# Patient Record
Sex: MALE | Race: WHITE | NOT HISPANIC OR LATINO | Employment: OTHER | URBAN - METROPOLITAN AREA
[De-identification: names, ages, dates, MRNs, and addresses within clinical notes are randomized per-mention and may not be internally consistent; named-entity substitution may affect disease eponyms.]

---

## 2017-01-03 ENCOUNTER — OFFICE VISIT (OUTPATIENT)
Dept: AUDIOLOGY | Facility: CLINIC | Age: 62
End: 2017-01-03
Payer: COMMERCIAL

## 2017-01-03 ENCOUNTER — OFFICE VISIT (OUTPATIENT)
Dept: OTOLARYNGOLOGY | Facility: CLINIC | Age: 62
End: 2017-01-03
Payer: COMMERCIAL

## 2017-01-03 DIAGNOSIS — H93.292 ABNORMAL AUDITORY PERCEPTION OF LEFT EAR: Primary | ICD-10-CM

## 2017-01-03 DIAGNOSIS — H69.92 DYSFUNCTION OF EUSTACHIAN TUBE, LEFT: Primary | ICD-10-CM

## 2017-01-03 PROCEDURE — 92567 TYMPANOMETRY: CPT | Performed by: AUDIOLOGIST

## 2017-01-03 PROCEDURE — 31575 DIAGNOSTIC LARYNGOSCOPY: CPT | Performed by: OTOLARYNGOLOGY

## 2017-01-03 PROCEDURE — 92557 COMPREHENSIVE HEARING TEST: CPT | Performed by: AUDIOLOGIST

## 2017-01-03 PROCEDURE — 99202 OFFICE O/P NEW SF 15 MIN: CPT | Mod: 25 | Performed by: OTOLARYNGOLOGY

## 2017-01-03 RX ORDER — MOMETASONE FUROATE MONOHYDRATE 50 UG/1
2 SPRAY, METERED NASAL DAILY
Qty: 3 BOX | Refills: 1 | Status: SHIPPED | OUTPATIENT
Start: 2017-01-03 | End: 2018-04-16

## 2017-01-03 ASSESSMENT — ENCOUNTER SYMPTOMS
DOUBLE VISION: 0
VOMITING: 0
BRUISES/BLEEDS EASILY: 0
TINGLING: 0
TREMORS: 0
CONSTITUTIONAL NEGATIVE: 1
HEARTBURN: 0
BLURRED VISION: 0
COUGH: 0
DIZZINESS: 0
NAUSEA: 0

## 2017-01-03 ASSESSMENT — PAIN SCALES - GENERAL: PAINLEVEL: NO PAIN (0)

## 2017-01-03 NOTE — PROGRESS NOTES
AUDIOLOGY REPORT    SUMMARY: Audiology visit completed. See audiogram for results.    RECOMMENDATIONS: Follow-up with ENT.    Ugo Wilson  Doctor of Audiology  [place] License # [id number]

## 2017-01-03 NOTE — NURSING NOTE
"Sven Givens's goals for this visit include:   Chief Complaint   Patient presents with     Consult     Left ear fluid       He requests these members of his care team be copied on today's visit information: pcp      PCP: Pam Dela Cruz    Referring Provider:  No referring provider defined for this encounter.    Chief Complaint   Patient presents with     Consult     Left ear fluid       Initial There were no vitals taken for this visit. Estimated body mass index is 34.92 kg/(m^2) as calculated from the following:    Height as of 12/20/16: 1.702 m (5' 7.01\").    Weight as of 12/20/16: 101.152 kg (223 lb).  BP completed using cuff size: NA (Not Taken)    "

## 2017-01-03 NOTE — PROGRESS NOTES
HPI    This is a 61 year old patient who has been having plugged up sensation and water feeling in his left ear. Started in September after his surgery. Denies any ear drainage, pain, tinnitus, dizziness or vertigo. No bleeding from nose or breathing issues through his nose. He has a hx of diabetes, GERD, PRESLEY, non-small cell ca, obesity, radiation exposure, and anemia. He tried CPAP in the past for his PRESLEY. He loss weight right after the surgery and gained weight again. He has seasonal allergies but no symptoms at this point.     Review of Systems   Constitutional: Negative.    HENT: Negative.    Eyes: Negative for blurred vision and double vision.   Respiratory: Negative for cough.    Gastrointestinal: Negative for heartburn, nausea and vomiting.   Skin: Negative.    Neurological: Negative for dizziness, tingling and tremors.   Endo/Heme/Allergies: Positive for environmental allergies. Does not bruise/bleed easily.         Physical Exam   Constitutional: He is well-developed, well-nourished, and in no distress.   HENT:   Head: Normocephalic and atraumatic.   Right Ear: Tympanic membrane, external ear and ear canal normal. No drainage, swelling or tenderness. No middle ear effusion. No decreased hearing is noted.   Left Ear: Tympanic membrane, external ear and ear canal normal. No drainage, swelling or tenderness.  No middle ear effusion. No decreased hearing is noted.   Nose: Mucosal edema present. No rhinorrhea or septal deviation.   Mouth/Throat: Uvula is midline, oropharynx is clear and moist and mucous membranes are normal. No oropharyngeal exudate.   Eyes: Pupils are equal, round, and reactive to light.   Neck: Neck supple. No tracheal deviation present. No thyromegaly present.   Lymphadenopathy:     He has no cervical adenopathy.     Diagnostic nasal endoscopy:     He was seen in the room and identified. Pros and cons of the procedure were explained to the patient. The procedure and its alternatives were  explained to the patient in lay terms. His questions were answered. His symptoms required a diagnostic endoscopic evaluation under local anesthesia. After obtaining an informed consent from the patient, 3% Lidocaine with 1: 100.000 epinephrine spray was applied to each nostril. Then a flexible scopic exam was performed. Septum is in the midline. Ostiomeatal complexes are free of disease but swollen and narrow. No pus or polyp seen. Inferior turbinates are enlarged. Nasopharynx is normal. Rosenmüller fossa and torus tubarius are normal. Epiglottis, hypopharynx, false vocal folds are normal. No pooling in pyriform fossae. Vocal cords are mobile and normal. He tolerated the procedure well and left the room with no complications.      A/P  ETD  I will start a topical nasal steroid and see him in the f/u in a month. If the problem continues, we can try a myringotomy. Questions were answered.

## 2017-03-05 DIAGNOSIS — I10 ESSENTIAL HYPERTENSION WITH GOAL BLOOD PRESSURE LESS THAN 140/90: ICD-10-CM

## 2017-03-05 NOTE — TELEPHONE ENCOUNTER
terazosin (HYTRIN) 5 MG capsule      Last Written Prescription Date: 9/9/16  Last Fill Quantity: 90, # refills: 1    Last Office Visit with [facility], [facility] or [facility] prescribing provider:  10/17/16   Future Office Visit:    Next 5 appointments (look out 90 days)     May 26, 2017  3:15 PM CDT   Return Visit with Lay Valencia MD   [facility] ([facility])    [address] [place] [id number] [phone]                    BP Readings from Last 3 Encounters:   12/20/16 126/75   12/15/16 (!) 146/93   10/17/16 105/69           Manuel Faarax  [facility]

## 2017-03-08 RX ORDER — TERAZOSIN 5 MG/1
CAPSULE ORAL
Qty: 90 CAPSULE | Refills: 1 | Status: SHIPPED | OUTPATIENT
Start: 2017-03-08 | End: 2017-09-03

## 2017-03-08 NOTE — TELEPHONE ENCOUNTER
Prescription approved per [facility] Refill Protocol.    Flor Beatty RN, [facility]

## 2017-03-09 ENCOUNTER — TELEPHONE (OUTPATIENT)
Dept: FAMILY MEDICINE | Facility: CLINIC | Age: 62
End: 2017-03-09

## 2017-03-09 DIAGNOSIS — E11.42 TYPE 2 DIABETES MELLITUS WITH DIABETIC POLYNEUROPATHY, WITHOUT LONG-TERM CURRENT USE OF INSULIN (H): Primary | ICD-10-CM

## 2017-03-09 NOTE — TELEPHONE ENCOUNTER
Routing refill request to provider for review/approval because:  Drug not active on patient's medication list  Rehana De Santiago RN

## 2017-03-09 NOTE — TELEPHONE ENCOUNTER
Reason for Call:  Other prescription    Detailed comments: [facility] calling for Pt previously called in to the pharmacy for one touch test strips a week ago but were not received.  They were hoping Dr. Dela Cruz can send in a Rx as soon as possible for Pt is out.    Phone Number Pharmacy can be reached at: Other phone number:  [phone]    Best Time: Anytime    Can we leave a detailed message on this number? YES    Call taken on 3/9/2017 at 2:14 PM by Amadeo Krishnan

## 2017-03-11 ENCOUNTER — MYC REFILL (OUTPATIENT)
Dept: FAMILY MEDICINE | Facility: CLINIC | Age: 62
End: 2017-03-11

## 2017-03-11 DIAGNOSIS — E11.42 TYPE 2 DIABETES MELLITUS WITH DIABETIC POLYNEUROPATHY, WITHOUT LONG-TERM CURRENT USE OF INSULIN (H): ICD-10-CM

## 2017-03-12 DIAGNOSIS — E11.65 TYPE 2 DIABETES MELLITUS WITH HYPERGLYCEMIA, WITHOUT LONG-TERM CURRENT USE OF INSULIN (H): Primary | ICD-10-CM

## 2017-03-12 DIAGNOSIS — E11.9 TYPE 2 DIABETES MELLITUS WITHOUT COMPLICATION (H): ICD-10-CM

## 2017-03-12 NOTE — TELEPHONE ENCOUNTER
metFORMIN          Last Written Prescription Date: 3/18/16  Last Fill Quantity: 180, # refills: 3  Last Office Visit with G, P or [facility] prescribing provider:  10/17/16   Next 5 appointments (look out 90 days)     May 26, 2017  3:15 PM CDT   Return Visit with Lay Valencia MD   [facility] ([facility])    [address] [place] [address] [phone]                   BP Readings from Last 3 Encounters:   12/20/16 126/75   12/15/16 (!) 146/93   10/17/16 105/69     Lab Results   Component Value Date    MICROL 38 10/30/2015     No results found for: MICROALBUMIN  Creatinine   Date Value Ref Range Status   09/24/2016 0.65 (L) 0.66 - 1.25 mg/dL Final   ]  GFR Estimate   Date Value Ref Range Status   09/24/2016 >90  Non  GFR Calc   >60 mL/min/1.7m2 Final   09/24/2016 >90  Non  GFR Calc   >60 mL/min/1.7m2 Final   09/23/2016 >90  Non  GFR Calc   >60 mL/min/1.7m2 Final     GFR Estimate If Black   Date Value Ref Range Status   09/24/2016 >90   GFR Calc   >60 mL/min/1.7m2 Final   09/24/2016 >90   GFR Calc   >60 mL/min/1.7m2 Final   09/23/2016 >90   GFR Calc   >60 mL/min/1.7m2 Final     Lab Results   Component Value Date    CHOL 147 03/18/2016     Lab Results   Component Value Date    HDL 29 03/18/2016     Lab Results   Component Value Date    LDL 84 03/18/2016    LDL 69 11/09/2009     Lab Results   Component Value Date    TRIG 172 03/18/2016     Lab Results   Component Value Date    CHOLHDLRATIO 4.8 03/02/2015     Lab Results   Component Value Date    AST 32 05/13/2016     Lab Results   Component Value Date    ALT 60 05/13/2016     Lab Results   Component Value Date    A1C 7.4 09/16/2016    A1C 7.1 03/18/2016    A1C 7.5 10/30/2015    A1C 6.8 03/02/2015    A1C 8.9 10/15/2014     Potassium   Date Value Ref Range Status   09/24/2016 3.8 3.4 - 5.3 mmol/L Final

## 2017-03-13 NOTE — TELEPHONE ENCOUNTER
Message from MyChart:  Original authorizing provider: Pam Dela Cruz MD    Sven Nageltucker would like a refill of the following medications:  blood glucose monitoring (ONE TOUCH ULTRA) test strip [Pam Dela Cruz MD]    Preferred pharmacy: [facility]/PHARMACY #[id number] - [place], [place] - [address] [address]    Comment:

## 2017-03-14 NOTE — TELEPHONE ENCOUNTER
Routing refill request to provider for review/approval because:  Labs out of range:  Creatinine  Lise Villa RN

## 2017-03-25 ENCOUNTER — OFFICE VISIT (OUTPATIENT)
Dept: URGENT CARE | Facility: URGENT CARE | Age: 62
End: 2017-03-25
Payer: COMMERCIAL

## 2017-03-25 VITALS
WEIGHT: 220 LBS | TEMPERATURE: 98.6 F | HEART RATE: 116 BPM | DIASTOLIC BLOOD PRESSURE: 78 MMHG | BODY MASS INDEX: 34.45 KG/M2 | SYSTOLIC BLOOD PRESSURE: 132 MMHG | OXYGEN SATURATION: 96 %

## 2017-03-25 DIAGNOSIS — J20.9 ACUTE BRONCHITIS, UNSPECIFIED ORGANISM: ICD-10-CM

## 2017-03-25 DIAGNOSIS — J98.01 BRONCHOSPASM: Primary | ICD-10-CM

## 2017-03-25 PROCEDURE — 99213 OFFICE O/P EST LOW 20 MIN: CPT | Performed by: FAMILY MEDICINE

## 2017-03-25 RX ORDER — ALBUTEROL SULFATE 90 UG/1
2-4 AEROSOL, METERED RESPIRATORY (INHALATION) EVERY 4 HOURS PRN
Qty: 1 INHALER | Refills: 1 | Status: SHIPPED | OUTPATIENT
Start: 2017-03-25 | End: 2017-11-10

## 2017-03-25 NOTE — MR AVS SNAPSHOT
After Visit Summary   3/25/2017    Sven Givens    MRN: [id number]           Patient Information     Date Of Birth          1955        Visit Information        Provider Department      3/25/2017 11:20 AM Ahsan Veronica MD [facility]        Today's Diagnoses     Bronchospasm    -  1    Acute bronchitis, unspecified organism           Follow-ups after your visit        Your next 10 appointments already scheduled     May 23, 2017  2:00 PM CDT   LAB with LAB ONC [facility])    [id number] [place] [id number] [phone]           Patient must bring picture ID.  Patient should be prepared to give a urine specimen  Please do not eat 10-12 hours before your appointment if you are coming in fasting for labs on lipids, cholesterol, or glucose (sugar).  Pregnant women should follow their Care Team instructions. Water with medications is okay. Do not drink coffee or other fluids.   If you have concerns about taking  your medications, please ask at office or if scheduling via [employer], send a message by clicking on Secure Messaging, Message Your Care Team.            May 23, 2017  2:30 PM CDT   CT CHEST ABDOMEN PELVIS W/O & W CONTRAST with MGCT1   [facility])    [address] [address] [place] [id number] [phone]           Please bring any scans or X-rays taken at other hospitals, if similar tests were done. Also bring a list of your medicines, including vitamins, minerals and over-the-counter drugs. It is safest to leave personal items at home.  Be sure to tell your doctor:   If you have any allergies.   If there s any chance you are pregnant.   If you are breastfeeding.   If you have any special needs.  You may have contrast for this exam. To prepare:   Do not eat or drink for 2 hours before your exam. If you need to take medicine, you may take it  with small sips of water. (We may ask you to take liquid medicine as well.)   The day before your exam, drink extra fluids at least six 8-ounce glasses (unless your doctor tells you to restrict your fluids).  Patients over 70 or patients with diabetes or kidney problems:   If you haven t had a blood test (creatinine test) within the last 30 days, go to your clinic or Diagnostic Imaging Department for this test.  If you have diabetes:   If your kidney function is normal, continue taking your metformin (Avandamet, Glucophage, Glucovance, Metaglip) on the day of your exam.   If your kidney function is abnormal, wait 48 hours before restarting this medicine.  You will have oral contrast for this exam:   You will drink the contrast at home. Get this from your clinic or Diagnostic Imaging Department. Please follow the directions given.  Please wear loose clothing, such as a sweat suit or jogging clothes. Avoid snaps, zippers and other metal. We may ask you to undress and put on a hospital gown.  If you have any questions, please call the Imaging Department where you will have your exam.            May 26, 2017  3:15 PM CDT   Return Visit with Lay Valencia MD   [facility] ([facility])    [address] [place] [id number] [phone]              Who to contact     If you have questions or need follow up information about today's clinic visit or your schedule please contact [facility] directly at [phone].  Normal or non-critical lab and imaging results will be communicated to you by MyChart, letter or phone within 4 business days after the clinic has received the results. If you do not hear from us within 7 days, please contact the clinic through [employer] or phone. If you have a critical or abnormal lab result, we will notify you by phone as soon as possible.  Submit refill requests through [employer] or call your pharmacy and they will forward the  refill request to us. Please allow 3 business days for your refill to be completed.          Additional Information About Your Visit        [employer] Information     [employer] gives you secure access to your electronic health record. If you see a primary care provider, you can also send messages to your care team and make appointments. If you have questions, please call your primary care clinic.  If you do not have a primary care provider, please call [phone] and they will assist you.        Care EveryWhere ID     This is your Care EveryWhere ID. This could be used by other organizations to access your [place] medical records  [id number]        Your Vitals Were     Pulse Temperature Pulse Oximetry BMI (Body Mass Index)          116 98.6  F (37  C) (Oral) 96% 34.45 kg/m2         Blood Pressure from Last 3 Encounters:   03/25/17 132/78   12/20/16 126/75   12/15/16 (!) 146/93    Weight from Last 3 Encounters:   03/25/17 220 lb (99.8 kg)   12/20/16 223 lb (101.2 kg)   12/15/16 226 lb 3.2 oz (102.6 kg)              Today, you had the following     No orders found for display         Today's Medication Changes          These changes are accurate as of: 3/25/17 12:41 PM.  If you have any questions, ask your nurse or doctor.               Start taking these medicines.        Dose/Directions    albuterol 108 (90 BASE) MCG/ACT Inhaler   Commonly known as:  PROAIR HFA/PROVENTIL HFA/VENTOLIN HFA   Used for:  Bronchospasm, Acute bronchitis, unspecified organism   Started by:  Ahsan Veronica MD        Dose:  2-4 puff   Inhale 2-4 puffs into the lungs every 4 hours as needed for shortness of breath / dyspnea or wheezing   Quantity:  1 Inhaler   Refills:  1            Where to get your medicines      These medications were sent to [facility]/pharmacy #[id number] - [place] - [address] [facility]  [address] [facility] [place] [facility] [address]     Phone:  [phone]     albuterol 108 (90 BASE) MCG/ACT Inhaler                 Primary Care Provider Office Phone # Fax #    Pam Yesenia Dela Cruz [phone]       [facility] [address]  [facility] [address]        Thank you!     Thank you for choosing [facility]  for your care. Our goal is always to provide you with excellent care. Hearing back from our patients is one way we can continue to improve our services. Please take a few minutes to complete the written survey that you may receive in the mail after your visit with us. Thank you!             Your Updated Medication List - Protect others around you: Learn how to safely use, store and throw away your medicines at www.disposemymeds.org.          This list is accurate as of: 3/25/17 12:41 PM.  Always use your most recent med list.                   Brand Name Dispense Instructions for use    albuterol 108 (90 BASE) MCG/ACT Inhaler    PROAIR HFA/PROVENTIL HFA/VENTOLIN HFA    1 Inhaler    Inhale 2-4 puffs into the lungs every 4 hours as needed for shortness of breath / dyspnea or wheezing       allopurinol 300 MG tablet    ZYLOPRIM    90 tablet    TAKE 1 TABLET (300 MG) BY MOUTH DAILY       aspirin 81 MG tablet      1 TABLET DAILY(*)       atorvastatin 40 MG tablet    LIPITOR    90 tablet    Take 1 tablet (40 mg) by mouth daily       Azelaic Acid 15 % gel    FINACEA    50 g    Apply small amount twice a day to skin       blood glucose monitoring test strip    ONE TOUCH ULTRA    100 strip    Use to test blood sugars 2 times daily or as directed.       cinnamon 500 MG Tabs      Take one tablet twice daily.       fish oil-omega-3 fatty acids 1000 MG capsule      daily       fluticasone 50 MCG/ACT spray    FLONASE    1 Bottle    Spray 1-2 sprays into both nostrils daily       glimepiride 4 MG tablet    AMARYL    180 tablet    TAKE 1 TABLET (4 MG) BY MOUTH 2 TIMES DAILY       guaiFENesin-codeine 100-10 MG/5ML Soln solution    ROBITUSSIN AC    120 mL    Take 5 mLs by mouth every 4 hours as  needed for cough       losartan-hydrochlorothiazide 100-25 MG per tablet    HYZAAR    90 tablet    TAKE 1 TABLET BY MOUTH DAILY       metFORMIN 1000 MG tablet    GLUCOPHAGE    180 tablet    TAKE 1 TABLET (1,000 MG) BY MOUTH 2 TIMES DAILY (WITH MEALS)       minocycline 100 MG tablet    DYNACIN    60 tablet    Take 1 tablet by mouth. Twice daily for 10 days with flare of rosacea.       mometasone 50 MCG/ACT spray    NASONEX    3 Box    Spray 2 sprays into both nostrils daily       MULTIPLE VITAMINS PO      1 a day       nitroglycerin 0.4 MG sublingual tablet    NITROSTAT    25 tablet    Place 1 tablet (0.4 mg) under the tongue every 5 minutes as needed for chest pain If you are still having symptoms after 3 doses (15 minutes) call 911.       omeprazole 20 MG CR capsule    priLOSEC    180 capsule    Take 1 capsule (20 mg) by mouth 2 times daily Take 30-60 minutes before a meal.       order for DME     200 strip    One touch ultra  Test strips for checking blood sugars 2 times a day.       terazosin 5 MG capsule    HYTRIN    90 capsule    TAKE 1 CAPSULE (5 MG) BY MOUTH AT BEDTIME

## 2017-03-25 NOTE — NURSING NOTE
"Chief Complaint   Patient presents with     Cough     Pt c/o Loss of appetite, body aches, headache, cough, trouble sleeping. Sx started Saturday when pt was in [place].       Initial /78 (BP Location: Left arm, Patient Position: Chair, Cuff Size: Adult Large)  Pulse 116  Temp 98.6  F (37  C) (Oral)  Wt 220 lb (99.8 kg)  SpO2 96%  BMI 34.45 kg/m2 Estimated body mass index is 34.45 kg/(m^2) as calculated from the following:    Height as of 12/20/16: 5' 7.01\" (1.702 m).    Weight as of this encounter: 220 lb (99.8 kg).  Medication Reconciliation: complete     Clementina Cramer CMA (AAMA)      "

## 2017-03-25 NOTE — PROGRESS NOTES
Some of this note was populated by a medical assistant.      SUBJECTIVE:                                                    Sven Givens is a 61 year old male who presents to clinic today for the following health issues:    RESPIRATORY SYMPTOMS      Duration: Saturday last week    Description  Loss of appetite, body aches, headache -- apical, cough -- dry , trouble sleeping, mild rhinorrhea     Severity: moderate    Accompanying signs and symptoms: None    History (predisposing factors):  none    Precipitating or alleviating factors: Was in [place] last week    Therapies tried and outcome:  rest and fluids, cough syrup, medication in [place] for congestion- no relief.      Hx of non-small cell cancer of left lung with resection in 9/2016       Problem list and histories reviewed & adjusted, as indicated.  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, overuse, and pressure     Hypertriglyceridemia     Benign prostatic hyperplasia with lower urinary tract symptoms, unspecified morphology     History of radiation exposure     Non-small cell carcinoma of lung, left (H)     Nonalcoholic hepatosteatosis     Past Surgical History:   Procedure Laterality Date     BRONCHOSCOPY FLEXIBLE N/A 9/23/2016    Procedure: BRONCHOSCOPY FLEXIBLE;  Surgeon: Gayle Moya MD;  Location: [facility] OR     COLONOSCOPY  5-03     GENITOURINARY SURGERY      kidney stones     THORACOSCOPIC WEDGE RESECTION LUNG Left 9/23/2016    Procedure: THORACOSCOPIC WEDGE  RESECTION LUNG;  Surgeon: Gayle Moya MD;  Location: [facility] OR     VASCULAR SURGERY      varicose vein       Social History   Substance Use Topics     Smoking status: Former Smoker     Quit date: 2/1/1992     Smokeless tobacco: Never Used      Comment: 15 + years      Alcohol use No     Family History   Problem Relation Age of Onset     CEREBROVASCULAR DISEASE Father      CANCER Sister      ovarary          Current Outpatient Prescriptions   Medication Sig Dispense Refill     metFORMIN (GLUCOPHAGE) 1000 MG tablet TAKE 1 TABLET (1,000 MG) BY MOUTH 2 TIMES DAILY (WITH MEALS) 180 tablet 3     order for DME One touch ultra  Test strips for checking blood sugars 2 times a day. 200 strip 1     blood glucose monitoring (ONE TOUCH ULTRA) test strip Use to test blood sugars 2 times daily or as directed. 100 strip 11     terazosin (HYTRIN) 5 MG capsule TAKE 1 CAPSULE (5 MG) BY MOUTH AT BEDTIME 90 capsule 1     mometasone (NASONEX) 50 MCG/ACT spray Spray 2 sprays into both nostrils daily 3 Box 1     glimepiride (AMARYL) 4 MG tablet TAKE 1 TABLET (4 MG) BY MOUTH 2 TIMES DAILY 180 tablet 1     minocycline (DYNACIN) 100 MG tablet Take 1 tablet by mouth. Twice daily for 10 days with flare of rosacea. 60 tablet 2     allopurinol (ZYLOPRIM) 300 MG tablet TAKE 1 TABLET (300 MG) BY MOUTH DAILY 90 tablet 3     losartan-hydrochlorothiazide (HYZAAR) 100-25 MG per tablet TAKE 1 TABLET BY MOUTH DAILY 90 tablet 3     guaiFENesin-codeine (ROBITUSSIN AC) 100-10 MG/5ML SOLN Take 5 mLs by mouth every 4 hours as needed for cough 120 mL 1     fluticasone (FLONASE) 50 MCG/ACT nasal spray Spray 1-2 sprays into both nostrils daily 1 Bottle 1     Azelaic Acid (FINACEA) 15 % gel Apply small amount twice a day to skin 50 g 3     atorvastatin (LIPITOR) 40 MG tablet Take 1 tablet (40 mg) by mouth daily 90 tablet 3     nitroglycerin (NITROSTAT) 0.4 MG SL tablet Place 1 tablet (0.4 mg) under the tongue every 5 minutes as needed for chest pain If you are  still having symptoms after 3 doses (15 minutes) call 911. 25 tablet 3     omeprazole (PRILOSEC) 20 MG capsule Take 1 capsule (20 mg) by mouth 2 times daily Take 30-60 minutes before a meal. 180 capsule 3     CINNAMON 500 MG OR TABS Take one tablet twice daily.       FISH OIL 1000 MG OR CAPS daily       ASPIRIN 81 MG PO TABS 1 TABLET DAILY(*)       MULTIPLE VITAMINS OR 1 a day       [DISCONTINUED] ORDER FOR DME One touch ultra  Test strips for checking blood sugars 2 times a day. 200 strip 1     Allergies   Allergen Reactions     Penicillins Rash     Ace Inhibitors Cough     Indomethacin Other (See Comments)     Severe dizziness       Reviewed and updated as needed this visit by clinical staff       Reviewed and updated as needed this visit by Provider         ROS:  Constitutional, HEENT, cardiovascular, pulmonary, gi and gu systems are negative, except as otherwise noted.    OBJECTIVE:                                                    /78 (BP Location: Left arm, Patient Position: Chair, Cuff Size: Adult Large)  Pulse 116  Temp 98.6  F (37  C) (Oral)  Wt 220 lb (99.8 kg)  SpO2 96%  BMI 34.45 kg/m2  Body mass index is 34.45 kg/(m^2).  GENERAL: healthy, alert and no distress  NECK: no adenopathy, no asymmetry, masses, or scars and thyroid normal to palpation  RESP: lungs clear to auscultation - no rales, rhonchi or wheezes  CV: regular rate and rhythm, normal S1 S2, no S3 or S4, no murmur, click or rub, no peripheral edema and peripheral pulses strong  ABDOMEN: soft, nontender, no hepatosplenomegaly, no masses and bowel sounds normal  MS: no gross musculoskeletal defects noted, no edema    Diagnostic Test Results:  none      ASSESSMENT/PLAN:                                                        ICD-10-CM    1. Bronchospasm J98.01 albuterol (PROAIR HFA/PROVENTIL HFA/VENTOLIN HFA) 108 (90 BASE) MCG/ACT Inhaler   2. Acute bronchitis, unspecified organism J20.9 albuterol (PROAIR HFA/PROVENTIL HFA/VENTOLIN  HFA) 108 (90 BASE) MCG/ACT Inhaler        PLAN  Patient educational/instructional material provided including reasons for follow-up   The patient indicates understanding of these issues and agrees with the plan.  Ahsan Veronica MD      [facility]

## 2017-03-28 ENCOUNTER — RADIANT APPOINTMENT (OUTPATIENT)
Dept: GENERAL RADIOLOGY | Facility: CLINIC | Age: 62
End: 2017-03-28
Attending: PHYSICIAN ASSISTANT
Payer: COMMERCIAL

## 2017-03-28 ENCOUNTER — OFFICE VISIT (OUTPATIENT)
Dept: FAMILY MEDICINE | Facility: CLINIC | Age: 62
End: 2017-03-28
Payer: COMMERCIAL

## 2017-03-28 VITALS
RESPIRATION RATE: 12 BRPM | HEIGHT: 67 IN | DIASTOLIC BLOOD PRESSURE: 70 MMHG | BODY MASS INDEX: 34.45 KG/M2 | OXYGEN SATURATION: 94 % | WEIGHT: 219.5 LBS | TEMPERATURE: 98.5 F | HEART RATE: 112 BPM | SYSTOLIC BLOOD PRESSURE: 110 MMHG

## 2017-03-28 DIAGNOSIS — R19.7 DIARRHEA, UNSPECIFIED TYPE: ICD-10-CM

## 2017-03-28 DIAGNOSIS — C34.92 NON-SMALL CELL CARCINOMA OF LUNG, LEFT (H): ICD-10-CM

## 2017-03-28 DIAGNOSIS — R05.9 COUGH: ICD-10-CM

## 2017-03-28 DIAGNOSIS — J20.9 ACUTE BRONCHITIS WITH SYMPTOMS > 10 DAYS: Primary | ICD-10-CM

## 2017-03-28 DIAGNOSIS — R11.0 NAUSEA: ICD-10-CM

## 2017-03-28 DIAGNOSIS — E11.42 TYPE 2 DIABETES MELLITUS WITH DIABETIC POLYNEUROPATHY, WITHOUT LONG-TERM CURRENT USE OF INSULIN (H): ICD-10-CM

## 2017-03-28 LAB
ALBUMIN SERPL-MCNC: 3.4 G/DL (ref 3.4–5)
ALP SERPL-CCNC: 131 U/L (ref 40–150)
ALT SERPL W P-5'-P-CCNC: 41 U/L (ref 0–70)
ANION GAP SERPL CALCULATED.3IONS-SCNC: 13 MMOL/L (ref 3–14)
AST SERPL W P-5'-P-CCNC: 23 U/L (ref 0–45)
BASOPHILS # BLD AUTO: 0 10E9/L (ref 0–0.2)
BASOPHILS NFR BLD AUTO: 0.1 %
BILIRUB SERPL-MCNC: 0.6 MG/DL (ref 0.2–1.3)
BUN SERPL-MCNC: 13 MG/DL (ref 7–30)
CALCIUM SERPL-MCNC: 9.4 MG/DL (ref 8.5–10.1)
CHLORIDE SERPL-SCNC: 98 MMOL/L (ref 94–109)
CO2 SERPL-SCNC: 26 MMOL/L (ref 20–32)
CREAT SERPL-MCNC: 0.76 MG/DL (ref 0.66–1.25)
DIFFERENTIAL METHOD BLD: ABNORMAL
EOSINOPHIL # BLD AUTO: 0.1 10E9/L (ref 0–0.7)
EOSINOPHIL NFR BLD AUTO: 1 %
ERYTHROCYTE [DISTWIDTH] IN BLOOD BY AUTOMATED COUNT: 13.1 % (ref 10–15)
FLUAV+FLUBV AG SPEC QL: NEGATIVE
FLUAV+FLUBV AG SPEC QL: NORMAL
GFR SERPL CREATININE-BSD FRML MDRD: ABNORMAL ML/MIN/1.7M2
GLUCOSE BLD-MCNC: 167 MG/DL (ref 70–99)
GLUCOSE SERPL-MCNC: 139 MG/DL (ref 70–99)
HBA1C MFR BLD: 7.7 % (ref 4.3–6)
HCT VFR BLD AUTO: 36 % (ref 40–53)
HGB BLD-MCNC: 12.1 G/DL (ref 13.3–17.7)
LYMPHOCYTES # BLD AUTO: 1.8 10E9/L (ref 0.8–5.3)
LYMPHOCYTES NFR BLD AUTO: 13.1 %
MCH RBC QN AUTO: 29.2 PG (ref 26.5–33)
MCHC RBC AUTO-ENTMCNC: 33.6 G/DL (ref 31.5–36.5)
MCV RBC AUTO: 87 FL (ref 78–100)
MONOCYTES # BLD AUTO: 1.1 10E9/L (ref 0–1.3)
MONOCYTES NFR BLD AUTO: 7.8 %
NEUTROPHILS # BLD AUTO: 10.7 10E9/L (ref 1.6–8.3)
NEUTROPHILS NFR BLD AUTO: 78 %
PLATELET # BLD AUTO: 304 10E9/L (ref 150–450)
POTASSIUM SERPL-SCNC: 3.4 MMOL/L (ref 3.4–5.3)
PROT SERPL-MCNC: 8.1 G/DL (ref 6.8–8.8)
RBC # BLD AUTO: 4.14 10E12/L (ref 4.4–5.9)
SODIUM SERPL-SCNC: 137 MMOL/L (ref 133–144)
SPECIMEN SOURCE: NORMAL
WBC # BLD AUTO: 13.7 10E9/L (ref 4–11)

## 2017-03-28 PROCEDURE — 82947 ASSAY GLUCOSE BLOOD QUANT: CPT | Performed by: PHYSICIAN ASSISTANT

## 2017-03-28 PROCEDURE — 71020 XR CHEST 2 VW: CPT

## 2017-03-28 PROCEDURE — 83036 HEMOGLOBIN GLYCOSYLATED A1C: CPT | Performed by: PHYSICIAN ASSISTANT

## 2017-03-28 PROCEDURE — 99214 OFFICE O/P EST MOD 30 MIN: CPT | Performed by: PHYSICIAN ASSISTANT

## 2017-03-28 PROCEDURE — 36415 COLL VENOUS BLD VENIPUNCTURE: CPT | Performed by: PHYSICIAN ASSISTANT

## 2017-03-28 PROCEDURE — 85025 COMPLETE CBC W/AUTO DIFF WBC: CPT | Performed by: PHYSICIAN ASSISTANT

## 2017-03-28 PROCEDURE — 87804 INFLUENZA ASSAY W/OPTIC: CPT | Performed by: PHYSICIAN ASSISTANT

## 2017-03-28 PROCEDURE — 80053 COMPREHEN METABOLIC PANEL: CPT | Performed by: PHYSICIAN ASSISTANT

## 2017-03-28 RX ORDER — LEVOFLOXACIN 500 MG/1
500 TABLET, FILM COATED ORAL DAILY
Qty: 10 TABLET | Refills: 0 | Status: SHIPPED | OUTPATIENT
Start: 2017-03-28 | End: 2017-04-14

## 2017-03-28 NOTE — PATIENT INSTRUCTIONS
Bring in stool for culture  Take levaquin daily for 10 days  Continue using the albuterol inhaler.   Follow up with us if no improvement over the next 5 days.  Return urgently if any change in symptoms like increasing shortness of breath, fever or other change in symptoms.   Schedule appointment with your Primary Care provider to review your diabetes. Your A1C was up to 7.7 and above our goal of 7.0.

## 2017-03-28 NOTE — NURSING NOTE
"Chief Complaint   Patient presents with     ER F/U       Initial /70 (BP Location: Right arm, Patient Position: Chair, Cuff Size: Adult Regular)  Pulse 112  Temp 98.5  F (36.9  C) (Oral)  Resp 12  Ht 1.702 m (5' 7\")  Wt 99.6 kg (219 lb 8 oz)  SpO2 94%  BMI 34.38 kg/m2 Estimated body mass index is 34.38 kg/(m^2) as calculated from the following:    Height as of this encounter: 1.702 m (5' 7\").    Weight as of this encounter: 99.6 kg (219 lb 8 oz).  Medication Reconciliation: complete     Maria Teresa Stanley        "

## 2017-03-28 NOTE — MR AVS SNAPSHOT
After Visit Summary   3/28/2017    Sven Givens    MRN: [id number]           Patient Information     Date Of Birth          1955        Visit Information        Provider Department      3/28/2017 2:00 PM Park Darden PA-C [facility]        Today's Diagnoses     Nausea    -  1    Cough        Type 2 diabetes mellitus with diabetic polyneuropathy, without long-term current use of insulin (H)        Diarrhea, unspecified type        Acute bronchitis with symptoms > 10 days          Care Instructions    Bring in stool for culture  Take levaquin daily for 10 days  Continue using the albuterol inhaler.   Follow up with us if no improvement over the next 5 days.  Return urgently if any change in symptoms like increasing shortness of breath, fever or other change in symptoms.   Schedule appointment with your Primary Care provider to review your diabetes. Your A1C was up to 7.7 and above our goal of 7.0.           Follow-ups after your visit        Your next 10 appointments already scheduled     Apr 14, 2017  8:00 AM CDT   PHYSICAL with Pam Dela Cruz MD   [facility]    [address] [facility] [address] [phone]            May 23, 2017  2:00 PM CDT   LAB with LAB ONC [facility] ([facility])    [id number] [place] [id number] [phone]           Patient must bring picture ID.  Patient should be prepared to give a urine specimen  Please do not eat 10-12 hours before your appointment if you are coming in fasting for labs on lipids, cholesterol, or glucose (sugar).  Pregnant women should follow their Care Team instructions. Water with medications is okay. Do not drink coffee or other fluids.   If you have concerns about taking  your medications, please ask at office or if scheduling via YUPIQ, send a message by clicking on Secure  Messaging, Message Your Care Team.            May 23, 2017  2:30 PM CDT   CT CHEST ABDOMEN PELVIS W/O & W CONTRAST with MGCT1   [facility] ([facility]    [address] [address] [place] [id number] [phone]           Please bring any scans or X-rays taken at other hospitals, if similar tests were done. Also bring a list of your medicines, including vitamins, minerals and over-the-counter drugs. It is safest to leave personal items at home.  Be sure to tell your doctor:   If you have any allergies.   If there s any chance you are pregnant.   If you are breastfeeding.   If you have any special needs.  You may have contrast for this exam. To prepare:   Do not eat or drink for 2 hours before your exam. If you need to take medicine, you may take it with small sips of water. (We may ask you to take liquid medicine as well.)   The day before your exam, drink extra fluids at least six 8-ounce glasses (unless your doctor tells you to restrict your fluids).  Patients over 70 or patients with diabetes or kidney problems:   If you haven t had a blood test (creatinine test) within the last 30 days, go to your clinic or Diagnostic Imaging Department for this test.  If you have diabetes:   If your kidney function is normal, continue taking your metformin (Avandamet, Glucophage, Glucovance, Metaglip) on the day of your exam.   If your kidney function is abnormal, wait 48 hours before restarting this medicine.  You will have oral contrast for this exam:   You will drink the contrast at home. Get this from your clinic or Diagnostic Imaging Department. Please follow the directions given.  Please wear loose clothing, such as a sweat suit or jogging clothes. Avoid snaps, zippers and other metal. We may ask you to undress and put on a hospital gown.  If you have any questions, please call the Imaging Department where you will have your exam.            May 26, 2017  3:15 PM CDT   Return Visit  "with Lay Valencia MD   [facility]    [address] [address] [place] [address] [phone]              Future tests that were ordered for you today     Open Future Orders        Priority Expected Expires Ordered    Enteric Bacteria and Virus Panel by RAMANDEEP Stool Routine  3/28/2018 3/28/2017    Ova and Parasite Exam Routine Routine  3/28/2018 3/28/2017            Who to contact     If you have questions or need follow up information about today's clinic visit or your schedule please contact [facility] directly at [phone].  Normal or non-critical lab and imaging results will be communicated to you by MyChart, letter or phone within 4 business days after the clinic has received the results. If you do not hear from us within 7 days, please contact the clinic through myAchyhart or phone. If you have a critical or abnormal lab result, we will notify you by phone as soon as possible.  Submit refill requests through [employer] or call your pharmacy and they will forward the refill request to us. Please allow 3 business days for your refill to be completed.          Additional Information About Your Visit        myAchyhart Information     [employer] gives you secure access to your electronic health record. If you see a primary care provider, you can also send messages to your care team and make appointments. If you have questions, please call your primary care clinic.  If you do not have a primary care provider, please call [phone] and they will assist you.        Care EveryWhere ID     This is your Care EveryWhere ID. This could be used by other organizations to access your [facility] medical records  [id number]        Your Vitals Were     Pulse Temperature Respirations Height Pulse Oximetry BMI (Body Mass Index)    112 98.5  F (36.9  C) (Oral) 12 1.702 m (5' 7\") 94% 34.38 kg/m2       Blood Pressure from Last 3 Encounters:   03/28/17 110/70   03/25/17 " 132/78   12/20/16 126/75    Weight from Last 3 Encounters:   03/28/17 99.6 kg (219 lb 8 oz)   03/25/17 99.8 kg (220 lb)   12/20/16 101.2 kg (223 lb)              We Performed the Following     CBC with platelets differential     Comprehensive metabolic panel     Glucose, whole blood     HEMOGLOBIN A1C     Influenza A/B antigen          Today's Medication Changes          These changes are accurate as of: 3/28/17  2:10 PM.  If you have any questions, ask your nurse or doctor.               Start taking these medicines.        Dose/Directions    levofloxacin 500 MG tablet   Commonly known as:  LEVAQUIN   Used for:  Cough, Acute bronchitis with symptoms > 10 days   Started by:  Park Darden PA-C        Dose:  500 mg   Take 1 tablet (500 mg) by mouth daily   Quantity:  10 tablet   Refills:  0            Where to get your medicines      These medications were sent to [facility]/pharmacy #[id number] - [facility], [place] - [address]  [address] [facility] [address]     Phone:  [phone]     levofloxacin 500 MG tablet                Primary Care Provider Office Phone # Fax #    Pam Yesenia Dela Cruz [phone]       [facility] [address] [facility] [address]        Thank you!     Thank you for choosing [facility]  for your care. Our goal is always to provide you with excellent care. Hearing back from our patients is one way we can continue to improve our services. Please take a few minutes to complete the written survey that you may receive in the mail after your visit with us. Thank you!             Your Updated Medication List - Protect others around you: Learn how to safely use, store and throw away your medicines at www.disposemymeds.org.          This list is accurate as of: 3/28/17  2:10 PM.  Always use your most recent med list.                   Brand Name Dispense Instructions for use    albuterol 108 (90 BASE) MCG/ACT Inhaler    PROAIR  HFA/PROVENTIL HFA/VENTOLIN HFA    1 Inhaler    Inhale 2-4 puffs into the lungs every 4 hours as needed for shortness of breath / dyspnea or wheezing       allopurinol 300 MG tablet    ZYLOPRIM    90 tablet    TAKE 1 TABLET (300 MG) BY MOUTH DAILY       aspirin 81 MG tablet      1 TABLET DAILY(*)       atorvastatin 40 MG tablet    LIPITOR    90 tablet    Take 1 tablet (40 mg) by mouth daily       Azelaic Acid 15 % gel    FINACEA    50 g    Apply small amount twice a day to skin       blood glucose monitoring test strip    ONE TOUCH ULTRA    100 strip    Use to test blood sugars 2 times daily or as directed.       cinnamon 500 MG Tabs      Take one tablet twice daily.       fish oil-omega-3 fatty acids 1000 MG capsule      daily       fluticasone 50 MCG/ACT spray    FLONASE    1 Bottle    Spray 1-2 sprays into both nostrils daily       glimepiride 4 MG tablet    AMARYL    180 tablet    TAKE 1 TABLET (4 MG) BY MOUTH 2 TIMES DAILY       guaiFENesin-codeine 100-10 MG/5ML Soln solution    ROBITUSSIN AC    120 mL    Take 5 mLs by mouth every 4 hours as needed for cough       levofloxacin 500 MG tablet    LEVAQUIN    10 tablet    Take 1 tablet (500 mg) by mouth daily       losartan-hydrochlorothiazide 100-25 MG per tablet    HYZAAR    90 tablet    TAKE 1 TABLET BY MOUTH DAILY       metFORMIN 1000 MG tablet    GLUCOPHAGE    180 tablet    TAKE 1 TABLET (1,000 MG) BY MOUTH 2 TIMES DAILY (WITH MEALS)       minocycline 100 MG tablet    DYNACIN    60 tablet    Take 1 tablet by mouth. Twice daily for 10 days with flare of rosacea.       mometasone 50 MCG/ACT spray    NASONEX    3 Box    Spray 2 sprays into both nostrils daily       MULTIPLE VITAMINS PO      1 a day       nitroglycerin 0.4 MG sublingual tablet    NITROSTAT    25 tablet    Place 1 tablet (0.4 mg) under the tongue every 5 minutes as needed for chest pain If you are still having symptoms after 3 doses (15 minutes) call 911.       omeprazole 20 MG CR capsule     priLOSEC    180 capsule    Take 1 capsule (20 mg) by mouth 2 times daily Take 30-60 minutes before a meal.       order for DME     200 strip    One touch ultra  Test strips for checking blood sugars 2 times a day.       terazosin 5 MG capsule    HYTRIN    90 capsule    TAKE 1 CAPSULE (5 MG) BY MOUTH AT BEDTIME

## 2017-03-28 NOTE — PROGRESS NOTES
SUBJECTIVE:                                                    Sven Givens is a 61 year old male who presents to clinic today for the following health issues:      ED/UC Followup:    Facility:  [facility]  Date of visit: 3-25-17  Reason for visit: URI  Current Status: Still weak, vomiting, Diarrhea, Chills sweats       Ill for approximately 10 days  Was recently in [place] and ill during trip and since.  Complains of headache, cough, nausea and diarrhea.  Denies vomiting.  Having 2 liquid grey stools a day.  Shortness of breath with any exertion.  Cough is dry nonproductive.  Complains of fatigue.  Seen in urgent care and given inhaler without improvement in symptoms.    History of stage one lung cancer (non small cell cancer) treated with surgery September.  Problem list and histories reviewed & adjusted, as indicated.  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, overuse, and pressure     Hypertriglyceridemia     Benign prostatic hyperplasia with lower urinary tract symptoms, unspecified morphology     History of radiation exposure     Non-small cell carcinoma of lung, left (H)     Nonalcoholic hepatosteatosis     Past Surgical History:   Procedure Laterality Date     BRONCHOSCOPY FLEXIBLE N/A 9/23/2016    Procedure: BRONCHOSCOPY FLEXIBLE;  Surgeon: Gayle Moya MD;  Location: [facility] OR     COLONOSCOPY  5-03     GENITOURINARY SURGERY      kidney stones     THORACOSCOPIC WEDGE RESECTION LUNG Left 9/23/2016     Procedure: THORACOSCOPIC WEDGE RESECTION LUNG;  Surgeon: Gayle Moya MD;  Location: [facility] OR     VASCULAR SURGERY      varicose vein       Social History   Substance Use Topics     Smoking status: Former Smoker     Quit date: 2/1/1992     Smokeless tobacco: Never Used      Comment: 15 + years      Alcohol use No     Family History   Problem Relation Age of Onset     CEREBROVASCULAR DISEASE Father      CANCER Sister      ovaraaiden          Current Outpatient Prescriptions   Medication Sig Dispense Refill       0     albuterol (PROAIR HFA/PROVENTIL HFA/VENTOLIN HFA) 108 (90 BASE) MCG/ACT Inhaler Inhale 2-4 puffs into the lungs every 4 hours as needed for shortness of breath / dyspnea or wheezing 1 Inhaler 1     metFORMIN (GLUCOPHAGE) 1000 MG tablet TAKE 1 TABLET (1,000 MG) BY MOUTH 2 TIMES DAILY (WITH MEALS) 180 tablet 3     order for DME One touch ultra  Test strips for checking blood sugars 2 times a day. 200 strip 1     blood glucose monitoring (ONE TOUCH ULTRA) test strip Use to test blood sugars 2 times daily or as directed. 100 strip 11     terazosin (HYTRIN) 5 MG capsule TAKE 1 CAPSULE (5 MG) BY MOUTH AT BEDTIME 90 capsule 1     mometasone (NASONEX) 50 MCG/ACT spray Spray 2 sprays into both nostrils daily 3 Box 1     glimepiride (AMARYL) 4 MG tablet TAKE 1 TABLET (4 MG) BY MOUTH 2 TIMES DAILY 180 tablet 1     minocycline (DYNACIN) 100 MG tablet Take 1 tablet by mouth. Twice daily for 10 days with flare of rosacea. 60 tablet 2     allopurinol (ZYLOPRIM) 300 MG tablet TAKE 1 TABLET (300 MG) BY MOUTH DAILY 90 tablet 3     losartan-hydrochlorothiazide (HYZAAR) 100-25 MG per tablet TAKE 1 TABLET BY MOUTH DAILY 90 tablet 3     guaiFENesin-codeine (ROBITUSSIN AC) 100-10 MG/5ML SOLN Take 5 mLs by mouth every 4 hours as needed for cough 120 mL 1     fluticasone (FLONASE) 50 MCG/ACT nasal spray Spray 1-2 sprays into both nostrils daily 1 Bottle 1     Azelaic Acid (FINACEA) 15 % gel Apply small amount twice a day to skin  "50 g 3     atorvastatin (LIPITOR) 40 MG tablet Take 1 tablet (40 mg) by mouth daily 90 tablet 3     nitroglycerin (NITROSTAT) 0.4 MG SL tablet Place 1 tablet (0.4 mg) under the tongue every 5 minutes as needed for chest pain If you are still having symptoms after 3 doses (15 minutes) call 911. 25 tablet 3     omeprazole (PRILOSEC) 20 MG capsule Take 1 capsule (20 mg) by mouth 2 times daily Take 30-60 minutes before a meal. 180 capsule 3     CINNAMON 500 MG OR TABS Take one tablet twice daily.       FISH OIL 1000 MG OR CAPS daily       ASPIRIN 81 MG PO TABS 1 TABLET DAILY(*)       MULTIPLE VITAMINS OR 1 a day       [DISCONTINUED] ORDER FOR DME One touch ultra  Test strips for checking blood sugars 2 times a day. 200 strip 1       Reviewed and updated as needed this visit by clinical staff  Tobacco  Allergies  Meds  Med Hx  Surg Hx  Fam Hx  Soc Hx      Reviewed and updated as needed this visit by Provider         ROS:  Constitutional, HEENT, cardiovascular, pulmonary, gi and gu systems are negative, except as otherwise noted.    OBJECTIVE:                                                    /70 (BP Location: Right arm, Patient Position: Chair, Cuff Size: Adult Regular)  Pulse 112  Temp 98.5  F (36.9  C) (Oral)  Resp 12  Ht 1.702 m (5' 7\")  Wt 99.6 kg (219 lb 8 oz)  SpO2 94%  BMI 34.38 kg/m2  Body mass index is 34.38 kg/(m^2).  GENERAL: healthy, alert and no distress  EYES: Eyes grossly normal to inspection, PERRL and conjunctivae and sclerae normal  HENT: ear canals and TM's normal, nose and mouth without ulcers or lesions  NECK: no adenopathy, no asymmetry, masses, or scars and thyroid normal to palpation  RESP: scattered wheeze.  No rhonchi or rales  CV: regular rate and rhythm, normal S1 S2, no S3 or S4, no murmur, click or rub, no peripheral edema and peripheral pulses strong  ABDOMEN: soft, nontender, no hepatosplenomegaly, no masses and bowel sounds normal  MS: no gross musculoskeletal defects " noted, no edema    Diagnostic Test Results:  Results for orders placed or performed in visit on 03/28/17   HEMOGLOBIN A1C   Result Value Ref Range    Hemoglobin A1C 7.7 (H) 4.3 - 6.0 %   Comprehensive metabolic panel   Result Value Ref Range    Sodium 137 133 - 144 mmol/L    Potassium 3.4 3.4 - 5.3 mmol/L    Chloride 98 94 - 109 mmol/L    Carbon Dioxide 26 20 - 32 mmol/L    Anion Gap 13 3 - 14 mmol/L    Glucose 139 (H) 70 - 99 mg/dL    Urea Nitrogen 13 7 - 30 mg/dL    Creatinine 0.76 0.66 - 1.25 mg/dL    GFR Estimate >90  Non  GFR Calc   >60 mL/min/1.7m2    GFR Estimate If Black >90   GFR Calc   >60 mL/min/1.7m2    Calcium 9.4 8.5 - 10.1 mg/dL    Bilirubin Total 0.6 0.2 - 1.3 mg/dL    Albumin 3.4 3.4 - 5.0 g/dL    Protein Total 8.1 6.8 - 8.8 g/dL    Alkaline Phosphatase 131 40 - 150 U/L    ALT 41 0 - 70 U/L    AST 23 0 - 45 U/L   CBC with platelets differential   Result Value Ref Range    WBC 13.7 (H) 4.0 - 11.0 10e9/L    RBC Count 4.14 (L) 4.4 - 5.9 10e12/L    Hemoglobin 12.1 (L) 13.3 - 17.7 g/dL    Hematocrit 36.0 (L) 40.0 - 53.0 %    MCV 87 78 - 100 fl    MCH 29.2 26.5 - 33.0 pg    MCHC 33.6 31.5 - 36.5 g/dL    RDW 13.1 10.0 - 15.0 %    Platelet Count 304 150 - 450 10e9/L    Diff Method Automated Method     % Neutrophils 78.0 %    % Lymphocytes 13.1 %    % Monocytes 7.8 %    % Eosinophils 1.0 %    % Basophils 0.1 %    Absolute Neutrophil 10.7 (H) 1.6 - 8.3 10e9/L    Absolute Lymphocytes 1.8 0.8 - 5.3 10e9/L    Absolute Monocytes 1.1 0.0 - 1.3 10e9/L    Absolute Eosinophils 0.1 0.0 - 0.7 10e9/L    Absolute Basophils 0.0 0.0 - 0.2 10e9/L   Glucose, whole blood   Result Value Ref Range    Glucose Whole Blood 167 (H) 70 - 99 mg/dL   Influenza A/B antigen   Result Value Ref Range    Influenza A/B Agn Specimen Nasal     Influenza A Negative NEG    Influenza B  NEG     Negative   Test results must be correlated with clinical data. If necessary, results   should be confirmed by a molecular  assay or viral culture.          ASSESSMENT/PLAN:                                                            1. Acute bronchitis with symptoms > 10 days  Will treat with levaquin (to cover travelers diarrhea as well)  - levofloxacin (LEVAQUIN) 500 MG tablet; Take 1 tablet (500 mg) by mouth daily  Dispense: 10 tablet; Refill: 0    2. Type 2 diabetes mellitus with diabetic polyneuropathy, without long-term current use of insulin (H)  A1C up from previous.  Due for follow up with pcp   - HEMOGLOBIN A1C  - Glucose, whole blood    3. Non-small cell carcinoma of lung, left (H)  Reports stage I-followed by oncology.  Only intervention surgery    4. Nausea  No emesis.  Will check electrolytes, kidney and liver function  - Comprehensive metabolic panel  - CBC with platelets differential  - Glucose, whole blood    5. Cough    - CBC with platelets differential  - XR Chest 2 Views; Future  - Influenza A/B antigen  - levofloxacin (LEVAQUIN) 500 MG tablet; Take 1 tablet (500 mg) by mouth daily  Dispense: 10 tablet; Refill: 0    6. Diarrhea, unspecified type  Will culture stool given diarrhea and travel  - Enteric Bacteria and Virus Panel by RAMANDEEP Stool; Future  - Ova and Parasite Exam Routine; Future    Patient Instructions   Bring in stool for culture  Take levaquin daily for 10 days  Continue using the albuterol inhaler.   Follow up with us if no improvement over the next 5 days.  Return urgently if any change in symptoms like increasing shortness of breath, fever or other change in symptoms.   Schedule appointment with your Primary Care provider to review your diabetes. Your A1C was up to 7.7 and above our goal of 7.0.        CC chart to PCP for araseli Darden PA-C  [facility]

## 2017-03-29 DIAGNOSIS — E78.1 HYPERTRIGLYCERIDEMIA: Primary | ICD-10-CM

## 2017-03-29 DIAGNOSIS — E11.42 TYPE 2 DIABETES MELLITUS WITH DIABETIC POLYNEUROPATHY (H): ICD-10-CM

## 2017-03-29 NOTE — TELEPHONE ENCOUNTER
[facility] Call Center    Phone Message    Name of Caller: Vi    Phone Number: Other phone number:  [phone]     Best time to return call: Any    May a detailed message be left on voicemail: no    Relation to patient: Other Name: Vi  Relationship:[facility]  Is there legal documentation in chart to discuss information with this person: NA  Action Taken: Message routed to:  Adult Clinics: Cardiology p [id number]'    Faxed refill request.   Medication requested: atorvastatin  Pharmacy Requested[facility] [place]   Pt's last office visit: 4/28/16  Next scheduled office visit: none  Component      Latest Ref Rng & Units 3/18/2016   Cholesterol      <200 mg/dL 147   Triglycerides      <150 mg/dL 172 (H)   HDL Cholesterol      >39 mg/dL 29 (L)   LDL Cholesterol Calculated      <100 mg/dL 84   Non HDL Cholesterol      <130 mg/dL 118     Refill authorized per protocol  Eve Belcher, RN  Cardiology Care Coordinator  [facility]  Phone: [phone]

## 2017-03-29 NOTE — PROGRESS NOTES
Vandana Machuca  Your electrolytes, kidney function and liver function were normal.   Your blood sugar was 139.    Please call or MyChart my office with any questions or concerns.    Park Darden, PAC

## 2017-03-30 RX ORDER — ATORVASTATIN CALCIUM 40 MG/1
40 TABLET, FILM COATED ORAL DAILY
Qty: 90 TABLET | Refills: 0 | Status: SHIPPED | OUTPATIENT
Start: 2017-03-30 | End: 2017-07-05

## 2017-04-14 ENCOUNTER — OFFICE VISIT (OUTPATIENT)
Dept: FAMILY MEDICINE | Facility: CLINIC | Age: 62
End: 2017-04-14
Payer: COMMERCIAL

## 2017-04-14 VITALS
RESPIRATION RATE: 18 BRPM | WEIGHT: 215 LBS | OXYGEN SATURATION: 96 % | SYSTOLIC BLOOD PRESSURE: 130 MMHG | HEART RATE: 102 BPM | TEMPERATURE: 98 F | HEIGHT: 69 IN | BODY MASS INDEX: 31.84 KG/M2 | DIASTOLIC BLOOD PRESSURE: 80 MMHG

## 2017-04-14 DIAGNOSIS — R05.3 PERSISTENT COUGH FOR 3 WEEKS OR LONGER: ICD-10-CM

## 2017-04-14 DIAGNOSIS — E11.42 DIABETIC POLYNEUROPATHY ASSOCIATED WITH TYPE 2 DIABETES MELLITUS (H): ICD-10-CM

## 2017-04-14 DIAGNOSIS — C34.92 NON-SMALL CELL CARCINOMA OF LUNG, LEFT (H): ICD-10-CM

## 2017-04-14 DIAGNOSIS — R10.13 EPIGASTRIC PAIN: ICD-10-CM

## 2017-04-14 DIAGNOSIS — Z13.89 SCREENING FOR DIABETIC PERIPHERAL NEUROPATHY: ICD-10-CM

## 2017-04-14 DIAGNOSIS — I10 HYPERTENSION GOAL BP (BLOOD PRESSURE) < 140/90: ICD-10-CM

## 2017-04-14 DIAGNOSIS — R97.20 ELEVATED PROSTATE SPECIFIC ANTIGEN (PSA): ICD-10-CM

## 2017-04-14 DIAGNOSIS — Z12.11 ENCOUNTER FOR SCREENING COLONOSCOPY: ICD-10-CM

## 2017-04-14 DIAGNOSIS — Z00.01 ENCOUNTER FOR ROUTINE ADULT PHYSICAL EXAM WITH ABNORMAL FINDINGS: Primary | ICD-10-CM

## 2017-04-14 DIAGNOSIS — E78.5 HYPERLIPIDEMIA WITH TARGET LDL LESS THAN 100: ICD-10-CM

## 2017-04-14 LAB
BASOPHILS # BLD AUTO: 0 10E9/L (ref 0–0.2)
BASOPHILS NFR BLD AUTO: 0.4 %
CHOLEST SERPL-MCNC: 102 MG/DL
CREAT UR-MCNC: 147 MG/DL
DIFFERENTIAL METHOD BLD: ABNORMAL
EOSINOPHIL # BLD AUTO: 0.3 10E9/L (ref 0–0.7)
EOSINOPHIL NFR BLD AUTO: 4 %
ERYTHROCYTE [DISTWIDTH] IN BLOOD BY AUTOMATED COUNT: 13.5 % (ref 10–15)
HCT VFR BLD AUTO: 38 % (ref 40–53)
HDLC SERPL-MCNC: 35 MG/DL
HGB BLD-MCNC: 12.2 G/DL (ref 13.3–17.7)
LDLC SERPL CALC-MCNC: 35 MG/DL
LYMPHOCYTES # BLD AUTO: 2.4 10E9/L (ref 0.8–5.3)
LYMPHOCYTES NFR BLD AUTO: 34 %
MCH RBC QN AUTO: 29 PG (ref 26.5–33)
MCHC RBC AUTO-ENTMCNC: 32.1 G/DL (ref 31.5–36.5)
MCV RBC AUTO: 91 FL (ref 78–100)
MICROALBUMIN UR-MCNC: 11 MG/L
MICROALBUMIN/CREAT UR: 7.76 MG/G CR (ref 0–17)
MONOCYTES # BLD AUTO: 0.5 10E9/L (ref 0–1.3)
MONOCYTES NFR BLD AUTO: 6.8 %
NEUTROPHILS # BLD AUTO: 3.8 10E9/L (ref 1.6–8.3)
NEUTROPHILS NFR BLD AUTO: 54.8 %
NONHDLC SERPL-MCNC: 67 MG/DL
PLATELET # BLD AUTO: 283 10E9/L (ref 150–450)
PSA SERPL-ACNC: 5.09 UG/L (ref 0–4)
RBC # BLD AUTO: 4.2 10E12/L (ref 4.4–5.9)
TRIGL SERPL-MCNC: 161 MG/DL
WBC # BLD AUTO: 7 10E9/L (ref 4–11)

## 2017-04-14 PROCEDURE — 85025 COMPLETE CBC W/AUTO DIFF WBC: CPT | Performed by: FAMILY MEDICINE

## 2017-04-14 PROCEDURE — 99212 OFFICE O/P EST SF 10 MIN: CPT | Mod: 25 | Performed by: FAMILY MEDICINE

## 2017-04-14 PROCEDURE — 82043 UR ALBUMIN QUANTITATIVE: CPT | Performed by: FAMILY MEDICINE

## 2017-04-14 PROCEDURE — 80061 LIPID PANEL: CPT | Performed by: FAMILY MEDICINE

## 2017-04-14 PROCEDURE — 36415 COLL VENOUS BLD VENIPUNCTURE: CPT | Performed by: FAMILY MEDICINE

## 2017-04-14 PROCEDURE — 99396 PREV VISIT EST AGE 40-64: CPT | Performed by: FAMILY MEDICINE

## 2017-04-14 PROCEDURE — 99207 C FOOT EXAM  NO CHARGE: CPT | Mod: 25 | Performed by: FAMILY MEDICINE

## 2017-04-14 PROCEDURE — 84153 ASSAY OF PSA TOTAL: CPT | Performed by: FAMILY MEDICINE

## 2017-04-14 RX ORDER — CODEINE PHOSPHATE AND GUAIFENESIN 10; 100 MG/5ML; MG/5ML
1-2 SOLUTION ORAL EVERY 4 HOURS PRN
Qty: 120 ML | Refills: 3 | Status: SHIPPED | OUTPATIENT
Start: 2017-04-14 | End: 2017-11-10

## 2017-04-14 ASSESSMENT — PAIN SCALES - GENERAL: PAINLEVEL: SEVERE PAIN (7)

## 2017-04-14 NOTE — PROGRESS NOTES
SUBJECTIVE:     CC: Sven Givens is an 61 year old male who presents for preventative health visit.     Healthy Habits: Got really sick in [place] on his first day with bronchitis and diarrhea. Treated with inhaler and antibiotics but did not help much. Hurts on right chest and abdomen. Developed constipation. Vision also was disturbed. Off work for next week.     Answers for HPI/ROS submitted by the patient on 4/11/2017   Annual Exam:  Getting at least 3 servings of Calcium per day:: Yes  Bi-annual eye exam:: Yes  Dental care twice a year:: Yes  Sleep apnea or symptoms of sleep apnea:: Sleep apnea  Diet:: Diabetic  Frequency of exercise:: 2-3 days/week  Taking medications regularly:: Yes  Medication side effects:: Not applicable  Additional concerns today:: No  Q1: Little interest or pleasure in doing things: 0=Not at all  Q2: Feeling down, depressed or hopeless: 0=Not at all  PHQ-2 Score: 0  Duration of exercise:: 15-30 minutes      Diabetes Follow-up      Patient is checking blood sugars: low blood sugars     Diabetic concerns: None and blood sugar frequently over 200 in morning     Symptoms of hypoglycemia (low blood sugar): none     Paresthesias (numbness or burning in feet) or sores: No     Date of last diabetic eye exam: due     Hyperlipidemia Follow-Up      Rate your low fat/cholesterol diet?: good    Taking statin?  Yes, no muscle aches from statin    Other lipid medications/supplements?:  none     Hypertension Follow-up      Outpatient blood pressures are not being checked.    Low Salt Diet: no added salt     Anxiety Follow-Up    Status since last visit: No change    Other associated symptoms:None    Complicating factors:   Significant life event: No   Current substance abuse: None  Depression symptoms: No  No flowsheet data found.     GAD7        Pain Follow-Up       Type / Location of Pain: right chest and upper abdomen from coughing  Analgesia/pain control:       Recent changes:  worse      Overall  control: Tolerable with discomfort  Activity level/function:      Daily activities:  Able to do light housework, cooking    Work:  Able to work part time without limitations  Adverse effects:  No  Adherance    Taking medication as directed?  Yes    Participating in other treatments: yes  Risk Factors:    Sleep:  Poor    Mood/anxiety:  worsened    Recent family or social stressors:  none noted    Other aggravating factors: sedentary lifestyle  PHQ-9 SCORE 1/28/2011   Total Score 9     No flowsheet data found.  Encounter-Level CSA:     There are no encounter-level csa.             Today's PHQ-2 Score:   PHQ-2 ( 1999 [employer]) 4/11/2017 12/20/2016   Q1: Little interest or pleasure in doing things - 0   Q2: Feeling down, depressed or hopeless - 0   PHQ-2 Score - 0   Little interest or pleasure in doing things Not at all -   Feeling down, depressed or hopeless Not at all -   PHQ-2 Score 0 -       Abuse: Current or Past(Physical, Sexual or Emotional)- No  Do you feel safe in your environment - Yes    Social History   Substance Use Topics     Smoking status: Former Smoker     Quit date: 2/1/1992     Smokeless tobacco: Never Used      Comment: 15 + years      Alcohol use No     The patient does not drink >3 drinks per day nor >7 drinks per week.    Last PSA:   PSA   Date Value Ref Range Status   10/30/2015 2.49 0 - 4 ug/L Final       Recent Labs   Lab Test  03/18/16   0905  03/02/15   0819  02/28/14   0941   CHOL  147  152  126   HDL  29*  32*  22*   LDL  84  73  69   TRIG  172*  235*  177*   CHOLHDLRATIO   --   4.8  5.8*   NHDL  118   --    --        Reviewed orders with patient. Reviewed health maintenance and updated orders accordingly - Yes    Reviewed and updated as needed this visit by clinical staff         Reviewed and updated as needed this visit by Provider            ROS:  C: NEGATIVE for fever, chills, change in weight  I: NEGATIVE for worrisome rashes, moles or lesions  E: NEGATIVE for vision changes or  irritation  ENT: NEGATIVE for ear, mouth and throat problems  CV: NEGATIVE for chest pain, palpitations or peripheral edema   male: negative for dysuria, hematuria, decreased urinary stream, erectile dysfunction, urethral discharge  M: NEGATIVE for significant arthralgias or myalgia  N: NEGATIVE for weakness, dizziness or paresthesias  P: NEGATIVE for changes in mood or affect    Problem list, Medication list, Allergies, and Medical/Social/Surgical histories reviewed in [facility] and updated as appropriate.  Labs reviewed in EPIC  BP Readings from Last 3 Encounters:   04/14/17 130/80   03/28/17 110/70   03/25/17 132/78    Wt Readings from Last 3 Encounters:   04/14/17 215 lb (97.5 kg)   03/28/17 219 lb 8 oz (99.6 kg)   03/25/17 220 lb (99.8 kg)                  Patient Active Problem List   Diagnosis     High triglycerides     Advanced directives, counseling/discussion     Hypertension goal BP (blood pressure) < 140/90     Diabetic neuropathy (H)     Tubular adenoma of colon     GERD (gastroesophageal reflux disease)     Anemia     Hyperlipidemia with target LDL less than 100     Gout     Type 2 diabetes mellitus with diabetic polyneuropathy (H)     Obesity     Idiopathic chronic gout of right foot without tophus     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, overuse, and pressure     Hypertriglyceridemia     Benign prostatic hyperplasia with lower urinary tract symptoms, unspecified morphology     History of radiation exposure     Non-small cell carcinoma of lung, left (H)     Nonalcoholic hepatosteatosis     Past Surgical History:   Procedure Laterality Date     BRONCHOSCOPY FLEXIBLE N/A 9/23/2016    Procedure: BRONCHOSCOPY FLEXIBLE;  Surgeon: Gayle Moya MD;  Location: [facility] OR     COLONOSCOPY  5-03     GENITOURINARY SURGERY      kidney stones     THORACOSCOPIC WEDGE RESECTION LUNG Left 9/23/2016    Procedure: THORACOSCOPIC WEDGE RESECTION LUNG;  Surgeon: Gayle Moya MD;  Location: [facility] OR     VASCULAR SURGERY       varicose vein       Social History   Substance Use Topics     Smoking status: Former Smoker     Quit date: 2/1/1992     Smokeless tobacco: Never Used      Comment: 15 + years      Alcohol use No     Family History   Problem Relation Age of Onset     CEREBROVASCULAR DISEASE Father      CANCER Sister      ovaraaiden          Current Outpatient Prescriptions   Medication Sig Dispense Refill     atorvastatin (LIPITOR) 40 MG tablet Take 1 tablet (40 mg) by mouth daily 90 tablet 0     albuterol (PROAIR HFA/PROVENTIL HFA/VENTOLIN HFA) 108 (90 BASE) MCG/ACT Inhaler Inhale 2-4 puffs into the lungs every 4 hours as needed for shortness of breath / dyspnea or wheezing 1 Inhaler 1     metFORMIN (GLUCOPHAGE) 1000 MG tablet TAKE 1 TABLET (1,000 MG) BY MOUTH 2 TIMES DAILY (WITH MEALS) 180 tablet 3     order for DME One touch ultra  Test strips for checking blood sugars 2 times a day. 200 strip 1     blood glucose monitoring (ONE TOUCH ULTRA) test strip Use to test blood sugars 2 times daily or as directed. 100 strip 11     terazosin (HYTRIN) 5 MG capsule TAKE 1 CAPSULE (5 MG) BY MOUTH AT BEDTIME 90 capsule 1     mometasone (NASONEX) 50 MCG/ACT spray Spray 2 sprays into both nostrils daily 3 Box 1     glimepiride (AMARYL) 4 MG tablet TAKE 1 TABLET (4 MG) BY MOUTH 2 TIMES DAILY 180 tablet 1     minocycline (DYNACIN) 100 MG tablet Take 1 tablet by mouth. Twice daily for 10 days with flare of rosacea. 60 tablet 2     allopurinol (ZYLOPRIM) 300 MG tablet TAKE 1 TABLET (300 MG) BY MOUTH DAILY 90 tablet 3     losartan-hydrochlorothiazide (HYZAAR) 100-25 MG per tablet TAKE 1 TABLET BY MOUTH DAILY 90 tablet 3     fluticasone (FLONASE) 50 MCG/ACT nasal spray Spray 1-2 sprays into both nostrils daily 1 Bottle 1     Azelaic Acid (FINACEA) 15 % gel Apply small amount twice a day to skin 50 g 3     nitroglycerin (NITROSTAT) 0.4 MG SL tablet Place 1 tablet (0.4 mg) under the tongue every 5 minutes as needed for chest pain If you are still having  symptoms after 3 doses (15 minutes) call 911. 25 tablet 3     omeprazole (PRILOSEC) 20 MG capsule Take 1 capsule (20 mg) by mouth 2 times daily Take 30-60 minutes before a meal. 180 capsule 3     CINNAMON 500 MG OR TABS Take one tablet twice daily.       FISH OIL 1000 MG OR CAPS daily       ASPIRIN 81 MG PO TABS 1 TABLET DAILY(*)       MULTIPLE VITAMINS OR 1 a day       [DISCONTINUED] ORDER FOR DME One touch ultra  Test strips for checking blood sugars 2 times a day. 200 strip 1     Allergies   Allergen Reactions     Penicillins Rash     Ace Inhibitors Cough     Indomethacin Other (See Comments)     Severe dizziness     Recent Labs   Lab Test  03/28/17   1342  10/12/16   1056  09/24/16   0625   09/16/16   0936 05/13/16 04/23/16   0925  03/18/16   0905   03/02/15   0819  10/15/14   0916  02/28/14   0941   A1C  7.7*   --    --    --   7.4*   --    --   7.1*   < >  6.8*  8.9*  7.6*   LDL   --    --    --    --    --    --    --   84   --   73   --   69   HDL   --    --    --    --    --    --    --   29*   --   32*   --   22*   TRIG   --    --    --    --    --    --    --   172*   --   235*   --   177*   ALT  41   --    --    --    --   60  38   --    --    --   33  34   CR  0.76   --   0.65*   < >  0.78   --   0.66  0.84   < >  0.79  0.72  0.80   GFRESTIMATED  >90  Non  GFR Calc     --   >90  Non  GFR Calc     < >  >90  Non  GFR Calc     --   >90  Non  GFR Calc    >90  Non  GFR Calc     < >  >90  Non  GFR Calc    >90  Non  GFR Calc    >90   GFRESTBLACK  >90   GFR Calc     --   >90   GFR Calc     < >  >90   GFR Calc     --   >90   GFR Calc    >90   GFR Calc     < >  >90   GFR Calc    >90   GFR Calc    >90   POTASSIUM  3.4   --   3.8   < >  4.0   --   3.6  4.2   < >  4.2  4.1  4.0   TSH   --   2.21  "  --    --    --    --    --    --    --    --   2.67   --     < > = values in this interval not displayed.      OBJECTIVE:     /80 (BP Location: Right arm, Patient Position: Chair, Cuff Size: Adult Large)  Pulse 102  Temp 98  F (36.7  C) (Oral)  Resp 18  Ht 5' 8.5\" (1.74 m)  Wt 215 lb (97.5 kg)  SpO2 96%  BMI 32.22 kg/m2  EXAM:  GENERAL: healthy, alert and no distress, is obese  EYES: Eyes grossly normal to inspection, PERRL and conjunctivae and sclerae normal  HENT: ear canals and TM's normal, nose and mouth without ulcers or lesions  NECK: no adenopathy, no asymmetry, masses, or scars and thyroid normal to palpation  RESP: lungs clear to auscultation - no rales, rhonchi or wheezes  CV: regular rate and rhythm, normal S1 S2, no S3 or S4, no murmur, click or rub, no peripheral edema and peripheral pulses strong  ABDOMEN: soft, nontender, no hepatosplenomegaly, no masses and bowel sounds normal  MS: no gross musculoskeletal defects noted, no edema  SKIN: no suspicious lesions or rashes  NEURO: Normal strength and tone, mentation intact and speech normal  BACK: no CVA tenderness, no paralumbar tenderness  PSYCH: mentation appears normal, affect normal/bright  Diabetic foot exam: normal DP and PT pulses, no trophic changes or ulcerative lesions, normal calluses, decreased sensory exam and abnormal monofilament exam     ASSESSMENT/PLAN:         ICD-10-CM    1. Encounter for routine adult physical exam with abnormal findings Z00.01 Persistent cough and chest pain from severe bronchitis, treated with antibiotics and slow recovery.    2. Diabetic polyneuropathy associated with type 2 diabetes mellitus (H) E11.42 FOOT EXAM  NO CHARGE [38625.114]     Albumin Random Urine Quantitative     CBC with platelets differential   3. Non-small cell carcinoma of lung, left (H) C34.92 Follow up with oncology in 1 month with CT scan   4. Epigastric pain R10.13 H Pylori antigen, stool- recheck for Helicobacter Pylori    5. " "Hypertension goal BP (blood pressure) < 140/90 I10 Well controlled on medications    6. Hyperlipidemia with target LDL less than 100 E78.5 Lipid panel reflex to direct LDL   7. Persistent cough for 3 weeks or longer R05 guaiFENesin-codeine (ROBITUSSIN AC) 100-10 MG/5ML SOLN solution for at night and to suppress excessive coughing, getting better.    8. Encounter for screening colonoscopy Z12.11 GASTROENTEROLOGY ADULT REF PROCEDURE ONLY- 5 year follow up due next month for polyp   9. Screening for diabetic peripheral neuropathy Z13.89    10. Elevated prostate specific antigen (PSA) R97.20 PROSTATE SPEC ANTIGEN SCREEN       COUNSELING:  Reviewed preventive health counseling, as reflected in patient instructions       Regular exercise       Healthy diet/nutrition       Colon cancer screening       Prostate cancer screening         reports that he quit smoking about 25 years ago. He has never used smokeless tobacco.    Estimated body mass index is 34.38 kg/(m^2) as calculated from the following:    Height as of 3/28/17: 5' 7\" (1.702 m).    Weight as of 3/28/17: 219 lb 8 oz (99.6 kg).   Weight management plan: Discussed healthy diet and exercise guidelines and patient will follow up in 3 months in clinic to re-evaluate.    Counseling Resources:  ATP IV Guidelines  Pooled Cohorts Equation Calculator  FRAX Risk Assessment  ICSI Preventive Guidelines  Dietary Guidelines for Americans, 2010  USDA's MyPlate  ASA Prophylaxis  Lung CA Screening    Pam Dela Cruz MD  [facility]  "

## 2017-04-14 NOTE — MR AVS SNAPSHOT
After Visit Summary   4/14/2017    Sven Givens    MRN: [id number]           Patient Information     Date Of Birth          1955        Visit Information        Provider Department      4/14/2017 8:00 AM Pam Dela Cruz MD [facility]        Today's Diagnoses     Screening for diabetic peripheral neuropathy    -  1    Encounter for screening colonoscopy        Epigastric pain        Hypertension goal BP (blood pressure) < 140/90        Diabetic polyneuropathy associated with type 2 diabetes mellitus (H)        Hyperlipidemia with target LDL less than 100        Persistent cough for 3 weeks or longer          Care Instructions    How to contact your care team: [phone] Pharmacy [phone]   AMPARO BROTHERS MD KATYA GEORGIEV, PA-C CHRIS JONES, PA-C NAM HO, MD JONATHAN BATES, MD ARVIN VOCAL, MD    Clinic hours M-Th 7am-7pm Fri 7am-5pm.   Urgent care M-F 11am-9pm  Sat/Sun 9am-5pm.   Pharmacy   Mon-Th:  8:00am-8pm   Fri:  8:00am-6:00pm  Sat/Sun  8:00am-5:00 pm       Preventive Health Recommendations  Male Ages 50 - 64    Yearly exam:             See your health care provider every year in order to  o   Review health changes.   o   Discuss preventive care.    o   Review your medicines if your doctor has prescribed any.     Have a cholesterol test every 5 years, or more frequently if you are at risk for high cholesterol/heart disease.     Have a diabetes test (fasting glucose) every three years. If you are at risk for diabetes, you should have this test more often.     Have a colonoscopy at age 50, or have a yearly FIT test (stool test). These exams will check for colon cancer.      Talk with your health care provider about whether or not a prostate cancer screening test (PSA) is right for you.    You should be tested each year for STDs (sexually transmitted diseases), if you re at risk.     Shots: Get a flu shot each year. Get a tetanus shot  every 10 years.     Nutrition:    Eat at least 5 servings of fruits and vegetables daily.     Eat whole-grain bread, whole-wheat pasta and brown rice instead of white grains and rice.     Talk to your provider about Calcium and Vitamin D.     Lifestyle    Exercise for at least 150 minutes a week (30 minutes a day, 5 days a week). This will help you control your weight and prevent disease.     Limit alcohol to one drink per day.     No smoking.     Wear sunscreen to prevent skin cancer.     See your dentist every six months for an exam and cleaning.     See your eye doctor every 1 to 2 years.          Follow-ups after your visit        Additional Services     GASTROENTEROLOGY ADULT REF PROCEDURE ONLY       Last Lab Result: Creatinine (mg/dL)       Date                     Value                 03/28/2017               0.76             ----------  Body mass index is 32.22 kg/(m^2).      Patient will be contacted to schedule procedure.     Please be aware that coverage of these services is subject to the terms and limitations of your health insurance plan.  Call member services at your health plan with any benefit or coverage questions.  Any procedures must be performed at a [facility] facility OR coordinated by your clinic's referral office.    Please bring the following with you to your appointment:    (1) Any X-Rays, CTs or MRIs which have been performed.  Contact the facility where they were done to arrange for  prior to your scheduled appointment.    (2) List of current medications   (3) This referral request   (4) Any documents/labs given to you for this referral                  Your next 10 appointments already scheduled     May 23, 2017  2:00 PM CDT   LAB with LAB ONC [facility] ([facility])    [address] [address] [place] [id number] [phone]           Patient must bring picture ID.  Patient should be prepared to give a urine specimen  Please do not  eat 10-12 hours before your appointment if you are coming in fasting for labs on lipids, cholesterol, or glucose (sugar).  Pregnant women should follow their Care Team instructions. Water with medications is okay. Do not drink coffee or other fluids.   If you have concerns about taking  your medications, please ask at office or if scheduling via "[employer]", send a message by clicking on Secure Messaging, Message Your Care Team.            May 23, 2017  2:30 PM CDT   CT CHEST ABDOMEN PELVIS W/O & W CONTRAST with MGCT1   [facility]    [address] [address] [place] [id number] [phone]           Please bring any scans or X-rays taken at other hospitals, if similar tests were done. Also bring a list of your medicines, including vitamins, minerals and over-the-counter drugs. It is safest to leave personal items at home.  Be sure to tell your doctor:   If you have any allergies.   If there s any chance you are pregnant.   If you are breastfeeding.   If you have any special needs.  You may have contrast for this exam. To prepare:   Do not eat or drink for 2 hours before your exam. If you need to take medicine, you may take it with small sips of water. (We may ask you to take liquid medicine as well.)   The day before your exam, drink extra fluids at least six 8-ounce glasses (unless your doctor tells you to restrict your fluids).  Patients over 70 or patients with diabetes or kidney problems:   If you haven t had a blood test (creatinine test) within the last 30 days, go to your clinic or Diagnostic Imaging Department for this test.  If you have diabetes:   If your kidney function is normal, continue taking your metformin (Avandamet, Glucophage, Glucovance, Metaglip) on the day of your exam.   If your kidney function is abnormal, wait 48 hours before restarting this medicine.  You will have oral contrast for this exam:   You will drink the contrast at home. Get this  from your clinic or Diagnostic Imaging Department. Please follow the directions given.  Please wear loose clothing, such as a sweat suit or jogging clothes. Avoid snaps, zippers and other metal. We may ask you to undress and put on a hospital gown.  If you have any questions, please call the Imaging Department where you will have your exam.            May 26, 2017  3:15 PM CDT   Return Visit with Lay Valencia MD   [facility]    [address] [place] [address] [phone]              Future tests that were ordered for you today     Open Future Orders        Priority Expected Expires Ordered    H Pylori antigen, stool Routine  5/14/2017 4/14/2017            Who to contact     If you have questions or need follow up information about today's clinic visit or your schedule please contact [facility] directly at [phone].  Normal or non-critical lab and imaging results will be communicated to you by [employer], letter or phone within 4 business days after the clinic has received the results. If you do not hear from us within 7 days, please contact the clinic through [employer] or phone. If you have a critical or abnormal lab result, we will notify you by phone as soon as possible.  Submit refill requests through [employer] or call your pharmacy and they will forward the refill request to us. Please allow 3 business days for your refill to be completed.          Additional Information About Your Visit        [employer] Information     [employer] gives you secure access to your electronic health record. If you see a primary care provider, you can also send messages to your care team and make appointments. If you have questions, please call your primary care clinic.  If you do not have a primary care provider, please call [phone] and they will assist you.        Care EveryWhere ID     This is your Care EveryWhere ID. This could be used by other  "organizations to access your [facility] medical records  [id number]        Your Vitals Were     Pulse Temperature Respirations Height Pulse Oximetry BMI (Body Mass Index)    102 98  F (36.7  C) (Oral) 18 5' 8.5\" (1.74 m) 96% 32.22 kg/m2       Blood Pressure from Last 3 Encounters:   04/14/17 130/80   03/28/17 110/70   03/25/17 132/78    Weight from Last 3 Encounters:   04/14/17 215 lb (97.5 kg)   03/28/17 219 lb 8 oz (99.6 kg)   03/25/17 220 lb (99.8 kg)              We Performed the Following     Albumin Random Urine Quantitative     CBC with platelets differential     FOOT EXAM  NO CHARGE [[id number]]     GASTROENTEROLOGY ADULT REF PROCEDURE ONLY     Lipid panel reflex to direct LDL     PROSTATE SPEC ANTIGEN SCREEN          Today's Medication Changes          These changes are accurate as of: 4/14/17  9:09 AM.  If you have any questions, ask your nurse or doctor.               Start taking these medicines.        Dose/Directions    guaiFENesin-codeine 100-10 MG/5ML Soln solution   Commonly known as:  ROBITUSSIN AC   Used for:  Persistent cough for 3 weeks or longer   Started by:  Pam Dela Cruz MD        Dose:  1-2 tsp.   Take 5-10 mLs by mouth every 4 hours as needed for cough   Quantity:  120 mL   Refills:  3            Where to get your medicines      Some of these will need a paper prescription and others can be bought over the counter.  Ask your nurse if you have questions.     Bring a paper prescription for each of these medications     guaiFENesin-codeine 100-10 MG/5ML Soln solution                Primary Care Provider Office Phone # Fax #    Pam Dela Cruz [phone]       [facility] [address]  [facility] [address]        Thank you!     Thank you for choosing [facility]  for your care. Our goal is always to provide you with excellent care. Hearing back from our patients is one way we can continue to improve our services. Please take a " few minutes to complete the written survey that you may receive in the mail after your visit with us. Thank you!             Your Updated Medication List - Protect others around you: Learn how to safely use, store and throw away your medicines at www.disposemymeds.org.          This list is accurate as of: 4/14/17  9:09 AM.  Always use your most recent med list.                   Brand Name Dispense Instructions for use    albuterol 108 (90 BASE) MCG/ACT Inhaler    PROAIR HFA/PROVENTIL HFA/VENTOLIN HFA    1 Inhaler    Inhale 2-4 puffs into the lungs every 4 hours as needed for shortness of breath / dyspnea or wheezing       allopurinol 300 MG tablet    ZYLOPRIM    90 tablet    TAKE 1 TABLET (300 MG) BY MOUTH DAILY       aspirin 81 MG tablet      1 TABLET DAILY(*)       atorvastatin 40 MG tablet    LIPITOR    90 tablet    Take 1 tablet (40 mg) by mouth daily       Azelaic Acid 15 % gel    FINACEA    50 g    Apply small amount twice a day to skin       blood glucose monitoring test strip    ONE TOUCH ULTRA    100 strip    Use to test blood sugars 2 times daily or as directed.       cinnamon 500 MG Tabs      Take one tablet twice daily.       fish oil-omega-3 fatty acids 1000 MG capsule      daily       fluticasone 50 MCG/ACT spray    FLONASE    1 Bottle    Spray 1-2 sprays into both nostrils daily       glimepiride 4 MG tablet    AMARYL    180 tablet    TAKE 1 TABLET (4 MG) BY MOUTH 2 TIMES DAILY       guaiFENesin-codeine 100-10 MG/5ML Soln solution    ROBITUSSIN AC    120 mL    Take 5-10 mLs by mouth every 4 hours as needed for cough       losartan-hydrochlorothiazide 100-25 MG per tablet    HYZAAR    90 tablet    TAKE 1 TABLET BY MOUTH DAILY       metFORMIN 1000 MG tablet    GLUCOPHAGE    180 tablet    TAKE 1 TABLET (1,000 MG) BY MOUTH 2 TIMES DAILY (WITH MEALS)       minocycline 100 MG tablet    DYNACIN    60 tablet    Take 1 tablet by mouth. Twice daily for 10 days with flare of rosacea.       mometasone 50  MCG/ACT spray    NASONEX    3 Box    Spray 2 sprays into both nostrils daily       MULTIPLE VITAMINS PO      1 a day       nitroglycerin 0.4 MG sublingual tablet    NITROSTAT    25 tablet    Place 1 tablet (0.4 mg) under the tongue every 5 minutes as needed for chest pain If you are still having symptoms after 3 doses (15 minutes) call 911.       omeprazole 20 MG CR capsule    priLOSEC    180 capsule    Take 1 capsule (20 mg) by mouth 2 times daily Take 30-60 minutes before a meal.       order for DME     200 strip    One touch ultra  Test strips for checking blood sugars 2 times a day.       terazosin 5 MG capsule    HYTRIN    90 capsule    TAKE 1 CAPSULE (5 MG) BY MOUTH AT BEDTIME

## 2017-04-14 NOTE — PATIENT INSTRUCTIONS
How to contact your care team: [phone] Pharmacy [phone]   AMPARO BROTHERS MD KATYA GEORGIEV, PA-C CHRIS JONES, PA-C NAM HO, MD JONATHAN BATES, MD ARVIN VOCAL, MD    Clinic hours M-Th 7am-7pm Fri 7am-5pm.   Urgent care M-F 11am-9pm  Sat/Sun 9am-5pm.   Pharmacy   Mon-Th:  8:00am-8pm   Fri:  8:00am-6:00pm  Sat/Sun  8:00am-5:00 pm       Preventive Health Recommendations  Male Ages 50   64    Yearly exam:             See your health care provider every year in order to  o   Review health changes.   o   Discuss preventive care.    o   Review your medicines if your doctor has prescribed any.     Have a cholesterol test every 5 years, or more frequently if you are at risk for high cholesterol/heart disease.     Have a diabetes test (fasting glucose) every three years. If you are at risk for diabetes, you should have this test more often.     Have a colonoscopy at age 50, or have a yearly FIT test (stool test). These exams will check for colon cancer.      Talk with your health care provider about whether or not a prostate cancer screening test (PSA) is right for you.    You should be tested each year for STDs (sexually transmitted diseases), if you re at risk.     Shots: Get a flu shot each year. Get a tetanus shot every 10 years.     Nutrition:    Eat at least 5 servings of fruits and vegetables daily.     Eat whole-grain bread, whole-wheat pasta and brown rice instead of white grains and rice.     Talk to your provider about Calcium and Vitamin D.     Lifestyle    Exercise for at least 150 minutes a week (30 minutes a day, 5 days a week). This will help you control your weight and prevent disease.     Limit alcohol to one drink per day.     No smoking.     Wear sunscreen to prevent skin cancer.     See your dentist every six months for an exam and cleaning.     See your eye doctor every 1 to 2 years.

## 2017-04-15 DIAGNOSIS — R10.13 EPIGASTRIC PAIN: Primary | ICD-10-CM

## 2017-04-15 PROCEDURE — 87338 HPYLORI STOOL AG IA: CPT | Performed by: FAMILY MEDICINE

## 2017-04-15 NOTE — PROGRESS NOTES
Dear Sven    Your test results are attached.    The white count is back to normal and this is reassuring that the infection should be completely treated and getting better.     Your cholesterol is very low and we may be able to decrease the dose.     The PSA is higher and I will send this result to Dr. Hillman.     Let me know if you are not feeling better or if the cough is not improving.   Please contact me by [email or web address] if you have any questions about your labs or management.    Pam Dela Cruz MD

## 2017-04-17 LAB
H PYLORI AG STL QL IA: NORMAL
MICRO REPORT STATUS: NORMAL
SPECIMEN SOURCE: NORMAL

## 2017-04-18 NOTE — PROGRESS NOTES
Dear Sven    Your test results are attached. I am happy to let you know that they are stable.    The Helicobacter Pylori test was negative.    Please contact me by Troodonhart if you have any questions about your labs or management.    Pam Dela Cruz MD

## 2017-05-09 ENCOUNTER — OFFICE VISIT (OUTPATIENT)
Dept: UROLOGY | Facility: CLINIC | Age: 62
End: 2017-05-09
Payer: COMMERCIAL

## 2017-05-09 VITALS — RESPIRATION RATE: 12 BRPM | DIASTOLIC BLOOD PRESSURE: 74 MMHG | HEART RATE: 60 BPM | SYSTOLIC BLOOD PRESSURE: 120 MMHG

## 2017-05-09 DIAGNOSIS — R97.20 ELEVATED PROSTATE SPECIFIC ANTIGEN (PSA): Primary | ICD-10-CM

## 2017-05-09 DIAGNOSIS — N40.1 BENIGN NON-NODULAR PROSTATIC HYPERPLASIA WITH LOWER URINARY TRACT SYMPTOMS: ICD-10-CM

## 2017-05-09 PROCEDURE — 51798 US URINE CAPACITY MEASURE: CPT | Performed by: UROLOGY

## 2017-05-09 PROCEDURE — 99213 OFFICE O/P EST LOW 20 MIN: CPT | Mod: 25 | Performed by: UROLOGY

## 2017-05-09 NOTE — NURSING NOTE
"Chief Complaint   Patient presents with     RECHECK       Initial /74 (BP Location: Right arm, Patient Position: Chair, Cuff Size: Adult Regular)  Pulse 60  Resp 12 Estimated body mass index is 32.22 kg/(m^2) as calculated from the following:    Height as of 4/14/17: 1.74 m (5' 8.5\").    Weight as of 4/14/17: 97.5 kg (215 lb).  Medication Reconciliation: complete   Cassy Poole RN         "

## 2017-05-09 NOTE — PROGRESS NOTES
Chief Complaint   Patient presents with     RECHECK       Sven Givens is a 62 year old male who presents today for follow up of   Chief Complaint   Patient presents with     RECHECK    f/u for benign prostatic hyperplasia/elevated psa.  Recent psa was slightly elevated.  He is s/p urolift several months ago.  His urinary symptoms are much better than before.      Current Outpatient Prescriptions   Medication Sig Dispense Refill     guaiFENesin-codeine (ROBITUSSIN AC) 100-10 MG/5ML SOLN solution Take 5-10 mLs by mouth every 4 hours as needed for cough 120 mL 3     atorvastatin (LIPITOR) 40 MG tablet Take 1 tablet (40 mg) by mouth daily 90 tablet 0     albuterol (PROAIR HFA/PROVENTIL HFA/VENTOLIN HFA) 108 (90 BASE) MCG/ACT Inhaler Inhale 2-4 puffs into the lungs every 4 hours as needed for shortness of breath / dyspnea or wheezing 1 Inhaler 1     metFORMIN (GLUCOPHAGE) 1000 MG tablet TAKE 1 TABLET (1,000 MG) BY MOUTH 2 TIMES DAILY (WITH MEALS) 180 tablet 3     order for DME One touch ultra  Test strips for checking blood sugars 2 times a day. 200 strip 1     blood glucose monitoring (ONE TOUCH ULTRA) test strip Use to test blood sugars 2 times daily or as directed. 100 strip 11     terazosin (HYTRIN) 5 MG capsule TAKE 1 CAPSULE (5 MG) BY MOUTH AT BEDTIME 90 capsule 1     mometasone (NASONEX) 50 MCG/ACT spray Spray 2 sprays into both nostrils daily 3 Box 1     glimepiride (AMARYL) 4 MG tablet TAKE 1 TABLET (4 MG) BY MOUTH 2 TIMES DAILY 180 tablet 1     minocycline (DYNACIN) 100 MG tablet Take 1 tablet by mouth. Twice daily for 10 days with flare of rosacea. 60 tablet 2     allopurinol (ZYLOPRIM) 300 MG tablet TAKE 1 TABLET (300 MG) BY MOUTH DAILY 90 tablet 3     losartan-hydrochlorothiazide (HYZAAR) 100-25 MG per tablet TAKE 1 TABLET BY MOUTH DAILY 90 tablet 3     fluticasone (FLONASE) 50 MCG/ACT nasal spray Spray 1-2 sprays into both nostrils daily 1 Bottle 1     Azelaic Acid (FINACEA) 15 % gel Apply small amount  twice a day to skin 50 g 3     nitroglycerin (NITROSTAT) 0.4 MG SL tablet Place 1 tablet (0.4 mg) under the tongue every 5 minutes as needed for chest pain If you are still having symptoms after 3 doses (15 minutes) call 911. 25 tablet 3     omeprazole (PRILOSEC) 20 MG capsule Take 1 capsule (20 mg) by mouth 2 times daily Take 30-60 minutes before a meal. 180 capsule 3     CINNAMON 500 MG OR TABS Take one tablet twice daily.       FISH OIL 1000 MG OR CAPS daily       ASPIRIN 81 MG PO TABS 1 TABLET DAILY(*)       MULTIPLE VITAMINS OR 1 a day       [DISCONTINUED] ORDER FOR DME One touch ultra  Test strips for checking blood sugars 2 times a day. 200 strip 1     Allergies   Allergen Reactions     Penicillins Rash     Ace Inhibitors Cough     Indomethacin Other (See Comments)     Severe dizziness      Past Medical History:   Diagnosis Date     Allergies     PCN, ACE, Indomethocin     DM (diabetes mellitus) (H)      Dyslipidemia      GERD (gastroesophageal reflux disease)      HTN (hypertension)      Kidney stones 09/2004    s/p right kidney basket retrieval     PRESLEY (obstructive sleep apnea)     cpap only during the winter when air is dry.     Rhinitis, allergic      Rosacea      Tubular adenoma of colon 12/2009    follow up colonoscopy in 3 to 5 years requested     Varicose veins      Past Surgical History:   Procedure Laterality Date     BRONCHOSCOPY FLEXIBLE N/A 9/23/2016    Procedure: BRONCHOSCOPY FLEXIBLE;  Surgeon: Gayle Moya MD;  Location: [facility] OR     COLONOSCOPY  5-03     GENITOURINARY SURGERY      kidney stones     THORACOSCOPIC WEDGE RESECTION LUNG Left 9/23/2016    Procedure: THORACOSCOPIC WEDGE RESECTION LUNG;  Surgeon: Gayle Moya MD;  Location: [facility] OR     VASCULAR SURGERY      varicose vein     Family History   Problem Relation Age of Onset     CEREBROVASCULAR DISEASE Father      CANCER Sister      ovarary      Social History     Social History     Marital status:      Spouse name: N/A      Number of children: N/A     Years of education: N/A     Occupational History      [employer]     Social History Main Topics     Smoking status: Former Smoker     Quit date: 2/1/1992     Smokeless tobacco: Never Used      Comment: 15 + years      Alcohol use No     Drug use: No     Sexual activity: No     Other Topics Concern     Parent/Sibling W/ Cabg, Mi Or Angioplasty Before 65f 55m? No     Social History Narrative       REVIEW OF SYSTEMS  =================  C: NEGATIVE for fever, chills, change in weight  I: NEGATIVE for worrisome rashes, moles or lesions  E/M: NEGATIVE for ear, mouth and throat problems  R: NEGATIVE for significant cough or SHORTNESS OF BREATH,   CV: NEGATIVE for chest pain, palpitations or peripheral edema  GI: NEGATIVE for nausea, abdominal pain, heartburn, or change in bowel habits  NEURO: NEGATIVE any motor/sensory changes  PSYCH: NEGATIVE for recent mood disorder    Physical Exam:  /74 (BP Location: Right arm, Patient Position: Chair, Cuff Size: Adult Regular)  Pulse 60  Resp 12   Patient is pleasant, in no acute distress, good general condition.  Lung: no evidence of respiratory distress    Abdomen: Soft, nondistended, non tender. No masses. No rebound or guarding.   Exam: no cva tenderness  Skin: Warm and dry.  No redness.  Psych: normal mood and affect  Neuro: alert and oriented    Assessment/Plan:   (R97.20) Elevated prostate specific antigen (PSA)  (primary encounter diagnosis)  Comment:    Plan: repeat psa in six months           psa elevation might be related to recent prostate procedure.    (N40.1) Benign non-nodular prostatic hyperplasia with lower urinary tract symptoms  Comment: s/p urolift  Plan: bladder scan 70 ml            Recheck prn

## 2017-05-09 NOTE — PROGRESS NOTES
Bladder Scan performed. 75ml maximum residual urine detected after 3 scans. MD informed.    Otf Wong, CMA

## 2017-05-09 NOTE — MR AVS SNAPSHOT
After Visit Summary   5/9/2017    Sven Givens    MRN: [id number]           Patient Information     Date Of Birth          1955        Visit Information        Provider Department      5/9/2017 3:00 PM Sami Hillman MD [facility]        Today's Diagnoses     Elevated prostate specific antigen (PSA)    -  1    Benign non-nodular prostatic hyperplasia with lower urinary tract symptoms          Care Instructions    Please follow up with Dr. Hillman in  6 months. You may stop at the  to arrange a lab appointment and follow up appointment one week later for results.        Follow-ups after your visit        Your next 10 appointments already scheduled     May 23, 2017  2:00 PM CDT   LAB with LAB ONC [facility] ([facility])    [address] [place] [address] [phone]           Patient must bring picture ID.  Patient should be prepared to give a urine specimen  Please do not eat 10-12 hours before your appointment if you are coming in fasting for labs on lipids, cholesterol, or glucose (sugar).  Pregnant women should follow their Care Team instructions. Water with medications is okay. Do not drink coffee or other fluids.   If you have concerns about taking  your medications, please ask at office or if scheduling via cheerapp, send a message by clicking on Secure Messaging, Message Your Care Team.            May 23, 2017  2:30 PM CDT   CT CHEST ABDOMEN PELVIS W/O & W CONTRAST with MGCT1   [facility])    [address] [place] [address] [phone]           Please bring any scans or X-rays taken at other hospitals, if similar tests were done. Also bring a list of your medicines, including vitamins, minerals and over-the-counter drugs. It is safest to leave personal items at home.  Be sure to tell your doctor:   If you have any allergies.   If there s  any chance you are pregnant.   If you are breastfeeding.   If you have any special needs.  You may have contrast for this exam. To prepare:   Do not eat or drink for 2 hours before your exam. If you need to take medicine, you may take it with small sips of water. (We may ask you to take liquid medicine as well.)   The day before your exam, drink extra fluids at least six 8-ounce glasses (unless your doctor tells you to restrict your fluids).  Patients over 70 or patients with diabetes or kidney problems:   If you haven t had a blood test (creatinine test) within the last 30 days, go to your clinic or Diagnostic Imaging Department for this test.  If you have diabetes:   If your kidney function is normal, continue taking your metformin (Avandamet, Glucophage, Glucovance, Metaglip) on the day of your exam.   If your kidney function is abnormal, wait 48 hours before restarting this medicine.  You will have oral contrast for this exam:   You will drink the contrast at home. Get this from your clinic or Diagnostic Imaging Department. Please follow the directions given.  Please wear loose clothing, such as a sweat suit or jogging clothes. Avoid snaps, zippers and other metal. We may ask you to undress and put on a hospital gown.  If you have any questions, please call the Imaging Department where you will have your exam.            May 26, 2017  3:15 PM CDT   Return Visit with Lay Valencia MD   [facility])    [address] [place] [address]   [phone]            May 31, 2017   Procedure with Margarito Rasheed DO   [facility] (--)    [address] [place] [address]   [phone]              Future tests that were ordered for you today     Open Future Orders        Priority Expected Expires Ordered    PSA tumor marker Routine 11/8/2017 5/10/2018 5/9/2017            Who to contact     If you have questions or need follow up  information about today's clinic visit or your schedule please contact [facility] directly at [phone].  Normal or non-critical lab and imaging results will be communicated to you by MyChart, letter or phone within 4 business days after the clinic has received the results. If you do not hear from us within 7 days, please contact the clinic through Songdroph[employer] or phone. If you have a critical or abnormal lab result, we will notify you by phone as soon as possible.  Submit refill requests through [facility] or call your pharmacy and they will forward the refill request to us. Please allow 3 business days for your refill to be completed.          Additional Information About Your Visit        SongdropharSayNow Information     [facility] gives you secure access to your electronic health record. If you see a primary care provider, you can also send messages to your care team and make appointments. If you have questions, please call your primary care clinic.  If you do not have a primary care provider, please call [phone] and they will assist you.        Care EveryWhere ID     This is your Care EveryWhere ID. This could be used by other organizations to access your [place] medical records  [id number]        Your Vitals Were     Pulse Respirations                60 12           Blood Pressure from Last 3 Encounters:   05/09/17 120/74   04/14/17 130/80   03/28/17 110/70    Weight from Last 3 Encounters:   04/14/17 97.5 kg (215 lb)   03/28/17 99.6 kg (219 lb 8 oz)   03/25/17 99.8 kg (220 lb)              We Performed the Following     MEASURE POST-VOID RESIDUAL URINE/BLADDER CAPACITY, US NON-IMAGING        Primary Care Provider Office Phone # Fax #    Pam Dela Cruz [phone]       [facility] [address]  [facility] [address]        Thank you!     Thank you for choosing [facility]  for your care. Our goal is always to provide you with excellent care. Hearing  back from our patients is one way we can continue to improve our services. Please take a few minutes to complete the written survey that you may receive in the mail after your visit with us. Thank you!             Your Updated Medication List - Protect others around you: Learn how to safely use, store and throw away your medicines at www.disposemymeds.org.          This list is accurate as of: 5/9/17  3:00 PM.  Always use your most recent med list.                   Brand Name Dispense Instructions for use    albuterol 108 (90 BASE) MCG/ACT Inhaler    PROAIR HFA/PROVENTIL HFA/VENTOLIN HFA    1 Inhaler    Inhale 2-4 puffs into the lungs every 4 hours as needed for shortness of breath / dyspnea or wheezing       allopurinol 300 MG tablet    ZYLOPRIM    90 tablet    TAKE 1 TABLET (300 MG) BY MOUTH DAILY       aspirin 81 MG tablet      1 TABLET DAILY(*)       atorvastatin 40 MG tablet    LIPITOR    90 tablet    Take 1 tablet (40 mg) by mouth daily       Azelaic Acid 15 % gel    FINACEA    50 g    Apply small amount twice a day to skin       blood glucose monitoring test strip    ONE TOUCH ULTRA    100 strip    Use to test blood sugars 2 times daily or as directed.       cinnamon 500 MG Tabs      Take one tablet twice daily.       fish oil-omega-3 fatty acids 1000 MG capsule      daily       fluticasone 50 MCG/ACT spray    FLONASE    1 Bottle    Spray 1-2 sprays into both nostrils daily       glimepiride 4 MG tablet    AMARYL    180 tablet    TAKE 1 TABLET (4 MG) BY MOUTH 2 TIMES DAILY       guaiFENesin-codeine 100-10 MG/5ML Soln solution    ROBITUSSIN AC    120 mL    Take 5-10 mLs by mouth every 4 hours as needed for cough       losartan-hydrochlorothiazide 100-25 MG per tablet    HYZAAR    90 tablet    TAKE 1 TABLET BY MOUTH DAILY       metFORMIN 1000 MG tablet    GLUCOPHAGE    180 tablet    TAKE 1 TABLET (1,000 MG) BY MOUTH 2 TIMES DAILY (WITH MEALS)       minocycline 100 MG tablet    DYNACIN    60 tablet    Take 1  tablet by mouth. Twice daily for 10 days with flare of rosacea.       mometasone 50 MCG/ACT spray    NASONEX    3 Box    Spray 2 sprays into both nostrils daily       MULTIPLE VITAMINS PO      1 a day       nitroglycerin 0.4 MG sublingual tablet    NITROSTAT    25 tablet    Place 1 tablet (0.4 mg) under the tongue every 5 minutes as needed for chest pain If you are still having symptoms after 3 doses (15 minutes) call 911.       omeprazole 20 MG CR capsule    priLOSEC    180 capsule    Take 1 capsule (20 mg) by mouth 2 times daily Take 30-60 minutes before a meal.       order for DME     200 strip    One touch ultra  Test strips for checking blood sugars 2 times a day.       terazosin 5 MG capsule    HYTRIN    90 capsule    TAKE 1 CAPSULE (5 MG) BY MOUTH AT BEDTIME

## 2017-05-09 NOTE — PATIENT INSTRUCTIONS
Please follow up with Dr. Hillman in  6 months. You may stop at the  to arrange a lab appointment and follow up appointment one week later for results.

## 2017-05-14 DIAGNOSIS — E11.9 TYPE 2 DIABETES MELLITUS WITHOUT COMPLICATION, UNSPECIFIED LONG TERM INSULIN USE STATUS: ICD-10-CM

## 2017-05-14 NOTE — TELEPHONE ENCOUNTER
glimepiride (AMARYL) 4 MG tablet         Last Written Prescription Date: 11/15/16  Last Fill Quantity: 180, # refills: 1  Last Office Visit with [facility], [facility] or [facility] prescribing provider:  4/14/17   Next 5 appointments (look out 90 days)     May 26, 2017  3:15 PM CDT   [facility]   [facility]    [address] [facility] [address] [phone]                   BP Readings from Last 3 Encounters:   05/09/17 120/74   04/14/17 130/80   03/28/17 110/70     Lab Results   Component Value Date    MICROL 11 04/14/2017     Lab Results   Component Value Date    UMALCR 7.76 04/14/2017     Creatinine   Date Value Ref Range Status   03/28/2017 0.76 0.66 - 1.25 mg/dL Final   ]  GFR Estimate   Date Value Ref Range Status   03/28/2017 >90  Non  GFR Calc   >60 mL/min/1.7m2 Final   09/24/2016 >90  Non  GFR Calc   >60 mL/min/1.7m2 Final   09/24/2016 >90  Non  GFR Calc   >60 mL/min/1.7m2 Final     GFR Estimate If Black   Date Value Ref Range Status   03/28/2017 >90   GFR Calc   >60 mL/min/1.7m2 Final   09/24/2016 >90   GFR Calc   >60 mL/min/1.7m2 Final   09/24/2016 >90   GFR Calc   >60 mL/min/1.7m2 Final     Lab Results   Component Value Date    CHOL 102 04/14/2017     Lab Results   Component Value Date    HDL 35 04/14/2017     Lab Results   Component Value Date    LDL 35 04/14/2017     Lab Results   Component Value Date    TRIG 161 04/14/2017     Lab Results   Component Value Date    CHOLHDLRATIO 4.8 03/02/2015     Lab Results   Component Value Date    AST 23 03/28/2017     Lab Results   Component Value Date    ALT 41 03/28/2017     Lab Results   Component Value Date    A1C 7.7 03/28/2017    A1C 7.4 09/16/2016    A1C 7.1 03/18/2016    A1C 7.5 10/30/2015    A1C 6.8 03/02/2015     Potassium   Date Value Ref Range Status   03/28/2017 3.4 3.4 - 5.3 mmol/L  Final           Manuel Faarax  [facility]

## 2017-05-17 RX ORDER — GLIMEPIRIDE 4 MG/1
TABLET ORAL
Qty: 180 TABLET | Refills: 0 | Status: SHIPPED | OUTPATIENT
Start: 2017-05-17 | End: 2017-08-13

## 2017-05-17 NOTE — TELEPHONE ENCOUNTER
Prescription approved per [facility] Refill Protocol.  Lise Villa RN

## 2017-05-23 ENCOUNTER — RADIANT APPOINTMENT (OUTPATIENT)
Dept: CT IMAGING | Facility: CLINIC | Age: 62
End: 2017-05-23
Attending: INTERNAL MEDICINE
Payer: COMMERCIAL

## 2017-05-23 DIAGNOSIS — C34.92 NON-SMALL CELL CARCINOMA OF LUNG, LEFT (H): ICD-10-CM

## 2017-05-23 LAB
ALBUMIN SERPL-MCNC: 3.9 G/DL (ref 3.4–5)
ALP SERPL-CCNC: 119 U/L (ref 40–150)
ALT SERPL W P-5'-P-CCNC: 41 U/L (ref 0–70)
ANION GAP SERPL CALCULATED.3IONS-SCNC: 9 MMOL/L (ref 3–14)
AST SERPL W P-5'-P-CCNC: 20 U/L (ref 0–45)
BASOPHILS # BLD AUTO: 0 10E9/L (ref 0–0.2)
BASOPHILS NFR BLD AUTO: 0.5 %
BILIRUB SERPL-MCNC: 0.4 MG/DL (ref 0.2–1.3)
BUN SERPL-MCNC: 16 MG/DL (ref 7–30)
CALCIUM SERPL-MCNC: 9.2 MG/DL (ref 8.5–10.1)
CHLORIDE SERPL-SCNC: 104 MMOL/L (ref 94–109)
CO2 SERPL-SCNC: 26 MMOL/L (ref 20–32)
CREAT SERPL-MCNC: 0.83 MG/DL (ref 0.66–1.25)
DIFFERENTIAL METHOD BLD: ABNORMAL
EOSINOPHIL # BLD AUTO: 0.3 10E9/L (ref 0–0.7)
EOSINOPHIL NFR BLD AUTO: 3.7 %
ERYTHROCYTE [DISTWIDTH] IN BLOOD BY AUTOMATED COUNT: 13.6 % (ref 10–15)
GFR SERPL CREATININE-BSD FRML MDRD: ABNORMAL ML/MIN/1.7M2
GLUCOSE SERPL-MCNC: 287 MG/DL (ref 70–99)
HCT VFR BLD AUTO: 36.1 % (ref 40–53)
HGB BLD-MCNC: 12 G/DL (ref 13.3–17.7)
LYMPHOCYTES # BLD AUTO: 2.7 10E9/L (ref 0.8–5.3)
LYMPHOCYTES NFR BLD AUTO: 34.9 %
MCH RBC QN AUTO: 29.4 PG (ref 26.5–33)
MCHC RBC AUTO-ENTMCNC: 33.2 G/DL (ref 31.5–36.5)
MCV RBC AUTO: 89 FL (ref 78–100)
MONOCYTES # BLD AUTO: 0.4 10E9/L (ref 0–1.3)
MONOCYTES NFR BLD AUTO: 5.5 %
NEUTROPHILS # BLD AUTO: 4.3 10E9/L (ref 1.6–8.3)
NEUTROPHILS NFR BLD AUTO: 55.4 %
PLATELET # BLD AUTO: 207 10E9/L (ref 150–450)
POTASSIUM SERPL-SCNC: 3.6 MMOL/L (ref 3.4–5.3)
PROT SERPL-MCNC: 7.5 G/DL (ref 6.8–8.8)
RADIOLOGIST FLAGS: NORMAL
RBC # BLD AUTO: 4.08 10E12/L (ref 4.4–5.9)
SODIUM SERPL-SCNC: 139 MMOL/L (ref 133–144)
WBC # BLD AUTO: 7.8 10E9/L (ref 4–11)

## 2017-05-23 PROCEDURE — 85025 COMPLETE CBC W/AUTO DIFF WBC: CPT | Performed by: INTERNAL MEDICINE

## 2017-05-23 PROCEDURE — 74177 CT ABD & PELVIS W/CONTRAST: CPT | Performed by: RADIOLOGY

## 2017-05-23 PROCEDURE — 80053 COMPREHEN METABOLIC PANEL: CPT | Performed by: INTERNAL MEDICINE

## 2017-05-23 PROCEDURE — 36415 COLL VENOUS BLD VENIPUNCTURE: CPT | Performed by: INTERNAL MEDICINE

## 2017-05-23 PROCEDURE — 71260 CT THORAX DX C+: CPT | Performed by: RADIOLOGY

## 2017-05-23 RX ORDER — IOPAMIDOL 755 MG/ML
132 INJECTION, SOLUTION INTRAVASCULAR ONCE
Status: COMPLETED | OUTPATIENT
Start: 2017-05-23 | End: 2017-05-23

## 2017-05-23 RX ADMIN — IOPAMIDOL 132 ML: 755 INJECTION, SOLUTION INTRAVASCULAR at 14:02

## 2017-05-25 ENCOUNTER — CARE COORDINATION (OUTPATIENT)
Dept: ONCOLOGY | Facility: CLINIC | Age: 62
End: 2017-05-25

## 2017-05-25 NOTE — PROGRESS NOTES
[facility] radiology called to give Dr. Valencia message regarding CT scan from 5/23 - patient has new pulmonary nodule.

## 2017-05-26 ENCOUNTER — ONCOLOGY VISIT (OUTPATIENT)
Dept: ONCOLOGY | Facility: CLINIC | Age: 62
End: 2017-05-26
Payer: COMMERCIAL

## 2017-05-26 VITALS
SYSTOLIC BLOOD PRESSURE: 129 MMHG | HEIGHT: 69 IN | HEART RATE: 111 BPM | WEIGHT: 220 LBS | OXYGEN SATURATION: 94 % | RESPIRATION RATE: 15 BRPM | DIASTOLIC BLOOD PRESSURE: 78 MMHG | BODY MASS INDEX: 32.58 KG/M2

## 2017-05-26 DIAGNOSIS — C34.92 NON-SMALL CELL CARCINOMA OF LUNG, LEFT (H): Primary | ICD-10-CM

## 2017-05-26 PROCEDURE — 99214 OFFICE O/P EST MOD 30 MIN: CPT | Performed by: INTERNAL MEDICINE

## 2017-05-26 ASSESSMENT — PAIN SCALES - GENERAL: PAINLEVEL: NO PAIN (0)

## 2017-05-26 NOTE — NURSING NOTE
"Oncology Rooming Note    May 26, 2017 3:03 PM   Sven Givens is a 62 year old male who presents for:    Chief Complaint   Patient presents with     Oncology Clinic Visit     follow up     Initial Vitals: /78  Pulse 111  Resp 15  Ht 1.753 m (5' 9.02\")  Wt 99.8 kg (220 lb)  SpO2 94%  BMI 32.47 kg/m2 Estimated body mass index is 32.47 kg/(m^2) as calculated from the following:    Height as of this encounter: 1.753 m (5' 9.02\").    Weight as of this encounter: 99.8 kg (220 lb). Body surface area is 2.2 meters squared.  No Pain (0) Comment: Data Unavailable   No LMP for male patient.  Allergies reviewed: Yes  Medications reviewed: Yes    Medications: Medication refills not needed today.  Pharmacy name entered into [employer]: [id number] - [place], [place] - [address] [facility]        5 minutes for nursing intake (face to face time)     Eve Valdez LPN              "

## 2017-05-26 NOTE — MR AVS SNAPSHOT
After Visit Summary   5/26/2017    Sven Givens    MRN: [id number]           Patient Information     Date Of Birth          1955        Visit Information        Provider Department      5/26/2017 3:15 PM Lay Valencia MD [facility]        Today's Diagnoses     Non-small cell carcinoma of lung, left (H)    -  1      Care Instructions    In September, repeat CT Chest and a few days later see me back              Follow-ups after your visit        Your next 10 appointments already scheduled     May 31, 2017   Procedure with Margarito Rasheed,    [facility] (--)    [address]  [facility] [address] [phone]            Sep 21, 2017  3:00 PM CDT   CT CHEST W/O CONTRAST with MGCT1   [facility] ([facility])    [address] [place] [address] [phone]           Please bring any scans or X-rays taken at other hospitals, if similar tests were done. Also bring a list of your medicines, including vitamins, minerals and over-the-counter drugs. It is safest to leave personal items at home.  Be sure to tell your doctor:   If you have any allergies.   If there s any chance you are pregnant.   If you are breastfeeding.   If you have any special needs.  You do not need to do anything special to prepare.  Please wear loose clothing, such as a sweat suit or jogging clothes. Avoid snaps, zippers and other metal. We may ask you to undress and put on a hospital gown.            Sep 26, 2017  3:15 PM CDT   Return Visit with Lay Valencia MD   [facility] ([facility])    [address] [facility] [address] [phone]            Oct 31, 2017  3:00 PM CDT   LAB with BK LAB   [facility] ([facility])    [address] [facility] [address] [phone]           Patient must bring picture ID.  Patient should be  prepared to give a urine specimen  Please do not eat 10-12 hours before your appointment if you are coming in fasting for labs on lipids, cholesterol, or glucose (sugar).  Pregnant women should follow their Care Team instructions. Water with medications is okay. Do not drink coffee or other fluids.   If you have concerns about taking  your medications, please ask at office or if scheduling via BringMeThat, send a message by clicking on Secure Messaging, Message Your Care Team.            Nov 07, 2017  3:00 PM CST   Return Visit with Sami [facility] MD   [facility]    [address] [place] [address]   [phone]              Future tests that were ordered for you today     Open Future Orders        Priority Expected Expires Ordered    CT Chest w/o contrast Routine 9/8/2017 5/26/2018 5/26/2017            Who to contact     If you have questions or need follow up information about today's clinic visit or your schedule please contact [facility] directly at [phone].  Normal or non-critical lab and imaging results will be communicated to you by MyChart, letter or phone within 4 business days after the clinic has received the results. If you do not hear from us within 7 days, please contact the clinic through [employer] or phone. If you have a critical or abnormal lab result, we will notify you by phone as soon as possible.  Submit refill requests through [employer] or call your pharmacy and they will forward the refill request to us. Please allow 3 business days for your refill to be completed.          Additional Information About Your Visit        BringMeThat Information     [employer] gives you secure access to your electronic health record. If you see a primary care provider, you can also send messages to your care team and make appointments. If you have questions, please call your primary care clinic.  If you do not have a primary care provider, please  "call [phone] and they will assist you.      [employer] is an electronic gateway that provides easy, online access to your medical records. With [employer], you can request a clinic appointment, read your test results, renew a prescription or communicate with your care team.     To access your existing account, please contact your [facility] or call [phone] for assistance.        Care EveryWhere ID     This is your Care EveryWhere ID. This could be used by other organizations to access your [place] medical records  [id number]        Your Vitals Were     Pulse Respirations Height Pulse Oximetry BMI (Body Mass Index)       111 15 1.753 m (5' 9.02\") 94% 32.47 kg/m2        Blood Pressure from Last 3 Encounters:   05/26/17 129/78   05/09/17 120/74   04/14/17 130/80    Weight from Last 3 Encounters:   05/26/17 99.8 kg (220 lb)   05/25/17 99.8 kg (220 lb)   04/14/17 97.5 kg (215 lb)               Primary Care Provider Office Phone # Fax #    Pam Yesenia Dela Cruz [phone]       [facility] [address]  [place] [address]        Thank you!     Thank you for choosing [facility]  for your care. Our goal is always to provide you with excellent care. Hearing back from our patients is one way we can continue to improve our services. Please take a few minutes to complete the written survey that you may receive in the mail after your visit with us. Thank you!             Your Updated Medication List - Protect others around you: Learn how to safely use, store and throw away your medicines at www.disposemymeds.org.          This list is accurate as of: 5/26/17  3:34 PM.  Always use your most recent med list.                   Brand Name Dispense Instructions for use    albuterol 108 (90 BASE) MCG/ACT Inhaler    PROAIR HFA/PROVENTIL HFA/VENTOLIN HFA    1 Inhaler    Inhale 2-4 puffs into the lungs every 4 hours as needed for shortness of " breath / dyspnea or wheezing       allopurinol 300 MG tablet    ZYLOPRIM    90 tablet    TAKE 1 TABLET (300 MG) BY MOUTH DAILY       aspirin 81 MG tablet      1 TABLET DAILY(*)       atorvastatin 40 MG tablet    LIPITOR    90 tablet    Take 1 tablet (40 mg) by mouth daily       Azelaic Acid 15 % gel    FINACEA    50 g    Apply small amount twice a day to skin       blood glucose monitoring test strip    ONE TOUCH ULTRA    100 strip    Use to test blood sugars 2 times daily or as directed.       cinnamon 500 MG Tabs      Take one tablet twice daily.       fish oil-omega-3 fatty acids 1000 MG capsule      daily       fluticasone 50 MCG/ACT spray    FLONASE    1 Bottle    Spray 1-2 sprays into both nostrils daily       glimepiride 4 MG tablet    AMARYL    180 tablet    TAKE 1 TABLET (4 MG) BY MOUTH 2 TIMES DAILY       guaiFENesin-codeine 100-10 MG/5ML Soln solution    ROBITUSSIN AC    120 mL    Take 5-10 mLs by mouth every 4 hours as needed for cough       losartan-hydrochlorothiazide 100-25 MG per tablet    HYZAAR    90 tablet    TAKE 1 TABLET BY MOUTH DAILY       metFORMIN 1000 MG tablet    GLUCOPHAGE    180 tablet    TAKE 1 TABLET (1,000 MG) BY MOUTH 2 TIMES DAILY (WITH MEALS)       minocycline 100 MG tablet    DYNACIN    60 tablet    Take 1 tablet by mouth. Twice daily for 10 days with flare of rosacea.       mometasone 50 MCG/ACT spray    NASONEX    3 Box    Spray 2 sprays into both nostrils daily       MULTIPLE VITAMINS PO      1 a day       nitroglycerin 0.4 MG sublingual tablet    NITROSTAT    25 tablet    Place 1 tablet (0.4 mg) under the tongue every 5 minutes as needed for chest pain If you are still having symptoms after 3 doses (15 minutes) call 911.       omeprazole 20 MG CR capsule    priLOSEC    180 capsule    Take 1 capsule (20 mg) by mouth 2 times daily Take 30-60 minutes before a meal.       order for DME     200 strip    One touch ultra  Test strips for checking blood sugars 2 times a day.        terazosin 5 MG capsule    HYTRIN    90 capsule    TAKE 1 CAPSULE (5 MG) BY MOUTH AT BEDTIME
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
## 2017-05-31 ENCOUNTER — SURGERY (OUTPATIENT)
Age: 62
End: 2017-05-31

## 2017-05-31 ENCOUNTER — HOSPITAL ENCOUNTER (OUTPATIENT)
Facility: AMBULATORY SURGERY CENTER | Age: 62
Discharge: HOME | End: 2017-05-31
Attending: SURGERY | Admitting: SURGERY
Payer: COMMERCIAL

## 2017-05-31 VITALS
TEMPERATURE: 97.6 F | RESPIRATION RATE: 16 BRPM | OXYGEN SATURATION: 96 % | DIASTOLIC BLOOD PRESSURE: 94 MMHG | BODY MASS INDEX: 32.58 KG/M2 | WEIGHT: 220 LBS | HEIGHT: 69 IN | SYSTOLIC BLOOD PRESSURE: 129 MMHG

## 2017-05-31 LAB
COLONOSCOPY: NORMAL
GLUCOSE BLDC GLUCOMTR-MCNC: 149 MG/DL (ref 70–99)

## 2017-05-31 PROCEDURE — 88305 TISSUE EXAM BY PATHOLOGIST: CPT | Performed by: SURGERY

## 2017-05-31 PROCEDURE — G8918 PT W/O PREOP ORDER IV AB PRO: HCPCS

## 2017-05-31 PROCEDURE — 45385 COLONOSCOPY W/LESION REMOVAL: CPT | Mod: PT | Performed by: SURGERY

## 2017-05-31 PROCEDURE — G8907 PT DOC NO EVENTS ON DISCHARG: HCPCS

## 2017-05-31 PROCEDURE — 45385 COLONOSCOPY W/LESION REMOVAL: CPT

## 2017-05-31 RX ORDER — ONDANSETRON 2 MG/ML
4 INJECTION INTRAMUSCULAR; INTRAVENOUS
Status: DISCONTINUED | OUTPATIENT
Start: 2017-05-31 | End: 2017-06-01 | Stop reason: HOSPADM

## 2017-05-31 RX ORDER — FENTANYL CITRATE 50 UG/ML
INJECTION, SOLUTION INTRAMUSCULAR; INTRAVENOUS PRN
Status: DISCONTINUED | OUTPATIENT
Start: 2017-05-31 | End: 2017-05-31 | Stop reason: HOSPADM

## 2017-05-31 RX ORDER — LIDOCAINE 40 MG/G
CREAM TOPICAL
Status: DISCONTINUED | OUTPATIENT
Start: 2017-05-31 | End: 2017-06-01 | Stop reason: HOSPADM

## 2017-05-31 RX ADMIN — FENTANYL CITRATE 100 MCG: 50 INJECTION, SOLUTION INTRAMUSCULAR; INTRAVENOUS at 07:24

## 2017-05-31 RX ADMIN — FENTANYL CITRATE 50 MCG: 50 INJECTION, SOLUTION INTRAMUSCULAR; INTRAVENOUS at 07:33

## 2017-05-31 RX ADMIN — FENTANYL CITRATE 50 MCG: 50 INJECTION, SOLUTION INTRAMUSCULAR; INTRAVENOUS at 08:11

## 2017-06-02 LAB — COPATH REPORT: NORMAL

## 2017-06-16 ENCOUNTER — TELEPHONE (OUTPATIENT)
Dept: FAMILY MEDICINE | Facility: CLINIC | Age: 62
End: 2017-06-16

## 2017-06-16 DIAGNOSIS — D12.6 TUBULAR ADENOMA OF COLON: Primary | ICD-10-CM

## 2017-06-16 NOTE — TELEPHONE ENCOUNTER
Reason for Call:  Request for results:    Name of test or procedure: COLONOSCOPY    Date of test of procedure: 5/31/2017    Location of the test or procedure: [facility]    OK to leave the result message on voice mail or with a family member? YES    Phone number Patient can be reached at:  Home number on file [phone] (home)    Additional comments: ANY    Call taken on 6/16/2017 at 3:24 PM by Alyce Scott

## 2017-06-16 NOTE — TELEPHONE ENCOUNTER
Chart reviewed. Results are final.   Routing to provider to review and advise.     Dora Thompson RN

## 2017-06-20 NOTE — TELEPHONE ENCOUNTER
Detailed message left on voice mail per provider documentation. [facility] number provided for any additional questions/concerns.   Dora Thompson RN

## 2017-06-20 NOTE — TELEPHONE ENCOUNTER
The biopsies showed 2 very small polyps. Neither had any cancer and one was an adenoma like in past polyps. Recommend repeat colonoscopy in 5 years.   Pam Dela Cruz MD

## 2017-07-05 ENCOUNTER — TELEPHONE (OUTPATIENT)
Dept: CARDIOLOGY | Facility: CLINIC | Age: 62
End: 2017-07-05

## 2017-07-05 DIAGNOSIS — E11.42 TYPE 2 DIABETES MELLITUS WITH DIABETIC POLYNEUROPATHY, WITHOUT LONG-TERM CURRENT USE OF INSULIN (H): ICD-10-CM

## 2017-07-05 DIAGNOSIS — E11.42 TYPE 2 DIABETES MELLITUS WITH DIABETIC POLYNEUROPATHY (H): ICD-10-CM

## 2017-07-05 DIAGNOSIS — E78.1 HYPERTRIGLYCERIDEMIA: ICD-10-CM

## 2017-07-05 RX ORDER — ATORVASTATIN CALCIUM 40 MG/1
40 TABLET, FILM COATED ORAL DAILY
Qty: 90 TABLET | Refills: 3 | Status: SHIPPED | OUTPATIENT
Start: 2017-07-05 | End: 2018-04-16

## 2017-07-05 RX ORDER — ATORVASTATIN CALCIUM 40 MG/1
TABLET, FILM COATED ORAL
Qty: 90 TABLET | Refills: 0 | Status: CANCELLED | OUTPATIENT
Start: 2017-07-05

## 2017-07-05 NOTE — TELEPHONE ENCOUNTER
[facility] Call Center    Phone Message    Name of Caller: [facility]    Phone Number: [phone]    Best time to return call: TODAY    May a detailed message be left on voicemail: yes    Relation to patient: Pharmacy    Reason for Call: Other: Refill for atorvastatin (LIPITOR) 40 MG tablet.      Action Taken: Message routed to:  Adult Clinics: Cardiology p [id number]

## 2017-07-05 NOTE — TELEPHONE ENCOUNTER
Faxed refill request.   Medication requested: atorvastatin  Pharmacy Requested[facility]   Pt's last office visit: 4/28/16  Next scheduled office visit: none  Component      Latest Ref Rng & Units 4/14/2017   Cholesterol      <200 mg/dL 102   Triglycerides      <150 mg/dL 161 (H)   HDL Cholesterol      >39 mg/dL 35 (L)   LDL Cholesterol Calculated      <100 mg/dL 35   Non HDL Cholesterol      <130 mg/dL 67     Refill authorized per protocol  Eve Belcher RN  Cardiology Care Coordinator  [facility]  Phone: [phone]

## 2017-07-14 ENCOUNTER — TELEPHONE (OUTPATIENT)
Dept: FAMILY MEDICINE | Facility: CLINIC | Age: 62
End: 2017-07-14

## 2017-07-14 DIAGNOSIS — E11.42 DIABETIC POLYNEUROPATHY ASSOCIATED WITH TYPE 2 DIABETES MELLITUS (H): Primary | ICD-10-CM

## 2017-07-14 DIAGNOSIS — L71.9 ROSACEA: ICD-10-CM

## 2017-07-14 NOTE — TELEPHONE ENCOUNTER
Patient is request refills for these two mediations Diclofenac Sodium Gel 3%, Ladocaine 5% Ointment.          Manuel Faarax  [facility]

## 2017-07-17 RX ORDER — LIDOCAINE 50 MG/G
OINTMENT TOPICAL 3 TIMES DAILY
Qty: 50 G | Refills: 3 | Status: SHIPPED | OUTPATIENT
Start: 2017-07-17 | End: 2018-04-16

## 2017-07-17 RX ORDER — AZELAIC ACID 0.15 G/G
GEL TOPICAL
Qty: 50 G | Refills: 3 | Status: SHIPPED | OUTPATIENT
Start: 2017-07-17 | End: 2024-02-07

## 2017-07-17 NOTE — TELEPHONE ENCOUNTER
Diclofenac Sodium Gel and Lidocaine 5% ointment have never been prescribed on [facility].    This writer attempted to contact Sven on 07/17/17      Reason for call medication request and left message to return call.      When patient calls back, please contact clinic RN team. If no one available, document that pt called and route to care team. routine priority.          Lise Villa, RAO

## 2017-07-17 NOTE — TELEPHONE ENCOUNTER
Patient is reporting using the medication for pain in his feet.  Patient is at work and doesn't have the prescriptions with him.      Routing to provider. Please advise if more information is needed.    Lise Villa RN

## 2017-08-13 DIAGNOSIS — E11.9 TYPE 2 DIABETES MELLITUS WITHOUT COMPLICATION, UNSPECIFIED LONG TERM INSULIN USE STATUS: ICD-10-CM

## 2017-08-16 RX ORDER — GLIMEPIRIDE 4 MG/1
TABLET ORAL
Qty: 180 TABLET | Refills: 0 | Status: SHIPPED | OUTPATIENT
Start: 2017-08-16 | End: 2017-11-13

## 2017-09-03 DIAGNOSIS — I10 ESSENTIAL HYPERTENSION WITH GOAL BLOOD PRESSURE LESS THAN 140/90: ICD-10-CM

## 2017-09-05 RX ORDER — TERAZOSIN 5 MG/1
CAPSULE ORAL
Qty: 90 CAPSULE | Refills: 1 | Status: SHIPPED | OUTPATIENT
Start: 2017-09-05 | End: 2018-03-03

## 2017-09-05 NOTE — TELEPHONE ENCOUNTER
terazosin (HYTRIN) 5 MG capsule         Last Written Prescription Date: 03/08/17  Last Fill Quantity: 90, # refills: 1    Last Office Visit with [facility], [facility] or [facility] prescribing provider:  04/14/17     Future Office Visit:    Next 5 appointments (look out 90 days)     Sep 26, 2017  3:15 PM CDT   Return Visit with Lay Valencia MD   [facility] ([facility])    [address] [place] [id number] [phone]            Nov 07, 2017  3:00 PM CST   Return Visit with Sami Hillman MD   [facility] ([facility])    [address] [place] [id number] [phone]                  BP Readings from Last 3 Encounters:   05/31/17 (!) 129/94   05/26/17 129/78   05/09/17 120/74         Veronica Garcia  [facility]

## 2017-09-11 ENCOUNTER — OFFICE VISIT (OUTPATIENT)
Dept: FAMILY MEDICINE | Facility: CLINIC | Age: 62
End: 2017-09-11
Payer: COMMERCIAL

## 2017-09-11 ENCOUNTER — RADIANT APPOINTMENT (OUTPATIENT)
Dept: GENERAL RADIOLOGY | Facility: CLINIC | Age: 62
End: 2017-09-11
Attending: FAMILY MEDICINE
Payer: COMMERCIAL

## 2017-09-11 VITALS
HEIGHT: 69 IN | DIASTOLIC BLOOD PRESSURE: 78 MMHG | WEIGHT: 228 LBS | SYSTOLIC BLOOD PRESSURE: 136 MMHG | HEART RATE: 92 BPM | BODY MASS INDEX: 33.77 KG/M2 | OXYGEN SATURATION: 93 % | TEMPERATURE: 98.9 F

## 2017-09-11 DIAGNOSIS — G62.9 NEUROPATHY: ICD-10-CM

## 2017-09-11 DIAGNOSIS — S90.32XA CONTUSION OF LEFT FOOT, INITIAL ENCOUNTER: ICD-10-CM

## 2017-09-11 DIAGNOSIS — S90.32XA CONTUSION OF LEFT FOOT, INITIAL ENCOUNTER: Primary | ICD-10-CM

## 2017-09-11 DIAGNOSIS — E78.5 HYPERLIPIDEMIA WITH TARGET LDL LESS THAN 100: ICD-10-CM

## 2017-09-11 DIAGNOSIS — E11.42 DIABETIC POLYNEUROPATHY ASSOCIATED WITH TYPE 2 DIABETES MELLITUS (H): ICD-10-CM

## 2017-09-11 DIAGNOSIS — I10 HYPERTENSION GOAL BP (BLOOD PRESSURE) < 140/90: ICD-10-CM

## 2017-09-11 DIAGNOSIS — E11.42 TYPE 2 DIABETES MELLITUS WITH DIABETIC POLYNEUROPATHY, WITHOUT LONG-TERM CURRENT USE OF INSULIN (H): ICD-10-CM

## 2017-09-11 DIAGNOSIS — C34.92 NON-SMALL CELL CARCINOMA OF LUNG, LEFT (H): ICD-10-CM

## 2017-09-11 LAB — HBA1C MFR BLD: 7.6 % (ref 4.3–6)

## 2017-09-11 PROCEDURE — 83036 HEMOGLOBIN GLYCOSYLATED A1C: CPT | Performed by: FAMILY MEDICINE

## 2017-09-11 PROCEDURE — 99214 OFFICE O/P EST MOD 30 MIN: CPT | Performed by: FAMILY MEDICINE

## 2017-09-11 PROCEDURE — 73630 X-RAY EXAM OF FOOT: CPT | Mod: LT

## 2017-09-11 PROCEDURE — 36415 COLL VENOUS BLD VENIPUNCTURE: CPT | Performed by: FAMILY MEDICINE

## 2017-09-11 ASSESSMENT — PAIN SCALES - GENERAL: PAINLEVEL: MODERATE PAIN (4)

## 2017-09-11 NOTE — PROGRESS NOTES
SUBJECTIVE:   Sven Givens is a 62 year old male who presents to clinic today for the following health issues:    Concern - Left foot injury  Onset: [id number]    Description:   Patient was looking for something and he accidentally bumped a large flashlight on the shelf, causing it to fall and hit left foot.    Intensity: moderate    Progression of Symptoms:  improving    Accompanying Signs & Symptoms:  Swelling, small cut    Previous history of similar problem:   No    Precipitating factors:   Worsened by: Use of feet, wearing shoes    Alleviating factors:  Improved by: Ice and Ibuprofen    Therapies Tried and outcome: Ice, Ibuprofen    Has persistent neuropathy in feet that is getting worse. Did not find gabapentin helpful at all.     Lung cancer removed by surgery. Had a follow up CT 3 months ago but had infection also. Repeat scheduled next month.       Diabetes Follow-up      Patient is checking blood sugars: not at all    Diabetic concerns: None     Symptoms of hypoglycemia (low blood sugar): none     Paresthesias (numbness or burning in feet) or sores: No     Date of last diabetic eye exam: due    Hyperlipidemia Follow-Up      Rate your low fat/cholesterol diet?: good    Taking statin?  Yes, no muscle aches from statin    Other lipid medications/supplements?:  none    Hypertension Follow-up      Outpatient blood pressures are not being checked.    Low Salt Diet: no added salt              Problem list and histories reviewed & adjusted, as indicated.  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, overuse, and pressure     Hypertriglyceridemia     Benign prostatic hyperplasia with lower urinary tract symptoms, unspecified morphology     History of radiation exposure     Non-small cell carcinoma of lung, left (H)     Nonalcoholic hepatosteatosis     Past Surgical History:   Procedure Laterality Date     BRONCHOSCOPY FLEXIBLE N/A 9/23/2016    Procedure: BRONCHOSCOPY FLEXIBLE;  Surgeon: Gayle Moya MD;  Location: [facility] OR     COLONOSCOPY  5-03     COLONOSCOPY WITH CO2 INSUFFLATION N/A 5/31/2017    Procedure: COLONOSCOPY WITH CO2 INSUFFLATION;  COLON SCREEN/ SEB;  Surgeon: Margarito Rasheed DO;  Location: [facility] OR     GENITOURINARY SURGERY      kidney stones     THORACOSCOPIC WEDGE RESECTION LUNG Left 9/23/2016    Procedure: THORACOSCOPIC WEDGE RESECTION LUNG;  Surgeon: Gayle Moya MD;  Location: [facility] OR     VASCULAR SURGERY      varicose vein       Social History   Substance Use Topics     Smoking status: Former Smoker     Quit date: 2/1/1992     Smokeless tobacco: Never Used      Comment: 15 + years      Alcohol use No     Family History   Problem Relation Age of Onset     CEREBROVASCULAR DISEASE Father      CANCER Sister      ovarary          Current Outpatient Prescriptions   Medication Sig Dispense Refill     terazosin (HYTRIN) 5 MG capsule TAKE 1 CAPSULE (5 MG) BY MOUTH AT BEDTIME 90 capsule 1     glimepiride (AMARYL) 4 MG tablet TAKE 1 TABLET (4 MG) BY MOUTH 2 TIMES DAILY 180 tablet 0     Azelaic Acid (FINACEA) 15 % gel Apply small amount twice a day to skin 50 g 3     lidocaine (XYLOCAINE) 5 % ointment Apply topically 3 times daily 50 g 3     diclofenac (VOLTAREN) 1 % GEL topical gel Apply 4 grams to knees or 2 grams to hands four times daily using enclosed dosing card. 100 g 1     atorvastatin (LIPITOR) 40 MG tablet Take 1 tablet (40 mg) by mouth daily 90 tablet 3     guaiFENesin-codeine (ROBITUSSIN AC) 100-10 MG/5ML SOLN  solution Take 5-10 mLs by mouth every 4 hours as needed for cough 120 mL 3     albuterol (PROAIR HFA/PROVENTIL HFA/VENTOLIN HFA) 108 (90 BASE) MCG/ACT Inhaler Inhale 2-4 puffs into the lungs every 4 hours as needed for shortness of breath / dyspnea or wheezing 1 Inhaler 1     metFORMIN (GLUCOPHAGE) 1000 MG tablet TAKE 1 TABLET (1,000 MG) BY MOUTH 2 TIMES DAILY (WITH MEALS) 180 tablet 3     order for DME One touch ultra  Test strips for checking blood sugars 2 times a day. 200 strip 1     blood glucose monitoring (ONE TOUCH ULTRA) test strip Use to test blood sugars 2 times daily or as directed. 100 strip 11     mometasone (NASONEX) 50 MCG/ACT spray Spray 2 sprays into both nostrils daily 3 Box 1     minocycline (DYNACIN) 100 MG tablet Take 1 tablet by mouth. Twice daily for 10 days with flare of rosacea. 60 tablet 2     allopurinol (ZYLOPRIM) 300 MG tablet TAKE 1 TABLET (300 MG) BY MOUTH DAILY 90 tablet 3     losartan-hydrochlorothiazide (HYZAAR) 100-25 MG per tablet TAKE 1 TABLET BY MOUTH DAILY 90 tablet 3     fluticasone (FLONASE) 50 MCG/ACT nasal spray Spray 1-2 sprays into both nostrils daily 1 Bottle 1     nitroglycerin (NITROSTAT) 0.4 MG SL tablet Place 1 tablet (0.4 mg) under the tongue every 5 minutes as needed for chest pain If you are still having symptoms after 3 doses (15 minutes) call 911. 25 tablet 3     omeprazole (PRILOSEC) 20 MG capsule Take 1 capsule (20 mg) by mouth 2 times daily Take 30-60 minutes before a meal. 180 capsule 3     CINNAMON 500 MG OR TABS Take one tablet twice daily.       FISH OIL 1000 MG OR CAPS daily       ASPIRIN 81 MG PO TABS 1 TABLET DAILY(*)       MULTIPLE VITAMINS OR 1 a day       [DISCONTINUED] ORDER FOR DME One touch ultra  Test strips for checking blood sugars 2 times a day. 200 strip 1     Allergies   Allergen Reactions     Penicillins Rash     Ace Inhibitors Cough     Indomethacin Other (See Comments)     Severe dizziness     Recent Labs   Lab Test  05/23/17   1330   04/14/17   0847  03/28/17   1342  10/12/16   1056   09/16/16   0936 05/13/16 03/18/16   0905   03/02/15   0819  10/15/14   0916   A1C   --    --   7.7*   --    --   7.4*   --    --   7.1*   < >  6.8*  8.9*   LDL   --   35   --    --    --    --    --    --   84   --   73   --    HDL   --   35*   --    --    --    --    --    --   29*   --   32*   --    TRIG   --   161*   --    --    --    --    --    --   172*   --   235*   --    ALT  41   --   41   --    --    --   60   < >   --    --    --   33   CR  0.83   --   0.76   --    < >  0.78   --    < >  0.84   < >  0.79  0.72   GFRESTIMATED  >90  Non  GFR Calc     --   >90  Non  GFR Calc     --    < >  >90  Non  GFR Calc     --    < >  >90  Non  GFR Calc     < >  >90  Non  GFR Calc    >90  Non  GFR Calc     GFRESTBLACK  >90   GFR Calc     --   >90   GFR Calc     --    < >  >90   GFR Calc     --    < >  >90   GFR Calc     < >  >90   GFR Calc    >90   GFR Calc     POTASSIUM  3.6   --   3.4   --    < >  4.0   --    < >  4.2   < >  4.2  4.1   TSH   --    --    --   2.21   --    --    --    --    --    --    --   2.67    < > = values in this interval not displayed.      BP Readings from Last 3 Encounters:   09/11/17 136/78   05/31/17 (!) 129/94   05/26/17 129/78    Wt Readings from Last 3 Encounters:   09/11/17 228 lb (103.4 kg)   05/25/17 220 lb (99.8 kg)   05/26/17 220 lb (99.8 kg)                  Labs reviewed in EPIC          Reviewed and updated as needed this visit by clinical staffTobacco  Allergies  Meds  Med Hx  Surg Hx  Fam Hx  Soc Hx      Reviewed and updated as needed this visit by Provider         ROS:  Constitutional, HEENT, cardiovascular, pulmonary, gi and gu systems are negative, except as otherwise noted.      OBJECTIVE:   /78 (BP Location: Left arm,  "Patient Position: Chair, Cuff Size: Adult Regular)  Pulse 92  Temp 98.9  F (37.2  C) (Oral)  Ht 5' 9\" (1.753 m)  Wt 228 lb (103.4 kg)  SpO2 93%  BMI 33.67 kg/m2  Body mass index is 33.67 kg/(m^2).  GENERAL: healthy, alert and no distress, is obese  EYES: Eyes grossly normal to inspection, PERRL and conjunctivae and sclerae normal  HENT: ear canals and TM's normal, nose and mouth without ulcers or lesions  NECK: no adenopathy, no asymmetry, masses, or scars and thyroid normal to palpation  RESP: lungs clear to auscultation - no rales, rhonchi or wheezes  CV: regular rate and rhythm, normal S1 S2, no S3 or S4, no murmur, click or rub, no peripheral edema and peripheral pulses strong  ABDOMEN: soft, nontender, no hepatosplenomegaly, no masses and bowel sounds normal  MS: no gross musculoskeletal defects noted, no edema  SKIN: no suspicious lesions or rashes  NEURO: Normal strength and tone, mentation intact and speech normal  BACK: no CVA tenderness, no paralumbar tenderness  PSYCH: mentation appears normal, affect normal/bright  Diabetic foot exam: normal DP and PT pulses, no trophic changes or ulcerative lesions, normal calluses, decreased sensory exam and decreased monofilament exam     Diagnostic Test Results:  No results found for this or any previous visit (from the past 24 hour(s)).    ASSESSMENT/PLAN:         Tobacco Cessation:   reports that he quit smoking about 25 years ago. He has never used smokeless tobacco.      BMI:   Estimated body mass index is 33.67 kg/(m^2) as calculated from the following:    Height as of this encounter: 5' 9\" (1.753 m).    Weight as of this encounter: 228 lb (103.4 kg).   Weight management plan: Discussed healthy diet and exercise guidelines and patient will follow up in 3 months in clinic to re-evaluate.            ICD-10-CM    1. Contusion of left foot, initial encounter S90.32XA XR Foot Left G/E 3 Views- no fracture but is still very tender after contusion to mid " forefoot.      PODIATRY/FOOT & ANKLE SURGERY REFERRAL   2. Type 2 diabetes mellitus with diabetic polyneuropathy, without long-term current use of insulin (H) E11.42 Hemoglobin A1c  Lab Results   Component Value Date    A1C 7.7 03/28/2017    A1C 7.4 09/16/2016    A1C 7.1 03/18/2016    A1C 7.5 10/30/2015    A1C 6.8 03/02/2015         3. Diabetic polyneuropathy associated with type 2 diabetes mellitus (H) E11.42 Weight gain from lack of exercise. Neuropathy getting worse   4. Non-small cell carcinoma of lung, left (H) C34.92 Follow up CT scan next month. Surgical removal only for treatment    5. Neuropathy (H) G62.9 PODIATRY/FOOT & ANKLE SURGERY REFERRAL   6. Hypertension goal BP (blood pressure) < 140/90 I10 Blood pressure well controlled on medications    7. Hyperlipidemia with target LDL less than 100 E78.5 Stable        CONSULTATION/REFERRAL to podiatry and thoracic surgery as scheduled.   FUTURE LABS:       - Schedule fasting labs in 3 months  FUTURE APPOINTMENTS:       - Follow-up visit in 3 months or sooner if any questions or concerns.   Work on weight loss  Regular exercise  See Patient Instructions    Pam Dela Cruz MD  [facility]

## 2017-09-11 NOTE — NURSING NOTE
"Chief Complaint   Patient presents with     Foot Injury     Left foot pain and swelling       Initial /78 (BP Location: Left arm, Patient Position: Chair, Cuff Size: Adult Regular)  Pulse 92  Temp 98.9  F (37.2  C) (Oral)  Ht 5' 9\" (1.753 m)  Wt 228 lb (103.4 kg)  SpO2 93%  BMI 33.67 kg/m2 Estimated body mass index is 33.67 kg/(m^2) as calculated from the following:    Height as of this encounter: 5' 9\" (1.753 m).    Weight as of this encounter: 228 lb (103.4 kg).  Medication Reconciliation: complete     Shakir Jacobs CMA    "

## 2017-09-11 NOTE — PATIENT INSTRUCTIONS
Foot Sprain    A sprain is a stretching or tearing of the ligaments that hold a joint together. There are no broken bones. Sprains generally take from 3 6 weeks to heal. A sprain may be treated with a splint, walking cast, or special boot. Mild sprains may not need any additional support.  Home care  The following guidelines will help you care for your injury at home:    Keep your leg elevated when sitting or lying down. This is very important during the first 48 hours to reduce swelling. Stay off the injured foot as much as possible until you can walk on it without pain. If needed, you may use crutches during the first week for this purpose. Crutches can be rented at many pharmacies or surgical/orthopedic supply stores.    You may be given a cast shoe to wear to prevent movement in your foot. If not, you can use a sandal or any shoe that does not put pressure on the injured area until the swelling and pain go away. If using a sandal, be careful not to hit your foot against anything, since another injury could make the sprain worse.    Apply an ice pack over the injured area for 15 to 20 minutes every 3 to 6 hours. You should do this for the first 24 to 48 hours. You can make an ice pack by filling a plastic bag that seals at the top with ice cubes and then wrapping it with a thin towel. Continue to use ice packs for relief of pain and swelling as needed. As the ice melts, avoid getting any wrap, splint, or cast wet. After 48 hours, apply heat from a warm shower or bath for 20 minutes several times daily. Alternating ice and heat may also be helpful.    You may use over-the-counter pain medicine to control pain, unless another medicine was prescribed. If you have chronic liver or kidney disease or ever had a stomach ulcer or GI bleeding, talk with your healthcare provider before using these medicines.    If you were given a splint or cast, keep it dry. Bathe with your splint or cast well out of the water,  protected with 2 large plastic bags, rubber-banded at the top end. If a fiberglass splint or cast gets wet, you can dry it with a hair dryer.    You may return to sports after healing, when you can run without pain.  Follow-up care  Follow up with your healthcare provider as directed. Sometimes fractures don t show up on the first X-ray. Bruises and sprains can sometimes hurt as much as a fracture. These injuries can take time to heal completely. If your symptoms don t improve or they get worse, talk with your healthcare provider. You may need a repeat X-ray.  When to seek medical advice  Call your healthcare provider right away if any of these occur:    The plaster cast or splint gets wet or soft    The fiberglass cast or splint gets wet and does not dry for 24 hours    Pain or swelling increases, or redness appears    A bad odor comes from within the cast    Fever of 100.4 F (38 C) or above lasting for 24 to 48 hours    Toes on the injured foot become cold, blue, numb, or tingly  Date Last Reviewed: 11/20/2015 2000-2017 [employer]. [address] [place] [address]. All rights reserved. This information is not intended as a substitute for professional medical care. Always follow your healthcare professional's instructions.        Foot Contusion  You have a contusion. This is also called a bruise. There is swelling and some bleeding under the skin, but no broken bones. This injury generally takes a few days to a few weeks to heal.  During that time, the bruise will typically change in color from reddish, to purple-blue, to greenish-yellow, then to yellow-brown.  Home care    Elevate the foot to reduce pain and swelling. As much as possible, sit or lie down with the foot raised about the level of your heart. This is especially important during the first 48 hours.    Ice the foot to help reduce pain and swelling. Wrap a cold source (ice pack or ice cubes in a plastic bag) in a thin towel.  Apply to the bruised area for 20 minutes every 1 to 2 hours the first day. Continue this 3 to 4 times a day until the pain and swelling goes away.    Unless another medicine was prescribed, you can take acetaminophen, ibuprofen, or naproxen to control pain. (If you have chronic liver or kidney disease or ever had a stomach ulcer or gastrointestinal bleeding, talk with your healthcare provider before using these medicines.)  Follow up  Follow up with your healthcare provider or our staff as advised. Call if you are not improving within 1 to 2 weeks.  When to seek medical advice   Call your healthcare provider right away if you have any of the following:    Increased pain or swelling    Foot or leg becomes cold, blue, numb or tingly    Signs of infection: Warmth, drainage, or increased redness or pain around the bruise    Inability to move the injured foot     Frequent bruising for unknown reasons  Date Last Reviewed: 2/1/2017 2000-2017 [employer]. [address] [place] [address], [place] [address]. All rights reserved. This information is not intended as a substitute for professional medical care. Always follow your healthcare professional's instructions.

## 2017-09-11 NOTE — MR AVS SNAPSHOT
After Visit Summary   9/11/2017    Sven Givens    MRN: [id number]           Patient Information     Date Of Birth          1955        Visit Information        Provider Department      9/11/2017 3:40 PM Pam Dela Cruz MD [facility]        Today's Diagnoses     Contusion of left foot, initial encounter    -  1    Type 2 diabetes mellitus with diabetic polyneuropathy, without long-term current use of insulin (H)        Neuropathy (H)          Care Instructions      Foot Sprain    A sprain is a stretching or tearing of the ligaments that hold a joint together. There are no broken bones. Sprains generally take from 3-6 weeks to heal. A sprain may be treated with a splint, walking cast, or special boot. Mild sprains may not need any additional support.  Home care  The following guidelines will help you care for your injury at home:    Keep your leg elevated when sitting or lying down. This is very important during the first 48 hours to reduce swelling. Stay off the injured foot as much as possible until you can walk on it without pain. If needed, you may use crutches during the first week for this purpose. Crutches can be rented at many pharmacies or surgical/orthopedic supply stores.    You may be given a cast shoe to wear to prevent movement in your foot. If not, you can use a sandal or any shoe that does not put pressure on the injured area until the swelling and pain go away. If using a sandal, be careful not to hit your foot against anything, since another injury could make the sprain worse.    Apply an ice pack over the injured area for 15 to 20 minutes every 3 to 6 hours. You should do this for the first 24 to 48 hours. You can make an ice pack by filling a plastic bag that seals at the top with ice cubes and then wrapping it with a thin towel. Continue to use ice packs for relief of pain and swelling as needed. As the ice melts, avoid getting any wrap, splint, or  cast wet. After 48 hours, apply heat from a warm shower or bath for 20 minutes several times daily. Alternating ice and heat may also be helpful.    You may use over-the-counter pain medicine to control pain, unless another medicine was prescribed. If you have chronic liver or kidney disease or ever had a stomach ulcer or GI bleeding, talk with your healthcare provider before using these medicines.    If you were given a splint or cast, keep it dry. Bathe with your splint or cast well out of the water, protected with 2 large plastic bags, rubber-banded at the top end. If a fiberglass splint or cast gets wet, you can dry it with a hair dryer.    You may return to sports after healing, when you can run without pain.  Follow-up care  Follow up with your healthcare provider as directed. Sometimes fractures don t show up on the first X-ray. Bruises and sprains can sometimes hurt as much as a fracture. These injuries can take time to heal completely. If your symptoms don t improve or they get worse, talk with your healthcare provider. You may need a repeat X-ray.  When to seek medical advice  Call your healthcare provider right away if any of these occur:    The plaster cast or splint gets wet or soft    The fiberglass cast or splint gets wet and does not dry for 24 hours    Pain or swelling increases, or redness appears    A bad odor comes from within the cast    Fever of 100.4 F (38 C) or above lasting for 24 to 48 hours    Toes on the injured foot become cold, blue, numb, or tingly  Date Last Reviewed: 11/20/2015 2000-2017 [employer]. [address] [place] [address]. All rights reserved. This information is not intended as a substitute for professional medical care. Always follow your healthcare professional's instructions.        Foot Contusion  You have a contusion. This is also called a bruise. There is swelling and some bleeding under the skin, but no broken bones. This  injury generally takes a few days to a few weeks to heal.  During that time, the bruise will typically change in color from reddish, to purple-blue, to greenish-yellow, then to yellow-brown.  Home care    Elevate the foot to reduce pain and swelling. As much as possible, sit or lie down with the foot raised about the level of your heart. This is especially important during the first 48 hours.    Ice the foot to help reduce pain and swelling. Wrap a cold source (ice pack or ice cubes in a plastic bag) in a thin towel. Apply to the bruised area for 20 minutes every 1 to 2 hours the first day. Continue this 3 to 4 times a day until the pain and swelling goes away.    Unless another medicine was prescribed, you can take acetaminophen, ibuprofen, or naproxen to control pain. (If you have chronic liver or kidney disease or ever had a stomach ulcer or gastrointestinal bleeding, talk with your healthcare provider before using these medicines.)  Follow up  Follow up with your healthcare provider or our staff as advised. Call if you are not improving within 1 to 2 weeks.  When to seek medical advice   Call your healthcare provider right away if you have any of the following:    Increased pain or swelling    Foot or leg becomes cold, blue, numb or tingly    Signs of infection: Warmth, drainage, or increased redness or pain around the bruise    Inability to move the injured foot     Frequent bruising for unknown reasons  Date Last Reviewed: 2/1/2017 2000-2017 [employer]. [address] [place] [address]. All rights reserved. This information is not intended as a substitute for professional medical care. Always follow your healthcare professional's instructions.                Follow-ups after your visit        Additional Services     PODIATRY/FOOT & ANKLE SURGERY REFERRAL       Your provider has referred you to: [facility]: [facility] [phone]    [email or web address]    Please be aware that coverage of these services is subject to the terms and limitations of your health insurance plan.  Call member services at your health plan with any benefit or coverage questions.      Please bring the following to your appointment:  >>   Any x-rays, CTs or MRIs which have been performed.  Contact the facility where they were done to arrange for  prior to your scheduled appointment.    >>   List of current medications   >>   This referral request   >>   Any documents/labs given to you for this referral                  Your next 10 appointments already scheduled     Sep 21, 2017  3:00 PM CDT   [facility]    [address] [place] [id number] [phone]           Please bring any scans or X-rays taken at other hospitals, if similar tests were done. Also bring a list of your medicines, including vitamins, minerals and over-the-counter drugs. It is safest to leave personal items at home.  Be sure to tell your doctor:   If you have any allergies.   If there s any chance you are pregnant.   If you are breastfeeding.   If you have any special needs.  You do not need to do anything special to prepare.  Please wear loose clothing, such as a sweat suit or jogging clothes. Avoid snaps, zippers and other metal. We may ask you to undress and put on a hospital gown.            Sep 26, 2017  3:15 PM CDT   [facility]   [facility]    [address] [address] [place] [id number] [phone]            Oct 31, 2017  3:00 PM CDT   [facility]    [address] [facility] [address] [phone]           Patient must bring picture ID. Patient should be prepared to give a urine specimen  Please do  not eat 10-12 hours before your appointment if you are coming in fasting for labs on lipids, cholesterol, or glucose (sugar). Pregnant women should follow their Care Team instructions. Water with medications is okay. Do not drink coffee or other fluids. If you have concerns about taking  your medications, please ask at office or if scheduling via [employer], send a message by clicking on Secure Messaging, Message Your Care Team.            Nov 07, 2017  3:00 PM CST   [facility]   [facility] ([address]  [place] [id number] [phone]              Who to contact     If you have questions or need follow up information about today's clinic visit or your schedule please contact [facility] directly at [phone].  Normal or non-critical lab and imaging results will be communicated to you by MyChart, letter or phone within 4 business days after the clinic has received the results. If you do not hear from us within 7 days, please contact the clinic through [employer] or phone. If you have a critical or abnormal lab result, we will notify you by phone as soon as possible.  Submit refill requests through [employer] or call your pharmacy and they will forward the refill request to us. Please allow 3 business days for your refill to be completed.          Additional Information About Your Visit        [employer] Information     [employer] gives you secure access to your electronic health record. If you see a primary care provider, you can also send messages to your care team and make appointments. If you have questions, please call your primary care clinic.  If you do not have a primary care provider, please call [phone] and they will assist you.        Care EveryWhere ID     This is your Care EveryWhere ID. This could be used by other organizations to access your [facility] medical records  [id number]        Your Vitals Were   "   Pulse Temperature Height Pulse Oximetry BMI (Body Mass Index)       92 98.9  F (37.2  C) (Oral) 5' 9\" (1.753 m) 93% 33.67 kg/m2        Blood Pressure from Last 3 Encounters:   09/11/17 136/78   05/31/17 (!) 129/94   05/26/17 129/78    Weight from Last 3 Encounters:   09/11/17 228 lb (103.4 kg)   05/25/17 220 lb (99.8 kg)   05/26/17 220 lb (99.8 kg)              We Performed the Following     Hemoglobin A1c     PODIATRY/FOOT & ANKLE SURGERY REFERRAL        Primary Care Provider Office Phone # Fax #    Pam Dela Cruz [phone]       [address]  [facility] [address]        Equal Access to Services     [facility] Center[facility]US[facility] : Hadii sergey cao hadasho Soomaali, waaxda luqadaha, qaybta kaalmada adeegyada, candace jaquez . So [facility] [phone].    ATENCIÓN: Si habla español, tiene a buchanan disposición servicios gratuitos de asistencia lingüística. [facility] [phone].    We comply with applicable federal civil rights laws and [place] laws. We do not discriminate on the basis of race, color, national origin, age, disability sex, sexual orientation or gender identity.            Thank you!     Thank you for choosing [facility]  for your care. Our goal is always to provide you with excellent care. Hearing back from our patients is one way we can continue to improve our services. Please take a few minutes to complete the written survey that you may receive in the mail after your visit with us. Thank you!             Your Updated Medication List - Protect others around you: Learn how to safely use, store and throw away your medicines at www.disposemymeds.org.          This list is accurate as of: 9/11/17  4:44 PM.  Always use your most recent med list.                   Brand Name Dispense Instructions for use Diagnosis    albuterol 108 (90 BASE) MCG/ACT Inhaler    PROAIR HFA/PROVENTIL HFA/VENTOLIN HFA    1 Inhaler    Inhale 2-4 puffs into the lungs every 4 " hours as needed for shortness of breath / dyspnea or wheezing    Bronchospasm, Acute bronchitis, unspecified organism       allopurinol 300 MG tablet    ZYLOPRIM    90 tablet    TAKE 1 TABLET (300 MG) BY MOUTH DAILY    Chronic gout of multiple sites, unspecified cause       aspirin 81 MG tablet      1 TABLET DAILY(*)        atorvastatin 40 MG tablet    LIPITOR    90 tablet    Take 1 tablet (40 mg) by mouth daily    Hypertriglyceridemia, Type 2 diabetes mellitus with diabetic polyneuropathy, without long-term current use of insulin (H)       Azelaic Acid 15 % gel    FINACEA    50 g    Apply small amount twice a day to skin    Rosacea       blood glucose monitoring test strip    ONE TOUCH ULTRA    100 strip    Use to test blood sugars 2 times daily or as directed.    Type 2 diabetes mellitus with diabetic polyneuropathy, without long-term current use of insulin (H)       cinnamon 500 MG Tabs      Take one tablet twice daily.        diclofenac 1 % Gel topical gel    VOLTAREN    100 g    Apply 4 grams to knees or 2 grams to hands four times daily using enclosed dosing card.    Diabetic polyneuropathy associated with type 2 diabetes mellitus (H)       fish oil-omega-3 fatty acids 1000 MG capsule      daily        fluticasone 50 MCG/ACT spray    FLONASE    1 Bottle    Spray 1-2 sprays into both nostrils daily    URI with cough and congestion       glimepiride 4 MG tablet    AMARYL    180 tablet    TAKE 1 TABLET (4 MG) BY MOUTH 2 TIMES DAILY    Type 2 diabetes mellitus without complication, unspecified long term insulin use status (H)       guaiFENesin-codeine 100-10 MG/5ML Soln solution    ROBITUSSIN AC    120 mL    Take 5-10 mLs by mouth every 4 hours as needed for cough    Persistent cough for 3 weeks or longer       lidocaine 5 % ointment    XYLOCAINE    50 g    Apply topically 3 times daily    Diabetic polyneuropathy associated with type 2 diabetes mellitus (H)       losartan-hydrochlorothiazide 100-25 MG per tablet     HYZAAR    90 tablet    TAKE 1 TABLET BY MOUTH DAILY    Essential hypertension with goal blood pressure less than 140/90       metFORMIN 1000 MG tablet    GLUCOPHAGE    180 tablet    TAKE 1 TABLET (1,000 MG) BY MOUTH 2 TIMES DAILY (WITH MEALS)    Type 2 diabetes mellitus with hyperglycemia, without long-term current use of insulin (H)       minocycline 100 MG tablet    DYNACIN    60 tablet    Take 1 tablet by mouth. Twice daily for 10 days with flare of rosacea.    Rosacea       mometasone 50 MCG/ACT spray    NASONEX    3 Box    Spray 2 sprays into both nostrils daily    Dysfunction of eustachian tube, left       MULTIPLE VITAMINS PO      1 a day        nitroGLYcerin 0.4 MG sublingual tablet    NITROSTAT    25 tablet    Place 1 tablet (0.4 mg) under the tongue every 5 minutes as needed for chest pain If you are still having symptoms after 3 doses (15 minutes) call 911.    Ischemic chest pain (H)       omeprazole 20 MG CR capsule    priLOSEC    180 capsule    Take 1 capsule (20 mg) by mouth 2 times daily Take 30-60 minutes before a meal.    Gastroesophageal reflux disease with esophagitis       order for DME     200 strip    One touch ultra  Test strips for checking blood sugars 2 times a day.    Type 2 diabetes mellitus with diabetic polyneuropathy, without long-term current use of insulin (H)       terazosin 5 MG capsule    HYTRIN    90 capsule    TAKE 1 CAPSULE (5 MG) BY MOUTH AT BEDTIME    Essential hypertension with goal blood pressure less than 140/90

## 2017-09-13 NOTE — PROGRESS NOTES
Dear Sven    Your test results are attached. I am happy to let you know that they are stable.    The radiologist did not see any fracture, but he does see arthritis in your foot and this could make recovery take longer.     Please contact me by MyChart if you have any questions about your labs or management.    Pam Dela Cruz MD

## 2017-09-13 NOTE — PROGRESS NOTES
Dear Sven    Your test results are attached.    The A1C test looks great and is at 7.6. We can recheck labs in 6 months.     Please contact me by [employer] if you have any questions about your labs or management.    Pam Dela Cruz MD

## 2017-09-21 ENCOUNTER — RADIANT APPOINTMENT (OUTPATIENT)
Dept: CT IMAGING | Facility: CLINIC | Age: 62
End: 2017-09-21
Attending: INTERNAL MEDICINE
Payer: COMMERCIAL

## 2017-09-21 DIAGNOSIS — C34.92 NON-SMALL CELL CARCINOMA OF LUNG, LEFT (H): ICD-10-CM

## 2017-09-21 PROCEDURE — 71250 CT THORAX DX C-: CPT | Performed by: RADIOLOGY

## 2017-09-26 ENCOUNTER — ONCOLOGY VISIT (OUTPATIENT)
Dept: ONCOLOGY | Facility: CLINIC | Age: 62
End: 2017-09-26
Payer: COMMERCIAL

## 2017-09-26 VITALS
BODY MASS INDEX: 33.33 KG/M2 | DIASTOLIC BLOOD PRESSURE: 79 MMHG | TEMPERATURE: 98.4 F | SYSTOLIC BLOOD PRESSURE: 125 MMHG | HEIGHT: 69 IN | WEIGHT: 225 LBS | OXYGEN SATURATION: 95 % | RESPIRATION RATE: 20 BRPM | HEART RATE: 94 BPM

## 2017-09-26 DIAGNOSIS — C34.92 NON-SMALL CELL CARCINOMA OF LUNG, LEFT (H): Primary | ICD-10-CM

## 2017-09-26 DIAGNOSIS — Z23 NEED FOR PROPHYLACTIC VACCINATION AND INOCULATION AGAINST INFLUENZA: ICD-10-CM

## 2017-09-26 PROCEDURE — G0008 ADMIN INFLUENZA VIRUS VAC: HCPCS | Performed by: INTERNAL MEDICINE

## 2017-09-26 PROCEDURE — 90686 IIV4 VACC NO PRSV 0.5 ML IM: CPT | Performed by: INTERNAL MEDICINE

## 2017-09-26 PROCEDURE — 99214 OFFICE O/P EST MOD 30 MIN: CPT | Mod: 25 | Performed by: INTERNAL MEDICINE

## 2017-09-26 ASSESSMENT — PAIN SCALES - GENERAL: PAINLEVEL: NO PAIN (0)

## 2017-09-26 NOTE — MR AVS SNAPSHOT
After Visit Summary   9/26/2017    Sven Givens    MRN: [id number]           Patient Information     Date Of Birth          1955        Visit Information        Provider Department      9/26/2017 3:15 PM Lay Valencia MD [facility]        Today's Diagnoses     Non-small cell carcinoma of lung, left (H)    -  1    Need for prophylactic vaccination and inoculation against influenza          Care Instructions    Flu shot today    Schedule CT Chest in 6 months and then see me a few days after that          Follow-ups after your visit        Your next 10 appointments already scheduled     Oct 11, 2017  1:15 PM CDT   New Visit with Krish Boyle DPM   [facility] ([facility])    [address] [facility] [address] [phone]            Oct 31, 2017  3:00 PM CDT   LAB with BK LAB   [facility] ([facility])    [address] [facility] [address] [phone]           Patient must bring picture ID. Patient should be prepared to give a urine specimen  Please do not eat 10-12 hours before your appointment if you are coming in fasting for labs on lipids, cholesterol, or glucose (sugar). Pregnant women should follow their Care Team instructions. Water with medications is okay. Do not drink coffee or other fluids. If you have concerns about taking  your medications, please ask at office or if scheduling via [employer], send a message by clicking on Secure Messaging, Message Your Care Team.            Nov 07, 2017  3:00 PM CST   Return Visit with Sami Hillman MD   [facility]    [address] [place] [address]   [phone]            Mar 16, 2018  2:30 PM CDT   CT CHEST W/O CONTRAST with MGCT1   [facility] ([facility])    [id number] [place] [address]    [phone]           Please bring any scans or X-rays taken at other hospitals, if similar tests were done. Also bring a list of your medicines, including vitamins, minerals and over-the-counter drugs. It is safest to leave personal items at home.  Be sure to tell your doctor:   If you have any allergies.   If there s any chance you are pregnant.   If you are breastfeeding.   If you have any special needs.  You do not need to do anything special to prepare.  Please wear loose clothing, such as a sweat suit or jogging clothes. Avoid snaps, zippers and other metal. We may ask you to undress and put on a hospital gown.            Mar 23, 2018  3:15 PM CDT   [facility]    [address] [place] [address] [phone]              Future tests that were ordered for you today     Open Future Orders        Priority Expected Expires Ordered    CT Chest w/o contrast Routine 3/26/2018 9/26/2018 9/26/2017            Who to contact     If you have questions or need follow up information about today's clinic visit or your schedule please contact [facility] directly at [phone].  Normal or non-critical lab and imaging results will be communicated to you by [employer], letter or phone within 4 business days after the clinic has received the results. If you do not hear from us within 7 days, please contact the clinic through [employer] or phone. If you have a critical or abnormal lab result, we will notify you by phone as soon as possible.  Submit refill requests through [employer] or call your pharmacy and they will forward the refill request to us. Please allow 3 business days for your refill to be completed.          Additional Information About Your Visit        [employer] Information     [employer] gives you secure access to your electronic health record. If you see a primary care provider, you can also send  "messages to your care team and make appointments. If you have questions, please call your primary care clinic.  If you do not have a primary care provider, please call [phone] and they will assist you.      [employer] is an electronic gateway that provides easy, online access to your medical records. With [employer], you can request a clinic appointment, read your test results, renew a prescription or communicate with your care team.     To access your existing account, please contact your [facility] or call [phone] for assistance.        Care EveryWhere ID     This is your Care EveryWhere ID. This could be used by other organizations to access your [facility] medical records  [id number]        Your Vitals Were     Pulse Temperature Respirations Height Pulse Oximetry BMI (Body Mass Index)    94 98.4  F (36.9  C) (Oral) 20 1.753 m (5' 9.02\") 95% 33.21 kg/m2       Blood Pressure from Last 3 Encounters:   09/26/17 125/79   09/11/17 136/78   05/31/17 (!) 129/94    Weight from Last 3 Encounters:   09/26/17 102.1 kg (225 lb)   09/11/17 103.4 kg (228 lb)   05/25/17 99.8 kg (220 lb)               Primary Care Provider Office Phone # Fax #    Pam Yesenia Dela Cruz [phone]       [address]  [facility] [address]        Equal Access to Services     [facility] Hospital[facility]US[facility]NA AH: Hadii aad ku hadasho Soomaali, waaxda luqadaha, qaybta kaalmada adeegyada, waxay deandre jaquez ah. So [facility] [phone].    ATENCIÓN: Si habla español, tiene a buchanan disposición servicios gratuitos de asistencia lingüística. Llame al [phone].    We comply with applicable federal civil rights laws and [place] laws. We do not discriminate on the basis of race, color, national origin, age, disability sex, sexual orientation or gender identity.            Thank you!     Thank you for choosing [facility]  for your care. Our goal is always to provide you with excellent " care. Hearing back from our patients is one way we can continue to improve our services. Please take a few minutes to complete the written survey that you may receive in the mail after your visit with us. Thank you!             Your Updated Medication List - Protect others around you: Learn how to safely use, store and throw away your medicines at www.disposemymeds.org.          This list is accurate as of: 9/26/17  3:43 PM.  Always use your most recent med list.                   Brand Name Dispense Instructions for use Diagnosis    albuterol 108 (90 BASE) MCG/ACT Inhaler    PROAIR HFA/PROVENTIL HFA/VENTOLIN HFA    1 Inhaler    Inhale 2-4 puffs into the lungs every 4 hours as needed for shortness of breath / dyspnea or wheezing    Bronchospasm, Acute bronchitis, unspecified organism       allopurinol 300 MG tablet    ZYLOPRIM    90 tablet    TAKE 1 TABLET (300 MG) BY MOUTH DAILY    Chronic gout of multiple sites, unspecified cause       aspirin 81 MG tablet      1 TABLET DAILY(*)        atorvastatin 40 MG tablet    LIPITOR    90 tablet    Take 1 tablet (40 mg) by mouth daily    Hypertriglyceridemia, Type 2 diabetes mellitus with diabetic polyneuropathy, without long-term current use of insulin (H)       Azelaic Acid 15 % gel    FINACEA    50 g    Apply small amount twice a day to skin    Rosacea       blood glucose monitoring test strip    ONE TOUCH ULTRA    100 strip    Use to test blood sugars 2 times daily or as directed.    Type 2 diabetes mellitus with diabetic polyneuropathy, without long-term current use of insulin (H)       cinnamon 500 MG Tabs      Take one tablet twice daily.        diclofenac 1 % Gel topical gel    VOLTAREN    100 g    Apply 4 grams to knees or 2 grams to hands four times daily using enclosed dosing card.    Diabetic polyneuropathy associated with type 2 diabetes mellitus (H)       fish oil-omega-3 fatty acids 1000 MG capsule      daily        fluticasone 50 MCG/ACT spray    FLONASE    1  Bottle    Spray 1-2 sprays into both nostrils daily    URI with cough and congestion       glimepiride 4 MG tablet    AMARYL    180 tablet    TAKE 1 TABLET (4 MG) BY MOUTH 2 TIMES DAILY    Type 2 diabetes mellitus without complication, unspecified long term insulin use status (H)       guaiFENesin-codeine 100-10 MG/5ML Soln solution    ROBITUSSIN AC    120 mL    Take 5-10 mLs by mouth every 4 hours as needed for cough    Persistent cough for 3 weeks or longer       lidocaine 5 % ointment    XYLOCAINE    50 g    Apply topically 3 times daily    Diabetic polyneuropathy associated with type 2 diabetes mellitus (H)       losartan-hydrochlorothiazide 100-25 MG per tablet    HYZAAR    90 tablet    TAKE 1 TABLET BY MOUTH DAILY    Essential hypertension with goal blood pressure less than 140/90       metFORMIN 1000 MG tablet    GLUCOPHAGE    180 tablet    TAKE 1 TABLET (1,000 MG) BY MOUTH 2 TIMES DAILY (WITH MEALS)    Type 2 diabetes mellitus with hyperglycemia, without long-term current use of insulin (H)       minocycline 100 MG tablet    DYNACIN    60 tablet    Take 1 tablet by mouth. Twice daily for 10 days with flare of rosacea.    Rosacea       mometasone 50 MCG/ACT spray    NASONEX    3 Box    Spray 2 sprays into both nostrils daily    Dysfunction of eustachian tube, left       MULTIPLE VITAMINS PO      1 a day        nitroGLYcerin 0.4 MG sublingual tablet    NITROSTAT    25 tablet    Place 1 tablet (0.4 mg) under the tongue every 5 minutes as needed for chest pain If you are still having symptoms after 3 doses (15 minutes) call 911.    Ischemic chest pain (H)       omeprazole 20 MG CR capsule    priLOSEC    180 capsule    Take 1 capsule (20 mg) by mouth 2 times daily Take 30-60 minutes before a meal.    Gastroesophageal reflux disease with esophagitis       order for DME     200 strip    One touch ultra  Test strips for checking blood sugars 2 times a day.    Type 2 diabetes mellitus with diabetic polyneuropathy,  without long-term current use of insulin (H)       terazosin 5 MG capsule    HYTRIN    90 capsule    TAKE 1 CAPSULE (5 MG) BY MOUTH AT BEDTIME    Essential hypertension with goal blood pressure less than 140/90

## 2017-09-26 NOTE — NURSING NOTE
"Oncology Rooming Note    September 26, 2017 3:04 PM   Sven Givens is a 62 year old male who presents for:    Chief Complaint   Patient presents with     Oncology Clinic Visit     f/u following CT     Initial Vitals: There were no vitals taken for this visit. Estimated body mass index is 33.67 kg/(m^2) as calculated from the following:    Height as of 9/11/17: 1.753 m (5' 9\").    Weight as of 9/11/17: 103.4 kg (228 lb). There is no height or weight on file to calculate BSA.  Data Unavailable Comment: Data Unavailable   No LMP for male patient.  Allergies reviewed: Yes  Medications reviewed: Yes    Medications: Medication refills not needed today.  Pharmacy name entered into [facility]: [id number] [place], [place] - [address]    Clinical concerns:     8 minutes for nursing intake (face to face time)     KISHORE MYERS LPN              "

## 2017-09-26 NOTE — NURSING NOTE
"Injectable Influenza Immunization Documentation    1.  Has the patient received the information for the injectable influenza vaccine? YES     2. Is the patient 6 months of age or older? YES     3. Does the patient have any of the following contraindications?         Severe allergy to eggs? No     Severe allergic reaction to previous influenza vaccines? No   Severe allergy to latex? No       History of Guillain-[place] syndrome? No     Currently have a temperature greater than 100.4F? No        4.  Severely egg allergic patients should have flu vaccine eligibility assessed by an MD, RN, or pharmacist, and those who received flu vaccine should be observed for 15 min by an MD, RN, Pharmacist, Medical Technician, or member of clinic staff.\": YES    5. Latex-allergic patients should be given latex-free influenza vaccine Yes. Please reference the Vaccine latex table to determine if your clinic s product is latex-containing.       Vaccination given by Ny Magdaleno LPN          "
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
## 2017-10-11 ENCOUNTER — OFFICE VISIT (OUTPATIENT)
Dept: PODIATRY | Facility: CLINIC | Age: 62
End: 2017-10-11
Payer: COMMERCIAL

## 2017-10-11 VITALS — WEIGHT: 228 LBS | OXYGEN SATURATION: 98 % | HEART RATE: 100 BPM | BODY MASS INDEX: 33.65 KG/M2

## 2017-10-11 DIAGNOSIS — E11.42 TYPE 2 DIABETES MELLITUS WITH DIABETIC POLYNEUROPATHY, WITHOUT LONG-TERM CURRENT USE OF INSULIN (H): Primary | ICD-10-CM

## 2017-10-11 DIAGNOSIS — M79.671 PAIN IN BOTH FEET: ICD-10-CM

## 2017-10-11 DIAGNOSIS — S90.32XA CONTUSION OF LEFT FOOT, INITIAL ENCOUNTER: ICD-10-CM

## 2017-10-11 DIAGNOSIS — M79.672 PAIN IN BOTH FEET: ICD-10-CM

## 2017-10-11 PROCEDURE — 99243 OFF/OP CNSLTJ NEW/EST LOW 30: CPT | Performed by: PODIATRIST

## 2017-10-11 RX ORDER — CAPSAICIN 0.025 %
1 CREAM (GRAM) TOPICAL 3 TIMES DAILY
Qty: 60 G | Refills: 3 | Status: SHIPPED | OUTPATIENT
Start: 2017-10-11 | End: 2020-12-23

## 2017-10-11 NOTE — LETTER
10/11/2017         RE: Sven Givens  [id number]  [facility] [id number]        Dear Colleague,    Thank you for referring your patient, Sven Givens, to the [facility]. Please see a copy of my visit note below.    Subjective:    Pt is seen today in consult from Dr. Dela Cruz as a new pt for a diabetic foot exam.  Patient had blunt trauma dorsum of left foot 9/1/17 with flashlight falling on foot.  States this has mostly healed up.  Denies weakness or increased toe deformity.    Chief complaint is pain and points to ball of both feet.  Also has pain and burning in both feet.  Has had this for many years.  Worse when blood sugars are high.  Slowly getting worse.  The pain is aggravated by activity.  Can also be bothersome in bed at night.  Nothing seems to relieve this.  Pain with walking now.  Wondering what else can be done to treat this.            ROS:  No hx of wound healing problems or foot ulcers.  Admits bilateral numbness.  Denies edema, erythema, drainage.       Allergies   Allergen Reactions     Penicillins Rash     Ace Inhibitors Cough     Indomethacin Other (See Comments)     Severe dizziness       Current Outpatient Prescriptions   Medication Sig Dispense Refill     capsaicin (ZOSTRIX) 0.025 % CREA cream Apply 1 g topically 3 times daily 60 g 3     terazosin (HYTRIN) 5 MG capsule TAKE 1 CAPSULE (5 MG) BY MOUTH AT BEDTIME 90 capsule 1     glimepiride (AMARYL) 4 MG tablet TAKE 1 TABLET (4 MG) BY MOUTH 2 TIMES DAILY 180 tablet 0     Azelaic Acid (FINACEA) 15 % gel Apply small amount twice a day to skin 50 g 3     lidocaine (XYLOCAINE) 5 % ointment Apply topically 3 times daily 50 g 3     diclofenac (VOLTAREN) 1 % GEL topical gel Apply 4 grams to knees or 2 grams to hands four times daily using enclosed dosing card. 100 g 1     atorvastatin (LIPITOR) 40 MG tablet Take 1 tablet (40 mg) by mouth daily 90 tablet 3     guaiFENesin-codeine (ROBITUSSIN AC) 100-10 MG/5ML SOLN  solution Take 5-10 mLs by mouth every 4 hours as needed for cough 120 mL 3     albuterol (PROAIR HFA/PROVENTIL HFA/VENTOLIN HFA) 108 (90 BASE) MCG/ACT Inhaler Inhale 2-4 puffs into the lungs every 4 hours as needed for shortness of breath / dyspnea or wheezing 1 Inhaler 1     metFORMIN (GLUCOPHAGE) 1000 MG tablet TAKE 1 TABLET (1,000 MG) BY MOUTH 2 TIMES DAILY (WITH MEALS) 180 tablet 3     order for DME One touch ultra  Test strips for checking blood sugars 2 times a day. 200 strip 1     blood glucose monitoring (ONE TOUCH ULTRA) test strip Use to test blood sugars 2 times daily or as directed. 100 strip 11     mometasone (NASONEX) 50 MCG/ACT spray Spray 2 sprays into both nostrils daily 3 Box 1     minocycline (DYNACIN) 100 MG tablet Take 1 tablet by mouth. Twice daily for 10 days with flare of rosacea. 60 tablet 2     allopurinol (ZYLOPRIM) 300 MG tablet TAKE 1 TABLET (300 MG) BY MOUTH DAILY 90 tablet 3     losartan-hydrochlorothiazide (HYZAAR) 100-25 MG per tablet TAKE 1 TABLET BY MOUTH DAILY 90 tablet 3     fluticasone (FLONASE) 50 MCG/ACT nasal spray Spray 1-2 sprays into both nostrils daily 1 Bottle 1     nitroglycerin (NITROSTAT) 0.4 MG SL tablet Place 1 tablet (0.4 mg) under the tongue every 5 minutes as needed for chest pain If you are still having symptoms after 3 doses (15 minutes) call 911. 25 tablet 3     omeprazole (PRILOSEC) 20 MG capsule Take 1 capsule (20 mg) by mouth 2 times daily Take 30-60 minutes before a meal. 180 capsule 3     CINNAMON 500 MG OR TABS Take one tablet twice daily.       FISH OIL 1000 MG OR CAPS daily       ASPIRIN 81 MG PO TABS 1 TABLET DAILY(*)       MULTIPLE VITAMINS OR 1 a day       [DISCONTINUED] ORDER FOR DME One touch ultra  Test strips for checking blood sugars 2 times a day. 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     Rhinitis, allergic      Rosacea      Tubular adenoma of colon 12/2009    follow up colonoscopy in 3 to 5 years requested     Varicose veins        Past Surgical History:   Procedure Laterality Date     BRONCHOSCOPY FLEXIBLE N/A 9/23/2016    Procedure: BRONCHOSCOPY FLEXIBLE;  Surgeon: Gayle Moya MD;  Location: [facility] OR     COLONOSCOPY  5-03     COLONOSCOPY WITH CO2 INSUFFLATION N/A 5/31/2017    Procedure: COLONOSCOPY WITH CO2 INSUFFLATION;  COLON SCREEN/ SEB;  Surgeon: Margarito Rasheed DO;  Location:  OR     GENITOURINARY SURGERY      kidney stones     THORACOSCOPIC WEDGE RESECTION LUNG Left 9/23/2016    Procedure: THORACOSCOPIC WEDGE RESECTION LUNG;  Surgeon: Gayle Moya MD;  Location: [facility] OR     VASCULAR SURGERY      varicose vein       Family History   Problem Relation Age of Onset     CEREBROVASCULAR DISEASE Father      CANCER Sister      ovarary        Social History   Substance Use Topics     Smoking status: Former Smoker     Quit date: 2/1/1992     Smokeless tobacco: Never Used      Comment: 15 + years      Alcohol use No         Exam:    Pulse 100  Wt 103.4 kg (228 lb)   SpO2 98%  BMI 33.65 kg/m2.  Patient a good historian.  A&O X 3.  Pulses DP, PT 2/4 b/l.  CRT < 3 seconds X 10 digits.   Mild ankle edema or varicosities noted.  5.07 monofilament  Intact on toes b/l.  Reflexes 2/4 b/l.  Skin healthy with hair growth noted b/l.  Normal arch with weightbearing.  No forefoot or rear foot deformities noted.  MS 5/5 all compartments.  Normal ROM all fore foot and rearfoot joints.  No erythema or ecchymosis noted bilateral.  No subcutaneous masses noted.  No pain on palpation anywhere bilateral.  Slight edema on dorsum of left foot and contusion 99% healed.    Hemoglobin A1C 7.6 (H) 4.3 - 6.0                Vitamin B12 405  193 - 986 pg/mL Final 10/12/2016      XR FOOT LT G/E 3 VW 9/11/2017 4:15 PM     HISTORY: Contusion.     COMPARISON: None.         IMPRESSION: No evidence of acute fracture or malalignment. There is a  calcaneal bone spur. Mild degenerative changes throughout the midfoot.     A/P  Diabetes mellitus  Peripheral neuropathy with pain  Blunt trauma left foot    Personally reviewed x-ray and labs.  Area of blunt trauma has healed.  Discussed cause of peripheral neuropathy this with patient.  Instructed patient to control sugars as tightly as possible.  Rx for accommodative orthotics.  Rx for capsacin cream and instructed him on how to use this.  Warned patient this could get worse if not complaint which could lead to loss of protective sensation and possible open wound, amputation.  Return to clinic prn.  Thank you for allowing me participate in the care of this patient.          Krish Boyle DPM, FACFAS      Again, thank you for allowing me to participate in the care of your patient.        Sincerely,        Krish Boyle DPM

## 2017-10-11 NOTE — PROGRESS NOTES
Subjective:    Pt is seen today in consult from Dr. Dela Cruz as a new pt for a diabetic foot exam.  Patient had blunt trauma dorsum of left foot 9/1/17 with flashlight falling on foot.  States this has mostly healed up.  Denies weakness or increased toe deformity.    Chief complaint is pain and points to ball of both feet.  Also has pain and burning in both feet.  Has had this for many years.  Worse when blood sugars are high.  Slowly getting worse.  The pain is aggravated by activity.  Can also be bothersome in bed at night.  Nothing seems to relieve this.  Pain with walking now.  Wondering what else can be done to treat this.            ROS:  No hx of wound healing problems or foot ulcers.  Admits bilateral numbness.  Denies edema, erythema, drainage.       Allergies   Allergen Reactions     Penicillins Rash     Ace Inhibitors Cough     Indomethacin Other (See Comments)     Severe dizziness       Current Outpatient Prescriptions   Medication Sig Dispense Refill     capsaicin (ZOSTRIX) 0.025 % CREA cream Apply 1 g topically 3 times daily 60 g 3     terazosin (HYTRIN) 5 MG capsule TAKE 1 CAPSULE (5 MG) BY MOUTH AT BEDTIME 90 capsule 1     glimepiride (AMARYL) 4 MG tablet TAKE 1 TABLET (4 MG) BY MOUTH 2 TIMES DAILY 180 tablet 0     Azelaic Acid (FINACEA) 15 % gel Apply small amount twice a day to skin 50 g 3     lidocaine (XYLOCAINE) 5 % ointment Apply topically 3 times daily 50 g 3     diclofenac (VOLTAREN) 1 % GEL topical gel Apply 4 grams to knees or 2 grams to hands four times daily using enclosed dosing card. 100 g 1     atorvastatin (LIPITOR) 40 MG tablet Take 1 tablet (40 mg) by mouth daily 90 tablet 3     guaiFENesin-codeine (ROBITUSSIN AC) 100-10 MG/5ML SOLN solution Take 5-10 mLs by mouth every 4 hours as needed for cough 120 mL 3     albuterol (PROAIR HFA/PROVENTIL HFA/VENTOLIN HFA) 108 (90 BASE) MCG/ACT Inhaler Inhale 2-4 puffs into the lungs every 4 hours as needed for shortness of breath / dyspnea or  wheezing 1 Inhaler 1     metFORMIN (GLUCOPHAGE) 1000 MG tablet TAKE 1 TABLET (1,000 MG) BY MOUTH 2 TIMES DAILY (WITH MEALS) 180 tablet 3     order for DME One touch ultra  Test strips for checking blood sugars 2 times a day. 200 strip 1     blood glucose monitoring (ONE TOUCH ULTRA) test strip Use to test blood sugars 2 times daily or as directed. 100 strip 11     mometasone (NASONEX) 50 MCG/ACT spray Spray 2 sprays into both nostrils daily 3 Box 1     minocycline (DYNACIN) 100 MG tablet Take 1 tablet by mouth. Twice daily for 10 days with flare of rosacea. 60 tablet 2     allopurinol (ZYLOPRIM) 300 MG tablet TAKE 1 TABLET (300 MG) BY MOUTH DAILY 90 tablet 3     losartan-hydrochlorothiazide (HYZAAR) 100-25 MG per tablet TAKE 1 TABLET BY MOUTH DAILY 90 tablet 3     fluticasone (FLONASE) 50 MCG/ACT nasal spray Spray 1-2 sprays into both nostrils daily 1 Bottle 1     nitroglycerin (NITROSTAT) 0.4 MG SL tablet Place 1 tablet (0.4 mg) under the tongue every 5 minutes as needed for chest pain If you are still having symptoms after 3 doses (15 minutes) call 911. 25 tablet 3     omeprazole (PRILOSEC) 20 MG capsule Take 1 capsule (20 mg) by mouth 2 times daily Take 30-60 minutes before a meal. 180 capsule 3     CINNAMON 500 MG OR TABS Take one tablet twice daily.       FISH OIL 1000 MG OR CAPS daily       ASPIRIN 81 MG PO TABS 1 TABLET DAILY(*)       MULTIPLE VITAMINS OR 1 a day       [DISCONTINUED] ORDER FOR DME One touch ultra  Test strips for checking blood sugars 2 times a day. 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     Rhinitis, allergic      Rosacea      Tubular adenoma of colon 12/2009    follow up colonoscopy in 3 to 5 years requested     Varicose veins        Past Surgical History:   Procedure Laterality Date     BRONCHOSCOPY FLEXIBLE N/A 9/23/2016    Procedure: BRONCHOSCOPY FLEXIBLE;  Surgeon: Gayle Moya MD;  Location: [facility] OR     COLONOSCOPY  5-03     COLONOSCOPY WITH CO2 INSUFFLATION N/A 5/31/2017    Procedure: COLONOSCOPY WITH CO2 INSUFFLATION;  COLON SCREEN/ SEB;  Surgeon: Margarito Rasheed DO;  Location:  OR     GENITOURINARY SURGERY      kidney stones     THORACOSCOPIC WEDGE RESECTION LUNG Left 9/23/2016    Procedure: THORACOSCOPIC WEDGE RESECTION LUNG;  Surgeon: Gayle Moya MD;  Location: [facility] OR     VASCULAR SURGERY      varicose vein       Family History   Problem Relation Age of Onset     CEREBROVASCULAR DISEASE Father      CANCER Sister      ovarary        Social History   Substance Use Topics     Smoking status: Former Smoker     Quit date: 2/1/1992     Smokeless tobacco: Never Used      Comment: 15 + years      Alcohol use No         Exam:    Pulse 100  Wt 103.4 kg (228 lb)  SpO2 98%  BMI 33.65 kg/m2.  Patient a good historian.  A&O X 3.  Pulses DP, PT 2/4 b/l.  CRT < 3 seconds X 10 digits.   Mild ankle edema or varicosities noted.  5.07 monofilament  Intact on toes b/l.  Reflexes 2/4 b/l.  Skin healthy with hair growth noted  b/l.  Normal arch with weightbearing.  No forefoot or rear foot deformities noted.  MS 5/5 all compartments.  Normal ROM all fore foot and rearfoot joints.  No erythema or ecchymosis noted bilateral.  No subcutaneous masses noted.  No pain on palpation anywhere bilateral.  Slight edema on dorsum of left foot and contusion 99% healed.    Hemoglobin A1C 7.6 (H) 4.3 - 6.0                Vitamin B12 405  193 - 986 pg/mL Final 10/12/2016      XR FOOT LT G/E 3 VW 9/11/2017 4:15 PM     HISTORY: Contusion.     COMPARISON: None.         IMPRESSION: No evidence of acute fracture or malalignment. There is a  calcaneal bone spur. Mild degenerative changes throughout the midfoot.     A/P  Diabetes mellitus  Peripheral neuropathy with pain  Blunt trauma left foot    Personally reviewed x-ray and labs.  Area of blunt trauma has healed.  Discussed cause of peripheral neuropathy this with patient.  Instructed patient to control sugars as tightly as possible.  Rx for accommodative orthotics.  Rx for capsacin cream and instructed him on how to use this.  Warned patient this could get worse if not complaint which could lead to loss of protective sensation and possible open wound, amputation.  Return to clinic prn.  Thank you for allowing me participate in the care of this patient.          Krish Boyle DPM, FACFAS

## 2017-10-11 NOTE — MR AVS SNAPSHOT
After Visit Summary   10/11/2017    Sven Givens    MRN: [id number]           Patient Information     Date Of Birth          1955        Visit Information        Provider Department      10/11/2017 1:15 PM Krish Boyle, L[facility]        Today's Diagnoses     Type 2 diabetes mellitus with diabetic polyneuropathy, without long-term current use of insulin (H)    -  1    Pain in both feet        Contusion of left foot, initial encounter          Care Instructions    We wish you continued good healing. If you have any questions or concerns, please do not hesitate to contact us at [phone]      Please remember to call and schedule a follow up appointment if one was recommended at your earliest convenience.   PODIATRY CLINIC HOURS  TELEPHONE NUMBER    Dr. Krish LEÓNPTUNDE [facility]    Clinics:  [facility]        Nupur Santos MA  Medical Assistant  Tuesday 1PM-6PM  [place]  Wednesday 7AM-2PM  [place]  Thursday 10AM-6PM  [place]  Friday 7AM-345PM  [place]  Specialty schedulers:   [phone] to make an appointment with any Specialty Provider.        Urgent Care locations:    [facility] Monday-Friday 5 pm - 9 pm. Saturday-Sunday 9 am -5pm    Monday-Friday 11 am - 9 pm Saturday 9 am - 5 pm     Monday-Sunday 12 noon-8PM [phone]     If you need a medication refill, please contact us you may need lab work and/or a follow up visit prior to your refill (i.e. Antifungal medications).    [employer] (secure e-mail communication and access to your chart) to send a message or to make an appointment.    If MRI needed please call Leonel Jo at [phone]        Weight management plan: Patient was referred to their PCP to discuss a diet and exercise plan.            Follow-ups after your visit        Your next 10  appointments already scheduled     Oct 31, 2017  3:00 PM CDT   LAB with BK LAB   [facility] ([facility]    [address] [facility] [id number] [phone]           Patient must bring picture ID. Patient should be prepared to give a urine specimen  Please do not eat 10-12 hours before your appointment if you are coming in fasting for labs on lipids, cholesterol, or glucose (sugar). Pregnant women should follow their Care Team instructions. Water with medications is okay. Do not drink coffee or other fluids. If you have concerns about taking  your medications, please ask at office or if scheduling via Emailaget, send a message by clicking on Secure Messaging, Message Your Care Team.            Nov 07, 2017  3:00 PM CST   Return Visit with Sami Hillman MD   [facility] ([facility])    [address] [place] [id number] [phone]            Mar 16, 2018  2:30 PM CDT   CT CHEST W/O CONTRAST with MGCT1   [facility] ([facility])    [address] [place] [address] [phone]           Please bring any scans or X-rays taken at other hospitals, if similar tests were done. Also bring a list of your medicines, including vitamins, minerals and over-the-counter drugs. It is safest to leave personal items at home.  Be sure to tell your doctor:   If you have any allergies.   If there s any chance you are pregnant.   If you are breastfeeding.   If you have any special needs.  You do not need to do anything special to prepare.  Please wear loose clothing, such as a sweat suit or jogging clothes. Avoid snaps, zippers and other metal. We may ask you to undress and put on a hospital gown.            Mar 23, 2018  3:15 PM CDT   Return Visit with Lay Valencia MD   [facility] ([facility])    [address] [place] [id number]    [phone]              Who to contact     If you have questions or need follow up information about today's clinic visit or your schedule please contact [facility] directly at [phone].  Normal or non-critical lab and imaging results will be communicated to you by MyChart, letter or phone within 4 business days after the clinic has received the results. If you do not hear from us within 7 days, please contact the clinic through [employer] or phone. If you have a critical or abnormal lab result, we will notify you by phone as soon as possible.  Submit refill requests through [employer] or call your pharmacy and they will forward the refill request to us. Please allow 3 business days for your refill to be completed.          Additional Information About Your Visit        [employer] Information     [employer] gives you secure access to your electronic health record. If you see a primary care provider, you can also send messages to your care team and make appointments. If you have questions, please call your primary care clinic.  If you do not have a primary care provider, please call [phone] and they will assist you.        Care EveryWhere ID     This is your Care EveryWhere ID. This could be used by other organizations to access your [place] medical records  [id number]        Your Vitals Were     Pulse Pulse Oximetry BMI (Body Mass Index)             100 98% 33.65 kg/m2          Blood Pressure from Last 3 Encounters:   09/26/17 125/79   09/11/17 136/78   05/31/17 (!) 129/94    Weight from Last 3 Encounters:   10/11/17 103.4 kg (228 lb)   09/26/17 102.1 kg (225 lb)   09/11/17 103.4 kg (228 lb)              Today, you had the following     No orders found for display         Today's Medication Changes          These changes are accurate as of: 10/11/17 11:59 PM.  If you have any questions, ask your nurse or doctor.               Start taking these medicines.        Dose/Directions    capsaicin  0.025 % Crea cream   Commonly known as:  ZOSTRIX   Used for:  Pain in both feet, Type 2 diabetes mellitus with diabetic polyneuropathy, without long-term current use of insulin (H)   Started by:  Krish Boyle DPM        Dose:  1 applicator   Apply 1 g topically 3 times daily   Quantity:  60 g   Refills:  3            Where to get your medicines      These medications were sent to [facility]/pharmacy #[id number] - [facility], [place] - [address]  [address] [facility] [address]     Phone:  [phone]     capsaicin 0.025 % Crea cream                Primary Care Provider Office Phone # Fax #    Pma Yesenia Dela Cruz [phone]       [address]  [facility] [address]        Equal Access to Services     [facility]: Hadii aad ku hadasho Soomaali, waaxda luqadaha, qaybta kaalmada adeegyada, candace jaquez . So [facility] [phone].    ATENCIÓN: Si habla español, tiene a buchanan disposición servicios gratuitos de asistencia lingüística. [facility] [phone].    We comply with applicable federal civil rights laws and [place] laws. We do not discriminate on the basis of race, color, national origin, age, disability, sex, sexual orientation, or gender identity.            Thank you!     Thank you for choosing [facility]  for your care. Our goal is always to provide you with excellent care. Hearing back from our patients is one way we can continue to improve our services. Please take a few minutes to complete the written survey that you may receive in the mail after your visit with us. Thank you!             Your Updated Medication List - Protect others around you: Learn how to safely use, store and throw away your medicines at www.disposemymeds.org.          This list is accurate as of: 10/11/17 11:59 PM.  Always use your most recent med list.                   Brand Name Dispense Instructions for use Diagnosis    albuterol 108 (90 BASE) MCG/ACT  Inhaler    PROAIR HFA/PROVENTIL HFA/VENTOLIN HFA    1 Inhaler    Inhale 2-4 puffs into the lungs every 4 hours as needed for shortness of breath / dyspnea or wheezing    Bronchospasm, Acute bronchitis, unspecified organism       allopurinol 300 MG tablet    ZYLOPRIM    90 tablet    TAKE 1 TABLET (300 MG) BY MOUTH DAILY    Chronic gout of multiple sites, unspecified cause       aspirin 81 MG tablet      1 TABLET DAILY(*)        atorvastatin 40 MG tablet    LIPITOR    90 tablet    Take 1 tablet (40 mg) by mouth daily    Hypertriglyceridemia, Type 2 diabetes mellitus with diabetic polyneuropathy, without long-term current use of insulin (H)       Azelaic Acid 15 % gel    FINACEA    50 g    Apply small amount twice a day to skin    Rosacea       blood glucose monitoring test strip    ONE TOUCH ULTRA    100 strip    Use to test blood sugars 2 times daily or as directed.    Type 2 diabetes mellitus with diabetic polyneuropathy, without long-term current use of insulin (H)       capsaicin 0.025 % Crea cream    ZOSTRIX    60 g    Apply 1 g topically 3 times daily    Pain in both feet, Type 2 diabetes mellitus with diabetic polyneuropathy, without long-term current use of insulin (H)       cinnamon 500 MG Tabs      Take one tablet twice daily.        diclofenac 1 % Gel topical gel    VOLTAREN    100 g    Apply 4 grams to knees or 2 grams to hands four times daily using enclosed dosing card.    Diabetic polyneuropathy associated with type 2 diabetes mellitus (H)       fish oil-omega-3 fatty acids 1000 MG capsule      daily        fluticasone 50 MCG/ACT spray    FLONASE    1 Bottle    Spray 1-2 sprays into both nostrils daily    URI with cough and congestion       glimepiride 4 MG tablet    AMARYL    180 tablet    TAKE 1 TABLET (4 MG) BY MOUTH 2 TIMES DAILY    Type 2 diabetes mellitus without complication, unspecified long term insulin use status (H)       guaiFENesin-codeine 100-10 MG/5ML Soln solution    ROBITUSSIN AC    120  mL    Take 5-10 mLs by mouth every 4 hours as needed for cough    Persistent cough for 3 weeks or longer       lidocaine 5 % ointment    XYLOCAINE    50 g    Apply topically 3 times daily    Diabetic polyneuropathy associated with type 2 diabetes mellitus (H)       losartan-hydrochlorothiazide 100-25 MG per tablet    HYZAAR    90 tablet    TAKE 1 TABLET BY MOUTH DAILY    Essential hypertension with goal blood pressure less than 140/90       metFORMIN 1000 MG tablet    GLUCOPHAGE    180 tablet    TAKE 1 TABLET (1,000 MG) BY MOUTH 2 TIMES DAILY (WITH MEALS)    Type 2 diabetes mellitus with hyperglycemia, without long-term current use of insulin (H)       minocycline 100 MG tablet    DYNACIN    60 tablet    Take 1 tablet by mouth. Twice daily for 10 days with flare of rosacea.    Rosacea       mometasone 50 MCG/ACT spray    NASONEX    3 Box    Spray 2 sprays into both nostrils daily    Dysfunction of Eustachian tube, left       MULTIPLE VITAMINS PO      1 a day        nitroGLYcerin 0.4 MG sublingual tablet    NITROSTAT    25 tablet    Place 1 tablet (0.4 mg) under the tongue every 5 minutes as needed for chest pain If you are still having symptoms after 3 doses (15 minutes) call 911.    Ischemic chest pain (H)       omeprazole 20 MG CR capsule    priLOSEC    180 capsule    Take 1 capsule (20 mg) by mouth 2 times daily Take 30-60 minutes before a meal.    Gastroesophageal reflux disease with esophagitis       order for DME     200 strip    One touch ultra  Test strips for checking blood sugars 2 times a day.    Type 2 diabetes mellitus with diabetic polyneuropathy, without long-term current use of insulin (H)       terazosin 5 MG capsule    HYTRIN    90 capsule    TAKE 1 CAPSULE (5 MG) BY MOUTH AT BEDTIME    Essential hypertension with goal blood pressure less than 140/90

## 2017-10-11 NOTE — PATIENT INSTRUCTIONS
We wish you continued good healing. If you have any questions or concerns, please do not hesitate to contact us at [phone]      Please remember to call and schedule a follow up appointment if one was recommended at your earliest convenience.   PODIATRY CLINIC HOURS  TELEPHONE NUMBER    Dr. Krish Boyle D.P.M [facility]    Clinics:  [facility]        Nupur Santos MA  Medical Assistant  Tuesday 1PM-6PM  [facility]  Wednesday 7AM-2PM  [place]  Thursday 10AM-6PM  [place] Friday 7AM-345PM  [place]  Specialty schedulers:   [phone] to make an appointment with any Specialty Provider.        Urgent Care locations:    [facility] Monday-Friday 5 pm - 9 pm. Saturday-Sunday 9 am -5pm    Monday-Friday 11 am - 9 pm Saturday 9 am - 5 pm     Monday-Sunday 12 noon-8PM [phone]     If you need a medication refill, please contact us you may need lab work and/or a follow up visit prior to your refill (i.e. Antifungal medications).    [employer] (secure e-mail communication and access to your chart) to send a message or to make an appointment.    If MRI needed please call Leonel Jo at [phone]        Weight management plan: Patient was referred to their PCP to discuss a diet and exercise plan.

## 2017-10-13 DIAGNOSIS — M1A.09X0 CHRONIC GOUT OF MULTIPLE SITES, UNSPECIFIED CAUSE: ICD-10-CM

## 2017-10-13 NOTE — TELEPHONE ENCOUNTER
allopurinol (ZYLOPRIM) 300 MG tablet       Last Written Prescription Date: 10/24/16  Last Fill Quantity: 90, # refills: 3  Last Office Visit with G, P or [facility] prescribing provider:  09/11/17     Next 5 appointments (look out 90 days)     Nov 07, 2017  3:00 PM CST   Return Visit with Sami Hillman MD   [facility] ([address] [address]   [phone]                   Uric Acid   Date Value Ref Range Status   10/30/2015 6.2 3.5 - 7.2 mg/dL Final   ]  Creatinine   Date Value Ref Range Status   05/23/2017 0.83 0.66 - 1.25 mg/dL Final   ]  Lab Results   Component Value Date    WBC 7.8 05/23/2017     Lab Results   Component Value Date    RBC 4.08 05/23/2017     Lab Results   Component Value Date    HGB 12.0 05/23/2017     Lab Results   Component Value Date    HCT 36.1 05/23/2017     No components found for: MCT  Lab Results   Component Value Date    MCV 89 05/23/2017     Lab Results   Component Value Date    MCH 29.4 05/23/2017     Lab Results   Component Value Date    MCHC 33.2 05/23/2017     Lab Results   Component Value Date    RDW 13.6 05/23/2017     Lab Results   Component Value Date     05/23/2017     Lab Results   Component Value Date    AST 20 05/23/2017     Lab Results   Component Value Date    ALT 41 05/23/2017         Veronica Garcia  [facility]

## 2017-10-17 RX ORDER — ALLOPURINOL 300 MG/1
TABLET ORAL
Qty: 90 TABLET | Refills: 3 | Status: SHIPPED | OUTPATIENT
Start: 2017-10-17 | End: 2018-04-16

## 2017-10-17 NOTE — TELEPHONE ENCOUNTER
Routing refill request to provider for review/approval because:  Labs not current:  Annual uric acid level. Last done 2015.

## 2017-10-31 DIAGNOSIS — R97.20 ELEVATED PROSTATE SPECIFIC ANTIGEN (PSA): ICD-10-CM

## 2017-10-31 LAB — PSA SERPL-MCNC: 2.46 UG/L (ref 0–4)

## 2017-10-31 PROCEDURE — 36415 COLL VENOUS BLD VENIPUNCTURE: CPT | Performed by: UROLOGY

## 2017-10-31 PROCEDURE — 84153 ASSAY OF PSA TOTAL: CPT | Performed by: UROLOGY

## 2017-11-07 ENCOUNTER — OFFICE VISIT (OUTPATIENT)
Dept: UROLOGY | Facility: CLINIC | Age: 62
End: 2017-11-07
Payer: COMMERCIAL

## 2017-11-07 VITALS — SYSTOLIC BLOOD PRESSURE: 116 MMHG | RESPIRATION RATE: 12 BRPM | HEART RATE: 84 BPM | DIASTOLIC BLOOD PRESSURE: 72 MMHG

## 2017-11-07 DIAGNOSIS — N40.1 BENIGN NON-NODULAR PROSTATIC HYPERPLASIA WITH LOWER URINARY TRACT SYMPTOMS: ICD-10-CM

## 2017-11-07 DIAGNOSIS — R97.20 ELEVATED PROSTATE SPECIFIC ANTIGEN (PSA): Primary | ICD-10-CM

## 2017-11-07 PROCEDURE — 51798 US URINE CAPACITY MEASURE: CPT | Performed by: UROLOGY

## 2017-11-07 PROCEDURE — 99213 OFFICE O/P EST LOW 20 MIN: CPT | Mod: 25 | Performed by: UROLOGY

## 2017-11-07 NOTE — PROGRESS NOTES
Chief Complaint   Patient presents with     RECHECK       Sven Givens is a 62 year old male who presents today for follow up of   Chief Complaint   Patient presents with     RECHECK    f/u for elevated psa/bph.  He is s/p urolift in the past.  His flow is good.  He still gets up 3-4 x at night.  He is on terazosin.  He has sleep apnea but has not been using the CPAP.  His psa went up to 5's but has decreased to 2's recently.    Current Outpatient Prescriptions   Medication Sig Dispense Refill     allopurinol (ZYLOPRIM) 300 MG tablet TAKE 1 TABLET (300 MG) BY MOUTH DAILY 90 tablet 3     capsaicin (ZOSTRIX) 0.025 % CREA cream Apply 1 g topically 3 times daily 60 g 3     terazosin (HYTRIN) 5 MG capsule TAKE 1 CAPSULE (5 MG) BY MOUTH AT BEDTIME 90 capsule 1     glimepiride (AMARYL) 4 MG tablet TAKE 1 TABLET (4 MG) BY MOUTH 2 TIMES DAILY 180 tablet 0     Azelaic Acid (FINACEA) 15 % gel Apply small amount twice a day to skin 50 g 3     lidocaine (XYLOCAINE) 5 % ointment Apply topically 3 times daily 50 g 3     diclofenac (VOLTAREN) 1 % GEL topical gel Apply 4 grams to knees or 2 grams to hands four times daily using enclosed dosing card. 100 g 1     atorvastatin (LIPITOR) 40 MG tablet Take 1 tablet (40 mg) by mouth daily 90 tablet 3     guaiFENesin-codeine (ROBITUSSIN AC) 100-10 MG/5ML SOLN solution Take 5-10 mLs by mouth every 4 hours as needed for cough 120 mL 3     albuterol (PROAIR HFA/PROVENTIL HFA/VENTOLIN HFA) 108 (90 BASE) MCG/ACT Inhaler Inhale 2-4 puffs into the lungs every 4 hours as needed for shortness of breath / dyspnea or wheezing 1 Inhaler 1     metFORMIN (GLUCOPHAGE) 1000 MG tablet TAKE 1 TABLET (1,000 MG) BY MOUTH 2 TIMES DAILY (WITH MEALS) 180 tablet 3     order for DME One touch ultra  Test strips for checking blood sugars 2 times a day. 200 strip 1     blood glucose monitoring (ONE TOUCH ULTRA) test strip Use to test blood sugars 2 times daily or as directed. 100 strip 11     mometasone (NASONEX)  50 MCG/ACT spray Spray 2 sprays into both nostrils daily 3 Box 1     minocycline (DYNACIN) 100 MG tablet Take 1 tablet by mouth. Twice daily for 10 days with flare of rosacea. 60 tablet 2     losartan-hydrochlorothiazide (HYZAAR) 100-25 MG per tablet TAKE 1 TABLET BY MOUTH DAILY 90 tablet 3     fluticasone (FLONASE) 50 MCG/ACT nasal spray Spray 1-2 sprays into both nostrils daily 1 Bottle 1     nitroglycerin (NITROSTAT) 0.4 MG SL tablet Place 1 tablet (0.4 mg) under the tongue every 5 minutes as needed for chest pain If you are still having symptoms after 3 doses (15 minutes) call 911. 25 tablet 3     omeprazole (PRILOSEC) 20 MG capsule Take 1 capsule (20 mg) by mouth 2 times daily Take 30-60 minutes before a meal. 180 capsule 3     CINNAMON 500 MG OR TABS Take one tablet twice daily.       FISH OIL 1000 MG OR CAPS daily       ASPIRIN 81 MG PO TABS 1 TABLET DAILY(*)       MULTIPLE VITAMINS OR 1 a day       [DISCONTINUED] ORDER FOR DME One touch ultra  Test strips for checking blood sugars 2 times a day. 200 strip 1     Allergies   Allergen Reactions     Penicillins Rash     Ace Inhibitors Cough     Indomethacin Other (See Comments)     Severe dizziness      Past Medical History:   Diagnosis Date     Allergies     PCN, ACE, Indomethocin     DM (diabetes mellitus) (H)      Dyslipidemia      GERD (gastroesophageal reflux disease)      HTN (hypertension)      Kidney stones 09/2004    s/p right kidney basket retrieval     PRESLEY (obstructive sleep apnea)     cpap only during the winter when air is dry.     Rhinitis, allergic      Rosacea      Tubular adenoma of colon 12/2009    follow up colonoscopy in 3 to 5 years requested     Varicose veins      Past Surgical History:   Procedure Laterality Date     BRONCHOSCOPY FLEXIBLE N/A 9/23/2016    Procedure: BRONCHOSCOPY FLEXIBLE;  Surgeon: Gayle Moya MD;  Location: [facility] OR     COLONOSCOPY  5-03     COLONOSCOPY WITH CO2 INSUFFLATION N/A 5/31/2017    Procedure: COLONOSCOPY WITH  CO2 INSUFFLATION;  COLON SCREEN/ SEB;  Surgeon: Margarito Rasheed DO;  Location: [facility] OR     GENITOURINARY SURGERY      kidney stones     THORACOSCOPIC WEDGE RESECTION LUNG Left 9/23/2016    Procedure: THORACOSCOPIC WEDGE RESECTION LUNG;  Surgeon: Gayle Moya MD;  Location: [facility] OR     VASCULAR SURGERY      varicose vein     Family History   Problem Relation Age of Onset     CEREBROVASCULAR DISEASE Father      CANCER Sister      ovarary      Social History     Social History     Marital status:      Spouse name: N/A     Number of children: N/A     Years of education: N/A     Occupational History      [employer]     Social History Main Topics     Smoking status: Former Smoker     Quit date: 2/1/1992     Smokeless tobacco: Never Used      Comment: 15 + years      Alcohol use No     Drug use: No     Sexual activity: No     Other Topics Concern     Parent/Sibling W/ Cabg, Mi Or Angioplasty Before 65f 55m? No     Social History Narrative       REVIEW OF SYSTEMS  =================  C: NEGATIVE for fever, chills, change in weight  I: NEGATIVE for worrisome rashes, moles or lesions  E/M: NEGATIVE for ear, mouth and throat problems  R: NEGATIVE for significant cough or SHORTNESS OF BREATH,   CV: NEGATIVE for chest pain, palpitations or peripheral edema  GI: NEGATIVE for nausea, abdominal pain, heartburn, or change in bowel habits  NEURO: NEGATIVE any motor/sensory changes  PSYCH: NEGATIVE for recent mood disorder    Physical Exam:  /72 (BP Location: Right arm, Patient Position: Chair, Cuff Size: Adult Large)  Pulse 84  Resp 12   Patient is pleasant, in no acute distress, good general condition.  Lung: no evidence of respiratory distress    Abdomen: Soft, nondistended, non tender. No masses. No rebound or guarding.   Exam: PVR 84 ml  Skin: Warm and dry.  No redness.  Psych: normal mood and affect  Neuro: alert and oriented    Assessment/Plan:   (R97.20) Elevated prostate specific antigen (PSA)   (primary encounter diagnosis)  Comment:    Plan: psa stable           Recheck annually    (N40.1) Benign non-nodular prostatic hyperplasia with lower urinary tract symptoms  Comment: s/p urolift.  C/o mainly nocturia  Plan: cont with terazosin           Nocturia could be sleep apnea - start using CPAP again (he has used for several years).

## 2017-11-07 NOTE — MR AVS SNAPSHOT
After Visit Summary   11/7/2017    Sven Givens    MRN: [id number]           Patient Information     Date Of Birth          1955        Visit Information        Provider Department      11/7/2017 3:00 PM Sami Hillman MD [facility]        Today's Diagnoses     Elevated prostate specific antigen (PSA)    -  1    Benign non-nodular prostatic hyperplasia with lower urinary tract symptoms           Follow-ups after your visit        Your next 10 appointments already scheduled     Nov 07, 2017  3:00 PM CST   [facility]    [address] [place] [id number]   [phone]            Mar 16, 2018  2:30 PM CDT   CT CHEST W/O CONTRAST with MGCT1   [facility])    [address] [place] [id number] [phone]           Please bring any scans or X-rays taken at other hospitals, if similar tests were done. Also bring a list of your medicines, including vitamins, minerals and over-the-counter drugs. It is safest to leave personal items at home.  Be sure to tell your doctor:   If you have any allergies.   If there s any chance you are pregnant.   If you are breastfeeding.   If you have any special needs.  You do not need to do anything special to prepare.  Please wear loose clothing, such as a sweat suit or jogging clothes. Avoid snaps, zippers and other metal. We may ask you to undress and put on a hospital gown.            Mar 23, 2018  3:15 PM CDT   Return Visit with Lay Valencia MD   [facility])    [address] [place] [id number] [phone]              Who to contact     If you have questions or need follow up information about today's clinic visit or your schedule please contact [facility] directly at [phone].  Normal or non-critical lab and  imaging results will be communicated to you by MyChart, letter or phone within 4 business days after the clinic has received the results. If you do not hear from us within 7 days, please contact the clinic through i[employer] or phone. If you have a critical or abnormal lab result, we will notify you by phone as soon as possible.  Submit refill requests through [employer] or call your pharmacy and they will forward the refill request to us. Please allow 3 business days for your refill to be completed.          Additional Information About Your Visit        [employer] Information     [employer] gives you secure access to your electronic health record. If you see a primary care provider, you can also send messages to your care team and make appointments. If you have questions, please call your primary care clinic.  If you do not have a primary care provider, please call [phone] and they will assist you.        Care EveryWhere ID     This is your Care EveryWhere ID. This could be used by other organizations to access your [place] medical records  [id number]        Your Vitals Were     Pulse Respirations                84 12           Blood Pressure from Last 3 Encounters:   11/07/17 116/72   09/26/17 125/79   09/11/17 136/78    Weight from Last 3 Encounters:   10/11/17 103.4 kg (228 lb)   09/26/17 102.1 kg (225 lb)   09/11/17 103.4 kg (228 lb)              We Performed the Following     MEASURE POST-VOID RESIDUAL URINE/BLADDER CAPACITY, US NON-IMAGING        Primary Care Provider Office Phone # Fax #    Pam Yesenia Dela Cruz [phone]       [address]  [facility] [address]        Equal Access to Services     [facility]: Hadii aad ku hadasho Soomaali, waaxda luqadaha, qaybta kaalmada adeegyada, candace jaquez . So [facility] [phone].    ATENCIÓN: Si habla español, tiene a buchanan disposición servicios gratuitos de asistencia lingüística. Llame al [phone].    We comply with  applicable federal civil rights laws and [place] laws. We do not discriminate on the basis of race, color, national origin, age, disability, sex, sexual orientation, or gender identity.            Thank you!     Thank you for choosing [facility]  for your care. Our goal is always to provide you with excellent care. Hearing back from our patients is one way we can continue to improve our services. Please take a few minutes to complete the written survey that you may receive in the mail after your visit with us. Thank you!             Your Updated Medication List - Protect others around you: Learn how to safely use, store and throw away your medicines at www.disposemymeds.org.          This list is accurate as of: 11/7/17  2:45 PM.  Always use your most recent med list.                   Brand Name Dispense Instructions for use Diagnosis    albuterol 108 (90 BASE) MCG/ACT Inhaler    PROAIR HFA/PROVENTIL HFA/VENTOLIN HFA    1 Inhaler    Inhale 2-4 puffs into the lungs every 4 hours as needed for shortness of breath / dyspnea or wheezing    Bronchospasm, Acute bronchitis, unspecified organism       allopurinol 300 MG tablet    ZYLOPRIM    90 tablet    TAKE 1 TABLET (300 MG) BY MOUTH DAILY    Chronic gout of multiple sites, unspecified cause       aspirin 81 MG tablet      1 TABLET DAILY(*)        atorvastatin 40 MG tablet    LIPITOR    90 tablet    Take 1 tablet (40 mg) by mouth daily    Hypertriglyceridemia, Type 2 diabetes mellitus with diabetic polyneuropathy, without long-term current use of insulin (H)       Azelaic Acid 15 % gel    FINACEA    50 g    Apply small amount twice a day to skin    Rosacea       blood glucose monitoring test strip    ONETOUCH ULTRA    100 strip    Use to test blood sugars 2 times daily or as directed.    Type 2 diabetes mellitus with diabetic polyneuropathy, without long-term current use of insulin (H)       capsaicin 0.025 % Crea cream    ZOSTRIX    60 g    Apply 1 g  topically 3 times daily    Pain in both feet, Type 2 diabetes mellitus with diabetic polyneuropathy, without long-term current use of insulin (H)       cinnamon 500 MG Tabs      Take one tablet twice daily.        diclofenac 1 % Gel topical gel    VOLTAREN    100 g    Apply 4 grams to knees or 2 grams to hands four times daily using enclosed dosing card.    Diabetic polyneuropathy associated with type 2 diabetes mellitus (H)       fish oil-omega-3 fatty acids 1000 MG capsule      daily        fluticasone 50 MCG/ACT spray    FLONASE    1 Bottle    Spray 1-2 sprays into both nostrils daily    URI with cough and congestion       glimepiride 4 MG tablet    AMARYL    180 tablet    TAKE 1 TABLET (4 MG) BY MOUTH 2 TIMES DAILY    Type 2 diabetes mellitus without complication, unspecified long term insulin use status (H)       guaiFENesin-codeine 100-10 MG/5ML Soln solution    ROBITUSSIN AC    120 mL    Take 5-10 mLs by mouth every 4 hours as needed for cough    Persistent cough for 3 weeks or longer       lidocaine 5 % ointment    XYLOCAINE    50 g    Apply topically 3 times daily    Diabetic polyneuropathy associated with type 2 diabetes mellitus (H)       losartan-hydrochlorothiazide 100-25 MG per tablet    HYZAAR    90 tablet    TAKE 1 TABLET BY MOUTH DAILY    Essential hypertension with goal blood pressure less than 140/90       metFORMIN 1000 MG tablet    GLUCOPHAGE    180 tablet    TAKE 1 TABLET (1,000 MG) BY MOUTH 2 TIMES DAILY (WITH MEALS)    Type 2 diabetes mellitus with hyperglycemia, without long-term current use of insulin (H)       minocycline 100 MG tablet    DYNACIN    60 tablet    Take 1 tablet by mouth. Twice daily for 10 days with flare of rosacea.    Rosacea       mometasone 50 MCG/ACT spray    NASONEX    3 Box    Spray 2 sprays into both nostrils daily    Dysfunction of Eustachian tube, left       MULTIPLE VITAMINS PO      1 a day        nitroGLYcerin 0.4 MG sublingual tablet    NITROSTAT    25 tablet     Place 1 tablet (0.4 mg) under the tongue every 5 minutes as needed for chest pain If you are still having symptoms after 3 doses (15 minutes) call 911.    Ischemic chest pain (H)       omeprazole 20 MG CR capsule    priLOSEC    180 capsule    Take 1 capsule (20 mg) by mouth 2 times daily Take 30-60 minutes before a meal.    Gastroesophageal reflux disease with esophagitis       order for DME     200 strip    One touch ultra  Test strips for checking blood sugars 2 times a day.    Type 2 diabetes mellitus with diabetic polyneuropathy, without long-term current use of insulin (H)       terazosin 5 MG capsule    HYTRIN    90 capsule    TAKE 1 CAPSULE (5 MG) BY MOUTH AT BEDTIME    Essential hypertension with goal blood pressure less than 140/90

## 2017-11-07 NOTE — NURSING NOTE
"Chief Complaint   Patient presents with     RECHECK       Initial /72 (BP Location: Right arm, Patient Position: Chair, Cuff Size: Adult Large)  Pulse 84  Resp 12 Estimated body mass index is 33.65 kg/(m^2) as calculated from the following:    Height as of 9/26/17: 1.753 m (5' 9.02\").    Weight as of 10/11/17: 103.4 kg (228 lb).  Medication Reconciliation: complete   Anika Linda CMA      "

## 2017-11-10 ENCOUNTER — OFFICE VISIT (OUTPATIENT)
Dept: FAMILY MEDICINE | Facility: CLINIC | Age: 62
End: 2017-11-10
Payer: COMMERCIAL

## 2017-11-10 VITALS
WEIGHT: 228 LBS | HEIGHT: 69 IN | DIASTOLIC BLOOD PRESSURE: 58 MMHG | SYSTOLIC BLOOD PRESSURE: 103 MMHG | TEMPERATURE: 98.3 F | OXYGEN SATURATION: 97 % | HEART RATE: 107 BPM | BODY MASS INDEX: 33.77 KG/M2

## 2017-11-10 DIAGNOSIS — E11.42 TYPE 2 DIABETES MELLITUS WITH DIABETIC POLYNEUROPATHY, WITHOUT LONG-TERM CURRENT USE OF INSULIN (H): ICD-10-CM

## 2017-11-10 DIAGNOSIS — C34.92 NON-SMALL CELL CARCINOMA OF LUNG, LEFT (H): ICD-10-CM

## 2017-11-10 DIAGNOSIS — Z92.3 HISTORY OF RADIATION EXPOSURE: ICD-10-CM

## 2017-11-10 DIAGNOSIS — J20.9 ACUTE BRONCHITIS WITH SYMPTOMS > 10 DAYS: Primary | ICD-10-CM

## 2017-11-10 DIAGNOSIS — E78.5 HYPERLIPIDEMIA WITH TARGET LDL LESS THAN 100: ICD-10-CM

## 2017-11-10 DIAGNOSIS — I10 HYPERTENSION GOAL BP (BLOOD PRESSURE) < 140/90: ICD-10-CM

## 2017-11-10 DIAGNOSIS — H10.33 ACUTE BACTERIAL CONJUNCTIVITIS OF BOTH EYES: ICD-10-CM

## 2017-11-10 PROCEDURE — 99214 OFFICE O/P EST MOD 30 MIN: CPT | Performed by: FAMILY MEDICINE

## 2017-11-10 RX ORDER — AZITHROMYCIN 250 MG/1
TABLET, FILM COATED ORAL
Qty: 6 TABLET | Refills: 0 | Status: SHIPPED | OUTPATIENT
Start: 2017-11-10 | End: 2018-04-16

## 2017-11-10 RX ORDER — OFLOXACIN 3 MG/ML
1 SOLUTION/ DROPS OPHTHALMIC EVERY 4 HOURS
Qty: 1 BOTTLE | Refills: 0 | Status: SHIPPED | OUTPATIENT
Start: 2017-11-10 | End: 2018-03-21

## 2017-11-10 ASSESSMENT — PAIN SCALES - GENERAL: PAINLEVEL: NO PAIN (0)

## 2017-11-10 NOTE — PROGRESS NOTES
SUBJECTIVE:   Sven Givens is a 62 year old male who presents to clinic today for the following health issues:    Acute Illness   Acute illness concerns: Possible pink eye  Onset: Cold started 11/6/17, eye problem started 11/7/17    Fever: no     Chills/Sweats: YES- sweat    Headache (location?): no     Sinus Pressure:YES    Conjunctivitis:  YES: bilateral, worst right eye. Matter, drainage, itchiness, redness    Ear Pain: no but feels like something is in ear making it comfortable    Rhinorrhea: YES    Congestion: YES    Sore Throat: YES     Cough: YES-productive of yellow sputum    Wheeze: no     Decreased Appetite: no     Nausea: no but dizziness when coughing really hard    Vomiting: no     Diarrhea:  no     Dysuria/Freq.: no     Fatigue/Achiness: no     Sick/Strep Exposure: YES- grandkids over this past weekend sick and was over 11/3-11/5     Therapies Tried and outcome: OTC Cough medication, OTC Afrin helps to sleep better, Thera-flu taken since 11/8/17 less coughing        Diabetes Follow-up      Patient is checking blood sugars: stable and 100-140 in am  Lab Results   Component Value Date    A1C 7.6 09/11/2017    A1C 7.7 03/28/2017    A1C 7.4 09/16/2016    A1C 7.1 03/18/2016    A1C 7.5 10/30/2015            Diabetic concerns: None     Symptoms of hypoglycemia (low blood sugar): none     Paresthesias (numbness or burning in feet) or sores: No     Date of last diabetic eye exam: up to date     Hyperlipidemia Follow-Up      Rate your low fat/cholesterol diet?: good    Taking statin?  Yes, no muscle aches from statin    Other lipid medications/supplements?:  none    Hypertension Follow-up      Outpatient blood pressures are not being checked.    Low Salt Diet: no added salt    Lung cancer removed surgically with no radiation or chemotherapy. Follow up CT showed no recurrence but a stable rib lesion that needs to be followed. Radiation exposure at [place].       Problem list and histories reviewed &  adjusted, as indicated.  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, overuse, and pressure     Hypertriglyceridemia     History of radiation exposure     Non-small cell carcinoma of lung, left (H)     Nonalcoholic hepatosteatosis     Past Surgical History:   Procedure Laterality Date     BRONCHOSCOPY FLEXIBLE N/A 9/23/2016    Procedure: BRONCHOSCOPY FLEXIBLE;  Surgeon: Gayle Moya MD;  Location: [facility] OR     COLONOSCOPY  5-03     COLONOSCOPY WITH CO2 INSUFFLATION N/A 5/31/2017    Procedure: COLONOSCOPY WITH CO2 INSUFFLATION;  COLON SCREEN/ SEB;  Surgeon: Margarito Rasheed DO;  Location:  OR     GENITOURINARY SURGERY      kidney stones     THORACOSCOPIC WEDGE RESECTION LUNG Left 9/23/2016    Procedure: THORACOSCOPIC WEDGE RESECTION LUNG;  Surgeon: Gayle Moya MD;  Location: [facility] OR     VASCULAR SURGERY      varicose vein       Social History   Substance Use Topics     Smoking status: Former Smoker     Quit date: 2/1/1992     Smokeless tobacco: Never Used      Comment: 15 + years      Alcohol use No     Family History   Problem Relation Age of Onset     CEREBROVASCULAR DISEASE Father      CANCER Sister      ovarary          Current Outpatient Prescriptions   Medication Sig Dispense Refill     Oxymetazoline HCl (AFRIN NASAL SPRAY NA) Spray in nostril as needed       azithromycin (ZITHROMAX) 250 MG tablet Two tablets first day, then one tablet daily for four days. 6 tablet 0     ofloxacin (OCUFLOX) 0.3 % ophthalmic solution  Place 1 drop into both eyes every 4 hours 1 Bottle 0     allopurinol (ZYLOPRIM) 300 MG tablet TAKE 1 TABLET (300 MG) BY MOUTH DAILY 90 tablet 3     capsaicin (ZOSTRIX) 0.025 % CREA cream Apply 1 g topically 3 times daily 60 g 3     terazosin (HYTRIN) 5 MG capsule TAKE 1 CAPSULE (5 MG) BY MOUTH AT BEDTIME 90 capsule 1     glimepiride (AMARYL) 4 MG tablet TAKE 1 TABLET (4 MG) BY MOUTH 2 TIMES DAILY 180 tablet 0     Azelaic Acid (FINACEA) 15 % gel Apply small amount twice a day to skin 50 g 3     lidocaine (XYLOCAINE) 5 % ointment Apply topically 3 times daily 50 g 3     diclofenac (VOLTAREN) 1 % GEL topical gel Apply 4 grams to knees or 2 grams to hands four times daily using enclosed dosing card. 100 g 1     atorvastatin (LIPITOR) 40 MG tablet Take 1 tablet (40 mg) by mouth daily 90 tablet 3     metFORMIN (GLUCOPHAGE) 1000 MG tablet TAKE 1 TABLET (1,000 MG) BY MOUTH 2 TIMES DAILY (WITH MEALS) 180 tablet 3     order for DME One touch ultra  Test strips for checking blood sugars 2 times a day. 200 strip 1     blood glucose monitoring (ONE TOUCH ULTRA) test strip Use to test blood sugars 2 times daily or as directed. 100 strip 11     mometasone (NASONEX) 50 MCG/ACT spray Spray 2 sprays into both nostrils daily 3 Box 1     minocycline (DYNACIN) 100 MG tablet Take 1 tablet by mouth. Twice daily for 10 days with flare of rosacea. 60 tablet 2     losartan-hydrochlorothiazide (HYZAAR) 100-25 MG per tablet TAKE 1 TABLET BY MOUTH DAILY 90 tablet 3     fluticasone (FLONASE) 50 MCG/ACT nasal spray Spray 1-2 sprays into both nostrils daily 1 Bottle 1     nitroglycerin (NITROSTAT) 0.4 MG SL tablet Place 1 tablet (0.4 mg) under the tongue every 5 minutes as needed for chest pain If you are still having symptoms after 3 doses (15 minutes) call 911. 25 tablet 3     omeprazole (PRILOSEC) 20 MG capsule Take 1 capsule (20 mg) by mouth 2 times daily Take 30-60 minutes before a meal. 180 capsule 3     CINNAMON 500 MG OR TABS Take one  tablet twice daily.       FISH OIL 1000 MG OR CAPS daily       ASPIRIN 81 MG PO TABS 1 TABLET DAILY(*)       MULTIPLE VITAMINS OR 1 a day       [DISCONTINUED] ORDER FOR DME One touch ultra  Test strips for checking blood sugars 2 times a day. 200 strip 1     Allergies   Allergen Reactions     Penicillins Rash     Ace Inhibitors Cough     Indomethacin Other (See Comments)     Severe dizziness     Recent Labs   Lab Test  09/11/17   1621  05/23/17   1330  04/14/17   0847  03/28/17   1342  10/12/16   1056   09/16/16   0936 05/13/16 03/18/16   0905   03/02/15   0819  10/15/14   0916   A1C  7.6*   --    --   7.7*   --    --   7.4*   --    --   7.1*   < >  6.8*  8.9*   LDL   --    --   35   --    --    --    --    --    --   84   --   73   --    HDL   --    --   35*   --    --    --    --    --    --   29*   --   32*   --    TRIG   --    --   161*   --    --    --    --    --    --   172*   --   235*   --    ALT   --   41   --   41   --    --    --   60   < >   --    --    --   33   CR   --   0.83   --   0.76   --    < >  0.78   --    < >  0.84   < >  0.79  0.72   GFRESTIMATED   --   >90  Non  GFR Calc     --   >90  Non  GFR Calc     --    < >  >90  Non  GFR Calc     --    < >  >90  Non  GFR Calc     < >  >90  Non  GFR Calc    >90  Non  GFR Calc     GFRESTBLACK   --   >90   GFR Calc     --   >90   GFR Calc     --    < >  >90   GFR Calc     --    < >  >90   GFR Calc     < >  >90   GFR Calc    >90   GFR Calc     POTASSIUM   --   3.6   --   3.4   --    < >  4.0   --    < >  4.2   < >  4.2  4.1   TSH   --    --    --    --   2.21   --    --    --    --    --    --    --   2.67    < > = values in this interval not displayed.      BP Readings from Last 3 Encounters:   11/10/17 103/58   11/07/17 116/72   09/26/17 125/79    Wt Readings from  "Last 3 Encounters:   11/10/17 228 lb (103.4 kg)   10/11/17 228 lb (103.4 kg)   09/26/17 225 lb (102.1 kg)                  Labs reviewed in EPIC          Reviewed and updated as needed this visit by clinical staffTobacco  Allergies  Meds  Med Hx  Surg Hx  Fam Hx  Soc Hx      Reviewed and updated as needed this visit by Provider         ROS:  Constitutional, HEENT, cardiovascular, pulmonary, gi and gu systems are negative, except as otherwise noted.      OBJECTIVE:   /58 (BP Location: Left arm, Patient Position: Chair, Cuff Size: Adult Large)  Pulse 107  Temp 98.3  F (36.8  C) (Oral)  Ht 5' 9\" (1.753 m)  Wt 228 lb (103.4 kg)  SpO2 97%  BMI 33.67 kg/m2  Body mass index is 33.67 kg/(m^2).  GENERAL: acutely ill, alert, well nourished, well hydrated, no distress, with cough  HENT: ear canals- normal; TMs- normal; Nose- rhinorrhea ; Mouth- no ulcers, no lesions, throat is erythematous without exudate. Sinuses with tenderness to percussion.   NECK: no tenderness, negative anterior cervical adenopathy, no asymmetry, no masses, no stiffness; thyroid- normal to palpation  RESP: lungs clear to auscultation - no rales, no rhonchi, some expiratory wheezes  CV: regular rates and rhythm, normal S1 S2, no S3 or S4 and no murmur, no click or rub -  ABDOMEN: soft, no tenderness, no  hepatosplenomegaly, no masses, normal bowel sounds  MS: extremities- no gross deformities noted, no edema  SKIN: no suspicious lesions, no rashes  PSYCH: Alert and oriented times 3; affect- normal     Diagnostic Test Results:  Results for orders placed or performed in visit on 10/31/17   PSA tumor marker   Result Value Ref Range    PSA 2.46 0 - 4 ug/L       ASSESSMENT/PLAN:         Tobacco Cessation:   reports that he quit smoking about 25 years ago. He has never used smokeless tobacco.      BMI:   Estimated body mass index is 33.67 kg/(m^2) as calculated from the following:    Height as of this encounter: 5' 9\" (1.753 m).    Weight as " of this encounter: 228 lb (103.4 kg).   Weight management plan: Discussed healthy diet and exercise guidelines and patient will follow up in 3 months in clinic to re-evaluate.            ICD-10-CM    1. Acute bronchitis with symptoms > 10 days J20.9 azithromycin (ZITHROMAX) 250 MG tablet- will treat a little earlier due to risk factors and recent surgery for lung cancer.   Symptomatic treatment with rest, fluids, tylenol and OTC cold medications as needed. Call if symptoms worse or do not improve for further evaluation and treatment. Call if symptoms persistent or worse. Chest xray if cough does not improve.   2. Acute bacterial conjunctivitis of both eyes H10.33 ofloxacin (OCUFLOX) 0.3 % ophthalmic solution 1 gtt every 4 hours while awake for 3-5 days depending on symptoms    3. Hypertension goal BP (blood pressure) < 140/90 I10 Blood pressure has been running very low at last few visits.    4. Hyperlipidemia with target LDL less than 100 E78.5 Stable    5. Type 2 diabetes mellitus with diabetic polyneuropathy, without long-term current use of insulin (H) E11.42 Would like to see A1C lower. Working on diet and exercise.    6. Non-small cell carcinoma of lung, left (H) C34.92 Follow up with oncology as scheduled.    7. History of radiation exposure Z92.3 May have persistent elevated risk of cancer over time.        FURTHER TESTING:       - chest xray if symptoms persist.  FUTURE LABS:       - Schedule fasting labs in 3 months  FUTURE APPOINTMENTS:       - Follow-up visit in 3 months or sooner if any questions or concerns.   Work on weight loss  Regular exercise  See Patient Instructions    Pam Dela Cruz MD  [facility]

## 2017-11-10 NOTE — MR AVS SNAPSHOT
After Visit Summary   11/10/2017    Sven Givens    MRN: [id number]           Patient Information     Date Of Birth          1955        Visit Information        Provider Department      11/10/2017 1:40 PM Pam Dela Cruz MD [facility]        Today's Diagnoses     Acute bronchitis with symptoms > 10 days    -  1    Acute bacterial conjunctivitis of both eyes        Hypertension goal BP (blood pressure) < 140/90        Hyperlipidemia with target LDL less than 100        Type 2 diabetes mellitus with diabetic polyneuropathy, without long-term current use of insulin (H)        Non-small cell carcinoma of lung, left (H)        History of radiation exposure          Care Instructions    At [facility], we strive to deliver an exceptional experience to you, every time we see you.  If you receive a survey in the mail, please send us back your thoughts. We really do value your feedback.    Based on your medical history, these are the current health maintenance/preventive care services that you are due for (some may have been done at this visit.)  Health Maintenance Due   Topic Date Due     EYE EXAM Q1 YEAR  03/01/2016     ADVANCE DIRECTIVE PLANNING Q5 YRS  06/16/2016         Suggested websites for health information:  Www.[employer] : Up to date and easily searchable information on multiple topics.  Www.medlineplus.gov : medication info, interactive tutorials, watch real surgeries online  Www.familydoctor.org : good info from the Academy of Family Physicians  Www.cdc.gov : public health info, travel advisories, epidemics (H1N1)  Www.aap.org : children's health info, normal development, vaccinations  Www.health.state[place].us : [place] dept of health, public health issues in [place], N1N1    Your care team:                            Family Medicine Internal Medicine   MD Rodrick Arita MD Shantel Branch-Fleming, MD Katya Georgiev PA-C Nam Ho, MD  Pediatrics   AMPARO Bob, GRISELDA Law APRN MD Keira Mckeon MD Deborah Mielke, MD Kim Thein, APRN CNP      Clinic hours: Monday - Thursday 7 am-7 pm; Fridays 7 am-5 pm.   Urgent care: Monday - Friday 11 am-9 pm; Saturday and Sunday 9 am-5 pm.  Pharmacy : Monday -Thursday 8 am-8 pm; Friday 8 am-6 pm; Saturday and Sunday 9 am-5 pm.     Clinic: [phone]   Pharmacy: [phone]    What Is Conjunctivitis?    Conjunctivitis is an irritation or infection. It affects the membrane that covers the white of your eye and the inside of your eyelid (conjunctiva). It can happen to one or both eyes. The membrane swells and the blood vessels enlarge (dilate). This makes your eye red. That's why conjunctivitis is sometimes called red eye or pink eye.  What are the symptoms?  If you have one or more of these symptoms, see an eye doctor:    Redness in and around your eye    Eyes that are puffy and sore    Itching, burning, or stinging eyes    Watery eyes or discharge from your eye    Eyelids that are crusty or stuck together when you wake up in the morning    Pink color in the whites of one or both eyes  Getting treatment quickly can help prevent damage to your eyes.  How is it diagnosed?  Conjunctivitis is usually a minor eye infection. But it can sometimes become a more serious problem. Some more serious eye diseases have symptoms that look like conjunctivitis. So it's important for an eye doctor to diagnose you. Your eye doctor will ask about your symptoms and any medicines you take. He or she will ask about any illnesses or medical conditions you may have. The doctor will also check your eyes with a hand-held light and a special microscope called a slit lamp.  Date Last Reviewed: 6/11/2015 2000-2017 [employer]. [address] [place] [address]. All rights reserved. This information is not intended as a substitute for professional  medical care. Always follow your healthcare professional's instructions.        Bronchitis, Antibiotic Treatment (Adult)    Bronchitis is an infection of the air passages (bronchial tubes) in your lungs. It often occurs when you have a cold. This illness is contagious during the first few days and is spread through the air by coughing and sneezing, or by direct contact (touching the sick person and then touching your own eyes, nose, or mouth).  Symptoms of bronchitis include cough with mucus (phlegm) and low-grade fever. Bronchitis usually lasts 7 to 14 days. Mild cases can be treated with simple home remedies. More severe infection is treated with an antibiotic.  Home care  Follow these guidelines when caring for yourself at home:    If your symptoms are severe, rest at home for the first 2 to 3 days. When you go back to your usual activities, don't let yourself get too tired.    Do not smoke. Also avoid being exposed to secondhand smoke.    You may use over-the-counter medicines to control fever or pain, unless another medicine was prescribed. (Note: If you have chronic liver or kidney disease or have ever had a stomach ulcer or gastrointestinal bleeding, talk with your healthcare provider before using these medicines. Also talk to your provider if you are taking medicine to prevent blood clots.) Aspirin should never be given to anyone younger than 18 years of age who is ill with a viral infection or fever. It may cause severe liver or brain damage.    Your appetite may be poor, so a light diet is fine. Avoid dehydration by drinking 6 to 8 glasses of fluids per day (such as water, soft drinks, sports drinks, juices, tea, or soup). Extra fluids will help loosen secretions in the nose and lungs.    Over-the-counter cough, cold, and sore-throat medicines will not shorten the length of the illness, but they may be helpful to reduce symptoms. (Note: Do not use decongestants if you have high blood pressure.)    Finish  all antibiotic medicine. Do this even if you are feeling better after only a few days.  Follow-up care  Follow up with your healthcare provider, or as advised. If you had an X-ray or ECG (electrocardiogram), a specialist will review it. You will be notified of any new findings that may affect your care.  Note: If you are age 65 or older, or if you have a chronic lung disease or condition that affects your immune system, or you smoke, talk to your healthcare provider about having pneumococcal vaccinations and a yearly influenza vaccination (flu shot).  When to seek medical advice  Call your healthcare provider right away if any of these occur:    Fever of 100.4 F (38 C) or higher    Coughing up increased amounts of colored sputum    Weakness, drowsiness, headache, facial pain, ear pain, or a stiff neck  Call 911, or get immediate medical care  Contact emergency services right away if any of these occur.    Coughing up blood    Worsening weakness, drowsiness, headache, or stiff neck    Trouble breathing, wheezing, or pain with breathing  Date Last Reviewed: 9/13/2015 2000-2017 [employer]. [address] [place] [address]. All rights reserved. This information is not intended as a substitute for professional medical care. Always follow your healthcare professional's instructions.                Follow-ups after your visit        Follow-up notes from your care team     Return in about 3 months (around 2/10/2018) for medication follow up, Physical Exam, Lab Work, BP Recheck.      Your next 10 appointments already scheduled     Mar 16, 2018  2:30 PM CDT   CT CHEST W/O CONTRAST with MGCT1   [facility]    [id number] [place] [id number] [phone]           Please bring any scans or X-rays taken at other hospitals, if similar tests were done. Also bring a list of your medicines, including vitamins, minerals and over-the-counter  drugs. It is safest to leave personal items at home.  Be sure to tell your doctor:   If you have any allergies.   If there s any chance you are pregnant.   If you are breastfeeding.   If you have any special needs.  You do not need to do anything special to prepare.  Please wear loose clothing, such as a sweat suit or jogging clothes. Avoid snaps, zippers and other metal. We may ask you to undress and put on a hospital gown.            Mar 23, 2018  3:15 PM CDT   Return Visit with Lay Valencia MD   [facility]    [address] [place] [id number] [phone]              Who to contact     If you have questions or need follow up information about today's clinic visit or your schedule please contact [facility] directly at [phone].  Normal or non-critical lab and imaging results will be communicated to you by [employer], letter or phone within 4 business days after the clinic has received the results. If you do not hear from us within 7 days, please contact the clinic through [employer] or phone. If you have a critical or abnormal lab result, we will notify you by phone as soon as possible.  Submit refill requests through [employer] or call your pharmacy and they will forward the refill request to us. Please allow 3 business days for your refill to be completed.          Additional Information About Your Visit        [employer] Information     [employer] gives you secure access to your electronic health record. If you see a primary care provider, you can also send messages to your care team and make appointments. If you have questions, please call your primary care clinic.  If you do not have a primary care provider, please call [phone] and they will assist you.        Care EveryWhere ID     This is your Care EveryWhere ID. This could be used by other organizations to access your [facility] medical records  [id number]        Your Vitals  "Were     Pulse Temperature Height Pulse Oximetry BMI (Body Mass Index)       107 98.3  F (36.8  C) (Oral) 5' 9\" (1.753 m) 97% 33.67 kg/m2        Blood Pressure from Last 3 Encounters:   11/10/17 103/58   11/07/17 116/72   09/26/17 125/79    Weight from Last 3 Encounters:   11/10/17 228 lb (103.4 kg)   10/11/17 228 lb (103.4 kg)   09/26/17 225 lb (102.1 kg)              Today, you had the following     No orders found for display         Today's Medication Changes          These changes are accurate as of: 11/10/17 11:59 PM.  If you have any questions, ask your nurse or doctor.               Start taking these medicines.        Dose/Directions    azithromycin 250 MG tablet   Commonly known as:  ZITHROMAX   Used for:  Acute bronchitis with symptoms > 10 days   Started by:  Pam Dela Cruz MD        Two tablets first day, then one tablet daily for four days.   Quantity:  6 tablet   Refills:  0       ofloxacin 0.3 % ophthalmic solution   Commonly known as:  OCUFLOX   Used for:  Acute bacterial conjunctivitis of both eyes   Started by:  Pam Dela Cruz MD        Dose:  1 drop   Place 1 drop into both eyes every 4 hours   Quantity:  1 Bottle   Refills:  0            Where to get your medicines      These medications were sent to [facility]/pharmacy #[id number] - [place], [place] - [address] [facility]  [address] [facility] [address]     Phone:  [phone]     azithromycin 250 MG tablet    ofloxacin 0.3 % ophthalmic solution                Primary Care Provider Office Phone # Fax #    Pam Dela Cruz [phone]       [address]  [facility] [address]        Equal Access to Services     MONTRELL STERN AH: Yolanda Wilson, gaby beltran, tania kaalmada jonoyada, candace baldwin. So [facility] [phone].    ATENCIÓN: Si habla español, tiene a buchanan disposición servicios gratuitos de asistencia lingüística. Llame al [phone].    We comply with " applicable federal civil rights laws and [place] laws. We do not discriminate on the basis of race, color, national origin, age, disability, sex, sexual orientation, or gender identity.            Thank you!     Thank you for choosing [facility]  for your care. Our goal is always to provide you with excellent care. Hearing back from our patients is one way we can continue to improve our services. Please take a few minutes to complete the written survey that you may receive in the mail after your visit with us. Thank you!             Your Updated Medication List - Protect others around you: Learn how to safely use, store and throw away your medicines at www.disposemymeds.org.          This list is accurate as of: 11/10/17 11:59 PM.  Always use your most recent med list.                   Brand Name Dispense Instructions for use Diagnosis    AFRIN NASAL SPRAY NA      Spray in nostril as needed        allopurinol 300 MG tablet    ZYLOPRIM    90 tablet    TAKE 1 TABLET (300 MG) BY MOUTH DAILY    Chronic gout of multiple sites, unspecified cause       aspirin 81 MG tablet      1 TABLET DAILY(*)        atorvastatin 40 MG tablet    LIPITOR    90 tablet    Take 1 tablet (40 mg) by mouth daily    Hypertriglyceridemia, Type 2 diabetes mellitus with diabetic polyneuropathy, without long-term current use of insulin (H)       Azelaic Acid 15 % gel    FINACEA    50 g    Apply small amount twice a day to skin    Rosacea       azithromycin 250 MG tablet    ZITHROMAX    6 tablet    Two tablets first day, then one tablet daily for four days.    Acute bronchitis with symptoms > 10 days       blood glucose monitoring test strip    ONETOUCH ULTRA    100 strip    Use to test blood sugars 2 times daily or as directed.    Type 2 diabetes mellitus with diabetic polyneuropathy, without long-term current use of insulin (H)       capsaicin 0.025 % Crea cream    ZOSTRIX    60 g    Apply 1 g topically 3 times daily    Pain  in both feet, Type 2 diabetes mellitus with diabetic polyneuropathy, without long-term current use of insulin (H)       cinnamon 500 MG Tabs      Take one tablet twice daily.        diclofenac 1 % Gel topical gel    VOLTAREN    100 g    Apply 4 grams to knees or 2 grams to hands four times daily using enclosed dosing card.    Diabetic polyneuropathy associated with type 2 diabetes mellitus (H)       fish oil-omega-3 fatty acids 1000 MG capsule      daily        fluticasone 50 MCG/ACT spray    FLONASE    1 Bottle    Spray 1-2 sprays into both nostrils daily    URI with cough and congestion       glimepiride 4 MG tablet    AMARYL    180 tablet    TAKE 1 TABLET (4 MG) BY MOUTH 2 TIMES DAILY    Type 2 diabetes mellitus without complication, unspecified long term insulin use status (H)       lidocaine 5 % ointment    XYLOCAINE    50 g    Apply topically 3 times daily    Diabetic polyneuropathy associated with type 2 diabetes mellitus (H)       losartan-hydrochlorothiazide 100-25 MG per tablet    HYZAAR    90 tablet    TAKE 1 TABLET BY MOUTH DAILY    Essential hypertension with goal blood pressure less than 140/90       metFORMIN 1000 MG tablet    GLUCOPHAGE    180 tablet    TAKE 1 TABLET (1,000 MG) BY MOUTH 2 TIMES DAILY (WITH MEALS)    Type 2 diabetes mellitus with hyperglycemia, without long-term current use of insulin (H)       minocycline 100 MG tablet    DYNACIN    60 tablet    Take 1 tablet by mouth. Twice daily for 10 days with flare of rosacea.    Rosacea       mometasone 50 MCG/ACT spray    NASONEX    3 Box    Spray 2 sprays into both nostrils daily    Dysfunction of Eustachian tube, left       MULTIPLE VITAMINS PO      1 a day        nitroGLYcerin 0.4 MG sublingual tablet    NITROSTAT    25 tablet    Place 1 tablet (0.4 mg) under the tongue every 5 minutes as needed for chest pain If you are still having symptoms after 3 doses (15 minutes) call 911.    Ischemic chest pain (H)       ofloxacin 0.3 % ophthalmic  solution    OCUFLOX    1 Bottle    Place 1 drop into both eyes every 4 hours    Acute bacterial conjunctivitis of both eyes       omeprazole 20 MG CR capsule    priLOSEC    180 capsule    Take 1 capsule (20 mg) by mouth 2 times daily Take 30-60 minutes before a meal.    Gastroesophageal reflux disease with esophagitis       order for DME     200 strip    One touch ultra  Test strips for checking blood sugars 2 times a day.    Type 2 diabetes mellitus with diabetic polyneuropathy, without long-term current use of insulin (H)       terazosin 5 MG capsule    HYTRIN    90 capsule    TAKE 1 CAPSULE (5 MG) BY MOUTH AT BEDTIME    Essential hypertension with goal blood pressure less than 140/90

## 2017-11-10 NOTE — PATIENT INSTRUCTIONS
At [facility], we strive to deliver an exceptional experience to you, every time we see you.  If you receive a survey in the mail, please send us back your thoughts. We really do value your feedback.    Based on your medical history, these are the current health maintenance/preventive care services that you are due for (some may have been done at this visit.)  Health Maintenance Due   Topic Date Due     EYE EXAM Q1 YEAR  03/01/2016     ADVANCE DIRECTIVE PLANNING Q5 YRS  06/16/2016         Suggested websites for health information:  Www.[employer]" : Up to date and easily searchable information on multiple topics.  Www.[employer].gov : medication info, interactive tutorials, watch real surgeries online  Www.familydoctor.org : good info from the Academy of Family Physicians  Www.cdc.gov : public health info, travel advisories, epidemics (H1N1)  Www.aap.org : children's health info, normal development, vaccinations  Www.health.[facility].[place].us : [place] dept of health, public health issues in [place], N1N1    Your care team:                            Family Medicine Internal Medicine   MD Rodrick Arita MD Shantel Branch-Fleming, MD Katya Georgiev PA-C Nam Ho, MD Pediatrics   AMPARO Bob, GRISELDA CANCHOLA CNP   MD Keira Trejo MD Deborah Mielke, MD Kim Thein, APRN [facility]      Clinic hours: Monday - Thursday 7 am-7 pm; Fridays 7 am-5 pm.   Urgent care: Monday - Friday 11 am-9 pm; Saturday and Sunday 9 am-5 pm.  Pharmacy : Monday -Thursday 8 am-8 pm; Friday 8 am-6 pm; Saturday and Sunday 9 am-5 pm.     Clinic: [phone]   Pharmacy: [phone]    What Is Conjunctivitis?    Conjunctivitis is an irritation or infection. It affects the membrane that covers the white of your eye and the inside of your eyelid (conjunctiva). It can happen to one or both eyes. The membrane swells and the blood vessels enlarge (dilate). This makes your  eye red. That's why conjunctivitis is sometimes called red eye or pink eye.  What are the symptoms?  If you have one or more of these symptoms, see an eye doctor:    Redness in and around your eye    Eyes that are puffy and sore    Itching, burning, or stinging eyes    Watery eyes or discharge from your eye    Eyelids that are crusty or stuck together when you wake up in the morning    Pink color in the whites of one or both eyes  Getting treatment quickly can help prevent damage to your eyes.  How is it diagnosed?  Conjunctivitis is usually a minor eye infection. But it can sometimes become a more serious problem. Some more serious eye diseases have symptoms that look like conjunctivitis. So it's important for an eye doctor to diagnose you. Your eye doctor will ask about your symptoms and any medicines you take. He or she will ask about any illnesses or medical conditions you may have. The doctor will also check your eyes with a hand-held light and a special microscope called a slit lamp.  Date Last Reviewed: 6/11/2015 2000-2017 [employer]. [address] [place] [address]. All rights reserved. This information is not intended as a substitute for professional medical care. Always follow your healthcare professional's instructions.        Bronchitis, Antibiotic Treatment (Adult)    Bronchitis is an infection of the air passages (bronchial tubes) in your lungs. It often occurs when you have a cold. This illness is contagious during the first few days and is spread through the air by coughing and sneezing, or by direct contact (touching the sick person and then touching your own eyes, nose, or mouth).  Symptoms of bronchitis include cough with mucus (phlegm) and low-grade fever. Bronchitis usually lasts 7 to 14 days. Mild cases can be treated with simple home remedies. More severe infection is treated with an antibiotic.  Home care  Follow these guidelines when caring for yourself at  home:    If your symptoms are severe, rest at home for the first 2 to 3 days. When you go back to your usual activities, don't let yourself get too tired.    Do not smoke. Also avoid being exposed to secondhand smoke.    You may use over-the-counter medicines to control fever or pain, unless another medicine was prescribed. (Note: If you have chronic liver or kidney disease or have ever had a stomach ulcer or gastrointestinal bleeding, talk with your healthcare provider before using these medicines. Also talk to your provider if you are taking medicine to prevent blood clots.) Aspirin should never be given to anyone younger than 18 years of age who is ill with a viral infection or fever. It may cause severe liver or brain damage.    Your appetite may be poor, so a light diet is fine. Avoid dehydration by drinking 6 to 8 glasses of fluids per day (such as water, soft drinks, sports drinks, juices, tea, or soup). Extra fluids will help loosen secretions in the nose and lungs.    Over-the-counter cough, cold, and sore-throat medicines will not shorten the length of the illness, but they may be helpful to reduce symptoms. (Note: Do not use decongestants if you have high blood pressure.)    Finish all antibiotic medicine. Do this even if you are feeling better after only a few days.  Follow-up care  Follow up with your healthcare provider, or as advised. If you had an X-ray or ECG (electrocardiogram), a specialist will review it. You will be notified of any new findings that may affect your care.  Note: If you are age 65 or older, or if you have a chronic lung disease or condition that affects your immune system, or you smoke, talk to your healthcare provider about having pneumococcal vaccinations and a yearly influenza vaccination (flu shot).  When to seek medical advice  Call your healthcare provider right away if any of these occur:    Fever of 100.4 F (38 C) or higher    Coughing up increased amounts of colored  sputum    Weakness, drowsiness, headache, facial pain, ear pain, or a stiff neck  Call 911, or get immediate medical care  Contact emergency services right away if any of these occur.    Coughing up blood    Worsening weakness, drowsiness, headache, or stiff neck    Trouble breathing, wheezing, or pain with breathing  Date Last Reviewed: 9/13/2015 2000-2017 [employer]. [address] [place] [address]. All rights reserved. This information is not intended as a substitute for professional medical care. Always follow your healthcare professional's instructions.

## 2017-11-10 NOTE — NURSING NOTE
"Chief Complaint   Patient presents with     Conjunctivitis     Bilateral, worst right eye       Initial /58 (BP Location: Left arm, Patient Position: Chair, Cuff Size: Adult Large)  Pulse 107  Temp 98.3  F (36.8  C) (Oral)  Ht 5' 9\" (1.753 m)  Wt 228 lb (103.4 kg)  SpO2 97%  BMI 33.67 kg/m2 Estimated body mass index is 33.67 kg/(m^2) as calculated from the following:    Height as of this encounter: 5' 9\" (1.753 m).    Weight as of this encounter: 228 lb (103.4 kg).  Medication Reconciliation: complete     Shakir Jacobs CMA    "

## 2017-11-12 DIAGNOSIS — I10 ESSENTIAL HYPERTENSION WITH GOAL BLOOD PRESSURE LESS THAN 140/90: ICD-10-CM

## 2017-11-13 DIAGNOSIS — E11.9 TYPE 2 DIABETES MELLITUS WITHOUT COMPLICATION, UNSPECIFIED LONG TERM INSULIN USE STATUS: ICD-10-CM

## 2017-11-15 RX ORDER — LOSARTAN POTASSIUM AND HYDROCHLOROTHIAZIDE 25; 100 MG/1; MG/1
TABLET ORAL
Qty: 90 TABLET | Refills: 1 | Status: SHIPPED | OUTPATIENT
Start: 2017-11-15 | End: 2018-04-16

## 2017-11-16 RX ORDER — GLIMEPIRIDE 4 MG/1
TABLET ORAL
Qty: 180 TABLET | Refills: 1 | Status: SHIPPED | OUTPATIENT
Start: 2017-11-16 | End: 2018-04-16

## 2017-12-20 ENCOUNTER — ONCOLOGY VISIT (OUTPATIENT)
Dept: ONCOLOGY | Facility: CLINIC | Age: 62
End: 2017-12-20
Payer: COMMERCIAL

## 2017-12-20 ENCOUNTER — RADIANT APPOINTMENT (OUTPATIENT)
Dept: GENERAL RADIOLOGY | Facility: CLINIC | Age: 62
End: 2017-12-20
Attending: INTERNAL MEDICINE
Payer: COMMERCIAL

## 2017-12-20 VITALS
HEIGHT: 69 IN | HEART RATE: 103 BPM | WEIGHT: 230 LBS | SYSTOLIC BLOOD PRESSURE: 137 MMHG | DIASTOLIC BLOOD PRESSURE: 83 MMHG | BODY MASS INDEX: 34.07 KG/M2 | TEMPERATURE: 96.9 F | RESPIRATION RATE: 15 BRPM

## 2017-12-20 DIAGNOSIS — R05.9 COUGH: Primary | ICD-10-CM

## 2017-12-20 DIAGNOSIS — C34.92 NON-SMALL CELL CARCINOMA OF LUNG, LEFT (H): ICD-10-CM

## 2017-12-20 DIAGNOSIS — R05.9 COUGH: ICD-10-CM

## 2017-12-20 LAB
BASOPHILS # BLD AUTO: 0.1 10E9/L (ref 0–0.2)
BASOPHILS NFR BLD AUTO: 0.8 %
DIFFERENTIAL METHOD BLD: ABNORMAL
EOSINOPHIL # BLD AUTO: 0.2 10E9/L (ref 0–0.7)
EOSINOPHIL NFR BLD AUTO: 2.5 %
ERYTHROCYTE [DISTWIDTH] IN BLOOD BY AUTOMATED COUNT: 12.9 % (ref 10–15)
HCT VFR BLD AUTO: 40.6 % (ref 40–53)
HGB BLD-MCNC: 13.2 G/DL (ref 13.3–17.7)
IMM GRANULOCYTES # BLD: 0 10E9/L (ref 0–0.4)
IMM GRANULOCYTES NFR BLD: 0.3 %
LYMPHOCYTES # BLD AUTO: 2.7 10E9/L (ref 0.8–5.3)
LYMPHOCYTES NFR BLD AUTO: 33.6 %
MCH RBC QN AUTO: 28.8 PG (ref 26.5–33)
MCHC RBC AUTO-ENTMCNC: 32.5 G/DL (ref 31.5–36.5)
MCV RBC AUTO: 89 FL (ref 78–100)
MONOCYTES # BLD AUTO: 0.5 10E9/L (ref 0–1.3)
MONOCYTES NFR BLD AUTO: 6.6 %
NEUTROPHILS # BLD AUTO: 4.5 10E9/L (ref 1.6–8.3)
NEUTROPHILS NFR BLD AUTO: 56.2 %
PLATELET # BLD AUTO: 221 10E9/L (ref 150–450)
RBC # BLD AUTO: 4.59 10E12/L (ref 4.4–5.9)
WBC # BLD AUTO: 7.9 10E9/L (ref 4–11)

## 2017-12-20 PROCEDURE — 99214 OFFICE O/P EST MOD 30 MIN: CPT | Performed by: INTERNAL MEDICINE

## 2017-12-20 PROCEDURE — 71020 XR CHEST 2 VW: CPT | Performed by: RADIOLOGY

## 2017-12-20 PROCEDURE — 36415 COLL VENOUS BLD VENIPUNCTURE: CPT | Performed by: INTERNAL MEDICINE

## 2017-12-20 PROCEDURE — 85025 COMPLETE CBC W/AUTO DIFF WBC: CPT | Performed by: INTERNAL MEDICINE

## 2017-12-20 ASSESSMENT — PAIN SCALES - GENERAL: PAINLEVEL: NO PAIN (0)
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
## 2017-12-20 NOTE — NURSING NOTE
"Oncology Rooming Note    December 20, 2017 8:15 AM   Sven Givens is a 62 year old male who presents for:    Chief Complaint   Patient presents with     Oncology Clinic Visit     follow up      Initial Vitals: /83  Pulse 103  Temp 96.9  F (36.1  C)  Resp 15  Ht 1.753 m (5' 9.02\")  Wt 104.3 kg (230 lb)   L/min  BMI 33.95 kg/m2 Estimated body mass index is 33.95 kg/(m^2) as calculated from the following:    Height as of this encounter: 1.753 m (5' 9.02\").    Weight as of this encounter: 104.3 kg (230 lb). Body surface area is 2.25 meters squared.  No Pain (0) Comment: Data Unavailable   No LMP for male patient.  Allergies reviewed: Yes  Medications reviewed: Yes    Medications: Medication refills not needed today.  Pharmacy name entered into [employer]: [id number] - [place], [place] - [address]        5 minutes for nursing intake (face to face time)     Eve Valdez LPN              "

## 2017-12-20 NOTE — MR AVS SNAPSHOT
After Visit Summary   12/20/2017    Sven Givens    MRN: [id number]           Patient Information     Date Of Birth          1955        Visit Information        Provider Department      12/20/2017 8:45 AM Lay Valencia MD [facility]        Today's Diagnoses     Cough    -  1    Non-small cell carcinoma of lung, left (H)          Care Instructions    Cbc and CXR today    Try over the counter cough suppressants    See me back as scheduled                Follow-ups after your visit        Your next 10 appointments already scheduled     Mar 16, 2018  2:30 PM CDT   CT CHEST W/O CONTRAST with MGCT1   [facility] ([facility])    [address] [place] [id number] [phone]           Please bring any scans or X-rays taken at other hospitals, if similar tests were done. Also bring a list of your medicines, including vitamins, minerals and over-the-counter drugs. It is safest to leave personal items at home.  Be sure to tell your doctor:   If you have any allergies.   If there s any chance you are pregnant.   If you are breastfeeding.   If you have any special needs.  You do not need to do anything special to prepare.  Please wear loose clothing, such as a sweat suit or jogging clothes. Avoid snaps, zippers and other metal. We may ask you to undress and put on a hospital gown.            Mar 21, 2018  2:45 PM CDT   Return Visit with Lay Valencia MD   [facility] ([facility])    [address] [place] [id number] [phone]              Future tests that were ordered for you today     Open Future Orders        Priority Expected Expires Ordered    X-ray Chest 2 vws* STAT 12/20/2017 12/20/2018 12/20/2017            Who to contact     If you have questions or need follow up information about today's clinic visit or your schedule please contact [facility] directly at  "[phone].  Normal or non-critical lab and imaging results will be communicated to you by [employer], letter or phone within 4 business days after the clinic has received the results. If you do not hear from us within 7 days, please contact the clinic through [employer] or phone. If you have a critical or abnormal lab result, we will notify you by phone as soon as possible.  Submit refill requests through [facility] or call your pharmacy and they will forward the refill request to us. Please allow 3 business days for your refill to be completed.          Additional Information About Your Visit        [employer] Information     [facility] gives you secure access to your electronic health record. If you see a primary care provider, you can also send messages to your care team and make appointments. If you have questions, please call your primary care clinic.  If you do not have a primary care provider, please call [phone] and they will assist you.      [facility] is an electronic gateway that provides easy, online access to your medical records. With [facility] you can request a clinic appointment, read your test results, renew a prescription or communicate with your care team.     To access your existing account, please contact your [facility] or call [phone] for assistance.        Care EveryWhere ID     This is your Care EveryWhere ID. This could be used by other organizations to access your [facility] medical records  [id number]        Your Vitals Were     Pulse Temperature Respirations Height Peak Flow BMI (Body Mass Index)    103 96.9  F (36.1  C) 15 1.753 m (5' 9.02\") 100 L/min 33.95 kg/m2       Blood Pressure from Last 3 Encounters:   12/20/17 137/83   11/10/17 103/58   11/07/17 116/72    Weight from Last 3 Encounters:   12/20/17 104.3 kg (230 lb)   11/10/17 103.4 kg (228 lb)   10/11/17 103.4 kg (228 lb)               Primary Care Provider Office Phone # Fax #    Pam Dela Cruz MD " [phone]       [address]  [place] [facility] [address]        Equal Access to Services     CHERISE STERN : Hadii sergey cao lesa Sotono, wavalentinda luqadaha, qaybta kaalmada sheri, candace chapashan nicolasa. So [facility] [phone].    ATENCIÓN: Si habla español, tiene a buchanan disposición servicios gratuitos de asistencia lingüística. Llame al [phone].    We comply with applicable federal civil rights laws and [place] laws. We do not discriminate on the basis of race, color, national origin, age, disability, sex, sexual orientation, or gender identity.            Thank you!     Thank you for choosing [facility]  for your care. Our goal is always to provide you with excellent care. Hearing back from our patients is one way we can continue to improve our services. Please take a few minutes to complete the written survey that you may receive in the mail after your visit with us. Thank you!             Your Updated Medication List - Protect others around you: Learn how to safely use, store and throw away your medicines at www.disposemymeds.org.          This list is accurate as of: 12/20/17  9:06 AM.  Always use your most recent med list.                   Brand Name Dispense Instructions for use Diagnosis    AFRIN NASAL SPRAY NA      Spray in nostril as needed        allopurinol 300 MG tablet    ZYLOPRIM    90 tablet    TAKE 1 TABLET (300 MG) BY MOUTH DAILY    Chronic gout of multiple sites, unspecified cause       aspirin 81 MG tablet      1 TABLET DAILY(*)        atorvastatin 40 MG tablet    LIPITOR    90 tablet    Take 1 tablet (40 mg) by mouth daily    Hypertriglyceridemia, Type 2 diabetes mellitus with diabetic polyneuropathy, without long-term current use of insulin (H)       Azelaic Acid 15 % gel    FINACEA    50 g    Apply small amount twice a day to skin    Rosacea       azithromycin 250 MG tablet    ZITHROMAX    6 tablet    Two tablets first day, then one  tablet daily for four days.    Acute bronchitis with symptoms > 10 days       blood glucose monitoring test strip    ONETOUCH ULTRA    100 strip    Use to test blood sugars 2 times daily or as directed.    Type 2 diabetes mellitus with diabetic polyneuropathy, without long-term current use of insulin (H)       capsaicin 0.025 % Crea cream    ZOSTRIX    60 g    Apply 1 g topically 3 times daily    Pain in both feet, Type 2 diabetes mellitus with diabetic polyneuropathy, without long-term current use of insulin (H)       cinnamon 500 MG Tabs      Take one tablet twice daily.        diclofenac 1 % Gel topical gel    VOLTAREN    100 g    Apply 4 grams to knees or 2 grams to hands four times daily using enclosed dosing card.    Diabetic polyneuropathy associated with type 2 diabetes mellitus (H)       fish oil-omega-3 fatty acids 1000 MG capsule      daily        fluticasone 50 MCG/ACT spray    FLONASE    1 Bottle    Spray 1-2 sprays into both nostrils daily    URI with cough and congestion       glimepiride 4 MG tablet    AMARYL    180 tablet    TAKE 1 TABLET (4 MG) BY MOUTH 2 TIMES DAILY    Type 2 diabetes mellitus without complication, unspecified long term insulin use status (H)       lidocaine 5 % ointment    XYLOCAINE    50 g    Apply topically 3 times daily    Diabetic polyneuropathy associated with type 2 diabetes mellitus (H)       losartan-hydrochlorothiazide 100-25 MG per tablet    HYZAAR    90 tablet    TAKE 1 TABLET BY MOUTH DAILY    Essential hypertension with goal blood pressure less than 140/90       metFORMIN 1000 MG tablet    GLUCOPHAGE    180 tablet    TAKE 1 TABLET (1,000 MG) BY MOUTH 2 TIMES DAILY (WITH MEALS)    Type 2 diabetes mellitus with hyperglycemia, without long-term current use of insulin (H)       minocycline 100 MG tablet    DYNACIN    60 tablet    Take 1 tablet by mouth. Twice daily for 10 days with flare of rosacea.    Rosacea       mometasone 50 MCG/ACT spray    NASONEX    3 Box     Spray 2 sprays into both nostrils daily    Dysfunction of Eustachian tube, left       MULTIPLE VITAMINS PO      1 a day        nitroGLYcerin 0.4 MG sublingual tablet    NITROSTAT    25 tablet    Place 1 tablet (0.4 mg) under the tongue every 5 minutes as needed for chest pain If you are still having symptoms after 3 doses (15 minutes) call 911.    Ischemic chest pain (H)       ofloxacin 0.3 % ophthalmic solution    OCUFLOX    1 Bottle    Place 1 drop into both eyes every 4 hours    Acute bacterial conjunctivitis of both eyes       omeprazole 20 MG CR capsule    priLOSEC    180 capsule    Take 1 capsule (20 mg) by mouth 2 times daily Take 30-60 minutes before a meal.    Gastroesophageal reflux disease with esophagitis       order for DME     200 strip    One touch ultra  Test strips for checking blood sugars 2 times a day.    Type 2 diabetes mellitus with diabetic polyneuropathy, without long-term current use of insulin (H)       terazosin 5 MG capsule    HYTRIN    90 capsule    TAKE 1 CAPSULE (5 MG) BY MOUTH AT BEDTIME    Essential hypertension with goal blood pressure less than 140/90

## 2018-03-03 ENCOUNTER — TELEPHONE (OUTPATIENT)
Dept: FAMILY MEDICINE | Facility: CLINIC | Age: 63
End: 2018-03-03

## 2018-03-03 DIAGNOSIS — E11.65 TYPE 2 DIABETES MELLITUS WITH HYPERGLYCEMIA, WITHOUT LONG-TERM CURRENT USE OF INSULIN (H): ICD-10-CM

## 2018-03-03 DIAGNOSIS — I10 ESSENTIAL HYPERTENSION WITH GOAL BLOOD PRESSURE LESS THAN 140/90: ICD-10-CM

## 2018-03-03 NOTE — TELEPHONE ENCOUNTER
"Requested Prescriptions   Pending Prescriptions Disp Refills     metFORMIN (GLUCOPHAGE) 1000 MG tablet [Pharmacy Med Name: METFORMIN HCL 1,000 MG TABLET]    Last Written Prescription Date:  3/15/17  Last Fill Quantity: 180,  # refills: 3   Last Office Visit with [facility], [facility] or [facility] prescribing provider:  11/10/17   Future Office Visit:    Next 5 appointments (look out 90 days)     Mar 21, 2018  2:45 PM CDT   [facility] with Lay Valencia MD   [facility]    [address] [place] [id number] [phone]                  180 tablet 3     Sig: TAKE 1 TABLET (1,000 MG) BY MOUTH 2 TIMES DAILY (WITH MEALS)    Biguanide Agents Passed    3/3/2018  1:49 AM       Passed - Blood pressure less than 140/90 in past 6 months    BP Readings from Last 3 Encounters:   12/20/17 137/83   11/10/17 103/58   11/07/17 116/72                Passed - Patient has documented LDL within the past 12 mos.    Recent Labs   Lab Test  04/14/17   0847   LDL  35            Passed - Patient has had a Microalbumin in the past 12 mos.    Recent Labs   Lab Test  04/14/17   0847   MICROL  11   UMALCR  7.76            Passed - Patient is age 10 or older       Passed - Patient has documented A1c within the specified period of time.    Recent Labs   Lab Test  09/11/17   1621   A1C  7.6*            Passed - Patient's CR is NOT>1.4 OR Patient's EGFR is NOT<45 within past 12 mos.    Recent Labs   Lab Test  05/23/17   1330   GFRESTIMATED  >90  Non  GFR Calc     GFRESTBLACK  >90   GFR Calc         Recent Labs   Lab Test  05/23/17   1330   CR  0.83            Passed - Patient does NOT have a diagnosis of CHF.       Passed - Recent (6 mo) or future (30 days) visit within the authorizing provider's specialty    Patient had office visit in the last 6 months or has a visit in the next 30 days with authorizing provider.  See \"Patient Info\" tab in inbasket, or \"Choose " "Columns\" in Meds & Orders section of the refill encounter.                  Manuel Faarax  [facility]  "

## 2018-03-03 NOTE — TELEPHONE ENCOUNTER
Requested Prescriptions   Pending Prescriptions Disp Refills     terazosin (HYTRIN) 5 MG capsule    Last Written Prescription Date:  9/5/17  Last Fill Quantity: 90,  # refills: 1   Last Office Visit with NOBLE, ASA or [facility] prescribing provider:  11/10/17   Future Office Visit:    Next 5 appointments (look out 90 days)     Mar 21, 2018  2:45 PM CDT   Return Visit with Lay Valencia MD   [facility] ([facility])    [address] [place] [id number] [phone]                  90 capsule 1    There is no refill protocol information for this order              Manuel Faarax  [facility]

## 2018-03-06 RX ORDER — TERAZOSIN 5 MG/1
CAPSULE ORAL
Qty: 30 CAPSULE | Refills: 0 | Status: SHIPPED | OUTPATIENT
Start: 2018-03-06 | End: 2018-03-07

## 2018-03-06 NOTE — TELEPHONE ENCOUNTER
Medication is being filled for 1 time refill only due to:  Patient needs to be seen because overdue to be seen in February.   Micaela Iyer RN

## 2018-03-06 NOTE — TELEPHONE ENCOUNTER
Medication is being filled for 1 time refill only due to:  overdue to me seen in February  Micaela Iyer RN

## 2018-03-07 RX ORDER — TERAZOSIN 5 MG/1
CAPSULE ORAL
Qty: 30 CAPSULE | Refills: 0 | Status: SHIPPED | OUTPATIENT
Start: 2018-03-07 | End: 2018-04-04

## 2018-03-07 NOTE — TELEPHONE ENCOUNTER
Called, patient is notified and appointment is scheduled.  Han Orourke,  [facility]

## 2018-03-07 NOTE — TELEPHONE ENCOUNTER
Rx resent to pharmacy. (jesus refill given)  Team, please inform patient he is due for fasting labs and office visit.     Dora Thompson RN

## 2018-03-07 NOTE — TELEPHONE ENCOUNTER
Reason for Call:  Other prescription    Detailed comments: [facility] calling for they did not receive Terazosin Rx that was faxed in yesterday and would like for Montse Payne to send in another Rx.      Phone Number Pharmacy can be reached at: Other phone number:  [phone]    Best Time: anytime    Can we leave a detailed message on this number? YES    Call taken on 3/7/2018 at 10:03 AM by Amadeo Krishnan

## 2018-03-16 ENCOUNTER — RADIANT APPOINTMENT (OUTPATIENT)
Dept: CT IMAGING | Facility: CLINIC | Age: 63
End: 2018-03-16
Attending: INTERNAL MEDICINE
Payer: COMMERCIAL

## 2018-03-16 DIAGNOSIS — C34.92 NON-SMALL CELL CARCINOMA OF LUNG, LEFT (H): ICD-10-CM

## 2018-03-16 PROCEDURE — 71250 CT THORAX DX C-: CPT | Performed by: RADIOLOGY

## 2018-03-21 ENCOUNTER — ONCOLOGY VISIT (OUTPATIENT)
Dept: ONCOLOGY | Facility: CLINIC | Age: 63
End: 2018-03-21
Payer: COMMERCIAL

## 2018-03-21 VITALS
WEIGHT: 229.56 LBS | HEART RATE: 89 BPM | BODY MASS INDEX: 34 KG/M2 | SYSTOLIC BLOOD PRESSURE: 138 MMHG | DIASTOLIC BLOOD PRESSURE: 85 MMHG | HEIGHT: 69 IN | TEMPERATURE: 98.3 F | OXYGEN SATURATION: 95 %

## 2018-03-21 DIAGNOSIS — C34.92 NON-SMALL CELL CARCINOMA OF LUNG, LEFT (H): Primary | ICD-10-CM

## 2018-03-21 PROCEDURE — 99214 OFFICE O/P EST MOD 30 MIN: CPT | Performed by: INTERNAL MEDICINE

## 2018-03-21 ASSESSMENT — PAIN SCALES - GENERAL: PAINLEVEL: NO PAIN (0)

## 2018-03-21 NOTE — MR AVS SNAPSHOT
After Visit Summary   3/21/2018    Sven Givens    MRN: [id number]           Patient Information     Date Of Birth          1955        Visit Information        Provider Department      3/21/2018 2:45 PM Lay Valencia MD [facility]        Today's Diagnoses     Non-small cell carcinoma of lung, left (H)    -  1      Care Instructions    CT Chest in 6 months and see me a few days after that    Preventive Care:    Diabetic Eye Exam Screening: During our visit today, we discussed that it is recommended you receive diabetic eye exam screening. Please call or make an appointment with your primary care provider to discuss this with them. You may also call the [facility] scheduling line ([phone]) to set up an eye exam at one of the [facility].            Follow-ups after your visit        Your next 10 appointments already scheduled     Apr 16, 2018  8:00 AM CDT   [facility]YSICAL with Pam Del[facility]   [facility]    [address] [facility] [address] [phone]            Sep 18, 2018  2:30 PM CDT   (Arrive by 2:15 PM)   CT CHEST W/O CONTRAST with MGCT1   [facility] ([facility])    [address] [address] [place] [id number] [phone]           Please bring any scans or X-rays taken at other hospitals, if similar tests were done. Also bring a list of your medicines, including vitamins, minerals and over-the-counter drugs. It is safest to leave personal items at home.  Be sure to tell your doctor:   If you have any allergies.   If there s any chance you are pregnant.   If you are breastfeeding.  You do not need to do anything special to prepare for this exam.  Please wear loose clothing, such as a sweat suit or jogging clothes. Avoid snaps, zippers and other metal. We may ask you to undress and put on a hospital gown.            Sep 21, 2018  4:15 PM CDT    Return Visit with Lay Valencia MD   [facility]    [address] [place] [phone] [phone]              Future tests that were ordered for you today     Open Future Orders        Priority Expected Expires Ordered    CT Chest w/o contrast Routine 9/21/2018 3/21/2019 3/21/2018            Who to contact     If you have questions or need follow up information about today's clinic visit or your schedule please contact [facility] directly at [phone].  Normal or non-critical lab and imaging results will be communicated to you by [employer], letter or phone within 4 business days after the clinic has received the results. If you do not hear from us within 7 days, please contact the clinic through [employer] or phone. If you have a critical or abnormal lab result, we will notify you by phone as soon as possible.  Submit refill requests through [employer] or call your pharmacy and they will forward the refill request to us. Please allow 3 business days for your refill to be completed.          Additional Information About Your Visit        MyChart Information     [employer] gives you secure access to your electronic health record. If you see a primary care provider, you can also send messages to your care team and make appointments. If you have questions, please call your primary care clinic.  If you do not have a primary care provider, please call [phone] and they will assist you.      [employer] is an electronic gateway that provides easy, online access to your medical records. With [employer], you can request a clinic appointment, read your test results, renew a prescription or communicate with your care team.     To access your existing account, please contact your [facility] or call [phone] for assistance.        Care EveryWhere ID     This is your Care EveryWhere ID. This could be used by other  "organizations to access your [facility] medical records  [id number]        Your Vitals Were     Pulse Temperature Height Pulse Oximetry BMI (Body Mass Index)       89 98.3  F (36.8  C) 1.753 m (5' 9\") 95% 33.9 kg/m2        Blood Pressure from Last 3 Encounters:   03/21/18 138/85   12/20/17 137/83   11/10/17 103/58    Weight from Last 3 Encounters:   03/21/18 104.1 kg (229 lb 9 oz)   12/20/17 104.3 kg (230 lb)   11/10/17 103.4 kg (228 lb)               Primary Care Provider Office Phone # Fax #    Pam Yesenia Dela Cruz [phone] [address]  [place] [facility] [address]        Equal Access to Services     [facility] Hospital[facility]USANNA : Hadii sergey cao hadasho Soomaali, waaxda luqadaha, qaybta kaalmada adeegyada, candace jaquez . So [facility] [phone].    ATENCIÓN: Si habla español, tiene a buchanan disposición servicios gratuitos de asistencia lingüística. Alexander al [phone].    We comply with applicable federal civil rights laws and [place] laws. We do not discriminate on the basis of race, color, national origin, age, disability, sex, sexual orientation, or gender identity.            Thank you!     Thank you for choosing [email or web address]  for your care. Our goal is always to provide you with excellent care. Hearing back from our patients is one way we can continue to improve our services. Please take a few minutes to complete the written survey that you may receive in the mail after your visit with us. Thank you!             Your Updated Medication List - Protect others around you: Learn how to safely use, store and throw away your medicines at www.disposemymeds.org.          This list is accurate as of 3/21/18  3:08 PM.  Always use your most recent med list.                   Brand Name Dispense Instructions for use Diagnosis    AFRIN NASAL SPRAY NA      Spray in nostril as needed        allopurinol 300 MG tablet    ZYLOPRIM    90 tablet    TAKE 1 TABLET (300 MG) BY MOUTH " DAILY    Chronic gout of multiple sites, unspecified cause       aspirin 81 MG tablet      1 TABLET DAILY(*)        atorvastatin 40 MG tablet    LIPITOR    90 tablet    Take 1 tablet (40 mg) by mouth daily    Hypertriglyceridemia, Type 2 diabetes mellitus with diabetic polyneuropathy, without long-term current use of insulin (H)       Azelaic Acid 15 % gel    FINACEA    50 g    Apply small amount twice a day to skin    Rosacea       azithromycin 250 MG tablet    ZITHROMAX    6 tablet    Two tablets first day, then one tablet daily for four days.    Acute bronchitis with symptoms > 10 days       blood glucose monitoring test strip    ONETOUCH ULTRA    100 strip    Use to test blood sugars 2 times daily or as directed.    Type 2 diabetes mellitus with diabetic polyneuropathy, without long-term current use of insulin (H)       capsaicin 0.025 % Crea cream    ZOSTRIX    60 g    Apply 1 g topically 3 times daily    Pain in both feet, Type 2 diabetes mellitus with diabetic polyneuropathy, without long-term current use of insulin (H)       cinnamon 500 MG Tabs      Take one tablet twice daily.        diclofenac 1 % Gel topical gel    VOLTAREN    100 g    Apply 4 grams to knees or 2 grams to hands four times daily using enclosed dosing card.    Diabetic polyneuropathy associated with type 2 diabetes mellitus (H)       fish oil-omega-3 fatty acids 1000 MG capsule      daily        fluticasone 50 MCG/ACT spray    FLONASE    1 Bottle    Spray 1-2 sprays into both nostrils daily    URI with cough and congestion       glimepiride 4 MG tablet    AMARYL    180 tablet    TAKE 1 TABLET (4 MG) BY MOUTH 2 TIMES DAILY    Type 2 diabetes mellitus without complication, unspecified long term insulin use status (H)       lidocaine 5 % ointment    XYLOCAINE    50 g    Apply topically 3 times daily    Diabetic polyneuropathy associated with type 2 diabetes mellitus (H)       losartan-hydrochlorothiazide 100-25 MG per tablet    HYZAAR    90  tablet    TAKE 1 TABLET BY MOUTH DAILY    Essential hypertension with goal blood pressure less than 140/90       metFORMIN 1000 MG tablet    GLUCOPHAGE    60 tablet    TAKE 1 TABLET (1,000 MG) BY MOUTH 2 TIMES DAILY (WITH MEALS)    Type 2 diabetes mellitus with hyperglycemia, without long-term current use of insulin (H)       minocycline 100 MG tablet    DYNACIN    60 tablet    Take 1 tablet by mouth. Twice daily for 10 days with flare of rosacea.    Rosacea       mometasone 50 MCG/ACT spray    NASONEX    3 Box    Spray 2 sprays into both nostrils daily    Dysfunction of Eustachian tube, left       MULTIPLE VITAMINS PO      1 a day        nitroGLYcerin 0.4 MG sublingual tablet    NITROSTAT    25 tablet    Place 1 tablet (0.4 mg) under the tongue every 5 minutes as needed for chest pain If you are still having symptoms after 3 doses (15 minutes) call 911.    Ischemic chest pain (H)       omeprazole 20 MG CR capsule    priLOSEC    180 capsule    Take 1 capsule (20 mg) by mouth 2 times daily Take 30-60 minutes before a meal.    Gastroesophageal reflux disease with esophagitis       order for DME     200 strip    One touch ultra  Test strips for checking blood sugars 2 times a day.    Type 2 diabetes mellitus with diabetic polyneuropathy, without long-term current use of insulin (H)       terazosin 5 MG capsule    HYTRIN    30 capsule    TAKE 1 CAPSULE (5 MG) BY MOUTH AT BEDTIME    Essential hypertension with goal blood pressure less than 140/90
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
## 2018-03-21 NOTE — NURSING NOTE
"Oncology Rooming Note    March 21, 2018 2:34 PM   Sven Givens is a 62 year old male who presents for:    Chief Complaint   Patient presents with     Oncology Clinic Visit     6 month follow up     Initial Vitals: /85  Pulse 89  Temp 98.3  F (36.8  C)  Ht 1.753 m (5' 9\")  Wt 104.1 kg (229 lb 9 oz)  SpO2 95%  BMI 33.9 kg/m2 Estimated body mass index is 33.9 kg/(m^2) as calculated from the following:    Height as of this encounter: 1.753 m (5' 9\").    Weight as of this encounter: 104.1 kg (229 lb 9 oz). Body surface area is 2.25 meters squared.  No Pain (0) Comment: Data Unavailable   No LMP for male patient.  Allergies reviewed: Yes  Medications reviewed: Yes    Medications: Medication refills not needed today.  Pharmacy name entered into [employer]: [id number] - [place], [place] - [address] [facility]        5 minutes for nursing intake (face to face time)     Vida Sweeney LPN              "

## 2018-03-21 NOTE — PATIENT INSTRUCTIONS
CT Chest in 6 months and see me a few days after that    Preventive Care:    Diabetic Eye Exam Screening: During our visit today, we discussed that it is recommended you receive diabetic eye exam screening. Please call or make an appointment with your primary care provider to discuss this with them. You may also call the [facility] scheduling line ([phone]) to set up an eye exam at one of the [facility]ics.
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
## 2018-04-16 ENCOUNTER — OFFICE VISIT (OUTPATIENT)
Dept: FAMILY MEDICINE | Facility: CLINIC | Age: 63
End: 2018-04-16
Payer: COMMERCIAL

## 2018-04-16 VITALS
TEMPERATURE: 97 F | HEIGHT: 68 IN | DIASTOLIC BLOOD PRESSURE: 86 MMHG | HEART RATE: 101 BPM | WEIGHT: 226 LBS | SYSTOLIC BLOOD PRESSURE: 135 MMHG | RESPIRATION RATE: 16 BRPM | OXYGEN SATURATION: 95 % | BODY MASS INDEX: 34.25 KG/M2

## 2018-04-16 DIAGNOSIS — K21.00 GASTROESOPHAGEAL REFLUX DISEASE WITH ESOPHAGITIS: ICD-10-CM

## 2018-04-16 DIAGNOSIS — G47.33 OSA (OBSTRUCTIVE SLEEP APNEA): ICD-10-CM

## 2018-04-16 DIAGNOSIS — C34.92 NON-SMALL CELL CARCINOMA OF LUNG, LEFT (H): ICD-10-CM

## 2018-04-16 DIAGNOSIS — L92.0 GRANULOMA ANNULARE: ICD-10-CM

## 2018-04-16 DIAGNOSIS — I10 HYPERTENSION GOAL BP (BLOOD PRESSURE) < 140/90: ICD-10-CM

## 2018-04-16 DIAGNOSIS — E11.42 DIABETIC POLYNEUROPATHY ASSOCIATED WITH TYPE 2 DIABETES MELLITUS (H): ICD-10-CM

## 2018-04-16 DIAGNOSIS — Z92.3 HISTORY OF RADIATION EXPOSURE: ICD-10-CM

## 2018-04-16 DIAGNOSIS — E11.42 TYPE 2 DIABETES MELLITUS WITH DIABETIC POLYNEUROPATHY, WITHOUT LONG-TERM CURRENT USE OF INSULIN (H): ICD-10-CM

## 2018-04-16 DIAGNOSIS — M1A.09X0 CHRONIC GOUT OF MULTIPLE SITES, UNSPECIFIED CAUSE: ICD-10-CM

## 2018-04-16 DIAGNOSIS — Z00.00 NORMAL PHYSICAL EXAM: Primary | ICD-10-CM

## 2018-04-16 DIAGNOSIS — R68.2 DRY MOUTH: ICD-10-CM

## 2018-04-16 DIAGNOSIS — E78.5 HYPERLIPIDEMIA WITH TARGET LDL LESS THAN 100: ICD-10-CM

## 2018-04-16 DIAGNOSIS — E78.1 HYPERTRIGLYCERIDEMIA: ICD-10-CM

## 2018-04-16 DIAGNOSIS — E11.65 TYPE 2 DIABETES MELLITUS WITH HYPERGLYCEMIA, WITHOUT LONG-TERM CURRENT USE OF INSULIN (H): ICD-10-CM

## 2018-04-16 DIAGNOSIS — I10 ESSENTIAL HYPERTENSION WITH GOAL BLOOD PRESSURE LESS THAN 140/90: ICD-10-CM

## 2018-04-16 DIAGNOSIS — E11.9 TYPE 2 DIABETES MELLITUS WITHOUT COMPLICATION, UNSPECIFIED LONG TERM INSULIN USE STATUS: ICD-10-CM

## 2018-04-16 DIAGNOSIS — K76.0 NONALCOHOLIC HEPATOSTEATOSIS: ICD-10-CM

## 2018-04-16 DIAGNOSIS — I20.1 ANGINA PECTORIS WITH DOCUMENTED SPASM (H): ICD-10-CM

## 2018-04-16 LAB
ALBUMIN SERPL-MCNC: 3.8 G/DL (ref 3.4–5)
ALP SERPL-CCNC: 119 U/L (ref 40–150)
ALT SERPL W P-5'-P-CCNC: 54 U/L (ref 0–70)
ANION GAP SERPL CALCULATED.3IONS-SCNC: 9 MMOL/L (ref 3–14)
AST SERPL W P-5'-P-CCNC: 36 U/L (ref 0–45)
BILIRUB SERPL-MCNC: 0.4 MG/DL (ref 0.2–1.3)
BUN SERPL-MCNC: 18 MG/DL (ref 7–30)
CALCIUM SERPL-MCNC: 9.1 MG/DL (ref 8.5–10.1)
CHLORIDE SERPL-SCNC: 103 MMOL/L (ref 94–109)
CHOLEST SERPL-MCNC: 125 MG/DL
CO2 SERPL-SCNC: 26 MMOL/L (ref 20–32)
CREAT SERPL-MCNC: 0.7 MG/DL (ref 0.66–1.25)
CREAT UR-MCNC: 162 MG/DL
ENA SS-A IGG SER IA-ACNC: <0.2 AI (ref 0–0.9)
ENA SS-B IGG SER IA-ACNC: <0.2 AI (ref 0–0.9)
ERYTHROCYTE [DISTWIDTH] IN BLOOD BY AUTOMATED COUNT: 13.1 % (ref 10–15)
GFR SERPL CREATININE-BSD FRML MDRD: >90 ML/MIN/1.7M2
GLUCOSE SERPL-MCNC: 151 MG/DL (ref 70–99)
HBA1C MFR BLD: 8.5 % (ref 0–5.6)
HCT VFR BLD AUTO: 37.9 % (ref 40–53)
HDLC SERPL-MCNC: 31 MG/DL
HGB BLD-MCNC: 12.3 G/DL (ref 13.3–17.7)
LDLC SERPL CALC-MCNC: 51 MG/DL
MCH RBC QN AUTO: 29.1 PG (ref 26.5–33)
MCHC RBC AUTO-ENTMCNC: 32.5 G/DL (ref 31.5–36.5)
MCV RBC AUTO: 90 FL (ref 78–100)
MICROALBUMIN UR-MCNC: 37 MG/L
MICROALBUMIN/CREAT UR: 22.72 MG/G CR (ref 0–17)
NONHDLC SERPL-MCNC: 94 MG/DL
PLATELET # BLD AUTO: 212 10E9/L (ref 150–450)
POTASSIUM SERPL-SCNC: 3.9 MMOL/L (ref 3.4–5.3)
PROT SERPL-MCNC: 7.5 G/DL (ref 6.8–8.8)
RBC # BLD AUTO: 4.22 10E12/L (ref 4.4–5.9)
SODIUM SERPL-SCNC: 138 MMOL/L (ref 133–144)
TRIGL SERPL-MCNC: 216 MG/DL
TSH SERPL DL<=0.005 MIU/L-ACNC: 2.91 MU/L (ref 0.4–4)
WBC # BLD AUTO: 7.3 10E9/L (ref 4–11)

## 2018-04-16 PROCEDURE — 80053 COMPREHEN METABOLIC PANEL: CPT | Performed by: FAMILY MEDICINE

## 2018-04-16 PROCEDURE — 99207 C FOOT EXAM  NO CHARGE: CPT | Performed by: FAMILY MEDICINE

## 2018-04-16 PROCEDURE — 85027 COMPLETE CBC AUTOMATED: CPT | Performed by: FAMILY MEDICINE

## 2018-04-16 PROCEDURE — 99396 PREV VISIT EST AGE 40-64: CPT | Performed by: FAMILY MEDICINE

## 2018-04-16 PROCEDURE — 84443 ASSAY THYROID STIM HORMONE: CPT | Performed by: FAMILY MEDICINE

## 2018-04-16 PROCEDURE — 80061 LIPID PANEL: CPT | Performed by: FAMILY MEDICINE

## 2018-04-16 PROCEDURE — 86235 NUCLEAR ANTIGEN ANTIBODY: CPT | Performed by: FAMILY MEDICINE

## 2018-04-16 PROCEDURE — 83036 HEMOGLOBIN GLYCOSYLATED A1C: CPT | Performed by: FAMILY MEDICINE

## 2018-04-16 PROCEDURE — 99213 OFFICE O/P EST LOW 20 MIN: CPT | Mod: 25 | Performed by: FAMILY MEDICINE

## 2018-04-16 PROCEDURE — 36415 COLL VENOUS BLD VENIPUNCTURE: CPT | Performed by: FAMILY MEDICINE

## 2018-04-16 PROCEDURE — 82043 UR ALBUMIN QUANTITATIVE: CPT | Performed by: FAMILY MEDICINE

## 2018-04-16 RX ORDER — GLIMEPIRIDE 4 MG/1
4 TABLET ORAL 2 TIMES DAILY
Qty: 180 TABLET | Refills: 3 | Status: SHIPPED | OUTPATIENT
Start: 2018-04-16 | End: 2019-03-08

## 2018-04-16 RX ORDER — LOSARTAN POTASSIUM AND HYDROCHLOROTHIAZIDE 25; 100 MG/1; MG/1
1 TABLET ORAL DAILY
Qty: 90 TABLET | Refills: 3 | Status: SHIPPED | OUTPATIENT
Start: 2018-04-16 | End: 2019-03-08

## 2018-04-16 RX ORDER — ALLOPURINOL 300 MG/1
300 TABLET ORAL DAILY
Qty: 90 TABLET | Refills: 3 | Status: SHIPPED | OUTPATIENT
Start: 2018-04-16 | End: 2019-05-29

## 2018-04-16 RX ORDER — ATORVASTATIN CALCIUM 40 MG/1
40 TABLET, FILM COATED ORAL DAILY
Qty: 90 TABLET | Refills: 3 | Status: SHIPPED | OUTPATIENT
Start: 2018-04-16 | End: 2019-05-28

## 2018-04-16 NOTE — MR AVS SNAPSHOT
After Visit Summary   4/16/2018    Sven Givens    MRN: [id number]           Patient Information     Date Of Birth          1955        Visit Information        Provider Department      4/16/2018 8:00 AM Pam Dela Cruz MD [facility]        Today's Diagnoses     Normal physical exam    -  1    Non-small cell carcinoma of lung, left (H)        Type 2 diabetes mellitus with diabetic polyneuropathy, without long-term current use of insulin (H)        Hyperlipidemia with target LDL less than 100        Hypertension goal BP (blood pressure) < 140/90        Nonalcoholic hepatosteatosis        History of radiation exposure        Dry mouth        Diabetic polyneuropathy associated with type 2 diabetes mellitus (H)        Granuloma annulare        PRESLEY (obstructive sleep apnea)          Care Instructions      Preventive Health Recommendations  Male Ages 50 - 64    Yearly exam:             See your health care provider every year in order to  o   Review health changes.   o   Discuss preventive care.    o   Review your medicines if your doctor has prescribed any.     Have a cholesterol test every 5 years, or more frequently if you are at risk for high cholesterol/heart disease.     Have a diabetes test (fasting glucose) every three years. If you are at risk for diabetes, you should have this test more often.     Have a colonoscopy at age 50, or have a yearly FIT test (stool test). These exams will check for colon cancer.      Talk with your health care provider about whether or not a prostate cancer screening test (PSA) is right for you.    You should be tested each year for STDs (sexually transmitted diseases), if you re at risk.     Shots: Get a flu shot each year. Get a tetanus shot every 10 years.     Nutrition:    Eat at least 5 servings of fruits and vegetables daily.     Eat whole-grain bread, whole-wheat pasta and brown rice instead of white grains and rice.     Talk to  your provider about Calcium and Vitamin D.     Lifestyle    Exercise for at least 150 minutes a week (30 minutes a day, 5 days a week). This will help you control your weight and prevent disease.     Limit alcohol to one drink per day.     No smoking.     Wear sunscreen to prevent skin cancer.     See your dentist every six months for an exam and cleaning.     See your eye doctor every 1 to 2 years.    Obstructive Sleep Apnea  Obstructive sleep apnea is a condition that causes your air passages to become narrowed or blocked during sleep. As a result, breathing stops for short periods. Your body wakes up enough for breathing to begin again, though you don't remember it. The cycle of stopped breathing and brief awakenings can repeat dozens of times a night. This prevents the body from getting to the deeper stages of sleep that are needed for good rest and may cause your body's oxygen level to fall.  Signs of sleep apnea include loud snoring, noisy breathing, and gasping sounds during sleep. Daytime symptoms include waking up tired after a full night's sleep, waking up with headaches, feeling very sleepy or falling asleep during the day, and having problems with memory or concentration.  Risk factors for sleep apnea include:    Being overweight    Being a man, or a woman in menopause    Smoking    Using alcohol or sedating medicines    Having enlarged structures in the nose or throat  Home care  Lifestyle changes that can help treat snoring and sleep apnea include the following:    If you are overweight, lose weight. Talk to your healthcare provider about a weight-loss plan for you.    Avoid alcohol for 3 to 4 hours before bedtime. Avoid sedating medications. Ask your healthcare provider about the medicines you take.    If you smoke, talk to your healthcare provider about ways to quit.    Sleep on your side. This can help prevent gravity from pulling relaxed throat tissues into your breathing passages.    If you have  allergies or sinus problems that block your nose, ask your healthcare provider for help.  Follow-up care  Follow up with your healthcare provider, or as advised. A diagnosis of sleep apnea is made with a sleep study. Your healthcare provider can tell you more about this test.  When to seek medical advice  Sleep apnea can make you more likely to have certain health problems. These include high blood pressure, heart attack, stroke, and sexual dysfunction. If you have sleep apnea, talk to your healthcare provider about the best treatments for you.  Date Last Reviewed: 4/1/2017 2000-2017 [employer]. [address] [place] [address]. All rights reserved. This information is not intended as a substitute for professional medical care. Always follow your healthcare professional's instructions.                Follow-ups after your visit        Additional Services     SLEEP EVALUATION & MANAGEMENT REFERRAL - [facility]  [phone] (Age 15 and up)       Please be aware that coverage of these services is subject to the terms and limitations of your health insurance plan.  Call member services at your health plan with any benefit or coverage questions.      Please bring the following to your appointment:    >>   List of current medications   >>   This referral request   >>   Any documents/labs given to you for this referral                      Your next 10 appointments already scheduled     Sep 21, 2018  4:15 PM CDT   [facility] Visit with Lay Valencia MD   [facility]    [address] [place] [id number] [phone]              Future tests that were ordered for you today     Open Future Orders        Priority Expected Expires Ordered    SLEEP EVALUATION & MANAGEMENT REFERRAL - [facility]  [phone] (Age 15 and up) Routine  4/16/2019 4/16/2018            Who to  "contact     If you have questions or need follow up information about today's clinic visit or your schedule please contact [facility] [place] directly at [phone].  Normal or non-critical lab and imaging results will be communicated to you by Abazabhart, letter or phone within 4 business days after the clinic has received the results. If you do not hear from us within 7 days, please contact the clinic through Abazabhart or phone. If you have a critical or abnormal lab result, we will notify you by phone as soon as possible.  Submit refill requests through [employer]mant or call your pharmacy and they will forward the refill request to us. Please allow 3 business days for your refill to be completed.          Additional Information About Your Visit        Gourmant Information     [employer] gives you secure access to your electronic health record. If you see a primary care provider, you can also send messages to your care team and make appointments. If you have questions, please call your primary care clinic.  If you do not have a primary care provider, please call [phone] and they will assist you.        Care EveryWhere ID     This is your Care EveryWhere ID. This could be used by other organizations to access your [facility] medical records  [id number]        Your Vitals Were     Pulse Temperature Respirations Height Pulse Oximetry BMI (Body Mass Index)    101 97  F (36.1  C) (Tympanic) 16 5' 8\" (1.727 m) 95% 34.36 kg/m2       Blood Pressure from Last 3 Encounters:   04/16/18 135/86   03/21/18 138/85   12/20/17 137/83    Weight from Last 3 Encounters:   04/16/18 226 lb (102.5 kg)   03/21/18 229 lb 9 oz (104.1 kg)   12/20/17 230 lb (104.3 kg)              We Performed the Following     Albumin Random Urine Quantitative with Creat Ratio     CBC with platelets     Comprehensive metabolic panel     Hemoglobin A1c     Lipid panel reflex to direct LDL Fasting     SSA Ro SHELLEY Antibody IgG     SSB La SHELLEY Antibody IgG  "    TSH with free T4 reflex        Primary Care Provider Office Phone # Fax #    Pam Yesenia Dela Cruz [phone]       [address]  [facility] [address]        Equal Access to Services     CHERISE STERN : Hadii aad ku hadnorao Soomaali, waaxda luqadaha, qaybta kaalmada adeegyada, waxluca shortyin hayaan kimparag devi leonid baldwin. So [facility] [phone].    ATENCIÓN: Si habla español, tiene a buchanan disposición servicios gratuitos de asistencia lingüística. Llame al [phone].    We comply with applicable federal civil rights laws and [place] laws. We do not discriminate on the basis of race, color, national origin, age, disability, sex, sexual orientation, or gender identity.            Thank you!     Thank you for choosing [facility]  for your care. Our goal is always to provide you with excellent care. Hearing back from our patients is one way we can continue to improve our services. Please take a few minutes to complete the written survey that you may receive in the mail after your visit with us. Thank you!             Your Updated Medication List - Protect others around you: Learn how to safely use, store and throw away your medicines at www.disposemymeds.org.          This list is accurate as of 4/16/18  8:29 AM.  Always use your most recent med list.                   Brand Name Dispense Instructions for use Diagnosis    AFRIN NASAL SPRAY NA      Spray in nostril as needed        allopurinol 300 MG tablet    ZYLOPRIM    90 tablet    TAKE 1 TABLET (300 MG) BY MOUTH DAILY    Chronic gout of multiple sites, unspecified cause       aspirin 81 MG tablet      1 TABLET DAILY(*)        atorvastatin 40 MG tablet    LIPITOR    90 tablet    Take 1 tablet (40 mg) by mouth daily    Hypertriglyceridemia, Type 2 diabetes mellitus with diabetic polyneuropathy, without long-term current use of insulin (H)       Azelaic Acid 15 % gel    FINACEA    50 g    Apply small amount twice a day to skin     Rosacea       azithromycin 250 MG tablet    ZITHROMAX    6 tablet    Two tablets first day, then one tablet daily for four days.    Acute bronchitis with symptoms > 10 days       blood glucose monitoring test strip    ONETOUCH ULTRA    100 strip    Use to test blood sugars 2 times daily or as directed.    Type 2 diabetes mellitus with diabetic polyneuropathy, without long-term current use of insulin (H)       capsaicin 0.025 % Crea cream    ZOSTRIX    60 g    Apply 1 g topically 3 times daily    Pain in both feet, Type 2 diabetes mellitus with diabetic polyneuropathy, without long-term current use of insulin (H)       cinnamon 500 MG Tabs      Take one tablet twice daily.        diclofenac 1 % Gel topical gel    VOLTAREN    100 g    Apply 4 grams to knees or 2 grams to hands four times daily using enclosed dosing card.    Diabetic polyneuropathy associated with type 2 diabetes mellitus (H)       fish oil-omega-3 fatty acids 1000 MG capsule      daily        fluticasone 50 MCG/ACT spray    FLONASE    1 Bottle    Spray 1-2 sprays into both nostrils daily    URI with cough and congestion       glimepiride 4 MG tablet    AMARYL    180 tablet    TAKE 1 TABLET (4 MG) BY MOUTH 2 TIMES DAILY    Type 2 diabetes mellitus without complication, unspecified long term insulin use status (H)       lidocaine 5 % ointment    XYLOCAINE    50 g    Apply topically 3 times daily    Diabetic polyneuropathy associated with type 2 diabetes mellitus (H)       losartan-hydrochlorothiazide 100-25 MG per tablet    HYZAAR    90 tablet    TAKE 1 TABLET BY MOUTH DAILY    Essential hypertension with goal blood pressure less than 140/90       metFORMIN 1000 MG tablet    GLUCOPHAGE    180 tablet    TAKE 1 TABLET (1,000 MG) BY MOUTH 2 TIMES DAILY (WITH MEALS). OVERDUE TO BE SEEN IN FEBRUARY.    Type 2 diabetes mellitus with hyperglycemia, without long-term current use of insulin (H)       minocycline 100 MG tablet    DYNACIN    60 tablet    Take 1  tablet by mouth. Twice daily for 10 days with flare of rosacea.    Rosacea       mometasone 50 MCG/ACT spray    NASONEX    3 Box    Spray 2 sprays into both nostrils daily    Dysfunction of Eustachian tube, left       MULTIPLE VITAMINS PO      1 a day        nitroGLYcerin 0.4 MG sublingual tablet    NITROSTAT    25 tablet    Place 1 tablet (0.4 mg) under the tongue every 5 minutes as needed for chest pain If you are still having symptoms after 3 doses (15 minutes) call 911.    Ischemic chest pain (H)       omeprazole 20 MG CR capsule    priLOSEC    180 capsule    Take 1 capsule (20 mg) by mouth 2 times daily Take 30-60 minutes before a meal.    Gastroesophageal reflux disease with esophagitis       order for DME     200 strip    One touch ultra  Test strips for checking blood sugars 2 times a day.    Type 2 diabetes mellitus with diabetic polyneuropathy, without long-term current use of insulin (H)       terazosin 5 MG capsule    HYTRIN    90 capsule    TAKE 1 CAPSULE (5 MG) BY MOUTH AT BEDTIME. PATIENT NEEDS TO BE SEEN FOR FUTURE REFILLS.    Essential hypertension with goal blood pressure less than 140/90

## 2018-04-16 NOTE — PATIENT INSTRUCTIONS
Preventive Health Recommendations  Male Ages 50   64    Yearly exam:             See your health care provider every year in order to  o   Review health changes.   o   Discuss preventive care.    o   Review your medicines if your doctor has prescribed any.     Have a cholesterol test every 5 years, or more frequently if you are at risk for high cholesterol/heart disease.     Have a diabetes test (fasting glucose) every three years. If you are at risk for diabetes, you should have this test more often.     Have a colonoscopy at age 50, or have a yearly FIT test (stool test). These exams will check for colon cancer.      Talk with your health care provider about whether or not a prostate cancer screening test (PSA) is right for you.    You should be tested each year for STDs (sexually transmitted diseases), if you re at risk.     Shots: Get a flu shot each year. Get a tetanus shot every 10 years.     Nutrition:    Eat at least 5 servings of fruits and vegetables daily.     Eat whole-grain bread, whole-wheat pasta and brown rice instead of white grains and rice.     Talk to your provider about Calcium and Vitamin D.     Lifestyle    Exercise for at least 150 minutes a week (30 minutes a day, 5 days a week). This will help you control your weight and prevent disease.     Limit alcohol to one drink per day.     No smoking.     Wear sunscreen to prevent skin cancer.     See your dentist every six months for an exam and cleaning.     See your eye doctor every 1 to 2 years.    Obstructive Sleep Apnea  Obstructive sleep apnea is a condition that causes your air passages to become narrowed or blocked during sleep. As a result, breathing stops for short periods. Your body wakes up enough for breathing to begin again, though you don't remember it. The cycle of stopped breathing and brief awakenings can repeat dozens of times a night. This prevents the body from getting to the deeper stages of sleep that are needed for good  rest and may cause your body's oxygen level to fall.  Signs of sleep apnea include loud snoring, noisy breathing, and gasping sounds during sleep. Daytime symptoms include waking up tired after a full night's sleep, waking up with headaches, feeling very sleepy or falling asleep during the day, and having problems with memory or concentration.  Risk factors for sleep apnea include:    Being overweight    Being a man, or a woman in menopause    Smoking    Using alcohol or sedating medicines    Having enlarged structures in the nose or throat  Home care  Lifestyle changes that can help treat snoring and sleep apnea include the following:    If you are overweight, lose weight. Talk to your healthcare provider about a weight-loss plan for you.    Avoid alcohol for 3 to 4 hours before bedtime. Avoid sedating medications. Ask your healthcare provider about the medicines you take.    If you smoke, talk to your healthcare provider about ways to quit.    Sleep on your side. This can help prevent gravity from pulling relaxed throat tissues into your breathing passages.    If you have allergies or sinus problems that block your nose, ask your healthcare provider for help.  Follow-up care  Follow up with your healthcare provider, or as advised. A diagnosis of sleep apnea is made with a sleep study. Your healthcare provider can tell you more about this test.  When to seek medical advice  Sleep apnea can make you more likely to have certain health problems. These include high blood pressure, heart attack, stroke, and sexual dysfunction. If you have sleep apnea, talk to your healthcare provider about the best treatments for you.  Date Last Reviewed: 4/1/2017 2000-2017 [employer]. [address] [place] [address], [place] [address]. All rights reserved. This information is not intended as a substitute for professional medical care. Always follow your healthcare professional's instructions.

## 2018-04-16 NOTE — PROGRESS NOTES
SUBJECTIVE:   CC: Sven Givens is an 62 year old male who presents for preventative health visit.     Physical   Annual:     Getting at least 3 servings of Calcium per day::  Yes    Bi-annual eye exam::  Yes    Dental care twice a year::  Yes    Sleep apnea or symptoms of sleep apnea::  None and Sleep apnea    Diet::  Carbohydrate counting    Frequency of exercise::  2-3 days/week    Duration of exercise::  30-45 minutes    Taking medications regularly::  Yes    Medication side effects::  None    Additional concerns today::  No               Answers for HPI/ROS submitted by the patient on 4/14/2018   PHQ-2 Score: 0        CONCERN: Dry mouth while sleeping started 3 months ago. No new medications. Used CPAP in the past but not for the past 2 years. Needs replacement. Wakes with dry mouth but also lasts during the day. Hard time to swallow dry foods due to no saliva. Normal tearing.     Today's PHQ-2 Score:   PHQ-2 ( 1999 [employer]) 4/16/2018   Q1: Little interest or pleasure in doing things 0   Q2: Feeling down, depressed or hopeless 0   PHQ-2 Score 0   Q1: Little interest or pleasure in doing things -   Q2: Feeling down, depressed or hopeless -   PHQ-2 Score -     Diabetes Follow-up      Patient is checking blood sugars: up to 200, not exercising due to painful feet    Diabetic concerns: None     Symptoms of hypoglycemia (low blood sugar): none     Paresthesias (numbness or burning in feet) or sores: feet feel on fire     Date of last diabetic eye exam: due end of April    BP Readings from Last 2 Encounters:   04/16/18 135/86   03/21/18 138/85     Hemoglobin A1C (%)   Date Value   09/11/2017 7.6 (H)   03/28/2017 7.7 (H)     LDL Cholesterol Calculated (mg/dL)   Date Value   04/14/2017 35   03/18/2016 84     Hyperlipidemia Follow-Up      Rate your low fat/cholesterol diet?: good    Taking statin?  Yes, no muscle aches from statin    Other lipid medications/supplements?:  none    Hypertension Follow-up      Outpatient  blood pressures are not being checked.    Low Salt Diet: no added salt      Abuse: Current or Past(Physical, Sexual or Emotional)- No  Do you feel safe in your environment - Yes    Social History   Substance Use Topics     Smoking status: Former Smoker     Quit date: 2/1/1992     Smokeless tobacco: Never Used      Comment: 15 + years      Alcohol use No     Alcohol Use 4/14/2018   If you drink alcohol do you typically have greater than 3 drinks per day OR greater than 7 drinks per week? Not Applicable   No flowsheet data found.    Last PSA:   PSA   Date Value Ref Range Status   10/31/2017 2.46 0 - 4 ug/L Final     Comment:     Assay Method:  Chemiluminescence using Siemens Vista analyzer       Reviewed orders with patient. Reviewed health maintenance and updated orders accordingly - Yes  Labs reviewed in EPIC  BP Readings from Last 3 Encounters:   04/16/18 135/86   03/21/18 138/85   12/20/17 137/83    Wt Readings from Last 3 Encounters:   04/16/18 226 lb (102.5 kg)   03/21/18 229 lb 9 oz (104.1 kg)   12/20/17 230 lb (104.3 kg)                  Patient Active Problem List   Diagnosis     High triglycerides     Advanced directives, counseling/discussion     Hypertension goal BP (blood pressure) < 140/90     Diabetic neuropathy (H)     Tubular adenoma of colon     GERD (gastroesophageal reflux disease)     Anemia     Hyperlipidemia with target LDL less than 100     Gout     Type 2 diabetes mellitus with diabetic polyneuropathy (H)     Obesity     Idiopathic chronic gout of right foot without tophus     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, overuse, and pressure     Hypertriglyceridemia     History of radiation exposure     Non-small cell carcinoma of lung, left (H)     Nonalcoholic hepatosteatosis     Past Surgical History:   Procedure Laterality Date     BRONCHOSCOPY FLEXIBLE N/A 9/23/2016    Procedure: BRONCHOSCOPY FLEXIBLE;  Surgeon: Gayle Moya MD;  Location: [facility] OR     COLONOSCOPY  5-03     COLONOSCOPY WITH CO2  INSUFFLATION N/A 5/31/2017    Procedure: COLONOSCOPY WITH CO2 INSUFFLATION;  COLON SCREEN/ SEB;  Surgeon: Margarito Rasheed DO;  Location: [facility] OR     GENITOURINARY SURGERY      kidney stones     THORACOSCOPIC WEDGE RESECTION LUNG Left 9/23/2016    Procedure: THORACOSCOPIC WEDGE RESECTION LUNG;  Surgeon: Gayle Moya MD;  Location: [facility] OR     VASCULAR SURGERY      varicose vein       Social History   Substance Use Topics     Smoking status: Former Smoker     Quit date: 2/1/1992     Smokeless tobacco: Never Used      Comment: 15 + years      Alcohol use No     Family History   Problem Relation Age of Onset     CEREBROVASCULAR DISEASE Father      CANCER Sister      ovarary          Current Outpatient Prescriptions   Medication Sig Dispense Refill     terazosin (HYTRIN) 5 MG capsule TAKE 1 CAPSULE (5 MG) BY MOUTH AT BEDTIME. PATIENT NEEDS TO BE SEEN FOR FUTURE REFILLS. 90 capsule 0     metFORMIN (GLUCOPHAGE) 1000 MG tablet TAKE 1 TABLET (1,000 MG) BY MOUTH 2 TIMES DAILY (WITH MEALS). OVERDUE TO BE SEEN IN FEBRUARY. 180 tablet 0     glimepiride (AMARYL) 4 MG tablet TAKE 1 TABLET (4 MG) BY MOUTH 2 TIMES DAILY 180 tablet 1     losartan-hydrochlorothiazide (HYZAAR) 100-25 MG per tablet TAKE 1 TABLET BY MOUTH DAILY 90 tablet 1     Oxymetazoline HCl (AFRIN NASAL SPRAY NA) Spray in nostril as needed       allopurinol (ZYLOPRIM) 300 MG tablet TAKE 1 TABLET (300 MG) BY MOUTH DAILY 90 tablet 3     capsaicin (ZOSTRIX) 0.025 % CREA cream Apply 1 g topically 3 times daily 60 g 3     Azelaic Acid (FINACEA) 15 % gel Apply small amount twice a day to skin 50 g 3     diclofenac (VOLTAREN) 1 % GEL topical gel Apply 4 grams to knees or 2 grams to hands four times daily using enclosed dosing card. 100 g 1     atorvastatin (LIPITOR) 40 MG tablet Take 1 tablet (40 mg) by mouth daily 90 tablet 3     order for DME One touch ultra  Test strips for checking blood sugars 2 times a day. 200 strip 1     blood glucose monitoring (ONE TOUCH  ULTRA) test strip Use to test blood sugars 2 times daily or as directed. 100 strip 11     mometasone (NASONEX) 50 MCG/ACT spray Spray 2 sprays into both nostrils daily 3 Box 1     minocycline (DYNACIN) 100 MG tablet Take 1 tablet by mouth. Twice daily for 10 days with flare of rosacea. 60 tablet 2     fluticasone (FLONASE) 50 MCG/ACT nasal spray Spray 1-2 sprays into both nostrils daily 1 Bottle 1     nitroglycerin (NITROSTAT) 0.4 MG SL tablet Place 1 tablet (0.4 mg) under the tongue every 5 minutes as needed for chest pain If you are still having symptoms after 3 doses (15 minutes) call 911. 25 tablet 3     omeprazole (PRILOSEC) 20 MG capsule Take 1 capsule (20 mg) by mouth 2 times daily Take 30-60 minutes before a meal. 180 capsule 3     CINNAMON 500 MG OR TABS Take one tablet twice daily.       FISH OIL 1000 MG OR CAPS daily       ASPIRIN 81 MG PO TABS 1 TABLET DAILY(*)       MULTIPLE VITAMINS OR 1 a day       azithromycin (ZITHROMAX) 250 MG tablet Two tablets first day, then one tablet daily for four days. (Patient not taking: Reported on 3/21/2018) 6 tablet 0     lidocaine (XYLOCAINE) 5 % ointment Apply topically 3 times daily (Patient not taking: Reported on 3/21/2018) 50 g 3     [DISCONTINUED] ORDER FOR DME One touch ultra  Test strips for checking blood sugars 2 times a day. 200 strip 1     Allergies   Allergen Reactions     Penicillins Rash     Ace Inhibitors Cough     Indomethacin Other (See Comments)     Severe dizziness     Recent Labs   Lab Test  09/11/17   1621  05/23/17   1330  04/14/17   0847  03/28/17   1342  10/12/16   1056   09/16/16   0936 05/13/16 03/18/16   0905   03/02/15   0819  10/15/14   0916   A1C  7.6*   --    --   7.7*   --    --   7.4*   --    --   7.1*   < >  6.8*  8.9*   LDL   --    --   35   --    --    --    --    --    --   84   --   73   --    HDL   --    --   35*   --    --    --    --    --    --   29*   --   32*   --    TRIG   --    --   161*   --    --    --    --    --     "--   172*   --   235*   --    ALT   --   41   --   41   --    --    --   60   < >   --    --    --   33   CR   --   0.83   --   0.76   --    < >  0.78   --    < >  0.84   < >  0.79  0.72   GFRESTIMATED   --   >90  Non  GFR Calc     --   >90  Non  GFR Calc     --    < >  >90  Non  GFR Calc     --    < >  >90  Non  GFR Calc     < >  >90  Non  GFR Calc    >90  Non  GFR Calc     GFRESTBLACK   --   >90   GFR Calc     --   >90   GFR Calc     --    < >  >90   GFR Calc     --    < >  >90   GFR Calc     < >  >90   GFR Calc    >90   GFR Calc     POTASSIUM   --   3.6   --   3.4   --    < >  4.0   --    < >  4.2   < >  4.2  4.1   TSH   --    --    --    --   2.21   --    --    --    --    --    --    --   2.67    < > = values in this interval not displayed.        Reviewed and updated as needed this visit by clinical staff         Reviewed and updated as needed this visit by Provider            Review of Systems  C: NEGATIVE for fever, chills, change in weight  I: NEGATIVE for worrisome rashes, moles or lesions  E: NEGATIVE for vision changes or irritation  ENT: NEGATIVE for ear, mouth and throat problems  R: NEGATIVE for significant cough or SOB  CV: NEGATIVE for chest pain, palpitations or peripheral edema  GI: NEGATIVE for nausea, abdominal pain, heartburn, or change in bowel habits   male: negative for dysuria, hematuria, decreased urinary stream, erectile dysfunction, urethral discharge  M: NEGATIVE for significant arthralgias or myalgia  N: NEGATIVE for weakness, dizziness or paresthesias  P: NEGATIVE for changes in mood or affect    OBJECTIVE:   /86 (BP Location: Right arm, Patient Position: Sitting, Cuff Size: Adult Large)  Pulse 101  Temp 97  F (36.1  C) (Tympanic)  Resp 16  Ht 5' 8\" (1.727 m)  Wt 226 lb (102.5 kg)  " SpO2 95%  BMI 34.36 kg/m2    Physical Exam  GENERAL: healthy, alert and no distress  EYES: Eyes grossly normal to inspection, PERRL and conjunctivae and sclerae normal  HENT: ear canals and TM's normal, nose and mouth without ulcers or lesions  NECK: no adenopathy, no asymmetry, masses, or scars and thyroid normal to palpation  RESP: lungs clear to auscultation - no rales, rhonchi or wheezes  CV: regular rate and rhythm, normal S1 S2, no S3 or S4, no murmur, click or rub, no peripheral edema and peripheral pulses strong  ABDOMEN: soft, nontender, no hepatosplenomegaly, no masses and bowel sounds normal  MS: no gross musculoskeletal defects noted, no edema  SKIN: no suspicious lesions or rashes, except annular lesions on lower extremities with central clearing consistent with annulare granuloma.   NEURO: Normal strength and tone, mentation intact and speech normal  PSYCH: mentation appears normal, affect normal/bright  Diabetic foot exam: normal DP and PT pulses, no trophic changes or ulcerative lesions, normal calluses, no deformities, decreased fore foot sensory exam and monofilament exam      ASSESSMENT/PLAN:       ICD-10-CM    1. Normal physical exam Z00.00 Blood sugars running higher recently. Unable to get exercise due to neuropathy. Dry mouth issues.    2. Non-small cell carcinoma of lung, left (H) C34.92 Seen by oncology with no recurrence on CT scan- repeat every 6 months.    3. Type 2 diabetes mellitus with diabetic polyneuropathy, without long-term current use of insulin (H) E11.42 Hemoglobin A1c  Lab Results   Component Value Date    A1C 8.5 04/16/2018    A1C 7.6 09/11/2017    A1C 7.7 03/28/2017    A1C 7.4 09/16/2016    A1C 7.1 03/18/2016      Not in control and will adjust medications, return to clinic in 3 months for follow up.     TSH with free T4 reflex     Albumin Random Urine Quantitative with Creat Ratio     atorvastatin (LIPITOR) 40 MG tablet     blood glucose monitoring (ONETOUCH ULTRA) test  "strip     order for DME   4. Hyperlipidemia with target LDL less than 100 E78.5 Lipid panel reflex to direct LDL Fasting   5. Hypertension goal BP (blood pressure) < 140/90 I10 Comprehensive metabolic panel, well controlled on medications      CBC with platelets   6. Nonalcoholic hepatosteatosis K76.0 Comprehensive metabolic panel   7. History of radiation exposure Z92.3 May be at increased risk for cancers and secondary radiation damage.    8. Dry mouth R68.2 SSA Ro SHELLEY Antibody IgG     SSB La SHELLEY Antibody IgG   9. Diabetic polyneuropathy associated with type 2 diabetes mellitus (H) E11.42    10. 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 Needs biopsy to be sure.   11. PRESLEY (obstructive sleep apnea) G47.33 SLEEP EVALUATION & MANAGEMENT REFERRAL - [facility]  [phone] (Age 15 and up)   12. Chronic gout of multiple sites, unspecified cause M1A.09X0 allopurinol (ZYLOPRIM) 300 MG tablet   13. Hypertriglyceridemia E78.1 atorvastatin (LIPITOR) 40 MG tablet   14. Type 2 diabetes mellitus without complication, unspecified long term insulin use status (H) E11.9 glimepiride (AMARYL) 4 MG tablet   15. Essential hypertension with goal blood pressure less than 140/90 I10 losartan-hydrochlorothiazide (HYZAAR) 100-25 MG per tablet   16. Type 2 diabetes mellitus with hyperglycemia, without long-term current use of insulin (H) E11.65 metFORMIN (GLUCOPHAGE) 1000 MG tablet   17. Gastroesophageal reflux disease with esophagitis K21.0 Stable    18. Angina pectoris with documented spasm (H) I20.1 Has not been using nitroglycerin.        COUNSELING:   Reviewed preventive health counseling, as reflected in patient instructions       Regular exercise       Healthy diet/nutrition         reports that he quit smoking about 26 years ago. He has never used smokeless tobacco.    Estimated body mass index is 33.9 kg/(m^2) as calculated from the following:    Height as of 3/21/18: 5' 9\" (1.753 m).    Weight " as of 3/21/18: 229 lb 9 oz (104.1 kg).   Weight management plan: Specific weight management program called carbohydrate counting discussed and follow up in 3 months in clinic to re-evaluate.    Counseling Resources:  ATP IV Guidelines  Pooled Cohorts Equation Calculator  FRAX Risk Assessment  ICSI Preventive Guidelines  Dietary Guidelines for Americans, 2010  USDA's MyPlate  ASA Prophylaxis  Lung CA Screening    Pam Dela Cruz MD  [facility]

## 2018-04-16 NOTE — NURSING NOTE
"Chief Complaint   Patient presents with     Physical       Initial /86 (BP Location: Right arm, Patient Position: Sitting, Cuff Size: Adult Large)  Pulse 101  Temp 97  F (36.1  C) (Tympanic)  Resp 16  Ht 5' 8\" (1.727 m)  Wt 226 lb (102.5 kg)  SpO2 95%  BMI 34.36 kg/m2 Estimated body mass index is 34.36 kg/(m^2) as calculated from the following:    Height as of this encounter: 5' 8\" (1.727 m).    Weight as of this encounter: 226 lb (102.5 kg).  Medication Reconciliation: complete.  JOY Ugalde      "

## 2018-04-18 ENCOUNTER — TELEPHONE (OUTPATIENT)
Dept: FAMILY MEDICINE | Facility: CLINIC | Age: 63
End: 2018-04-18

## 2018-04-18 NOTE — TELEPHONE ENCOUNTER
Dr. Dela Cruz completed form for patient and the form has been faxed back to fax # [phone].  Han Orourke,  [facility]

## 2018-04-21 NOTE — PROGRESS NOTES
Dear Sven    Your test results are attached. I am happy to let you know that they are stable.    The test for Sjogren's is negative and this is not the cause for your dry mouth. The diabetes house is higher and sometimes this can cause dehydration, so I think this is the most likely reason. The thyroid test is normal. The kidneys are healthy.     I would like you to meet with the diabetic educator about medication options for your diabetes.     Your provider has referred you to: **[facility] Scheduling (numerous locations) [phone]   [email or web address]    Please contact me by Might[employer]Meetinghart if you have any questions about your labs or management.    Pam Dela Cruz MD

## 2018-04-24 ENCOUNTER — TELEPHONE (OUTPATIENT)
Dept: PHARMACY | Facility: OTHER | Age: 63
End: 2018-04-24

## 2018-04-24 NOTE — LETTER
[facility]  [address] [place] [address]      Dear  Sven,    Dr. Dela Cruz has recommended you schedule a Medication Therapy Management (MTM) appointment. MTM is designed to help you get the most of out of your medicines.     During an MTM appointment a specially trained pharmacist will review all of your medicines, both prescription and over-the-counter. They will make sure your medicines are the best choice for you and are safe and convenient for you.  MTM pharmacists work together with you and your doctor to help you understand your medicines, solve any problems related to your medicines and help you get the best results from taking your medicines.     At [facility], we strongly believe in a team approach to health care. We want to help you understand your medicines and health conditions. To learn more about how you might benefit from MTM services, watch the patient video at [email or web address].     To make an appointment, please call the MTM scheduling line at [phone] (toll-free at [phone]).    We look forward to hearing from you!        Kathi Garrett, PharmD, BCACP

## 2018-04-24 NOTE — TELEPHONE ENCOUNTER
MTM referral from: [facility] visit (referral by provider)    MTM referral outreach attempt #2 on April 24, 2018 at 2:55 PM      Outcome: Patient not reachable after several attempts, will route to MTM Pharmacist/Provider as an FYI. Thank you for the referral.    Alcira Husain, MTM Coordinator

## 2018-05-16 DIAGNOSIS — E11.42 TYPE 2 DIABETES MELLITUS WITH DIABETIC POLYNEUROPATHY, WITHOUT LONG-TERM CURRENT USE OF INSULIN (H): ICD-10-CM

## 2018-05-16 NOTE — TELEPHONE ENCOUNTER
100 strips sent on 4/16/18 with 11 refills. Should have refills at pharmacy.     Manuel Wang RN, BSN

## 2018-05-29 ENCOUNTER — TELEPHONE (OUTPATIENT)
Dept: UROLOGY | Facility: CLINIC | Age: 63
End: 2018-05-29

## 2018-05-29 ENCOUNTER — OFFICE VISIT (OUTPATIENT)
Dept: UROLOGY | Facility: CLINIC | Age: 63
End: 2018-05-29
Payer: COMMERCIAL

## 2018-05-29 VITALS — HEART RATE: 114 BPM | DIASTOLIC BLOOD PRESSURE: 81 MMHG | SYSTOLIC BLOOD PRESSURE: 127 MMHG | OXYGEN SATURATION: 96 %

## 2018-05-29 DIAGNOSIS — R31.0 GROSS HEMATURIA: Primary | ICD-10-CM

## 2018-05-29 DIAGNOSIS — J20.9 ACUTE BRONCHITIS, UNSPECIFIED ORGANISM: ICD-10-CM

## 2018-05-29 DIAGNOSIS — J98.01 BRONCHOSPASM: ICD-10-CM

## 2018-05-29 DIAGNOSIS — N40.1 BENIGN NON-NODULAR PROSTATIC HYPERPLASIA WITH LOWER URINARY TRACT SYMPTOMS: ICD-10-CM

## 2018-05-29 LAB
ALBUMIN UR-MCNC: 30 MG/DL
APPEARANCE UR: ABNORMAL
BILIRUB UR QL STRIP: NEGATIVE
COLOR UR AUTO: ABNORMAL
GLUCOSE UR STRIP-MCNC: NEGATIVE MG/DL
HGB UR QL STRIP: ABNORMAL
KETONES UR STRIP-MCNC: NEGATIVE MG/DL
LEUKOCYTE ESTERASE UR QL STRIP: NEGATIVE
NITRATE UR QL: NEGATIVE
PH UR STRIP: 6 PH (ref 5–7)
RBC #/AREA URNS AUTO: >100 /HPF
SOURCE: ABNORMAL
SP GR UR STRIP: 1.02 (ref 1–1.03)
UROBILINOGEN UR STRIP-ACNC: 0.2 EU/DL (ref 0.2–1)
WBC #/AREA URNS AUTO: ABNORMAL /HPF

## 2018-05-29 PROCEDURE — 51798 US URINE CAPACITY MEASURE: CPT | Performed by: UROLOGY

## 2018-05-29 PROCEDURE — 81001 URINALYSIS AUTO W/SCOPE: CPT | Performed by: UROLOGY

## 2018-05-29 PROCEDURE — 88112 CYTOPATH CELL ENHANCE TECH: CPT | Performed by: UROLOGY

## 2018-05-29 PROCEDURE — 99213 OFFICE O/P EST LOW 20 MIN: CPT | Mod: 25 | Performed by: UROLOGY

## 2018-05-29 NOTE — PROGRESS NOTES
Chief Complaint   Patient presents with     Hematuria       Sven Givens is a 63 year old male who presents today for follow up of   Chief Complaint   Patient presents with     Hematuria    patient c/o intermittent gross hematuria for the last several months. He denies any flank pain/n/v.  He has h/o renal stone many years ago.  He is a former smoker.  He is s/p urolift in the past.  He has no urinary complaints.    Current Outpatient Prescriptions   Medication Sig Dispense Refill     allopurinol (ZYLOPRIM) 300 MG tablet Take 1 tablet (300 mg) by mouth daily 90 tablet 3     ASPIRIN 81 MG PO TABS 1 TABLET DAILY(*)       atorvastatin (LIPITOR) 40 MG tablet Take 1 tablet (40 mg) by mouth daily 90 tablet 3     Azelaic Acid (FINACEA) 15 % gel Apply small amount twice a day to skin 50 g 3     blood glucose monitoring (ONETOUCH ULTRA) test strip Use to test blood sugars 2 times daily or as directed. 100 strip 11     capsaicin (ZOSTRIX) 0.025 % CREA cream Apply 1 g topically 3 times daily 60 g 3     CINNAMON 500 MG OR TABS Take one tablet twice daily.       diclofenac (VOLTAREN) 1 % GEL topical gel Apply 4 grams to knees or 2 grams to hands four times daily using enclosed dosing card. 100 g 1     FISH OIL 1000 MG OR CAPS daily       fluticasone (FLONASE) 50 MCG/ACT nasal spray Spray 1-2 sprays into both nostrils daily 1 Bottle 1     glimepiride (AMARYL) 4 MG tablet Take 1 tablet (4 mg) by mouth 2 times daily 180 tablet 3     losartan-hydrochlorothiazide (HYZAAR) 100-25 MG per tablet Take 1 tablet by mouth daily 90 tablet 3     metFORMIN (GLUCOPHAGE) 1000 MG tablet Take 1 tablet (1,000 mg) by mouth 2 times daily (with meals) 180 tablet 3     MULTIPLE VITAMINS OR 1 a day       nitroglycerin (NITROSTAT) 0.4 MG SL tablet Place 1 tablet (0.4 mg) under the tongue every 5 minutes as needed for chest pain If you are still having symptoms after 3 doses (15 minutes) call 911. 25 tablet 3     omeprazole (PRILOSEC) 20 MG capsule  Take 1 capsule (20 mg) by mouth 2 times daily Take 30-60 minutes before a meal. 180 capsule 3     order for DME One touch ultra  Test strips for checking blood sugars 2 times a day. 200 strip 1     Oxymetazoline HCl (AFRIN NASAL SPRAY NA) Spray in nostril as needed       terazosin (HYTRIN) 5 MG capsule TAKE 1 CAPSULE (5 MG) BY MOUTH AT BEDTIME. PATIENT NEEDS TO BE SEEN FOR FUTURE REFILLS. 90 capsule 0     [DISCONTINUED] ORDER FOR DME One touch ultra  Test strips for checking blood sugars 2 times a day. 200 strip 1     Allergies   Allergen Reactions     Penicillins Rash     Ace Inhibitors Cough     Indomethacin Other (See Comments)     Severe dizziness      Past Medical History:   Diagnosis Date     Allergies     PCN, ACE, Indomethocin     DM (diabetes mellitus) (H)      Dyslipidemia      GERD (gastroesophageal reflux disease)      HTN (hypertension)      Kidney stones 09/2004    s/p right kidney basket retrieval     PRESLEY (obstructive sleep apnea)     cpap only during the winter when air is dry.     Rhinitis, allergic      Rosacea      Tubular adenoma of colon 12/2009    follow up colonoscopy in 3 to 5 years requested     Varicose veins      Past Surgical History:   Procedure Laterality Date     BRONCHOSCOPY FLEXIBLE N/A 9/23/2016    Procedure: BRONCHOSCOPY FLEXIBLE;  Surgeon: Gayle Moya MD;  Location: [facility] OR     COLONOSCOPY  5-03     COLONOSCOPY WITH CO2 INSUFFLATION N/A 5/31/2017    Procedure: COLONOSCOPY WITH CO2 INSUFFLATION;  COLON SCREEN/ SEB;  Surgeon: Margarito Rasheed DO;  Location: [facility] OR     GENITOURINARY SURGERY      kidney stones     THORACOSCOPIC WEDGE RESECTION LUNG Left 9/23/2016    Procedure: THORACOSCOPIC WEDGE RESECTION LUNG;  Surgeon: Gayle Moya MD;  Location: [facility] OR     VASCULAR SURGERY      varicose vein     Family History   Problem Relation Age of Onset     CEREBROVASCULAR DISEASE Father      CANCER Sister      ovarary      Social History     Social History     Marital status:       Spouse name: N/A     Number of children: N/A     Years of education: N/A     Occupational History      [employer]     Social History Main Topics     Smoking status: Former Smoker     Quit date: 2/1/1992     Smokeless tobacco: Never Used      Comment: 15 + years      Alcohol use No     Drug use: No     Sexual activity: No     Other Topics Concern     Parent/Sibling W/ Cabg, Mi Or Angioplasty Before 65f 55m? No     Social History Narrative       REVIEW OF SYSTEMS  =================  C: NEGATIVE for fever, chills, change in weight  I: NEGATIVE for worrisome rashes, moles or lesions  E/M: NEGATIVE for ear, mouth and throat problems  R: NEGATIVE for significant cough or SHORTNESS OF BREATH,   CV: NEGATIVE for chest pain, palpitations or peripheral edema  GI: NEGATIVE for nausea, abdominal pain, heartburn, or change in bowel habits  NEURO: NEGATIVE any motor/sensory changes  PSYCH: NEGATIVE for recent mood disorder    Physical Exam:  /81 (BP Location: Right arm, Patient Position: Chair, Cuff Size: Adult Regular)  Pulse 114  SpO2 96%   Patient is pleasant, in no acute distress, good general condition.  Lung: no evidence of respiratory distress    Abdomen: Soft, nondistended, non tender. No masses. No rebound or guarding.   Exam: normal male  Skin: Warm and dry.  No redness.  Psych: normal mood and affect  Neuro: alert and oriented    Assessment/Plan:   (R31.0) Gross hematuria  (primary encounter diagnosis)  Comment:  UA  > 100 rbc/hpf  Plan: CT urogram/cysto next      BPH: s/p urolift.

## 2018-05-29 NOTE — TELEPHONE ENCOUNTER
Pt called stating CT scan is not covered by insurance  Requesting provider to send more info to insurance  Pt was not able to provide insurance fax#  He will call back with fax number and we will send over LOV notes.    Clay Menjivar RN....5/29/2018 1:23 PM

## 2018-05-29 NOTE — TELEPHONE ENCOUNTER
"Requested Prescriptions   Pending Prescriptions Disp Refills     PROAIR  (90 Base) MCG/ACT inhaler [Pharmacy Med Name: PROAIR HFA 90 MCG INHALER]    Last Written Prescription Date:  n/a  Last Fill Quantity: 0,  # refills: 0   Last Office Visit with [facility], [facility] or [facility] prescribing provider:  4/16/18   Future Office Visit:    Next 5 appointments (look out 90 days)     Jul 03, 2018  9:00 AM CDT   Return Visit with Sami Hillman MD, [place]ILIA CYSTO PROC ROOM   [facility] ([address] [address]   [phone]                  8.5 Inhaler 0     Sig: INHALE 2-4 PUFFS EVERY 4 HOURS AS NEEDED FOR SHORTNESS OF BREATH/ DYSPNEA OR WHEEZING    Asthma Maintenance Inhalers - Anticholinergics Passed    5/29/2018 12:17 PM       Passed - Patient is age 12 years or older       Passed - Recent (12 mo) or future (30 days) visit within the authorizing provider's specialty    Patient had office visit in the last 12 months or has a visit in the next 30 days with authorizing provider or within the authorizing provider's specialty.  See \"Patient Info\" tab in inbasket, or \"Choose Columns\" in Meds & Orders section of the refill encounter.                  Manuel Faarax  [facility]  "

## 2018-05-29 NOTE — TELEPHONE ENCOUNTER
Reason for Call:  Other Authorization for CT scan    Detailed comments: Patient called [facility] and they told him they do not have a fax number. Need to call [phone] to give verbal instructions for authorization    Phone Number Patient can be reached at: Home number on file [phone]     Best Time: Any    Can we leave a detailed message on this number? YES    Call taken on 5/29/2018 at 1:42 PM by Gina Palmer

## 2018-05-29 NOTE — PATIENT INSTRUCTIONS
Please call [facility] to schedule your CT scan at [facility]. [phone].  They are located near the intersection of [facility] and [address] [place] [address].      Your cystoscopy with Dr. Hillman has been scheduled for 7/3/2018 at 930.    If you have any questions or need to reschedule please call       Cystoscopy    Cystoscopy is a procedure that lets your doctor look directly inside your urethra and bladder. It can be used to:    Help diagnose a problem with your urethra, bladder, or kidneys.    Take a sample (biopsy) of bladder or urethral tissue.    Treat certain problems (such as removing kidney stones).    Place a stent to bypass an obstruction.    Take special X-rays of the kidneys.  Based on the findings, your doctor may recommend other tests or treatments.  What is a cystoscope?  A cystoscope is a telescope-like instrument that contains lenses and fiberoptics (small glass wires that make bright light). The cystoscope may be straight and rigid, or flexible to bend around curves in the urethra. The doctor may look directly into the cystoscope, or project the image onto a monitor.  Getting ready    Ask your doctor if you should stop taking any medicines before the procedure.    Ask whether you should avoid eating or drinking anything after midnight before the procedure.    Follow any other instructions your doctor gives you.  Tell your doctor before the exam if you:    Take any medicines, such as aspirin or blood thinners    Have allergies to any medicines    Are pregnant   The procedure  Cystoscopy is done in the doctor s office, surgery center, or hospital. The doctor and a nurse are present during the procedure. It takes only a few minutes, longer if a biopsy, X-ray, or treatment needs to be done.  During the procedure:    You lie on an exam table on your back, knees bent and legs apart. You are covered with a drape.    Your urethra and the area around it are washed. Anesthetic jelly may be  applied to numb the urethra. Other pain medicine is usually not needed. In some cases, you may be offered a mild sedative to help you relax. If a more extensive procedure is to be done, such as a biopsy or kidney stone removal, general anesthesia may be needed.    The cystoscope is inserted. A sterile fluid is put into the bladder to expand it. You may feel pressure from this fluid.    When the procedure is done, the cystoscope is removed.  After the procedure  If you had a sedative, general anesthesia, or spinal anesthesia, you must have someone drive you home. Once you re home:    Drink plenty of fluids.    You may have burning or light bleeding when you urinate this is normal.    Medicines may be prescribed to ease any discomfort or prevent infection. Take these as directed.    Call your doctor if you have heavy bleeding or blood clots, burning that lasts more than a day, a fever over 100 F  (38  C), or trouble urinating.  Date Last Reviewed: 1/1/2017 2000-2017 [employer]. [address] [place] [address], [place] [address]. All rights reserved. This information is not intended as a substitute for professional medical care. Always follow your healthcare professional's instructions.

## 2018-05-29 NOTE — MR AVS SNAPSHOT
After Visit Summary   5/29/2018    Sven Givens    MRN: [id number]           Patient Information     Date Of Birth          1955        Visit Information        Provider Department      5/29/2018 11:00 AM Sami Hillman MD [facility]        Today's Diagnoses     Gross hematuria    -  1      Care Instructions    Please call [facility] to schedule your CT scan at [facility]. [phone].  They are located near the intersection of [facility] and [address] [place] [address].      Your cystoscopy with Dr. Hillman has been scheduled for 7/3/2018 at 930.    If you have any questions or need to reschedule please call       Cystoscopy    Cystoscopy is a procedure that lets your doctor look directly inside your urethra and bladder. It can be used to:    Help diagnose a problem with your urethra, bladder, or kidneys.    Take a sample (biopsy) of bladder or urethral tissue.    Treat certain problems (such as removing kidney stones).    Place a stent to bypass an obstruction.    Take special X-rays of the kidneys.  Based on the findings, your doctor may recommend other tests or treatments.  What is a cystoscope?  A cystoscope is a telescope-like instrument that contains lenses and fiberoptics (small glass wires that make bright light). The cystoscope may be straight and rigid, or flexible to bend around curves in the urethra. The doctor may look directly into the cystoscope, or project the image onto a monitor.  Getting ready    Ask your doctor if you should stop taking any medicines before the procedure.    Ask whether you should avoid eating or drinking anything after midnight before the procedure.    Follow any other instructions your doctor gives you.  Tell your doctor before the exam if you:    Take any medicines, such as aspirin or blood thinners    Have allergies to any medicines    Are pregnant   The procedure  Cystoscopy is done in the doctor s office, surgery center, or  \[facility] [place] [facility]\"". The doctor and a nurse are present during the procedure. It takes only a few minutes, longer if a biopsy, X-ray, or treatment needs to be done.  During the procedure:    You lie on an exam table on your back, knees bent and legs apart. You are covered with a drape.    Your urethra and the area around it are washed. Anesthetic jelly may be applied to numb the urethra. Other pain medicine is usually not needed. In some cases, you may be offered a mild sedative to help you relax. If a more extensive procedure is to be done, such as a biopsy or kidney stone removal, general anesthesia may be needed.    The cystoscope is inserted. A sterile fluid is put into the bladder to expand it. You may feel pressure from this fluid.    When the procedure is done, the cystoscope is removed.  After the procedure  If you had a sedative, general anesthesia, or spinal anesthesia, you must have someone drive you home. Once you re home:    Drink plenty of fluids.    You may have burning or light bleeding when you urinate--this is normal.    Medicines may be prescribed to ease any discomfort or prevent infection. Take these as directed.    Call your doctor if you have heavy bleeding or blood clots, burning that lasts more than a day, a fever over 100 F  (38  C), or trouble urinating.  Date Last Reviewed: 1/1/2017 2000-2017 [employer]. [address] [place] [address]. All rights reserved. This information is not intended as a substitute for professional medical care. Always follow your healthcare professional's instructions.                Follow-ups after your visit        Your next 10 appointments already scheduled     Jun 01, 2018  3:00 PM CDT   New Sleep Patient with CHERYLE Burns   [facility] ([facility])    [address] [place] [address]   [phone]            Jul 03, 2018  9:00 AM CDT   Return Visit with Sami  Shankar Hillman MD, [place] CYSTO PROC ROOM   [facility]    [address] [place] [address]  [place] [id number] [phone]            Sep 21, 2018  4:15 PM CDT   Return Visit with Lay Valencia MD   [facility]    [address] [place] [id number] [phone]              Future tests that were ordered for you today     Open Future Orders        Priority Expected Expires Ordered    CT Abdomen Pelvis Hematuria w/wo IV Contrast Routine 5/30/2018 7/28/2018 5/29/2018            Who to contact     If you have questions or need follow up information about today's clinic visit or your schedule please contact [facility] directly at [phone].  Normal or non-critical lab and imaging results will be communicated to you by MyChart, letter or phone within 4 business days after the clinic has received the results. If you do not hear from us within 7 days, please contact the clinic through [employer] or phone. If you have a critical or abnormal lab result, we will notify you by phone as soon as possible.  Submit refill requests through [employer] or call your pharmacy and they will forward the refill request to us. Please allow 3 business days for your refill to be completed.          Additional Information About Your Visit        MyChart Information     [employer] gives you secure access to your electronic health record. If you see a primary care provider, you can also send messages to your care team and make appointments. If you have questions, please call your primary care clinic.  If you do not have a primary care provider, please call [phone] and they will assist you.        Care EveryWhere ID     This is your Care EveryWhere ID. This could be used by other organizations to access your [place] medical records  [id number]        Your Vitals Were     Pulse Pulse Oximetry                114 96%           Blood  Pressure from Last 3 Encounters:   05/29/18 127/81   04/16/18 135/86   03/21/18 138/85    Weight from Last 3 Encounters:   04/16/18 102.5 kg (226 lb)   03/21/18 104.1 kg (229 lb 9 oz)   12/20/17 104.3 kg (230 lb)              We Performed the Following     Cytology non gyn     MEASURE POST-VOID RESIDUAL URINE/BLADDER CAPACITY, US NON-IMAGING ([address]30)     UA reflex to Microscopic and Culture     Urine Microscopic        Primary Care Provider Office Phone # Fax #    Pam Yesenia Dela Cruz [phone]       [address]  [facility] [address]        Equal Access to Services     CHERISE STERN : Hadii sergey walkero Sotono, waaxda luqadaha, qaybta kaalmada adeparagyada, candace jaquez . So [facility] [phone].    ATENCIÓN: Si habla español, tiene a buchanan disposición servicios gratuitos de asistencia lingüística. Llame al [phone].    We comply with applicable federal civil rights laws and [place] laws. We do not discriminate on the basis of race, color, national origin, age, disability, sex, sexual orientation, or gender identity.            Thank you!     Thank you for choosing [facility]  for your care. Our goal is always to provide you with excellent care. Hearing back from our patients is one way we can continue to improve our services. Please take a few minutes to complete the written survey that you may receive in the mail after your visit with us. Thank you!             Your Updated Medication List - Protect others around you: Learn how to safely use, store and throw away your medicines at www.disposemymeds.org.          This list is accurate as of 5/29/18 11:26 AM.  Always use your most recent med list.                   Brand Name Dispense Instructions for use Diagnosis    AFRIN NASAL SPRAY NA      Spray in nostril as needed        allopurinol 300 MG tablet    ZYLOPRIM    90 tablet    Take 1 tablet (300 mg) by mouth daily    Chronic gout of multiple sites,  unspecified cause       aspirin 81 MG tablet      1 TABLET DAILY(*)        atorvastatin 40 MG tablet    LIPITOR    90 tablet    Take 1 tablet (40 mg) by mouth daily    Hypertriglyceridemia, Type 2 diabetes mellitus with diabetic polyneuropathy, without long-term current use of insulin (H)       Azelaic Acid 15 % gel    FINACEA    50 g    Apply small amount twice a day to skin    Rosacea       blood glucose monitoring test strip    ONETOUCH ULTRA    100 strip    Use to test blood sugars 2 times daily or as directed.    Type 2 diabetes mellitus with diabetic polyneuropathy, without long-term current use of insulin (H)       capsaicin 0.025 % Crea cream    ZOSTRIX    60 g    Apply 1 g topically 3 times daily    Pain in both feet, Type 2 diabetes mellitus with diabetic polyneuropathy, without long-term current use of insulin (H)       cinnamon 500 MG Tabs      Take one tablet twice daily.        diclofenac 1 % Gel topical gel    VOLTAREN    100 g    Apply 4 grams to knees or 2 grams to hands four times daily using enclosed dosing card.    Diabetic polyneuropathy associated with type 2 diabetes mellitus (H)       fish oil-omega-3 fatty acids 1000 MG capsule      daily        fluticasone 50 MCG/ACT spray    FLONASE    1 Bottle    Spray 1-2 sprays into both nostrils daily    URI with cough and congestion       glimepiride 4 MG tablet    AMARYL    180 tablet    Take 1 tablet (4 mg) by mouth 2 times daily    Type 2 diabetes mellitus without complication, unspecified long term insulin use status (H)       losartan-hydrochlorothiazide 100-25 MG per tablet    HYZAAR    90 tablet    Take 1 tablet by mouth daily    Essential hypertension with goal blood pressure less than 140/90       metFORMIN 1000 MG tablet    GLUCOPHAGE    180 tablet    Take 1 tablet (1,000 mg) by mouth 2 times daily (with meals)    Type 2 diabetes mellitus with hyperglycemia, without long-term current use of insulin (H)       MULTIPLE VITAMINS PO      1 a day         nitroGLYcerin 0.4 MG sublingual tablet    NITROSTAT    25 tablet    Place 1 tablet (0.4 mg) under the tongue every 5 minutes as needed for chest pain If you are still having symptoms after 3 doses (15 minutes) call 911.    Ischemic chest pain (H)       omeprazole 20 MG CR capsule    priLOSEC    180 capsule    Take 1 capsule (20 mg) by mouth 2 times daily Take 30-60 minutes before a meal.    Gastroesophageal reflux disease with esophagitis       order for DME     200 strip    One touch ultra  Test strips for checking blood sugars 2 times a day.    Type 2 diabetes mellitus with diabetic polyneuropathy, without long-term current use of insulin (H)       terazosin 5 MG capsule    HYTRIN    90 capsule    TAKE 1 CAPSULE (5 MG) BY MOUTH AT BEDTIME. PATIENT NEEDS TO BE SEEN FOR FUTURE REFILLS.    Essential hypertension with goal blood pressure less than 140/90

## 2018-05-30 ENCOUNTER — RADIANT APPOINTMENT (OUTPATIENT)
Dept: CT IMAGING | Facility: CLINIC | Age: 63
End: 2018-05-30
Attending: UROLOGY
Payer: COMMERCIAL

## 2018-05-30 ENCOUNTER — MYC MEDICAL ADVICE (OUTPATIENT)
Dept: UROLOGY | Facility: CLINIC | Age: 63
End: 2018-05-30

## 2018-05-30 DIAGNOSIS — R31.0 GROSS HEMATURIA: ICD-10-CM

## 2018-05-30 LAB
COPATH REPORT: NORMAL
CREAT BLD-MCNC: 0.9 MG/DL (ref 0.66–1.25)
GFR SERPL CREATININE-BSD FRML MDRD: 85 ML/MIN/1.7M2

## 2018-05-30 PROCEDURE — 74178 CT ABD&PLV WO CNTR FLWD CNTR: CPT | Performed by: RADIOLOGY

## 2018-05-30 PROCEDURE — 82565 ASSAY OF CREATININE: CPT | Performed by: RADIOLOGY

## 2018-05-30 RX ORDER — IOPAMIDOL 755 MG/ML
135 INJECTION, SOLUTION INTRAVASCULAR ONCE
Status: COMPLETED | OUTPATIENT
Start: 2018-05-30 | End: 2018-05-30

## 2018-05-30 RX ADMIN — IOPAMIDOL 135 ML: 755 INJECTION, SOLUTION INTRAVASCULAR at 14:49

## 2018-05-30 NOTE — TELEPHONE ENCOUNTER
Called and spoke to insurance.   Approved authorization number [id number]   My chart message sent to patient.   Cassy Poole RN

## 2018-05-31 RX ORDER — ALBUTEROL SULFATE 90 UG/1
AEROSOL, METERED RESPIRATORY (INHALATION)
Qty: 8.5 INHALER | Refills: 3 | Status: SHIPPED | OUTPATIENT
Start: 2018-05-31 | End: 2019-03-08

## 2018-05-31 NOTE — TELEPHONE ENCOUNTER
Routing refill request to provider for review/approval because:  Drug not active on patient's medication list  Medication was last given in March 2017 at an Urgent Care visit for acute bronchitis.  Provider to review, per [facility] protocol    Rosi Chakraborty RN  [facility]

## 2018-06-01 ENCOUNTER — OFFICE VISIT (OUTPATIENT)
Dept: SLEEP MEDICINE | Facility: CLINIC | Age: 63
End: 2018-06-01
Attending: FAMILY MEDICINE
Payer: COMMERCIAL

## 2018-06-01 VITALS
DIASTOLIC BLOOD PRESSURE: 82 MMHG | WEIGHT: 231 LBS | SYSTOLIC BLOOD PRESSURE: 134 MMHG | OXYGEN SATURATION: 97 % | HEART RATE: 98 BPM | HEIGHT: 69 IN | BODY MASS INDEX: 34.21 KG/M2

## 2018-06-01 DIAGNOSIS — C34.92 NON-SMALL CELL CARCINOMA OF LUNG, LEFT (H): Chronic | ICD-10-CM

## 2018-06-01 DIAGNOSIS — G47.33 OSA (OBSTRUCTIVE SLEEP APNEA): Primary | ICD-10-CM

## 2018-06-01 PROCEDURE — 99244 OFF/OP CNSLTJ NEW/EST MOD 40: CPT | Performed by: PHYSICIAN ASSISTANT

## 2018-06-01 RX ORDER — ZOLPIDEM TARTRATE 5 MG/1
TABLET ORAL
Qty: 1 TABLET | Refills: 0 | Status: SHIPPED | OUTPATIENT
Start: 2018-06-01 | End: 2018-09-14

## 2018-06-01 NOTE — MR AVS SNAPSHOT
After Visit Summary   6/1/2018    Sven Givens    MRN: [id number]           Patient Information     Date Of Birth          1955        Visit Information        Provider Department      6/1/2018 3:00 PM Al Ayala PA [facility]        Today's Diagnoses     PRESLEY (obstructive sleep apnea)    -  1      Care Instructions      Your BMI is Body mass index is 34.11 kg/(m^2).  Weight management is a personal decision.  If you are interested in exploring weight loss strategies, the following discussion covers the approaches that may be successful. Body mass index (BMI) is one way to tell whether you are at a healthy weight, overweight, or obese. It measures your weight in relation to your height.  A BMI of 18.5 to 24.9 is in the healthy range. A person with a BMI of 25 to 29.9 is considered overweight, and someone with a BMI of 30 or greater is considered obese. More than two-thirds of American adults are considered overweight or obese.  Being overweight or obese increases the risk for further weight gain. Excess weight may lead to heart disease and diabetes.  Creating and following plans for healthy eating and physical activity may help you improve your health.  Weight control is part of healthy lifestyle and includes exercise, emotional health, and healthy eating habits. Careful eating habits lifelong are the mainstay of weight control. Though there are significant health benefits from weight loss, long-term weight loss with diet alone may be very difficult to achieve- studies show long-term success with dietary management in less than 10% of people. Attaining a healthy weight may be especially difficult to achieve in those with severe obesity. In some cases, medications, devices and surgical management might be considered.  What can you do?  If you are overweight or obese and are interested in methods for weight loss, you should discuss this with your provider.     Consider reducing  daily calorie intake by 500 calories.     Keep a food journal.     Avoiding skipping meals, consider cutting portions instead.    Diet combined with exercise helps maintain muscle while optimizing fat loss. Strength training is particularly important for building and maintaining muscle mass. Exercise helps reduce stress, increase energy, and improves fitness. Increasing exercise without diet control, however, may not burn enough calories to loose weight.       Start walking three days a week 10-20 minutes at a time    Work towards walking thirty minutes five days a week     Eventually, increase the speed of your walking for 1-2 minutes at time    In addition, we recommend that you review healthy lifestyles and methods for weight loss available through the National Institutes of Health patient information sites:  http://win.niddk.nih.gov/publications/index.htm    And look into health and wellness programs that may be available through your health insurance provider, employer, local community center, or cedric club.    Weight management plan: Patient was referred to their PCP to discuss a diet and exercise plan.              Follow-ups after your visit        Your next 10 appointments already scheduled     Jul 03, 2018  9:00 AM CDT   Return Visit with Sami Hillman MD, [facility]ST[facility] PROC ROOM   [facility] ([address] [address]   [phone]            Jul 16, 2018  8:00 PM CDT   PSG Titration w/TCM with BK BED 2   [facility] ([facility])    [address] [place] [address]   [phone]            Jul 30, 2018  3:20 PM CDT   Return Sleep Patient with CHERYLE Burns   [facility] ([facility])    [address] [place] [address]   [phone]            Sep 18, 2018  2:30 PM CDT   CT CHEST W/O CONTRAST  with MGCT1   [facility] ([facility])    [address] [place] [address] [phone]           Please bring any scans or X-rays taken at other hospitals, if similar tests were done. Also bring a list of your medicines, including vitamins, minerals and over-the-counter drugs. It is safest to leave personal items at home.  Be sure to tell your doctor:   If you have any allergies.   If there s any chance you are pregnant.   If you are breastfeeding.  You do not need to do anything special to prepare for this exam.  Please wear loose clothing, such as a sweat suit or jogging clothes. Avoid snaps, zippers and other metal. We may ask you to undress and put on a hospital gown.            Sep 21, 2018  4:15 PM CDT   Return Visit with Lay Valencia MD   [facility] ([facility])    [address] [place] [address] [phone]              Future tests that were ordered for you today     Open Future Orders        Priority Expected Expires Ordered    Comprehensive Sleep Study Routine  11/28/2018 6/1/2018            Who to contact     If you have questions or need follow up information about today's clinic visit or your schedule please contact [facility] directly at [phone].  Normal or non-critical lab and imaging results will be communicated to you by Zinwavehart, letter or phone within 4 business days after the clinic has received the results. If you do not hear from us within 7 days, please contact the clinic through Zinwavehart or phone. If you have a critical or abnormal lab result, we will notify you by phone as soon as possible.  Submit refill requests through [employer] or call your pharmacy and they will forward the refill request to us. Please allow 3 business days for your refill to be completed.          Additional Information About Your Visit        [employer] Information     [employer] gives you secure  "access to your electronic health record. If you see a primary care provider, you can also send messages to your care team and make appointments. If you have questions, please call your primary care clinic.  If you do not have a primary care provider, please call [phone] and they will assist you.        Care EveryWhere ID     This is your Care EveryWhere ID. This could be used by other organizations to access your [facility] medical records  [id number]        Your Vitals Were     Pulse Height Pulse Oximetry BMI (Body Mass Index)          98 1.753 m (5' 9\") 97% 34.11 kg/m2         Blood Pressure from Last 3 Encounters:   06/01/18 134/82   05/29/18 127/81   04/16/18 135/86    Weight from Last 3 Encounters:   06/01/18 104.8 kg (231 lb)   04/16/18 102.5 kg (226 lb)   03/21/18 104.1 kg (229 lb 9 oz)              We Performed the Following     SLEEP EVALUATION & MANAGEMENT REFERRAL - ADULT -[facility]  [phone] (Age 15 and up)          Today's Medication Changes          These changes are accurate as of 6/1/18  3:28 PM.  If you have any questions, ask your nurse or doctor.               Start taking these medicines.        Dose/Directions    zolpidem 5 MG tablet   Commonly known as:  AMBIEN   Used for:  PRESLEY (obstructive sleep apnea)   Started by:  Al Ayala PA        Take tablet by mouth 15 minutes prior to sleep, for Sleep Study   Quantity:  1 tablet   Refills:  0            Where to get your medicines      Some of these will need a paper prescription and others can be bought over the counter.  Ask your nurse if you have questions.     Bring a paper prescription for each of these medications     zolpidem 5 MG tablet                Primary Care Provider Office Phone # Fax #    Pam Dela Cruz [phone]       [address]  [facility] [address]        Equal Access to Services     CHERISE STERN AH: Hadii sergey Wilson, camilada luqadapebbles, qaybta " candace codyparag jaquez ah. Iz[facility] [phone].    ATENCIÓN: Si curt sam, tiene a buchanan disposición servicios gratuitos de asistencia lingüística. Alexander al [phone].    We comply with applicable federal civil rights laws and [place] laws. We do not discriminate on the basis of race, color, national origin, age, disability, sex, sexual orientation, or gender identity.            Thank you!     Thank you for choosing [facility]  for your care. Our goal is always to provide you with excellent care. Hearing back from our patients is one way we can continue to improve our services. Please take a few minutes to complete the written survey that you may receive in the mail after your visit with us. Thank you!             Your Updated Medication List - Protect others around you: Learn how to safely use, store and throw away your medicines at www.disposemymeds.org.          This list is accurate as of 6/1/18  3:28 PM.  Always use your most recent med list.                   Brand Name Dispense Instructions for use Diagnosis    AFRIN NASAL SPRAY NA      Spray in nostril as needed        allopurinol 300 MG tablet    ZYLOPRIM    90 tablet    Take 1 tablet (300 mg) by mouth daily    Chronic gout of multiple sites, unspecified cause       aspirin 81 MG tablet      1 TABLET DAILY(*)        atorvastatin 40 MG tablet    LIPITOR    90 tablet    Take 1 tablet (40 mg) by mouth daily    Hypertriglyceridemia, Type 2 diabetes mellitus with diabetic polyneuropathy, without long-term current use of insulin (H)       Azelaic Acid 15 % gel    FINACEA    50 g    Apply small amount twice a day to skin    Rosacea       blood glucose monitoring test strip    ONETOUCH ULTRA    100 strip    Use to test blood sugars 2 times daily or as directed.    Type 2 diabetes mellitus with diabetic polyneuropathy, without long-term current use of insulin (H)       capsaicin 0.025 % Crea cream     ZOSTRIX    60 g    Apply 1 g topically 3 times daily    Pain in both feet, Type 2 diabetes mellitus with diabetic polyneuropathy, without long-term current use of insulin (H)       cinnamon 500 MG Tabs      Take one tablet twice daily.        diclofenac 1 % Gel topical gel    VOLTAREN    100 g    Apply 4 grams to knees or 2 grams to hands four times daily using enclosed dosing card.    Diabetic polyneuropathy associated with type 2 diabetes mellitus (H)       fish oil-omega-3 fatty acids 1000 MG capsule      daily        fluticasone 50 MCG/ACT spray    FLONASE    1 Bottle    Spray 1-2 sprays into both nostrils daily    URI with cough and congestion       glimepiride 4 MG tablet    AMARYL    180 tablet    Take 1 tablet (4 mg) by mouth 2 times daily    Type 2 diabetes mellitus without complication, unspecified long term insulin use status (H)       losartan-hydrochlorothiazide 100-25 MG per tablet    HYZAAR    90 tablet    Take 1 tablet by mouth daily    Essential hypertension with goal blood pressure less than 140/90       metFORMIN 1000 MG tablet    GLUCOPHAGE    180 tablet    Take 1 tablet (1,000 mg) by mouth 2 times daily (with meals)    Type 2 diabetes mellitus with hyperglycemia, without long-term current use of insulin (H)       MULTIPLE VITAMINS PO      1 a day        nitroGLYcerin 0.4 MG sublingual tablet    NITROSTAT    25 tablet    Place 1 tablet (0.4 mg) under the tongue every 5 minutes as needed for chest pain If you are still having symptoms after 3 doses (15 minutes) call 911.    Ischemic chest pain (H)       omeprazole 20 MG CR capsule    priLOSEC    180 capsule    Take 1 capsule (20 mg) by mouth 2 times daily Take 30-60 minutes before a meal.    Gastroesophageal reflux disease with esophagitis       order for DME     200 strip    One touch ultra  Test strips for checking blood sugars 2 times a day.    Type 2 diabetes mellitus with diabetic polyneuropathy, without long-term current use of insulin (H)        PROAIR  (90 Base) MCG/ACT Inhaler   Generic drug:  albuterol     8.5 Inhaler    INHALE 2-4 PUFFS EVERY 4 HOURS AS NEEDED FOR SHORTNESS OF BREATH/ DYSPNEA OR WHEEZING    Bronchospasm, Acute bronchitis, unspecified organism       terazosin 5 MG capsule    HYTRIN    90 capsule    TAKE 1 CAPSULE (5 MG) BY MOUTH AT BEDTIME. PATIENT NEEDS TO BE SEEN FOR FUTURE REFILLS.    Essential hypertension with goal blood pressure less than 140/90       zolpidem 5 MG tablet    AMBIEN    1 tablet    Take tablet by mouth 15 minutes prior to sleep, for Sleep Study    PRESLEY (obstructive sleep apnea)

## 2018-06-01 NOTE — PATIENT INSTRUCTIONS

## 2018-06-01 NOTE — PROGRESS NOTES
Sleep Consultation:    Date on this visit: 6/1/2018    Sven Givens is sent by Pam Dela Cruz for a sleep consultation regarding PRESLEY.    Primary Physician: Pam Dela Cruz     Chief Complaint   Patient presents with     Sleep Problem     consult-haven't used machine in years.fatigued during the day.  Wants to start back on cpap.      HPI: Sven Givens is a 63 year old male who was initially diagnosed with severe obstructive sleep apnea by a sleep study on 11/29/2007(200#)-AHI 37.7, RDI 50, lowest oxygen saturation 80%. CPAP of 7 cm/H9O was effective. He was prescribed CPAP, but stopped using it in ~2012 due to the loudness of the machine contributing to difficulty falling asleep.      Sven has gained 30# since his sleep study.     Sven goes to sleep at 8:00 PM during the week. He wakes up at 4:00 AM with an alarm. He falls asleep in 30 minutes.  Sven denies difficulty falling asleep.  He wakes up 3-4 times a night for 5-60 minutes before falling back to sleep.  Sven wakes up to go to the bathroom and dry mouth.  On weekends, Sven goes to sleep at 9:00 PM.  He wakes up at 7:00 AM without an alarm. He falls asleep in 20 minutes.  Patient gets 6.5-7 hours of sleep per night.     Patient does not use electronics in bed, watch TV in bed and read in bed.     Sven usually does not do shift work.      Sven does snore every night and snoring is loud. Patient does have a regular bed partner. There is report of snoring.  He does have witnessed apneas. Patient sleeps on his back and side. He denies morning headaches, morning confusion and restless legs. Sven denies any bruxism, sleep walking, sleep talking, dream enactment, sleep paralysis, cataplexy and hypnogogic/hypnopompic hallucinations.    Sven denies difficulty breathing through his nose, claustrophobia and reflux at night.      Patient describes themself as a morning person.  He would prefer to go to sleep at 8:00 PM and wake up at 4:00 AM.   Patient's [employer] [employer] Sleepiness score 3/24 inconsistent with excessive  daytime sleepiness.  He has fatigue.     Sven does not take naps.  He takes some inadvertant naps.  He denies falling asleep while driving.   Patient was counseled on the importance of driving while alert, to pull over if drowsy, or nap before getting into the vehicle if sleepy.  He uses 1-2 cups/day of coffee. Last caffeine intake is usually before noon.    Allergies:    Allergies   Allergen Reactions     Penicillins Rash     Ace Inhibitors Cough     Indomethacin Other (See Comments)     Severe dizziness       Medications:    Current Outpatient Prescriptions   Medication Sig Dispense Refill     allopurinol (ZYLOPRIM) 300 MG tablet Take 1 tablet (300 mg) by mouth daily 90 tablet 3     ASPIRIN 81 MG PO TABS 1 TABLET DAILY(*)       atorvastatin (LIPITOR) 40 MG tablet Take 1 tablet (40 mg) by mouth daily 90 tablet 3     Azelaic Acid (FINACEA) 15 % gel Apply small amount twice a day to skin 50 g 3     blood glucose monitoring (ONETOUCH ULTRA) test strip Use to test blood sugars 2 times daily or as directed. 100 strip 11     capsaicin (ZOSTRIX) 0.025 % CREA cream Apply 1 g topically 3 times daily 60 g 3     CINNAMON 500 MG OR TABS Take one tablet twice daily.       diclofenac (VOLTAREN) 1 % GEL topical gel Apply 4 grams to knees or 2 grams to hands four times daily using enclosed dosing card. 100 g 1     FISH OIL 1000 MG OR CAPS daily       fluticasone (FLONASE) 50 MCG/ACT nasal spray Spray 1-2 sprays into both nostrils daily 1 Bottle 1     glimepiride (AMARYL) 4 MG tablet Take 1 tablet (4 mg) by mouth 2 times daily 180 tablet 3     losartan-hydrochlorothiazide (HYZAAR) 100-25 MG per tablet Take 1 tablet by mouth daily 90 tablet 3     metFORMIN (GLUCOPHAGE) 1000 MG tablet Take 1 tablet (1,000 mg) by mouth 2 times daily (with meals) 180 tablet 3     MULTIPLE VITAMINS OR 1 a day       nitroglycerin (NITROSTAT) 0.4 MG SL tablet Place 1 tablet (0.4 mg)  under the tongue every 5 minutes as needed for chest pain If you are still having symptoms after 3 doses (15 minutes) call 911. 25 tablet 3     omeprazole (PRILOSEC) 20 MG capsule Take 1 capsule (20 mg) by mouth 2 times daily Take 30-60 minutes before a meal. 180 capsule 3     order for DME One touch ultra  Test strips for checking blood sugars 2 times a day. 200 strip 1     Oxymetazoline HCl (AFRIN NASAL SPRAY NA) Spray in nostril as needed       PROAIR  (90 Base) MCG/ACT inhaler INHALE 2-4 PUFFS EVERY 4 HOURS AS NEEDED FOR SHORTNESS OF BREATH/ DYSPNEA OR WHEEZING 8.5 Inhaler 3     terazosin (HYTRIN) 5 MG capsule TAKE 1 CAPSULE (5 MG) BY MOUTH AT BEDTIME. PATIENT NEEDS TO BE SEEN FOR FUTURE REFILLS. 90 capsule 0     zolpidem (AMBIEN) 5 MG tablet Take tablet by mouth 15 minutes prior to sleep, for Sleep Study 1 tablet 0     [DISCONTINUED] ORDER FOR DME One touch ultra  Test strips for checking blood sugars 2 times a day. 200 strip 1       Problem List:  Patient Active Problem List    Diagnosis Date Noted     Non-small cell carcinoma of lung, left (H) 10/17/2016     Priority: High     Stage 1A (pT1aN0) 0.9 x 0.7 x 0.5 cm grade 1 well differentiated adenocarcinoma of the left lower lobe of the lung with invasive component being 0.3cm, s/p wedge resection and mediastinal LN sampling on 9/23/16.       PRESLEY (obstructive sleep apnea)      Priority: Medium     11/29/2007(200#)-AHI 37.7, RDI 50, lowest oxygen saturation 80%. CPAP of 7 cm/H9O was effective.       Nonalcoholic hepatosteatosis 10/17/2016     Priority: Medium     History of radiation exposure 09/30/2016     Priority: Medium     Hypertriglyceridemia 12/18/2015     Priority: Medium     Type 2 diabetes mellitus with diabetic polyneuropathy (H) 10/15/2015     Priority: Medium     Obesity 10/15/2015     Priority: Medium     Hyperlipidemia with target LDL less than 100 12/20/2013     Priority: Medium     Diagnosis updated by automated process. Provider to  review and confirm.       Gout 12/20/2013     Priority: Medium     Diagnosed by joint tap August 2013 per patient report.        Anemia 07/03/2013     Priority: Medium     Persistent mild anemia. Needs evaluation. May be thalassemia carrier.       Diabetic neuropathy (H) 05/07/2012     Priority: Medium     Advanced directives, counseling/discussion 06/16/2011     Priority: Medium     Advance Directive Problem List Overview:   Name Relationship Phone    Primary Health Care Agent            Alternative Health Care Agent          Discussed advance care planning with patient; information given to patient to review.  Bindu Smith   6/16/2011          Hypertension goal BP (blood pressure) < 140/90 06/16/2011     Priority: Medium     High triglycerides 02/21/2011     Priority: Medium     Tubular adenoma of colon 12/01/2009     Priority: Medium     follow up colonoscopy 5 years requested 5-2017 due.       GERD (gastroesophageal reflux disease) 05/15/2012     Priority: Low        Past Medical/Surgical History:  Past Medical History:   Diagnosis Date     Allergies     PCN, ACE, Indomethocin     Kidney stones 09/2004    s/p right kidney basket retrieval     Rhinitis, allergic      Rosacea      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, overuse, 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     Varicose veins      Past Surgical History:   Procedure Laterality Date     BRONCHOSCOPY FLEXIBLE N/A 9/23/2016    Procedure: BRONCHOSCOPY FLEXIBLE;  Surgeon: Gayle Moya MD;  Location: [facility] OR     COLONOSCOPY  5-03     COLONOSCOPY WITH CO2 INSUFFLATION N/A 5/31/2017    Procedure: COLONOSCOPY WITH CO2 INSUFFLATION;  COLON SCREEN/ SEB;  Surgeon: Margarito Rasheed DO;  Location: [facility] OR     GENITOURINARY SURGERY      kidney stones     THORACOSCOPIC WEDGE RESECTION LUNG Left 9/23/2016    Procedure: THORACOSCOPIC WEDGE RESECTION LUNG;  Surgeon: Gayle Moya MD;  Location: [facility] OR     VASCULAR SURGERY      varicose vein       Social History:  Social History      Social History     Marital status:      Spouse name: N/A     Number of children: N/A     Years of education: N/A     Occupational History      [employer]     Social History Main Topics     Smoking status: Former Smoker     Quit date: 2/1/1992     Smokeless tobacco: Never Used      Comment: 15 + years      Alcohol use No     Drug use: No     Sexual activity: No     Other Topics Concern     Parent/Sibling W/ Cabg, Mi Or Angioplasty Before 65f 55m? No     Social History Narrative       Family History:  Family History   Problem Relation Age of Onset     CEREBROVASCULAR DISEASE Father      CANCER Sister      ovarary        Review of Systems:  CONSTITUTIONAL: NEGATIVE for weight gain/loss, fever, chills, sweats or night sweats, drug allergies.  EYES: NEGATIVE for changes in vision, blind spots, double vision.  ENT: NEGATIVE for ear pain, sore throat, sinus pain, post-nasal drip, runny nose, bloody nose  CARDIAC: NEGATIVE for fast heartbeats or fluttering in chest, chest pain or pressure, breathlessness when lying flat, swollen legs or swollen feet.  NEUROLOGIC: NEGATIVE headaches, weakness or numbness in the arms or legs.  DERMATOLOGIC: NEGATIVE for rashes, new moles or change in mole(s)  PULMONARY: NEGATIVE SOB at rest, SOB with activity, dry cough, productive cough, coughing up blood, wheezing or whistling when breathing.    GASTROINTESTINAL: NEGATIVE for nausea or vomitting, loose or watery stools, fat or grease in stools, constipation, abdominal pain, bowel movements black in color or blood noted.  GENITOURINARY: NEGATIVE for pain during urination, blood in urine, urinating more frequently than usual, irregular menstrual periods.  MUSCULOSKELETAL: NEGATIVE for muscle pain, bone or joint pain, swollen joints.  ENDOCRINE: NEGATIVE for increased thirst or urination, diabetes.  LYMPHATIC: NEGATIVE for swollen lymph nodes, lumps or bumps in the breasts or nipple discharge.    Physical  "Examination:  Vitals: /82  Pulse 98  Ht 1.753 m (5' 9\")  Wt 104.8 kg (231 lb)  SpO2 97%  BMI 34.11 kg/m2  BMI= Body mass index is 34.11 kg/(m^2).         [place] Total Score 6/1/2018   Total score - [place] 3       GENERAL APPEARANCE: alert and no distress  EYES: Eyes grossly normal to inspection, PERRL and conjunctivae and sclerae normal  HENT: ear canals and TM's normal, nose and mouth without ulcers or lesions and oropharynx crowded  NECK: no adenopathy and no asymmetry, masses, or scars  RESP: lungs clear to auscultation - no rales, rhonchi or wheezes  CV: regular rates and rhythm and normal S1 S2, no S3 or S4  MS: extremities normal- no gross deformities noted  NEURO: Normal strength and tone, mentation intact and speech normal  PSYCH: mentation appears normal and affect normal/bright  Mallampati Class:   Tonsillar Stage:     Last Basic Metabolic Panel:  Lab Results   Component Value Date     04/16/2018      Lab Results   Component Value Date    POTASSIUM 3.9 04/16/2018     Lab Results   Component Value Date    CHLORIDE 103 04/16/2018     Lab Results   Component Value Date    ODELL 9.1 04/16/2018     Lab Results   Component Value Date    CO2 26 04/16/2018     Lab Results   Component Value Date    BUN 18 04/16/2018     Lab Results   Component Value Date    CR 0.70 04/16/2018     Lab Results   Component Value Date     04/16/2018       Impression/Plan:  History severe obstructive sleep apnea by a sleep study on 11/29/2007(200#)-AHI 37.7, RDI 50, lowest oxygen saturation 80%,CPAP of 7 cm/H9O was effective. Currently untreated. Interim weight gain of ~30# and left lower lobe wedge resection. Recommend an all night PAP titration Polysomnogram (using 4% desaturation/Medicare/2012 AASM 1B scoring rules) to evaluate for optimal treatment. Ambien if needed.     Literature provided regarding sleep apnea.      He will follow up with me in approximately two weeks after his sleep study has been " completed to review the results and discuss plan of care.       Polysomnography reviewed.  Obstructive sleep apnea reviewed.  Complications of untreated sleep apnea were reviewed.    Al Ayala PA-C    CC: Pam Dela Cruz

## 2018-06-01 NOTE — NURSING NOTE
"Chief Complaint   Patient presents with     Sleep Problem     consult-haven't used maching in years.fatigued during the day.  Wants to start back on cpap.        Initial /82  Pulse 98  Ht 1.753 m (5' 9\")  Wt 104.8 kg (231 lb)  SpO2 97%  BMI 34.11 kg/m2 Estimated body mass index is 34.11 kg/(m^2) as calculated from the following:    Height as of this encounter: 1.753 m (5' 9\").    Weight as of this encounter: 104.8 kg (231 lb).    Medication Reconciliation: complete    Neck circumference: 16 inches / 41 centimeters.    DME: [facility]    "

## 2018-06-03 ENCOUNTER — MYC MEDICAL ADVICE (OUTPATIENT)
Dept: UROLOGY | Facility: CLINIC | Age: 63
End: 2018-06-03

## 2018-06-12 ENCOUNTER — OFFICE VISIT (OUTPATIENT)
Dept: UROLOGY | Facility: CLINIC | Age: 63
End: 2018-06-12
Payer: COMMERCIAL

## 2018-06-12 VITALS — DIASTOLIC BLOOD PRESSURE: 87 MMHG | OXYGEN SATURATION: 97 % | SYSTOLIC BLOOD PRESSURE: 138 MMHG | HEART RATE: 117 BPM

## 2018-06-12 DIAGNOSIS — R31.0 GROSS HEMATURIA: Primary | ICD-10-CM

## 2018-06-12 PROCEDURE — 52000 CYSTOURETHROSCOPY: CPT | Performed by: UROLOGY

## 2018-06-12 NOTE — MR AVS SNAPSHOT
After Visit Summary   6/12/2018    Sven Givens    MRN: [id number]           Patient Information     Date Of Birth          1955        Visit Information        Provider Department      6/12/2018 10:00 AM Sami Hillman MD; [facility] ROOM [facility]        Today's Diagnoses     Gross hematuria    -  1       Follow-ups after your visit        Your next 10 appointments already scheduled     Jul 16, 2018  8:00 PM CDT   PSG Titration w/TCM with BK BED 2   [facility] ([facility] [place])    [address] [place] [facility] [address]  [facility] [address]   [phone]            Jul 30, 2018  3:20 PM CDT   Return Sleep Patient with CHERYLE Burns   [facility] ([facility] [place])    [address] [place] [facility] [address]  [facility] [id number] [phone]            Sep 18, 2018  2:30 PM CDT   CT CHEST W/O CONTRAST with MGCT1   [facility])    [id number] [place] [id number] [phone]           Please bring any scans or X-rays taken at other hospitals, if similar tests were done. Also bring a list of your medicines, including vitamins, minerals and over-the-counter drugs. It is safest to leave personal items at home.  Be sure to tell your doctor:   If you have any allergies.   If there s any chance you are pregnant.   If you are breastfeeding.  You do not need to do anything special to prepare for this exam.  Please wear loose clothing, such as a sweat suit or jogging clothes. Avoid snaps, zippers and other metal. We may ask you to undress and put on a hospital gown.            Sep 21, 2018  4:15 PM CDT   Return Visit with Lay Valencia MD   [facility])    [id number] [place] [address] [phone]              Who to contact     If you have questions or need follow  up information about today's clinic visit or your schedule please contact [facility] directly at [phone].  Normal or non-critical lab and imaging results will be communicated to you by MyChart, letter or phone within 4 business days after the clinic has received the results. If you do not hear from us within 7 days, please contact the clinic through [employer] or phone. If you have a critical or abnormal lab result, we will notify you by phone as soon as possible.  Submit refill requests through [email or web address] or call your pharmacy and they will forward the refill request to us. Please allow 3 business days for your refill to be completed.          Additional Information About Your Visit        [employer]unn[employer]oft Information     [email or web address] gives you secure access to your electronic health record. If you see a primary care provider, you can also send messages to your care team and make appointments. If you have questions, please call your primary care clinic.  If you do not have a primary care provider, please call [phone] and they will assist you.        Care EveryWhere ID     This is your Care EveryWhere ID. This could be used by other organizations to access your [place] medical records  [id number]        Your Vitals Were     Pulse Pulse Oximetry                117 97%           Blood Pressure from Last 3 Encounters:   06/12/18 138/87   06/01/18 134/82   05/29/18 127/81    Weight from Last 3 Encounters:   06/01/18 104.8 kg (231 lb)   04/16/18 102.5 kg (226 lb)   03/21/18 104.1 kg (229 lb 9 oz)              We Performed the Following     CYSTOURETHROSCOPY ([id number])        Primary Care Provider Office Phone # Fax #    Pamparisa Dela Cruz [phone]       [address]  [facility] [address]        Equal Access to Services     MONTRELL STERN : Yolanda Wilson, gaby beltran, tania wade, candace baldwin. So [facility] [phone].    ATENCIÓN: Si  curt sam, tiene a buchanan disposición servicios gratuitos de asistencia lingüística. Alexander vines [phone].    We comply with applicable federal civil rights laws and [place] laws. We do not discriminate on the basis of race, color, national origin, age, disability, sex, sexual orientation, or gender identity.            Thank you!     Thank you for choosing [facility]  for your care. Our goal is always to provide you with excellent care. Hearing back from our patients is one way we can continue to improve our services. Please take a few minutes to complete the written survey that you may receive in the mail after your visit with us. Thank you!             Your Updated Medication List - Protect others around you: Learn how to safely use, store and throw away your medicines at www.disposemymeds.org.          This list is accurate as of 6/12/18 10:12 AM.  Always use your most recent med list.                   Brand Name Dispense Instructions for use Diagnosis    AFRIN NASAL SPRAY NA      Spray in nostril as needed        allopurinol 300 MG tablet    ZYLOPRIM    90 tablet    Take 1 tablet (300 mg) by mouth daily    Chronic gout of multiple sites, unspecified cause       aspirin 81 MG tablet      1 TABLET DAILY(*)        atorvastatin 40 MG tablet    LIPITOR    90 tablet    Take 1 tablet (40 mg) by mouth daily    Hypertriglyceridemia, Type 2 diabetes mellitus with diabetic polyneuropathy, without long-term current use of insulin (H)       Azelaic Acid 15 % gel    FINACEA    50 g    Apply small amount twice a day to skin    Rosacea       blood glucose monitoring test strip    ONETOUCH ULTRA    100 strip    Use to test blood sugars 2 times daily or as directed.    Type 2 diabetes mellitus with diabetic polyneuropathy, without long-term current use of insulin (H)       capsaicin 0.025 % Crea cream    ZOSTRIX    60 g    Apply 1 g topically 3 times daily    Pain in both feet, Type 2 diabetes mellitus with  diabetic polyneuropathy, without long-term current use of insulin (H)       cinnamon 500 MG Tabs      Take one tablet twice daily.        diclofenac 1 % Gel topical gel    VOLTAREN    100 g    Apply 4 grams to knees or 2 grams to hands four times daily using enclosed dosing card.    Diabetic polyneuropathy associated with type 2 diabetes mellitus (H)       fish oil-omega-3 fatty acids 1000 MG capsule      daily        fluticasone 50 MCG/ACT spray    FLONASE    1 Bottle    Spray 1-2 sprays into both nostrils daily    URI with cough and congestion       glimepiride 4 MG tablet    AMARYL    180 tablet    Take 1 tablet (4 mg) by mouth 2 times daily    Type 2 diabetes mellitus without complication, unspecified long term insulin use status (H)       losartan-hydrochlorothiazide 100-25 MG per tablet    HYZAAR    90 tablet    Take 1 tablet by mouth daily    Essential hypertension with goal blood pressure less than 140/90       metFORMIN 1000 MG tablet    GLUCOPHAGE    180 tablet    Take 1 tablet (1,000 mg) by mouth 2 times daily (with meals)    Type 2 diabetes mellitus with hyperglycemia, without long-term current use of insulin (H)       MULTIPLE VITAMINS PO      1 a day        nitroGLYcerin 0.4 MG sublingual tablet    NITROSTAT    25 tablet    Place 1 tablet (0.4 mg) under the tongue every 5 minutes as needed for chest pain If you are still having symptoms after 3 doses (15 minutes) call 911.    Ischemic chest pain (H)       omeprazole 20 MG CR capsule    priLOSEC    180 capsule    Take 1 capsule (20 mg) by mouth 2 times daily Take 30-60 minutes before a meal.    Gastroesophageal reflux disease with esophagitis       order for DME     200 strip    One touch ultra  Test strips for checking blood sugars 2 times a day.    Type 2 diabetes mellitus with diabetic polyneuropathy, without long-term current use of insulin (H)       PROAIR  (90 Base) MCG/ACT Inhaler   Generic drug:  albuterol     8.5 Inhaler    INHALE 2-4  PUFFS EVERY 4 HOURS AS NEEDED FOR SHORTNESS OF BREATH/ DYSPNEA OR WHEEZING    Bronchospasm, Acute bronchitis, unspecified organism       terazosin 5 MG capsule    HYTRIN    90 capsule    TAKE 1 CAPSULE (5 MG) BY MOUTH AT BEDTIME. PATIENT NEEDS TO BE SEEN FOR FUTURE REFILLS.    Essential hypertension with goal blood pressure less than 140/90       zolpidem 5 MG tablet    AMBIEN    1 tablet    Take tablet by mouth 15 minutes prior to sleep, for Sleep Study    PRESLEY (obstructive sleep apnea)

## 2018-06-12 NOTE — PROGRESS NOTES
S: Sven Givens is a 63 year old male returns for hematuria.  Recent CT urogram neg.    Patient is draped and prepped.  Flexible cystoscopy placed under direct vision.      The anterior urethra is normal   The prostatic urethra post Urolift.              There is a metallic clip seen.     The length is 2cm,  the coaptation is 2 cm.     In the bladder there is a small stone seen a bladder neck.  No evidence of bladder cancer.    Assessment/Plan:  (R31.0) Gross hematuria  (primary encounter diagnosis)  Comment: prostatic bleeding/post urolift with metallic clip seen.  Plan: reassurance.  Ok to restart aspirin.  F/u if bleeding recurs.  Might need exposed clip removal.

## 2018-06-19 ENCOUNTER — TELEPHONE (OUTPATIENT)
Dept: SLEEP MEDICINE | Facility: CLINIC | Age: 63
End: 2018-06-19

## 2018-06-19 DIAGNOSIS — G47.33 OBSTRUCTIVE SLEEP APNEA: Primary | ICD-10-CM

## 2018-06-19 NOTE — TELEPHONE ENCOUNTER
For the sleep study scheduled on 07/16/18 for Pt:  Sven Givens  Male, 63 yrs, 1955  MRN:   [id number]    The PA has been denied. A P2P may be completed by calling   [phone]  [id number]  Sven Givens  1955    Thank you,  Latanya Mccormack  Financial Securing Representative-Sleep Medicine

## 2018-06-22 NOTE — TELEPHONE ENCOUNTER
I spoke with the patient on the phone.     Order for replacement CPAP placed. Auto-CPAP 7-10 cm/H2O.    Please call the patient for CPAP set up and arrange a follow up 4-6 weeks after the CPAP set up.     Al Ayala PA-C

## 2018-06-28 ENCOUNTER — TELEPHONE (OUTPATIENT)
Dept: SLEEP MEDICINE | Facility: CLINIC | Age: 63
End: 2018-06-28

## 2018-06-28 NOTE — TELEPHONE ENCOUNTER
Called patient to schedule replacement PAP setup in [facility]. Patient prefers afternoons. Appointment scheduled at  [facility] for 07/12/18 at 3:15 pm. Patient was given location information. Coordinator was notified about appointment.

## 2018-07-04 DIAGNOSIS — E11.65 TYPE 2 DIABETES MELLITUS WITH HYPERGLYCEMIA, WITHOUT LONG-TERM CURRENT USE OF INSULIN (H): ICD-10-CM

## 2018-07-04 DIAGNOSIS — I10 ESSENTIAL HYPERTENSION WITH GOAL BLOOD PRESSURE LESS THAN 140/90: ICD-10-CM

## 2018-07-04 NOTE — TELEPHONE ENCOUNTER
Requested Prescriptions   Pending Prescriptions Disp Refills     metFORMIN (GLUCOPHAGE) 1000 MG tablet [Pharmacy Med Name: METFORMIN HCL 1,000 MG TABLET]    Last Written Prescription Date:  4/16/18  Last Fill Quantity: 180,  # refills: 3   Last Office Visit with NOBLE, [facility] or [facility] prescribing provider:  4/16/18   Future Office Visit:    Next 5 appointments (look out 90 days)     Sep 21, 2018  4:15 PM CDT   Return Visit with Lay Valencia MD   [facility] ([facility])    [address] [place] [id number] [phone]                  180 tablet 0     Sig: TAKE 1 TABLET (1,000 MG) BY MOUTH 2 TIMES DAILY (WITH MEALS). OVERDUE TO BE SEEN IN FEBRUARY.    Biguanide Agents Passed    7/4/2018  1:53 AM       Passed - Blood pressure less than 140/90 in past 6 months    BP Readings from Last 3 Encounters:   06/12/18 138/87   06/01/18 134/82   05/29/18 127/81                Passed - Patient has documented LDL within the past 12 mos.    Recent Labs   Lab Test  04/16/18   0834   LDL  51            Passed - Patient has had a Microalbumin in the past 12 mos.    Recent Labs   Lab Test  04/16/18   0841   MICROL  37   UMALCR  22.72*            Passed - Patient is age 10 or older       Passed - Patient has documented A1c within the specified period of time.    If HgbA1C is 8 or greater, it needs to be on file within the past 3 months.  If less than 8, must be on file within the past 6 months.     Recent Labs   Lab Test  04/16/18   0834   A1C  8.5*            Passed - Patient's CR is NOT>1.4 OR Patient's EGFR is NOT<45 within past 12 mos.    Recent Labs   Lab Test  05/30/18   1445   GFRESTIMATED  85   GFRESTBLACK  >90       Recent Labs   Lab Test  04/16/18   0834   CR  0.70            Passed - Patient does NOT have a diagnosis of CHF.       Passed - Recent (6 mo) or future (30 days) visit within the authorizing provider's specialty    Patient had office visit in the last 6 months or  "has a visit in the next 30 days with authorizing provider or within the authorizing provider's specialty.  See \"Patient Info\" tab in inbasket, or \"Choose Columns\" in Meds & Orders section of the refill encounter.            terazosin (HYTRIN) 5 MG capsule [Pharmacy Med Name: TERAZOSIN 5 MG CAPSULE] 90 capsule 0     Sig: TAKE 1 CAPSULE (5 MG) BY MOUTH AT BEDTIME. PATIENT NEEDS TO BE SEEN FOR FUTURE REFILLS.    Alpha Blockers Passed    7/4/2018  1:53 AM       Passed - Blood pressure under 140/90 in past 12 months    BP Readings from Last 3 Encounters:   06/12/18 138/87   06/01/18 134/82   05/29/18 127/81                Passed - Recent (12 mo) or future (30 days) visit within the authorizing provider's specialty    Patient had office visit in the last 12 months or has a visit in the next 30 days with authorizing provider or within the authorizing provider's specialty.  See \"Patient Info\" tab in inbasket, or \"Choose Columns\" in Meds & Orders section of the refill encounter.           Passed - Patient does not have Tadalafil, Vardenafil, or Sildenafil on their medication list       Passed - Patient is 18 years of age or older              Manuel Faarax  [facility]  "

## 2018-07-05 RX ORDER — TERAZOSIN 5 MG/1
5 CAPSULE ORAL AT BEDTIME
Qty: 90 CAPSULE | Refills: 1 | Status: SHIPPED | OUTPATIENT
Start: 2018-07-05 | End: 2018-07-31

## 2018-07-05 NOTE — TELEPHONE ENCOUNTER
Terazosin request approved per [facility] refill protocol.    Request for Metformin is too soon. Rx was last given on 4/16/18, #180 with 3 refills and sent to same pharmacy. Should have available till April 2019. Confirmation of recipit was received.     Disp Refills Start End MIGUEL   metFORMIN (GLUCOPHAGE) 1000 MG tablet 180 tablet 3 4/16/2018  No   Sig - Route: Take 1 tablet (1,000 mg) by mouth 2 times daily (with meals) - Oral   Class: E-Prescribe   Order: [id number]   E-Prescribing Status: Receipt confirmed by pharmacy (4/16/2018 10:20 AM CDT)   Pharmacy   [id number] - [place], [place] - [address]  [place] Triage

## 2018-07-12 ENCOUNTER — DOCUMENTATION ONLY (OUTPATIENT)
Dept: SLEEP MEDICINE | Facility: CLINIC | Age: 63
End: 2018-07-12
Payer: COMMERCIAL

## 2018-07-12 NOTE — PROGRESS NOTES
Patient was offered choice of vendor and chose [facility].  Sven Givens was set up at [place] on July 12, 2018 Patient received a Resmed AirSense 10 Auto. Pressures were set at 7-10 cm H2O.   Patient s ramp is 5 cm H2O for Auto and FLEX/EPR is EPR, 2.  Patient received a [employer] Mask name: Eson 2 Nasal mask Size Medium, heated tubing and heated humidifier.  Patient is enrolled in the [facility] and does need to meet compliance (prior authorization was required). Patient has a follow up on 07/30/18 with CHERYLE Mcdaniels.  Follow up is scheduled too soon, will need to be rescheduled for 30 days from today so there is enough data for CHERYLE Mcdaniels to review.  Emailed Yue to call patient and reschedule the appointment for a later date.  Patient mentioned that he used a Full Face mask in the past, but likes the Nasal Cushion masks he tried today better.  Suzette Watkins

## 2018-07-16 ENCOUNTER — DOCUMENTATION ONLY (OUTPATIENT)
Dept: SLEEP MEDICINE | Facility: CLINIC | Age: 63
End: 2018-07-16

## 2018-07-16 NOTE — PROGRESS NOTES
3 DAY STM VISIT    Patient contacted for 3 day STM visit  Subjective measures:  Pt states things are going well and has no issues or complaints.  Pt is benefiting from therapy.     Device type: Auto-CPAP  PAP settings from order::  CPAP min 7 cm  H20       CPAP max 10 cm  H20    Mask type:    Nasal Mask     Device settings from machine      Min CPAP 7.0            Max CPAP 10.0      Assessment: Nightly usage over four hours.  Action plan: Pt to have f/u 14 day STM visit.  Patient has a follow up visit scheduled:   yes within 31-90 days of set up.

## 2018-07-27 ENCOUNTER — DOCUMENTATION ONLY (OUTPATIENT)
Dept: SLEEP MEDICINE | Facility: CLINIC | Age: 63
End: 2018-07-27

## 2018-07-27 NOTE — PROGRESS NOTES
14 DAY STM VISIT    Subjective measures:   Pt states things are going well and has no issues or complaints.  Pt is benefiting from therapy.    Assessment: Pt meeting objective benchmarks.  Patient meeting subjective benchmarks.   Action plan: pt to have 30 day STM visit.    Device type: Auto-CPAP  PAP settings: CPAP min 7.0 cm  H20     CPAP max 10.0 cm  H20    95th% pressure 9.9 cm   Mask type:  Nasal Mask  Objective measures: 14 day rolling measures      Compliance  100 %      Leak  36.72 lpm  last  upload      AHI 6.95   last  upload      Average number of minutes 448     Average hours of usage 7.5          Objective measure goal  Compliance   Goal >70%  Leak   Goal < 24 lpm  AHI  Goal < 5  Usage  Goal >240

## 2018-07-31 ENCOUNTER — OFFICE VISIT (OUTPATIENT)
Dept: UROLOGY | Facility: CLINIC | Age: 63
End: 2018-07-31
Payer: COMMERCIAL

## 2018-07-31 VITALS — SYSTOLIC BLOOD PRESSURE: 132 MMHG | HEART RATE: 77 BPM | DIASTOLIC BLOOD PRESSURE: 86 MMHG | OXYGEN SATURATION: 98 %

## 2018-07-31 DIAGNOSIS — T19.1XXD FOREIGN BODY IN BLADDER AND URETHRA, SUBSEQUENT ENCOUNTER: Primary | ICD-10-CM

## 2018-07-31 DIAGNOSIS — T19.0XXD FOREIGN BODY IN BLADDER AND URETHRA, SUBSEQUENT ENCOUNTER: Primary | ICD-10-CM

## 2018-07-31 PROCEDURE — 52310 CYSTOSCOPY AND TREATMENT: CPT | Performed by: UROLOGY

## 2018-07-31 NOTE — MR AVS SNAPSHOT
After Visit Summary   7/31/2018    Sven Givens    MRN: [id number]           Patient Information     Date Of Birth          1955        Visit Information        Provider Department      7/31/2018 2:15 PM Sami Hillman MD; [facility]        Today's Diagnoses     Foreign body in bladder and urethra, subsequent encounter    -  1       Follow-ups after your visit        Your next 10 appointments already scheduled     Sep 14, 2018  2:00 PM CDT   Return Sleep Patient with CHERYLE Burns   [facility] ([facility])    [address] [address] [id number] [phone]            Sep 18, 2018  2:30 PM CDT   CT CHEST W/O CONTRAST with MGCT1   [facility])    [address] [address] [place] [address] [phone]           Please bring any scans or X-rays taken at other hospitals, if similar tests were done. Also bring a list of your medicines, including vitamins, minerals and over-the-counter drugs. It is safest to leave personal items at home.  Be sure to tell your doctor:   If you have any allergies.   If there s any chance you are pregnant.   If you are breastfeeding.  You do not need to do anything special to prepare for this exam.  Please wear loose clothing, such as a sweat suit or jogging clothes. Avoid snaps, zippers and other metal. We may ask you to undress and put on a hospital gown.            Sep 21, 2018  4:15 PM CDT   Return Visit with Lay Valencia MD   [facility] ([facility])    [address] [place] [address] [phone]              Who to contact     If you have questions or need follow up information about today's clinic visit or your schedule please contact [facility] directly at [phone].  Normal or non-critical lab and imaging results will be  communicated to you by Re[employer], letter or phone within 4 business days after the clinic has received the results. If you do not hear from us within 7 days, please contact the clinic through [employer] or phone. If you have a critical or abnormal lab result, we will notify you by phone as soon as possible.  Submit refill requests through [employer] or call your pharmacy and they will forward the refill request to us. Please allow 3 business days for your refill to be completed.          Additional Information About Your Visit        [employer] Information     [employer] gives you secure access to your electronic health record. If you see a primary care provider, you can also send messages to your care team and make appointments. If you have questions, please call your primary care clinic.  If you do not have a primary care provider, please call [phone] and they will assist you.        Care EveryWhere ID     This is your Care EveryWhere ID. This could be used by other organizations to access your [facility] medical records  [id number]        Your Vitals Were     Pulse Pulse Oximetry                77 98%           Blood Pressure from Last 3 Encounters:   07/31/18 132/86   06/12/18 138/87   06/01/18 134/82    Weight from Last 3 Encounters:   06/01/18 104.8 kg (231 lb)   04/16/18 102.5 kg (226 lb)   03/21/18 104.1 kg (229 lb 9 oz)              We Performed the Following     CYSTOSCOPY W FOREIGN BODY REMOVAL, SIMPLE ([address]289)          Today's Medication Changes          These changes are accurate as of 7/31/18  2:23 PM.  If you have any questions, ask your nurse or doctor.               Stop taking these medicines if you haven't already. Please contact your care team if you have questions.     terazosin 5 MG capsule   Commonly known as:  HYTRIN   Stopped by:  Sami Hillman MD                    Primary Care Provider Office Phone # Fax #    Pam Yesenia Dela Cruz [phone]       [address]  [place] [address]        Equal Access to Services     [facility]: Hadii sergey cao aarondestinee Auraali, waaxda luqadaha, qaybta kaalmada sheri, candace baldwin. So [facility] [phone].    ATENCIÓN: Si habla español, tiene a buchanan disposición servicios gratuitos de asistencia lingüística. Alexander al [phone].    We comply with applicable federal civil rights laws and [place] laws. We do not discriminate on the basis of race, color, national origin, age, disability, sex, sexual orientation, or gender identity.            Thank you!     Thank you for choosing [facility]  for your care. Our goal is always to provide you with excellent care. Hearing back from our patients is one way we can continue to improve our services. Please take a few minutes to complete the written survey that you may receive in the mail after your visit with us. Thank you!             Your Updated Medication List - Protect others around you: Learn how to safely use, store and throw away your medicines at www.disposemymeds.org.          This list is accurate as of 7/31/18  2:23 PM.  Always use your most recent med list.                   Brand Name Dispense Instructions for use Diagnosis    AFRIN NASAL SPRAY NA      Spray in nostril as needed        allopurinol 300 MG tablet    ZYLOPRIM    90 tablet    Take 1 tablet (300 mg) by mouth daily    Chronic gout of multiple sites, unspecified cause       aspirin 81 MG tablet      1 TABLET DAILY(*)        atorvastatin 40 MG tablet    LIPITOR    90 tablet    Take 1 tablet (40 mg) by mouth daily    Hypertriglyceridemia, Type 2 diabetes mellitus with diabetic polyneuropathy, without long-term current use of insulin (H)       Azelaic Acid 15 % gel    FINACEA    50 g    Apply small amount twice a day to skin    Rosacea       blood glucose monitoring test strip    ONETOUCH ULTRA    100 strip    Use to test blood sugars 2 times daily or as directed.    Type 2 diabetes mellitus  with diabetic polyneuropathy, without long-term current use of insulin (H)       capsaicin 0.025 % Crea cream    ZOSTRIX    60 g    Apply 1 g topically 3 times daily    Pain in both feet, Type 2 diabetes mellitus with diabetic polyneuropathy, without long-term current use of insulin (H)       cinnamon 500 MG Tabs      Take one tablet twice daily.        diclofenac 1 % Gel topical gel    VOLTAREN    100 g    Apply 4 grams to knees or 2 grams to hands four times daily using enclosed dosing card.    Diabetic polyneuropathy associated with type 2 diabetes mellitus (H)       fish oil-omega-3 fatty acids 1000 MG capsule      daily        fluticasone 50 MCG/ACT spray    FLONASE    1 Bottle    Spray 1-2 sprays into both nostrils daily    URI with cough and congestion       glimepiride 4 MG tablet    AMARYL    180 tablet    Take 1 tablet (4 mg) by mouth 2 times daily    Type 2 diabetes mellitus without complication, unspecified long term insulin use status (H)       losartan-hydrochlorothiazide 100-25 MG per tablet    HYZAAR    90 tablet    Take 1 tablet by mouth daily    Essential hypertension with goal blood pressure less than 140/90       metFORMIN 1000 MG tablet    GLUCOPHAGE    180 tablet    Take 1 tablet (1,000 mg) by mouth 2 times daily (with meals)    Type 2 diabetes mellitus with hyperglycemia, without long-term current use of insulin (H)       MULTIPLE VITAMINS PO      1 a day        nitroGLYcerin 0.4 MG sublingual tablet    NITROSTAT    25 tablet    Place 1 tablet (0.4 mg) under the tongue every 5 minutes as needed for chest pain If you are still having symptoms after 3 doses (15 minutes) call 911.    Ischemic chest pain (H)       omeprazole 20 MG CR capsule    priLOSEC    180 capsule    Take 1 capsule (20 mg) by mouth 2 times daily Take 30-60 minutes before a meal.    Gastroesophageal reflux disease with esophagitis       order for DME     200 strip    One touch ultra  Test strips for checking blood sugars 2  times a day.    Type 2 diabetes mellitus with diabetic polyneuropathy, without long-term current use of insulin (H)       PROAIR  (90 Base) MCG/ACT Inhaler   Generic drug:  albuterol     8.5 Inhaler    INHALE 2-4 PUFFS EVERY 4 HOURS AS NEEDED FOR SHORTNESS OF BREATH/ DYSPNEA OR WHEEZING    Bronchospasm, Acute bronchitis, unspecified organism       zolpidem 5 MG tablet    AMBIEN    1 tablet    Take tablet by mouth 15 minutes prior to sleep, for Sleep Study    PRESLEY (obstructive sleep apnea)

## 2018-07-31 NOTE — PROGRESS NOTES
S: Sven Givens is a 63 year old male returns for  Removal of urolifts clips    Patient is draped and prepped.  Flexible cystoscopy placed under direct vision.      The anterior urethra is normal   The prostatic urethra showed small exposed clips with calcification in it.  This was removed with a grasper without any difficulty (left side).  Inside the bladder at bladder neck region on right side, small clip seen exposed.  I tried to remove it but couldn't.        Assessment/Plan:  (T19.1XXD,  T19.0XXD) Foreign body in bladder and urethra, subsequent encounter  (primary encounter diagnosis)  Comment:    Plan:  Observation            If still problems, need to go to OR for removal.

## 2018-07-31 NOTE — PROGRESS NOTES
The following medication was given:     MEDICATION:  Ciprofloxin  ROUTE: PO  SITE: mouth  DOSE: 500  LOT #: [id number]  : [employer]  EXPIRATION DATE: 12/18  NDC#: 43945-510-59   Was there drug waste? No  Due to,medication administration, patient instructed to remain in clinic for 15 minutes  afterwards, and to report any adverse reaction to me immediately.

## 2018-08-06 ENCOUNTER — TELEPHONE (OUTPATIENT)
Dept: SLEEP MEDICINE | Facility: CLINIC | Age: 63
End: 2018-08-06

## 2018-08-06 NOTE — TELEPHONE ENCOUNTER
Per email from DME Coordinator, Suzette TOBAR, patient will need to sign new delivery ticket for his CPAP and supplies. Due to insurance guidelines, patient's machine must be a rent to own. Spoke to patient about about his insurance guidelines. Patient will stop in to  [facility] to sign new delivery ticket for machine order. Per Suzette TOBAR, she left delivery tickets at the MA desk at [facility]. Patient stated he would like to stop in today, 8/6/18 to sign the delivery tickets. Patient understands that  Sleep center is on second floor and only open until 4 pm. Notified Suzette about patient stopping in today.

## 2018-08-13 ENCOUNTER — TELEPHONE (OUTPATIENT)
Dept: UROLOGY | Facility: CLINIC | Age: 63
End: 2018-08-13

## 2018-08-13 ENCOUNTER — DOCUMENTATION ONLY (OUTPATIENT)
Dept: SLEEP MEDICINE | Facility: CLINIC | Age: 63
End: 2018-08-13

## 2018-08-13 NOTE — TELEPHONE ENCOUNTER
Called and spoke to patient.   Patient states that he is having discomfort when he sits down.   Feels like a needle is poking him.    Patient denies hematuria.  Patient states that his urination has worsened, with slower stream.   Huddled with Dr. Hillman give a couple weeks to heal.   If no improvement will follow up.   Cassy Poole RN

## 2018-08-13 NOTE — PROGRESS NOTES
Patient returned call.     Subjective measures:  Patient meeting subjective benchmarks.     Action plan:pt to have 6 month STM visit

## 2018-08-13 NOTE — PROGRESS NOTES
30 DAY [facility] VISIT    Diagnostic AHI: 37.7 PSG from 2007    Message left for patient to return call     Assessment: Pt not meeting objective benchmarks for AHI and leak    Action plan: waiting for patient to return call.   Patient has a follow up visit with CHERYLE Mcdaniels on 9/14/2018.   Device type: Auto-CPAP  PAP settings: CPAP min 7.0 cm  H20     CPAP max 10.0 cm  H20    95th% pressure 9.6 cm  H20   Mask type:  Nasal Mask  Objective measures: 14 day rolling measures      Compliance  78 %      Leak  29.82 lpm  last  upload      AHI 5.84   last  Upload-declining       Average number of minutes 309      Objective measure goal  Compliance   Goal >70%  Leak   Goal < 24 lpm  AHI  Goal < 5  Usage  Goal >240

## 2018-08-18 ENCOUNTER — MYC MEDICAL ADVICE (OUTPATIENT)
Dept: UROLOGY | Facility: CLINIC | Age: 63
End: 2018-08-18

## 2018-08-18 DIAGNOSIS — N40.1 BENIGN NON-NODULAR PROSTATIC HYPERPLASIA WITH LOWER URINARY TRACT SYMPTOMS: Primary | ICD-10-CM

## 2018-08-21 ENCOUNTER — TELEPHONE (OUTPATIENT)
Dept: UROLOGY | Facility: CLINIC | Age: 63
End: 2018-08-21

## 2018-08-21 NOTE — TELEPHONE ENCOUNTER
Reason for Call:  Other call back    Detailed comments: Called patient to schedule xps laser turp. Patient would like to know what is exactly being done with this procedure. Please call patient to discuss.    Phone Number Patient can be reached at: Home number on file [phone] (home)    Best Time: any    Can we leave a detailed message on this number? YES    Call taken on 8/21/2018 at 2:40 PM by Irma Becerra

## 2018-08-21 NOTE — TELEPHONE ENCOUNTER
Symptoms of BPH are worsened.  Flow is weaker.  He has more irritative voiding symptoms.  He wants to proceed with more definite treatment.  Laser surgery offered.  Info given.  Will schedule for elective surgery.

## 2018-09-14 ENCOUNTER — OFFICE VISIT (OUTPATIENT)
Dept: SLEEP MEDICINE | Facility: CLINIC | Age: 63
End: 2018-09-14
Payer: COMMERCIAL

## 2018-09-14 VITALS
HEART RATE: 107 BPM | OXYGEN SATURATION: 98 % | BODY MASS INDEX: 33.27 KG/M2 | SYSTOLIC BLOOD PRESSURE: 104 MMHG | DIASTOLIC BLOOD PRESSURE: 70 MMHG | WEIGHT: 224.6 LBS | HEIGHT: 69 IN

## 2018-09-14 DIAGNOSIS — G47.33 OSA (OBSTRUCTIVE SLEEP APNEA): Primary | ICD-10-CM

## 2018-09-14 PROCEDURE — 99213 OFFICE O/P EST LOW 20 MIN: CPT | Performed by: PHYSICIAN ASSISTANT

## 2018-09-14 NOTE — PATIENT INSTRUCTIONS

## 2018-09-14 NOTE — MR AVS SNAPSHOT
After Visit Summary   9/14/2018    Sven Givens    MRN: [id number]           Patient Information     Date Of Birth          1955        Visit Information        Provider Department      9/14/2018 2:00 PM Al Ayala PA [facility]        Today's Diagnoses     PRESLEY (obstructive sleep apnea)    -  1      Care Instructions      Your BMI is Body mass index is 33.17 kg/(m^2).  Weight management is a personal decision.  If you are interested in exploring weight loss strategies, the following discussion covers the approaches that may be successful. Body mass index (BMI) is one way to tell whether you are at a healthy weight, overweight, or obese. It measures your weight in relation to your height.  A BMI of 18.5 to 24.9 is in the healthy range. A person with a BMI of 25 to 29.9 is considered overweight, and someone with a BMI of 30 or greater is considered obese. More than two-thirds of American adults are considered overweight or obese.  Being overweight or obese increases the risk for further weight gain. Excess weight may lead to heart disease and diabetes.  Creating and following plans for healthy eating and physical activity may help you improve your health.  Weight control is part of healthy lifestyle and includes exercise, emotional health, and healthy eating habits. Careful eating habits lifelong are the mainstay of weight control. Though there are significant health benefits from weight loss, long-term weight loss with diet alone may be very difficult to achieve- studies show long-term success with dietary management in less than 10% of people. Attaining a healthy weight may be especially difficult to achieve in those with severe obesity. In some cases, medications, devices and surgical management might be considered.  What can you do?  If you are overweight or obese and are interested in methods for weight loss, you should discuss this with your provider.     Consider  reducing daily calorie intake by 500 calories.     Keep a food journal.     Avoiding skipping meals, consider cutting portions instead.    Diet combined with exercise helps maintain muscle while optimizing fat loss. Strength training is particularly important for building and maintaining muscle mass. Exercise helps reduce stress, increase energy, and improves fitness. Increasing exercise without diet control, however, may not burn enough calories to loose weight.       Start walking three days a week 10-20 minutes at a time    Work towards walking thirty minutes five days a week     Eventually, increase the speed of your walking for 1-2 minutes at time    In addition, we recommend that you review healthy lifestyles and methods for weight loss available through the National Institutes of Health patient information sites:  http://win.niddk.nih.gov/publications/index.htm    And look into health and wellness programs that may be available through your health insurance provider, employer, local community center, or cedric club.    Weight management plan: Patient was referred to their PCP to discuss a diet and exercise plan.              Follow-ups after your visit        Follow-up notes from your care team     Return in about 4 weeks (around 10/12/2018).      Your next 10 appointments already scheduled     Sep 18, 2018  2:30 PM CDT   CT CHEST W/O CONTRAST with MGCT1   [facility] ([facility])    [id number] [place] [address] [phone]           How do I prepare for my exam? (Food and drink instructions) No Food and Drink Restrictions.  How do I prepare for my exam? (Other instructions) You do not need to do anything special to prepare for this exam. For a sinus scan: Use your nose spray (nasal decongestant spray) as directed.  What should I wear: Please wear loose clothing, such as a sweat suit or jogging clothes. Avoid snaps, zippers and other metal. We may ask you  to undress and put on a hospital gown.  How long does the exam take: Most scans take less than 20 minutes.  What should I bring: Please bring any scans or X-rays taken at other hospitals, if similar tests were done. Also bring a list of your medicines, including vitamins, minerals and over-the-counter drugs. It is safest to leave personal items at home.  Do I need a : No  is needed.  What do I need to tell my doctor? Be sure to tell your doctor: * If you have any allergies. * If there s any chance you are pregnant. * If you are breastfeeding.  What should I do after the exam: No restrictions, You may resume normal activities.  What is this test: A CT (computed tomography) scan is a series of pictures that allows us to look inside your body. The scanner creates images of the body in cross sections, much like slices of bread. This helps us see any problems more clearly.  Who should I call with questions: If you have any questions, please call the Imaging Department where you will have your exam. Directions, parking instructions, and other information is available on our website, [email or web address].            Sep 21, 2018  4:15 PM CDT   Return Visit with Lay Valencia MD   [facility]    [address] [place] [address] [phone]            Oct 11, 2018  1:30 PM CDT   Return Sleep Patient with CHERYLE Burns   [facility] ([facility])    [address] [address] [address] [phone]              Who to contact     If you have questions or need follow up information about today's clinic visit or your schedule please contact [facility] directly at [phone].  Normal or non-critical lab and imaging results will be communicated to you by MyChart, letter or phone within 4 business days after the clinic has received the results. If you do not hear  "from us within 7 days, please contact the clinic through [employer] or phone. If you have a critical or abnormal lab result, we will notify you by phone as soon as possible.  Submit refill requests through [employer] or call your pharmacy and they will forward the refill request to us. Please allow 3 business days for your refill to be completed.          Additional Information About Your Visit        [employer] Information     [employer] gives you secure access to your electronic health record. If you see a primary care provider, you can also send messages to your care team and make appointments. If you have questions, please call your primary care clinic.  If you do not have a primary care provider, please call [phone] and they will assist you.        Care EveryWhere ID     This is your Care EveryWhere ID. This could be used by other organizations to access your [place] medical records  [id number]        Your Vitals Were     Pulse Height Pulse Oximetry BMI (Body Mass Index)          107 1.753 m (5' 9\") 98% 33.17 kg/m2         Blood Pressure from Last 3 Encounters:   09/14/18 104/70   07/31/18 132/86   06/12/18 138/87    Weight from Last 3 Encounters:   09/14/18 101.9 kg (224 lb 9.6 oz)   06/01/18 104.8 kg (231 lb)   04/16/18 102.5 kg (226 lb)              Today, you had the following     No orders found for display       Primary Care Provider Office Phone # Fax #    Pam Yesenia Dela Cruz [phone]       [address]  [facility] [address]        Equal Access to Services     [facility] : Hadii aad ku hadasho Soomaali, waaxda luqadaha, qaybta kaalmada adeegyada, candace jaquez . So [facility] [phone].    ATENCIÓN: Si habla español, tiene a buchanan disposición servicios gratuitos de asistencia lingüística. Llame al [phone].    We comply with applicable federal civil rights laws and [place] laws. We do not discriminate on the basis of race, color, national origin, age, " disability, sex, sexual orientation, or gender identity.            Thank you!     Thank you for choosing [facility]  for your care. Our goal is always to provide you with excellent care. Hearing back from our patients is one way we can continue to improve our services. Please take a few minutes to complete the written survey that you may receive in the mail after your visit with us. Thank you!             Your Updated Medication List - Protect others around you: Learn how to safely use, store and throw away your medicines at www.disposemymeds.org.          This list is accurate as of 9/14/18  2:26 PM.  Always use your most recent med list.                   Brand Name Dispense Instructions for use Diagnosis    AFRIN NASAL SPRAY NA      Spray in nostril as needed        allopurinol 300 MG tablet    ZYLOPRIM    90 tablet    Take 1 tablet (300 mg) by mouth daily    Chronic gout of multiple sites, unspecified cause       aspirin 81 MG tablet      1 TABLET DAILY(*)        atorvastatin 40 MG tablet    LIPITOR    90 tablet    Take 1 tablet (40 mg) by mouth daily    Hypertriglyceridemia, Type 2 diabetes mellitus with diabetic polyneuropathy, without long-term current use of insulin (H)       Azelaic Acid 15 % gel    FINACEA    50 g    Apply small amount twice a day to skin    Rosacea       blood glucose monitoring test strip    ONETOUCH ULTRA    100 strip    Use to test blood sugars 2 times daily or as directed.    Type 2 diabetes mellitus with diabetic polyneuropathy, without long-term current use of insulin (H)       capsaicin 0.025 % Crea cream    ZOSTRIX    60 g    Apply 1 g topically 3 times daily    Pain in both feet, Type 2 diabetes mellitus with diabetic polyneuropathy, without long-term current use of insulin (H)       cinnamon 500 MG Tabs      Take one tablet twice daily.        diclofenac 1 % Gel topical gel    VOLTAREN    100 g    Apply 4 grams to knees or 2 grams to hands four times daily using  enclosed dosing card.    Diabetic polyneuropathy associated with type 2 diabetes mellitus (H)       fish oil-omega-3 fatty acids 1000 MG capsule      daily        fluticasone 50 MCG/ACT spray    FLONASE    1 Bottle    Spray 1-2 sprays into both nostrils daily    URI with cough and congestion       glimepiride 4 MG tablet    AMARYL    180 tablet    Take 1 tablet (4 mg) by mouth 2 times daily    Type 2 diabetes mellitus without complication, unspecified long term insulin use status (H)       losartan-hydrochlorothiazide 100-25 MG per tablet    HYZAAR    90 tablet    Take 1 tablet by mouth daily    Essential hypertension with goal blood pressure less than 140/90       metFORMIN 1000 MG tablet    GLUCOPHAGE    180 tablet    Take 1 tablet (1,000 mg) by mouth 2 times daily (with meals)    Type 2 diabetes mellitus with hyperglycemia, without long-term current use of insulin (H)       MULTIPLE VITAMINS PO      1 a day        nitroGLYcerin 0.4 MG sublingual tablet    NITROSTAT    25 tablet    Place 1 tablet (0.4 mg) under the tongue every 5 minutes as needed for chest pain If you are still having symptoms after 3 doses (15 minutes) call 911.    Ischemic chest pain (H)       omeprazole 20 MG CR capsule    priLOSEC    180 capsule    Take 1 capsule (20 mg) by mouth 2 times daily Take 30-60 minutes before a meal.    Gastroesophageal reflux disease with esophagitis       order for DME     200 strip    One touch ultra  Test strips for checking blood sugars 2 times a day.    Type 2 diabetes mellitus with diabetic polyneuropathy, without long-term current use of insulin (H)       PROAIR  (90 Base) MCG/ACT inhaler   Generic drug:  albuterol     8.5 Inhaler    INHALE 2-4 PUFFS EVERY 4 HOURS AS NEEDED FOR SHORTNESS OF BREATH/ DYSPNEA OR WHEEZING    Bronchospasm, Acute bronchitis, unspecified organism

## 2018-09-14 NOTE — NURSING NOTE
"Chief Complaint   Patient presents with     CPAP Follow Up       Initial There were no vitals taken for this visit. Estimated body mass index is 34.11 kg/(m^2) as calculated from the following:    Height as of 6/1/18: 1.753 m (5' 9\").    Weight as of 6/1/18: 104.8 kg (231 lb).    Medication Reconciliation: complete        "

## 2018-09-14 NOTE — PROGRESS NOTES
Obstructive Sleep Apnea - PAP Follow-Up Visit:    Chief Complaint   Patient presents with     CPAP Follow Up       Sven Givens comes in today for follow-up of their severe sleep apnea, managed with CPAP.     Sven Givens is a 63 year old male who was initially diagnosed with severe obstructive sleep apnea by a sleep study on 11/29/2007(200#)-AHI 37.7, RDI 50, lowest oxygen saturation 80%. CPAP of 7 cm/H9O was effective. He was prescribed CPAP, but stopped using it in ~2012 due to the loudness of the machine contributing to difficulty falling asleep.    July 12, 2018 Patient received a Resmed AirSense 10 Auto. Pressures were set at 7-10 cm H2O.    Overall, he rates the experience with PAP as 8 (0 poor, 10 great). The mask is comfortable.    The mask is not leaking .  He is not snoring with the mask on. He is having gasp arousals.  He is not having significant oral/nasal dryness. The pressure is comfortable.     His PAP interface is Nasal Mask.    Bedtime is typically 2000. Usually it takes about 15 min minutes to fall asleep with the mask on. Wake time is typically 0500.  Patient is using PAP therapy 4.5 hours per night. The patient is usually getting 7 hours of sleep per night.    Total score - Tamms: 0 (9/14/2018  1:00 PM)      ISABEL Total Score: 12    ResMed   Auto-PAP 7.0 - 10.0 cmH2O 30 day usage data:    63% of days with > 4 hours of use. 9/30 days with no use.   Average use 226 minutes per day.   95%ile Leak 32.02 L/min.   CPAP 95% pressure 9.4 cm.   AHI 4.47 events per hour.         Past medical/surgical history, family history, social history, medications and allergies were reviewed.      Problem List:  Patient Active Problem List    Diagnosis Date Noted     PRESLEY (obstructive sleep apnea)      Priority: Medium     11/29/2007(200#)-AHI 37.7, RDI 50, lowest oxygen saturation 80%. CPAP of 7 cm/H9O was effective.       Non-small cell carcinoma of lung, left (H) 10/17/2016     Priority: Medium     Stage 1A  "(pT1aN0) 0.9 x 0.7 x 0.5 cm grade 1 well differentiated adenocarcinoma of the left lower lobe of the lung with invasive component being 0.3cm, s/p wedge resection and mediastinal LN sampling on 9/23/16.       Nonalcoholic hepatosteatosis 10/17/2016     Priority: Medium     History of radiation exposure 09/30/2016     Priority: Medium     Hypertriglyceridemia 12/18/2015     Priority: Medium     Type 2 diabetes mellitus with diabetic polyneuropathy (H) 10/15/2015     Priority: Medium     Obesity 10/15/2015     Priority: Medium     Hyperlipidemia with target LDL less than 100 12/20/2013     Priority: Medium     Diagnosis updated by automated process. Provider to review and confirm.       Gout 12/20/2013     Priority: Medium     Diagnosed by joint tap August 2013 per patient report.        Anemia 07/03/2013     Priority: Medium     Persistent mild anemia. Needs evaluation. May be thalassemia carrier.       GERD (gastroesophageal reflux disease) 05/15/2012     Priority: Medium     Diabetic neuropathy (H) 05/07/2012     Priority: Medium     Advanced directives, counseling/discussion 06/16/2011     Priority: Medium     Advance Directive Problem List Overview:   Name Relationship Phone    Primary Health Care Agent            Alternative Health Care Agent          Discussed advance care planning with patient; information given to patient to review.  Bindu Smith   6/16/2011          Hypertension goal BP (blood pressure) < 140/90 06/16/2011     Priority: Medium     High triglycerides 02/21/2011     Priority: Medium     Tubular adenoma of colon 12/01/2009     Priority: Medium     follow up colonoscopy 5 years requested 5-2017 due.          /70  Pulse 107  Ht 1.753 m (5' 9\")  Wt 101.9 kg (224 lb 9.6 oz)  SpO2 98%  BMI 33.17 kg/m2    Impression/Plan:    Severe Sleep apnea-  Tolerating PAP well. Daytime symptoms are improved.   Increase CPAP use.     Sven Givens will follow up in about 3-4 week(s). "     Fifteen minutes spent with patient, all of which were spent face-to-face counseling, consulting, coordinating plan of care regarding PRESLEY.      Al Ayala PA-C

## 2018-09-18 ENCOUNTER — RADIANT APPOINTMENT (OUTPATIENT)
Dept: CT IMAGING | Facility: CLINIC | Age: 63
End: 2018-09-18
Attending: INTERNAL MEDICINE
Payer: COMMERCIAL

## 2018-09-18 DIAGNOSIS — C34.92 NON-SMALL CELL CARCINOMA OF LUNG, LEFT (H): ICD-10-CM

## 2018-09-18 PROCEDURE — 71250 CT THORAX DX C-: CPT | Performed by: RADIOLOGY

## 2018-09-21 ENCOUNTER — ONCOLOGY VISIT (OUTPATIENT)
Dept: ONCOLOGY | Facility: CLINIC | Age: 63
End: 2018-09-21
Payer: COMMERCIAL

## 2018-09-21 VITALS
SYSTOLIC BLOOD PRESSURE: 136 MMHG | HEIGHT: 69 IN | DIASTOLIC BLOOD PRESSURE: 86 MMHG | HEART RATE: 94 BPM | RESPIRATION RATE: 18 BRPM | TEMPERATURE: 98.2 F | BODY MASS INDEX: 33.48 KG/M2 | WEIGHT: 226.04 LBS

## 2018-09-21 DIAGNOSIS — C34.92 NON-SMALL CELL CARCINOMA OF LUNG, LEFT (H): Primary | Chronic | ICD-10-CM

## 2018-09-21 PROCEDURE — 99214 OFFICE O/P EST MOD 30 MIN: CPT | Performed by: INTERNAL MEDICINE

## 2018-09-21 ASSESSMENT — PAIN SCALES - GENERAL: PAINLEVEL: MODERATE PAIN (5)

## 2018-09-21 NOTE — NURSING NOTE
"Oncology Rooming Note    September 21, 2018 3:57 PM   Sven Givens is a 63 year old male who presents for:    Chief Complaint   Patient presents with     Oncology Clinic Visit     follow up     Initial Vitals: /86  Pulse 94  Temp 98.2  F (36.8  C)  Resp 18  Ht 1.753 m (5' 9\")  Wt 102.5 kg (226 lb 0.7 oz)  PF 97 L/min  BMI 33.38 kg/m2 Estimated body mass index is 33.38 kg/(m^2) as calculated from the following:    Height as of this encounter: 1.753 m (5' 9\").    Weight as of this encounter: 102.5 kg (226 lb 0.7 oz). Body surface area is 2.23 meters squared.  Moderate Pain (5) Comment: not taking anything   No LMP for male patient.  Allergies reviewed: Yes  Medications reviewed: Yes    Medications: Medication refills not needed today.  Pharmacy name entered into [facility]Advisor: [id number] [place] - [address]       5 minutes for nursing intake (face to face time)     Doris Carlisle LPN              "
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
## 2018-09-21 NOTE — PATIENT INSTRUCTIONS
CT Chest in 6 months and see me after that    Talk to your PCP/Gastroenterologist for the abdominal discomfort/acid reflux

## 2018-09-21 NOTE — MR AVS SNAPSHOT
After Visit Summary   9/21/2018    Sven Givens    MRN: [id number]           Patient Information     Date Of Birth          1955        Visit Information        Provider Department      9/21/2018 4:15 PM Lay Valencia MD [facility]        Today's Diagnoses     Non-small cell carcinoma of lung, left (H)    -  1      Care Instructions    CT Chest in 6 months and see me after that    Talk to your PCP/Gastroenterologist for the abdominal discomfort/acid reflux              Follow-ups after your visit        Your next 10 appointments already scheduled     Oct 11, 2018  1:30 PM CDT   Return Sleep Patient with CHERYLE Burns   [facility] ([facility])    [address] [address] [id number] [phone]            Mar 19, 2019  3:00 PM CDT   CT CHEST W/O CONTRAST with MGCT1   [facility] ([facility])    [address] [place] [id number] [phone]           How do I prepare for my exam? (Food and drink instructions) No Food and Drink Restrictions.  How do I prepare for my exam? (Other instructions) You do not need to do anything special to prepare for this exam. For a sinus scan: Use your nose spray (nasal decongestant spray) as directed.  What should I wear: Please wear loose clothing, such as a sweat suit or jogging clothes. Avoid snaps, zippers and other metal. We may ask you to undress and put on a hospital gown.  How long does the exam take: Most scans take less than 20 minutes.  What should I bring: Please bring any scans or X-rays taken at other hospitals, if similar tests were done. Also bring a list of your medicines, including vitamins, minerals and over-the-counter drugs. It is safest to leave personal items at home.  Do I need a : No  is needed.  What do I need to tell my doctor? Be sure to tell your doctor: * If you have any allergies.  * If there s any chance you are pregnant. * If you are breastfeeding.  What should I do after the exam: No restrictions, You may resume normal activities.  What is this test: A CT (computed tomography) scan is a series of pictures that allows us to look inside your body. The scanner creates images of the body in cross sections, much like slices of bread. This helps us see any problems more clearly.  Who should I call with questions: If you have any questions, please call the Imaging Department where you will have your exam. Directions, parking instructions, and other information is available on our website, "[employer]/imaging.            Mar 22, 2019  3:15 PM CDT   Return Visit with Lay Valencia MD   [facility] ([facility])    [address] [place] [id number] [phone]              Future tests that were ordered for you today     Open Future Orders        Priority Expected Expires Ordered    CT Chest w/o contrast Routine 3/21/2019 9/21/2019 9/21/2018            Who to contact     If you have questions or need follow up information about today's clinic visit or your schedule please contact [facility] directly at [phone].  Normal or non-critical lab and imaging results will be communicated to you by MyChart, letter or phone within 4 business days after the clinic has received the results. If you do not hear from us within 7 days, please contact the clinic through [employer] or phone. If you have a critical or abnormal lab result, we will notify you by phone as soon as possible.  Submit refill requests through [employer] or call your pharmacy and they will forward the refill request to us. Please allow 3 business days for your refill to be completed.          Additional Information About Your Visit        [employer] Information     [employer] gives you secure access to your electronic health record. If you see a primary care provider, you can also  "send messages to your care team and make appointments. If you have questions, please call your primary care clinic.  If you do not have a primary care provider, please call [phone] and they will assist you.      [employer] is an electronic gateway that provides easy, online access to your medical records. With [employer], you can request a clinic appointment, read your test results, renew a prescription or communicate with your care team.     To access your existing account, please contact your [facility] or call [phone] for assistance.        Care EveryWhere ID     This is your Care EveryWhere ID. This could be used by other organizations to access your Branch medical records  [id number]        Your Vitals Were     Pulse Temperature Respirations Height Peak Flow BMI (Body Mass Index)    94 98.2  F (36.8  C) 18 1.753 m (5' 9\") 97 L/min 33.38 kg/m2       Blood Pressure from Last 3 Encounters:   09/21/18 136/86   09/14/18 104/70   07/31/18 132/86    Weight from Last 3 Encounters:   09/21/18 102.5 kg (226 lb 0.7 oz)   09/14/18 101.9 kg (224 lb 9.6 oz)   06/01/18 104.8 kg (231 lb)               Primary Care Provider Office Phone # Fax #    Pam Yesenia Dela Cruz [phone]       [address]  [facility] [address]        Equal Access to Services     [facility] AH: Hadii aad ku hadasho Soomaali, waaxda luqadaha, qaybta kaalmada adeegyada, candace baldwin. So [facility] [phone].    ATENCIÓN: Si habla español, tiene a buchanan disposición servicios gratuitos de asistencia lingüística. Llame al [phone].    We comply with applicable federal civil rights laws and [place] laws. We do not discriminate on the basis of race, color, national origin, age, disability, sex, sexual orientation, or gender identity.            Thank you!     Thank you for choosing [facility]  for your care. Our goal is always to provide you with " excellent care. Hearing back from our patients is one way we can continue to improve our services. Please take a few minutes to complete the written survey that you may receive in the mail after your visit with us. Thank you!             Your Updated Medication List - Protect others around you: Learn how to safely use, store and throw away your medicines at www.disposemymeds.org.          This list is accurate as of 9/21/18  4:28 PM.  Always use your most recent med list.                   Brand Name Dispense Instructions for use Diagnosis    AFRIN NASAL SPRAY NA      Spray in nostril as needed        allopurinol 300 MG tablet    ZYLOPRIM    90 tablet    Take 1 tablet (300 mg) by mouth daily    Chronic gout of multiple sites, unspecified cause       aspirin 81 MG tablet      1 TABLET DAILY(*)        atorvastatin 40 MG tablet    LIPITOR    90 tablet    Take 1 tablet (40 mg) by mouth daily    Hypertriglyceridemia, Type 2 diabetes mellitus with diabetic polyneuropathy, without long-term current use of insulin (H)       Azelaic Acid 15 % gel    FINACEA    50 g    Apply small amount twice a day to skin    Rosacea       blood glucose monitoring test strip    ONETOUCH ULTRA    100 strip    Use to test blood sugars 2 times daily or as directed.    Type 2 diabetes mellitus with diabetic polyneuropathy, without long-term current use of insulin (H)       capsaicin 0.025 % Crea cream    ZOSTRIX    60 g    Apply 1 g topically 3 times daily    Pain in both feet, Type 2 diabetes mellitus with diabetic polyneuropathy, without long-term current use of insulin (H)       cinnamon 500 MG Tabs      Take one tablet twice daily.        diclofenac 1 % Gel topical gel    VOLTAREN    100 g    Apply 4 grams to knees or 2 grams to hands four times daily using enclosed dosing card.    Diabetic polyneuropathy associated with type 2 diabetes mellitus (H)       fish oil-omega-3 fatty acids 1000 MG capsule      daily        fluticasone 50 MCG/ACT  spray    FLONASE    1 Bottle    Spray 1-2 sprays into both nostrils daily    URI with cough and congestion       glimepiride 4 MG tablet    AMARYL    180 tablet    Take 1 tablet (4 mg) by mouth 2 times daily    Type 2 diabetes mellitus without complication, unspecified long term insulin use status (H)       losartan-hydrochlorothiazide 100-25 MG per tablet    HYZAAR    90 tablet    Take 1 tablet by mouth daily    Essential hypertension with goal blood pressure less than 140/90       metFORMIN 1000 MG tablet    GLUCOPHAGE    180 tablet    Take 1 tablet (1,000 mg) by mouth 2 times daily (with meals)    Type 2 diabetes mellitus with hyperglycemia, without long-term current use of insulin (H)       MULTIPLE VITAMINS PO      1 a day        nitroGLYcerin 0.4 MG sublingual tablet    NITROSTAT    25 tablet    Place 1 tablet (0.4 mg) under the tongue every 5 minutes as needed for chest pain If you are still having symptoms after 3 doses (15 minutes) call 911.    Ischemic chest pain (H)       omeprazole 20 MG CR capsule    priLOSEC    180 capsule    Take 1 capsule (20 mg) by mouth 2 times daily Take 30-60 minutes before a meal.    Gastroesophageal reflux disease with esophagitis       order for DME     200 strip    One touch ultra  Test strips for checking blood sugars 2 times a day.    Type 2 diabetes mellitus with diabetic polyneuropathy, without long-term current use of insulin (H)       PROAIR  (90 Base) MCG/ACT inhaler   Generic drug:  albuterol     8.5 Inhaler    INHALE 2-4 PUFFS EVERY 4 HOURS AS NEEDED FOR SHORTNESS OF BREATH/ DYSPNEA OR WHEEZING    Bronchospasm, Acute bronchitis, unspecified organism

## 2018-09-24 ENCOUNTER — CARE COORDINATION (OUTPATIENT)
Dept: ONCOLOGY | Facility: CLINIC | Age: 63
End: 2018-09-24

## 2018-09-24 NOTE — PROGRESS NOTES
Patient was in clinic today for follow-up.  Requesting PA done or letter sent to his insurance so his CT can be scheduled without an issue.  Writer sent message to our financial dept for assistance in matter.

## 2018-09-26 ENCOUNTER — TELEPHONE (OUTPATIENT)
Dept: GASTROENTEROLOGY | Facility: CLINIC | Age: 63
End: 2018-09-26

## 2018-09-26 NOTE — TELEPHONE ENCOUNTER
PREVISIT INFORMATION                                                    Sven Tosha scheduled for future visit at [facility] specialty clinics.    Patient is scheduled to see Dr Mccarty on 8.16.18  Reason for visit: Abdominal pain/Acid reflux  Referring provider   Has patient seen previous specialist? [facility] in 2016. Records in [facility]  Medical Records:  Available in chart.  Patient was previously seen at a [place] or [facility] facility.    REVIEW                                                      New patient packet mailed to patient: No  Medication reconciliation complete: No      Current Outpatient Prescriptions   Medication Sig Dispense Refill     allopurinol (ZYLOPRIM) 300 MG tablet Take 1 tablet (300 mg) by mouth daily 90 tablet 3     ASPIRIN 81 MG PO TABS 1 TABLET DAILY(*)       atorvastatin (LIPITOR) 40 MG tablet Take 1 tablet (40 mg) by mouth daily 90 tablet 3     Azelaic Acid (FINACEA) 15 % gel Apply small amount twice a day to skin (Patient not taking: Reported on 9/21/2018) 50 g 3     blood glucose monitoring (ONETOUCH ULTRA) test strip Use to test blood sugars 2 times daily or as directed. 100 strip 11     capsaicin (ZOSTRIX) 0.025 % CREA cream Apply 1 g topically 3 times daily (Patient not taking: Reported on 9/14/2018) 60 g 3     CINNAMON 500 MG OR TABS Take one tablet twice daily.       diclofenac (VOLTAREN) 1 % GEL topical gel Apply 4 grams to knees or 2 grams to hands four times daily using enclosed dosing card. (Patient not taking: Reported on 9/14/2018) 100 g 1     FISH OIL 1000 MG OR CAPS daily       fluticasone (FLONASE) 50 MCG/ACT nasal spray Spray 1-2 sprays into both nostrils daily (Patient not taking: Reported on 9/21/2018) 1 Bottle 1     glimepiride (AMARYL) 4 MG tablet Take 1 tablet (4 mg) by mouth 2 times daily 180 tablet 3     losartan-hydrochlorothiazide (HYZAAR) 100-25 MG per tablet Take 1 tablet by mouth daily 90 tablet 3     metFORMIN  (GLUCOPHAGE) 1000 MG tablet Take 1 tablet (1,000 mg) by mouth 2 times daily (with meals) 180 tablet 3     MULTIPLE VITAMINS OR 1 a day       nitroglycerin (NITROSTAT) 0.4 MG SL tablet Place 1 tablet (0.4 mg) under the tongue every 5 minutes as needed for chest pain If you are still having symptoms after 3 doses (15 minutes) call 911. (Patient not taking: Reported on 9/21/2018) 25 tablet 3     omeprazole (PRILOSEC) 20 MG capsule Take 1 capsule (20 mg) by mouth 2 times daily Take 30-60 minutes before a meal. 180 capsule 3     order for DME One touch ultra  Test strips for checking blood sugars 2 times a day. 200 strip 1     Oxymetazoline HCl (AFRIN NASAL SPRAY NA) Spray in nostril as needed       PROAIR  (90 Base) MCG/ACT inhaler INHALE 2-4 PUFFS EVERY 4 HOURS AS NEEDED FOR SHORTNESS OF BREATH/ DYSPNEA OR WHEEZING (Patient not taking: Reported on 9/21/2018) 8.5 Inhaler 3     [DISCONTINUED] ORDER FOR DME One touch ultra  Test strips for checking blood sugars 2 times a day. 200 strip 1       Allergies: Penicillins; Ace inhibitors; and Indomethacin        PLAN/FOLLOW-UP NEEDED                                                      Previsit review complete.  Patient will see provider at future scheduled appointment.     Patient Reminders Given:  Please, make sure you bring an updated list of your medications.   If you are having a procedure, please, present 15 minutes early.  If you need to cancel or reschedule,please call [phone].    Abbe HOOKER

## 2018-10-11 ENCOUNTER — OFFICE VISIT (OUTPATIENT)
Dept: SLEEP MEDICINE | Facility: CLINIC | Age: 63
End: 2018-10-11
Payer: COMMERCIAL

## 2018-10-11 VITALS
SYSTOLIC BLOOD PRESSURE: 124 MMHG | OXYGEN SATURATION: 97 % | DIASTOLIC BLOOD PRESSURE: 79 MMHG | HEIGHT: 69 IN | HEART RATE: 94 BPM | BODY MASS INDEX: 33.62 KG/M2 | WEIGHT: 227 LBS

## 2018-10-11 DIAGNOSIS — G47.33 OSA (OBSTRUCTIVE SLEEP APNEA): Primary | ICD-10-CM

## 2018-10-11 PROCEDURE — 99213 OFFICE O/P EST LOW 20 MIN: CPT | Performed by: PHYSICIAN ASSISTANT

## 2018-10-11 NOTE — MR AVS SNAPSHOT
After Visit Summary   10/11/2018    Sven Givens    MRN: [id number]           Patient Information     Date Of Birth          1955        Visit Information        Provider Department      10/11/2018 10:30 AM Al Ayala PA [facility]        Today's Diagnoses     PRESLEY (obstructive sleep apnea)    -  1      Care Instructions      Your BMI is Body mass index is 33.52 kg/(m^2).  Weight management is a personal decision.  If you are interested in exploring weight loss strategies, the following discussion covers the approaches that may be successful. Body mass index (BMI) is one way to tell whether you are at a healthy weight, overweight, or obese. It measures your weight in relation to your height.  A BMI of 18.5 to 24.9 is in the healthy range. A person with a BMI of 25 to 29.9 is considered overweight, and someone with a BMI of 30 or greater is considered obese. More than two-thirds of American adults are considered overweight or obese.  Being overweight or obese increases the risk for further weight gain. Excess weight may lead to heart disease and diabetes.  Creating and following plans for healthy eating and physical activity may help you improve your health.  Weight control is part of healthy lifestyle and includes exercise, emotional health, and healthy eating habits. Careful eating habits lifelong are the mainstay of weight control. Though there are significant health benefits from weight loss, long-term weight loss with diet alone may be very difficult to achieve- studies show long-term success with dietary management in less than 10% of people. Attaining a healthy weight may be especially difficult to achieve in those with severe obesity. In some cases, medications, devices and surgical management might be considered.  What can you do?  If you are overweight or obese and are interested in methods for weight loss, you should discuss this with your provider.     Consider  reducing daily calorie intake by 500 calories.     Keep a food journal.     Avoiding skipping meals, consider cutting portions instead.    Diet combined with exercise helps maintain muscle while optimizing fat loss. Strength training is particularly important for building and maintaining muscle mass. Exercise helps reduce stress, increase energy, and improves fitness. Increasing exercise without diet control, however, may not burn enough calories to loose weight.       Start walking three days a week 10-20 minutes at a time    Work towards walking thirty minutes five days a week     Eventually, increase the speed of your walking for 1-2 minutes at time    In addition, we recommend that you review healthy lifestyles and methods for weight loss available through the National Institutes of Health patient information sites:  http://win.niddk.nih.gov/publications/index.htm    And look into health and wellness programs that may be available through your health insurance provider, employer, local community center, or cedric club.    Weight management plan: Patient was referred to their PCP to discuss a diet and exercise plan.                  Follow-ups after your visit        Follow-up notes from your care team     Return in 2 years (on 10/11/2020) for PAP follow up.      Your next 10 appointments already scheduled     Oct 11, 2018 10:30 AM CDT   Return Sleep Patient with CHERYLE Burns   [facility] [place]    [address] [address] [id number] [phone]            Oct 16, 2018  2:30 PM CDT   New Visit with Abbe Mccarty MD   [facility])    [address] [facility] [address]   [phone]            Mar 19, 2019  3:00 PM CDT   CT CHEST W/O CONTRAST with MGCT1   [facility]    [address] [facility]  [address] [phone]           How do I prepare for my exam? (Food and drink instructions) No Food and Drink Restrictions.  How do I prepare for my exam? (Other instructions) You do not need to do anything special to prepare for this exam. For a sinus scan: Use your nose spray (nasal decongestant spray) as directed.  What should I wear: Please wear loose clothing, such as a sweat suit or jogging clothes. Avoid snaps, zippers and other metal. We may ask you to undress and put on a hospital gown.  How long does the exam take: Most scans take less than 20 minutes.  What should I bring: Please bring any scans or X-rays taken at other hospitals, if similar tests were done. Also bring a list of your medicines, including vitamins, minerals and over-the-counter drugs. It is safest to leave personal items at home.  Do I need a : No  is needed.  What do I need to tell my doctor? Be sure to tell your doctor: * If you have any allergies. * If there s any chance you are pregnant. * If you are breastfeeding.  What should I do after the exam: No restrictions, You may resume normal activities.  What is this test: A CT (computed tomography) scan is a series of pictures that allows us to look inside your body. The scanner creates images of the body in cross sections, much like slices of bread. This helps us see any problems more clearly.  Who should I call with questions: If you have any questions, please call the Imaging Department where you will have your exam. Directions, parking instructions, and other information is available on our website, [email or web address].            Mar 22, 2019  3:15 PM CDT   Return Visit with Lay Valencia MD   [facility]    [address] [place] [address] [phone]              Who to contact     If you have questions or need follow up information about today's clinic visit or your schedule please contact [facility] directly at [phone].  Normal or non-critical lab and imaging results will be communicated to you by MyChart, letter or phone within 4 business days after the clinic has received the results. If you do not hear from us within 7 days, please contact the clinic through [employer] or phone. If you have a critical or abnormal lab result, we will notify you by phone as soon as possible.  Submit refill requests through [employer] or call your pharmacy and they will forward the refill request to us. Please allow 3 business days for your refill to be completed.          Additional Information About Your Visit        [employer] Information     [employer] gives you secure access to your electronic health record. If you see a primary care provider, you can also send messages to your care team and make appointments. If you have questions, please call your primary care clinic.  If you do not have a primary care provider, please call [phone] and they will assist you.        Care EveryWhere ID     This is your Care EveryWhere ID. This could be used by other organizations to access your [facility] medical records  [id number]        Your Vitals Were     Pulse Height Pulse Oximetry BMI (Body Mass Index)          94 1.753 m (5' 9\") 97% 33.52 kg/m2         Blood Pressure from Last 3 Encounters:   10/11/18 124/79   09/21/18 136/86   09/14/18 104/70    Weight from Last 3 Encounters:   10/11/18 103 kg (227 lb)   09/21/18 102.5 kg (226 lb 0.7 oz)   09/14/18 101.9 kg (224 lb 9.6 oz)              We Performed the Following     Sleep Comprehensive DME        Primary Care Provider Office Phone # Fax #    Pam Dela Cruz [phone]       [address]  [facility] [address]        Equal Access to Services     CHERISE STERN : Hadii sergey Wilson, waaxda luqadaha, qaybta kaalmada sheri, candace baldwin. So [facility] [phone].    ATENCIÓN: Si curt espradhika busch buchanan " disposición servicios gratuitos de asistencia lingüística. Alexander vines [phone].    We comply with applicable federal civil rights laws and [place] laws. We do not discriminate on the basis of race, color, national origin, age, disability, sex, sexual orientation, or gender identity.            Thank you!     Thank you for choosing [facility]  for your care. Our goal is always to provide you with excellent care. Hearing back from our patients is one way we can continue to improve our services. Please take a few minutes to complete the written survey that you may receive in the mail after your visit with us. Thank you!             Your Updated Medication List - Protect others around you: Learn how to safely use, store and throw away your medicines at www.disposemymeds.org.          This list is accurate as of 10/11/18 10:17 AM.  Always use your most recent med list.                   Brand Name Dispense Instructions for use Diagnosis    AFRIN NASAL SPRAY NA      Spray in nostril as needed        allopurinol 300 MG tablet    ZYLOPRIM    90 tablet    Take 1 tablet (300 mg) by mouth daily    Chronic gout of multiple sites, unspecified cause       aspirin 81 MG tablet      1 TABLET DAILY(*)        atorvastatin 40 MG tablet    LIPITOR    90 tablet    Take 1 tablet (40 mg) by mouth daily    Hypertriglyceridemia, Type 2 diabetes mellitus with diabetic polyneuropathy, without long-term current use of insulin (H)       Azelaic Acid 15 % gel    FINACEA    50 g    Apply small amount twice a day to skin    Rosacea       blood glucose monitoring test strip    ONETOUCH ULTRA    100 strip    Use to test blood sugars 2 times daily or as directed.    Type 2 diabetes mellitus with diabetic polyneuropathy, without long-term current use of insulin (H)       capsaicin 0.025 % Crea cream    ZOSTRIX    60 g    Apply 1 g topically 3 times daily    Pain in both feet, Type 2 diabetes mellitus with diabetic polyneuropathy,  without long-term current use of insulin (H)       cinnamon 500 MG Tabs      Take one tablet twice daily.        diclofenac 1 % Gel topical gel    VOLTAREN    100 g    Apply 4 grams to knees or 2 grams to hands four times daily using enclosed dosing card.    Diabetic polyneuropathy associated with type 2 diabetes mellitus (H)       fish oil-omega-3 fatty acids 1000 MG capsule      daily        fluticasone 50 MCG/ACT spray    FLONASE    1 Bottle    Spray 1-2 sprays into both nostrils daily    URI with cough and congestion       glimepiride 4 MG tablet    AMARYL    180 tablet    Take 1 tablet (4 mg) by mouth 2 times daily    Type 2 diabetes mellitus without complication, unspecified long term insulin use status       losartan-hydrochlorothiazide 100-25 MG per tablet    HYZAAR    90 tablet    Take 1 tablet by mouth daily    Essential hypertension with goal blood pressure less than 140/90       metFORMIN 1000 MG tablet    GLUCOPHAGE    180 tablet    Take 1 tablet (1,000 mg) by mouth 2 times daily (with meals)    Type 2 diabetes mellitus with hyperglycemia, without long-term current use of insulin (H)       MULTIPLE VITAMINS PO      1 a day        nitroGLYcerin 0.4 MG sublingual tablet    NITROSTAT    25 tablet    Place 1 tablet (0.4 mg) under the tongue every 5 minutes as needed for chest pain If you are still having symptoms after 3 doses (15 minutes) call 911.    Ischemic chest pain       omeprazole 20 MG CR capsule    priLOSEC    180 capsule    Take 1 capsule (20 mg) by mouth 2 times daily Take 30-60 minutes before a meal.    Gastroesophageal reflux disease with esophagitis       order for DME     200 strip    One touch ultra  Test strips for checking blood sugars 2 times a day.    Type 2 diabetes mellitus with diabetic polyneuropathy, without long-term current use of insulin (H)       PROAIR  (90 Base) MCG/ACT inhaler   Generic drug:  albuterol     8.5 Inhaler    INHALE 2-4 PUFFS EVERY 4 HOURS AS NEEDED FOR  SHORTNESS OF BREATH/ DYSPNEA OR WHEEZING    Bronchospasm, Acute bronchitis, unspecified organism

## 2018-10-11 NOTE — PROGRESS NOTES
Obstructive Sleep Apnea - PAP Follow-Up Visit:    Chief Complaint   Patient presents with     CPAP Follow Up       Sven Givens comes in today for follow-up of their severe sleep apnea, managed with CPAP.     Sven Givens is a 63 year old male who was initially diagnosed with severe obstructive sleep apnea by a sleep study on 11/29/2007(200#)-AHI 37.7, RDI 50, lowest oxygen saturation 80%. CPAP of 7 cm/H9O was effective. He was prescribed CPAP, but stopped using it in ~2012 due to the loudness of the machine contributing to difficulty falling asleep.    July 12, 2018 Patient received a Resmed AirSense 10 Auto. Pressures were set at 7-10 cm H2O.    Overall, he rates the experience with PAP as 9 (0 poor, 10 great). The mask is comfortable.    The mask is not leaking .  He is not snoring with the mask on. He is not having gasp arousals.  He is not having significant oral/nasal dryness. The pressure is comfortable.     His PAP interface is Nasal Pillows.    Bedtime is typically 2000. Usually it takes about 15 min minutes to fall asleep with the mask on. Wake time is typically 0400.  Patient is using PAP therapy 6 hours per night. The patient is usually getting 8 hours of sleep per night.    He does feel rested in the morning.    Total score - [place]: 0 (10/11/2018  9:00 AM)      ISABEL Total Score: 9    ResMed   Auto-PAP 7.0 - 10.0 cmH2O 30 day usage data:    100% of days with > 4 hours of use. 0/30 days with no use.   Average use 7 hours and 7 minutes per day.   95%ile Leak 23.63 L/min.   CPAP 95% pressure 10 cm.   AHI 2.7 events per hour.         Past medical/surgical history, family history, social history, medications and allergies were reviewed.      Problem List:  Patient Active Problem List    Diagnosis Date Noted     Non-small cell carcinoma of lung, left (H) 10/17/2016     Priority: High     Stage 1A (pT1aN0) 0.9 x 0.7 x 0.5 cm grade 1 well differentiated adenocarcinoma of the left lower lobe of the lung  "with invasive component being 0.3cm, s/p wedge resection and mediastinal LN sampling on 9/23/16.       PRESLEY (obstructive sleep apnea)      Priority: Medium     11/29/2007(200#)-AHI 37.7, RDI 50, lowest oxygen saturation 80%. CPAP of 7 cm/H9O was effective.       Nonalcoholic hepatosteatosis 10/17/2016     Priority: Medium     History of radiation exposure 09/30/2016     Priority: Medium     Hypertriglyceridemia 12/18/2015     Priority: Medium     Type 2 diabetes mellitus with diabetic polyneuropathy (H) 10/15/2015     Priority: Medium     Obesity 10/15/2015     Priority: Medium     Hyperlipidemia with target LDL less than 100 12/20/2013     Priority: Medium     Diagnosis updated by automated process. Provider to review and confirm.       Gout 12/20/2013     Priority: Medium     Diagnosed by joint tap August 2013 per patient report.        Anemia 07/03/2013     Priority: Medium     Persistent mild anemia. Needs evaluation. May be thalassemia carrier.       Diabetic neuropathy (H) 05/07/2012     Priority: Medium     Advanced directives, counseling/discussion 06/16/2011     Priority: Medium     Advance Directive Problem List Overview:   Name Relationship Phone    Primary Health Care Agent            Alternative Health Care Agent          Discussed advance care planning with patient; information given to patient to review.  Bindu Smith   6/16/2011          Hypertension goal BP (blood pressure) < 140/90 06/16/2011     Priority: Medium     High triglycerides 02/21/2011     Priority: Medium     Tubular adenoma of colon 12/01/2009     Priority: Medium     follow up colonoscopy 5 years requested 5-2017 due.       GERD (gastroesophageal reflux disease) 05/15/2012     Priority: Low        /79  Pulse 94  Ht 1.753 m (5' 9\")  Wt 103 kg (227 lb)  SpO2 97%  BMI 33.52 kg/m2    Impression/Plan:    Severe Sleep apnea-  Tolerating PAP well. Daytime symptoms are improved.   Continue current CPAP therapy.   Comprehensive " TINY.     Sven Givens will follow up in about 2 year(s).     Fifteen minutes spent with patient, all of which were spent face-to-face counseling, consulting, coordinating plan of care regarding PRESLEY.      Al Ayala PA-C

## 2018-10-11 NOTE — NURSING NOTE
"Chief Complaint   Patient presents with     CPAP Follow Up       Initial /79  Pulse 94  Ht 1.753 m (5' 9\")  Wt 103 kg (227 lb)  SpO2 97%  BMI 33.52 kg/m2 Estimated body mass index is 33.52 kg/(m^2) as calculated from the following:    Height as of this encounter: 1.753 m (5' 9\").    Weight as of this encounter: 103 kg (227 lb).    Medication Reconciliation: complete      "

## 2018-10-11 NOTE — PATIENT INSTRUCTIONS

## 2018-10-16 ENCOUNTER — OFFICE VISIT (OUTPATIENT)
Dept: GASTROENTEROLOGY | Facility: CLINIC | Age: 63
End: 2018-10-16
Payer: COMMERCIAL

## 2018-10-16 VITALS
DIASTOLIC BLOOD PRESSURE: 74 MMHG | OXYGEN SATURATION: 97 % | SYSTOLIC BLOOD PRESSURE: 152 MMHG | BODY MASS INDEX: 33.27 KG/M2 | HEIGHT: 69 IN | HEART RATE: 94 BPM | WEIGHT: 224.6 LBS

## 2018-10-16 DIAGNOSIS — R10.13 DYSPEPSIA: Primary | ICD-10-CM

## 2018-10-16 LAB
ALBUMIN SERPL-MCNC: 4 G/DL (ref 3.4–5)
ALP SERPL-CCNC: 121 U/L (ref 40–150)
ALT SERPL W P-5'-P-CCNC: 45 U/L (ref 0–70)
AMYLASE SERPL-CCNC: 52 U/L (ref 30–110)
AST SERPL W P-5'-P-CCNC: 24 U/L (ref 0–45)
BILIRUB DIRECT SERPL-MCNC: 0.1 MG/DL (ref 0–0.2)
BILIRUB SERPL-MCNC: 0.3 MG/DL (ref 0.2–1.3)
LIPASE SERPL-CCNC: 177 U/L (ref 73–393)
PROT SERPL-MCNC: 7.7 G/DL (ref 6.8–8.8)

## 2018-10-16 PROCEDURE — 83516 IMMUNOASSAY NONANTIBODY: CPT | Performed by: INTERNAL MEDICINE

## 2018-10-16 PROCEDURE — 83516 IMMUNOASSAY NONANTIBODY: CPT | Mod: 59 | Performed by: INTERNAL MEDICINE

## 2018-10-16 PROCEDURE — 36415 COLL VENOUS BLD VENIPUNCTURE: CPT | Performed by: INTERNAL MEDICINE

## 2018-10-16 PROCEDURE — 99204 OFFICE O/P NEW MOD 45 MIN: CPT | Performed by: INTERNAL MEDICINE

## 2018-10-16 PROCEDURE — 83690 ASSAY OF LIPASE: CPT | Performed by: INTERNAL MEDICINE

## 2018-10-16 PROCEDURE — 80076 HEPATIC FUNCTION PANEL: CPT | Performed by: INTERNAL MEDICINE

## 2018-10-16 PROCEDURE — 82150 ASSAY OF AMYLASE: CPT | Performed by: INTERNAL MEDICINE

## 2018-10-16 RX ORDER — SUCRALFATE 1 G/1
1 TABLET ORAL 3 TIMES DAILY PRN
Qty: 90 TABLET | Refills: 0 | Status: SHIPPED | OUTPATIENT
Start: 2018-10-16 | End: 2018-11-15

## 2018-10-16 ASSESSMENT — PAIN SCALES - GENERAL: PAINLEVEL: MILD PAIN (2)

## 2018-10-16 NOTE — MR AVS SNAPSHOT
After Visit Summary   10/16/2018    Sven Givens    MRN: [id number]           Patient Information     Date Of Birth          1955        Visit Information        Provider Department      10/16/2018 2:30 PM Abbe Mccarty MD [facility]        Today's Diagnoses     Dyspepsia    -  1      Care Instructions    Obtain labs    Submit stool sample    Get abdominal ultrasound    Take carafate 1 tab up to three times daily as needed.  Prescription sent to your pharmacy.    Continue omeprazole twice daily, take 30 min prior to meals.    FUV 6 weeks.          Follow-ups after your visit        Your next 10 appointments already scheduled     Oct 23, 2018  8:30 AM CDT   US ABDOMEN COMPLETE with MGUS1, MG US TECH   [facility] ([facility])    [address] [address] [place] [id number] [phone]           How do I prepare for my exam? (Food and drink instructions) Adults: No eating, smoking, gum chewing or drinking for 8 hours before the exam. You may take medicine with a small sip of water.  Children: * Infants, breast-fed: may have breast milk up to 2 hours before exam. * Infants, formula: may have bottle until 4 hours before exam. * Children 1-5 years: No food or drink for 4 hours before exam. * Children 6 -12 years: No food or drink for 6 hours before exam. * Children over 12 years: No food or drink for 8 hours before exam.  * J Tube Fed: No need to stop feedings.  What should I wear: Wear comfortable clothes.  How long does the exam take: Most ultrasounds take 30 to 60 minutes.  What should I bring: Bring a list of your medicines, including vitamins, minerals and over-the-counter drugs. It is safest to leave personal items at home.  Do I need a :  No  is needed.  What do I need to tell my doctor: Tell your doctor about any allergies you may have.  What should I do after the exam: No restrictions, You may resume normal  activities.  What is this test: An ultrasound uses sound waves to make pictures of the body. Sound waves do not cause pain. The only discomfort may be the pressure of the wand against your skin or full bladder.  Who should I call with questions: If you have any questions, please call the Imaging Department where you will have your exam. Directions, parking instructions, and other information is available on our website, [email or web address].            Nov 27, 2018  2:30 PM CST   [facility]    [address] [place] [id number] [phone]            Mar 19, 2019  3:00 PM CDT   CT CHEST W/O CONTRAST with MGCT1   [facility])    [address] [place] [id number] [phone]           How do I prepare for my exam? (Food and drink instructions) No Food and Drink Restrictions.  How do I prepare for my exam? (Other instructions) You do not need to do anything special to prepare for this exam. For a sinus scan: Use your nose spray (nasal decongestant spray) as directed.  What should I wear: Please wear loose clothing, such as a sweat suit or jogging clothes. Avoid snaps, zippers and other metal. We may ask you to undress and put on a hospital gown.  How long does the exam take: Most scans take less than 20 minutes.  What should I bring: Please bring any scans or X-rays taken at other hospitals, if similar tests were done. Also bring a list of your medicines, including vitamins, minerals and over-the-counter drugs. It is safest to leave personal items at home.  Do I need a : No  is needed.  What do I need to tell my doctor? Be sure to tell your doctor: * If you have any allergies. * If there s any chance you are pregnant. * If you are breastfeeding.  What should I do after the exam: No restrictions, You may resume normal activities.   What is this test: A CT (computed tomography) scan is a series of pictures that allows us to look inside your body. The scanner creates images of the body in cross sections, much like slices of bread. This helps us see any problems more clearly.  Who should I call with questions: If you have any questions, please call the Imaging Department where you will have your exam. Directions, parking instructions, and other information is available on our website, [email or web address].            Mar 22, 2019  3:15 PM CDT   Return Visit with Lay Valencia MD   [facility] ([facility])    [id number] [place] [id number] [phone]              Future tests that were ordered for you today     Open Future Orders        Priority Expected Expires Ordered    Tissue transglutaminase abbie IgA and IgG Routine  10/16/2019 10/16/2018    H Pylori antigen stool Routine  10/16/2019 10/16/2018    Amylase Routine  10/16/2019 10/16/2018    Lipase Routine  10/16/2019 10/16/2018    Hepatic panel Routine  10/16/2019 10/16/2018    US Abdomen Complete Routine  10/16/2019 10/16/2018            Who to contact     If you have questions or need follow up information about today's clinic visit or your schedule please contact [facility] directly at [phone].  Normal or non-critical lab and imaging results will be communicated to you by [employer], letter or phone within 4 business days after the clinic has received the results. If you do not hear from us within 7 days, please contact the clinic through [employer] or phone. If you have a critical or abnormal lab result, we will notify you by phone as soon as possible.  Submit refill requests through [email or web address] or call your pharmacy and they will forward the refill request to us. Please allow 3 business days for your refill to be completed.          Additional Information About Your Visit        [email or web address] Information     [email or web address] gives you  "secure access to your electronic health record. If you see a primary care provider, you can also send messages to your care team and make appointments. If you have questions, please call your primary care clinic.  If you do not have a primary care provider, please call [phone] and they will assist you.      [employer] is an electronic gateway that provides easy, online access to your medical records. With [employer], you can request a clinic appointment, read your test results, renew a prescription or communicate with your care team.     To access your existing account, please contact your [facility] or call [phone] for assistance.        Care EveryWhere ID     This is your Care EveryWhere ID. This could be used by other organizations to access your [facility] medical records  [id number]        Your Vitals Were     Pulse Height Pulse Oximetry BMI (Body Mass Index)          94 1.753 m (5' 9\") 97% 33.17 kg/m2         Blood Pressure from Last 3 Encounters:   10/16/18 152/74   10/11/18 124/79   09/21/18 136/86    Weight from Last 3 Encounters:   10/16/18 101.9 kg (224 lb 9.6 oz)   10/11/18 103 kg (227 lb)   09/21/18 102.5 kg (226 lb 0.7 oz)                 Today's Medication Changes          These changes are accurate as of 10/16/18  2:50 PM.  If you have any questions, ask your nurse or doctor.               Start taking these medicines.        Dose/Directions    sucralfate 1 GM tablet   Commonly known as:  CARAFATE   Used for:  Dyspepsia   Started by:  Abbe Mccarty MD        Dose:  1 g   Take 1 tablet (1 g) by mouth 3 times daily as needed (Abdominal pain)   Quantity:  90 tablet   Refills:  0            Where to get your medicines      These medications were sent to [facility]/pharmacy #[id number] - [place] - [address] [facility]  [address] [place] [address]     Phone:  [phone]     sucralfate 1 GM tablet                Primary Care Provider Office Phone " # Fax #    Pam Yesenia Dela Cruz [phone] [address]  [facility] [address]        Equal Access to Services     CHERISE STERN : Haddamien sergey cao aarono Sotono, waaxda luqadaha, qaybta kaalmada adeegyada, candace devi laChaparrolatisha baldwin. So [facility] [phone].    ATENCIÓN: Si habla español, tiene a buchanan disposición servicios gratuitos de asistencia lingüística. Llame al [phone].    We comply with applicable federal civil rights laws and [place] laws. We do not discriminate on the basis of race, color, national origin, age, disability, sex, sexual orientation, or gender identity.            Thank you!     Thank you for choosing [facility]  for your care. Our goal is always to provide you with excellent care. Hearing back from our patients is one way we can continue to improve our services. Please take a few minutes to complete the written survey that you may receive in the mail after your visit with us. Thank you!             Your Updated Medication List - Protect others around you: Learn how to safely use, store and throw away your medicines at www.disposemymeds.org.          This list is accurate as of 10/16/18  2:50 PM.  Always use your most recent med list.                   Brand Name Dispense Instructions for use Diagnosis    AFRIN NASAL SPRAY NA      Spray in nostril as needed        allopurinol 300 MG tablet    ZYLOPRIM    90 tablet    Take 1 tablet (300 mg) by mouth daily    Chronic gout of multiple sites, unspecified cause       aspirin 81 MG tablet      1 TABLET DAILY(*)        atorvastatin 40 MG tablet    LIPITOR    90 tablet    Take 1 tablet (40 mg) by mouth daily    Hypertriglyceridemia, Type 2 diabetes mellitus with diabetic polyneuropathy, without long-term current use of insulin (H)       Azelaic Acid 15 % gel    FINACEA    50 g    Apply small amount twice a day to skin    Rosacea       blood glucose monitoring test strip    ONETOUCH ULTRA     100 strip    Use to test blood sugars 2 times daily or as directed.    Type 2 diabetes mellitus with diabetic polyneuropathy, without long-term current use of insulin (H)       capsaicin 0.025 % Crea cream    ZOSTRIX    60 g    Apply 1 g topically 3 times daily    Pain in both feet, Type 2 diabetes mellitus with diabetic polyneuropathy, without long-term current use of insulin (H)       cinnamon 500 MG Tabs      Take one tablet twice daily.        diclofenac 1 % Gel topical gel    VOLTAREN    100 g    Apply 4 grams to knees or 2 grams to hands four times daily using enclosed dosing card.    Diabetic polyneuropathy associated with type 2 diabetes mellitus (H)       fish oil-omega-3 fatty acids 1000 MG capsule      daily        fluticasone 50 MCG/ACT spray    FLONASE    1 Bottle    Spray 1-2 sprays into both nostrils daily    URI with cough and congestion       glimepiride 4 MG tablet    AMARYL    180 tablet    Take 1 tablet (4 mg) by mouth 2 times daily    Type 2 diabetes mellitus without complication, unspecified long term insulin use status       losartan-hydrochlorothiazide 100-25 MG per tablet    HYZAAR    90 tablet    Take 1 tablet by mouth daily    Essential hypertension with goal blood pressure less than 140/90       metFORMIN 1000 MG tablet    GLUCOPHAGE    180 tablet    Take 1 tablet (1,000 mg) by mouth 2 times daily (with meals)    Type 2 diabetes mellitus with hyperglycemia, without long-term current use of insulin (H)       MULTIPLE VITAMINS PO      1 a day        nitroGLYcerin 0.4 MG sublingual tablet    NITROSTAT    25 tablet    Place 1 tablet (0.4 mg) under the tongue every 5 minutes as needed for chest pain If you are still having symptoms after 3 doses (15 minutes) call 911.    Ischemic chest pain       omeprazole 20 MG CR capsule    priLOSEC    180 capsule    Take 1 capsule (20 mg) by mouth 2 times daily Take 30-60 minutes before a meal.    Gastroesophageal reflux disease with esophagitis        order for DME     200 strip    One touch ultra  Test strips for checking blood sugars 2 times a day.    Type 2 diabetes mellitus with diabetic polyneuropathy, without long-term current use of insulin (H)       PROAIR  (90 Base) MCG/ACT inhaler   Generic drug:  albuterol     8.5 Inhaler    INHALE 2-4 PUFFS EVERY 4 HOURS AS NEEDED FOR SHORTNESS OF BREATH/ DYSPNEA OR WHEEZING    Bronchospasm, Acute bronchitis, unspecified organism       sucralfate 1 GM tablet    CARAFATE    90 tablet    Take 1 tablet (1 g) by mouth 3 times daily as needed (Abdominal pain)    Dyspepsia

## 2018-10-16 NOTE — PATIENT INSTRUCTIONS
Obtain labs    Submit stool sample    Get abdominal ultrasound    Take carafate 1 tab up to three times daily as needed.  Prescription sent to your pharmacy.    Continue omeprazole twice daily, take 30 min prior to meals.    FUV 6 weeks.

## 2018-10-17 NOTE — PROGRESS NOTES
GASTROENTEROLOGY NEW PATIENT CLINIC VISIT    CC/REFERRING MD:    Pam Dela Cruz    REASON FOR CONSULTATION:   No ref. provider found for   Chief Complaint   Patient presents with     Consult     Abdominal pain and acid reflux       HISTORY OF PRESENT ILLNESS:    Sven Givens is 63 year old male who presents for evaluation of abdominal pain.  The patient notes that he has had long-standing issues with epigastric abdominal pain.  He notes that his pain is located in the epigastrium as well as periumbilical pain.  The pain is constant though it is worsened with eating certain foods including spicy or fried foods.  Eating small frequent meals makes the pain better.  He gets more pain when he has an empty stomach.  He notes that he underwent evaluation in 2016 which was unremarkable.  He has been prescribed omeprazole twice daily which he is taking appropriately.  He notes mild improvement with the PPI therapy.  He is not having nausea or vomiting.  He is not losing weight.  He takes intermittent ibuprofen for headaches with the last dose 3-4 months ago.  He does not take other over-the-counter medications.  There is no family history of esophageal cancer, gastric cancer, pancreatic cancer or other GI cancers.  He has undergone colonoscopy recently.  There is no history of cholecystectomy.    PREVIOUS ENDOSCOPY:    Procedure Date: 5/31/2017 6:56 AM   Impression:               - Abnormal digital rectal exam.                             - One 3 mm polyp in the cecum, removed with a cold                             snare. Resected and retrieved.                             - One 3 mm polyp at 20 cm proximal to the anus,                             removed with a cold snare. Resected and retrieved.                             - Diverticulosis in the entire examined colon.                             - Internal hemorrhoids.     EGD May 2016.  Normal EGD.  Biopsies negative for H. pylori.    PROBLEM LIST  Patient  Active Problem List    Diagnosis Date Noted     PRESLEY (obstructive sleep apnea)      Priority: Medium     11/29/2007(200#)-AHI 37.7, RDI 50, lowest oxygen saturation 80%. CPAP of 7 cm/H9O was effective.       Non-small cell carcinoma of lung, left (H) 10/17/2016     Priority: Medium     Stage 1A (pT1aN0) 0.9 x 0.7 x 0.5 cm grade 1 well differentiated adenocarcinoma of the left lower lobe of the lung with invasive component being 0.3cm, s/p wedge resection and mediastinal LN sampling on 9/23/16.       Nonalcoholic hepatosteatosis 10/17/2016     Priority: Medium     History of radiation exposure 09/30/2016     Priority: Medium     Hypertriglyceridemia 12/18/2015     Priority: Medium     Type 2 diabetes mellitus with diabetic polyneuropathy (H) 10/15/2015     Priority: Medium     Obesity 10/15/2015     Priority: Medium     Hyperlipidemia with target LDL less than 100 12/20/2013     Priority: Medium     Diagnosis updated by automated process. Provider to review and confirm.       Gout 12/20/2013     Priority: Medium     Diagnosed by joint tap August 2013 per patient report.        Anemia 07/03/2013     Priority: Medium     Persistent mild anemia. Needs evaluation. May be thalassemia carrier.       GERD (gastroesophageal reflux disease) 05/15/2012     Priority: Medium     Diabetic neuropathy (H) 05/07/2012     Priority: Medium     Advanced directives, counseling/discussion 06/16/2011     Priority: Medium     Advance Directive Problem List Overview:   Name Relationship Phone    Primary Health Care Agent            Alternative Health Care Agent          Discussed advance care planning with patient; information given to patient to review.  Bindu Smith   6/16/2011          Hypertension goal BP (blood pressure) < 140/90 06/16/2011     Priority: Medium     High triglycerides 02/21/2011     Priority: Medium     Tubular adenoma of colon 12/01/2009     Priority: Medium     follow up colonoscopy 5 years requested 5-2017  due.         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, overuse, 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     Varicose veins          PREVIOUS SURGERIES: (I personally reviewed this history with the patient at today's visit)   Past Surgical History:   Procedure Laterality Date     BRONCHOSCOPY FLEXIBLE N/A 9/23/2016    Procedure: BRONCHOSCOPY FLEXIBLE;  Surgeon: Gayle Moya MD;  Location: [facility] OR     COLONOSCOPY  5-03     COLONOSCOPY WITH CO2 INSUFFLATION N/A 5/31/2017    Procedure: COLONOSCOPY WITH CO2 INSUFFLATION;  COLON SCREEN/ SEB;  Surgeon: Margarito Rasheed DO;  Location: [facility] OR     GENITOURINARY SURGERY      kidney stones     THORACOSCOPIC WEDGE RESECTION LUNG Left 9/23/2016    Procedure: THORACOSCOPIC WEDGE RESECTION LUNG;  Surgeon: Gayle Moya MD;  Location: [facility] OR     VASCULAR SURGERY      varicose vein         ALLERGIES:     Allergies   Allergen Reactions     Penicillins Rash     Ace Inhibitors Cough     Indomethacin Other (See Comments)     Severe dizziness       PERTINENT MEDICATIONS:    Current Outpatient Prescriptions:      allopurinol (ZYLOPRIM) 300 MG tablet, Take 1 tablet (300 mg) by mouth daily, Disp: 90 tablet, Rfl: 3     ASPIRIN 81 MG PO TABS, 1 TABLET DAILY(*), Disp: , Rfl:      atorvastatin (LIPITOR) 40 MG tablet, Take 1 tablet (40 mg) by mouth daily, Disp: 90 tablet, Rfl: 3     Azelaic Acid (FINACEA) 15 % gel, Apply small amount twice a day to skin, Disp: 50 g, Rfl: 3     blood glucose monitoring (ONETOUCH ULTRA) test strip, Use to test blood sugars 2 times daily or as directed., Disp: 100 strip, Rfl: 11     capsaicin (ZOSTRIX) 0.025 % CREA cream, Apply 1 g topically 3 times daily, Disp: 60 g, Rfl: 3     CINNAMON 500 MG OR  TABS, Take one tablet twice daily., Disp: , Rfl:      diclofenac (VOLTAREN) 1 % GEL topical gel, Apply 4 grams to knees or 2 grams to hands four times daily using enclosed dosing card., Disp: 100 g, Rfl: 1     FISH OIL 1000 MG OR CAPS, daily, Disp: , Rfl:      fluticasone (FLONASE) 50 MCG/ACT nasal spray, Spray 1-2 sprays into both nostrils daily, Disp: 1 Bottle, Rfl: 1     glimepiride (AMARYL) 4 MG tablet, Take 1 tablet (4 mg) by mouth 2 times daily, Disp: 180 tablet, Rfl: 3     losartan-hydrochlorothiazide (HYZAAR) 100-25 MG per tablet, Take 1 tablet by mouth daily, Disp: 90 tablet, Rfl: 3     metFORMIN (GLUCOPHAGE) 1000 MG tablet, Take 1 tablet (1,000 mg) by mouth 2 times daily (with meals), Disp: 180 tablet, Rfl: 3     MULTIPLE VITAMINS OR, 1 a day, Disp: , Rfl:      nitroglycerin (NITROSTAT) 0.4 MG SL tablet, Place 1 tablet (0.4 mg) under the tongue every 5 minutes as needed for chest pain If you are still having symptoms after 3 doses (15 minutes) call 911., Disp: 25 tablet, Rfl: 3     omeprazole (PRILOSEC) 20 MG capsule, Take 1 capsule (20 mg) by mouth 2 times daily Take 30-60 minutes before a meal., Disp: 180 capsule, Rfl: 3     order for DME, One touch ultra  Test strips for checking blood sugars 2 times a day., Disp: 200 strip, Rfl: 1     Oxymetazoline HCl (AFRIN NASAL SPRAY NA), Spray in nostril as needed, Disp: , Rfl:      PROAIR  (90 Base) MCG/ACT inhaler, INHALE 2-4 PUFFS EVERY 4 HOURS AS NEEDED FOR SHORTNESS OF BREATH/ DYSPNEA OR WHEEZING, Disp: 8.5 Inhaler, Rfl: 3     sucralfate (CARAFATE) 1 GM tablet, Take 1 tablet (1 g) by mouth 3 times daily as needed (Abdominal pain), Disp: 90 tablet, Rfl: 0     [DISCONTINUED] ORDER FOR DME, One touch ultra  Test strips for checking blood sugars 2 times a day., Disp: 200 strip, Rfl: 1  No current facility-administered medications for this visit.     Facility-Administered Medications Ordered in Other Visits:      DOBUTamine 500 mg in dextrose 5% 250 mL  "(adult std), 15 mcg/kg/min, Intravenous, Continuous, Pma Dela Cruz MD, Last Rate: 44.1 mL/hr at 04/14/16 1551, 15 mcg/kg/min at 04/14/16 1551    SOCIAL HISTORY:  Social History     Social History     Marital status:      Spouse name: N/A     Number of children: N/A     Years of education: N/A     Occupational History      [employer]     Social History Main Topics     Smoking status: Former Smoker     Quit date: 2/1/1992     Smokeless tobacco: Never Used      Comment: 15 + years      Alcohol use No     Drug use: No     Sexual activity: No     Other Topics Concern     Parent/Sibling W/ Cabg, Mi Or Angioplasty Before 65f 55m? No     Social History Narrative       FAMILY HISTORY: (I personally reviewed this history with the patient at today's visit)  Family History   Problem Relation Age of Onset     Cerebrovascular Disease Father      Cancer Sister      ovarary          ROS:    No fevers or chills  No weight loss  No blurry vision, double vision or change in vision  No sore throat  No lymphadenopathy  No headache, paraesthesias, or weakness in a limb  No shortness of breath or wheezing  No chest pain or pressure  No arthralgias or myalgias  No rashes or skin changes  No odynophagia or dysphagia  No BRBPR, hematochezia, melena  No dysuria, frequency or urgency  No hot/cold intolerance or polyria  No anxiety or depression  PHYSICAL EXAMINATION:  Constitutional: aaox3, cooperative, pleasant, not dyspneic/diaphoretic, no acute distress  Vitals reviewed: /74  Pulse 94  Ht 1.753 m (5' 9\")  Wt 101.9 kg (224 lb 9.6 oz)  SpO2 97%  BMI 33.17 kg/m2  Wt:   Wt Readings from Last 2 Encounters:   10/16/18 101.9 kg (224 lb 9.6 oz)   10/11/18 103 kg (227 lb)      Eyes: Sclera anicteric/injected  Ears/nose/mouth/throat: Normal oropharynx without ulcers or exudate, mucus membranes moist, hearing intact  Neck: supple, thyroid normal size  CV: No edema, RRR  Respiratory: Unlabored breathing, CTAB  Lymph: No " submandibular, supraclavicular or inguinal lymphadenopathy  Abd:  Nondistended, no masses, +bs, no hepatosplenomegaly, slightly tender in the epigastrium, no peritoneal signs  Skin: warm, perfused, no jaundice  Psych: Normal affect  MSK: Normal gait      PERTINENT STUDIES: (I personally reviewed these laboratory studies today)  Most recent CBC:   Recent Labs   Lab Test  04/16/18   0834  12/20/17   0904   WBC  7.3  7.9   HGB  12.3*  13.2*   HCT  37.9*  40.6   PLT  212  221     Most recent hepatic panel:  Recent Labs   Lab Test  10/16/18   1455  04/16/18   0834   ALT  45  54   AST  24  36     Most recent creatinine:  Recent Labs   Lab Test  04/16/18   0834  05/23/17   1330   CR  0.70  0.83       RADIOLOGY:    Examination:  CT ABDOMEN PELVIS HEMATURIA W/WO IV CONTRAST 5/30/2018  3:03 PM      History: Gross hematuria     Comparison: CT of the chest, abdomen, pelvis 5/23/2017.     Technique: CT of the abdomen and pelvis were obtained without and with  IV contrast per CT urogram protocol. Sagittal and coronal  reconstructions created and reviewed.     Contrast: 135 ML ISOVUE 370     Findings:   Diffuse hypoattenuation of the hepatic parenchyma. Increased  attenuation along the gallbladder fossa is favored to represent focal  fatty sparing. The spleen, adrenal glands, pancreas, and gallbladder  are unremarkable.     Symmetric nephrographic phase without hydronephrosis. Punctate  calcific density of the superior pole of the right kidney measures 3  mm. Punctate calcific density of the interpolar region of the left  kidney measures 3 mm. Scattered hypoattenuating structures of both  kidneys are too small to characterize. Several fluid attenuating  structures likely represent simple cysts. No new lesions of the renal  parenchyma. On excretory phase imaging, the right ureter is opacified  throughout its course the left ureter is opacified proximally, but not  opacified distally. The caliber of the ureter remains  narrow  throughout its course. No periureteral stranding or hydroureter.     The small and large bowel are normal in caliber. The major abdominal  vasculature is patent. There are atherosclerotic calcifications of the  abdominal aorta and iliac arteries. No retroperitoneal, mesenteric, or  inguinal lymphadenopathy. Scattered colonic diverticula without  surrounding inflammatory change. The appendix is visualized and  unremarkable.     Degenerative changes of the lumbar spine. No suspicious or acute  osseous abnormalities.      The lung bases are clear with surgical changes along the left major  fissure.         Impression:   1. Punctate nonobstructing renal calculi bilaterally. No other  findings to explain gross hematuria.  2. Hepatic steatosis.  3. Diverticulosis without diverticulitis.     I have personally reviewed the examination and initial interpretation  and I agree with the findings.       ASSESSMENT/PLAN:    Sven Givens is a 63 year old male who presents for evaluation of epigastric abdominal pain.  Symptoms are characterized by epigastric abdominal pain which does change with intake of spicy or fried foods.  There is no history of weight loss or family history of GI malignancy.  It is possible that he has a functional abdominal pain syndrome given his prior negative evaluation.  However, given his age malignancies in the differential as well as common upper GI tract pathology including ulcers, gastritis, GERD, pancreatic pathology or gallbladder pathology.  We will plan to obtain lab work, stool H-pylori and an abdominal ultrasound.  He can continue on twice daily PPI and we will give him a prescription for Carafate as needed.  He will then follow-up with us in the office.  If abdominal pain continues or if labs are abnormal or if evidence of continued anemia, then repeat upper endoscopy is likely indicated.    Dyspepsia  Orders Placed This Encounter   Procedures     US Abdomen Complete     Tissue  transglutaminase abbie IgA and IgG     H Pylori antigen stool     Amylase     Lipase     Hepatic panel       RTC 6 weeks.    Thank you for this consultation.  It was a pleasure to participate in the care of this patient; please contact us with any further questions.    This note was created with voice recognition software, and while reviewed for accuracy, typos may remain.     Abbe Mccarty MD  Adjunct  of Medicine  Division of Gastroenterology, Hepatology and Nutrition  [facility]  [phone]

## 2018-10-18 DIAGNOSIS — R10.13 DYSPEPSIA: ICD-10-CM

## 2018-10-18 LAB
TTG IGA SER-ACNC: 1 U/ML
TTG IGG SER-ACNC: <1 U/ML

## 2018-10-18 PROCEDURE — 87338 HPYLORI STOOL AG IA: CPT | Performed by: INTERNAL MEDICINE

## 2018-10-19 LAB
H PYLORI AG STL QL IA: NORMAL
SPECIMEN SOURCE: NORMAL

## 2018-10-23 ENCOUNTER — RADIANT APPOINTMENT (OUTPATIENT)
Dept: ULTRASOUND IMAGING | Facility: CLINIC | Age: 63
End: 2018-10-23
Attending: INTERNAL MEDICINE
Payer: COMMERCIAL

## 2018-10-23 DIAGNOSIS — R10.13 DYSPEPSIA: ICD-10-CM

## 2018-10-23 PROCEDURE — 76700 US EXAM ABDOM COMPLETE: CPT | Performed by: RADIOLOGY

## 2018-10-29 ENCOUNTER — MYC MEDICAL ADVICE (OUTPATIENT)
Dept: GASTROENTEROLOGY | Facility: CLINIC | Age: 63
End: 2018-10-29

## 2018-10-30 NOTE — TELEPHONE ENCOUNTER
Results sent via retsCloud message by Dr. Mccarty, pt read.    Lili Gregory RN, BSN, PHN  [facility]  GI/Gen Surg/Hepatology Care Coordinator

## 2018-11-07 DIAGNOSIS — E11.42 TYPE 2 DIABETES MELLITUS WITH DIABETIC POLYNEUROPATHY, WITHOUT LONG-TERM CURRENT USE OF INSULIN (H): ICD-10-CM

## 2018-11-07 NOTE — TELEPHONE ENCOUNTER
"Requested Prescriptions   Pending Prescriptions Disp Refills     ONETOUCH ULTRA test strip [Pharmacy Med Name: ONE TOUCH ULTRA BLUE TEST STRP]  Last Written Prescription Date:  04/16/18  Last Fill Quantity: 100,  # refills: 11   Last Office Visit with [facility], [facility] or [facility] prescribing provider:  04/16/18-Jose De Jesus   Future Office Visit:    Next 5 appointments (look out 90 days)     Nov 27, 2018  2:30 PM CST   Return Visit with Abbe Mccarty MD   [facility]    [address] [place] [id number] [phone]                200 strip 1     Sig: USE TO CHECK BLOOD SUGARS TWICE A DAY.    Diabetic Supplies Protocol Failed    11/7/2018  2:43 AM       Failed - Recent (6 mo) or future (30 days) visit within the authorizing provider's specialty    Patient had office visit in the last 6 months or has a visit in the next 30 days with authorizing provider.  See \"Patient Info\" tab in inbasket, or \"Choose Columns\" in Meds & Orders section of the refill encounter.           Passed - Patient is 18 years of age or older          "

## 2018-11-09 NOTE — TELEPHONE ENCOUNTER
Prescription approved per [facility] Refill Protocol.    Flor Beatty RN, [facility]

## 2018-11-27 ENCOUNTER — OFFICE VISIT (OUTPATIENT)
Dept: GASTROENTEROLOGY | Facility: CLINIC | Age: 63
End: 2018-11-27
Payer: COMMERCIAL

## 2018-11-27 VITALS
BODY MASS INDEX: 33.5 KG/M2 | DIASTOLIC BLOOD PRESSURE: 94 MMHG | OXYGEN SATURATION: 98 % | SYSTOLIC BLOOD PRESSURE: 138 MMHG | HEART RATE: 106 BPM | HEIGHT: 69 IN | WEIGHT: 226.2 LBS

## 2018-11-27 DIAGNOSIS — R10.13 ABDOMINAL PAIN, EPIGASTRIC: Primary | ICD-10-CM

## 2018-11-27 DIAGNOSIS — R10.13 DYSPEPSIA: ICD-10-CM

## 2018-11-27 LAB
BASOPHILS # BLD AUTO: 0.1 10E9/L (ref 0–0.2)
BASOPHILS NFR BLD AUTO: 0.7 %
DIFFERENTIAL METHOD BLD: ABNORMAL
EOSINOPHIL # BLD AUTO: 0.1 10E9/L (ref 0–0.7)
EOSINOPHIL NFR BLD AUTO: 1.4 %
ERYTHROCYTE [DISTWIDTH] IN BLOOD BY AUTOMATED COUNT: 12.3 % (ref 10–15)
HCT VFR BLD AUTO: 38.8 % (ref 40–53)
HGB BLD-MCNC: 12.7 G/DL (ref 13.3–17.7)
IMM GRANULOCYTES # BLD: 0 10E9/L (ref 0–0.4)
IMM GRANULOCYTES NFR BLD: 0.5 %
LYMPHOCYTES # BLD AUTO: 2.5 10E9/L (ref 0.8–5.3)
LYMPHOCYTES NFR BLD AUTO: 28.6 %
MCH RBC QN AUTO: 28.7 PG (ref 26.5–33)
MCHC RBC AUTO-ENTMCNC: 32.7 G/DL (ref 31.5–36.5)
MCV RBC AUTO: 88 FL (ref 78–100)
MONOCYTES # BLD AUTO: 0.6 10E9/L (ref 0–1.3)
MONOCYTES NFR BLD AUTO: 6.8 %
NEUTROPHILS # BLD AUTO: 5.4 10E9/L (ref 1.6–8.3)
NEUTROPHILS NFR BLD AUTO: 62 %
PLATELET # BLD AUTO: 226 10E9/L (ref 150–450)
RBC # BLD AUTO: 4.43 10E12/L (ref 4.4–5.9)
WBC # BLD AUTO: 8.7 10E9/L (ref 4–11)

## 2018-11-27 PROCEDURE — 85025 COMPLETE CBC W/AUTO DIFF WBC: CPT | Performed by: INTERNAL MEDICINE

## 2018-11-27 PROCEDURE — 36415 COLL VENOUS BLD VENIPUNCTURE: CPT | Performed by: INTERNAL MEDICINE

## 2018-11-27 PROCEDURE — 99213 OFFICE O/P EST LOW 20 MIN: CPT | Performed by: INTERNAL MEDICINE

## 2018-11-27 ASSESSMENT — PAIN SCALES - GENERAL: PAINLEVEL: MILD PAIN (3)

## 2018-11-27 NOTE — MR AVS SNAPSHOT
After Visit Summary   11/27/2018    Sven Givens    MRN: [id number]           Patient Information     Date Of Birth          1955        Visit Information        Provider Department      11/27/2018 2:30 PM Abbe Mccarty MD [facility]        Today's Diagnoses     Abdominal pain, epigastric    -  1      Care Instructions      Start miralax one scoop per day.  If no increase in bowel movements, ok to increase to 2 scoops per day.    Obtain the lab work.  If the blood counts are low, then we will discuss scheduling an upper endoscopy.    Discuss a medication called nortriptyline with your primary doctor which can help with abdominal pain.      Follow up in 4 months. Call [phone] to schedule           Follow-ups after your visit        Follow-up notes from your care team     Return in about 4 months (around 3/27/2019).      Your next 10 appointments already scheduled     Mar 19, 2019  3:00 PM CDT   CT CHEST W/O CONTRAST with MGCT1   [facility] ([facility]    [address] [place] [id number] [phone]           How do I prepare for my exam? (Food and drink instructions) No Food and Drink Restrictions.  How do I prepare for my exam? (Other instructions) You do not need to do anything special to prepare for this exam. For a sinus scan: Use your nose spray (nasal decongestant spray) as directed.  What should I wear: Please wear loose clothing, such as a sweat suit or jogging clothes. Avoid snaps, zippers and other metal. We may ask you to undress and put on a hospital gown.  How long does the exam take: Most scans take less than 20 minutes.  What should I bring: Please bring any scans or X-rays taken at other hospitals, if similar tests were done. Also bring a list of your medicines, including vitamins, minerals and over-the-counter drugs. It is safest to leave personal items at home.  Do I need a : No   is needed.  What do I need to tell my doctor? Be sure to tell your doctor: * If you have any allergies. * If there s any chance you are pregnant. * If you are breastfeeding.  What should I do after the exam: No restrictions, You may resume normal activities.  What is this test: A CT (computed tomography) scan is a series of pictures that allows us to look inside your body. The scanner creates images of the body in cross sections, much like slices of bread. This helps us see any problems more clearly.  Who should I call with questions: If you have any questions, please call the Imaging Department where you will have your exam. Directions, parking instructions, and other information is available on our website, [email or web address].            Mar 22, 2019  3:15 PM CDT   Return Visit with Lay Valencia MD   [facility] ([facility]    [address] [place] [id number] [phone]              Future tests that were ordered for you today     Open Future Orders        Priority Expected Expires Ordered    CBC with platelets differential Routine  11/27/2019 11/27/2018            Who to contact     If you have questions or need follow up information about today's clinic visit or your schedule please contact [facility] directly at [phone].  Normal or non-critical lab and imaging results will be communicated to you by MyChart, letter or phone within 4 business days after the clinic has received the results. If you do not hear from us within 7 days, please contact the clinic through MyChart or phone. If you have a critical or abnormal lab result, we will notify you by phone as soon as possible.  Submit refill requests through [employer] or call your pharmacy and they will forward the refill request to us. Please allow 3 business days for your refill to be completed.          Additional Information About Your Visit        MyChart Information      "[employer] gives you secure access to your electronic health record. If you see a primary care provider, you can also send messages to your care team and make appointments. If you have questions, please call your primary care clinic.  If you do not have a primary care provider, please call [phone] and they will assist you.      [employer] is an electronic gateway that provides easy, online access to your medical records. With [employer], you can request a clinic appointment, read your test results, renew a prescription or communicate with your care team.     To access your existing account, please contact your [facility] or call [phone] for assistance.        Care EveryWhere ID     This is your Care EveryWhere ID. This could be used by other organizations to access your [facility] medical records  [id number]        Your Vitals Were     Pulse Height Pulse Oximetry BMI (Body Mass Index)          106 1.753 m (5' 9\") 98% 33.4 kg/m2         Blood Pressure from Last 3 Encounters:   11/27/18 (!) 138/94   10/16/18 152/74   10/11/18 124/79    Weight from Last 3 Encounters:   11/27/18 102.6 kg (226 lb 3.2 oz)   10/16/18 101.9 kg (224 lb 9.6 oz)   10/11/18 103 kg (227 lb)               Primary Care Provider Office Phone # Fax #    Pam Yesenia Dela Cruz [phone]       [id number] [facility] [place] [address]        Equal Access to Services     [facility]: Hadii aad ku hadasho Soomaali, waaxda luqadaha, qaybta kaalmada adeegyada, candace carrera haysharrin jono devi la'latisha . So [facility] [phone].    ATENCIÓN: Si habla español, tiene a buchanan disposición servicios gratuitos de asistencia lingüística. Llame al [phone].    We comply with applicable federal civil rights laws and [place] laws. We do not discriminate on the basis of race, color, national origin, age, disability, sex, sexual orientation, or gender identity.            Thank you!     Thank you for choosing [facility] " [facility]  for your care. Our goal is always to provide you with excellent care. Hearing back from our patients is one way we can continue to improve our services. Please take a few minutes to complete the written survey that you may receive in the mail after your visit with us. Thank you!             Your Updated Medication List - Protect others around you: Learn how to safely use, store and throw away your medicines at www.disposemymeds.org.          This list is accurate as of 11/27/18  2:51 PM.  Always use your most recent med list.                   Brand Name Dispense Instructions for use Diagnosis    AFRIN NASAL SPRAY NA      Spray in nostril as needed        allopurinol 300 MG tablet    ZYLOPRIM    90 tablet    Take 1 tablet (300 mg) by mouth daily    Chronic gout of multiple sites, unspecified cause       aspirin 81 MG tablet    ASA     1 TABLET DAILY(*)        atorvastatin 40 MG tablet    LIPITOR    90 tablet    Take 1 tablet (40 mg) by mouth daily    Hypertriglyceridemia, Type 2 diabetes mellitus with diabetic polyneuropathy, without long-term current use of insulin (H)       azelaic acid 15 % external gel    FINACEA    50 g    Apply small amount twice a day to skin    Rosacea       capsaicin 0.025 % external cream    ZOSTRIX    60 g    Apply 1 g topically 3 times daily    Pain in both feet, Type 2 diabetes mellitus with diabetic polyneuropathy, without long-term current use of insulin (H)       cinnamon 500 MG Tabs      Take one tablet twice daily.        diclofenac 1 % topical gel    VOLTAREN    100 g    Apply 4 grams to knees or 2 grams to hands four times daily using enclosed dosing card.    Diabetic polyneuropathy associated with type 2 diabetes mellitus (H)       fish oil-omega-3 fatty acids 1000 MG capsule      daily        fluticasone 50 MCG/ACT nasal spray    FLONASE    1 Bottle    Spray 1-2 sprays into both nostrils daily    URI with cough and congestion       glimepiride 4 MG tablet     AMARYL    180 tablet    Take 1 tablet (4 mg) by mouth 2 times daily    Type 2 diabetes mellitus without complication, unspecified long term insulin use status       losartan-hydrochlorothiazide 100-25 MG per tablet    HYZAAR    90 tablet    Take 1 tablet by mouth daily    Essential hypertension with goal blood pressure less than 140/90       metFORMIN 1000 MG tablet    GLUCOPHAGE    180 tablet    Take 1 tablet (1,000 mg) by mouth 2 times daily (with meals)    Type 2 diabetes mellitus with hyperglycemia, without long-term current use of insulin (H)       MULTIPLE VITAMINS PO      1 a day        nitroGLYcerin 0.4 MG sublingual tablet    NITROSTAT    25 tablet    Place 1 tablet (0.4 mg) under the tongue every 5 minutes as needed for chest pain If you are still having symptoms after 3 doses (15 minutes) call 911.    Ischemic chest pain       omeprazole 20 MG DR capsule    priLOSEC    180 capsule    Take 1 capsule (20 mg) by mouth 2 times daily Take 30-60 minutes before a meal.    Gastroesophageal reflux disease with esophagitis       ONETOUCH ULTRA test strip   Generic drug:  blood glucose monitoring     200 strip    USE TO CHECK BLOOD SUGARS TWICE A DAY.    Type 2 diabetes mellitus with diabetic polyneuropathy, without long-term current use of insulin (H)       order for DME     200 strip    One touch ultra  Test strips for checking blood sugars 2 times a day.    Type 2 diabetes mellitus with diabetic polyneuropathy, without long-term current use of insulin (H)       PROAIR  (90 Base) MCG/ACT inhaler   Generic drug:  albuterol     8.5 Inhaler    INHALE 2-4 PUFFS EVERY 4 HOURS AS NEEDED FOR SHORTNESS OF BREATH/ DYSPNEA OR WHEEZING    Bronchospasm, Acute bronchitis, unspecified organism

## 2018-11-27 NOTE — PATIENT INSTRUCTIONS
Start miralax one scoop per day.  If no increase in bowel movements, ok to increase to 2 scoops per day.    Obtain the lab work.  If the blood counts are low, then we will discuss scheduling an upper endoscopy.    Discuss a medication called nortriptyline with your primary doctor which can help with abdominal pain.      Follow up in 4 months. Call [phone] to schedule

## 2018-11-29 NOTE — PROGRESS NOTES
GASTROENTEROLOGY FOLLOW UP CLINIC VISIT    CC/REFERRING MD:    Pam Dela Cruz  No ref. provider found    REASON FOR CONSULTATION:   No ref. provider found for   Chief Complaint   Patient presents with     RECHECK     follow up visit        HISTORY OF PRESENT ILLNESS:    Sven Givens is 63 year old male who presents for follow up of abdominal pain.  He was originally evaluated in this office October 16, 2018 for chronic epigastric pain.  At that point laboratory serologies and H pylori were ordered he was given a prescription for Carafate.  He did obtain the labs and took the Carafate.  Note that the state following these instructions, he does continue to have epigastric and right upper quadrant pain.  It often keeps him lying in bed.  He typically has 2-3 episodes of pain which is worse in the morning.  If he gets up and walks around then the pain is less severe.  The pain is also worse when he is constipated.  There is no weight loss.  No other changes in his medical history.    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     Varicose veins        PREVIOUS SURGERIES:   Past Surgical History:   Procedure Laterality Date     BRONCHOSCOPY FLEXIBLE N/A 9/23/2016    Procedure: BRONCHOSCOPY FLEXIBLE;  Surgeon: Gayle Moya MD;  Location: [facility] OR     COLONOSCOPY  5-03     COLONOSCOPY WITH CO2 INSUFFLATION N/A 5/31/2017    Procedure: COLONOSCOPY WITH CO2 INSUFFLATION;  COLON SCREEN/ SEB;  Surgeon: Margarito Rasheed DO;  Location:  OR     GENITOURINARY SURGERY      kidney stones     THORACOSCOPIC WEDGE RESECTION LUNG Left 9/23/2016    Procedure: THORACOSCOPIC WEDGE RESECTION LUNG;  Surgeon: Gayle Moya MD;  Location: [facility] OR     VASCULAR SURGERY      varicose  vein       ALLERGIES:     Allergies   Allergen Reactions     Penicillins Rash     Ace Inhibitors Cough     Indomethacin Other (See Comments)     Severe dizziness       PERTINENT MEDICATIONS:    Current Outpatient Prescriptions:      allopurinol (ZYLOPRIM) 300 MG tablet, Take 1 tablet (300 mg) by mouth daily, Disp: 90 tablet, Rfl: 3     ASPIRIN 81 MG PO TABS, 1 TABLET DAILY(*), Disp: , Rfl:      atorvastatin (LIPITOR) 40 MG tablet, Take 1 tablet (40 mg) by mouth daily, Disp: 90 tablet, Rfl: 3     Azelaic Acid (FINACEA) 15 % gel, Apply small amount twice a day to skin, Disp: 50 g, Rfl: 3     capsaicin (ZOSTRIX) 0.025 % CREA cream, Apply 1 g topically 3 times daily, Disp: 60 g, Rfl: 3     CINNAMON 500 MG OR TABS, Take one tablet twice daily., Disp: , Rfl:      diclofenac (VOLTAREN) 1 % GEL topical gel, Apply 4 grams to knees or 2 grams to hands four times daily using enclosed dosing card., Disp: 100 g, Rfl: 1     FISH OIL 1000 MG OR CAPS, daily, Disp: , Rfl:      fluticasone (FLONASE) 50 MCG/ACT nasal spray, Spray 1-2 sprays into both nostrils daily, Disp: 1 Bottle, Rfl: 1     glimepiride (AMARYL) 4 MG tablet, Take 1 tablet (4 mg) by mouth 2 times daily, Disp: 180 tablet, Rfl: 3     losartan-hydrochlorothiazide (HYZAAR) 100-25 MG per tablet, Take 1 tablet by mouth daily, Disp: 90 tablet, Rfl: 3     metFORMIN (GLUCOPHAGE) 1000 MG tablet, Take 1 tablet (1,000 mg) by mouth 2 times daily (with meals), Disp: 180 tablet, Rfl: 3     MULTIPLE VITAMINS OR, 1 a day, Disp: , Rfl:      nitroglycerin (NITROSTAT) 0.4 MG SL tablet, Place 1 tablet (0.4 mg) under the tongue every 5 minutes as needed for chest pain If you are still having symptoms after 3 doses (15 minutes) call 911., Disp: 25 tablet, Rfl: 3     omeprazole (PRILOSEC) 20 MG capsule, Take 1 capsule (20 mg) by mouth 2 times daily Take 30-60 minutes before a meal., Disp: 180 capsule, Rfl: 3     ONETOUCH ULTRA test strip, USE TO CHECK BLOOD SUGARS TWICE A DAY., Disp: 200  "strip, Rfl: 0     order for DME, One touch ultra  Test strips for checking blood sugars 2 times a day., Disp: 200 strip, Rfl: 1     Oxymetazoline HCl (AFRIN NASAL SPRAY NA), Spray in nostril as needed, Disp: , Rfl:      PROAIR  (90 Base) MCG/ACT inhaler, INHALE 2-4 PUFFS EVERY 4 HOURS AS NEEDED FOR SHORTNESS OF BREATH/ DYSPNEA OR WHEEZING, Disp: 8.5 Inhaler, Rfl: 3     [DISCONTINUED] ORDER FOR DME, One touch ultra  Test strips for checking blood sugars 2 times a day., Disp: 200 strip, Rfl: 1  No current facility-administered medications for this visit.     Facility-Administered Medications Ordered in Other Visits:      DOBUTamine 500 mg in dextrose 5% 250 mL (adult std), 15 mcg/kg/min, Intravenous, Continuous, Pam Dela Cruz MD, Last Rate: 44.1 mL/hr at 04/14/16 1551, 15 mcg/kg/min at 04/14/16 1551    FAMILY HISTORY:   Family History   Problem Relation Age of Onset     Cerebrovascular Disease Father      Cancer Sister      ovarary           ROS:    No fevers or chills  No weight loss  No blurry vision, double vision or change in vision  No sore throat  No lymphadenopathy  No headache, paraesthesias, or weakness in a limb  No shortness of breath or wheezing  No chest pain or pressure  No arthralgias or myalgias  No rashes or skin changes  No odynophagia or dysphagia  No BRBPR, hematochezia, melena  No dysuria, frequency or urgency  No hot/cold intolerance or polyria  No anxiety or depression  PHYSICAL EXAMINATION:  Constitutional: aaox3, cooperative, pleasant, not dyspneic/diaphoretic, no acute distress, obese  Vitals reviewed: BP (!) 138/94  Pulse 106  Ht 1.753 m (5' 9\")  Wt 102.6 kg (226 lb 3.2 oz)  SpO2 98%  BMI 33.4 kg/m2  Wt:   Wt Readings from Last 2 Encounters:   11/27/18 102.6 kg (226 lb 3.2 oz)   10/16/18 101.9 kg (224 lb 9.6 oz)      Eyes: Sclera anicteric/injected  Ears/nose/mouth/throat: Normal oropharynx without ulcers or exudate, mucus membranes moist, hearing intact  Neck: supple, " thyroid normal size  CV: No edema  Respiratory: Unlabored breathing  Lymph: No submandibular, supraclavicular or inguinal lymphadenopathy  Abd: Nondistended, no masses, +bs, no hepatosplenomegaly, nontender, no peritoneal signs  Skin: warm, perfused, no jaundice  Psych: Normal affect  MSK: Normal gait      PERTINENT STUDIES:   Most recent CBC:  Recent Labs   Lab Test  11/27/18   1502  04/16/18   0834   WBC  8.7  7.3   HGB  12.7*  12.3*   HCT  38.8*  37.9*   PLT  226  212     Most recent hepatic panel:  Recent Labs   Lab Test  10/16/18   1455  04/16/18   0834   ALT  45  54   AST  24  36     Most recent creatinine:  Recent Labs   Lab Test  04/16/18   0834  05/23/17   1330   CR  0.70  0.83       RADIOLOGY:    EXAMINATION: US ABDOMEN COMPLETE, 10/23/2018 8:31 AM      COMPARISON: CT abdomen and pelvis 5/30/2018     HISTORY: Worsening dyspepsia. History of treated left lung cancer.     TECHNIQUE: The abdomen was scanned in standard fashion with  specialized ultrasound transducer(s) using both grey scale and limited  color Doppler techniques. Today's study is technically challenged by  bowel gas. In addition, the liver is completely hyperechoic which  attenuates the ultrasound beam to evaluate underlying structures.     Findings:  Pancreas: Obscured by bowel gas.  Aorta and IVC:  Obscured by the liver and bowel gas. The middle and  distal portion of the aorta appears within normal limits.   Liver: The liver has diffuse increased echogenicity that attenuates  the ultrasound beam. Evaluation for mass or biliary ductal dilation is  limited. The main portal vein is possibly identified with color  Doppler and appears antegrade, possibly measuring 1.3 cm in diameter.  Gallbladder: The gallbladder appears normal without cholelithiasis.  Bile Ducts: Difficult to identify due to the attenuation of the liver.   The common bile duct measures 4 mm in diameter.  Kidneys: No hydronephrosis.  The craniocaudal dimensions are: right-  12.7  cm, left- 12.7 cm. One simple cyst in the right kidney measures  1.1 cm and 1 simple cyst in left kidney measures 1.3 cm.  Spleen: Enlarged, measuring 13.4 cm in greatest dimension.  Fluid: No evidence of ascites or pleural effusions.         Impression:   1.  Marked increased liver echogenicity, indicative of intrinsic  hepatic disease, most commonly fatty liver.  2.  However, the liver attenuates the ultrasound beam which obscures  visualization of underlying structures.  3.  The gallbladder appears normal.  4.  Mildly enlarged spleen.     AN MD MAISHA      ASSESSMENT/PLAN:    Sven Givens is a 63 year old male who presents for follow up of epigastric and right upper quadrant.  Objectively he is noted to have a low-grade anemia in the past.  Prior endoscopic evaluation has been unremarkable.  Is most likely that his symptoms represent irritable bowel or functional dyspepsia variant, less likely for her to be a serious GI pathology.  We will plan to obtain blood work at this time and if he continues to have unexplained anemia, then proceeding with a another upper endoscopy is reasonable.  We have also recommended that he initiate a regimen of daily MiraLAX.  He can also discuss initiation of neuromodulation with his primary care doctor.  We would typically suggest nortriptyline nightly which may be increased over time but prefer this prescription is managed by his primary care doctor or his psychiatrist given the potential for neuropsychiatric side effects..         Abdominal pain, epigastric  Dyspepsia  Orders Placed This Encounter   Procedures     CBC with platelets differential     Last Lab Result: Hemoglobin (g/dL)       Date                     Value                 04/16/2018               12.3 (L)         ----------     Standing Status:   Future     Standing Expiration Date:   11/27/2019       RTC 4 months with Bibiana or Jhonny    Thank you for this consultation.  It was a pleasure to participate in the  care of this patient; please contact us with any further questions.     This note was created with voice recognition software, and while reviewed for accuracy, typos may remain.     Abbe Mccarty MD  Adjunct  of Medicine  Division of Gastroenterology, Hepatology and Nutrition  [facility]  [phone]

## 2018-12-03 NOTE — PROGRESS NOTES
Mild anemia noted again.  Previously known to have mild epo deficiency.  Continue monitoring at this time.  If anemia worsens or abdominal pain fails to improve or worsens, then can repeat EGD.  Continue monitoring for now.  Abbe Mccarty MD

## 2018-12-14 DIAGNOSIS — I10 ESSENTIAL HYPERTENSION WITH GOAL BLOOD PRESSURE LESS THAN 140/90: ICD-10-CM

## 2018-12-14 NOTE — TELEPHONE ENCOUNTER
"Requested Prescriptions   Pending Prescriptions Disp Refills     terazosin (HYTRIN) 5 MG capsule [Pharmacy Med Name: TERAZOSIN 5 MG CAPSULE]  Last Written Prescription Date:  07/05/18  Last Fill Quantity: 90,  # refills: 1   Last Office Visit with LAI, BAILEY or [facility] prescribing provider:  04/16/18-Jose De Jesus   Future Office Visit:    90 capsule 1     Sig: TAKE 1 CAPSULE (5 MG) BY MOUTH AT BEDTIME    Alpha Blockers Failed - 12/14/2018  2:18 AM       Failed - Blood pressure under 140/90 in past 12 months    BP Readings from Last 3 Encounters:   11/27/18 (!) 138/94   10/16/18 152/74   10/11/18 124/79                Passed - Recent (12 mo) or future (30 days) visit within the authorizing provider's specialty    Patient had office visit in the last 12 months or has a visit in the next 30 days with authorizing provider or within the authorizing provider's specialty.  See \"Patient Info\" tab in inbasket, or \"Choose Columns\" in Meds & Orders section of the refill encounter.             Passed - Patient does not have Tadalafil, Vardenafil, or Sildenafil on their medication list       Passed - Patient is 18 years of age or older          "

## 2018-12-18 RX ORDER — TERAZOSIN 5 MG/1
5 CAPSULE ORAL AT BEDTIME
Qty: 90 CAPSULE | Refills: 1 | Status: SHIPPED | OUTPATIENT
Start: 2018-12-18 | End: 2019-05-28

## 2018-12-18 NOTE — TELEPHONE ENCOUNTER
Routing refill request to provider for review/approval because:  Drug not active on patient's medication list.    Flor Beatty RN, [facility]

## 2018-12-21 ENCOUNTER — ALLIED HEALTH/NURSE VISIT (OUTPATIENT)
Dept: NURSING | Facility: CLINIC | Age: 63
End: 2018-12-21
Payer: COMMERCIAL

## 2018-12-21 DIAGNOSIS — Z23 NEED FOR PROPHYLACTIC VACCINATION AND INOCULATION AGAINST INFLUENZA: Primary | ICD-10-CM

## 2018-12-21 PROCEDURE — 90662 IIV NO PRSV INCREASED AG IM: CPT

## 2018-12-21 PROCEDURE — 99207 ZZC NO CHARGE NURSE ONLY: CPT

## 2018-12-21 PROCEDURE — 90471 IMMUNIZATION ADMIN: CPT

## 2019-01-09 ENCOUNTER — DOCUMENTATION ONLY (OUTPATIENT)
Dept: SLEEP MEDICINE | Facility: CLINIC | Age: 64
End: 2019-01-09

## 2019-01-09 NOTE — PROGRESS NOTES
6 month [facility]    STM Recheck Visit     Diagnostic AHI: 37.7  PSG    Data only recheck     Assessment: Pt meeting objective benchmarks.     Action plan: pt to follow up per provider request       Device type: Auto-CPAP  PAP settings: CPAP min 7.0 cm  H20     CPAP max 10.0 cm  H20    95th% pressure 9.9 cm  H20   Objective measures: 14 day rolling measures      Compliance  73 %      Leak  29.45 lpm  last  upload      AHI 2.46   last  upload      Average number of minutes 184      Objective measure goal  Compliance   Goal >70%  Leak   Goal < 24 lpm  AHI  Goal < 5  Usage  Goal >240

## 2019-01-21 PROBLEM — I20.1 ANGINA PECTORIS WITH DOCUMENTED SPASM (H): Status: ACTIVE | Noted: 2019-01-21

## 2019-03-08 ENCOUNTER — TELEPHONE (OUTPATIENT)
Dept: FAMILY MEDICINE | Facility: CLINIC | Age: 64
End: 2019-03-08

## 2019-03-08 ENCOUNTER — OFFICE VISIT (OUTPATIENT)
Dept: FAMILY MEDICINE | Facility: CLINIC | Age: 64
End: 2019-03-08
Payer: COMMERCIAL

## 2019-03-08 VITALS
BODY MASS INDEX: 32.94 KG/M2 | WEIGHT: 222.4 LBS | HEART RATE: 84 BPM | HEIGHT: 69 IN | DIASTOLIC BLOOD PRESSURE: 78 MMHG | TEMPERATURE: 97.9 F | SYSTOLIC BLOOD PRESSURE: 136 MMHG | OXYGEN SATURATION: 99 %

## 2019-03-08 DIAGNOSIS — G47.33 OSA (OBSTRUCTIVE SLEEP APNEA): Chronic | ICD-10-CM

## 2019-03-08 DIAGNOSIS — I20.1 ANGINA PECTORIS WITH DOCUMENTED SPASM (H): ICD-10-CM

## 2019-03-08 DIAGNOSIS — I10 HYPERTENSION GOAL BP (BLOOD PRESSURE) < 140/90: Chronic | ICD-10-CM

## 2019-03-08 DIAGNOSIS — K21.00 GASTROESOPHAGEAL REFLUX DISEASE WITH ESOPHAGITIS: ICD-10-CM

## 2019-03-08 DIAGNOSIS — C34.92 NON-SMALL CELL CARCINOMA OF LUNG, LEFT (H): ICD-10-CM

## 2019-03-08 DIAGNOSIS — Z12.5 SCREENING FOR PROSTATE CANCER: ICD-10-CM

## 2019-03-08 DIAGNOSIS — D12.6 TUBULAR ADENOMA OF COLON: ICD-10-CM

## 2019-03-08 DIAGNOSIS — E11.42 TYPE 2 DIABETES MELLITUS WITH DIABETIC POLYNEUROPATHY, WITHOUT LONG-TERM CURRENT USE OF INSULIN (H): Primary | Chronic | ICD-10-CM

## 2019-03-08 DIAGNOSIS — R06.2 WHEEZING: ICD-10-CM

## 2019-03-08 DIAGNOSIS — E78.5 HYPERLIPIDEMIA WITH TARGET LDL LESS THAN 100: Chronic | ICD-10-CM

## 2019-03-08 DIAGNOSIS — K59.01 SLOW TRANSIT CONSTIPATION: ICD-10-CM

## 2019-03-08 LAB
ALBUMIN SERPL-MCNC: 4.1 G/DL (ref 3.4–5)
ALT SERPL W P-5'-P-CCNC: 47 U/L (ref 0–70)
ANION GAP SERPL CALCULATED.3IONS-SCNC: 10 MMOL/L (ref 3–14)
BUN SERPL-MCNC: 15 MG/DL (ref 7–30)
CALCIUM SERPL-MCNC: 9.5 MG/DL (ref 8.5–10.1)
CHLORIDE SERPL-SCNC: 105 MMOL/L (ref 94–109)
CHOLEST SERPL-MCNC: 125 MG/DL
CO2 SERPL-SCNC: 25 MMOL/L (ref 20–32)
CREAT SERPL-MCNC: 0.71 MG/DL (ref 0.66–1.25)
ERYTHROCYTE [DISTWIDTH] IN BLOOD BY AUTOMATED COUNT: 12.8 % (ref 10–15)
GFR SERPL CREATININE-BSD FRML MDRD: >90 ML/MIN/{1.73_M2}
GLUCOSE SERPL-MCNC: 166 MG/DL (ref 70–99)
HBA1C MFR BLD: 9.9 % (ref 0–5.6)
HCT VFR BLD AUTO: 41.1 % (ref 40–53)
HDLC SERPL-MCNC: 35 MG/DL
HGB BLD-MCNC: 13.4 G/DL (ref 13.3–17.7)
LDLC SERPL CALC-MCNC: 34 MG/DL
MCH RBC QN AUTO: 29.3 PG (ref 26.5–33)
MCHC RBC AUTO-ENTMCNC: 32.6 G/DL (ref 31.5–36.5)
MCV RBC AUTO: 90 FL (ref 78–100)
NONHDLC SERPL-MCNC: 90 MG/DL
PHOSPHATE SERPL-MCNC: 2.6 MG/DL (ref 2.5–4.5)
PLATELET # BLD AUTO: 220 10E9/L (ref 150–450)
POTASSIUM SERPL-SCNC: 4.1 MMOL/L (ref 3.4–5.3)
PSA SERPL-ACNC: 2.28 UG/L (ref 0–4)
RBC # BLD AUTO: 4.57 10E12/L (ref 4.4–5.9)
SODIUM SERPL-SCNC: 140 MMOL/L (ref 133–144)
TRIGL SERPL-MCNC: 279 MG/DL
WBC # BLD AUTO: 7.3 10E9/L (ref 4–11)

## 2019-03-08 PROCEDURE — 84460 ALANINE AMINO (ALT) (SGPT): CPT | Performed by: FAMILY MEDICINE

## 2019-03-08 PROCEDURE — 36415 COLL VENOUS BLD VENIPUNCTURE: CPT | Performed by: FAMILY MEDICINE

## 2019-03-08 PROCEDURE — 85027 COMPLETE CBC AUTOMATED: CPT | Performed by: FAMILY MEDICINE

## 2019-03-08 PROCEDURE — 80061 LIPID PANEL: CPT | Performed by: FAMILY MEDICINE

## 2019-03-08 PROCEDURE — 83036 HEMOGLOBIN GLYCOSYLATED A1C: CPT | Performed by: FAMILY MEDICINE

## 2019-03-08 PROCEDURE — G0103 PSA SCREENING: HCPCS | Performed by: FAMILY MEDICINE

## 2019-03-08 PROCEDURE — 99214 OFFICE O/P EST MOD 30 MIN: CPT | Performed by: FAMILY MEDICINE

## 2019-03-08 PROCEDURE — 80069 RENAL FUNCTION PANEL: CPT | Performed by: FAMILY MEDICINE

## 2019-03-08 RX ORDER — GLIMEPIRIDE 4 MG/1
4 TABLET ORAL 2 TIMES DAILY
Qty: 180 TABLET | Refills: 3 | Status: SHIPPED | OUTPATIENT
Start: 2019-03-08 | End: 2020-05-01

## 2019-03-08 RX ORDER — LOSARTAN POTASSIUM AND HYDROCHLOROTHIAZIDE 25; 100 MG/1; MG/1
1 TABLET ORAL DAILY
Qty: 90 TABLET | Refills: 3 | Status: SHIPPED | OUTPATIENT
Start: 2019-03-08 | End: 2020-05-13

## 2019-03-08 RX ORDER — ALBUTEROL SULFATE 90 UG/1
2 AEROSOL, METERED RESPIRATORY (INHALATION) EVERY 4 HOURS PRN
Qty: 8.5 INHALER | Refills: 3 | Status: SHIPPED | OUTPATIENT
Start: 2019-03-08 | End: 2019-11-26

## 2019-03-08 RX ORDER — SENNA AND DOCUSATE SODIUM 50; 8.6 MG/1; MG/1
1 TABLET, FILM COATED ORAL AT BEDTIME
Qty: 90 TABLET | Refills: 3 | Status: SHIPPED | OUTPATIENT
Start: 2019-03-08 | End: 2020-03-03

## 2019-03-08 ASSESSMENT — PAIN SCALES - GENERAL: PAINLEVEL: NO PAIN (0)

## 2019-03-08 ASSESSMENT — MIFFLIN-ST. JEOR: SCORE: 1784.18

## 2019-03-08 NOTE — TELEPHONE ENCOUNTER
Insurance covers one pill per day.   Plan does not cover this medication. Please call plan at [phone] to initiate prior authorization or call/fax pharmacy to change medication at [phone]. Patient ID # is [id number].        [facility]

## 2019-03-08 NOTE — PROGRESS NOTES
SUBJECTIVE:   Sven Givens is a 63 year old male who presents to clinic today for the following health issues:    Diabetes Follow-up    Patient is checking blood sugars: once daily.  Results are as follows:         bedtime - 250-300, not eating healthy, family stress with death of sister, daughter sick with     Diabetic concerns: blood sugar frequently over 200     Symptoms of hypoglycemia (low blood sugar): none     Paresthesias (numbness or burning in feet) or sores: Yes both feet     Date of last diabetic eye exam: 4/2018.    Diabetes Management Resources    Hyperlipidemia Follow-Up      Rate your low fat/cholesterol diet?: good    Taking statin?  Yes, no muscle aches from statin    Other lipid medications/supplements?:  none    Hypertension Follow-up      Outpatient blood pressures are being checked at home.  Results are 135/80.    Low Salt Diet: no added salt      Constipation for past 6-7 months. Miralax alone is not helping. Hard to push out stools every 3 days. Trying to eat more fiber.     BP Readings from Last 2 Encounters:   03/08/19 136/78   11/27/18 (!) 138/94     Hemoglobin A1C (%)   Date Value   03/08/2019 9.9 (H)   04/16/2018 8.5 (H)     LDL Cholesterol Calculated (mg/dL)   Date Value   03/08/2019 34   04/16/2018 51       Amount of exercise or physical activity: None    Problems taking medications regularly: No    Medication side effects: none    Diet: low salt and low fat/cholesterol    Vascular Disease Follow-up:  Coronary Artery Disease (CAD)      Chest pain or pressure, left side neck or arm pain: No    Shortness of breath/increased sweats/nausea with exertion: No    Pain in calves walking 1-2 blocks: No    Worsened or new symptoms since last visit: No    Nitroglycerin use: no    Daily aspirin use: Yes    Non-small cell carcinoma of left lung- surgery to remove without chemotherapy or radiation. Colon adenoma. Follow up with gastroenterology and oncology as recommended.     Problem list and  histories reviewed & adjusted, as indicated.  Additional history: as documented    Patient Active Problem List   Diagnosis     Advanced directives, counseling/discussion     Hypertension goal BP (blood pressure) < 140/90     Diabetic neuropathy (H)     Tubular adenoma of colon     GERD (gastroesophageal reflux disease)     Anemia     Hyperlipidemia with target LDL less than 100     Gout     Type 2 diabetes mellitus with diabetic polyneuropathy (H)     History of radiation exposure     Non-small cell carcinoma of lung, left (H)     Nonalcoholic hepatosteatosis     PRESLEY (obstructive sleep apnea)     Angina pectoris with documented spasm (H)     Past Surgical History:   Procedure Laterality Date     BRONCHOSCOPY FLEXIBLE N/A 2016    Procedure: BRONCHOSCOPY FLEXIBLE;  Surgeon: Gayle Moya MD;  Location: [facility] OR     COLONOSCOPY       COLONOSCOPY WITH CO2 INSUFFLATION N/A 2017    Procedure: COLONOSCOPY WITH CO2 INSUFFLATION;  COLON SCREEN/ SEB;  Surgeon: Margarito Rasheed DO;  Location:  OR     GENITOURINARY SURGERY      kidney stones     THORACOSCOPIC WEDGE RESECTION LUNG Left 2016    Procedure: THORACOSCOPIC WEDGE RESECTION LUNG;  Surgeon: Gayle Moya MD;  Location: [facility] OR     VASCULAR SURGERY      varicose vein       Social History     Tobacco Use     Smoking status: Former Smoker     Last attempt to quit: 1992     Years since quittin.1     Smokeless tobacco: Never Used     Tobacco comment: 15 + years    Substance Use Topics     Alcohol use: No     Family History   Problem Relation Age of Onset     Cerebrovascular Disease Father      Cancer Sister         ovarary          Current Outpatient Medications   Medication Sig Dispense Refill     albuterol (PROAIR HFA) 108 (90 Base) MCG/ACT inhaler Inhale 2 puffs into the lungs every 4 hours as needed for shortness of breath / dyspnea or wheezing 8.5 Inhaler 3     glimepiride (AMARYL) 4 MG tablet Take 1 tablet (4 mg) by mouth 2 times daily  180 tablet 3     losartan-hydrochlorothiazide (HYZAAR) 100-25 MG tablet Take 1 tablet by mouth daily 90 tablet 3     metFORMIN (GLUCOPHAGE) 1000 MG tablet Take 1 tablet (1,000 mg) by mouth 2 times daily (with meals) 180 tablet 3     omeprazole (PRILOSEC) 20 MG DR capsule Take 1 capsule (20 mg) by mouth 2 times daily Take 30-60 minutes before a meal. 180 capsule 3     SENNA-docusate sodium (SENNA S) 8.6-50 MG tablet Take 1 tablet by mouth At Bedtime 90 tablet 3     allopurinol (ZYLOPRIM) 300 MG tablet Take 1 tablet (300 mg) by mouth daily 90 tablet 3     ASPIRIN 81 MG PO TABS 1 TABLET DAILY(*)       atorvastatin (LIPITOR) 40 MG tablet Take 1 tablet (40 mg) by mouth daily 90 tablet 3     Azelaic Acid (FINACEA) 15 % gel Apply small amount twice a day to skin 50 g 3     capsaicin (ZOSTRIX) 0.025 % CREA cream Apply 1 g topically 3 times daily 60 g 3     CINNAMON 500 MG OR TABS Take one tablet twice daily.       diclofenac (VOLTAREN) 1 % GEL topical gel Apply 4 grams to knees or 2 grams to hands four times daily using enclosed dosing card. 100 g 1     FISH OIL 1000 MG OR CAPS daily       fluticasone (FLONASE) 50 MCG/ACT nasal spray Spray 1-2 sprays into both nostrils daily 1 Bottle 1     MULTIPLE VITAMINS OR 1 a day       nitroglycerin (NITROSTAT) 0.4 MG SL tablet Place 1 tablet (0.4 mg) under the tongue every 5 minutes as needed for chest pain If you are still having symptoms after 3 doses (15 minutes) call 911. 25 tablet 3     ONETOUCH ULTRA test strip USE TO CHECK BLOOD SUGARS TWICE A DAY. 200 strip 0     order for DME One touch ultra  Test strips for checking blood sugars 2 times a day. 200 strip 1     Oxymetazoline HCl (AFRIN NASAL SPRAY NA) Spray in nostril as needed       terazosin (HYTRIN) 5 MG capsule TAKE 1 CAPSULE (5 MG) BY MOUTH AT BEDTIME 90 capsule 1     Allergies   Allergen Reactions     Penicillins Rash     Ace Inhibitors Cough     Indomethacin Other (See Comments)     Severe dizziness     Recent Labs  "  Lab Test 03/08/19  0926 10/16/18  1455 05/30/18  1445 04/16/18  0834 09/11/17  1621  04/14/17  0847  10/12/16  1056   A1C 9.9*  --   --  8.5* 7.6*  --   --    < >  --    LDL 34  --   --  51  --   --  35  --   --    HDL 35*  --   --  31*  --   --  35*  --   --    TRIG 279*  --   --  216*  --   --  161*  --   --    ALT 47 45  --  54  --    < >  --    < >  --    CR 0.71  --   --  0.70  --    < >  --    < >  --    GFRESTIMATED >90  --  85 >90  --    < >  --    < >  --    GFRESTBLACK >90  --  >90 >90  --    < >  --    < >  --    POTASSIUM 4.1  --   --  3.9  --    < >  --    < >  --    TSH  --   --   --  2.91  --   --   --   --  2.21    < > = values in this interval not displayed.      BP Readings from Last 3 Encounters:   03/08/19 136/78   11/27/18 (!) 138/94   10/16/18 152/74    Wt Readings from Last 3 Encounters:   03/08/19 100.9 kg (222 lb 6.4 oz)   11/27/18 102.6 kg (226 lb 3.2 oz)   10/16/18 101.9 kg (224 lb 9.6 oz)                  Labs reviewed in EPIC    Reviewed and updated as needed this visit by clinical staff  Tobacco  Allergies       Reviewed and updated as needed this visit by Provider         ROS:  Constitutional, HEENT, cardiovascular, pulmonary, gi and gu systems are negative, except as otherwise noted.    OBJECTIVE:     /78 (BP Location: Right arm, Patient Position: Sitting, Cuff Size: Adult Regular)   Pulse 84   Temp 97.9  F (36.6  C) (Oral)   Ht 1.753 m (5' 9\")   Wt 100.9 kg (222 lb 6.4 oz)   SpO2 99%   BMI 32.84 kg/m    Body mass index is 32.84 kg/m .  GENERAL: healthy, alert, well nourished, well hydrated, no distress, obese  HENT: ear canals- normal; TMs- normal; Nose- normal; Mouth- no ulcers, no lesions, throat is clear with no erythema or exudate.   NECK: no tenderness, no adenopathy, no asymmetry, no masses, no stiffness; thyroid- normal to palpation  RESP: lungs clear to auscultation - no rales, no rhonchi, no wheezes  CV: regular rates and rhythm, normal S1 S2, no S3 or S4 and " no murmur, no click or rub -  ABDOMEN: soft, no tenderness, no  hepatosplenomegaly, no masses, normal bowel sounds  MS: extremities- no gross deformities noted, no edema  SKIN: no suspicious lesions, no rashes  NEURO: strength and tone- normal, sensory exam- grossly normal, mentation- intact, speech- normal, reflexes- symmetric  BACK: no CVA tenderness, no paralumbar tenderness  PSYCH: Alert and oriented times 3; speech- coherent , normal rate and volume; able to articulate logical thoughts, able to abstract reason, no tangential thoughts, no hallucinations or delusions, affect- normal     Diagnostic Test Results:  Results for orders placed or performed in visit on 03/08/19   CBC with platelets   Result Value Ref Range    WBC 7.3 4.0 - 11.0 10e9/L    RBC Count 4.57 4.4 - 5.9 10e12/L    Hemoglobin 13.4 13.3 - 17.7 g/dL    Hematocrit 41.1 40.0 - 53.0 %    MCV 90 78 - 100 fl    MCH 29.3 26.5 - 33.0 pg    MCHC 32.6 31.5 - 36.5 g/dL    RDW 12.8 10.0 - 15.0 %    Platelet Count 220 150 - 450 10e9/L   Renal panel   Result Value Ref Range    Sodium 140 133 - 144 mmol/L    Potassium 4.1 3.4 - 5.3 mmol/L    Chloride 105 94 - 109 mmol/L    Carbon Dioxide 25 20 - 32 mmol/L    Anion Gap 10 3 - 14 mmol/L    Glucose 166 (H) 70 - 99 mg/dL    Urea Nitrogen 15 7 - 30 mg/dL    Creatinine 0.71 0.66 - 1.25 mg/dL    GFR Estimate >90 >60 mL/min/[1.73_m2]    GFR Estimate If Black >90 >60 mL/min/[1.73_m2]    Calcium 9.5 8.5 - 10.1 mg/dL    Phosphorus 2.6 2.5 - 4.5 mg/dL    Albumin 4.1 3.4 - 5.0 g/dL   Lipid panel reflex to direct LDL Fasting   Result Value Ref Range    Cholesterol 125 <200 mg/dL    Triglycerides 279 (H) <150 mg/dL    HDL Cholesterol 35 (L) >39 mg/dL    LDL Cholesterol Calculated 34 <100 mg/dL    Non HDL Cholesterol 90 <130 mg/dL   Hemoglobin A1c   Result Value Ref Range    Hemoglobin A1C 9.9 (H) 0 - 5.6 %   ALT   Result Value Ref Range    ALT 47 0 - 70 U/L   PSA, screen   Result Value Ref Range    PSA 2.28 0 - 4 ug/L  "      ASSESSMENT/PLAN:         Tobacco Cessation:   reports that he quit smoking about 27 years ago. he has never used smokeless tobacco.      BMI:   Estimated body mass index is 32.84 kg/m  as calculated from the following:    Height as of this encounter: 1.753 m (5' 9\").    Weight as of this encounter: 100.9 kg (222 lb 6.4 oz).   Weight management plan: Discussed healthy diet and exercise guidelines        ICD-10-CM    1. Type 2 diabetes mellitus with diabetic polyneuropathy, without long-term current use of insulin (H) E11.42 glimepiride (AMARYL) 4 MG tablet   Lab Results   Component Value Date    A1C 9.9 03/08/2019    A1C 8.5 04/16/2018    A1C 7.6 09/11/2017    A1C 7.7 03/28/2017    A1C 7.4 09/16/2016      worsening control with poor diet and exercise. Does not want to start insulin. Will review alternatives.   metFORMIN (GLUCOPHAGE) 1000 MG tablet     Hemoglobin A1c   2. Angina pectoris with documented spasm (H) I20.1 Denies recent angina and this is well controlled    3. Non-small cell carcinoma of lung, left (H) C34.92 Due for yearly CT scan next month.    4. Hypertension goal BP (blood pressure) < 140/90- blood pressure well controlled on medications  I10 losartan-hydrochlorothiazide (HYZAAR) 100-25 MG tablet     CBC with platelets     Renal panel   5. Hyperlipidemia with target LDL less than 100- stable and will recheck  E78.5 Lipid panel reflex to direct LDL Fasting     ALT   6. Gastroesophageal reflux disease with esophagitis K21.0 omeprazole (PRILOSEC) 20 MG DR capsule twice a day as needed reflux or heart burn   7. Tubular adenoma of colon D12.6 Follow up colonoscopy as recommended. 2022   8. PRESLEY (obstructive sleep apnea) G47.33 Wearing CPAP every night. Some insomnia   9. Slow transit constipation K59.01 SENNA-docusate sodium (SENNA S) 8.6-50 MG tablet add senna to regiment, continue Miralax. Discussed fiber sources in diet   10. Screening for prostate cancer Z12.5 PSA, screen   11. Wheezing R06.2 " albuterol (PROAIR HFA) 108 (90 Base) MCG/ACT inhaler as needed. Usually just with colds.        CONSULTATION/REFERRAL to oncology and gastroenterology   FUTURE LABS:       - Schedule fasting labs in 3 months  FUTURE APPOINTMENTS:       - Follow-up visit in 3 months or sooner if any questions or concerns.   Work on weight loss  Regular exercise  See Patient Instructions    Pam Dela Cruz MD  [facility]

## 2019-03-08 NOTE — TELEPHONE ENCOUNTER
I resent the medication as once a day. He will have to buy the second pill himself due to insurance restrictions.  Pam Dela Cruz MD

## 2019-03-08 NOTE — PATIENT INSTRUCTIONS
Patient Education     Constipation (Adult)  Constipation means that you have bowel movements that are less frequent than usual. Stools often become very hard and difficult to pass.  Constipation is very common. At some point in life, it affects almost everyone. Since everyone's bowel habits are different, what is constipation to one person may not be to another. Your healthcare provider may do tests to diagnose constipation. It depends on what he or she finds when evaluating you.    Symptoms of constipation include:    Abdominal pain    Bloating    Vomiting    Painful bowel movements    Itching, swelling, bleeding, or pain around the anus  Causes  Constipation can have many causes. These include:    Diet low in fiber    Too much dairy    Not drinking enough liquids    Lack of exercise or physical activity (especially true for older adults)    Changes in lifestyle or daily routine, including pregnancy, aging, work, and travel    Frequent use or misuse of laxatives    Ignoring the urge to have a bowel movement or delaying it until later    Medicines, such as certain prescription pain medicines, iron supplements, antacids, certain antidepressants, and calcium supplements    Diseases like irritable bowel syndrome, bowel obstructions, stroke, diabetes, thyroid disease, Parkinson disease, hemorrhoids, and colon cancer  Complications  Potential complications of constipation can include:    Hemorrhoids    Rectal bleeding from hemorrhoids or anal fissures (skin tears)    Hernias    Dependency on laxatives    Chronic constipation    Fecal impaction, a severe form of constipation in which a large amount of hard stool is in your rectum that you can't pass    Bowel obstruction or perforation  Home care  All treatment should be done after talking with your healthcare provider. This is especially true if you have another medical problems, are taking prescription medicines, or are an older adult. Treatment most often involves  lifestyle changes. You may also need medicines. Your healthcare provider will tell you which will work best for you. Follow the advice below to help avoid this problem in the future.  Lifestyle changes  These lifestyle changes can help prevent constipation:    Diet. Eat a high-fiber diet, with fresh fruit and vegetables, and reduce dairy intake, meats, and processed foods    Fluids. It's important to get enough fluids each day. Drink plenty of water when you eat more fiber. If you are on diet that limits the amount of fluid you can have, talk about this with your healthcare provider.    Regular exercise. Check with your healthcare provider first.  Medicines  Take any medicines as directed. Some laxatives are safe to use only every now and then. Others can be taken on a regular basis. While laxatives don't cause bowel dependence, they are treating the symptoms. So your constipation may return if you don't make other changes. Talk with your healthcare provider or pharmacist if you have questions.  Prescription pain medicines can cause constipation. If you are taking this kind of medicine, ask your healthcare provider if you should also take a stool softener.  Medicines you may take to treat constipation include:    Fiber supplements    Stool softeners    Laxatives    Enemas    Rectal suppositories  Follow-up care  Follow up with your healthcare provider if symptoms don't get better in the next few days. You may need to have more tests or see a specialist.  Call 911  Call 911 if any of these occur:    Trouble breathing    Stiff, rigid abdomen that is severely painful to touch    Confusion    Fainting or loss of consciousness    Rapid heart rate    Chest pain  When to seek medical advice  Call your healthcare provider right away if any of these occur:    Fever of 100.4 F (38 C) or higher, or as directed by your healthcare provider    Failure to resume normal bowel movements    Pain in your abdomen or back gets worse     Nausea or vomiting    Swelling in your abdomen    Blood in the stool    Black, tarry stool    Involuntary weight loss    Weakness  Date Last Reviewed: 6/1/2018 2000-2018 [employer]. [address] [place] [address], [place] [address]. All rights reserved. This information is not intended as a substitute for professional medical care. Always follow your healthcare professional's instructions.           Patient Education     Eating a High-Fiber Diet  Fiber is what gives strength and structure to plants. Most grains, beans, vegetables, and fruits contain fiber. Foods rich in fiber are often low in calories and fat, and they fill you up more. They may also reduce your risks for certain health problems. To find out the amount of fiber in canned, packaged, or frozen foods, read the Nutrition Facts label. It tells you how much fiber is in one serving.    Types of fiber and their benefits  There are two types of fiber: insoluble and soluble. They both aid digestion and help you maintain a healthy weight.    Insoluble fiber. This is found in whole grains, cereals, certain fruits and vegetables such as apple skin, corn, and carrots. Insoluble fiber may prevent constipation and reduce the risk for certain types of cancer. It is called insoluble because it does not dissolve in water.    Soluble fiber. This type of fiber is in oats, beans, and certain fruits and vegetables such as strawberries and peas. Soluble fiber can reduce cholesterol, which may help lower the risk for heart disease. It also helps control blood sugar levels.  Look for high-fiber foods  Try these foods to add fiber to your diet:    Whole-grain breads and cereals. Try to eat 6 to 8 ounces a day. Include wheat and oat bran cereals, whole-wheat muffins or toast, and corn tortillas in your meals.    Fruits. Try to eat 2 cups a day. Apples, oranges, strawberries, pears, and bananas are good sources. (Note: Fruit juice is low in fiber.)    Vegetables. Try  to eat at least 2.5 cups a day. Add asparagus, carrots, broccoli, peas, and corn to your meals.    Beans. One cup of cooked lentils gives you over 15 grams of fiber. Try navy beans, lentils, and chickpeas.    Seeds. A small handful of seeds gives you about 3 grams of fiber. Try sunflower or cory seeds.  Keep track of your fiber  Keep track of how much fiber you eat. Start by reading food labels. Then eat a variety of foods high in fiber. As you start to eat more fiber, ask your healthcare provider how much water you should be drinking to keep your digestive system working smoothly.  Aim for a certain amount of fiber in your diet each day. If you are a woman, that amount is between 25 and 28 grams per day. Men should aim for 30 to 33 grams per day. After age 50, your daily fiber needs drop to 22 grams for women and 28 grams for men.  Before you reach for the fiber supplements, think about this. Fiber is found naturally in healthy whole foods. It gives you that feeling of fullness after you eat. Taking fiber supplements or eating fiber-enriched foods will not give you this full feeling.  Your fiber intake is a good measure for the quality of your overall diet. If you are missing out on your daily amount of fiber, you may be lacking other important nutrients as well.  Date Last Reviewed: 6/1/2017 2000-2018 [employer]. [address] [place] [address], [place] [address]. All rights reserved. This information is not intended as a substitute for professional medical care. Always follow your healthcare professional's instructions.

## 2019-03-11 ENCOUNTER — TELEPHONE (OUTPATIENT)
Dept: FAMILY MEDICINE | Facility: CLINIC | Age: 64
End: 2019-03-11

## 2019-03-11 DIAGNOSIS — E11.42 TYPE 2 DIABETES MELLITUS WITH DIABETIC POLYNEUROPATHY, WITHOUT LONG-TERM CURRENT USE OF INSULIN (H): Primary | ICD-10-CM

## 2019-03-11 NOTE — TELEPHONE ENCOUNTER
Patient was notified regarding MSG below.  Patient restated the msg, denied having any questions and stated clarification at the end of the conversation.  Christie Palmer

## 2019-03-11 NOTE — TELEPHONE ENCOUNTER
Patient states he is returning a call to whomever that left him a voice message about Diabetes medication to take twice daily. Told patient unable to see who may have called him. He states this is not regarding to Omeprazole as he agrees to self pay for it.     Per progress note documentation, Dr Dela Cruz does mention about reviewing alternatives of Diabetes medication because patient does not want to start insulin.    Routing to Dr Dela Cruz.  Shakir Jacobs CMA

## 2019-03-12 ENCOUNTER — TELEPHONE (OUTPATIENT)
Dept: FAMILY MEDICINE | Facility: CLINIC | Age: 64
End: 2019-03-12

## 2019-03-12 ENCOUNTER — OFFICE VISIT (OUTPATIENT)
Dept: GASTROENTEROLOGY | Facility: CLINIC | Age: 64
End: 2019-03-12
Payer: COMMERCIAL

## 2019-03-12 VITALS
OXYGEN SATURATION: 95 % | WEIGHT: 223.4 LBS | SYSTOLIC BLOOD PRESSURE: 120 MMHG | BODY MASS INDEX: 33.09 KG/M2 | HEART RATE: 103 BPM | DIASTOLIC BLOOD PRESSURE: 81 MMHG | HEIGHT: 69 IN

## 2019-03-12 DIAGNOSIS — K76.0 NAFLD (NONALCOHOLIC FATTY LIVER DISEASE): ICD-10-CM

## 2019-03-12 DIAGNOSIS — K21.9 GASTROESOPHAGEAL REFLUX DISEASE WITHOUT ESOPHAGITIS: Primary | ICD-10-CM

## 2019-03-12 DIAGNOSIS — K59.00 CONSTIPATION, UNSPECIFIED CONSTIPATION TYPE: ICD-10-CM

## 2019-03-12 PROCEDURE — 99214 OFFICE O/P EST MOD 30 MIN: CPT | Performed by: INTERNAL MEDICINE

## 2019-03-12 ASSESSMENT — MIFFLIN-ST. JEOR: SCORE: 1788.72

## 2019-03-12 ASSESSMENT — PAIN SCALES - GENERAL: PAINLEVEL: NO PAIN (0)

## 2019-03-12 NOTE — TELEPHONE ENCOUNTER
Dr. Dela Cruz completed form for patient and the form has been mailed to patient's home address.  Han Orourke,  [facility]

## 2019-03-12 NOTE — TELEPHONE ENCOUNTER
I reviewed the options for adding a third oral medication. Invokana seems like the best option at this time. I sent in a prescription for 100 mg once a day with breakfast. This can be taken with the other medications. Please review potential side effects with pharmacy or make an office visit to discuss. We may need a PA for insurance to cover this. [employer] message sent about the medication.    Form completed and patient can pick this up or it can be mailed tomorrow.      Pam Dela Cruz MD

## 2019-03-12 NOTE — TELEPHONE ENCOUNTER
Called  [facility] pharmacy and confirmed that patient's insurance covers Invokana with a co pay of $75 for quantity 30 days. In addition, there is a  discount that can be requested from the  website. I went online and it looks like it is something patient can go online and click at the top to answer questions and personal information himself to apply for $0 co pay on the medication.    Shakir Jacobs CMA

## 2019-03-12 NOTE — RESULT ENCOUNTER NOTE
Dear Sven    Your test results are attached. I am happy to let you know that they are stable.    The cholesterol looks great. The kidneys are healthy. The diabetes test is high as we discussed. I sent in a prescription for Invokana that can be taken with breakfast, with your other medication. This should help to lower the blood sugar.     The test for prostate cancer was normal and shows low risk.     We can recheck labs in 3 months.     Please contact me by [employer] if you have any questions about your labs or management.    Pam Dela Cruz MD

## 2019-03-12 NOTE — NURSING NOTE
"Sven Givens's goals for this visit include:   Chief Complaint   Patient presents with     RECHECK     Follow up dyspepsia       He requests these members of his care team be copied on today's visit information: yes    PCP: Pam Dela Cruz    Referring Provider:  No referring provider defined for this encounter.    /81 (BP Location: Left arm, Patient Position: Sitting, Cuff Size: Adult Regular)   Pulse 103   Ht 1.753 m (5' 9\")   Wt 101.3 kg (223 lb 6.4 oz)   SpO2 95%   BMI 32.99 kg/m      Do you need any medication refills at today's visit? No    Tabitha Serna LPN      "

## 2019-03-12 NOTE — PATIENT INSTRUCTIONS
Take zantac 150mg one tab at night daily.    Schedule upper endoscopy with Dr. Mccarty. Call [phone] to schedule.    Miralax daily.  Take senna daily as needed to have daily bowel movement.

## 2019-03-12 NOTE — PROGRESS NOTES
GASTROENTEROLOGY FOLLOW UP CLINIC VISIT    CC/REFERRING MD:    Pam Dela Cruz    REASON FOR CONSULTATION:   No ref. provider found for   Chief Complaint   Patient presents with     RECHECK     Follow up dyspepsia       HISTORY OF PRESENT ILLNESS:    Sven Givens is 63 year old male who presents for follow up of abdominal pain.  He was last seen in the office November 2018.  He reports that he has been having more epigastric pain as well as reflux recently.  He is getting daily symptoms of heartburn/reflux.  He is taking omeprazole daily.  This is not providing him significant relief of symptoms.  He has made lifestyle changes including avoidance of alcohol and he does not eat late in the evening.  Last endoscopy was in 2016.  He notes his symptoms are significantly different since that time.  He is not having any dysphagia or odynophagia at this point.  He does note that he is having increased constipation.  He was taking MiraLAX daily without improvement.  He has been given a prescription for senna and notes that this has provided him more regular bowel movements recently.      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     Varicose veins        PREVIOUS SURGERIES:   Past Surgical History:   Procedure Laterality Date     BRONCHOSCOPY FLEXIBLE N/A 9/23/2016    Procedure: BRONCHOSCOPY FLEXIBLE;  Surgeon: Gayle Moya MD;  Location: [facility] OR     COLONOSCOPY  5-03     COLONOSCOPY WITH CO2 INSUFFLATION N/A 5/31/2017    Procedure: COLONOSCOPY WITH CO2 INSUFFLATION;  COLON SCREEN/ SEB;  Surgeon: Margarito Rasheed DO;  Location:  OR     GENITOURINARY SURGERY      kidney stones     THORACOSCOPIC WEDGE RESECTION LUNG Left 9/23/2016    Procedure: THORACOSCOPIC WEDGE  RESECTION LUNG;  Surgeon: Gayle Moya MD;  Location: [facility] OR     VASCULAR SURGERY      varicose vein       ALLERGIES:     Allergies   Allergen Reactions     Penicillins Rash     Ace Inhibitors Cough     Indomethacin Other (See Comments)     Severe dizziness       PERTINENT MEDICATIONS:    Current Outpatient Medications:      albuterol (PROAIR HFA) 108 (90 Base) MCG/ACT inhaler, Inhale 2 puffs into the lungs every 4 hours as needed for shortness of breath / dyspnea or wheezing, Disp: 8.5 Inhaler, Rfl: 3     allopurinol (ZYLOPRIM) 300 MG tablet, Take 1 tablet (300 mg) by mouth daily, Disp: 90 tablet, Rfl: 3     ASPIRIN 81 MG PO TABS, 1 TABLET DAILY(*), Disp: , Rfl:      atorvastatin (LIPITOR) 40 MG tablet, Take 1 tablet (40 mg) by mouth daily, Disp: 90 tablet, Rfl: 3     Azelaic Acid (FINACEA) 15 % gel, Apply small amount twice a day to skin, Disp: 50 g, Rfl: 3     canagliflozin (INVOKANA) 100 MG tablet, Take 1 tablet (100 mg) by mouth every morning (before breakfast), Disp: 30 tablet, Rfl: 3     capsaicin (ZOSTRIX) 0.025 % CREA cream, Apply 1 g topically 3 times daily, Disp: 60 g, Rfl: 3     CINNAMON 500 MG OR TABS, Take one tablet twice daily., Disp: , Rfl:      diclofenac (VOLTAREN) 1 % GEL topical gel, Apply 4 grams to knees or 2 grams to hands four times daily using enclosed dosing card., Disp: 100 g, Rfl: 1     FISH OIL 1000 MG OR CAPS, daily, Disp: , Rfl:      fluticasone (FLONASE) 50 MCG/ACT nasal spray, Spray 1-2 sprays into both nostrils daily, Disp: 1 Bottle, Rfl: 1     glimepiride (AMARYL) 4 MG tablet, Take 1 tablet (4 mg) by mouth 2 times daily, Disp: 180 tablet, Rfl: 3     losartan-hydrochlorothiazide (HYZAAR) 100-25 MG tablet, Take 1 tablet by mouth daily, Disp: 90 tablet, Rfl: 3     metFORMIN (GLUCOPHAGE) 1000 MG tablet, Take 1 tablet (1,000 mg) by mouth 2 times daily (with meals), Disp: 180 tablet, Rfl: 3     MULTIPLE VITAMINS OR, 1 a day, Disp: , Rfl:      nitroglycerin (NITROSTAT) 0.4 MG SL  tablet, Place 1 tablet (0.4 mg) under the tongue every 5 minutes as needed for chest pain If you are still having symptoms after 3 doses (15 minutes) call 911., Disp: 25 tablet, Rfl: 3     omeprazole (PRILOSEC) 20 MG DR capsule, Take 1 capsule (20 mg) by mouth daily Take 30-60 minutes before a meal., Disp: 90 capsule, Rfl: 3     ONETOUCH ULTRA test strip, USE TO CHECK BLOOD SUGARS TWICE A DAY., Disp: 200 strip, Rfl: 0     order for DME, One touch ultra  Test strips for checking blood sugars 2 times a day., Disp: 200 strip, Rfl: 1     Oxymetazoline HCl (AFRIN NASAL SPRAY NA), Spray in nostril as needed, Disp: , Rfl:      ranitidine (ZANTAC) 150 MG tablet, Take 1 tablet (150 mg) by mouth At Bedtime, Disp: 30 tablet, Rfl: 11     SENNA-docusate sodium (SENNA S) 8.6-50 MG tablet, Take 1 tablet by mouth At Bedtime, Disp: 90 tablet, Rfl: 3     terazosin (HYTRIN) 5 MG capsule, TAKE 1 CAPSULE (5 MG) BY MOUTH AT BEDTIME, Disp: 90 capsule, Rfl: 1  No current facility-administered medications for this visit.     Facility-Administered Medications Ordered in Other Visits:      DOBUTamine 500 mg in dextrose 5% 250 mL (adult std), 15 mcg/kg/min, Intravenous, Continuous, Pam Dela Cruz MD, Last Rate: 44.1 mL/hr at 04/14/16 1551, 15 mcg/kg/min at 04/14/16 1551    FAMILY HISTORY:   Family History   Problem Relation Age of Onset     Cerebrovascular Disease Father      Cancer Sister         ovarary           ROS:    No fevers or chills  No weight loss  No blurry vision, double vision or change in vision  No sore throat  No lymphadenopathy  No headache, paraesthesias, or weakness in a limb  No shortness of breath or wheezing  No chest pain or pressure  No arthralgias or myalgias  No rashes or skin changes  No odynophagia or dysphagia  No BRBPR, hematochezia, melena  No dysuria, frequency or urgency  No hot/cold intolerance or polyria  No anxiety or depression  PHYSICAL EXAMINATION:  Constitutional: aaox3, cooperative, pleasant, not  "dyspneic/diaphoretic, no acute distress  Vitals reviewed: /81 (BP Location: Left arm, Patient Position: Sitting, Cuff Size: Adult Regular)   Pulse 103   Ht 1.753 m (5' 9\")   Wt 101.3 kg (223 lb 6.4 oz)   SpO2 95%   BMI 32.99 kg/m    Wt:   Wt Readings from Last 2 Encounters:   03/12/19 101.3 kg (223 lb 6.4 oz)   03/08/19 100.9 kg (222 lb 6.4 oz)      Eyes: Sclera anicteric/injected  Ears/nose/mouth/throat: Normal oropharynx without ulcers or exudate, mucus membranes moist, hearing intact  Neck: supple, thyroid normal size  CV: No edema  Respiratory: Unlabored breathing  Lymph: No submandibular, supraclavicular or inguinal lymphadenopathy  Abd:  Nondistended, no masses, +bs, no hepatosplenomegaly, nontender, no peritoneal signs  Skin: warm, perfused, no jaundice  Psych: Normal affect  MSK: Normal gait      PERTINENT STUDIES:   Most recent CBC:  Recent Labs   Lab Test 03/08/19  0926 11/27/18  1502   WBC 7.3 8.7   HGB 13.4 12.7*   HCT 41.1 38.8*    226     Most recent hepatic panel:  Recent Labs   Lab Test 03/08/19  0926 10/16/18  1455 04/16/18  0834   ALT 47 45 54   AST  --  24 36     Most recent creatinine:  Recent Labs   Lab Test 03/08/19  0926 04/16/18  0834   CR 0.71 0.70       RADIOLOGY:    EXAMINATION: US ABDOMEN COMPLETE, 10/23/2018 8:31 AM      COMPARISON: CT abdomen and pelvis 5/30/2018     HISTORY: Worsening dyspepsia. History of treated left lung cancer.     TECHNIQUE: The abdomen was scanned in standard fashion with  specialized ultrasound transducer(s) using both grey scale and limited  color Doppler techniques. Today's study is technically challenged by  bowel gas. In addition, the liver is completely hyperechoic which  attenuates the ultrasound beam to evaluate underlying structures.     Findings:  Pancreas: Obscured by bowel gas.  Aorta and IVC:  Obscured by the liver and bowel gas. The middle and  distal portion of the aorta appears within normal limits.   Liver: The liver has diffuse " increased echogenicity that attenuates  the ultrasound beam. Evaluation for mass or biliary ductal dilation is  limited. The main portal vein is possibly identified with color  Doppler and appears antegrade, possibly measuring 1.3 cm in diameter.  Gallbladder: The gallbladder appears normal without cholelithiasis.  Bile Ducts: Difficult to identify due to the attenuation of the liver.   The common bile duct measures 4 mm in diameter.  Kidneys: No hydronephrosis.  The craniocaudal dimensions are: right-  12.7 cm, left- 12.7 cm. One simple cyst in the right kidney measures  1.1 cm and 1 simple cyst in left kidney measures 1.3 cm.  Spleen: Enlarged, measuring 13.4 cm in greatest dimension.  Fluid: No evidence of ascites or pleural effusions.                                                                      Impression:   1.  Marked increased liver echogenicity, indicative of intrinsic  hepatic disease, most commonly fatty liver.  2.  However, the liver attenuates the ultrasound beam which obscures  visualization of underlying structures.  3.  The gallbladder appears normal.  4.  Mildly enlarged spleen.     AN MD MAISHA    ASSESSMENT/PLAN:    vSen Givens is a 63 year old male who presents for follow up of abdominal pain, GERD, constipation and NAFLD.    GERD and abdominal pain: He is taking omeprazole once daily.  Symptoms are primarily at night.  I have suggested that he continue his PPI once daily and add ranitidine 150 mg nightly in order to control symptoms.  I have recommended another upper endoscopy due to continued GERD with failure to respond to PPI therapy.  This will be scheduled with myself at [place].  Can consider nutrition referral for diet teaching versus pH impedance testing if symptoms fail to improve.    Constipation: He has had constipation for the past several months.  He previously was evaluated with colonoscopy in 2017 though symptoms were not present at that point.  I do not think that  we need to repeat a colonoscopy unless symptoms worsen, blood in the stool or other concerns.  I have recommended that he continue the MiraLAX daily and can take senna in addition.  If this regimen does not improve him, we can consider Linzess.    NAFLD: He has evidence of fatty liver by imaging.  His noninvasive risk stratification markers are a APRI index of 0.273 (normal less than 0.7) and a FIB4 of 1.06 (normal less than 1.45).  Periodic liver tests should be done 1-2 times yearly.  I do not think he would benefit from liver biopsy at this point.  At some point, can consider a fiber scan though his noninvasive liver testing points toward a very low risk of him actually having fibrosis.  He should have cardiac optimization given increased cardiovascular events in the setting of NAFLD and consider a sleep study if he has not had one in the past.      RTC 3 months    Thank you for this consultation.  It was a pleasure to participate in the care of this patient; please contact us with any further questions.    This note was created with voice recognition software, and while reviewed for accuracy, typos may remain.     Abbe Mccarty MD  Adjunct  of Medicine  Division of Gastroenterology, Hepatology and Nutrition  [facility]  [phone]

## 2019-03-19 ENCOUNTER — ANCILLARY PROCEDURE (OUTPATIENT)
Dept: CT IMAGING | Facility: CLINIC | Age: 64
End: 2019-03-19
Attending: INTERNAL MEDICINE
Payer: COMMERCIAL

## 2019-03-19 DIAGNOSIS — C34.92 NON-SMALL CELL CARCINOMA OF LUNG, LEFT (H): Chronic | ICD-10-CM

## 2019-03-19 PROCEDURE — 71250 CT THORAX DX C-: CPT | Performed by: RADIOLOGY

## 2019-03-22 ENCOUNTER — ONCOLOGY VISIT (OUTPATIENT)
Dept: ONCOLOGY | Facility: CLINIC | Age: 64
End: 2019-03-22
Payer: COMMERCIAL

## 2019-03-22 VITALS
BODY MASS INDEX: 32.13 KG/M2 | WEIGHT: 217.6 LBS | TEMPERATURE: 98.1 F | SYSTOLIC BLOOD PRESSURE: 144 MMHG | HEART RATE: 99 BPM | DIASTOLIC BLOOD PRESSURE: 84 MMHG | OXYGEN SATURATION: 96 %

## 2019-03-22 DIAGNOSIS — C34.92 NON-SMALL CELL CARCINOMA OF LUNG, LEFT (H): Primary | ICD-10-CM

## 2019-03-22 PROCEDURE — 99214 OFFICE O/P EST MOD 30 MIN: CPT | Performed by: INTERNAL MEDICINE

## 2019-03-22 ASSESSMENT — PAIN SCALES - GENERAL: PAINLEVEL: NO PAIN (0)
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
## 2019-03-22 NOTE — NURSING NOTE
"Oncology Rooming Note    March 22, 2019 3:20 PM   Sven Givens is a 63 year old male who presents for:    Chief Complaint   Patient presents with     Oncology Clinic Visit     6 month f/u     Initial Vitals: /84   Pulse 99   Temp 98.1  F (36.7  C) (Oral)   Wt 98.7 kg (217 lb 9.6 oz)   SpO2 96%   BMI 32.13 kg/m   Estimated body mass index is 32.13 kg/m  as calculated from the following:    Height as of 3/12/19: 1.753 m (5' 9\").    Weight as of this encounter: 98.7 kg (217 lb 9.6 oz). Body surface area is 2.19 meters squared.  No Pain (0) Comment: Data Unavailable   No LMP for male patient.  Allergies reviewed: Yes  Medications reviewed: Yes    Medications: Medication refills not needed today.  Pharmacy name entered into [employer]: [id number] - [place], [place] - [address]    Clinical concerns: CT results Dr. Valencia was notified.      Saud Han RN              "

## 2019-04-06 ENCOUNTER — MYC MEDICAL ADVICE (OUTPATIENT)
Dept: FAMILY MEDICINE | Facility: CLINIC | Age: 64
End: 2019-04-06

## 2019-04-08 NOTE — TELEPHONE ENCOUNTER
E-visit instructions given to Sven to further discuss UTI symptoms and Invokana use.     Manuel Wang RN, BSN

## 2019-04-09 ENCOUNTER — OFFICE VISIT (OUTPATIENT)
Dept: FAMILY MEDICINE | Facility: CLINIC | Age: 64
End: 2019-04-09
Payer: COMMERCIAL

## 2019-04-09 VITALS
HEART RATE: 106 BPM | RESPIRATION RATE: 16 BRPM | HEIGHT: 69 IN | BODY MASS INDEX: 32.17 KG/M2 | OXYGEN SATURATION: 96 % | TEMPERATURE: 98.7 F | WEIGHT: 217.2 LBS | SYSTOLIC BLOOD PRESSURE: 130 MMHG | DIASTOLIC BLOOD PRESSURE: 68 MMHG

## 2019-04-09 DIAGNOSIS — E11.42 TYPE 2 DIABETES MELLITUS WITH DIABETIC POLYNEUROPATHY, WITHOUT LONG-TERM CURRENT USE OF INSULIN (H): Primary | ICD-10-CM

## 2019-04-09 DIAGNOSIS — E11.42 DIABETIC POLYNEUROPATHY ASSOCIATED WITH TYPE 2 DIABETES MELLITUS (H): ICD-10-CM

## 2019-04-09 DIAGNOSIS — Z13.89 SCREENING FOR DIABETIC PERIPHERAL NEUROPATHY: ICD-10-CM

## 2019-04-09 DIAGNOSIS — E78.5 HYPERLIPIDEMIA WITH TARGET LDL LESS THAN 100: ICD-10-CM

## 2019-04-09 DIAGNOSIS — R35.0 URINARY FREQUENCY: ICD-10-CM

## 2019-04-09 DIAGNOSIS — Z11.4 SCREENING FOR HIV (HUMAN IMMUNODEFICIENCY VIRUS): ICD-10-CM

## 2019-04-09 DIAGNOSIS — I10 HYPERTENSION GOAL BP (BLOOD PRESSURE) < 140/90: ICD-10-CM

## 2019-04-09 LAB
ALBUMIN UR-MCNC: 30 MG/DL
APPEARANCE UR: CLEAR
BACTERIA #/AREA URNS HPF: ABNORMAL /HPF
BILIRUB UR QL STRIP: NEGATIVE
COLOR UR AUTO: YELLOW
CREAT UR-MCNC: 86 MG/DL
GLUCOSE UR STRIP-MCNC: >=1000 MG/DL
HGB UR QL STRIP: NEGATIVE
KETONES UR STRIP-MCNC: ABNORMAL MG/DL
LEUKOCYTE ESTERASE UR QL STRIP: NEGATIVE
MICROALBUMIN UR-MCNC: 399 MG/L
MICROALBUMIN/CREAT UR: 463.42 MG/G CR (ref 0–17)
NITRATE UR QL: NEGATIVE
NON-SQ EPI CELLS #/AREA URNS LPF: ABNORMAL /LPF
PH UR STRIP: 6 PH (ref 5–7)
RBC #/AREA URNS AUTO: ABNORMAL /HPF
SOURCE: ABNORMAL
SP GR UR STRIP: 1.02 (ref 1–1.03)
UROBILINOGEN UR STRIP-ACNC: 0.2 EU/DL (ref 0.2–1)
WBC #/AREA URNS AUTO: ABNORMAL /HPF

## 2019-04-09 PROCEDURE — 99214 OFFICE O/P EST MOD 30 MIN: CPT | Performed by: FAMILY MEDICINE

## 2019-04-09 PROCEDURE — 82043 UR ALBUMIN QUANTITATIVE: CPT | Performed by: FAMILY MEDICINE

## 2019-04-09 PROCEDURE — 99207 C FOOT EXAM  NO CHARGE: CPT | Performed by: FAMILY MEDICINE

## 2019-04-09 PROCEDURE — 81001 URINALYSIS AUTO W/SCOPE: CPT | Performed by: FAMILY MEDICINE

## 2019-04-09 RX ORDER — FLUCONAZOLE 150 MG/1
150 TABLET ORAL ONCE
Qty: 1 TABLET | Refills: 0 | Status: SHIPPED | OUTPATIENT
Start: 2019-04-09 | End: 2019-04-12

## 2019-04-09 ASSESSMENT — PAIN SCALES - GENERAL: PAINLEVEL: NO PAIN (0)

## 2019-04-09 ASSESSMENT — MIFFLIN-ST. JEOR: SCORE: 1760.59

## 2019-04-09 NOTE — PROGRESS NOTES
SUBJECTIVE:   Sven Givens is a 63 year old male who presents to clinic today for the following   health issues:    Diabetes Follow-up    Patient is checking blood sugars: once daily.  Results are as follows: 200              postprandial after supper- couple hours after eat dinner    Diabetic concerns: None and frequent infections     Symptoms of hypoglycemia     Paresthesias (numbness or burning in feet) or sores: Yes      Date of last diabetic eye exam: has a coming appointment on May 1st 2019    BP Readings from Last 2 Encounters:   03/22/19 144/84   03/12/19 120/81     Hemoglobin A1C (%)   Date Value   03/08/2019 9.9 (H)   04/16/2018 8.5 (H)     LDL Cholesterol Calculated (mg/dL)   Date Value   03/08/2019 34   04/16/2018 51       Diabetes Management Resources    Amount of exercise or physical activity: 2-3 days/week for an average of 45-60 minutes    Problems taking medications regularly: No    Medication side effects: get bladder infection from Invokana and blood went lower after taken invokana for 2 days but developed urinary frequency, urgency and dysuria that has not gone away. Can't take Januvia and is already on metformin and glimepiride with poor control.     Diet: low salt, limited sugar intake        Hyperlipidemia Follow-Up      Rate your low fat/cholesterol diet?: good    Taking statin?  Yes, no muscle aches from statin    Other lipid medications/supplements?:  none    Hypertension Follow-up      Outpatient blood pressures are not being checked.    Low Salt Diet: no added salt      Additional history: as documented    Reviewed  and updated as needed this visit by clinical staff         Reviewed and updated as needed this visit by Provider         Patient Active Problem List   Diagnosis     Advanced directives, counseling/discussion     Hypertension goal BP (blood pressure) < 140/90     Diabetic neuropathy (H)     Tubular adenoma of colon     GERD (gastroesophageal reflux disease)     Anemia      Hyperlipidemia with target LDL less than 100     Gout     Type 2 diabetes mellitus with diabetic polyneuropathy (H)     History of radiation exposure     Non-small cell carcinoma of lung, left (H)     Nonalcoholic hepatosteatosis     PRESLEY (obstructive sleep apnea)     Angina pectoris with documented spasm (H)     Past Surgical History:   Procedure Laterality Date     BRONCHOSCOPY FLEXIBLE N/A 2016    Procedure: BRONCHOSCOPY FLEXIBLE;  Surgeon: Gayle Moya MD;  Location: [facility]     COLONOSCOPY       COLONOSCOPY WITH CO2 INSUFFLATION N/A 2017    Procedure: COLONOSCOPY WITH CO2 INSUFFLATION;  COLON SCREEN/ SEB;  Surgeon: Margarito Rasheed DO;  Location: [facility]     GENITOURINARY SURGERY      kidney stones     THORACOSCOPIC WEDGE RESECTION LUNG Left 2016    Procedure: THORACOSCOPIC WEDGE RESECTION LUNG;  Surgeon: Gayle Moya MD;  Location: [facility]     VASCULAR SURGERY      varicose vein       Social History     Tobacco Use     Smoking status: Former Smoker     Last attempt to quit: 1992     Years since quittin.2     Smokeless tobacco: Never Used     Tobacco comment: 15 + years    Substance Use Topics     Alcohol use: No     Family History   Problem Relation Age of Onset     Cerebrovascular Disease Father      Cancer Sister         ovarary          Current Outpatient Medications   Medication Sig Dispense Refill     albuterol (PROAIR HFA) 108 (90 Base) MCG/ACT inhaler Inhale 2 puffs into the lungs every 4 hours as needed for shortness of breath / dyspnea or wheezing 8.5 Inhaler 3     allopurinol (ZYLOPRIM) 300 MG tablet Take 1 tablet (300 mg) by mouth daily 90 tablet 3     ASPIRIN 81 MG PO TABS 1 TABLET DAILY(*)       atorvastatin (LIPITOR) 40 MG tablet Take 1 tablet (40 mg) by mouth daily 90 tablet 3     Azelaic Acid (FINACEA) 15 % gel Apply small amount twice a day to skin 50 g 3     capsaicin (ZOSTRIX) 0.025 % CREA cream Apply 1 g topically 3 times daily 60 g 3     CINNAMON 500 MG OR  TABS Take one tablet twice daily.       diclofenac (VOLTAREN) 1 % GEL topical gel Apply 4 grams to knees or 2 grams to hands four times daily using enclosed dosing card. 100 g 1     FISH OIL 1000 MG OR CAPS daily       fluconazole (DIFLUCAN) 150 MG tablet Take 1 tablet (150 mg) by mouth once for 1 dose 1 tablet 0     fluticasone (FLONASE) 50 MCG/ACT nasal spray Spray 1-2 sprays into both nostrils daily 1 Bottle 1     glimepiride (AMARYL) 4 MG tablet Take 1 tablet (4 mg) by mouth 2 times daily 180 tablet 3     linagliptin (TRADJENTA) 5 MG TABS tablet Take 1 tablet (5 mg) by mouth daily 30 tablet 3     losartan-hydrochlorothiazide (HYZAAR) 100-25 MG tablet Take 1 tablet by mouth daily 90 tablet 3     metFORMIN (GLUCOPHAGE) 1000 MG tablet Take 1 tablet (1,000 mg) by mouth 2 times daily (with meals) 180 tablet 3     MULTIPLE VITAMINS OR 1 a day       nitroglycerin (NITROSTAT) 0.4 MG SL tablet Place 1 tablet (0.4 mg) under the tongue every 5 minutes as needed for chest pain If you are still having symptoms after 3 doses (15 minutes) call 911. 25 tablet 3     omeprazole (PRILOSEC) 20 MG DR capsule Take 1 capsule (20 mg) by mouth daily Take 30-60 minutes before a meal. 90 capsule 3     ONETOUCH ULTRA test strip USE TO CHECK BLOOD SUGARS TWICE A DAY. 200 strip 0     order for DME One touch ultra  Test strips for checking blood sugars 2 times a day. 200 strip 1     Oxymetazoline HCl (AFRIN NASAL SPRAY NA) Spray in nostril as needed       ranitidine (ZANTAC) 150 MG tablet Take 1 tablet (150 mg) by mouth At Bedtime 30 tablet 11     SENNA-docusate sodium (SENNA S) 8.6-50 MG tablet Take 1 tablet by mouth At Bedtime 90 tablet 3     terazosin (HYTRIN) 5 MG capsule TAKE 1 CAPSULE (5 MG) BY MOUTH AT BEDTIME 90 capsule 1     Allergies   Allergen Reactions     Penicillins Rash     Ace Inhibitors Cough     Indomethacin Other (See Comments)     Severe dizziness     Recent Labs   Lab Test 03/08/19  0926 10/16/18  1455 05/30/18  1445  "04/16/18  0834 09/11/17  1621  04/14/17  0847  10/12/16  1056   A1C 9.9*  --   --  8.5* 7.6*  --   --    < >  --    LDL 34  --   --  51  --   --  35  --   --    HDL 35*  --   --  31*  --   --  35*  --   --    TRIG 279*  --   --  216*  --   --  161*  --   --    ALT 47 45  --  54  --    < >  --    < >  --    CR 0.71  --   --  0.70  --    < >  --    < >  --    GFRESTIMATED >90  --  85 >90  --    < >  --    < >  --    GFRESTBLACK >90  --  >90 >90  --    < >  --    < >  --    POTASSIUM 4.1  --   --  3.9  --    < >  --    < >  --    TSH  --   --   --  2.91  --   --   --   --  2.21    < > = values in this interval not displayed.      BP Readings from Last 3 Encounters:   04/09/19 130/68   03/22/19 144/84   03/12/19 120/81    Wt Readings from Last 3 Encounters:   04/09/19 98.5 kg (217 lb 3.2 oz)   03/22/19 98.7 kg (217 lb 9.6 oz)   03/12/19 101.3 kg (223 lb 6.4 oz)                  Labs reviewed in EPIC    ROS:  Constitutional, HEENT, cardiovascular, pulmonary, gi and gu systems are negative, except as otherwise noted.    OBJECTIVE:     /68 (BP Location: Left arm, Patient Position: Chair, Cuff Size: Adult Large)   Pulse 106   Temp 98.7  F (37.1  C) (Oral)   Resp 16   Ht 1.753 m (5' 9\")   Wt 98.5 kg (217 lb 3.2 oz)   SpO2 96%   BMI 32.07 kg/m    Body mass index is 32.07 kg/m .  GENERAL: healthy, alert, well nourished, well hydrated, no distress, obese  HENT: ear canals- normal; TMs- normal; Nose- normal; Mouth- no ulcers, no lesions, throat is clear with no erythema or exudate.   NECK: no tenderness, no adenopathy, no asymmetry, no masses, no stiffness; thyroid- normal to palpation  RESP: lungs clear to auscultation - no rales, no rhonchi, no wheezes  CV: regular rates and rhythm, normal S1 S2, no S3 or S4 and no murmur, no click or rub -  ABDOMEN: soft, no tenderness, no  hepatosplenomegaly, no masses, normal bowel sounds  MS: extremities- no gross deformities noted, no edema  SKIN: no suspicious lesions, no " rashes  NEURO: strength and tone- normal, sensory exam- grossly normal, mentation- intact, speech- normal, reflexes- symmetric  BACK: no CVA tenderness, no paralumbar tenderness  PSYCH: Alert and oriented times 3; speech- coherent , normal rate and volume; able to articulate logical thoughts, able to abstract reason, no tangential thoughts, no hallucinations or delusions, affect- normal   Diabetic foot exam: normal DP and PT pulses, no trophic changes or ulcerative lesions, normal calluses, no deformities, normal sensory exam and decreased monofilament exam around toes    Diagnostic Test Results:  Results for orders placed or performed in visit on 03/19/19   CT Chest w/o contrast    Narrative    EXAMINATION: CT CHEST W/O CONTRAST, 3/19/2019 2:51 PM    TECHNIQUE:  Helical CT images from the thoracic inlet through the lung  bases were obtained without IV contrast. Contrast dose: None    COMPARISON: And 1820 18, 3/16/2018, and 20/1/2017    HISTORY: follow up for lung cancer; Non-small cell carcinoma of lung,  left (H)    FINDINGS:    The central tracheobronchial tree is patent. Postoperative changes  from left lower lobe wedge resection. No pneumothorax or pleural  effusion. No new or enlarging pulmonary nodules.    The heart size is normal. No pericardial effusion. Normal caliber and  configuration of the thoracic great vessels. No thoracic adenopathy.    Steatotic liver. No worrisome bony or soft tissue lesions.      Impression    IMPRESSION:   1. Postoperative changes from left lower lobe wedge resection without  evidence of recurrent or metastatic disease in the chest.  2. No new or enlarging pulmonary nodules.  3. Steatotic liver.     I have personally reviewed the examination and initial interpretation  and I agree with the findings.    ELADIO DILL MD       ASSESSMENT/PLAN:         Tobacco Cessation:   reports that he quit smoking about 27 years ago. He has never used smokeless tobacco.      BMI:   Estimated  "body mass index is 32.07 kg/m  as calculated from the following:    Height as of this encounter: 1.753 m (5' 9\").    Weight as of this encounter: 98.5 kg (217 lb 3.2 oz).   Weight management plan: Discussed healthy diet and exercise guidelines        ICD-10-CM    1. Type 2 diabetes mellitus with diabetic polyneuropathy, without long-term current use of insulin (H)- having a difficult time finding third oral medication that helps control blood sugar without side effects.  E11.42 Albumin Random Urine Quantitative with Creat Ratio     linagliptin (TRADJENTA) 5 MG TABS tablet   2. Screening for HIV (human immunodeficiency virus) Z11.4 declined   3. Screening for diabetic peripheral neuropathy Z13.89 FOOT EXAM  NO CHARGE [21076.114]   4. Hypertension goal BP (blood pressure) < 140/90 I10 Well controlled on medications    5. Hyperlipidemia with target LDL less than 100 E78.5 Stable    6. Diabetic polyneuropathy associated with type 2 diabetes mellitus (H) E11.42 Stable    7. Urinary frequency- may be due to yeast infection or prostate. Will check urinalysis.  R35.0 UA reflex to Microscopic and Culture     fluconazole (DIFLUCAN) 150 MG tablet- one dose.       CONSULTATION/REFERRAL to diabetes education  FUTURE LABS:       - Schedule fasting labs in 3 months  FUTURE APPOINTMENTS:       - Follow-up visit in 3 months or sooner if any questions or concerns.   Work on weight loss  Regular exercise  See Patient Instructions    Pam Dela Cruz MD  [facility]        "

## 2019-04-09 NOTE — PATIENT INSTRUCTIONS
At [facility], we strive to deliver an exceptional experience to you, every time we see you.  If you receive a survey in the mail, please send us back your thoughts. We really do value your feedback.    Based on your medical history, these are the current health maintenance/preventive care services that you are due for (some may have been done at this visit.)  Health Maintenance Due   Topic Date Due     HIV SCREEN (SYSTEM ASSIGNED)  05/02/1971     ZOSTER IMMUNIZATION (2 of 3) 02/14/2014     EYE EXAM Q1 YEAR  03/01/2016     ADVANCE DIRECTIVE PLANNING Q5 YRS  06/16/2016     MEDICARE ANNUAL WELLNESS VISIT  04/16/2019     MICROALBUMIN Q1 YEAR  04/16/2019     FOOT EXAM Q1 YEAR  04/18/2019         Suggested websites for health information:  Www.Pictrition App : Up to date and easily searchable information on multiple topics.  Www.ITeam.gov : medication info, interactive tutorials, watch real surgeries online  Www.familydoctor.org : good info from the Academy of Family Physicians  Www.cdc.gov : public health info, travel advisories, epidemics (H1N1)  Www.aap.org : children's health info, normal development, vaccinations  Www[place]health[place] : [place] dept of health, public health issues in [place], N1N1    Your care team:                            Family Medicine Internal Medicine   MD Rodrick Arita MD Shantel Branch-Fleming, MD Katya Georgiev PA-C Nam Ho, MD Pediatrics   AMPARO Bob, MD Keira Barbosa CNP, MD Deborah Mielke, MD Kim Thein, APRN CNP      Clinic hours: Monday - Thursday 7 am-7 pm; Fridays 7 am-5 pm.   Urgent care: Monday - Friday 11 am-9 pm; Saturday and Sunday 9 am-5 pm.  Pharmacy : Monday -Thursday 8 am-8 pm; Friday 8 am-6 pm; Saturday and Sunday 9 am-5 pm.     Clinic: [phone]   Pharmacy: [phone]

## 2019-04-11 NOTE — RESULT ENCOUNTER NOTE
Dear Sven    Your test results are attached.     It looks like the bladder irritation is from persistent sugar and protein in the urine. Hopefully the new diabetic medication will help with this.     Please contact me by MyChart if you have any questions about your labs or management.    Pam Dela Cruz MD

## 2019-04-19 ENCOUNTER — HOSPITAL ENCOUNTER (OUTPATIENT)
Facility: AMBULATORY SURGERY CENTER | Age: 64
Discharge: HOME | End: 2019-04-19
Attending: INTERNAL MEDICINE | Admitting: INTERNAL MEDICINE
Payer: COMMERCIAL

## 2019-04-19 ENCOUNTER — SURGERY (OUTPATIENT)
Age: 64
End: 2019-04-19
Payer: COMMERCIAL

## 2019-04-19 VITALS
OXYGEN SATURATION: 98 % | RESPIRATION RATE: 16 BRPM | TEMPERATURE: 97.8 F | HEART RATE: 82 BPM | DIASTOLIC BLOOD PRESSURE: 85 MMHG | SYSTOLIC BLOOD PRESSURE: 125 MMHG

## 2019-04-19 LAB
GLUCOSE BLDC GLUCOMTR-MCNC: 200 MG/DL (ref 70–99)
GLUCOSE BLDC GLUCOMTR-MCNC: 215 MG/DL (ref 70–99)
UPPER GI ENDOSCOPY: NORMAL

## 2019-04-19 PROCEDURE — 43251 EGD REMOVE LESION SNARE: CPT

## 2019-04-19 PROCEDURE — 43239 EGD BIOPSY SINGLE/MULTIPLE: CPT | Mod: 59 | Performed by: INTERNAL MEDICINE

## 2019-04-19 PROCEDURE — 88305 TISSUE EXAM BY PATHOLOGIST: CPT | Performed by: INTERNAL MEDICINE

## 2019-04-19 PROCEDURE — G8918 PT W/O PREOP ORDER IV AB PRO: HCPCS

## 2019-04-19 PROCEDURE — G8907 PT DOC NO EVENTS ON DISCHARG: HCPCS

## 2019-04-19 PROCEDURE — 43239 EGD BIOPSY SINGLE/MULTIPLE: CPT | Mod: XS

## 2019-04-19 PROCEDURE — 43251 EGD REMOVE LESION SNARE: CPT | Performed by: INTERNAL MEDICINE

## 2019-04-19 RX ORDER — FENTANYL CITRATE 50 UG/ML
INJECTION, SOLUTION INTRAMUSCULAR; INTRAVENOUS PRN
Status: DISCONTINUED | OUTPATIENT
Start: 2019-04-19 | End: 2019-04-19 | Stop reason: HOSPADM

## 2019-04-19 RX ORDER — ONDANSETRON 2 MG/ML
4 INJECTION INTRAMUSCULAR; INTRAVENOUS
Status: DISCONTINUED | OUTPATIENT
Start: 2019-04-19 | End: 2019-04-20 | Stop reason: HOSPADM

## 2019-04-19 RX ORDER — LIDOCAINE 40 MG/G
CREAM TOPICAL
Status: DISCONTINUED | OUTPATIENT
Start: 2019-04-19 | End: 2019-04-20 | Stop reason: HOSPADM

## 2019-04-19 RX ADMIN — FENTANYL CITRATE 50 MCG: 50 INJECTION, SOLUTION INTRAMUSCULAR; INTRAVENOUS at 10:20

## 2019-04-19 RX ADMIN — FENTANYL CITRATE 50 MCG: 50 INJECTION, SOLUTION INTRAMUSCULAR; INTRAVENOUS at 10:17

## 2019-04-23 LAB — COPATH REPORT: NORMAL

## 2019-05-24 DIAGNOSIS — I10 ESSENTIAL HYPERTENSION WITH GOAL BLOOD PRESSURE LESS THAN 140/90: ICD-10-CM

## 2019-05-24 DIAGNOSIS — M1A.09X0 CHRONIC GOUT OF MULTIPLE SITES, UNSPECIFIED CAUSE: ICD-10-CM

## 2019-05-24 DIAGNOSIS — E78.1 HYPERTRIGLYCERIDEMIA: ICD-10-CM

## 2019-05-24 DIAGNOSIS — E11.42 TYPE 2 DIABETES MELLITUS WITH DIABETIC POLYNEUROPATHY, WITHOUT LONG-TERM CURRENT USE OF INSULIN (H): ICD-10-CM

## 2019-05-24 NOTE — TELEPHONE ENCOUNTER
"Pharmacy needs two separate scripts for losartan and hydrochlorothiazide as well since combo is on back order.    Requested Prescriptions   Pending Prescriptions Disp Refills     allopurinol (ZYLOPRIM) 300 MG tablet  Last Written Prescription Date:  04/16/18  Last Fill Quantity: 90,  # refills: 3   Last Office Visit with [facility], [facility] or [facility] prescribing provider:  04/09/19-Jose De Jesus   Future Office Visit:    Next 5 appointments (look out 90 days)    Jun 11, 2019  3:00 PM CDT  [facility] with Shannon Barry PA-C  [facility] [address] [address] [place] [id number] [phone]        90 tablet 3     Sig: Take 1 tablet (300 mg) by mouth daily       Gout Agents Protocol Failed - 5/24/2019  1:51 PM        Failed - Has Uric Acid on file in past 12 months and value is less than 6     Recent Labs   Lab Test 10/30/15  0817   URIC 6.2     If level is 6mg/dL or greater, ok to refill one time and refer to provider.           Passed - CBC on file in past 12 months     Recent Labs   Lab Test 03/08/19  0926   WBC 7.3   RBC 4.57   HGB 13.4   HCT 41.1                    Passed - ALT on file in past 12 months     Recent Labs   Lab Test 03/08/19  0926   ALT 47             Passed - Recent (12 mo) or future (30 days) visit within the authorizing provider's specialty     Patient had office visit in the last 12 months or has a visit in the next 30 days with authorizing provider or within the authorizing provider's specialty.  See \"Patient Info\" tab in inbasket, or \"Choose Columns\" in Meds & Orders section of the refill encounter.              Passed - Medication is active on med list        Passed - Patient is age 18 or older        Passed - Normal serum creatinine on file in the past 12 months     Recent Labs   Lab Test 03/08/19  0926 05/30/18  1445   CR 0.71  --    CREAT  --  0.9             atorvastatin (LIPITOR) 40 MG tablet  Last Written Prescription Date:  04/16/18  Last " "Fill Quantity: 90,  # refills: 3   Last [facility] Visit [facility] or [facility] prescribing provider:  04/09/19-Jose De Jesus   Future Office Visit:    Next 5 appointments (look out 90 days)    Jun 11, 2019  3:00 PM CDT  [facility] [address] [address] [place] [id number] [phone]        90 tablet 3     Sig: Take 1 tablet (40 mg) by mouth daily       Statins Protocol Passed - 5/24/2019  1:51 PM        Passed - LDL on file in past 12 months     Recent Labs   Lab Test 03/08/19  0926   LDL 34             Passed - No abnormal creatine kinase in past 12 months     No lab results found.             Passed - Recent (12 mo) or future (30 days) visit within the authorizing provider's specialty     Patient had office visit in the last 12 months or has a visit in the next 30 days with authorizing provider or within the authorizing provider's specialty.  See \"Patient Info\" tab in inbasket, or \"Choose Columns\" in Meds & Orders section of the refill encounter.              Passed - Medication is active on med list        Passed - Patient is age 18 or older        terazosin (HYTRIN) 5 MG capsule  Last Written Prescription Date:  12/18/18  Last Fill Quantity: 90,  # refills: 1   Last Office Visit with [facility] or  [facility] prescribing provider:  04/09/19Kirti   Future Office Visit:    Next 5 appointments (look out 90 days)    Jun 11, 2019  3:00 PM CDT  [facility] [address] [address] [place] [id number] [phone]        90 capsule 1     Sig: Take 1 capsule (5 mg) by mouth At Bedtime       Alpha Blockers Passed - 5/24/2019  1:51 PM        Passed - Blood pressure under 140/90 in past 12 months     BP Readings from Last 3 Encounters:   04/19/19 125/85   04/09/19 130/68   03/22/19 144/84                 Passed - Recent (12 mo) or future (30 days) visit " "within the authorizing provider's specialty     Patient had office visit in the last 12 months or has a visit in the next 30 days with authorizing provider or within the authorizing provider's specialty.  See \"Patient Info\" tab in inbasket, or \"Choose Columns\" in Meds & Orders section of the refill encounter.              Passed - Patient does not have Tadalafil, Vardenafil, or Sildenafil on their medication list        Passed - Medication is active on med list        Passed - Patient is 18 years of age or older          "

## 2019-05-28 DIAGNOSIS — I10 HYPERTENSION GOAL BP (BLOOD PRESSURE) < 140/90: Primary | ICD-10-CM

## 2019-05-28 RX ORDER — TERAZOSIN 5 MG/1
5 CAPSULE ORAL AT BEDTIME
Qty: 90 CAPSULE | Refills: 1 | Status: SHIPPED | OUTPATIENT
Start: 2019-05-28 | End: 2019-11-17

## 2019-05-28 RX ORDER — ATORVASTATIN CALCIUM 40 MG/1
40 TABLET, FILM COATED ORAL DAILY
Qty: 90 TABLET | Refills: 1 | Status: SHIPPED | OUTPATIENT
Start: 2019-05-28 | End: 2019-11-20

## 2019-05-28 NOTE — TELEPHONE ENCOUNTER
For allopurinol:  Routing refill request to provider for review/approval because:  Labs not current:  Uric acid    For atorvastatin and terazosin:  Prescription approved per [facility] Refill Protocol.            Manuel Wang RN, BSN, PHN

## 2019-05-28 NOTE — TELEPHONE ENCOUNTER
Please send individual Losartan and hydrochlorothiazide Rx, combo is back order.           Manuel Faarax  [facility]

## 2019-05-28 NOTE — TELEPHONE ENCOUNTER
Allopurinol has been sent to provider for review. Atorvastatin has been sent to pharmacy.     Routing refill request to provider for review/approval because:  Needs to be two different prescriptions due to availability at pharmacy.       Manuel Wang RN, BSN, PHN

## 2019-05-29 RX ORDER — LOSARTAN POTASSIUM 100 MG/1
100 TABLET ORAL DAILY
Qty: 90 TABLET | Refills: 3 | Status: SHIPPED | OUTPATIENT
Start: 2019-05-29 | End: 2019-11-21

## 2019-05-29 RX ORDER — HYDROCHLOROTHIAZIDE 25 MG/1
25 TABLET ORAL DAILY
Qty: 90 TABLET | Refills: 3 | Status: SHIPPED | OUTPATIENT
Start: 2019-05-29 | End: 2019-11-21

## 2019-05-29 RX ORDER — ALLOPURINOL 300 MG/1
300 TABLET ORAL DAILY
Qty: 90 TABLET | Refills: 3 | Status: SHIPPED | OUTPATIENT
Start: 2019-05-29 | End: 2020-05-15

## 2019-06-21 ENCOUNTER — DOCUMENTATION ONLY (OUTPATIENT)
Dept: SLEEP MEDICINE | Facility: CLINIC | Age: 64
End: 2019-06-21

## 2019-06-21 NOTE — PROGRESS NOTES
STM Recheck Visit     Subjective measures:   Pt states that he isn't having any issues and he is using nightly.      Assessment: Pt not meeting objective benchmarks for compliance  Patient meeting subjective benchmarks.   Action plan: follow up per provider request    Device type: Auto-CPAP  PAP settings: CPAP min 7 cm  H20     CPAP max 10 cm  H20  Objective measures: No usage since 4/8/19    Diagnostic AHI: 37.7     Objective measure goal  Compliance   Goal >70%  Leak   Goal < 10%  AHI  Goal < 5  Usage  Goal >240

## 2019-07-16 ENCOUNTER — OFFICE VISIT (OUTPATIENT)
Dept: FAMILY MEDICINE | Facility: CLINIC | Age: 64
End: 2019-07-16
Payer: COMMERCIAL

## 2019-07-16 VITALS
HEART RATE: 102 BPM | DIASTOLIC BLOOD PRESSURE: 82 MMHG | RESPIRATION RATE: 16 BRPM | SYSTOLIC BLOOD PRESSURE: 130 MMHG | OXYGEN SATURATION: 97 % | TEMPERATURE: 98.3 F | WEIGHT: 216 LBS | BODY MASS INDEX: 32.74 KG/M2 | HEIGHT: 68 IN

## 2019-07-16 DIAGNOSIS — E11.42 TYPE 2 DIABETES MELLITUS WITH DIABETIC POLYNEUROPATHY, WITHOUT LONG-TERM CURRENT USE OF INSULIN (H): ICD-10-CM

## 2019-07-16 DIAGNOSIS — C34.92 NON-SMALL CELL CARCINOMA OF LUNG, LEFT (H): ICD-10-CM

## 2019-07-16 DIAGNOSIS — N40.1 BENIGN PROSTATIC HYPERPLASIA WITH WEAK URINARY STREAM: ICD-10-CM

## 2019-07-16 DIAGNOSIS — G47.33 OSA (OBSTRUCTIVE SLEEP APNEA): Chronic | ICD-10-CM

## 2019-07-16 DIAGNOSIS — I10 HYPERTENSION GOAL BP (BLOOD PRESSURE) < 140/90: ICD-10-CM

## 2019-07-16 DIAGNOSIS — R39.12 BENIGN PROSTATIC HYPERPLASIA WITH WEAK URINARY STREAM: ICD-10-CM

## 2019-07-16 DIAGNOSIS — Z01.818 PREOP GENERAL PHYSICAL EXAM: Primary | ICD-10-CM

## 2019-07-16 DIAGNOSIS — E78.5 HYPERLIPIDEMIA WITH TARGET LDL LESS THAN 100: ICD-10-CM

## 2019-07-16 PROBLEM — I20.1 ANGINA PECTORIS WITH DOCUMENTED SPASM (H): Status: RESOLVED | Noted: 2019-01-21 | Resolved: 2019-07-16

## 2019-07-16 LAB
ALBUMIN UR-MCNC: 100 MG/DL
ANION GAP SERPL CALCULATED.3IONS-SCNC: 9 MMOL/L (ref 3–14)
APPEARANCE UR: CLEAR
BACTERIA #/AREA URNS HPF: ABNORMAL /HPF
BILIRUB UR QL STRIP: NEGATIVE
BUN SERPL-MCNC: 15 MG/DL (ref 7–30)
CALCIUM SERPL-MCNC: 9.4 MG/DL (ref 8.5–10.1)
CHLORIDE SERPL-SCNC: 104 MMOL/L (ref 94–109)
CO2 SERPL-SCNC: 24 MMOL/L (ref 20–32)
COLOR UR AUTO: YELLOW
CREAT SERPL-MCNC: 0.58 MG/DL (ref 0.66–1.25)
CREAT UR-MCNC: 187 MG/DL
ERYTHROCYTE [DISTWIDTH] IN BLOOD BY AUTOMATED COUNT: 12.7 % (ref 10–15)
GFR SERPL CREATININE-BSD FRML MDRD: >90 ML/MIN/{1.73_M2}
GLUCOSE SERPL-MCNC: 185 MG/DL (ref 70–99)
GLUCOSE UR STRIP-MCNC: NEGATIVE MG/DL
HBA1C MFR BLD: 10.1 % (ref 0–5.6)
HCT VFR BLD AUTO: 39.2 % (ref 40–53)
HGB BLD-MCNC: 13.2 G/DL (ref 13.3–17.7)
HGB UR QL STRIP: NEGATIVE
KETONES UR STRIP-MCNC: 15 MG/DL
LEUKOCYTE ESTERASE UR QL STRIP: ABNORMAL
MCH RBC QN AUTO: 29.6 PG (ref 26.5–33)
MCHC RBC AUTO-ENTMCNC: 33.7 G/DL (ref 31.5–36.5)
MCV RBC AUTO: 88 FL (ref 78–100)
MICROALBUMIN UR-MCNC: 196 MG/L
MICROALBUMIN/CREAT UR: 104.81 MG/G CR (ref 0–17)
MUCOUS THREADS #/AREA URNS LPF: PRESENT /LPF
NITRATE UR QL: NEGATIVE
NON-SQ EPI CELLS #/AREA URNS LPF: ABNORMAL /LPF
PH UR STRIP: 7 PH (ref 5–7)
PLATELET # BLD AUTO: 222 10E9/L (ref 150–450)
POTASSIUM SERPL-SCNC: 3.9 MMOL/L (ref 3.4–5.3)
RBC # BLD AUTO: 4.46 10E12/L (ref 4.4–5.9)
RBC #/AREA URNS AUTO: ABNORMAL /HPF
SODIUM SERPL-SCNC: 137 MMOL/L (ref 133–144)
SOURCE: ABNORMAL
SP GR UR STRIP: 1.02 (ref 1–1.03)
UROBILINOGEN UR STRIP-ACNC: 0.2 EU/DL (ref 0.2–1)
WBC # BLD AUTO: 7.7 10E9/L (ref 4–11)
WBC #/AREA URNS AUTO: ABNORMAL /HPF

## 2019-07-16 PROCEDURE — 85027 COMPLETE CBC AUTOMATED: CPT | Performed by: FAMILY MEDICINE

## 2019-07-16 PROCEDURE — 81001 URINALYSIS AUTO W/SCOPE: CPT | Performed by: FAMILY MEDICINE

## 2019-07-16 PROCEDURE — 93000 ELECTROCARDIOGRAM COMPLETE: CPT | Performed by: FAMILY MEDICINE

## 2019-07-16 PROCEDURE — 36415 COLL VENOUS BLD VENIPUNCTURE: CPT | Performed by: FAMILY MEDICINE

## 2019-07-16 PROCEDURE — 82043 UR ALBUMIN QUANTITATIVE: CPT | Performed by: FAMILY MEDICINE

## 2019-07-16 PROCEDURE — 83036 HEMOGLOBIN GLYCOSYLATED A1C: CPT | Performed by: FAMILY MEDICINE

## 2019-07-16 PROCEDURE — 99215 OFFICE O/P EST HI 40 MIN: CPT | Performed by: FAMILY MEDICINE

## 2019-07-16 PROCEDURE — 80048 BASIC METABOLIC PNL TOTAL CA: CPT | Performed by: FAMILY MEDICINE

## 2019-07-16 ASSESSMENT — PAIN SCALES - GENERAL: PAINLEVEL: NO PAIN (0)

## 2019-07-16 ASSESSMENT — MIFFLIN-ST. JEOR: SCORE: 1738.24

## 2019-07-16 NOTE — PATIENT INSTRUCTIONS
At [facility], we strive to deliver an exceptional experience to you, every time we see you.  If you receive a survey in the mail, please send us back your thoughts. We really do value your feedback.    Based on your medical history, these are the current health maintenance/preventive care services that you are due for (some may have been done at this visit.)  Health Maintenance Due   Topic Date Due     ZOSTER IMMUNIZATION (2 of 3) 02/14/2014     EYE EXAM  03/01/2016     ADVANCE CARE PLANNING  06/16/2016     MEDICARE ANNUAL WELLNESS VISIT  04/16/2019         Suggested websites for health information:  Www.[employer]Art[employer] : Up to date and easily searchable information on multiple topics.  Www.medlineplus.gov : medication info, interactive tutorials, watch real surgeries online  Www.familydoctor.org : good info from the Academy of Family Physicians  Www.cdc.gov : public health info, travel advisories, epidemics (H1N1)  Www.aap.org : children's health info, normal development, vaccinations  Www.health.[facility].[place].us : [place] dept of health, public health issues in [place], N1N1    Your care team:                            Family Medicine Internal Medicine   MD Rodrick Arita MD Shantel Branch-Fleming, MD Katya Georgiev PA-C Nam Ho, MD Pediatrics   AMPARO Bob, MD Keira Barbosa CNP, MD Deborah Mielke, MD Kim Thein, APRN [facility]      Clinic hours: Monday - Thursday 7 am-7 pm; Fridays 7 am-5 pm.   Urgent care: Monday - Friday 11 am-9 pm; Saturday and Sunday 9 am-5 pm.  Pharmacy : Monday -Thursday 8 am-8 pm; Friday 8 am-6 pm; Saturday and Sunday 9 am-5 pm.     Clinic: [phone]   Pharmacy: [phone]      Before Your Surgery      Call your surgeon if there is any change in your health. This includes signs of a cold or flu (such as a sore throat, runny nose, cough, rash or fever).    Do not smoke, drink alcohol or  take over the counter medicine (unless your surgeon or primary care doctor tells you to) for the 24 hours before and after surgery.    If you take prescribed drugs: Follow your doctor s orders about which medicines to take and which to stop until after surgery.    Eating and drinking prior to surgery: follow the instructions from your surgeon    Take a shower or bath the night before surgery. Use the soap your surgeon gave you to gently clean your skin. If you do not have soap from your surgeon, use your regular soap. Do not shave or scrub the surgery site.  Wear clean pajamas and have clean sheets on your bed.

## 2019-07-16 NOTE — PROGRESS NOTES
[address] [address]  [phone]  Dept: [phone]    PRE-OP EVALUATION:  Today's date: 2019    Sven Givens (: 1955) presents for pre-operative evaluation assessment as requested by Dr. Karl J Kemerling.  He requires evaluation and anesthesia risk assessment prior to undergoing surgery/procedure for treatment of benign prostatic hypertrophy.    Proposed Surgery/ Procedure: Cystoscopy, Green Light Laser Ablation of the Prostate Possible Transurethral Resection of the Prostate  Date of Surgery/ Procedure: 19  Time of Surgery/ Procedure: 11am  Hospital/Surgical Facility: [facility]  Fax number for surgical facility: [phone]  Primary Physician: Pam Dela Cruz  Type of Anesthesia Anticipated: unknown    Patient has a Health Care Directive or Living Will:  NO    1. NO - Do you have a history of heart attack, stroke, stent, bypass or surgery on an artery in the head, neck, heart or legs?  2. NO - Do you ever have any pain or discomfort in your chest?  3. NO - Do you have a history of  Heart Failure?  4. NO - Are you troubled by shortness of breath when: walking on the level, up a slight hill or at night?  5. NO - Do you currently have a cold, bronchitis or other respiratory infection?  6. NO - Do you have a cough, shortness of breath or wheezing?  7. YES - DO YOU SOMETIMES GET PAINS IN THE CALVES OF YOUR LEGS WHEN YOU WALK? More in the feet and not the legs or calves  8. YES - DO YOU OR ANYONE IN YOUR FAMILY HAVE PREVIOUS HISTORY OF BLOOD CLOTS? Sister had a blood clot in the leg after surgery that required blood thinners.   9. NO - Do you or does anyone in your family have a serious bleeding problem such as prolonged bleeding following surgeries or cuts?  10. NO - Have you ever had problems with anemia or been told to take iron pills?  11. NO - Have you had any abnormal blood loss such as black,  tarry or bloody stools, or abnormal vaginal bleeding?  12. NO - Have you ever had a blood transfusion?  13. NO - Have you or any of your relatives ever had problems with anesthesia?  14. YES - DO YOU HAVE SLEEP APNEA, EXCESSIVE SNORING OR DAYTIME DROWSINESS? Mild sleep apnea on back causes snoring, using CPAP machine helps.   15. NO - Do you have any prosthetic heart valves?  16. NO - Do you have prosthetic joints?  17. NO - Is there any chance that you may be pregnant?      HPI:     HPI related to upcoming procedure: Progressive weakness in urine stream due to enlargement of the prostate gland. Failed on medications and urolift procedure.       CAD - Patient has history of angina but no MI. Patient denies recent chest pain or NTG use, denies exercise induced dyspnea or PND. Last Stress test had to be stopped, no recent issues, EKG Normal 7/16/2019 .     DIABETES - Patient has a longstanding history of DiabetesType Type II . Patient is being treated with oral agents and denies significant side effects. Control has been good. Complicating factors include but are not limited to: hypertension, hyperlipidemia and neuropathy.     HYPERLIPIDEMIA - Patient has a long history of significant Hyperlipidemia requiring medication for treatment with recent good control. Patient reports no problems or side effects with the medication.     HYPERTENSION - Patient has longstanding history of HTN , currently denies any symptoms referable to elevated blood pressure. Specifically denies chest pain, palpitations, dyspnea, orthopnea, PND or peripheral edema. Blood pressure readings have been in normal range. Current medication regimen is as listed below. Patient denies any side effects of medication.     SLEEP PROBLEM - Patient has a longstanding history of snoring.. Patient has tried OTC medications with limited success.       MEDICAL HISTORY:     Patient Active Problem List    Diagnosis Date Noted     Angina pectoris with documented  spasm (H) 01/21/2019     Priority: Medium     PRESLEY (obstructive sleep apnea)      Priority: Medium     11/29/2007(200#)-AHI 37.7, RDI 50, lowest oxygen saturation 80%. CPAP of 7 cm/H9O was effective.       Non-small cell carcinoma of lung, left (H) 10/17/2016     Priority: Medium     Stage 1A (pT1aN0) 0.9 x 0.7 x 0.5 cm grade 1 well differentiated adenocarcinoma of the left lower lobe of the lung with invasive component being 0.3cm, s/p wedge resection and mediastinal LN sampling on 9/23/16.       Nonalcoholic hepatosteatosis 10/17/2016     Priority: Medium     History of radiation exposure 09/30/2016     Priority: Medium     Type 2 diabetes mellitus with diabetic polyneuropathy (H) 10/15/2015     Priority: Medium     Hyperlipidemia with target LDL less than 100 12/20/2013     Priority: Medium     Diagnosis updated by automated process. Provider to review and confirm.       Gout 12/20/2013     Priority: Medium     Diagnosed by joint tap August 2013 per patient report.        Anemia 07/03/2013     Priority: Medium     Persistent mild anemia. Needs evaluation. May be thalassemia carrier.       GERD (gastroesophageal reflux disease) 05/15/2012     Priority: Medium     Diabetic neuropathy (H) 05/07/2012     Priority: Medium     Advanced directives, counseling/discussion 06/16/2011     Priority: Medium     Advance Directive Problem List Overview:   Name Relationship Phone    Primary Health Care Agent            Alternative Health Care Agent          Discussed advance care planning with patient; information given to patient to review.  Bindu Smith   6/16/2011          Hypertension goal BP (blood pressure) < 140/90 06/16/2011     Priority: Medium     Tubular adenoma of colon 12/01/2009     Priority: Medium     follow up colonoscopy 5 years requested 5-2017 due.        Past Medical History:   Diagnosis Date     Allergies     PCN, ACE, Indomethocin     Diabetes (H) 2002     Kidney stones 09/2004    s/p right kidney  basket retrieval     Rhinitis, allergic      Rosacea      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, overuse, 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     Varicose veins      Past Surgical History:   Procedure Laterality Date     BRONCHOSCOPY FLEXIBLE N/A 9/23/2016    Procedure: BRONCHOSCOPY FLEXIBLE;  Surgeon: Gayle Moya MD;  Location: [facility] OR     COLONOSCOPY  5-03     COLONOSCOPY WITH CO2 INSUFFLATION N/A 5/31/2017    Procedure: COLONOSCOPY WITH CO2 INSUFFLATION;  COLON SCREEN/ SEB;  Surgeon: Margarito Rasheed DO;  Location: [facility] OR     COMBINED ESOPHAGOSCOPY, GASTROSCOPY, DUODENOSCOPY (EGD) WITH CO2 INSUFFLATION N/A 4/19/2019    Procedure: COMBINED ESOPHAGOSCOPY, GASTROSCOPY, DUODENOSCOPY (EGD) WITH CO2 INSUFFLATION;  Surgeon: Abbe Mccarty MD;  Location: [facility] OR     ESOPHAGOSCOPY, GASTROSCOPY, DUODENOSCOPY (EGD), COMBINED N/A 4/19/2019    Procedure: Combined Esophagoscopy, Gastroscopy, Duodenoscopy (Egd), Biopsy Single Or Multiple;  Surgeon: Abbe Mccarty MD;  Location: [facility] OR     GENITOURINARY SURGERY      kidney stones     THORACOSCOPIC WEDGE RESECTION LUNG Left 9/23/2016    Procedure: THORACOSCOPIC WEDGE RESECTION LUNG;  Surgeon: Gayle Moya MD;  Location: [facility] OR     VASCULAR SURGERY      varicose vein     Current Outpatient Medications   Medication Sig Dispense Refill     albuterol (PROAIR HFA) 108 (90 Base) MCG/ACT inhaler Inhale 2 puffs into the lungs every 4 hours as needed for shortness of breath / dyspnea or wheezing 8.5 Inhaler 3     allopurinol (ZYLOPRIM) 300 MG tablet Take 1 tablet (300 mg) by mouth daily 90 tablet 3     ASPIRIN 81 MG PO TABS 1 TABLET DAILY(*)       atorvastatin (LIPITOR) 40 MG tablet Take 1 tablet (40 mg) by mouth daily 90 tablet 1     Azelaic Acid (FINACEA) 15 % gel Apply small amount twice a day to skin 50 g 3     capsaicin (ZOSTRIX) 0.025 % CREA cream Apply 1 g topically 3 times daily 60 g 3     CINNAMON 500 MG OR TABS Take one tablet twice daily.       diclofenac  (VOLTAREN) 1 % GEL topical gel Apply 4 grams to knees or 2 grams to hands four times daily using enclosed dosing card. 100 g 1     FISH OIL 1000 MG OR CAPS daily       fluticasone (FLONASE) 50 MCG/ACT nasal spray Spray 1-2 sprays into both nostrils daily 1 Bottle 1     glimepiride (AMARYL) 4 MG tablet Take 1 tablet (4 mg) by mouth 2 times daily 180 tablet 3     hydrochlorothiazide (HYDRODIURIL) 25 MG tablet Take 1 tablet (25 mg) by mouth daily 90 tablet 3     losartan (COZAAR) 100 MG tablet Take 1 tablet (100 mg) by mouth daily 90 tablet 3     losartan-hydrochlorothiazide (HYZAAR) 100-25 MG tablet Take 1 tablet by mouth daily 90 tablet 3     metFORMIN (GLUCOPHAGE) 1000 MG tablet Take 1 tablet (1,000 mg) by mouth 2 times daily (with meals) 180 tablet 3     MULTIPLE VITAMINS OR 1 a day       omeprazole (PRILOSEC) 20 MG DR capsule Take 1 capsule (20 mg) by mouth daily Take 30-60 minutes before a meal. 90 capsule 3     ONETOUCH ULTRA test strip USE TO CHECK BLOOD SUGARS TWICE A DAY. 200 strip 0     order for DME One touch ultra  Test strips for checking blood sugars 2 times a day. 200 strip 1     Oxymetazoline HCl (AFRIN NASAL SPRAY NA) Spray in nostril as needed       ranitidine (ZANTAC) 150 MG tablet Take 1 tablet (150 mg) by mouth At Bedtime 30 tablet 11     SENNA-docusate sodium (SENNA S) 8.6-50 MG tablet Take 1 tablet by mouth At Bedtime 90 tablet 3     terazosin (HYTRIN) 5 MG capsule Take 1 capsule (5 mg) by mouth At Bedtime 90 capsule 1     linagliptin (TRADJENTA) 5 MG TABS tablet Take 1 tablet (5 mg) by mouth daily (Patient not taking: Reported on 7/16/2019) 30 tablet 3     OTC products: None, except as noted above    Allergies   Allergen Reactions     Penicillins Rash     Ace Inhibitors Cough     Indomethacin Other (See Comments)     Severe dizziness     Invokana [Canagliflozin]      Blood infection      Latex Allergy: NO    Social History     Tobacco Use     Smoking status: Former Smoker     Last attempt to  "quit: 1992     Years since quittin.4     Smokeless tobacco: Never Used     Tobacco comment: 15 + years    Substance Use Topics     Alcohol use: No     History   Drug Use No       REVIEW OF SYSTEMS:   Constitutional, neuro, ENT, endocrine, pulmonary, cardiac, gastrointestinal, genitourinary, musculoskeletal, integument and psychiatric systems are negative, except as otherwise noted.    EXAM:   /82 (BP Location: Right arm, Patient Position: Chair, Cuff Size: Adult Large)   Pulse 102   Temp 98.3  F (36.8  C) (Oral)   Resp 16   Ht 1.734 m (5' 8.25\")   Wt 98 kg (216 lb)   SpO2 97%   BMI 32.60 kg/m      GENERAL APPEARANCE: healthy, alert and no distress, obese     EYES: EOMI,  PERRL     HENT: ear canals and TM's normal and nose and mouth without ulcers or lesions     NECK: no adenopathy, no asymmetry, masses, or scars and thyroid normal to palpation     RESP: lungs clear to auscultation - no rales, rhonchi or wheezes     CV: regular rates and rhythm, normal S1 S2, no S3 or S4 and no murmur, click or rub     ABDOMEN:  soft, nontender, no HSM or masses and bowel sounds normal     MS: extremities normal- no gross deformities noted, no evidence of inflammation in joints, FROM in all extremities.     SKIN: no suspicious lesions or rashes     NEURO: Normal strength and tone, sensory exam grossly normal, mentation intact and speech normal     PSYCH: mentation appears normal. and affect normal/bright     LYMPHATICS: No cervical adenopathy    DIAGNOSTICS:   EKG: appears normal, NSR, normal axis, normal intervals, no acute ST/T changes c/w ischemia, no LVH by voltage criteria, unchanged from previous tracings    Recent Labs   Lab Test 19  0926 18  1502 18  0834   HGB 13.4 12.7* 12.3*    226 212     --  138   POTASSIUM 4.1  --  3.9   CR 0.71  --  0.70   A1C 9.9*  --  8.5*      Results for orders placed or performed in visit on 19   CBC with platelets   Result Value Ref Range "    WBC 7.7 4.0 - 11.0 10e9/L    RBC Count 4.46 4.4 - 5.9 10e12/L    Hemoglobin 13.2 (L) 13.3 - 17.7 g/dL    Hematocrit 39.2 (L) 40.0 - 53.0 %    MCV 88 78 - 100 fl    MCH 29.6 26.5 - 33.0 pg    MCHC 33.7 31.5 - 36.5 g/dL    RDW 12.7 10.0 - 15.0 %    Platelet Count 222 150 - 450 10e9/L   Hemoglobin A1c   Result Value Ref Range    Hemoglobin A1C 10.1 (H) 0 - 5.6 %   UA reflex to Microscopic and Culture   Result Value Ref Range    Color Urine Yellow     Appearance Urine Clear     Glucose Urine Negative NEG^Negative mg/dL    Bilirubin Urine Negative NEG^Negative    Ketones Urine 15 (A) NEG^Negative mg/dL    Specific Gravity Urine 1.020 1.003 - 1.035    Blood Urine Negative NEG^Negative    pH Urine 7.0 5.0 - 7.0 pH    Protein Albumin Urine 100 (A) NEG^Negative mg/dL    Urobilinogen Urine 0.2 0.2 - 1.0 EU/dL    Nitrite Urine Negative NEG^Negative    Leukocyte Esterase Urine Trace (A) NEG^Negative    Source Midstream Urine    Urine Microscopic   Result Value Ref Range    WBC Urine 0 - 5 OTO5^0 - 5 /HPF    RBC Urine O - 2 OTO2^O - 2 /HPF    Squamous Epithelial /LPF Urine Few FEW^Few /LPF    Bacteria Urine Few (A) NEG^Negative /HPF    Mucous Urine Present (A) NEG^Negative /LPF      IMPRESSION:   Reason for surgery/procedure: benign prostatic hypertrophy.    Proposed Surgery/ Procedure: Cystoscopy, Green Light Laser Ablation of the Prostate Possible Transurethral Resection of the Prostate  Diagnosis/reason for consult: cardiac and anesthesia risk assessment     The proposed surgical procedure is considered INTERMEDIATE risk.    REVISED CARDIAC RISK INDEX  The patient has the following serious cardiovascular risks for perioperative complications such as (MI, PE, VFib and 3  AV Block):  No serious cardiac risks  INTERPRETATION: 0 risks: Class I (very low risk - 0.4% complication rate)    The patient has the following additional risks for perioperative complications:  No identified additional risks      ICD-10-CM    1. Preop  general physical exam Z01.818 EKG 12-lead complete w/read - Clinics     Approved for procedure and anesthesia.    2. Benign prostatic hyperplasia with weak urinary stream N40.1 UA reflex to Microscopic and Culture- normal urinalysis, failed treatment with medications and planned procedure    R39.12    3. Hypertension goal BP (blood pressure) < 140/90 I10 CBC with platelets     Basic metabolic panel   4. Type 2 diabetes mellitus with diabetic polyneuropathy, without long-term current use of insulin (H)- diabetes educator referral for pre op diabetes management E11.42 Hemoglobin A1c- not well controlled on medications and did not tolerate either Tradjenta or Invokana. Recommend adding Lantus insulin or other long acting insulin to diabetes medication      Albumin Random Urine Quantitative with Creat Ratio   5. Non-small cell carcinoma of lung, left (H) C34.92 Treated with surgical removal. No recurrence   6. PRESLEY (obstructive sleep apnea) G47.33 Uses CPAP nightly. Symptoms mostly on back.    7. Hyperlipidemia with target LDL less than 100 E78.5 Stable        RECOMMENDATIONS:       Obstructive Sleep Apnea (or suspected sleep apnea)  Patient is to bring their home CPAP with them on the day of surgery      --Patient is to take all scheduled medications on the day of surgery EXCEPT for modifications listed below.    ACE Inhibitor or Angiotensin Receptor Blocker (ARB) Use  Ace inhibitor or Angiotensin Receptor Blocker (ARB) and should HOLD this medication for the 24 hours prior to surgery.      APPROVAL GIVEN to proceed with proposed procedure, without further diagnostic evaluation       Signed Electronically by: Pam Dela Cruz MD    Copy of this evaluation report is provided to requesting physician.    [facility] Preop Guidelines    Revised Cardiac Risk Index

## 2019-07-17 ENCOUNTER — TELEPHONE (OUTPATIENT)
Dept: FAMILY MEDICINE | Facility: CLINIC | Age: 64
End: 2019-07-17

## 2019-07-17 NOTE — RESULT ENCOUNTER NOTE
Dear Sven    Your test results are attached.    The diabetes test is too high and it looks like we should start long acting insulin once a day to get your blood sugar under better control along with the current diabetes medication you are taking. I put in a referral for diabetes education to help with this. You can also schedule a follow up appointment with me.     The other blood work looks stable. We can recheck labs in 3 months.     Please contact me by [facility] [employer] [facility] if you have any questions about your labs or management.    Pam Dela Cruz MD

## 2019-07-17 NOTE — TELEPHONE ENCOUNTER
Diabetes Education Scheduling Outreach #1:    Call to patient to schedule. Patient is driving and cannot schedule at this time. Provided number and he will call back to schedule.    [facility] Watters On[employer]  Diabetes and Nutrition Scheduling

## 2019-07-23 ENCOUNTER — ALLIED HEALTH/NURSE VISIT (OUTPATIENT)
Dept: EDUCATION SERVICES | Facility: CLINIC | Age: 64
End: 2019-07-23
Payer: COMMERCIAL

## 2019-07-23 VITALS — HEIGHT: 69 IN | BODY MASS INDEX: 32.5 KG/M2 | WEIGHT: 219.4 LBS

## 2019-07-23 DIAGNOSIS — E11.42 TYPE 2 DIABETES MELLITUS WITH DIABETIC POLYNEUROPATHY, WITHOUT LONG-TERM CURRENT USE OF INSULIN (H): Primary | ICD-10-CM

## 2019-07-23 PROCEDURE — 99207 ZZC DROP WITH A PROCEDURE: CPT

## 2019-07-23 PROCEDURE — G0108 DIAB MANAGE TRN  PER INDIV: HCPCS

## 2019-07-23 RX ORDER — INSULIN GLARGINE 100 [IU]/ML
15 INJECTION, SOLUTION SUBCUTANEOUS EVERY MORNING
Qty: 6 ML | Refills: 3 | Status: SHIPPED | OUTPATIENT
Start: 2019-07-23 | End: 2020-05-22

## 2019-07-23 ASSESSMENT — MIFFLIN-ST. JEOR: SCORE: 1765.57

## 2019-07-23 NOTE — PATIENT INSTRUCTIONS
"Taking Insulin:    1. Take Lantus - 15 units at morning.     - Do a 2 unit \"prime\" before each injection, be sure a stream of insulin comes out of the needle before you give your injection.    - After you inject, hold the needle under the skin to the count of 10 to be sure all of the insulin goes in.     - Rotate injection sites, keeping at least 1 inch apart from last injection site and 2 inches away from belly button or surgical scars.    2. Pen - Use a new pen needle for each injection. Always remove pen needle from the insulin pen after use and do not store insulin pens with the needle on the pen.     3. Store insulin you are not using in the refrigerator (do not freeze). Take new insulin out of the refrigerator a few hours prior to use to bring to room temperature.     4. Once opened Lantus can be kept at room temperature for 28 days after opened.. Do not use the opened insulin after this time has passed, even if there is still medicine inside.     5. Always carry your blood sugar meter and a sugar source like glucose tablets with you in case of a low blood sugar. Treat a low blood sugar (less than 70) with 15 grams of carbohydrate (1 carb choice). Wait 15 minutes, recheck blood sugar. If blood sugar is still below 70, repeat the treatment.    6. Wear Medical ID or carry a wallet card stating you have Diabetes.    7. Call your doctor or diabetes educator if you begin having low blood sugars or if you have questions or concerns.     8. Complete and mail in the form to inform the [place] [employer] that you have started taking insulin.    Follow-up: Call ([phone]), e-mail ([email or web address]), or send [employer] message to educator with blood sugar readings on Friday    NEXT APPOINTMENT: Monday, August 26th at 3:30pm at [facility]    Dorcas Amezcua RD, LD, CDE   [phone]    "

## 2019-07-23 NOTE — PROCEDURES
Diabetes Self-Management Education & Support    Diabetes Education Self Management & Training    SUBJECTIVE/OBJECTIVE:  Presents for: Individual review  Accompanied by: Self  Diabetes education in the past 24mo: No(Says once about 10 years ago at different clinic- [place] [facility])  Focus of Visit: Insulin Start  Insulin administration technique taught using: Pen  Insulin Type: Lantus  Diabetes type: Type 2  Date of diagnosis: 2010  Disease course: Worsening  How confident are you filling out medical forms by yourself:: Quite a bit  Diabetes management related comments/concerns: Says blood glucose up and down.  Says now blood glucose is high.    Beginning of year, lot of stress- sister was in coma and waiting for her to die.  Saw blood glucose in the 300-400's.  Was prescribed an extra medication - Invokana but was not able to tolerate it after 3-4 days and not see big improvement in blood glucose.  Tried another medication for 2 weeks but stopped because of rash and GI upset so had to stop. Really does NOT want to start insulin but needs to get blood glucose down.     Says feet hurt - feel like on fire.  Tries to swim since walking hurts.  Says for many years would go to the gym and exercise.     Says like fruit and small amount of bread- not eat a lot of sugar, juice or alcohol.  Says weight is down about 5 lbs.      Says tried not to eat and became dizzy at 3-4 pm so realized he needs to eat food regularly.    Transportation concerns: No  Other concerns:: None  Cultural Influences/Ethnic Background:  [place]      Diabetes Symptoms & Complications  Blurred vision: Yes  Fatigue: Yes  Neuropathy: Yes  Foot ulcerations: No  Polydipsia: Yes  Polyphagia: Yes  Polyuria: Yes  Visual change: Yes  Weakness: Yes  Weight loss: No  Slow healing wounds: No  Recent Infection(s): No  Symptom course: Worsening  Weight trend: Decreasing steadily  Autonomic neuropathy: No  CVA: No  Heart disease: No  Nephropathy: No  Peripheral neuropathy:  "Yes  Peripheral Vascular Disease: No  Retinopathy: No  Sexual dysfunction: No    Patient Problem List and Family Medical History reviewed for relevant medical history, current medical status, and diabetes risk factors.    Vitals:  Ht 1.753 m (5' 9\")   Wt 99.5 kg (219 lb 6.4 oz)   BMI 32.40 kg/m    Estimated body mass index is 32.4 kg/m  as calculated from the following:    Height as of this encounter: 1.753 m (5' 9\").    Weight as of this encounter: 99.5 kg (219 lb 6.4 oz).   Last 3 BP:   BP Readings from Last 3 Encounters:   07/16/19 130/82   04/19/19 125/85   04/09/19 130/68       History   Smoking Status     Former Smoker     Quit date: 2/1/1992   Smokeless Tobacco     Never Used     Comment: 15 + years        Labs:  Lab Results   Component Value Date    A1C 10.1 07/16/2019     Lab Results   Component Value Date     07/16/2019     Lab Results   Component Value Date    LDL 34 03/08/2019     HDL Cholesterol   Date Value Ref Range Status   03/08/2019 35 (L) >39 mg/dL Final   ]  GFR Estimate   Date Value Ref Range Status   07/16/2019 >90 >60 mL/min/[1.73_m2] Final     Comment:     Non  GFR Calc  Starting 12/18/2018, serum creatinine based estimated GFR (eGFR) will be   calculated using the Chronic Kidney Disease Epidemiology Collaboration   (CKD-EPI) equation.       GFR Estimate If Black   Date Value Ref Range Status   07/16/2019 >90 >60 mL/min/[1.73_m2] Final     Comment:      GFR Calc  Starting 12/18/2018, serum creatinine based estimated GFR (eGFR) will be   calculated using the Chronic Kidney Disease Epidemiology Collaboration   (CKD-EPI) equation.       Lab Results   Component Value Date    CR 0.58 07/16/2019     No results found for: MICROALBUMIN    Healthy Eating  Healthy Eating Assessed Today: Yes  Cultural/Oriental orthodox diet restrictions?: No  Patient on a regular basis: Eats 3 meals a day  Meal planning: Smaller portions  Meals include: Breakfast, Lunch, Dinner, " Snacks  Breakfast: coffee and fruit OR oatmeal, hot dog, coffee OR omelet with ham and red pepper  Lunch: beef, 1 cup rice and veggie OR pasta, hamburger and coffee OR stuffed dinner with beef and rice   Dinner: soup, 1/2-1 pc bread and fruit OR salmon and potato OR Potato, chicken, salad, beets , soup   Snacks: AM: 1/2 bagel or yogurt OR fruits on occasion   Beverages: Water, Coffee, Alcohol(2-3 bottles water. wine on occasion)  Has patient met with a dietitian in the past?: No    Being Active  Being Active Assessed Today: Yes  Exercise:: Yes  How intense was your typical exercise? : Light (like stretching or slow walking)  Barrier to exercise: Physical limitation(Feet hurt)    Monitoring  Monitoring Assessed Today: Yes  Did patient bring glucose meter to appointment? : Yes  Blood Glucose Meter: One Touch(Mini)  Home Glucose (Sugar) Monitoring: 3-4 times per week  Low Glucose Range (mg/dL): >200  High Glucose Range (mg/dL): >200  Overall Range (mg/dL): >200      B/22: 237  7: 430  7/15: 349  7: 223  : 248  : 328    Taking Medications  Diabetes Medication(s)     Biguanides       metFORMIN (GLUCOPHAGE) 1000 MG tablet    Take 1 tablet (1,000 mg) by mouth 2 times daily (with meals)    Insulin       LANTUS SOLOSTAR 100 UNIT/ML soln    Inject 15 Units Subcutaneous every morning    Sulfonylureas       glimepiride (AMARYL) 4 MG tablet    Take 1 tablet (4 mg) by mouth 2 times daily          Taking Medication Assessed Today: Yes  Current Treatments: Oral Agent (dual therapy)  Problems taking diabetes medications regularly?: No  Diabetes medication side effects?: No  Treatment Compliance: All of the time    Problem Solving  Problem Solving Assessed Today: Yes  Hypoglycemia Frequency: Rarely(Feels symptoms when BG around 150 mg/dL)  Hypoglycemia Treatment: Candy, Glucose (tablets or gel)(Dark chocolate)  Patient carries a carbohydrate source: Yes    Hypoglycemia symptoms  Confusion: No  Dizziness or  Light-Headedness: Yes  Headaches: No  Hunger: No  Mood changes: No  Nervousness/Anxiety: No  Sleepiness: No  Speech difficulty: No  Sweats: Yes  Tremors: Yes         Reducing Risks  Reducing Risks Assessed Today: No    Healthy Coping  Healthy Coping Assessed Today: No  Patient Activation Measure Survey Score:  BILL Score (Last Two) 12/26/2011   BILL Raw Score 40   Activation Score 60   BILL Level 3       ASSESSMENT:  Discussed pros and cons to starting insulin and informed Sven that if his oral medications are no longer enough to lower blood glucose, indicates body not producing enough insulin and needs to do injections.  Reviewed importance of good blood glucose control to help stay healthy with diabetes and reduce risk of impaired healing with surgery/procedures.  He admits he really does not want to start insulin and need to take for the rest of his life but understands it may help him feel better and stay healthy.    Reviewed insulin pen use and injection.  MD requested starting 15 units Lantus or other long acting insulin daily (see Diabetes Education referral); looks like Lantus Solostar pen preferred in [facility].  Discussed storage, sharps, new needle each use, site selection, action of insulin and reviewed hypoglycemia signs, symptoms and treatment.  Encouraged Sven to always carry source of rapid-acting glucose at all times and keep in car, when out and by bedside incase of low blood glucose.  Encouraged him to eat consistent meals with carbohydrate to protect against hypoglycemia.  Reviewed when to check blood glucose, ideally fasting and some time at night (pre-dinner or bedtime).      He was advised to follow up by Clemencia on Friday so can adjust if needed before his procedure on 7/31/19.  Pt verbalized understanding of concepts discussed and recommendations provided.   Will plan a follow up in 1 month to see how Lantus is working for him and possibly other medication (GLP-1) if injection going ok.  Pt  verbalized understanding of concepts discussed and recommendations provided.         Patient's most recent   Lab Results   Component Value Date    A1C 10.1 07/16/2019    is not meeting goal of <7.0    INTERVENTION:   Diabetes knowledge and skills assessment:     Patient is knowledgeable in diabetes management concepts related to: Healthy Eating, Being Active, Monitoring, Taking Medication and Problem Solving    Patient needs further education on the following diabetes management concepts: Taking Medication, Reducing Risks and Healthy Coping    Based on learning assessment above, most appropriate setting for further diabetes education would be: Group class or Individual setting.    Education provided today on:  AADE Self-Care Behaviors:  Diabetes Pathophysiology  Healthy Eating: carbohydrate counting, consistency in amount, composition, and timing of food intake and portion control  Being Active: relationship to blood glucose, describe appropriate activity program and precautions to take  Monitoring: purpose, log and interpret results, individual blood glucose targets, frequency of monitoring and proper sharps disposal  Taking Medication: action of prescribed medication, drawing up, administering and storing injectable diabetes medications, proper site selection and rotation for injections, side effects of prescribed medications and when to take medications  Problem Solving: high blood glucose - causes, signs/symptoms, treatment and prevention, low blood glucose - causes, signs/symptoms, treatment and prevention, carrying a carbohydrate source at all times and when to call health care provider  Insulin administration technique taught today. Patient verbalized understanding and was able to perform an accurate return demonstration of administration technique.    Opportunities for ongoing education and support in diabetes-self management were discussed.    Pt verbalized understanding of concepts discussed and  recommendations provided today.       Education Materials Provided:  Insulin Pen Insulin information    PLAN:  See Patient Instructions for co-developed, patient-stated behavior change goals.  AVS printed and provided to patient today. See Follow-Up section for recommended follow-up.    Dorcas Amezcua RD, MEGAN, CDE   Time Spent: 70 minutes  Encounter Type: Individual    Any diabetes medication dose changes were made via the CDE Protocol and Collaborative Practice Agreement with the patient's referring provider. A copy of this encounter was shared with the provider.

## 2019-07-25 NOTE — TELEPHONE ENCOUNTER
Patient has been scheduled, see appointment history for detials.  Han Orourke,  [facility]

## 2019-07-26 ENCOUNTER — TELEPHONE (OUTPATIENT)
Dept: EDUCATION SERVICES | Facility: CLINIC | Age: 64
End: 2019-07-26

## 2019-07-26 NOTE — TELEPHONE ENCOUNTER
Agree with hold diabetes oral medication prior to surgery. Do not take when NPO. Will have our staff call him to verify this.  Pam Dela Cruz MD

## 2019-07-26 NOTE — TELEPHONE ENCOUNTER
Called to see if Sven started Lantus at bedtime.  Says did not start the insulin since worried it would interfere with the surgery.  Says was trying to lose weight.  Says lost another 3 lbs since he last came in.  Says blood glucose around 200 mg/dL.  Says scared to start it since worried he will be independent.      Says surgery is on Wednesday.  Told pharmacy would not take it now but has it on hold.  Was told to fast 8 hours before the surgery.  Ask if he was told to hold the Amaryl or Metformin and he says he was not.    Taking diabetes medications?   yes:     Diabetes Medication(s)     Biguanides       metFORMIN (GLUCOPHAGE) 1000 MG tablet    Take 1 tablet (1,000 mg) by mouth 2 times daily (with meals)    Insulin       LANTUS SOLOSTAR 100 UNIT/ML soln    Inject 15 Units Subcutaneous every morning    Sulfonylureas       glimepiride (AMARYL) 4 MG tablet    Take 1 tablet (4 mg) by mouth 2 times daily        Will route to MD to see if she wanted him to take Amaryl and Metformin with his higher blood glucose or not take day of. Advised Sven to call on Monday if he does not hear back.    Dorcas Amezcua RD, LD, CDE

## 2019-07-27 DIAGNOSIS — E11.42 TYPE 2 DIABETES MELLITUS WITH DIABETIC POLYNEUROPATHY, WITHOUT LONG-TERM CURRENT USE OF INSULIN (H): ICD-10-CM

## 2019-07-27 NOTE — TELEPHONE ENCOUNTER
"Requested Prescriptions   Pending Prescriptions Disp Refills     TRADJENTA 5 MG TABS tablet [Pharmacy Med Name: TRADJENTA 5 MG TABLET] 90 tablet 1     Sig: TAKE 1 TABLET BY MOUTH EVERY DAY  Last Written Prescription Date:  4/9/19  Last Fill Quantity: 30,  # refills: 3   Last Office Visit with [facility], [facility] or [facility] prescribing provider:  7/16/19   Future Office Visit:    Next 5 appointments (look out 90 days)    Sep 20, 2019  3:30 PM CDT  Return Visit with Lay Valencia MD  [facility] ([facility] [address] [address] [place] [id number] [phone]                DPP4 Inhibitors Protocol Failed - 7/27/2019  8:41 AM        Failed - Medication is active on med list        Failed - Normal serum creatinine in past 12 months     Recent Labs   Lab Test 07/16/19  1139  05/30/18  1445   CR 0.58*   < >  --    CREAT  --   --  0.9    < > = values in this interval not displayed.             Passed - Blood pressure less than 140/90 in past 6 months     BP Readings from Last 3 Encounters:   07/16/19 130/82   04/19/19 125/85   04/09/19 130/68                 Passed - LDL on file in past 12 months     Recent Labs   Lab Test 03/08/19  0926   LDL 34             Passed - Microalbumin on file in past 12 months     Recent Labs   Lab Test 07/16/19  1150   MICROL 196   UMALCR 104.81*             Passed - HgbA1C in past 3 or 6 months     If HgbA1C is 8 or greater, it needs to be on file within the past 3 months.  If less than 8, must be on file within the past 6 months.     Recent Labs   Lab Test 07/16/19  1139   A1C 10.1*             Passed - Patient is age 18 or older        Passed - Recent (6 mo) or future (30 days) visit within the authorizing provider's specialty     Patient had office visit in the last 6 months or has a visit in the next 30 days with authorizing provider.  See \"Patient Info\" tab in inbasket, or \"Choose Columns\" in Meds & Orders section of the refill encounter.              "

## 2019-07-29 NOTE — TELEPHONE ENCOUNTER
Routing refill request to provider for review/approval because:  Drug not active on patient's medication list  Ana Shrestha RN

## 2019-07-29 NOTE — TELEPHONE ENCOUNTER
Please check with patient. We stopped this medication due to side effects and he should not need refills at this time.  Pam Dela Cruz MD

## 2019-07-29 NOTE — TELEPHONE ENCOUNTER
Spoke with Sven to relay provider message. He states understanding and has no further questions.     Cheri Bonner, BSN, RN, PHN

## 2019-07-30 ENCOUNTER — TELEPHONE (OUTPATIENT)
Dept: FAMILY MEDICINE | Facility: CLINIC | Age: 64
End: 2019-07-30

## 2019-07-30 RX ORDER — LINAGLIPTIN 5 MG/1
TABLET, FILM COATED ORAL
Qty: 90 TABLET | Refills: 1 | OUTPATIENT
Start: 2019-07-30

## 2019-07-30 NOTE — TELEPHONE ENCOUNTER
Reason for Call:  Other     Detailed comments: [facility] Needs EKG tracing faxed to [phone]        Phone Number can be reached at: Therese MELENDREZ [facility] [phone]    Best Time: any    Can we leave a detailed message on this number? YES    Call taken on 7/30/2019 at 12:41 PM by Melyssa Parker

## 2019-07-30 NOTE — TELEPHONE ENCOUNTER
Patient contacted. He did not request Tradjenta. He does not take this medication. Must be on auto refill from pharmacy.     Denied medication with note to pharmacy patient is no longer on this medication.     Dora Thompson RN

## 2019-07-31 ENCOUNTER — TRANSFERRED RECORDS (OUTPATIENT)
Dept: HEALTH INFORMATION MANAGEMENT | Facility: CLINIC | Age: 64
End: 2019-07-31

## 2019-08-09 ENCOUNTER — ANCILLARY PROCEDURE (OUTPATIENT)
Dept: GENERAL RADIOLOGY | Facility: CLINIC | Age: 64
End: 2019-08-09
Attending: PHYSICIAN ASSISTANT
Payer: COMMERCIAL

## 2019-08-09 ENCOUNTER — OFFICE VISIT (OUTPATIENT)
Dept: FAMILY MEDICINE | Facility: CLINIC | Age: 64
End: 2019-08-09
Payer: COMMERCIAL

## 2019-08-09 VITALS
HEART RATE: 131 BPM | BODY MASS INDEX: 32.43 KG/M2 | DIASTOLIC BLOOD PRESSURE: 76 MMHG | HEIGHT: 68 IN | RESPIRATION RATE: 19 BRPM | SYSTOLIC BLOOD PRESSURE: 107 MMHG | TEMPERATURE: 99.1 F | WEIGHT: 214 LBS | OXYGEN SATURATION: 98 %

## 2019-08-09 DIAGNOSIS — R50.9 FEVER, UNSPECIFIED FEVER CAUSE: Primary | ICD-10-CM

## 2019-08-09 DIAGNOSIS — N30.00 ACUTE CYSTITIS WITHOUT HEMATURIA: ICD-10-CM

## 2019-08-09 DIAGNOSIS — R50.9 FEVER, UNSPECIFIED FEVER CAUSE: ICD-10-CM

## 2019-08-09 LAB
ALBUMIN UR-MCNC: >=300 MG/DL
APPEARANCE UR: CLEAR
BACTERIA #/AREA URNS HPF: ABNORMAL /HPF
BASOPHILS # BLD AUTO: 0 10E9/L (ref 0–0.2)
BASOPHILS NFR BLD AUTO: 0.2 %
BILIRUB UR QL STRIP: NEGATIVE
COLOR UR AUTO: YELLOW
DIFFERENTIAL METHOD BLD: ABNORMAL
EOSINOPHIL # BLD AUTO: 0 10E9/L (ref 0–0.7)
EOSINOPHIL NFR BLD AUTO: 0.1 %
ERYTHROCYTE [DISTWIDTH] IN BLOOD BY AUTOMATED COUNT: 12.5 % (ref 10–15)
ERYTHROCYTE [SEDIMENTATION RATE] IN BLOOD BY WESTERGREN METHOD: 43 MM/H (ref 0–20)
GLUCOSE UR STRIP-MCNC: NEGATIVE MG/DL
HCT VFR BLD AUTO: 38.4 % (ref 40–53)
HGB BLD-MCNC: 12.5 G/DL (ref 13.3–17.7)
HGB UR QL STRIP: ABNORMAL
KETONES UR STRIP-MCNC: 15 MG/DL
LEUKOCYTE ESTERASE UR QL STRIP: ABNORMAL
LYMPHOCYTES # BLD AUTO: 1.6 10E9/L (ref 0.8–5.3)
LYMPHOCYTES NFR BLD AUTO: 12.4 %
MCH RBC QN AUTO: 28.9 PG (ref 26.5–33)
MCHC RBC AUTO-ENTMCNC: 32.6 G/DL (ref 31.5–36.5)
MCV RBC AUTO: 89 FL (ref 78–100)
MONOCYTES # BLD AUTO: 1 10E9/L (ref 0–1.3)
MONOCYTES NFR BLD AUTO: 8 %
NEUTROPHILS # BLD AUTO: 10 10E9/L (ref 1.6–8.3)
NEUTROPHILS NFR BLD AUTO: 79.3 %
NITRATE UR QL: POSITIVE
NON-SQ EPI CELLS #/AREA URNS LPF: ABNORMAL /LPF
PH UR STRIP: 7 PH (ref 5–7)
PLATELET # BLD AUTO: 236 10E9/L (ref 150–450)
RBC # BLD AUTO: 4.33 10E12/L (ref 4.4–5.9)
RBC #/AREA URNS AUTO: ABNORMAL /HPF
SOURCE: ABNORMAL
SP GR UR STRIP: 1.02 (ref 1–1.03)
UROBILINOGEN UR STRIP-ACNC: 0.2 EU/DL (ref 0.2–1)
WBC # BLD AUTO: 12.6 10E9/L (ref 4–11)
WBC #/AREA URNS AUTO: ABNORMAL /HPF

## 2019-08-09 PROCEDURE — 87186 SC STD MICRODIL/AGAR DIL: CPT | Performed by: PHYSICIAN ASSISTANT

## 2019-08-09 PROCEDURE — 71046 X-RAY EXAM CHEST 2 VIEWS: CPT | Mod: FY

## 2019-08-09 PROCEDURE — 36415 COLL VENOUS BLD VENIPUNCTURE: CPT | Performed by: PHYSICIAN ASSISTANT

## 2019-08-09 PROCEDURE — 87086 URINE CULTURE/COLONY COUNT: CPT | Performed by: PHYSICIAN ASSISTANT

## 2019-08-09 PROCEDURE — 87088 URINE BACTERIA CULTURE: CPT | Performed by: PHYSICIAN ASSISTANT

## 2019-08-09 PROCEDURE — 85652 RBC SED RATE AUTOMATED: CPT | Performed by: PHYSICIAN ASSISTANT

## 2019-08-09 PROCEDURE — 99214 OFFICE O/P EST MOD 30 MIN: CPT | Performed by: PHYSICIAN ASSISTANT

## 2019-08-09 PROCEDURE — 81001 URINALYSIS AUTO W/SCOPE: CPT | Performed by: PHYSICIAN ASSISTANT

## 2019-08-09 PROCEDURE — 85025 COMPLETE CBC W/AUTO DIFF WBC: CPT | Performed by: PHYSICIAN ASSISTANT

## 2019-08-09 RX ORDER — LEVOFLOXACIN 500 MG/1
500 TABLET, FILM COATED ORAL DAILY
Qty: 10 TABLET | Refills: 0 | Status: SHIPPED | OUTPATIENT
Start: 2019-08-09 | End: 2020-05-18

## 2019-08-09 ASSESSMENT — PAIN SCALES - GENERAL: PAINLEVEL: MODERATE PAIN (4)

## 2019-08-09 ASSESSMENT — MIFFLIN-ST. JEOR: SCORE: 1729.17

## 2019-08-09 NOTE — PROGRESS NOTES
Subjective     Sven Givens is a 64 year old male who presents to clinic today for the following health issues:    HPI   Acute Illness   Acute illness concerns: fever  Onset: 1 week    Fever: YES    Chills/Sweats: YES    Headache (location?): YES    Sinus Pressure:no    Conjunctivitis:  no    Ear Pain: no    Rhinorrhea: no    Congestion: no    Sore Throat: no     Cough: occasioanl cough    Wheeze: no    Decreased Appetite: YES    Nausea: YES    Vomiting: no    Diarrhea:  no    Dysuria/Freq.: YES- had prostate surgery     Fatigue/Achiness: YES    Sick/Strep Exposure: no     Therapies Tried and outcome: tylenol    Recent cystoscopy and ablation of prostate a little over a week ago  Was on Antibiotic for five days  Couldn't go to bathroom and had catheter for another week  Able to pee but afternoon had fever.  Temperature between 99.6 and yesterday 103.5   Chilled.  Couldn't sleep all night.   Called urologist yesterday and recommended 2 tylenol 325 yesterday afternoon and doesn't help   Didn't take any antipyretic today   Terrible headache.   Back hurts - maybe from surgery  Lung cancer 3 yrs ago left side.   Right side lung pain.  Cough since last night.  Complains of dysuria, frequency and epigastric abdominal pain.   No vomiting but is nauseated       Patient Active Problem List   Diagnosis     Advanced directives, counseling/discussion     Hypertension goal BP (blood pressure) < 140/90     Diabetic neuropathy (H)     Tubular adenoma of colon     GERD (gastroesophageal reflux disease)     Anemia     Hyperlipidemia with target LDL less than 100     Gout     Type 2 diabetes mellitus with diabetic polyneuropathy (H)     History of radiation exposure     Non-small cell carcinoma of lung, left (H)     Nonalcoholic hepatosteatosis     PRESLEY (obstructive sleep apnea)     Past Surgical History:   Procedure Laterality Date     BRONCHOSCOPY FLEXIBLE N/A 9/23/2016    Procedure: BRONCHOSCOPY FLEXIBLE;  Surgeon: Gayle Moya  MD SHANE;  Location: [facility] OR     COLONOSCOPY  5-03     COLONOSCOPY WITH CO2 INSUFFLATION N/A 2017    Procedure: COLONOSCOPY WITH CO2 INSUFFLATION;  COLON SCREEN/ SEB;  Surgeon: Margarito Rasheed DO;  Location: [facility] OR     COMBINED ESOPHAGOSCOPY, GASTROSCOPY, DUODENOSCOPY (EGD) WITH CO2 INSUFFLATION N/A 2019    Procedure: COMBINED ESOPHAGOSCOPY, GASTROSCOPY, DUODENOSCOPY (EGD) WITH CO2 INSUFFLATION;  Surgeon: Abbe Mccarty MD;  Location: [facility] OR     ESOPHAGOSCOPY, GASTROSCOPY, DUODENOSCOPY (EGD), COMBINED N/A 2019    Procedure: Combined Esophagoscopy, Gastroscopy, Duodenoscopy (Egd), Biopsy Single Or Multiple;  Surgeon: Abbe Mccarty MD;  Location: [facility]     GENITOURINARY SURGERY      kidney stones     THORACOSCOPIC WEDGE RESECTION LUNG Left 2016    Procedure: THORACOSCOPIC WEDGE RESECTION LUNG;  Surgeon: Gayle Moya MD;  Location: [facility] OR     VASCULAR SURGERY      varicose vein       Social History     Tobacco Use     Smoking status: Former Smoker     Last attempt to quit: 1992     Years since quittin.5     Smokeless tobacco: Never Used     Tobacco comment: 15 + years    Substance Use Topics     Alcohol use: No     Family History   Problem Relation Age of Onset     Cerebrovascular Disease Father      Cancer Sister         ovarary          Current Outpatient Medications   Medication Sig Dispense Refill     albuterol (PROAIR HFA) 108 (90 Base) MCG/ACT inhaler Inhale 2 puffs into the lungs every 4 hours as needed for shortness of breath / dyspnea or wheezing 8.5 Inhaler 3     allopurinol (ZYLOPRIM) 300 MG tablet Take 1 tablet (300 mg) by mouth daily 90 tablet 3     ASPIRIN 81 MG PO TABS 1 TABLET DAILY(*)       atorvastatin (LIPITOR) 40 MG tablet Take 1 tablet (40 mg) by mouth daily 90 tablet 1     Azelaic Acid (FINACEA) 15 % gel Apply small amount twice a day to skin 50 g 3     capsaicin (ZOSTRIX) 0.025 % CREA cream Apply 1 g topically 3 times daily 60 g 3      CINNAMON 500 MG OR TABS Take one tablet twice daily.       diclofenac (VOLTAREN) 1 % GEL topical gel Apply 4 grams to knees or 2 grams to hands four times daily using enclosed dosing card. 100 g 1     FISH OIL 1000 MG OR CAPS daily       fluticasone (FLONASE) 50 MCG/ACT nasal spray Spray 1-2 sprays into both nostrils daily 1 Bottle 1     glimepiride (AMARYL) 4 MG tablet Take 1 tablet (4 mg) by mouth 2 times daily 180 tablet 3     hydrochlorothiazide (HYDRODIURIL) 25 MG tablet Take 1 tablet (25 mg) by mouth daily 90 tablet 3     insulin pen needle (32G X 4 MM) 32G X 4 MM miscellaneous Use 1 needle daily (new needle each use) 100 each 3     LANTUS SOLOSTAR 100 UNIT/ML soln Inject 15 Units Subcutaneous every morning 6 mL 3     levofloxacin (LEVAQUIN) 500 MG tablet Take 1 tablet (500 mg) by mouth daily 10 tablet 0     losartan (COZAAR) 100 MG tablet Take 1 tablet (100 mg) by mouth daily 90 tablet 3     losartan-hydrochlorothiazide (HYZAAR) 100-25 MG tablet Take 1 tablet by mouth daily 90 tablet 3     metFORMIN (GLUCOPHAGE) 1000 MG tablet Take 1 tablet (1,000 mg) by mouth 2 times daily (with meals) 180 tablet 3     MULTIPLE VITAMINS OR 1 a day       omeprazole (PRILOSEC) 20 MG DR capsule Take 1 capsule (20 mg) by mouth daily Take 30-60 minutes before a meal. 90 capsule 3     ONETOUCH ULTRA test strip USE TO CHECK BLOOD SUGARS TWICE A DAY. 200 strip 0     order for DME One touch ultra  Test strips for checking blood sugars 2 times a day. 200 strip 1     Oxymetazoline HCl (AFRIN NASAL SPRAY NA) Spray in nostril as needed       ranitidine (ZANTAC) 150 MG tablet Take 1 tablet (150 mg) by mouth At Bedtime 30 tablet 11     SENNA-docusate sodium (SENNA S) 8.6-50 MG tablet Take 1 tablet by mouth At Bedtime 90 tablet 3     terazosin (HYTRIN) 5 MG capsule Take 1 capsule (5 mg) by mouth At Bedtime 90 capsule 1     BP Readings from Last 3 Encounters:   08/09/19 107/76   07/16/19 130/82   04/19/19 125/85    Wt Readings from Last 3  "Encounters:   08/09/19 97.1 kg (214 lb)   07/23/19 99.5 kg (219 lb 6.4 oz)   07/16/19 98 kg (216 lb)                      Reviewed and updated as needed this visit by Provider  Tobacco  Allergies  Meds  Problems  Med Hx  Surg Hx  Fam Hx  Soc Hx          Review of Systems   ROS COMP: Constitutional, HEENT, cardiovascular, pulmonary, gi and gu systems are negative, except as otherwise noted.      Objective    /76 (BP Location: Right arm, Patient Position: Chair, Cuff Size: Adult Large)   Pulse 131   Temp 99.1  F (37.3  C) (Oral)   Resp 19   Ht 1.734 m (5' 8.25\")   Wt 97.1 kg (214 lb)   SpO2 98%   BMI 32.30 kg/m    Body mass index is 32.3 kg/m .  Physical Exam   GENERAL: healthy, alert and no distress  NECK: no adenopathy, no asymmetry, masses, or scars and thyroid normal to palpation  RESP: lungs clear to auscultation - no rales, rhonchi or wheezes  CV: regular rate and rhythm, normal S1 S2, no S3 or S4, no murmur, click or rub, no peripheral edema and peripheral pulses strong  ABDOMEN: soft, nontender, no hepatosplenomegaly, no masses and bowel sounds normal  MS: no gross musculoskeletal defects noted, no edema    Diagnostic Test Results:  Results for orders placed or performed in visit on 08/09/19   *UA reflex to Microscopic and Culture ([place] and [facility] (except [place] and [place])   Result Value Ref Range    Color Urine Yellow     Appearance Urine Clear     Glucose Urine Negative NEG^Negative mg/dL    Bilirubin Urine Negative NEG^Negative    Ketones Urine 15 (A) NEG^Negative mg/dL    Specific Gravity Urine 1.020 1.003 - 1.035    Blood Urine Large (A) NEG^Negative    pH Urine 7.0 5.0 - 7.0 pH    Protein Albumin Urine >=300 (A) NEG^Negative mg/dL    Urobilinogen Urine 0.2 0.2 - 1.0 EU/dL    Nitrite Urine Positive (A) NEG^Negative    Leukocyte Esterase Urine Moderate (A) NEG^Negative    Source Midstream Urine    CBC with platelets differential   Result Value Ref Range    WBC 12.6 (H) " 4.0 - 11.0 10e9/L    RBC Count 4.33 (L) 4.4 - 5.9 10e12/L    Hemoglobin 12.5 (L) 13.3 - 17.7 g/dL    Hematocrit 38.4 (L) 40.0 - 53.0 %    MCV 89 78 - 100 fl    MCH 28.9 26.5 - 33.0 pg    MCHC 32.6 31.5 - 36.5 g/dL    RDW 12.5 10.0 - 15.0 %    Platelet Count 236 150 - 450 10e9/L    % Neutrophils 79.3 %    % Lymphocytes 12.4 %    % Monocytes 8.0 %    % Eosinophils 0.1 %    % Basophils 0.2 %    Absolute Neutrophil 10.0 (H) 1.6 - 8.3 10e9/L    Absolute Lymphocytes 1.6 0.8 - 5.3 10e9/L    Absolute Monocytes 1.0 0.0 - 1.3 10e9/L    Absolute Eosinophils 0.0 0.0 - 0.7 10e9/L    Absolute Basophils 0.0 0.0 - 0.2 10e9/L    Diff Method Automated Method    ESR: Erythrocyte sedimentation rate   Result Value Ref Range    Sed Rate 43 (H) 0 - 20 mm/h   Urine Microscopic   Result Value Ref Range    WBC Urine 25-50 (A) OTO5^0 - 5 /HPF    RBC Urine 25-50 (A) OTO2^O - 2 /HPF    Squamous Epithelial /LPF Urine Moderate (A) FEW^Few /LPF    Bacteria Urine Many (A) NEG^Negative /HPF           Assessment & Plan     1. Fever, unspecified fever cause  Normal chest xray.  Elevated white count with left shift .  Urinalysis with infection  Warning signs and symptoms warranting emergent evaluation reviewed. Follow up with er if no improvement in fever in 2 days  - *UA reflex to Microscopic and Culture ([place] and [facility] (except [place] and [place])  - CBC with platelets differential  - ESR: Erythrocyte sedimentation rate  - XR Chest 2 Views; Future  - Urine Microscopic  - Urine Culture Aerobic Bacterial    2. Acute cystitis without hematuria  Likely related to recent surgery.   Will treat with levaquin.  Call placed to his urologist and I notified nurse of lab results and findings and treatment   - levofloxacin (LEVAQUIN) 500 MG tablet; Take 1 tablet (500 mg) by mouth daily  Dispense: 10 tablet; Refill: 0             Patient Instructions   Take levaquin 500 mg daily for 10 days  Call your urologist today and advise that fever continues  and infected urine  Follow up with your urologist early next week  Go to the emergency department over the weekend if increasing abdominal pain, back pain, blood in urine or fever not improving Sunday        Return in about 3 days (around 8/12/2019), or if symptoms worsen or fail to improve.    Park Darden PA-C  [facility]

## 2019-08-09 NOTE — PATIENT INSTRUCTIONS
Take levaquin 500 mg daily for 10 days  Call your urologist today and advise that fever continues and infected urine  Follow up with your urologist early next week  Go to the emergency department over the weekend if increasing abdominal pain, back pain, blood in urine or fever not improving Sunday

## 2019-08-09 NOTE — RESULT ENCOUNTER NOTE
Vandana Machuca  Your chest xray was read as normal by the radiologist as well.   Please call or MyChart my office with any questions or concerns.   Park Darden, PAC

## 2019-08-10 LAB
BACTERIA SPEC CULT: ABNORMAL
SPECIMEN SOURCE: ABNORMAL

## 2019-08-10 NOTE — RESULT ENCOUNTER NOTE
Vandana Machuca  Your urine culture shows that bacteria should respond to antibiotic as prescribed.   Follow up with us in clinic Monday if symptoms not improving  Please go to emergency department if still spiking fever Sunday or any change in symptoms.  Please call or MyChart my office with any questions or concerns.    Park Darden, PAC

## 2019-08-26 ENCOUNTER — TRANSFERRED RECORDS (OUTPATIENT)
Dept: HEALTH INFORMATION MANAGEMENT | Facility: CLINIC | Age: 64
End: 2019-08-26

## 2019-09-17 ENCOUNTER — ANCILLARY PROCEDURE (OUTPATIENT)
Dept: CT IMAGING | Facility: CLINIC | Age: 64
End: 2019-09-17
Attending: INTERNAL MEDICINE
Payer: COMMERCIAL

## 2019-09-17 DIAGNOSIS — C34.92 NON-SMALL CELL CARCINOMA OF LUNG, LEFT (H): ICD-10-CM

## 2019-09-17 PROCEDURE — 71250 CT THORAX DX C-: CPT | Performed by: RADIOLOGY

## 2019-09-27 ENCOUNTER — ONCOLOGY VISIT (OUTPATIENT)
Dept: ONCOLOGY | Facility: CLINIC | Age: 64
End: 2019-09-27
Attending: INTERNAL MEDICINE
Payer: COMMERCIAL

## 2019-09-27 VITALS
TEMPERATURE: 98.2 F | RESPIRATION RATE: 16 BRPM | WEIGHT: 216.2 LBS | OXYGEN SATURATION: 96 % | SYSTOLIC BLOOD PRESSURE: 115 MMHG | DIASTOLIC BLOOD PRESSURE: 72 MMHG | HEIGHT: 68 IN | HEART RATE: 100 BPM | BODY MASS INDEX: 32.77 KG/M2

## 2019-09-27 DIAGNOSIS — C34.92 NON-SMALL CELL CARCINOMA OF LUNG, LEFT (H): Primary | ICD-10-CM

## 2019-09-27 PROCEDURE — 99214 OFFICE O/P EST MOD 30 MIN: CPT | Performed by: INTERNAL MEDICINE

## 2019-09-27 ASSESSMENT — MIFFLIN-ST. JEOR: SCORE: 1739.43

## 2019-09-27 ASSESSMENT — PAIN SCALES - GENERAL: PAINLEVEL: NO PAIN (0)

## 2019-09-27 NOTE — NURSING NOTE
"Oncology Rooming Note    September 27, 2019 7:41 AM   Sven Givens is a 64 year old male who presents for:    Chief Complaint   Patient presents with     Oncology Clinic Visit     6 mon f/u     Initial Vitals: /72 (BP Location: Left arm)   Pulse 100   Temp 98.2  F (36.8  C) (Oral)   Resp 16   Ht 1.734 m (5' 8.27\")   Wt 98.1 kg (216 lb 3.2 oz)   SpO2 96%   BMI 32.62 kg/m   Estimated body mass index is 32.62 kg/m  as calculated from the following:    Height as of this encounter: 1.734 m (5' 8.27\").    Weight as of this encounter: 98.1 kg (216 lb 3.2 oz). Body surface area is 2.17 meters squared.  No Pain (0) Comment: Data Unavailable   No LMP for male patient.  Allergies reviewed: Yes  Medications reviewed: Yes    Medications: Medication refills not needed today.  Pharmacy name entered into [facility]:    [id number] - [place] - [address]  [facility] [id number] [place] [employer] - [place] - [address]    Diann Leigh LPN              "
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
## 2019-11-06 ENCOUNTER — HEALTH MAINTENANCE LETTER (OUTPATIENT)
Age: 64
End: 2019-11-06

## 2019-11-17 DIAGNOSIS — I10 ESSENTIAL HYPERTENSION WITH GOAL BLOOD PRESSURE LESS THAN 140/90: ICD-10-CM

## 2019-11-18 NOTE — TELEPHONE ENCOUNTER
"Requested Prescriptions   Pending Prescriptions Disp Refills     terazosin (HYTRIN) 5 MG capsule [Pharmacy Med Name: TERAZOSIN 5 MG CAPSULE]  Last Written Prescription Date:  05/28/19  Last Fill Quantity: 90,  # refills: 1   Last Office Visit with [facility], [facility] or [facility] prescribing provider:  08/09/19-Adelso   Future Office Visit:    90 capsule 1     Sig: TAKE 1 CAPSULE (5 MG) BY MOUTH AT BEDTIME       Alpha Blockers Passed - 11/17/2019  5:23 PM        Passed - Blood pressure under 140/90 in past 12 months     BP Readings from Last 3 Encounters:   09/27/19 115/72   08/09/19 107/76   07/16/19 130/82                 Passed - Recent (12 mo) or future (30 days) visit within the authorizing provider's specialty     Patient has had an office visit with the authorizing provider or a provider within the authorizing providers department within the previous 12 mos or has a future within next 30 days. See \"Patient Info\" tab in inbasket, or \"Choose Columns\" in Meds & Orders section of the refill encounter.              Passed - Patient does not have Tadalafil, Vardenafil, or Sildenafil on their medication list        Passed - Medication is active on med list        Passed - Patient is 18 years of age or older          "

## 2019-11-20 DIAGNOSIS — E78.1 HYPERTRIGLYCERIDEMIA: ICD-10-CM

## 2019-11-20 DIAGNOSIS — E11.42 TYPE 2 DIABETES MELLITUS WITH DIABETIC POLYNEUROPATHY, WITHOUT LONG-TERM CURRENT USE OF INSULIN (H): ICD-10-CM

## 2019-11-20 RX ORDER — TERAZOSIN 5 MG/1
5 CAPSULE ORAL AT BEDTIME
Qty: 90 CAPSULE | Refills: 0 | Status: SHIPPED | OUTPATIENT
Start: 2019-11-20 | End: 2020-02-24

## 2019-11-20 NOTE — TELEPHONE ENCOUNTER
Prescription approved per [facility] Refill Protocol.  Noemí Gomez RN

## 2019-11-21 DIAGNOSIS — I10 HYPERTENSION GOAL BP (BLOOD PRESSURE) < 140/90: ICD-10-CM

## 2019-11-21 RX ORDER — ATORVASTATIN CALCIUM 40 MG/1
TABLET, FILM COATED ORAL
Qty: 90 TABLET | Refills: 0 | Status: SHIPPED | OUTPATIENT
Start: 2019-11-21 | End: 2020-02-25

## 2019-11-21 NOTE — TELEPHONE ENCOUNTER
Pharmacy is faxing request for us to resend scripts stating they were inactivated??    losartan (COZAAR) 100 MG tablet 90 tablet 3 5/29/2019     hydrochlorothiazide (HYDRODIURIL) 25 MG tablet 90 tablet 3 5/29/2019
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
## 2019-11-25 RX ORDER — LOSARTAN POTASSIUM 100 MG/1
100 TABLET ORAL DAILY
Qty: 90 TABLET | Refills: 1 | Status: SHIPPED | OUTPATIENT
Start: 2019-11-25 | End: 2020-05-13

## 2019-11-25 RX ORDER — HYDROCHLOROTHIAZIDE 25 MG/1
25 TABLET ORAL DAILY
Qty: 90 TABLET | Refills: 1 | Status: SHIPPED | OUTPATIENT
Start: 2019-11-25 | End: 2020-05-13

## 2019-11-26 ENCOUNTER — MYC REFILL (OUTPATIENT)
Dept: FAMILY MEDICINE | Facility: CLINIC | Age: 64
End: 2019-11-26

## 2019-11-26 DIAGNOSIS — R06.2 WHEEZING: ICD-10-CM

## 2019-11-26 NOTE — TELEPHONE ENCOUNTER
Routing refill request to provider for review/approval because:  Labs out of range:  Creatinine    Flor Beatty RN, [facility]

## 2019-11-26 NOTE — TELEPHONE ENCOUNTER
"Requested Prescriptions   Pending Prescriptions Disp Refills     albuterol (PROAIR HFA) 108 (90 Base) MCG/ACT inhaler  Last Written Prescription Date:  03/08/19  Last Fill Quantity: 8.5,  # refills: 3   Last Office Visit with [facility], [facility] or [facility] prescribing provider:  08/09/19-Eloy   Future Office Visit:    Next 5 appointments (look out 90 days)    Dec 12, 2019  7:40 AM CST  Office Visit with Pam Dela Cruz MD  [facility] ([facility]) [address] [place] [id number] [phone]        8.5 Inhaler 3     Sig: Inhale 2 puffs into the lungs every 4 hours as needed for shortness of breath / dyspnea or wheezing       Asthma Maintenance Inhalers - Anticholinergics Passed - 11/26/2019 12:53 PM        Passed - Patient is age 12 years or older        Passed - Recent (12 mo) or future (30 days) visit within the authorizing provider's specialty     Patient has had an office visit with the authorizing provider or a provider within the authorizing providers department within the previous 12 mos or has a future within next 30 days. See \"Patient Info\" tab in inbasket, or \"Choose Columns\" in Meds & Orders section of the refill encounter.              Passed - Medication is active on med list          "

## 2019-11-29 RX ORDER — ALBUTEROL SULFATE 90 UG/1
2 AEROSOL, METERED RESPIRATORY (INHALATION) EVERY 4 HOURS PRN
Qty: 8.5 INHALER | Refills: 3 | Status: SHIPPED | OUTPATIENT
Start: 2019-11-29 | End: 2020-12-10

## 2019-12-12 ENCOUNTER — OFFICE VISIT (OUTPATIENT)
Dept: FAMILY MEDICINE | Facility: CLINIC | Age: 64
End: 2019-12-12
Payer: COMMERCIAL

## 2019-12-12 VITALS
HEIGHT: 68 IN | OXYGEN SATURATION: 99 % | TEMPERATURE: 97.8 F | WEIGHT: 217 LBS | HEART RATE: 95 BPM | SYSTOLIC BLOOD PRESSURE: 138 MMHG | DIASTOLIC BLOOD PRESSURE: 76 MMHG | BODY MASS INDEX: 32.89 KG/M2 | RESPIRATION RATE: 18 BRPM

## 2019-12-12 DIAGNOSIS — E11.42 TYPE 2 DIABETES MELLITUS WITH DIABETIC POLYNEUROPATHY, WITHOUT LONG-TERM CURRENT USE OF INSULIN (H): Primary | ICD-10-CM

## 2019-12-12 DIAGNOSIS — L84 CORN OR CALLUS: ICD-10-CM

## 2019-12-12 DIAGNOSIS — M21.622 BUNIONETTE OF LEFT FOOT: ICD-10-CM

## 2019-12-12 DIAGNOSIS — E11.42 DIABETIC POLYNEUROPATHY ASSOCIATED WITH TYPE 2 DIABETES MELLITUS (H): ICD-10-CM

## 2019-12-12 DIAGNOSIS — I10 HYPERTENSION GOAL BP (BLOOD PRESSURE) < 140/90: ICD-10-CM

## 2019-12-12 DIAGNOSIS — C34.92 NON-SMALL CELL CARCINOMA OF LUNG, LEFT (H): ICD-10-CM

## 2019-12-12 DIAGNOSIS — M1A.0710 IDIOPATHIC CHRONIC GOUT OF RIGHT FOOT WITHOUT TOPHUS: ICD-10-CM

## 2019-12-12 DIAGNOSIS — E78.5 HYPERLIPIDEMIA WITH TARGET LDL LESS THAN 100: ICD-10-CM

## 2019-12-12 LAB
CREAT UR-MCNC: 272 MG/DL
HBA1C MFR BLD: 9.3 % (ref 0–5.6)
MICROALBUMIN UR-MCNC: 259 MG/L
MICROALBUMIN/CREAT UR: 95.22 MG/G CR (ref 0–17)

## 2019-12-12 PROCEDURE — 90670 PCV13 VACCINE IM: CPT | Performed by: FAMILY MEDICINE

## 2019-12-12 PROCEDURE — 99214 OFFICE O/P EST MOD 30 MIN: CPT | Mod: 25 | Performed by: FAMILY MEDICINE

## 2019-12-12 PROCEDURE — 83036 HEMOGLOBIN GLYCOSYLATED A1C: CPT | Performed by: FAMILY MEDICINE

## 2019-12-12 PROCEDURE — 36415 COLL VENOUS BLD VENIPUNCTURE: CPT | Performed by: FAMILY MEDICINE

## 2019-12-12 PROCEDURE — 90471 IMMUNIZATION ADMIN: CPT | Performed by: FAMILY MEDICINE

## 2019-12-12 PROCEDURE — 90472 IMMUNIZATION ADMIN EACH ADD: CPT | Performed by: FAMILY MEDICINE

## 2019-12-12 PROCEDURE — 90662 IIV NO PRSV INCREASED AG IM: CPT | Performed by: FAMILY MEDICINE

## 2019-12-12 PROCEDURE — 82043 UR ALBUMIN QUANTITATIVE: CPT | Performed by: FAMILY MEDICINE

## 2019-12-12 ASSESSMENT — MIFFLIN-ST. JEOR: SCORE: 1743.06

## 2019-12-12 ASSESSMENT — PAIN SCALES - GENERAL: PAINLEVEL: SEVERE PAIN (7)

## 2019-12-12 NOTE — NURSING NOTE
Prior to immunization administration, verified patients identity using patient s name and date of birth. Please see Immunization Activity for additional information.     Screening Questionnaire for Adult Immunization    Are you sick today?   No   Do you have allergies to medications, food, a vaccine component or latex?   No   Have you ever had a serious reaction after receiving a vaccination?   No   Do you have a long-term health problem with heart, lung, kidney, or metabolic disease (e.g., diabetes), asthma, a blood disorder, no spleen, complement component deficiency, a cochlear implant, or a spinal fluid leak?  Are you on long-term aspirin therapy?   Yes   Do you have cancer, leukemia, HIV/AIDS, or any other immune system problem?   Yes   Do you have a parent, brother, or sister with an immune system problem?   No   In the past 3 months, have you taken medications that affect  your immune system, such as prednisone, other steroids, or anticancer drugs; drugs for the treatment of rheumatoid arthritis, Crohn s disease, or psoriasis; or have you had radiation treatments?   No   Have you had a seizure, or a brain or other nervous system problem?   No   During the past year, have you received a transfusion of blood or blood     products, or been given immune (gamma) globulin or antiviral drug?   No   For women: Are you pregnant or is there a chance you could become        pregnant during the next month?   No   Have you received any vaccinations in the past 4 weeks?   No     Immunization questionnaire was positive for at least one answer.  Notified Jose De Jesus.        Per orders of Dr. Dela Cruz, injection of PCV 13 given by Lucretia Braun. Patient instructed to remain in clinic for 15 minutes afterwards, and to report any adverse reaction to me immediately.       Screening performed by Lucretia Braun on 12/12/2019 at 8:26 AM.

## 2019-12-12 NOTE — PATIENT INSTRUCTIONS
At [facility], we strive to deliver an exceptional experience to you, every time we see you.  If you receive a survey in the mail, please send us back your thoughts. We really do value your feedback.    Based on your medical history, these are the current health maintenance/preventive care services that you are due for (some may have been done at this visit.)  Health Maintenance Due   Topic Date Due     ZOSTER IMMUNIZATION (2 of 3) 02/14/2014     EYE EXAM  03/01/2016     ADVANCE CARE PLANNING  06/16/2016     PNEUMOCOCCAL IMMUNIZATION 65+ HIGH/HIGHEST RISK (1 of 2 - PCV13) 05/02/2018     MEDICARE ANNUAL WELLNESS VISIT  04/16/2019     INFLUENZA VACCINE (1) 09/01/2019         Suggested websites for health information:  Www.[facility].org : Up to date and easily searchable information on multiple topics.  Www.medlineplus.gov : medication info, interactive tutorials, watch real surgeries online  Www.familydoctor.org : good info from the Academy of Family Physicians  Www.cdc.gov : public health info, travel advisories, epidemics (H1N1)  Www.aap.org : children's health info, normal development, vaccinations  Www.health.[facility].[place].us : [place] dept of health, public health issues in [place], N1N1    Your care team:                            Family Medicine Internal Medicine   MD Rodrick Arita MD Shantel Branch-Fleming, MD Katya Georgiev PA-C Nam Ho, MD Pediatrics   AMPARO Bob, MD Keira Barbosa CNP, MD Deborah Mielke, MD Kim Thein, APRN CNP      Clinic hours: Monday - Thursday 7 am-7 pm; Fridays 7 am-5 pm.   Urgent care: Monday - Friday 11 am-9 pm; Saturday and Sunday 9 am-5 pm.  Pharmacy : Monday -Thursday 8 am-8 pm; Friday 8 am-6 pm; Saturday and Sunday 9 am-5 pm.     Clinic: [phone]   Pharmacy: [phone]      Patient Education     Treating Corns and Calluses  If your corns or calluses are mild, reducing  friction may help. Different shoes, moleskin patches, or soft pads may be all the treatment you need. In more severe cases, treating tissue buildup may require your doctor s care. Sometimes custom-made shoe inserts (orthotics) or special pads are prescribed to reduce friction and pressure.     Doctor examining senior man's foot.   Change shoes  If you have corns, your doctor may suggest wearing shoes that have more toe room. This way, buckled joints are less likely to be pinched against the top of the shoe. If you have calluses, wearing a cushioned insole, arch support, or heel counter can help reduce friction.  Visit your doctor  In some cases, your doctor may trim away the outer layers of skin that make up the corn or callus. For a painful corn, medicine may be injected beneath the built-up tissue.  Wear orthotics  Orthotics are specially made to meet the needs of your feet. They cushion calluses or divert pressure away from these problem areas. Worn as directed, orthotics help limit existing problems and prevent new ones from forming.  If you need surgery  If a bone or joint is out of place, certain parts of your foot may be under too much pressure. This can cause severe corns and calluses. In such cases, surgery may be the best way to correct the problem.  Outpatient procedures  In most cases, surgery to improve bone position is an outpatient procedure. Your doctor may cut away excess bone, reposition prominent bones, or even fuse joints. Sometimes tendons or ligaments are cut to reduce tension on a bone or joint. Your doctor will talk with you about the procedure that is best suited to your needs.  Date Last Reviewed: 7/1/2016 2000-2018 [employer]. [address] [place] [address], [place] [address]. All rights reserved. This information is not intended as a substitute for professional medical care. Always follow your healthcare professional's instructions.           Patient Education     What Are Corns  and Calluses?    Corns and calluses are your body s response to friction or pressure against the skin. If your foot rubs the inside of your shoe, the affected area of skin thickens. Or, if a bone is not in the normal position, skin caught between bone and shoe or bone and ground builds up. In either case, the outer layer of skin thickens to protect the foot from unusual pressure. In many cases, corns and calluses look bad but are not harmful. However, more severe corns and calluses may hurt, become infected, destroy healthy tissue, or affect foot movement.    Corns  Corns usually grow on top of the foot, often at the toe joint. Corns can range from a slight thickening of skin to a painful soft or hard bump. They often form on top of buckled toe joints (hammer toes). If your toes curl under, corns may grow on the tips of the toes. You may also get a corn on the end of a toe if it rubs against your shoe. Corns can also grow between toes, often between the first and second toes.    Calluses  Calluses grow on the bottom of the foot or on the outer edge of a toe or heel. A callus may spread across the ball of your foot. This type of callus is usually due to a problem with a metatarsal (the long bone at the base of a toe, near the ball of the foot). A pinch callus may grow along the outer edge of the heel or the big toe. Some calluses press up into the foot instead of spreading on the outside. A callus may form a central core or plug of tissue where pressure is greatest.  Date Last Reviewed: 1/1/2018 2000-2018 [employer]. [address] [place] [address]. All rights reserved. This information is not intended as a substitute for professional medical care. Always follow your healthcare professional's instructions.           Patient Education     What Are Bunions?  The bone at the base of your big toe connects to a bone in the ball of your foot. The joint is where the 2 bones connect. Normally, the  2 bones lie almost in a straight line, with your big toe pointing straight ahead. But sometimes the big toe starts to turn in towards the smaller toes. This pushes the joint out to the side, causing a bony bump called a bunion. Bunions can be mild, moderate, or severe.     A bunion is a small bump on the side of the foot at the base of the big toe. It forms when the big toe turns in toward the second toe. This pushes the joint at the base of the big toe out to the inside.      Symptoms of bunions  A bunion often causes pain and swelling around the joint at the base of the big toe. The skin may become red or warm. If the big toe pushes under the second toe, a painful corn may form on the top of or inside the second toe. In some cases, bunions cause no symptoms other than making the foot harder to fit in a shoe.  What can be done    Wear comfortable shoes. Wearing shoes that are roomy across the toes and that have low heels (less than 2 inches) can help keep a bunion from getting worse or causing pain.    Ask your healthcare provider about pads and inserts to help prevent corns and to cushion the bunion.    Talk to your healthcare provider about bunion surgery and whether it is right for you.  Note  Your feet tend to get larger as you age. That means you may need to increase your shoe size from time to time to make sure that your shoes fit your feet comfortably.   Date Last Reviewed: 10/1/2017    4151-0027 [employer]. [address] [place] [address], [place] [address]. All rights reserved. This information is not intended as a substitute for professional medical care. Always follow your healthcare professional's instructions.

## 2019-12-12 NOTE — PROGRESS NOTES
Subjective     Sven Givens is a 64 year old male who presents to clinic today for the following health issues:    HPI   Diabetes Follow-up    How often are you checking your blood sugar? One time daily most days, sometimes skip if feels blood sugar is not really high  What time of day are you checking your blood sugars (select all that apply)?  Before and after meals  Have you had any blood sugars above 200?  Yes  Have you had any blood sugars below 70?  No    What symptoms do you notice when your blood sugar is low?  None    What concerns do you have today about your diabetes?   Finger nail bluish color sometimes x2 months, intermittent   Sore left outer side of foot noticed after last office visit, painful     Do you have any of these symptoms? (Select all that apply)  Redness, sores, or blisters on feet     Have you had a diabetic eye exam in the last 12 months? No    BP Readings from Last 2 Encounters:   12/12/19 138/76   09/27/19 115/72     Hemoglobin A1C (%)   Date Value   12/12/2019 9.3 (H)   07/16/2019 10.1 (H)     LDL Cholesterol Calculated (mg/dL)   Date Value   03/08/2019 34   04/16/2018 51       Diabetes Management Resources      How many servings of fruits and vegetables do you eat daily?  2-3    On average, how many sweetened beverages do you drink each day (Examples: soda, juice, sweet tea, etc.  Do NOT count diet or artificially sweetened beverages)?   0    How many days per week do you miss taking your medication? 0    Hyperlipidemia Follow-Up      Are you regularly taking any medication or supplement to lower your cholesterol?   Yes- statin    Are you having muscle aches or other side effects that you think could be caused by your cholesterol lowering medication?  No    Hypertension Follow-up      Do you check your blood pressure regularly outside of the clinic? No     Are you following a low salt diet? Yes    Are your blood pressures ever more than 140 on the top number (systolic) OR  more   than 90 on the bottom number (diastolic), for example 140/90? No    Chronic Pain Follow-Up       Type / Location of Pain: Gout   Analgesia/pain control:       Recent changes:  improved      Overall control: Tolerable with discomfort  Activity level/function:      Daily activities:  Can do most things most days, with some rest    Work:  Pain does not limit any  work  Adverse effects:  No  Adherance    Taking medication as directed?  Yes    Participating in other treatments: yes  Risk Factors:    Sleep:  Good    Mood/anxiety:  controlled    Recent family or social stressors:  none noted    Other aggravating factors: none  PHQ-9 SCORE 1/28/2011   PHQ-9 Total Score 9     No flowsheet data found.  Encounter-Level CSA:    There are no encounter-level csa.     Patient-Level CSA:    There are no patient-level csa.         Patient Active Problem List   Diagnosis     Advanced directives, counseling/discussion     Hypertension goal BP (blood pressure) < 140/90     Diabetic neuropathy (H)     Tubular adenoma of colon     GERD (gastroesophageal reflux disease)     Anemia     Hyperlipidemia with target LDL less than 100     Gout     Type 2 diabetes mellitus with diabetic polyneuropathy (H)     History of radiation exposure     Non-small cell carcinoma of lung, left (H)     Nonalcoholic hepatosteatosis     PRESLEY (obstructive sleep apnea)     Past Surgical History:   Procedure Laterality Date     BRONCHOSCOPY FLEXIBLE N/A 9/23/2016    Procedure: BRONCHOSCOPY FLEXIBLE;  Surgeon: Gayle Moya MD;  Location: [facility] OR     COLONOSCOPY  5-03     COLONOSCOPY WITH CO2 INSUFFLATION N/A 5/31/2017    Procedure: COLONOSCOPY WITH CO2 INSUFFLATION;  COLON SCREEN/ SEB;  Surgeon: Margarito Rasheed DO;  Location:  OR     COMBINED ESOPHAGOSCOPY, GASTROSCOPY, DUODENOSCOPY (EGD) WITH CO2 INSUFFLATION N/A 4/19/2019    Procedure: COMBINED ESOPHAGOSCOPY, GASTROSCOPY, DUODENOSCOPY (EGD) WITH CO2 INSUFFLATION;  Surgeon: Abbe Mccarty  MD Logan;  Location: [facility] OR     ESOPHAGOSCOPY, GASTROSCOPY, DUODENOSCOPY (EGD), COMBINED N/A 2019    Procedure: Combined Esophagoscopy, Gastroscopy, Duodenoscopy (Egd), Biopsy Single Or Multiple;  Surgeon: Abbe Mccarty MD;  Location: [facility] OR     GENITOURINARY SURGERY      kidney stones     THORACOSCOPIC WEDGE RESECTION LUNG Left 2016    Procedure: THORACOSCOPIC WEDGE RESECTION LUNG;  Surgeon: Gayle Moya MD;  Location: [facility] OR     VASCULAR SURGERY      varicose vein       Social History     Tobacco Use     Smoking status: Former Smoker     Last attempt to quit: 1992     Years since quittin.8     Smokeless tobacco: Never Used     Tobacco comment: 15 + years    Substance Use Topics     Alcohol use: No     Family History   Problem Relation Age of Onset     Cerebrovascular Disease Father      Cancer Sister         ovarary          Current Outpatient Medications   Medication Sig Dispense Refill     albuterol (PROAIR HFA) 108 (90 Base) MCG/ACT inhaler Inhale 2 puffs into the lungs every 4 hours as needed for shortness of breath / dyspnea or wheezing 8.5 Inhaler 3     allopurinol (ZYLOPRIM) 300 MG tablet Take 1 tablet (300 mg) by mouth daily 90 tablet 3     ASPIRIN 81 MG PO TABS 1 TABLET DAILY(*)       atorvastatin (LIPITOR) 40 MG tablet TAKE 1 TABLET BY MOUTH EVERY DAY 90 tablet 0     Azelaic Acid (FINACEA) 15 % gel Apply small amount twice a day to skin 50 g 3     capsaicin (ZOSTRIX) 0.025 % CREA cream Apply 1 g topically 3 times daily 60 g 3     CINNAMON 500 MG OR TABS Take one tablet twice daily.       diclofenac (VOLTAREN) 1 % GEL topical gel Apply 4 grams to knees or 2 grams to hands four times daily using enclosed dosing card. 100 g 1     FISH OIL 1000 MG OR CAPS daily       fluticasone (FLONASE) 50 MCG/ACT nasal spray Spray 1-2 sprays into both nostrils daily 1 Bottle 1     glimepiride (AMARYL) 4 MG tablet Take 1 tablet (4 mg) by mouth 2 times daily 180 tablet 3      hydrochlorothiazide (HYDRODIURIL) 25 MG tablet Take 1 tablet (25 mg) by mouth daily 90 tablet 1     insulin pen needle (32G X 4 MM) 32G X 4 MM miscellaneous Use 1 needle daily (new needle each use) 100 each 3     LANTUS SOLOSTAR 100 UNIT/ML soln Inject 15 Units Subcutaneous every morning 6 mL 3     levofloxacin (LEVAQUIN) 500 MG tablet Take 1 tablet (500 mg) by mouth daily 10 tablet 0     losartan (COZAAR) 100 MG tablet Take 1 tablet (100 mg) by mouth daily 90 tablet 1     losartan-hydrochlorothiazide (HYZAAR) 100-25 MG tablet Take 1 tablet by mouth daily 90 tablet 3     metFORMIN (GLUCOPHAGE) 1000 MG tablet Take 1 tablet (1,000 mg) by mouth 2 times daily (with meals) 180 tablet 3     MULTIPLE VITAMINS OR 1 a day       omeprazole (PRILOSEC) 20 MG DR capsule Take 1 capsule (20 mg) by mouth daily Take 30-60 minutes before a meal. 90 capsule 3     ONETOUCH ULTRA test strip USE TO CHECK BLOOD SUGARS TWICE A DAY. 200 strip 0     order for DME One touch ultra  Test strips for checking blood sugars 2 times a day. 200 strip 1     Oxymetazoline HCl (AFRIN NASAL SPRAY NA) Spray in nostril as needed       ranitidine (ZANTAC) 150 MG tablet Take 1 tablet (150 mg) by mouth At Bedtime 30 tablet 11     SENNA-docusate sodium (SENNA S) 8.6-50 MG tablet Take 1 tablet by mouth At Bedtime 90 tablet 3     terazosin (HYTRIN) 5 MG capsule TAKE 1 CAPSULE (5 MG) BY MOUTH AT BEDTIME 90 capsule 0     Allergies   Allergen Reactions     Penicillins Rash     Ace Inhibitors Cough     Indomethacin Other (See Comments)     Severe dizziness     Invokana [Canagliflozin]      Blood infection     Recent Labs   Lab Test 12/12/19  0734 07/16/19  1139 03/08/19  0926 10/16/18  1455  04/16/18  0834  04/14/17  0847  10/12/16  1056   A1C 9.3* 10.1* 9.9*  --   --  8.5*   < >  --    < >  --    LDL  --   --  34  --   --  51  --  35  --   --    HDL  --   --  35*  --   --  31*  --  35*  --   --    TRIG  --   --  279*  --   --  216*  --  161*  --   --    ALT  --    "--  47 45  --  54   < >  --    < >  --    CR  --  0.58* 0.71  --   --  0.70   < >  --    < >  --    GFRESTIMATED  --  >90 >90  --    < > >90   < >  --    < >  --    GFRESTBLACK  --  >90 >90  --    < > >90   < >  --    < >  --    POTASSIUM  --  3.9 4.1  --   --  3.9   < >  --    < >  --    TSH  --   --   --   --   --  2.91  --   --   --  2.21    < > = values in this interval not displayed.      BP Readings from Last 3 Encounters:   12/12/19 138/76   09/27/19 115/72   08/09/19 107/76    Wt Readings from Last 3 Encounters:   12/12/19 98.4 kg (217 lb)   09/27/19 98.1 kg (216 lb 3.2 oz)   08/09/19 97.1 kg (214 lb)                    Reviewed and updated as needed this visit by Provider         Review of Systems   ROS COMP: Constitutional, HEENT, cardiovascular, pulmonary, gi and gu systems are negative, except as otherwise noted.      Objective    /76 (BP Location: Left arm, Patient Position: Chair, Cuff Size: Adult Large)   Pulse 95   Temp 97.8  F (36.6  C) (Oral)   Resp 18   Ht 1.734 m (5' 8.27\")   Wt 98.4 kg (217 lb)   SpO2 99%   BMI 32.74 kg/m    Body mass index is 32.74 kg/m .  Physical Exam   GENERAL: healthy, alert, well nourished, well hydrated, no distress, obese  HENT: ear canals- normal; TMs- normal; Nose- normal; Mouth- no ulcers, no lesions, throat is clear with no erythema or exudate.   NECK: no tenderness, no adenopathy, no asymmetry, no masses, no stiffness; thyroid- normal to palpation  RESP: lungs clear to auscultation - no rales, no rhonchi, no wheezes  CV: regular rates and rhythm, normal S1 S2, no S3 or S4 and no murmur, no click or rub -  ABDOMEN: soft, no tenderness, no  hepatosplenomegaly, no masses, normal bowel sounds  MS: extremities- no gross deformities noted, no edema  SKIN: no suspicious lesions, no rashes  NEURO: strength and tone- normal, sensory exam- grossly normal, mentation- intact, speech- normal, reflexes- symmetric  BACK: no CVA tenderness, no paralumbar " "tenderness  PSYCH: Alert and oriented times 3; speech- coherent , normal rate and volume; able to articulate logical thoughts, able to abstract reason, no tangential thoughts, no hallucinations or delusions, affect- normal   Diabetic foot exam: normal DP and PT pulses, no trophic changes or ulcerative lesions, normal calluses, no deformities, normal sensory exam and normal monofilament exam, bunionette left foot with callus and corn with blood blister.     Diagnostic Test Results:  Labs reviewed in Epic  Results for orders placed or performed in visit on 12/12/19 (from the past 24 hour(s))   Hemoglobin A1c   Result Value Ref Range    Hemoglobin A1C 9.3 (H) 0 - 5.6 %           Assessment & Plan       ICD-10-CM    1. Type 2 diabetes mellitus with diabetic polyneuropathy, without long-term current use of insulin (H) E11.42 Hemoglobin A1c- control improving but not optimal at this time. Will work on diet and exercise.      Albumin Random Urine Quantitative with Creat Ratio   2. Hyperlipidemia with target LDL less than 100 E78.5 Stable    3. Diabetic polyneuropathy associated with type 2 diabetes mellitus (H) E11.42 Increased pain but this is from corn on left foot.    4. Hypertension goal BP (blood pressure) < 140/90 I10 Well controlled on medications    5. Non-small cell carcinoma of lung, left (H) C34.92 Seen by oncology and will extend visits to once a year at this time.    6. Idiopathic chronic gout of right foot without tophus M1A.0710 No recurrent symptoms    7. Bunionette of left foot M21.622 Causing pain and callus on left foot   8. Corn or callus L84 Discussed treatment with corn pads, pumas stone and soaking foot. Follow up with podiatry if not improving.         BMI:   Estimated body mass index is 32.74 kg/m  as calculated from the following:    Height as of this encounter: 1.734 m (5' 8.27\").    Weight as of this encounter: 98.4 kg (217 lb).   Weight management plan: Discussed healthy diet and exercise " guidelines        FUTURE LABS:       - Schedule fasting labs in 3 months  FUTURE APPOINTMENTS:       - Follow-up visit in 3 months or sooner if any questions or concerns.   Work on weight loss  Regular exercise  See Patient Instructions    No follow-ups on file.    Pam Dela Cruz MD  [facility]

## 2019-12-14 NOTE — RESULT ENCOUNTER NOTE
Dear Sven    Your test results are attached. I am happy to let you know that they are stable.    The diabetes test is improving as we discussed. There is some protein in the urine, but it is stable.     Please contact me by [employer] if you have any questions about your labs or management. You may also call my office number [phone] for any questions.     Pam Dela Cruz MD

## 2020-02-16 ENCOUNTER — HEALTH MAINTENANCE LETTER (OUTPATIENT)
Age: 65
End: 2020-02-16

## 2020-02-16 DIAGNOSIS — K21.00 GASTROESOPHAGEAL REFLUX DISEASE WITH ESOPHAGITIS: ICD-10-CM

## 2020-02-16 NOTE — TELEPHONE ENCOUNTER
"Requested Prescriptions   Pending Prescriptions Disp Refills     omeprazole (PRILOSEC) 20 MG DR capsule [Pharmacy Med Name: OMEPRAZOLE DR 20 MG CAPSULE] 90 capsule 2     Sig: TAKE 1 CAPSULE (20 MG) BY MOUTH DAILY TAKE 30-60 MINUTES BEFORE A MEAL.         Last Written Prescription Date:  3/8/19  Last Fill Quantity: 90,  # refills: 3   Last Office Visit with [facility], [facility] or [facility] prescribing provider:  12/12/19   Future Office Visit:         PPI Protocol Passed - 2/16/2020  9:48 AM        Passed - Not on Clopidogrel (unless Pantoprazole ordered)        Passed - No diagnosis of osteoporosis on record        Passed - Recent (12 mo) or future (30 days) visit within the authorizing provider's specialty     Patient has had an office visit with the authorizing provider or a provider within the authorizing providers department within the previous 12 mos or has a future within next 30 days. See \"Patient Info\" tab in inbasket, or \"Choose Columns\" in Meds & Orders section of the refill encounter.              Passed - Medication is active on med list        Passed - Patient is age 18 or older              Manuel Faarax  [facility]  "

## 2020-02-21 DIAGNOSIS — L71.9 ROSACEA: Primary | ICD-10-CM

## 2020-02-21 DIAGNOSIS — I10 ESSENTIAL HYPERTENSION WITH GOAL BLOOD PRESSURE LESS THAN 140/90: ICD-10-CM

## 2020-02-21 RX ORDER — METRONIDAZOLE 7.5 MG/G
GEL TOPICAL 2 TIMES DAILY
Qty: 45 G | Refills: 3 | Status: SHIPPED | OUTPATIENT
Start: 2020-02-21 | End: 2024-02-07

## 2020-02-21 NOTE — TELEPHONE ENCOUNTER
"Pharmacy comments:    \"Patient is requesting refill on Metronidazole 0.75% gel. It was prescribed by a different doctor. Patient wants it filled by his primary doctor. Thank you.\"  "

## 2020-02-21 NOTE — TELEPHONE ENCOUNTER
"Requested Prescriptions   Pending Prescriptions Disp Refills     TERAZOSIN 5 MG PO capsule [Pharmacy Med Name: TERAZOSIN 5 MG CAPSULE]  Last Written Prescription Date:  11/20/19  Last Fill Quantity: 90,  # refills: 0   Last office visit: 12/12/2019 with prescribing provider:  Jose De Jesus   Future Office Visit:     90 capsule 0     Sig: TAKE 1 CAPSULE (5 MG) BY MOUTH AT BEDTIME       Alpha Blockers Passed - 2/21/2020 10:00 AM        Passed - Blood pressure under 140/90 in past 12 months     BP Readings from Last 3 Encounters:   12/12/19 138/76   09/27/19 115/72   08/09/19 107/76                 Passed - Recent (12 mo) or future (30 days) visit within the authorizing provider's specialty     Patient has had an office visit with the authorizing provider or a provider within the authorizing providers department within the previous 12 mos or has a future within next 30 days. See \"Patient Info\" tab in inbasket, or \"Choose Columns\" in Meds & Orders section of the refill encounter.              Passed - Patient does not have Tadalafil, Vardenafil, or Sildenafil on their medication list        Passed - Medication is active on med list        Passed - Patient is 18 years of age or older          "

## 2020-02-21 NOTE — TELEPHONE ENCOUNTER
Requested Prescriptions   Pending Prescriptions Disp Refills     metroNIDAZOLE 0.75 % EX external gel       Sig: Apply topically 2 times daily       There is no refill protocol information for this order          Routing refill request to provider for review/approval because:  Drug not active on patient's medication list  Previous prescription given by a different provider.       Manuel Wang RN, BSN, PHN

## 2020-02-22 DIAGNOSIS — E78.1 HYPERTRIGLYCERIDEMIA: ICD-10-CM

## 2020-02-22 DIAGNOSIS — E11.42 TYPE 2 DIABETES MELLITUS WITH DIABETIC POLYNEUROPATHY, WITHOUT LONG-TERM CURRENT USE OF INSULIN (H): ICD-10-CM

## 2020-02-24 RX ORDER — TERAZOSIN 5 MG/1
5 CAPSULE ORAL AT BEDTIME
Qty: 90 CAPSULE | Refills: 0 | Status: SHIPPED | OUTPATIENT
Start: 2020-02-24 | End: 2020-05-18

## 2020-02-24 NOTE — TELEPHONE ENCOUNTER
"Requested Prescriptions   Pending Prescriptions Disp Refills     atorvastatin (LIPITOR) 40 MG tablet [Pharmacy Med Name: ATORVASTATIN 40 MG TABLET]  Last Written Prescription Date:  11/21/19  Last Fill Quantity: 90,  # refills: 0   Last Office Visit with [facility], [facility] or [facility] prescribing provider:  12/12/19-Jose De Jesus   Future Office Visit:    90 tablet 0     Sig: TAKE 1 TABLET BY MOUTH EVERY DAY       Statins Protocol Passed - 2/22/2020  4:58 PM        Passed - LDL on file in past 12 months     Recent Labs   Lab Test 03/08/19  0926   LDL 34             Passed - No abnormal creatine kinase in past 12 months     No lab results found.             Passed - Recent (12 mo) or future (30 days) visit within the authorizing provider's specialty     Patient has had an office visit with the authorizing provider or a provider within the authorizing providers department within the previous 12 mos or has a future within next 30 days. See \"Patient Info\" tab in inbasket, or \"Choose Columns\" in Meds & Orders section of the refill encounter.              Passed - Medication is active on med list        Passed - Patient is age 18 or older            "

## 2020-02-25 RX ORDER — ATORVASTATIN CALCIUM 40 MG/1
TABLET, FILM COATED ORAL
Qty: 90 TABLET | Refills: 0 | Status: SHIPPED | OUTPATIENT
Start: 2020-02-25 | End: 2020-08-10

## 2020-02-25 NOTE — TELEPHONE ENCOUNTER
Prescription approved per [facility] Refill Protocol.      Flor Beatty RN, [facility] Triage

## 2020-02-27 ENCOUNTER — TRANSFERRED RECORDS (OUTPATIENT)
Dept: HEALTH INFORMATION MANAGEMENT | Facility: CLINIC | Age: 65
End: 2020-02-27

## 2020-03-01 ENCOUNTER — MYC MEDICAL ADVICE (OUTPATIENT)
Dept: FAMILY MEDICINE | Facility: CLINIC | Age: 65
End: 2020-03-01

## 2020-03-01 DIAGNOSIS — K59.01 SLOW TRANSIT CONSTIPATION: ICD-10-CM

## 2020-03-01 NOTE — TELEPHONE ENCOUNTER
"Requested Prescriptions   Pending Prescriptions Disp Refills     SENNA-PLUS 8.6-50 MG tablet [Pharmacy Med Name: SENNA PLUS TABLET] 90 tablet 2     Sig: TAKE 1 TABLET BY MOUTH EVERYDAY AT BEDTIME         Last Written Prescription Date:  3/8/19  Last Fill Quantity: 90,  # refills: 3   Last Office Visit with [facility], [facility] or [facility] prescribing provider:  12/12/19   Future Office Visit:         Laxatives Protocol Passed - 3/1/2020  4:14 PM        Passed - Patient is age 6 or older        Passed - Recent (12 mo) or future (30 days) visit within the authorizing provider's specialty     Patient has had an office visit with the authorizing provider or a provider within the authorizing providers department within the previous 12 mos or has a future within next 30 days. See \"Patient Info\" tab in inbasket, or \"Choose Columns\" in Meds & Orders section of the refill encounter.              Passed - Medication is active on med list              Manuel Faarax  [facility]  "

## 2020-03-01 NOTE — LETTER
[address] [address]  [phone]        March 2, 2020    Regarding:  Sven Givens  [id number]  [facility] [address]              To Whom It May Concern;    Sven Givens is unable to travel at this time due to complications of non-small cell carcinoma of the lung and complications of diabetes. He is on current treatment and it is medically advised not to travel. It is potentially hazardous to his health and life to be exposed to any contagious disease at this time. Please call if you have any further questions.           Sincerely,        Pam Dela Cruz MD

## 2020-03-02 NOTE — TELEPHONE ENCOUNTER
Patient's letter will be deliver to the  this afternoon for pickup after 4p.  Han Orourke,  [facility]    NOTE: If patient and or guardians calls the clinic before the care teams gets a chance to call them, please notify the caller the above information regarding pickup times, remind them to bring a photo ID, document and close the encounter.  Han Orourke,  [facility]    FYI:  Anything completed after 2:00p will not be delivered until the next business day after 3p.   Han Orourke,  [facility].

## 2020-03-03 RX ORDER — SENNOSIDES AND DOCUSATE SODIUM 8.6; 5 MG/1; MG/1
TABLET ORAL
Qty: 90 TABLET | Refills: 2 | Status: SHIPPED | OUTPATIENT
Start: 2020-03-03 | End: 2020-11-04

## 2020-03-03 NOTE — TELEPHONE ENCOUNTER
Prescription approved per [facility] Refill Protocol.      Manuel Wang RN, BSN, PHN

## 2020-04-05 ENCOUNTER — TELEPHONE (OUTPATIENT)
Dept: FAMILY MEDICINE | Facility: CLINIC | Age: 65
End: 2020-04-05

## 2020-04-05 DIAGNOSIS — E11.42 TYPE 2 DIABETES MELLITUS WITH DIABETIC POLYNEUROPATHY, WITHOUT LONG-TERM CURRENT USE OF INSULIN (H): ICD-10-CM

## 2020-04-05 NOTE — TELEPHONE ENCOUNTER
"Requested Prescriptions   Pending Prescriptions Disp Refills     TRADJENTA 5 MG TABS tablet [Pharmacy Med Name: TRADJENTA 5 MG TABLET] 90 tablet 1     Sig: TAKE 1 TABLET BY MOUTH EVERY DAY       DPP4 Inhibitors Protocol Failed - 4/5/2020  9:25 AM        Failed - HgbA1C in past 3 or 6 months     If HgbA1C is 8 or greater, it needs to be on file within the past 3 months.  If less than 8, must be on file within the past 6 months.     Recent Labs   Lab Test 12/12/19  0734   A1C 9.3*             Failed - Medication is active on med list        Failed - Normal serum creatinine in past 12 months     Recent Labs   Lab Test 07/16/19  1139  05/30/18  1445   CR 0.58*   < >  --    CREAT  --   --  0.9    < > = values in this interval not displayed.       Ok to refill medication if creatinine is low          Passed - Patient is age 18 or older        Passed - Recent (6 mo) or future (30 days) visit within the authorizing provider's specialty     Patient had office visit in the last 6 months or has a visit in the next 30 days with authorizing provider.  See \"Patient Info\" tab in inbasket, or \"Choose Columns\" in Meds & Orders section of the refill encounter.               "

## 2020-04-08 RX ORDER — LINAGLIPTIN 5 MG/1
TABLET, FILM COATED ORAL
Qty: 90 TABLET | Refills: 1 | Status: SHIPPED | OUTPATIENT
Start: 2020-04-08 | End: 2020-05-22 | Stop reason: SINTOL

## 2020-04-08 NOTE — TELEPHONE ENCOUNTER
This writer attempted to contact Sven on 04/08/20      Reason for call refill and left detailed message.      If patient calls back:   [facility] Team (MA/TC) called. Inform patient that someone from the team will contact them, document that pt called and route to care team.         Shanelle Graf MA

## 2020-04-08 NOTE — TELEPHONE ENCOUNTER
Routing refill request to provider for review/approval because:  Drug not active on patient's medication list  Labs not current:  HgbA1C and creatinine    The original prescription was discontinued on 7/16/2019 by Pam Dela Cruz MD for the following reason: Side effects. Renewing this prescription may not be appropriate.     Flor Beatty RN, [facility] Triage

## 2020-04-28 DIAGNOSIS — E11.42 TYPE 2 DIABETES MELLITUS WITH DIABETIC POLYNEUROPATHY, WITHOUT LONG-TERM CURRENT USE OF INSULIN (H): Chronic | ICD-10-CM

## 2020-05-01 RX ORDER — GLIMEPIRIDE 4 MG/1
4 TABLET ORAL 2 TIMES DAILY
Qty: 60 TABLET | Refills: 0 | Status: SHIPPED | OUTPATIENT
Start: 2020-05-01 | End: 2020-05-27

## 2020-05-01 NOTE — TELEPHONE ENCOUNTER
"Requested Prescriptions   Pending Prescriptions Disp Refills     glimepiride (AMARYL) 4 MG tablet [Pharmacy Med Name: GLIMEPIRIDE 4 MG TABLET] 180 tablet 3     Sig: TAKE 1 TABLET (4 MG) BY MOUTH 2 TIMES DAILY       Sulfonylurea Agents Failed - 5/1/2020  9:18 AM        Failed - Patient has documented A1c within the specified period of time.     If HgbA1C is 8 or greater, it needs to be on file within the past 3 months.  If less than 8, must be on file within the past 6 months.     Recent Labs   Lab Test 12/12/19  0734   A1C 9.3*             Failed - Patient has a recent creatinine (normal) within the past 12 mos.     Recent Labs   Lab Test 07/16/19  1139  05/30/18  1445   CR 0.58*   < >  --    CREAT  --   --  0.9    < > = values in this interval not displayed.       Ok to refill medication if creatinine is low          Passed - Medication is active on med list        Passed - Patient is age 18 or older        Passed - Recent (6 mo) or future (30 days) visit within the authorizing provider's specialty     Patient had office visit in the last 6 months or has a visit in the next 30 days with authorizing provider or within the authorizing provider's specialty.  See \"Patient Info\" tab in inbasket, or \"Choose Columns\" in Meds & Orders section of the refill encounter.               metFORMIN (GLUCOPHAGE) 1000 MG tablet [Pharmacy Med Name: METFORMIN HCL 1,000 MG TABLET] 180 tablet 3     Sig: TAKE 1 TABLET (1,000 MG) BY MOUTH 2 TIMES DAILY (WITH MEALS)       Biguanide Agents Failed - 5/1/2020  9:18 AM        Failed - Patient has documented A1c within the specified period of time.     If HgbA1C is 8 or greater, it needs to be on file within the past 3 months.  If less than 8, must be on file within the past 6 months.     Recent Labs   Lab Test 12/12/19  0734   A1C 9.3*             Passed - Patient is age 10 or older        Passed - Patient's CR is NOT>1.4 OR Patient's EGFR is NOT<45 within past 12 mos.     Recent Labs   Lab " "Test 07/16/19  1139   GFRESTIMATED >90   GFRESTBLACK >90       Recent Labs   Lab Test 07/16/19  1139   CR 0.58*             Passed - Patient does NOT have a diagnosis of CHF.        Passed - Medication is active on med list        Passed - Recent (6 mo) or future (30 days) visit within the authorizing provider's specialty     Patient had office visit in the last 6 months or has a visit in the next 30 days with authorizing provider or within the authorizing provider's specialty.  See \"Patient Info\" tab in inbasket, or \"Choose Columns\" in Meds & Orders section of the refill encounter.                 "

## 2020-05-01 NOTE — TELEPHONE ENCOUNTER
Routing refill request to provider for review/approval because:  Labs not current:  A1C, creatinine    Manuel Wang RN, BSN, PHN

## 2020-05-01 NOTE — TELEPHONE ENCOUNTER
rx refilled with note to follow up with clinic or virtual visit for next refill. Labs due.    Yeison Villegas MD

## 2020-05-10 DIAGNOSIS — K21.00 GASTROESOPHAGEAL REFLUX DISEASE WITH ESOPHAGITIS: ICD-10-CM

## 2020-05-10 DIAGNOSIS — I10 HYPERTENSION GOAL BP (BLOOD PRESSURE) < 140/90: ICD-10-CM

## 2020-05-12 DIAGNOSIS — M1A.09X0 CHRONIC GOUT OF MULTIPLE SITES, UNSPECIFIED CAUSE: ICD-10-CM

## 2020-05-13 RX ORDER — LOSARTAN POTASSIUM 100 MG/1
TABLET ORAL
Qty: 90 TABLET | Refills: 0 | Status: SHIPPED | OUTPATIENT
Start: 2020-05-13 | End: 2020-05-18

## 2020-05-13 RX ORDER — HYDROCHLOROTHIAZIDE 25 MG/1
TABLET ORAL
Qty: 90 TABLET | Refills: 0 | Status: SHIPPED | OUTPATIENT
Start: 2020-05-13 | End: 2020-05-18

## 2020-05-13 NOTE — TELEPHONE ENCOUNTER
Approved per [place] CPA.   SCr out of range - slightly low.     Routed to scheduling pool, overdue for follow-up.     Kathi Garrett, PharmD, BCACP   Medication Management Pharmacist   [facility]  [phone]

## 2020-05-14 NOTE — TELEPHONE ENCOUNTER
Routing refill request to provider for review/approval because:  Labs not current:  ALT, Uric Acid, Creatinine    Rosi Chakraborty RN  [facility]

## 2020-05-15 RX ORDER — ALLOPURINOL 300 MG/1
TABLET ORAL
Qty: 90 TABLET | Refills: 3 | Status: SHIPPED | OUTPATIENT
Start: 2020-05-15 | End: 2021-02-18 | Stop reason: ALTCHOICE

## 2020-05-18 ENCOUNTER — VIRTUAL VISIT (OUTPATIENT)
Dept: FAMILY MEDICINE | Facility: CLINIC | Age: 65
End: 2020-05-18
Payer: COMMERCIAL

## 2020-05-18 ENCOUNTER — TELEPHONE (OUTPATIENT)
Dept: FAMILY MEDICINE | Facility: CLINIC | Age: 65
End: 2020-05-18

## 2020-05-18 ENCOUNTER — MYC MEDICAL ADVICE (OUTPATIENT)
Dept: FAMILY MEDICINE | Facility: CLINIC | Age: 65
End: 2020-05-18

## 2020-05-18 VITALS — DIASTOLIC BLOOD PRESSURE: 76 MMHG | HEART RATE: 120 BPM | SYSTOLIC BLOOD PRESSURE: 126 MMHG

## 2020-05-18 DIAGNOSIS — I10 ESSENTIAL HYPERTENSION WITH GOAL BLOOD PRESSURE LESS THAN 140/90: ICD-10-CM

## 2020-05-18 DIAGNOSIS — E78.5 HYPERLIPIDEMIA LDL GOAL <100: ICD-10-CM

## 2020-05-18 DIAGNOSIS — E11.42 TYPE 2 DIABETES MELLITUS WITH DIABETIC POLYNEUROPATHY, WITHOUT LONG-TERM CURRENT USE OF INSULIN (H): ICD-10-CM

## 2020-05-18 DIAGNOSIS — R00.0 TACHYCARDIA: Primary | ICD-10-CM

## 2020-05-18 PROCEDURE — 99214 OFFICE O/P EST MOD 30 MIN: CPT | Mod: 95 | Performed by: FAMILY MEDICINE

## 2020-05-18 RX ORDER — HYDROCHLOROTHIAZIDE 25 MG/1
25 TABLET ORAL DAILY
Qty: 90 TABLET | Refills: 0 | Status: SHIPPED | OUTPATIENT
Start: 2020-05-18 | End: 2020-09-15

## 2020-05-18 RX ORDER — LEVOFLOXACIN 500 MG/1
500 TABLET, FILM COATED ORAL DAILY
Qty: 10 TABLET | Refills: 0 | Status: CANCELLED | OUTPATIENT
Start: 2020-05-18

## 2020-05-18 RX ORDER — LOSARTAN POTASSIUM 100 MG/1
100 TABLET ORAL DAILY
Qty: 90 TABLET | Refills: 0 | Status: SHIPPED | OUTPATIENT
Start: 2020-05-18 | End: 2020-09-15

## 2020-05-18 RX ORDER — ATORVASTATIN CALCIUM 40 MG/1
40 TABLET, FILM COATED ORAL DAILY
Qty: 90 TABLET | Refills: 0 | Status: CANCELLED | OUTPATIENT
Start: 2020-05-18

## 2020-05-18 RX ORDER — TERAZOSIN 5 MG/1
5 CAPSULE ORAL AT BEDTIME
Qty: 90 CAPSULE | Refills: 0 | Status: SHIPPED | OUTPATIENT
Start: 2020-05-18 | End: 2020-05-22

## 2020-05-18 NOTE — TELEPHONE ENCOUNTER
Reason for Call:  Rapid heart rate f/u    Detailed comments: Patient is calling because he was told to see PCP within 24hrs.  Please call and advise patient only wants to see/ speak with Dr. Dela Cruz        Phone Number Patient can be reached at: Home number on file [phone] (home)    Best Time: any    Can we leave a detailed message on this number? YES    Call taken on 5/18/2020 at 11:08 AM by Mariam Alejandre

## 2020-05-18 NOTE — PATIENT INSTRUCTIONS
At [facility], we strive to deliver an exceptional experience to you, every time we see you.  If you receive a survey in the mail, please send us back your thoughts. We really do value your feedback.    Based on your medical history, these are the current health maintenance/preventive care services that you are due for (some may have been done at this visit.)  Health Maintenance Due   Topic Date Due     ZOSTER IMMUNIZATION (2 of 3) 02/14/2014     ADVANCE CARE PLANNING  06/16/2016     MEDICARE ANNUAL WELLNESS VISIT  04/16/2019     PHQ-2  01/01/2020     LIPID  03/08/2020     PSA  03/08/2020     DIABETIC FOOT EXAM  04/09/2020         Suggested websites for health information:  Www.LendMeYourLiteracy.org : Up to date and easily searchable information on multiple topics.  Www.medlineplus.gov : medication info, interactive tutorials, watch real surgeries online  Www.familydoctor.org : good info from the Academy of Family Physicians  Www.cdc.gov : public health info, travel advisories, epidemics (H1N1)  Www.aap.org : children's health info, normal development, vaccinations  Www.health.[facility].[place].us : [place] dept of health, public health issues in [place], N1N1    Your care team:                            Family Medicine Internal Medicine   MD Rodrick Arita MD Shantel Branch-Fleming, MD Katya Georgiev PA-C Nam Ho, MD Pediatrics   AMPARO Bob, MD Keira Barbosa CNP, MD Deborah Mielke, MD Kim Thein, APRN [facility]      Clinic hours: Monday - Thursday 7 am-7 pm; Fridays 7 am-5 pm.   Urgent care: Monday - Friday 11 am-9 pm; Saturday and Sunday 9 am-5 pm.  Pharmacy : Monday -Thursday 8 am-8 pm; Friday 8 am-6 pm; Saturday and Sunday 9 am-5 pm.     Clinic: [phone]   Pharmacy: [phone]    Patient Education     Understanding Tachycardia    The heart has an electrical system that sends signals to control the heartbeat. Any abnormal  change in the speed or pattern of the heartbeat is called an arrhythmia. If you have an arrhythmia that causes the heart to beat faster than normal, this is known as tachycardia. There are many types of tachycardia. They can affect the heart s upper chambers (atria), the heart s lower chambers (ventricles), or both.  What causes tachycardia?  Many things can cause tachycardia, including:    Damage to heart tissue from heart disease, past heart attack, or heart surgery    Abnormal electrical pathways in the heart    Problems with the heart s structure that you are born with     High blood pressure    Overactive thyroid    Use of certain medicines    Severe blood loss or anemia    Dehydration    Severe stress, fear, or anxiety    Smoking    Too much alcohol or caffeine    Abuse of certain street drugs, such as cocaine    Infections    Certain inflammatory conditions    Chronic  pain syndromes  What are the symptoms of tachycardia?  Tachycardia can cause a fast, pounding, or irregular heartbeat. It can also make it harder for the heart to pump blood efficiently to the body. This may cause symptoms such as:    Shortness of breath    Tiredness    Dizziness or fainting    Chest pain  Some people with tachycardia may have no symptoms at all.  How is tachycardiatreated?  Treatment for tachycardia depends on the cause. It also depends on the type you have and how severe your symptoms are. Tachycardia in the ventricles is often more serious than in the atria. For this reason, it may need to be treated right away. Possible treatments include:    Treating the underlying cause. For instance, if a medicine is causing your tachycardia, changing the dosage or stopping the medicine with your doctor s guidance may correct the problem.    Lifestyle changes. These include getting enough sleep and reducing stress. They also include avoiding caffeine, alcohol, tobacco, and street drugs.    Vagal maneuvers.These are techniques that may  help interrupt a fast heartbeat and slow it down. One example is to take a deep breath and bear down hard while holding your breath.     Medicines. These may be used to help slow down a fast heartbeat. They may also be used to regulate the pattern of the heartbeat.    Electrical cardioversion. Special pads or paddles are used to send one or more brief  electrical shocks to the heart. This can help restore the heartbeat to normal.    Ablation. A long thin tube called a catheter is inserted into a blood vessel and threaded to the heart. The catheter sends out hot or cold energy to the areas causing abnormal signals. This destroys the problem tissue or cells. This may stop certain types of tachycardia.    Pacemaker. This is a device that is placed just under the skin in the chest. It sends paced signals to make the heart beat at a more normal rate and rhythm. You may need this when certain medicines that treat tachycardia also result in a slow heart rate.      Implantable cardioverter defibrillator (ICD). This is a device that is placed just under the skin in the chest or armpit. The ICD monitors your heart rate. When needed, it sends controlled burst of signals to the heart to overdrive a tachycardia.  It can also send a single stronger shock to the heart to stop a life-threatening type of tachycardia, if needed.    Surgery. During surgery, different techniques may be used to create scar tissue in the areas of the heart causing abnormal signals. This may help stop certain types of tachycardia.  What are the complications of tachycardia?  These can include:    Blood clots or stroke    Heart failure. With this problem, the heart muscle is so weak it no longer pumps blood well.    Fainting    Sudden cardiac arrest. This is when the heart suddenly stops beating.  When should I call my healthcare provider?  Call your healthcare provider right away if you have any of these:    Symptoms that don t get better with treatment,  or get worse    New symptoms  Date Last Reviewed: 5/1/2016 2000-2019 [employer]. [address] [place] [address], [place] [address]. All rights reserved. This information is not intended as a substitute for professional medical care. Always follow your healthcare professional's instructions.           Patient Education     Understanding Atrial Fibrillation    An arrhythmia is any problem with the speed or pattern of the heartbeat. Atrial fibrillation (AFib) is the most common type of arrhythmia. It causes fast, chaotic electrical signals in the atria. This makes it hard for the heart to work as it should. It also affects how much blood your heart can pump out to the body.  AFib may occur once in a while and go away on its own. Or it may continue for longer periods and need treatment.  AFib can lead to serious problems, such as stroke. Your healthcare provider will need to monitor and manage it.  What happens during atrial fibrillation?   The heart has an electrical system that sends signals to control the heartbeat. As signals move through the heart, they tell the heart s upper chambers (atria) and lower chambers (ventricles) when to squeeze (contract) and relax. This lets blood move through the heart and out to the body and lungs.  With AFib, the atria receive abnormal signals. This causes them to contract in a fast and irregular way, and out of sync with the ventricles. When this happens, the atria also have a harder time moving blood into the ventricles. Blood may then pool in the atria. This increases the risk for blood clots and stroke. The ventricles also may contract too quickly and irregularly. As a result, they may not pump blood to the body and lungs as well as they should. This can weaken the heart muscle over time and cause heart failure.  What causes atrial fibrillation?  AFib is more common in older adults. It has many possible causes:    Coronary artery disease    Heart valve disease    Heart  attack    Heart surgery    High blood pressure    Thyroid disease    Diabetes    Lung disease    Sleep apnea    Heavy alcohol use  In some cases of AFib, doctors don't know the cause.  What are the symptoms of atrial fibrillation?  AFib may not cause symptoms. If symptoms do occur, they may include:    A fast, pounding, irregular heartbeat    Shortness of breath    Tiredness    Dizziness or fainting    Chest pain  How is atrial fibrillation treated?  Treatments for AFib can include any of the options below.    Medicines. You may be prescribed:  ? Heart rate medicines to help slow down the heartbeat  ? Heart rhythm medicines to help the heart beat more regularly  ? Blood thinners or anti-clotting medicines to help reduce the risk for blood clots and stroke.    Left atrial appendage closure. Your healthcare provider may advise this device to prevent stroke. You may need if you are at high risk for stroke but have problems taking blood-thinner (anticoagulant) medicines. The device is placed in the part of the heart where most clots form. This area is called the left atrial appendage (FEMI). It's a pouch-like structure in the muscle wall of the left atrium. The device closes off the FEMI to prevent clots moving from the heart to the brain and causing a stroke.    Electrical cardioversion. Your healthcare provider uses special pads or paddles to send one or more brief electrical shocks to the heart. This can help reset the heartbeat to normal.    Ablation. Long, thin tubes (catheters) are threaded through a blood vessel to the heart. There, the catheters send out hot or cold energy to the areas causing the abnormal signals. This energy destroys the problem tissue or cells. This improves the chances that your heart will stay in normal rhythm without using medicines. If your heart rate and rhythm can t be controlled, you may need ablation and a pacemaker. These will help control the heart rate and regularity of the  heartbeat.    Surgery. During surgery, your healthcare provider may use different methods to create scar tissue in the areas of the heart causing the abnormal signals. The scar tissue disrupts the abnormal signals and may stop AFib from occurring.    Hybrid surgical-catheter ablation for AFib. This treatment is used for people with AFib that continues or is hard to treat.. It combines surgery with a catheter ablation. During the surgery, the surgeon makes small cuts (incisions) between the ribs in the chest or in the abdomen near the sternum. The surgeon puts a scope through the incisions to get to the backside of the heart. The catheter portion of the procedure is done by putting a catheter into a vein in the groin. The catheter is guided to the inside of the heart. Using the catheter, radiofrequency ablation is done to destroy the tissue inside the heart that is causing the AFib. Using both of these approaches may work better to block the abnormal electrical signals and be a more permanent treatment for persistent AFib.  What are possible complications of atrial fibrillation?  Complications can include:    Blood clots    Stroke    Heart failure. This problem occurs when the heart muscle weakens so much that it can no longer pump blood well.  When should I call my healthcare provider?  Call your healthcare provider right away if you have any of these:    Symptoms that don t get better with treatment, or get worse    New symptoms  Date Last Reviewed: 5/1/2016 2000-2019 [employer]. [address] [place] [address], [place] [address]. All rights reserved. This information is not intended as a substitute for professional medical care. Always follow your healthcare professional's instructions.

## 2020-05-18 NOTE — TELEPHONE ENCOUNTER
120 BPM  Patient states that he has been working from home and feels like he needs to talk to someone about his heart rate being high.       Sven states that he has been home since March and barley goes outside due to being worried about COVID19.    Patient states that he had lung cancer a few years back and would like to talk to someone. No SOB or difficulties breathing  No chest pain or pressure.     Patient is scheduled for a virtual visit this afternoon to discuss.     Valentine Ambrosio RN  [facility]

## 2020-05-18 NOTE — PROGRESS NOTES
"Sven Givens is a 65 year old male who is being evaluated via a billable telephone visit.      The patient has been notified of following:     \"This telephone visit will be conducted via a call between you and your physician/provider. We have found that certain health care needs can be provided without the need for a physical exam.  This service lets us provide the care you need with a short phone conversation.  If a prescription is necessary we can send it directly to your pharmacy.  If lab work is needed we can place an order for that and you can then stop by our lab to have the test done at a later time.    Telephone visits are billed at different rates depending on your insurance coverage. During this emergency period, for some insurers they may be billed the same as an in-person visit.  Please reach out to your insurance provider with any questions.    If during the course of the call the physician/provider feels a telephone visit is not appropriate, you will not be charged for this service.\"    Patient has given verbal consent for Telephone visit?  Yes    What phone number would you like to be contacted at? [phone]    How would you like to obtain your AVS? Clemencia    Subjective     Sven Givens is a 65 year old male who presents via phone visit today for the following health issues:    HPI  Concern - Rapid heart rate   Onset: 1 month     Description:   Pt states that he has been having a rapid heart rate for the past month. Heart rate was around 120 and O2 was 96, and wasn't sure what could be causing it. Originally thought that it was due to a faulty reading in his Apple watch and checked his pulse on other machines and still had the same readings of 116-120. Pt works from home due to COVID-19 pandemic and isn't very active, does go on his treadmill for 30 minutes from time to time.     Intensity: mild, moderate    Progression of Symptoms:  Intermittent, but present nearly every day. When exercise " (walking) on treadmill, is is about the same, maybe up to 130s    Accompanying Signs & Symptoms:  Sometimes he can feel the heart beating fast, but no CP or RALPH. BPs normal.    Previous history of similar problem:   None     Precipitating factors:   Worsened by: none     Alleviating factors:  Improved by: none     Therapies Tried and outcome: tries to lay down/sit and relax       Past medical, family, and social histories, medications, and allergies are reviewed and updated in Epic.          Review of Systems   Constitutional, HEENT, cardiovascular, pulmonary, gi and gu systems are negative, except as otherwise noted.       Objective   Reported vitals:  There were no vitals taken for this visit.   PSYCH: Alert and oriented times 3; coherent speech, normal   rate and volume, able to articulate logical thoughts, able   to abstract reason, no tangential thoughts, no hallucinations   or delusions  His affect is normal and pleasant  RESP: No cough, no audible wheezing, able to talk in full sentences  Remainder of exam unable to be completed due to telephone visits    Diagnostic Test Results:  Labs reviewed in Epic  Lab Results   Component Value Date    A1C 9.3 12/12/2019    A1C 10.1 07/16/2019    A1C 9.9 03/08/2019    A1C 8.5 04/16/2018    A1C 7.6 09/11/2017              ASSESSMENT/PLAN:  (R00.0) Tachycardia  (primary encounter diagnosis)  Comment: DDx includes (but is not limited to) hyperglycemia with dehydration and metabolic changes, hypothyroidism, new atrial fibrillation, anxiety  Plan: TSH with free T4 reflex, CBC with platelets        Return in about 2 days (around 5/20/2020) for face-to-face exam of heart (preceded by lab appointment).      (E11.42) Type 2 diabetes mellitus with diabetic polyneuropathy, without long-term current use of insulin (H)  Comment: Uncontrolled and overdue for follow-up appointment  Plan: Hemoglobin A1c, TSH with free T4 reflex        Labs tomorrow    (I10) Essential hypertension with  goal blood pressure less than 140/90  Comment: Well-controlled by our record and per his history  Plan: hydrochlorothiazide (HYDRODIURIL) 25 MG tablet,        losartan (COZAAR) 100 MG tablet, terazosin         (HYTRIN) 5 MG capsule, Basic metabolic panel,         CBC with platelets          (E78.5) Hyperlipidemia LDL goal <100  Comment: Due for a lab update  Plan: Lipid panel reflex to direct LDL Non-fasting,         ALT        Patient prefers to have fasting labs done, so he plans to schedule for tomorrow morning      Phone call duration:  8 minutes    Margarito Burns MD

## 2020-05-20 DIAGNOSIS — I10 ESSENTIAL HYPERTENSION WITH GOAL BLOOD PRESSURE LESS THAN 140/90: ICD-10-CM

## 2020-05-20 DIAGNOSIS — R00.0 TACHYCARDIA: ICD-10-CM

## 2020-05-20 DIAGNOSIS — E78.5 HYPERLIPIDEMIA LDL GOAL <100: ICD-10-CM

## 2020-05-20 DIAGNOSIS — E11.42 TYPE 2 DIABETES MELLITUS WITH DIABETIC POLYNEUROPATHY, WITHOUT LONG-TERM CURRENT USE OF INSULIN (H): ICD-10-CM

## 2020-05-20 LAB
ALT SERPL W P-5'-P-CCNC: 44 U/L (ref 0–70)
ANION GAP SERPL CALCULATED.3IONS-SCNC: 7 MMOL/L (ref 3–14)
BUN SERPL-MCNC: 17 MG/DL (ref 7–30)
CALCIUM SERPL-MCNC: 9.7 MG/DL (ref 8.5–10.1)
CHLORIDE SERPL-SCNC: 104 MMOL/L (ref 94–109)
CHOLEST SERPL-MCNC: 138 MG/DL
CO2 SERPL-SCNC: 25 MMOL/L (ref 20–32)
CREAT SERPL-MCNC: 0.59 MG/DL (ref 0.66–1.25)
ERYTHROCYTE [DISTWIDTH] IN BLOOD BY AUTOMATED COUNT: 12.5 % (ref 10–15)
GFR SERPL CREATININE-BSD FRML MDRD: >90 ML/MIN/{1.73_M2}
GLUCOSE SERPL-MCNC: 232 MG/DL (ref 70–99)
HBA1C MFR BLD: 11.4 % (ref 0–5.6)
HCT VFR BLD AUTO: 39.7 % (ref 40–53)
HDLC SERPL-MCNC: 36 MG/DL
HGB BLD-MCNC: 13.2 G/DL (ref 13.3–17.7)
LDLC SERPL CALC-MCNC: 40 MG/DL
MCH RBC QN AUTO: 29.1 PG (ref 26.5–33)
MCHC RBC AUTO-ENTMCNC: 33.2 G/DL (ref 31.5–36.5)
MCV RBC AUTO: 87 FL (ref 78–100)
NONHDLC SERPL-MCNC: 102 MG/DL
PLATELET # BLD AUTO: 218 10E9/L (ref 150–450)
POTASSIUM SERPL-SCNC: 3.7 MMOL/L (ref 3.4–5.3)
RBC # BLD AUTO: 4.54 10E12/L (ref 4.4–5.9)
SODIUM SERPL-SCNC: 136 MMOL/L (ref 133–144)
TRIGL SERPL-MCNC: 308 MG/DL
TSH SERPL DL<=0.005 MIU/L-ACNC: 2.42 MU/L (ref 0.4–4)
WBC # BLD AUTO: 7.5 10E9/L (ref 4–11)

## 2020-05-20 PROCEDURE — 84443 ASSAY THYROID STIM HORMONE: CPT | Performed by: FAMILY MEDICINE

## 2020-05-20 PROCEDURE — 80061 LIPID PANEL: CPT | Performed by: FAMILY MEDICINE

## 2020-05-20 PROCEDURE — 85027 COMPLETE CBC AUTOMATED: CPT | Performed by: FAMILY MEDICINE

## 2020-05-20 PROCEDURE — 36415 COLL VENOUS BLD VENIPUNCTURE: CPT | Performed by: FAMILY MEDICINE

## 2020-05-20 PROCEDURE — 84460 ALANINE AMINO (ALT) (SGPT): CPT | Performed by: FAMILY MEDICINE

## 2020-05-20 PROCEDURE — 83036 HEMOGLOBIN GLYCOSYLATED A1C: CPT | Performed by: FAMILY MEDICINE

## 2020-05-20 PROCEDURE — 80048 BASIC METABOLIC PNL TOTAL CA: CPT | Performed by: FAMILY MEDICINE

## 2020-05-20 RX ORDER — TERAZOSIN 5 MG/1
5 CAPSULE ORAL AT BEDTIME
Qty: 90 CAPSULE | Refills: 0 | Status: SHIPPED | OUTPATIENT
Start: 2020-05-20 | End: 2020-08-11

## 2020-05-20 NOTE — TELEPHONE ENCOUNTER
Routing refill request to provider for review/approval because:  Labs not current:  Creatinine    Flor Beatty RN, [facility] Triage

## 2020-05-22 ENCOUNTER — OFFICE VISIT (OUTPATIENT)
Dept: FAMILY MEDICINE | Facility: CLINIC | Age: 65
End: 2020-05-22
Payer: COMMERCIAL

## 2020-05-22 VITALS
HEIGHT: 68 IN | DIASTOLIC BLOOD PRESSURE: 75 MMHG | BODY MASS INDEX: 32.89 KG/M2 | RESPIRATION RATE: 18 BRPM | TEMPERATURE: 98.2 F | WEIGHT: 217 LBS | HEART RATE: 122 BPM | OXYGEN SATURATION: 97 % | SYSTOLIC BLOOD PRESSURE: 123 MMHG

## 2020-05-22 DIAGNOSIS — I10 ESSENTIAL HYPERTENSION WITH GOAL BLOOD PRESSURE LESS THAN 140/90: ICD-10-CM

## 2020-05-22 DIAGNOSIS — E11.42 TYPE 2 DIABETES MELLITUS WITH DIABETIC POLYNEUROPATHY, WITH LONG-TERM CURRENT USE OF INSULIN (H): ICD-10-CM

## 2020-05-22 DIAGNOSIS — R00.0 INCREASED HEART RATE: ICD-10-CM

## 2020-05-22 DIAGNOSIS — R00.0 SINUS TACHYCARDIA: Primary | ICD-10-CM

## 2020-05-22 DIAGNOSIS — Z79.4 TYPE 2 DIABETES MELLITUS WITH DIABETIC POLYNEUROPATHY, WITH LONG-TERM CURRENT USE OF INSULIN (H): ICD-10-CM

## 2020-05-22 DIAGNOSIS — Z12.5 SCREENING FOR PROSTATE CANCER: ICD-10-CM

## 2020-05-22 DIAGNOSIS — E78.5 HYPERLIPIDEMIA LDL GOAL <100: ICD-10-CM

## 2020-05-22 DIAGNOSIS — R00.0 SINUS TACHYCARDIA: ICD-10-CM

## 2020-05-22 DIAGNOSIS — C34.92 NON-SMALL CELL CARCINOMA OF LUNG, LEFT (H): ICD-10-CM

## 2020-05-22 PROCEDURE — 93000 ELECTROCARDIOGRAM COMPLETE: CPT | Performed by: FAMILY MEDICINE

## 2020-05-22 PROCEDURE — 99214 OFFICE O/P EST MOD 30 MIN: CPT | Performed by: FAMILY MEDICINE

## 2020-05-22 ASSESSMENT — MIFFLIN-ST. JEOR: SCORE: 1733.81

## 2020-05-22 ASSESSMENT — PAIN SCALES - GENERAL: PAINLEVEL: NO PAIN (0)

## 2020-05-22 NOTE — PATIENT INSTRUCTIONS
Patient Education     Oral Medicines for Type 2 Diabetes  Diabetes pills can help to manage your blood sugar. These pills are not insulin. They work to manage your blood sugar in a few ways. You may be given a combination of medicines. Always follow your healthcare provider's instructions.  Some pills may put you at greater risk for low blood sugar (hypoglycemia). Watch for symptoms of low blood sugar (see below). Call your provider if low blood sugar happens often.  Type of diabetes pills  Biguanides  These pills help control the amount of sugar in your blood. They decrease the amount of sugar your liver makes. And they help your muscles use insulin better. You often take these medicines with or after each meal. Possible side effects and other problems include:    Diarrhea    Nausea    Vomiting    Belly (abdominal) bloating    A lot of gas    Metallic taste in mouth    Lower blood vitamin B12 levels from decreased absorption from the gastrointestinal tract of this vitamin  Have your vitamin B-12 levels checked often if you use these pills for a long time. This is even more important if you have anemia or peripheral neuropathy.  Sulfonylureas  These pills help your body make more insulin. They are taken 30 minutes before a meal. Possible side effects include low blood sugar.  Alpha-glucosidase inhibitors  These pills slow the digestion of sugars and starches. They can help keep your blood sugar from going too high after a meal. Take them with the first bite of each main meal. Possible side effects include:    Stomach pain    Diarrhea    A lot of gas  Thiazolidinediones  These pills help your muscle cells use insulin better. Your healthcare provider may order lab tests to check your liver before prescribing these pills. He or she will also check it often while you are taking them. Possible side effects include:     Weight gain    Extra fluid in your body and swelling    Higher risk for heart failure    Osteoporosis  and higher risk for broken bones  Meglitinides  These pills increase your insulin for a short time. You take them before meals. Possible side effects include:    Low blood sugar    Diarrhea    Headache     Slightly raised risk for heart problems  DPP-4 inhibitors  These pills help lower blood sugar levels in people with type 2 diabetes. They are less likely to cause low blood sugar, unless you take them with a sulfonylurea. You take them once a day. Possible side effects include:    Upper respiratory tract infection    Stuffy or runny nose    Sore throat    Headache  Other side effects are under study.  SGLT-2 inhibitors  These pills help lower blood sugar levels in people with type 2 diabetes. They raise the amount of sugar that leaks into the urine. Possible side effects include:    Urinary tract infections    Genital fungal infections, especially in women    Dehydration    Low blood pressure    Increased bone fractures    Keotacidosis while blood sugar is only mildly raised above the target range  The FDA has issued a safety warning for the SGLT-2 inhibitor canagliflozin. Recent studies have shown that this medicine increases the risk of leg and foot amputations. If you are taking this medicine, tell your healthcare provider right away if you have any new pain or tenderness, sores, or infections in your legs or feet. Talk with your provider before stopping any diabetes medicine.   Dopamine D2 receptor agonist (bromocriptine mesylate)  These pills help lower blood sugar levels in people with type 2 diabetes. Possible side effects of this medicine include:    Nausea    Vomiting    Feeling tired and weak (fatigue)    Dizziness    Headaches  Combination pills  These medicines may help keep your blood sugar in your target range. They also help your pancreas make more insulin. And they may help your muscles use insulin better. Side effects depend on which type of combination you use. Your healthcare provider can tell  "you more.  Watch for symptoms of low blood sugar  Symptoms include:    Headaches    Shakiness or dizziness    Hunger    Cold, clammy skin    Sweating    A hard, fast heartbeat    Confusion or irritability  If you think your blood sugar is low, check a blood sample with a meter. If the level is low, eat one of the \"quick fix\" foods below. They can help raise your blood sugar:     3 to 4 glucose tablets    1 serving of glucose gel    1/2 cup (4 ounces) of any fruit juice    1/2 cup (4 ounces) of regular (not diet) soda    1 cup (8 ounces) of milk    1 tbsp of honey or sugar    5 to 6 pieces of hard candy?  Recheck your blood sugar in 15 minutes. If it is still low, eat another serving. If it stays low after the second snack, seek medical care.     Date Last Reviewed: 6/1/2016 2000-2019 [employer]. [address] [place] [address]. All rights reserved. This information is not intended as a substitute for professional medical care. Always follow your healthcare professional's instructions.           Patient Education     Understanding Tachycardia    The heart has an electrical system that sends signals to control the heartbeat. Any abnormal change in the speed or pattern of the heartbeat is called an arrhythmia. If you have an arrhythmia that causes the heart to beat faster than normal, this is known as tachycardia. There are many types of tachycardia. They can affect the heart s upper chambers (atria), the heart s lower chambers (ventricles), or both.  What causes tachycardia?  Many things can cause tachycardia, including:    Damage to heart tissue from heart disease, past heart attack, or heart surgery    Abnormal electrical pathways in the heart    Problems with the heart s structure that you are born with     High blood pressure    Overactive thyroid    Use of certain medicines    Severe blood loss or anemia    Dehydration    Severe stress, fear, or anxiety    Smoking    Too much alcohol " or caffeine    Abuse of certain street drugs, such as cocaine    Infections    Certain inflammatory conditions    Chronic  pain syndromes  What are the symptoms of tachycardia?  Tachycardia can cause a fast, pounding, or irregular heartbeat. It can also make it harder for the heart to pump blood efficiently to the body. This may cause symptoms such as:    Shortness of breath    Tiredness    Dizziness or fainting    Chest pain  Some people with tachycardia may have no symptoms at all.  How is tachycardiatreated?  Treatment for tachycardia depends on the cause. It also depends on the type you have and how severe your symptoms are. Tachycardia in the ventricles is often more serious than in the atria. For this reason, it may need to be treated right away. Possible treatments include:    Treating the underlying cause. For instance, if a medicine is causing your tachycardia, changing the dosage or stopping the medicine with your doctor s guidance may correct the problem.    Lifestyle changes. These include getting enough sleep and reducing stress. They also include avoiding caffeine, alcohol, tobacco, and street drugs.    Vagal maneuvers.These are techniques that may help interrupt a fast heartbeat and slow it down. One example is to take a deep breath and bear down hard while holding your breath.     Medicines. These may be used to help slow down a fast heartbeat. They may also be used to regulate the pattern of the heartbeat.    Electrical cardioversion. Special pads or paddles are used to send one or more brief  electrical shocks to the heart. This can help restore the heartbeat to normal.    Ablation. A long thin tube called a catheter is inserted into a blood vessel and threaded to the heart. The catheter sends out hot or cold energy to the areas causing abnormal signals. This destroys the problem tissue or cells. This may stop certain types of tachycardia.    Pacemaker. This is a device that is placed just under  the skin in the chest. It sends paced signals to make the heart beat at a more normal rate and rhythm. You may need this when certain medicines that treat tachycardia also result in a slow heart rate.      Implantable cardioverter defibrillator (ICD). This is a device that is placed just under the skin in the chest or armpit. The ICD monitors your heart rate. When needed, it sends controlled burst of signals to the heart to overdrive a tachycardia.  It can also send a single stronger shock to the heart to stop a life-threatening type of tachycardia, if needed.    Surgery. During surgery, different techniques may be used to create scar tissue in the areas of the heart causing abnormal signals. This may help stop certain types of tachycardia.  What are the complications of tachycardia?  These can include:    Blood clots or stroke    Heart failure. With this problem, the heart muscle is so weak it no longer pumps blood well.    Fainting    Sudden cardiac arrest. This is when the heart suddenly stops beating.  When should I call my healthcare provider?  Call your healthcare provider right away if you have any of these:    Symptoms that don t get better with treatment, or get worse    New symptoms  Date Last Reviewed: 5/1/2016 2000-2019 [employer]. [address] [place] [address], [place] [address]. All rights reserved. This information is not intended as a substitute for professional medical care. Always follow your healthcare professional's instructions.

## 2020-05-22 NOTE — PROGRESS NOTES
Subjective     Sven Givens is a 65 year old male who presents to clinic today for the following health issues:    HPI   Hypertension Follow-up      Do you check your blood pressure regularly outside of the clinic? Yes     Are you following a low salt diet? No    Are your blood pressures ever more than 140 on the top number (systolic) OR more   than 90 on the bottom number (diastolic), for example 140/90? No 135/80  Heart rate has been elevated for months  -over 100  -at night pulse is 60-70    How many servings of fruits and vegetables do you eat daily?  2-3    On average, how many sweetened beverages do you drink each day (Examples: soda, juice, sweet tea, etc.  Do NOT count diet or artificially sweetened beverages)?   1    How many days per week do you exercise enough to make your heart beat faster? 3 or less    How many minutes a day do you exercise enough to make your heart beat faster? 10 - 19    How many days per week do you miss taking your medication? 0    Diabetes Follow-up    How often are you checking your blood sugar? One time daily  What time of day are you checking your blood sugars (select all that apply)?  Before meals  Have you had any blood sugars above 200?  No  Have you had any blood sugars below 70?  No    What symptoms do you notice when your blood sugar is low?  None    What concerns do you have today about your diabetes? None     Do you have any of these symptoms? (Select all that apply)  No numbness or tingling in feet.  No redness, sores or blisters on feet.  No complaints of excessive thirst.  No reports of blurry vision.  No significant changes to weight.      BP Readings from Last 2 Encounters:   05/22/20 123/75   05/18/20 126/76     Hemoglobin A1C (%)   Date Value   05/20/2020 11.4 (H)   12/12/2019 9.3 (H)     LDL Cholesterol Calculated (mg/dL)   Date Value   05/20/2020 40   03/08/2019 34           {Reference  Diabetes Log - 7 days :488924}    Hyperlipidemia Follow-Up      Are you  regularly taking any medication or supplement to lower your cholesterol?   Yes- lipitor    Are you having muscle aches or other side effects that you think could be caused by your cholesterol lowering medication?  No      Patient Active Problem List   Diagnosis     Advanced directives, counseling/discussion     Essential hypertension with goal blood pressure less than 140/90     History of adenomatous polyp of colon     GERD (gastroesophageal reflux disease)     Anemia     Hyperlipidemia LDL goal <100     Gout     Type 2 diabetes mellitus with diabetic polyneuropathy, with long-term current use of insulin (H)     History of radiation exposure     Non-small cell carcinoma of lung, left (H)     Nonalcoholic hepatosteatosis     PRESLEY (obstructive sleep apnea)     Past Surgical History:   Procedure Laterality Date     BRONCHOSCOPY FLEXIBLE N/A 9/23/2016    Procedure: BRONCHOSCOPY FLEXIBLE;  Surgeon: Gayle Moya MD;  Location: [facility]     COLONOSCOPY  5-03     COLONOSCOPY WITH CO2 INSUFFLATION N/A 5/31/2017    Procedure: COLONOSCOPY WITH CO2 INSUFFLATION;  COLON SCREEN/ SEB;  Surgeon: Margarito Rasheed DO;  Location: [facility]     COMBINED ESOPHAGOSCOPY, GASTROSCOPY, DUODENOSCOPY (EGD) WITH CO2 INSUFFLATION N/A 4/19/2019    Procedure: COMBINED ESOPHAGOSCOPY, GASTROSCOPY, DUODENOSCOPY (EGD) WITH CO2 INSUFFLATION;  Surgeon: Abbe Mccarty MD;  Location: [facility]     ESOPHAGOSCOPY, GASTROSCOPY, DUODENOSCOPY (EGD), COMBINED N/A 4/19/2019    Procedure: Combined Esophagoscopy, Gastroscopy, Duodenoscopy (Egd), Biopsy Single Or Multiple;  Surgeon: Abbe Mccarty MD;  Location: [facility]     GENITOURINARY SURGERY      kidney stones     THORACOSCOPIC WEDGE RESECTION LUNG Left 9/23/2016    Procedure: THORACOSCOPIC WEDGE RESECTION LUNG;  Surgeon: Gayle Moya MD;  Location: [facility]     VASCULAR SURGERY      varicose vein       Social History     Tobacco Use     Smoking status: Former Smoker     Last attempt to  quit: 1992     Years since quittin.3     Smokeless tobacco: Never Used     Tobacco comment: 15 + years    Substance Use Topics     Alcohol use: No     Family History   Problem Relation Age of Onset     Cerebrovascular Disease Father      Cancer Sister         roberto          Current Outpatient Medications   Medication Sig Dispense Refill     albuterol (PROAIR HFA) 108 (90 Base) MCG/ACT inhaler Inhale 2 puffs into the lungs every 4 hours as needed for shortness of breath / dyspnea or wheezing 8.5 Inhaler 3     allopurinol (ZYLOPRIM) 300 MG tablet TAKE 1 TABLET BY MOUTH EVERY DAY 90 tablet 3     ASPIRIN 81 MG PO TABS 1 TABLET DAILY(*)       atorvastatin (LIPITOR) 40 MG tablet TAKE 1 TABLET BY MOUTH EVERY DAY 90 tablet 0     Azelaic Acid (FINACEA) 15 % gel Apply small amount twice a day to skin 50 g 3     capsaicin (ZOSTRIX) 0.025 % CREA cream Apply 1 g topically 3 times daily 60 g 3     CINNAMON 500 MG OR TABS Take one tablet twice daily.       FISH OIL 1000 MG OR CAPS daily       fluticasone (FLONASE) 50 MCG/ACT nasal spray Spray 1-2 sprays into both nostrils daily 1 Bottle 1     glimepiride (AMARYL) 4 MG tablet Take 1 tablet (4 mg) by mouth 2 times daily Please follow up in clinic or virtual visit for next refill. Labs due. 60 tablet 0     hydrochlorothiazide (HYDRODIURIL) 25 MG tablet Take 1 tablet (25 mg) by mouth daily for blood pressure. 90 tablet 0     losartan (COZAAR) 100 MG tablet Take 1 tablet (100 mg) by mouth daily for blood pressure. 90 tablet 0     metFORMIN (GLUCOPHAGE) 1000 MG tablet Take 1 tablet (1,000 mg) by mouth 2 times daily (with meals) Please follow up in clinic or virtual visit for next refill. Labs due. 60 tablet 3     Metoprolol Succinate 25 MG CS24 Take 25 mg by mouth daily 90 capsule 3     metroNIDAZOLE 0.75 % EX external gel Apply topically 2 times daily 45 g 3     MULTIPLE VITAMINS OR 1 a day       omeprazole (PRILOSEC) 20 MG DR capsule TAKE 1 CAPSULE (20 MG) BY MOUTH DAILY  "TAKE 30-60 MINUTES BEFORE A MEAL. 90 capsule 0     ONETOUCH ULTRA test strip USE TO CHECK BLOOD SUGARS TWICE A DAY. 200 strip 0     Oxymetazoline HCl (AFRIN NASAL SPRAY NA) Spray in nostril as needed       SENNA-PLUS 8.6-50 MG tablet TAKE 1 TABLET BY MOUTH EVERYDAY AT BEDTIME 90 tablet 2     terazosin (HYTRIN) 5 MG capsule TAKE 1 CAPSULE (5 MG) BY MOUTH AT BEDTIME 90 capsule 0     Allergies   Allergen Reactions     Penicillins Rash     Ace Inhibitors Cough     Indomethacin Other (See Comments)     Severe dizziness     Invokana [Canagliflozin]      Blood infection     Recent Labs   Lab Test 05/20/20  1229 12/12/19  0734 07/16/19  1139 03/08/19  0926 10/16/18  1455  04/16/18  0834   A1C 11.4* 9.3* 10.1* 9.9*  --   --  8.5*   LDL 40  --   --  34  --   --  51   HDL 36*  --   --  35*  --   --  31*   TRIG 308*  --   --  279*  --   --  216*   ALT 44  --   --  47 45  --  54   CR 0.59*  --  0.58* 0.71  --   --  0.70   GFRESTIMATED >90  --  >90 >90  --    < > >90   GFRESTBLACK >90  --  >90 >90  --    < > >90   POTASSIUM 3.7  --  3.9 4.1  --   --  3.9   TSH 2.42  --   --   --   --   --  2.91    < > = values in this interval not displayed.      BP Readings from Last 3 Encounters:   05/22/20 123/75   05/18/20 126/76   12/12/19 138/76    Wt Readings from Last 3 Encounters:   05/22/20 98.4 kg (217 lb)   12/12/19 98.4 kg (217 lb)   09/27/19 98.1 kg (216 lb 3.2 oz)                      Reviewed and updated as needed this visit by Provider         Review of Systems   Constitutional, HEENT, cardiovascular, pulmonary, gi and gu systems are negative, except as otherwise noted.      Objective    /75 (BP Location: Right arm, Patient Position: Sitting, Cuff Size: Adult Large)   Pulse 122   Temp 98.2  F (36.8  C) (Oral)   Resp 18   Ht 1.727 m (5' 8\")   Wt 98.4 kg (217 lb)   SpO2 97%   BMI 32.99 kg/m    Body mass index is 32.99 kg/m .  Physical Exam   GENERAL APPEARANCE: healthy, alert and no distress  EYES: Eyes grossly normal " to inspection, PERRL and conjunctivae and sclerae normal  HENT: ear canals and TM's normal, nose and mouth without ulcers or lesions, oropharynx clear and oral mucous membranes moist  NECK: no adenopathy, no asymmetry, masses, or scars and thyroid normal to palpation  RESP: lungs clear to auscultation - no rales, rhonchi or wheezes  CV: regular rate and rhythm but fast rate, normal S1 S2, no S3 or S4, no murmur, click or rub, no peripheral edema and peripheral pulses strong  ABDOMEN: soft, nontender, no hepatosplenomegaly, no masses and bowel sounds normal  MS: no musculoskeletal defects are noted and gait is age appropriate without ataxia  SKIN: no suspicious lesions or rashes  NEURO: Normal strength and tone, sensory exam grossly normal, mentation intact and speech normal  PSYCH: mentation appears normal and affect normal/bright somewhat anxious    Diagnostic Test Results:  Labs reviewed in Epic  No results found for this or any previous visit (from the past 24 hour(s)).        Assessment & Plan       ICD-10-CM    1. Sinus tachycardia - may be due to elevated blood sugars and deconditioning. No signs of infection or other issues today. Normal thyroid testing. Will check echocardiogram and start metoprolol. If blood pressure too low or dizziness, may need to hold or discontinue hydrochlorothiazide  R00.0 Echocardiogram Complete     Metoprolol Succinate 25 MG CS24     CARDIOLOGY EVAL ADULT REFERRAL   2. Type 2 diabetes mellitus with diabetic polyneuropathy, with long-term current use of insulin (H)  E11.42 Poor control and will look into medication options. Does not want to go on inuslin.     Z79.4    3. Increased heart rate  R00.0 EKG 12-lead complete w/read - Clinics   4. Non-small cell carcinoma of lung, left (H)  C34.92 No signs of recurrence and follow up with oncology.   5. Essential hypertension with goal blood pressure less than 140/90  I10 Metoprolol Succinate 25 MG CS24     CARDIOLOGY EVAL ADULT REFERRAL    6. Hyperlipidemia LDL goal <100  E78.5 stable   7. Screening for prostate cancer  Z12.5 PROSTATE SPEC ANTIGEN SCREEN- repeat screen.           FURTHER TESTING:       - echocardiogram  CONSULTATION/REFERRAL to cardiology  FUTURE LABS:       - Schedule fasting labs in 3 months  FUTURE APPOINTMENTS:       - Follow-up visit in 2-3 months or sooner if any questions or concerns.   Work on weight loss  Regular exercise  See Patient Instructions    No follow-ups on file.    Pam Dela Cruz MD  [facility]

## 2020-05-23 DIAGNOSIS — E11.42 TYPE 2 DIABETES MELLITUS WITH DIABETIC POLYNEUROPATHY, WITHOUT LONG-TERM CURRENT USE OF INSULIN (H): Chronic | ICD-10-CM

## 2020-05-27 RX ORDER — METOPROLOL SUCCINATE 25 MG/1
25 TABLET, EXTENDED RELEASE ORAL DAILY
Qty: 90 TABLET | Refills: 3 | Status: SHIPPED | OUTPATIENT
Start: 2020-05-27 | End: 2020-11-02

## 2020-05-27 RX ORDER — GLIMEPIRIDE 4 MG/1
TABLET ORAL
Qty: 180 TABLET | Refills: 3 | Status: SHIPPED | OUTPATIENT
Start: 2020-05-27 | End: 2021-05-13

## 2020-05-27 NOTE — TELEPHONE ENCOUNTER
Routing refill request to provider for review/approval because:  Provider to change to tablets.    Flor Beatty RN, [facility] Triage

## 2020-05-27 NOTE — TELEPHONE ENCOUNTER
Routing refill request to provider for review/approval because:  Labs not current:  Creatinine    Flor Beatty RN, [facility] Triage

## 2020-05-27 NOTE — TELEPHONE ENCOUNTER
Reason for Call:  Other prescription    Detailed comments: Pt was prescribed metoprolol capsules and they are not available and would like to see if Pt could switch to metoprolol tablets.     Phone Number Pharmacy  can be reached at: Other phone number:  [phone]    Best Time: anytime    Can we leave a detailed message on this number? YES    Call taken on 5/27/2020 at 1:31 PM by Amadeo Krishnan          Pt was prescribed metoprolol capsules and they are not available and would like to see if Pt could switch to metoprolol tablets.

## 2020-06-01 ENCOUNTER — ANCILLARY PROCEDURE (OUTPATIENT)
Dept: CARDIOLOGY | Facility: CLINIC | Age: 65
End: 2020-06-01
Attending: FAMILY MEDICINE
Payer: COMMERCIAL

## 2020-06-01 ENCOUNTER — MYC MEDICAL ADVICE (OUTPATIENT)
Dept: FAMILY MEDICINE | Facility: CLINIC | Age: 65
End: 2020-06-01

## 2020-06-01 DIAGNOSIS — R00.0 SINUS TACHYCARDIA: ICD-10-CM

## 2020-06-01 PROCEDURE — 93306 TTE W/DOPPLER COMPLETE: CPT | Performed by: INTERNAL MEDICINE

## 2020-06-01 NOTE — RESULT ENCOUNTER NOTE
Dear Sven    Your test results are attached. I am happy to let you know that they are stable.    The echocardiogram is completely normal. How is your heart rate on metoprolol?    Please contact me by Abigail Stewartt if you have any questions about your labs or management. You may also call my office number [phone] for any questions.     Pam Dela Cruz MD

## 2020-06-02 ENCOUNTER — VIRTUAL VISIT (OUTPATIENT)
Dept: FAMILY MEDICINE | Facility: CLINIC | Age: 65
End: 2020-06-02
Payer: COMMERCIAL

## 2020-06-02 VITALS — HEART RATE: 87 BPM | SYSTOLIC BLOOD PRESSURE: 160 MMHG | DIASTOLIC BLOOD PRESSURE: 89 MMHG

## 2020-06-02 DIAGNOSIS — R00.0 TACHYCARDIA: Primary | ICD-10-CM

## 2020-06-02 PROCEDURE — 99213 OFFICE O/P EST LOW 20 MIN: CPT | Mod: 95 | Performed by: NURSE PRACTITIONER

## 2020-06-02 NOTE — TELEPHONE ENCOUNTER
"S-(situation): called and spoke with patient on the phone regarding his symptoms reported in My Chart message sen yesterday evening. He reports side effects after starting metoprolol 3 days ago.     B-(background): patient had OV with PCP on 5/22/20 . At that visit PCP prescribed metoprolol. Patient actually started the medication 3 days ago though due to issues with mail order pharmacy delay in shipping.     A-(assessment): patient has an apple watch in which he monitors his heart rated during elevated heart rate symptoms. The past few days about 10 min after taking the Rx patient can feel a drastic change in his heart rate. When he is sleeping his heart rate runs 70-80 . But when awake it runs around 100 bpm and has the highest noted heart rate at 128 bpm. During the elevated heart rate he can feel a sensation like his  \"heart is  jumping out of his chest.\" . Soon after taking the Rx he also feels very dizzy but the dizziness does not last very long.       Negative for following symptoms: faintness, n/v, chest pain, breathing problems.       R-(recommendations): phone visit in next hour or less and patient is agreeable. Writer assisted him in scheduling phone apt in next 15 min with provider.    Ana Shrestha RN      "

## 2020-06-02 NOTE — PROGRESS NOTES
"Sven Givens is a 65 year old male who is being evaluated via a billable telephone visit.      The patient has been notified of following:     \"This telephone visit will be conducted via a call between you and your physician/provider. We have found that certain health care needs can be provided without the need for a physical exam.  This service lets us provide the care you need with a short phone conversation.  If a prescription is necessary we can send it directly to your pharmacy.  If lab work is needed we can place an order for that and you can then stop by our lab to have the test done at a later time.    Telephone visits are billed at different rates depending on your insurance coverage. During this emergency period, for some insurers they may be billed the same as an in-person visit.  Please reach out to your insurance provider with any questions.    If during the course of the call the physician/provider feels a telephone visit is not appropriate, you will not be charged for this service.\"    Patient has given verbal consent for Telephone visit?  Yes    What phone number would you like to be contacted at? [phone]    How would you like to obtain your AVS? Clemencia    Subjective     Sven Givens is a 65 year old male who presents via phone visit today for the following health issues:    HPI     Concern - Elevated Heart Rate  Onset: 3 days ago    Description:   Pt states he started his BP meds 3 days ago and noticed an increase of his heart rate      Therapies Tried and outcome: none      Pleasant 65-year-old male initiated a virtual visit with concerns for ongoing elevated heart rate.  He reports that he started the metoprolol 3 days ago and has not noticed any difference.  Last week when he first started the medication he had some chest discomfort about 10 minutes after taking the medication.  He reports that has not happened again since.  His blood pressure this morning was 143/79 and heart rate " 81.  He was referred to cardiology but has not yet made an appointment.  He last saw a cardiologist 4 years ago.  He denies shortness of breath.  No chest pain in the last few days but notes sometimes he gets discomfort with exertion.  Symptoms for couple months now.  He reports he cannot come to clinic today due to scheduling conflicts.  He states he really just wanted us to know he does not feel much different with his heart rate with the metoprolol.  He denies any sort of side effects.  He had an echo yesterday which was normal. No nausea or vomiting. No abdominal pain. Pain didn't radiate when it was present.        Patient Active Problem List   Diagnosis     Advanced directives, counseling/discussion     Essential hypertension with goal blood pressure less than 140/90     History of adenomatous polyp of colon     GERD (gastroesophageal reflux disease)     Anemia     Hyperlipidemia LDL goal <100     Gout     Type 2 diabetes mellitus with diabetic polyneuropathy, with long-term current use of insulin (H)     History of radiation exposure     Non-small cell carcinoma of lung, left (H)     Nonalcoholic hepatosteatosis     PRESLEY (obstructive sleep apnea)     Past Surgical History:   Procedure Laterality Date     BRONCHOSCOPY FLEXIBLE N/A 9/23/2016    Procedure: BRONCHOSCOPY FLEXIBLE;  Surgeon: Gayle Moya MD;  Location: [facility] OR     COLONOSCOPY  5-03     COLONOSCOPY WITH CO2 INSUFFLATION N/A 5/31/2017    Procedure: COLONOSCOPY WITH CO2 INSUFFLATION;  COLON SCREEN/ SEB;  Surgeon: Margarito Rasheed DO;  Location:  OR     COMBINED ESOPHAGOSCOPY, GASTROSCOPY, DUODENOSCOPY (EGD) WITH CO2 INSUFFLATION N/A 4/19/2019    Procedure: COMBINED ESOPHAGOSCOPY, GASTROSCOPY, DUODENOSCOPY (EGD) WITH CO2 INSUFFLATION;  Surgeon: Abbe Mccarty MD;  Location:  OR     ESOPHAGOSCOPY, GASTROSCOPY, DUODENOSCOPY (EGD), COMBINED N/A 4/19/2019    Procedure: Combined Esophagoscopy, Gastroscopy, Duodenoscopy (Egd), Biopsy  Single Or Multiple;  Surgeon: Abbe Mccarty MD;  Location: [facility] OR     GENITOURINARY SURGERY      kidney stones     THORACOSCOPIC WEDGE RESECTION LUNG Left 2016    Procedure: THORACOSCOPIC WEDGE RESECTION LUNG;  Surgeon: Gayle Moya MD;  Location: [facility] OR     VASCULAR SURGERY      varicose vein       Social History     Tobacco Use     Smoking status: Former Smoker     Last attempt to quit: 1992     Years since quittin.3     Smokeless tobacco: Never Used     Tobacco comment: 15 + years    Substance Use Topics     Alcohol use: No     Family History   Problem Relation Age of Onset     Cerebrovascular Disease Father      Cancer Sister         ovadarlene          Current Outpatient Medications   Medication Sig Dispense Refill     albuterol (PROAIR HFA) 108 (90 Base) MCG/ACT inhaler Inhale 2 puffs into the lungs every 4 hours as needed for shortness of breath / dyspnea or wheezing 8.5 Inhaler 3     allopurinol (ZYLOPRIM) 300 MG tablet TAKE 1 TABLET BY MOUTH EVERY DAY 90 tablet 3     ASPIRIN 81 MG PO TABS 1 TABLET DAILY(*)       atorvastatin (LIPITOR) 40 MG tablet TAKE 1 TABLET BY MOUTH EVERY DAY 90 tablet 0     Azelaic Acid (FINACEA) 15 % gel Apply small amount twice a day to skin 50 g 3     capsaicin (ZOSTRIX) 0.025 % CREA cream Apply 1 g topically 3 times daily 60 g 3     CINNAMON 500 MG OR TABS Take one tablet twice daily.       FISH OIL 1000 MG OR CAPS daily       fluticasone (FLONASE) 50 MCG/ACT nasal spray Spray 1-2 sprays into both nostrils daily 1 Bottle 1     glimepiride (AMARYL) 4 MG tablet TAKE 1 TABLET BY MOUTH TWICE A  tablet 3     hydrochlorothiazide (HYDRODIURIL) 25 MG tablet Take 1 tablet (25 mg) by mouth daily for blood pressure. 90 tablet 0     losartan (COZAAR) 100 MG tablet Take 1 tablet (100 mg) by mouth daily for blood pressure. 90 tablet 0     metFORMIN (GLUCOPHAGE) 1000 MG tablet Take 1 tablet (1,000 mg) by mouth 2 times daily (with meals) Please follow up in clinic  or virtual visit for next refill. Labs due. 60 tablet 3     Metoprolol Succinate 25 MG CS24 Take 25 mg by mouth daily 90 capsule 3     metoprolol succinate ER (TOPROL-XL) 25 MG 24 hr tablet Take 1 tablet (25 mg) by mouth daily 90 tablet 3     metroNIDAZOLE 0.75 % EX external gel Apply topically 2 times daily 45 g 3     MULTIPLE VITAMINS OR 1 a day       omeprazole (PRILOSEC) 20 MG DR capsule TAKE 1 CAPSULE (20 MG) BY MOUTH DAILY TAKE 30-60 MINUTES BEFORE A MEAL. 90 capsule 0     ONETOUCH ULTRA test strip USE TO CHECK BLOOD SUGARS TWICE A DAY. 200 strip 0     Oxymetazoline HCl (AFRIN NASAL SPRAY NA) Spray in nostril as needed       SENNA-PLUS 8.6-50 MG tablet TAKE 1 TABLET BY MOUTH EVERYDAY AT BEDTIME 90 tablet 2     terazosin (HYTRIN) 5 MG capsule TAKE 1 CAPSULE (5 MG) BY MOUTH AT BEDTIME 90 capsule 0     Allergies   Allergen Reactions     Penicillins Rash     Ace Inhibitors Cough     Indomethacin Other (See Comments)     Severe dizziness     Invokana [Canagliflozin]      Blood infection       Reviewed and updated as needed this visit by Provider         Review of Systems   Constitutional, HEENT, cardiovascular, pulmonary, GI, , musculoskeletal, neuro, skin, endocrine and psych systems are negative, except as otherwise noted.       Objective   Reported vitals:  BP (!) 160/89   Pulse 87    healthy, alert and no distress  PSYCH: Alert and oriented times 3; coherent speech, normal   rate and volume, able to articulate logical thoughts, able   to abstract reason, no tangential thoughts, no hallucinations   or delusions  His affect is normal  RESP: No cough, no audible wheezing, able to talk in full sentences  Remainder of exam unable to be completed due to telephone visits    Diagnostic Test Results:  Labs reviewed in Epic        Assessment/Plan:  1. Tachycardia  He called today to report that his symptoms are not changed since starting the metoprolol 3 days ago. I offered a clinic visit today but he declined due to  scheduling conflicts with his work. I reiterated that he needs to follow up with cardiology for further evaluation. I also recommend emergency department if any chest pain/pressure. He verbalized understanding and agreed to call cardiology when we got off the phone.       Return in about 2 days (around 6/4/2020).     Return precautions discussed, including when to seek urgent/emergent care.    Patient verbalizes understanding and agrees with plan of care.         Phone call duration:  11 minutes (9:24-9:35)    JESIKA Zayas CNP

## 2020-06-03 ENCOUNTER — ALLIED HEALTH/NURSE VISIT (OUTPATIENT)
Dept: PHARMACY | Facility: CLINIC | Age: 65
End: 2020-06-03
Payer: COMMERCIAL

## 2020-06-03 DIAGNOSIS — M1A.0710 IDIOPATHIC CHRONIC GOUT OF RIGHT FOOT WITHOUT TOPHUS: ICD-10-CM

## 2020-06-03 DIAGNOSIS — E78.5 HYPERLIPIDEMIA LDL GOAL <100: ICD-10-CM

## 2020-06-03 DIAGNOSIS — E11.42 TYPE 2 DIABETES MELLITUS WITH DIABETIC POLYNEUROPATHY, WITHOUT LONG-TERM CURRENT USE OF INSULIN (H): ICD-10-CM

## 2020-06-03 DIAGNOSIS — C34.92 NON-SMALL CELL CARCINOMA OF LUNG, LEFT (H): ICD-10-CM

## 2020-06-03 DIAGNOSIS — I10 ESSENTIAL HYPERTENSION WITH GOAL BLOOD PRESSURE LESS THAN 140/90: Primary | ICD-10-CM

## 2020-06-03 PROCEDURE — 99607 MTMS BY PHARM ADDL 15 MIN: CPT | Performed by: PHARMACIST

## 2020-06-03 PROCEDURE — 99605 MTMS BY PHARM NP 15 MIN: CPT | Performed by: PHARMACIST

## 2020-06-03 RX ORDER — SEMAGLUTIDE 1.34 MG/ML
INJECTION, SOLUTION SUBCUTANEOUS
Qty: 1.5 ML | Refills: 2 | Status: SHIPPED | OUTPATIENT
Start: 2020-06-03 | End: 2020-06-10

## 2020-06-03 NOTE — PATIENT INSTRUCTIONS
Recommendations from today's MTM visit:                                                    MTM (medication therapy management) is a service provided by a clinical pharmacist designed to help you get the most of out of your medicines.   Today we reviewed what your medicines are for, how to know if they are working, that your medicines are safe and how to make your medicine regimen as easy as possible.     1. Fish oil increase to two tablets (2000 mg) twice daily    2. Increase atorvastatin to a full tablet, 40 mg daily.    3. Test your blood sugar twice daily, right away in the morning (goal blood sugars are  mg/dL) and two hours after lunch (goal blood sugars are less than 180 mg/dL).    4. Start Ozempic 0.25 mg weekly.    Video link on how to use Ozempic:   https://www.[employer]/how-to-use/the-ozempic-pen.html#ifu-video     It was great to speak with you today.  I value your experience and would be very thankful for your time with providing feedback on our clinic survey. You may receive a survey via email or text message in the next few days.     Next MTM visit: 3 weeks    To schedule another MTM appointment, please call the clinic directly or you may call the MTM scheduling line at [phone] or toll-free at [phone].     My Clinical Pharmacist's contact information:                                                      It was a pleasure talking with you today!  Please feel free to contact me with any questions or concerns you have.      Quiana Mckeon, PharmD  Medication Therapy Management Pharmacist  [phone]

## 2020-06-03 NOTE — Clinical Note
DENILSON HELM note, thanks!    Quiana Mckeon, PharmD  Medication Therapy Management Pharmacist  [phone]

## 2020-06-03 NOTE — PROGRESS NOTES
MTM ENCOUNTER  SUBJECTIVE/OBJECTIVE:                           Sven Givens is a 65 year old male called for an initial visit. He was referred to me from Pam Dela Cruz for difficult to manage diabetes. would like to try options other than insulin.     Patient consented to a telehealth visit: yes  Telemedicine Visit Details  Type of service:  Telephone visit  Start Time: 9:02 AM  End Time: 9:57  Originating Location (pt. Location): Home  Distant Location (provider location):  [facility] MTM  Mode of Communication:  Telephone    Chief Complaint: diabetes.  Personal Healthcare Goals: He'd like to avoid insulin, last option    Allergies/ADRs: Reviewed in EHR  Tobacco:  reports that he quit smoking about 28 years ago. He has never used smokeless tobacco.  Alcohol: none  Caffeine: 1 cups/day of coffee, 0-1 cups/day of tea  Activity: difficult to walk, used to swim in pool often, this has been closed since March, now occasionally lifting weight at home  PMH: Reviewed in EHR    Medication Adherence/Access:   Patient uses pill box(es).  Patient takes medications 2 time(s) per day.   Per patient, misses medication 0 times per week, rarely.   The patient fills medications at [place]: NO, fills medications at [facility] [place].    Type 2 Diabetes:   Glimepiride 4 mg twice daily  Metformin 1000 mg twice daily  Cinnamon 1000 mg twice daily       He'd like to avoid insulin.   Hx:Bladder infection with Invokana in 2019, saw urolgist for this.  Pt is not experiencing side effects.  SMB-1 time(s) daily, checks when he's exhausted Ranges (patient reported): 200-300  Symptoms of low blood sugar? Dizzy, one time, long time ago, treats with juice or sugar tablets, BG was 85  Symptoms of high blood sugar? polydipsia and fatigue, lost feeling/pain in feet  Diet/Exercise: Eats 4x/day, smaller meals. Doesn't eat fried foods, more vegetables, yogurt, bagel. Limits corn, potatoes, rice   Aspirin: Taking 81mg  daily and denies side effects   Statin: Yes: atorvastatin   ACEi/ARB: Yes: losartan.   Urine Albumin:   Lab Results   Component Value Date    UMALCR 95.22 (H) 12/12/2019      Lab Results   Component Value Date    A1C 11.4 05/20/2020    A1C 9.3 12/12/2019    A1C 10.1 07/16/2019    A1C 9.9 03/08/2019    A1C 8.5 04/16/2018     Hypertension:   Metoprolol XL 25 mg daily  Losartan 100 mg daily   Terazosin 5 mg at bedtime    He wasn't sure metoprolol was working, pulse was ~120s, now it is working.  He's has now been on metoprolol ~ 1 week.  Now pulse is 80-90.   Patient does self-monitor BP. Home BP monitoring in range of 135's systolic over 80-85's diastolic.  Prior to metoprolol, it was higher.  He can tell when his BP is high, gets headache.  Patient reports no current medication side effects.  BP Readings from Last 3 Encounters:   06/02/20 (!) 160/89   05/22/20 123/75   05/18/20 126/76     Hyperlipidemia:   Atorvastatin 20 mg daily (not 40 mg as prescribed)  Fish oil 1000 mg daily     Since his LDL has been good, he didn't think he needed the full dose of atorvastatin.  He's concerned about his triglycerides. He's unsure why he's on the fish oil. Pt reports no significant myalgias or other side effects.  Recent Labs   Lab Test 05/20/20  1229 03/08/19  0926  03/02/15  0819 02/28/14  0941   CHOL 138 125   < > 152 126   HDL 36* 35*   < > 32* 22*   LDL 40 34   < > 73 69   TRIG 308* 279*   < > 235* 177*   CHOLHDLRATIO  --   --   --  4.8 5.8*    < > = values in this interval not displayed.     Neuropathy:    capsaisin - doesn't help, will review at follow-up.    Gout:   allopurinol 300 mg daily    Pt reports no current pain concerns. Pt is experiencing the following medication side effects: none.   Uric Acid   Date Value Ref Range Status   10/30/2015 6.2 3.5 - 7.2 mg/dL Final   ]    Hx lung cancer 2016:    Albuterol HFA as needed (rarely uses)    He gets monitored now every 1 year. Denies side effects    Today's Vitals:  There were no vitals taken for this visit. -phone visit    ASSESSMENT:                              Medication Adherence: good, no issues identified    Type 2 Diabetes: Needs Improvement. Patient is not meeting A1c goal of < 7%. Self monitoring of blood glucose is at goal of fasting  mg/dL and post prandial < 180 mg/dL. Pt would benefit from minimum SMBG: Check blood sugars fasting, and occasionally 2 hours after starting a meal.  GLP-1 agonist (Ozempic) :  Start at dose : 0.25 mg weekly. Aspirin therapy is safe and appropriate.  Cinnamon not effective, address at follow-up.    Hypertension: Improved. BP is at goal < 140/90, per home readings, pulse has improved.     Hyperlipidemia: Needs Improvement. Pt may benefit from increasing atorvastatin, as prescribed. This will also help TG. He would like to take fish oil at 4g/day for possible TG benefit as well.    Neuropathy: will review at follow-up.    Gout: Needs improvement: Future: due for repeat uric acid level.    Hx lung cancer 2016: stable    PLAN:                            Patient:    1. Fish oil increase to two tablets (2000 mg) twice daily    2. Increase atorvastatin to a full tablet, 40 mg daily.    3. Test your blood sugar twice daily, right away in the morning (goal blood sugars are  mg/dL) and two hours after lunch (goal blood sugars are less than 180 mg/dL).    4. Start Ozempic 0.25 mg weekly.    Video link on how to use Ozempic:   https://www.[employer]/how-to-use/the-ozempic-pen.html#ifu-video     Addendum 6/5/20: returned Sven's phone call, he's been reading more about GLP-1 agonists and risk of cancer, which he is concerned about, as he has a history of lung cancer and has had a few thyroid nodules removed in the past. It was discussed that I don't believe he'd be at a greater risk with Ozempic, questions answered. He states he will need some time to think about options. Alternative option is insulin.    I spent 50 minutes with this  patient today. All changes were made via collaborative practice agreement with Pam Dela Cruz. A copy of the visit note was provided to the patient's primary care provider.    Will follow up in 3 weeks.    The patient was sent via [employer] a summary of these recommendations.     Quiana Mckeon, PharmD  Medication Therapy Management Pharmacist  [phone]

## 2020-06-10 ENCOUNTER — TELEPHONE (OUTPATIENT)
Dept: FAMILY MEDICINE | Facility: CLINIC | Age: 65
End: 2020-06-10

## 2020-06-10 DIAGNOSIS — E11.42 TYPE 2 DIABETES MELLITUS WITH DIABETIC POLYNEUROPATHY, WITHOUT LONG-TERM CURRENT USE OF INSULIN (H): ICD-10-CM

## 2020-06-10 RX ORDER — SEMAGLUTIDE 1.34 MG/ML
INJECTION, SOLUTION SUBCUTANEOUS
Qty: 4.5 ML | Refills: 2 | Status: SHIPPED | OUTPATIENT
Start: 2020-06-10 | End: 2020-08-05

## 2020-06-10 NOTE — TELEPHONE ENCOUNTER
Pharmacy requesting this be changed to a 90 day supply:    Semaglutide,0.25 or 0.5MG/DOS, (OZEMPIC, 0.25 OR 0.5 MG/DOSE,) 2 MG/1.5ML SOPN  1.5 mL  2  6/3/2020  7/29/2020

## 2020-06-10 NOTE — TELEPHONE ENCOUNTER
Prescription approved per [facility] Refill Protocol for 90 day supply.      Manuel Wang RN, BSN, PHN

## 2020-08-07 DIAGNOSIS — E11.42 TYPE 2 DIABETES MELLITUS WITH DIABETIC POLYNEUROPATHY, WITHOUT LONG-TERM CURRENT USE OF INSULIN (H): ICD-10-CM

## 2020-08-07 DIAGNOSIS — E78.1 HYPERTRIGLYCERIDEMIA: ICD-10-CM

## 2020-08-08 DIAGNOSIS — I10 ESSENTIAL HYPERTENSION WITH GOAL BLOOD PRESSURE LESS THAN 140/90: ICD-10-CM

## 2020-08-10 RX ORDER — ATORVASTATIN CALCIUM 40 MG/1
TABLET, FILM COATED ORAL
Qty: 90 TABLET | Refills: 0 | Status: SHIPPED | OUTPATIENT
Start: 2020-08-10 | End: 2020-11-04

## 2020-08-10 NOTE — TELEPHONE ENCOUNTER
Routing refill request to provider for review/approval because:  A break in medication    Ana Shrestha RN

## 2020-08-11 RX ORDER — TERAZOSIN 5 MG/1
CAPSULE ORAL
Qty: 90 CAPSULE | Refills: 3 | Status: SHIPPED | OUTPATIENT
Start: 2020-08-11 | End: 2021-08-04

## 2020-08-11 NOTE — TELEPHONE ENCOUNTER
Routing refill request to provider for review/approval because:  Blood pressure fails protocol  Dora Thompson RN  [facility]

## 2020-08-21 ENCOUNTER — ANCILLARY PROCEDURE (OUTPATIENT)
Dept: CT IMAGING | Facility: CLINIC | Age: 65
End: 2020-08-21
Attending: INTERNAL MEDICINE
Payer: COMMERCIAL

## 2020-08-21 DIAGNOSIS — C34.92 NON-SMALL CELL CARCINOMA OF LUNG, LEFT (H): ICD-10-CM

## 2020-08-21 PROCEDURE — 71250 CT THORAX DX C-: CPT | Performed by: RADIOLOGY

## 2020-08-23 DIAGNOSIS — K21.00 GASTROESOPHAGEAL REFLUX DISEASE WITH ESOPHAGITIS: ICD-10-CM

## 2020-08-25 NOTE — TELEPHONE ENCOUNTER
Prescription approved per [facility] Refill Protocol.  Dora Thompson RN  [facility]

## 2020-09-02 ENCOUNTER — TRANSFERRED RECORDS (OUTPATIENT)
Dept: HEALTH INFORMATION MANAGEMENT | Facility: CLINIC | Age: 65
End: 2020-09-02

## 2020-09-02 LAB — RETINOPATHY: NEGATIVE

## 2020-09-12 DIAGNOSIS — I10 ESSENTIAL HYPERTENSION WITH GOAL BLOOD PRESSURE LESS THAN 140/90: ICD-10-CM

## 2020-09-15 RX ORDER — LOSARTAN POTASSIUM 100 MG/1
100 TABLET ORAL DAILY
Qty: 90 TABLET | Refills: 1 | Status: SHIPPED | OUTPATIENT
Start: 2020-09-15 | End: 2021-08-24

## 2020-09-15 RX ORDER — HYDROCHLOROTHIAZIDE 25 MG/1
25 TABLET ORAL DAILY
Qty: 90 TABLET | Refills: 1 | Status: SHIPPED | OUTPATIENT
Start: 2020-09-15 | End: 2021-08-24

## 2020-09-15 NOTE — TELEPHONE ENCOUNTER
Routing refill request to provider for review/approval because:  Labs out of range:  Creatinine  BP above goal  Fails protocol    Rosi Chakraborty RN  [facility]

## 2020-09-16 ENCOUNTER — ONCOLOGY VISIT (OUTPATIENT)
Dept: ONCOLOGY | Facility: CLINIC | Age: 65
End: 2020-09-16
Payer: COMMERCIAL

## 2020-09-16 VITALS
BODY MASS INDEX: 33.09 KG/M2 | OXYGEN SATURATION: 97 % | HEART RATE: 107 BPM | DIASTOLIC BLOOD PRESSURE: 88 MMHG | TEMPERATURE: 98.7 F | SYSTOLIC BLOOD PRESSURE: 135 MMHG | RESPIRATION RATE: 18 BRPM | WEIGHT: 217.6 LBS

## 2020-09-16 DIAGNOSIS — C34.92 NON-SMALL CELL CARCINOMA OF LUNG, LEFT (H): Primary | ICD-10-CM

## 2020-09-16 PROCEDURE — 99214 OFFICE O/P EST MOD 30 MIN: CPT | Performed by: INTERNAL MEDICINE

## 2020-09-16 ASSESSMENT — PAIN SCALES - GENERAL: PAINLEVEL: NO PAIN (0)
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
## 2020-09-16 NOTE — NURSING NOTE
"Oncology Rooming Note    September 16, 2020 4:00 PM   Sven Givens is a 65 year old male who presents for:    Chief Complaint   Patient presents with     Oncology Clinic Visit     1 year follow up     Initial Vitals: /88 (BP Location: Left arm)   Pulse 107   Temp 98.7  F (37.1  C) (Oral)   Resp 18   Wt 98.7 kg (217 lb 9.6 oz)   SpO2 97%   BMI 33.09 kg/m   Estimated body mass index is 33.09 kg/m  as calculated from the following:    Height as of 5/22/20: 1.727 m (5' 8\").    Weight as of this encounter: 98.7 kg (217 lb 9.6 oz). Body surface area is 2.18 meters squared.  No Pain (0) Comment: Data Unavailable   No LMP for male patient.  Allergies reviewed: Yes  Medications reviewed: Yes    Medications: Medication refills not needed today.  Pharmacy name entered into [facility]:    [id number] - [place] - [address] [facility] [id number] [place] [employer] - [place] - [address] [facility]      Clementina Spencer LPN              "

## 2020-09-25 ENCOUNTER — MYC MEDICAL ADVICE (OUTPATIENT)
Dept: FAMILY MEDICINE | Facility: CLINIC | Age: 65
End: 2020-09-25

## 2020-09-25 NOTE — TELEPHONE ENCOUNTER
Application for disability parking certificate form placed in Dr Dela Cruz's bin for completion.      Lena MONET (R))

## 2020-09-29 NOTE — TELEPHONE ENCOUNTER
Pt notified that form completed.  PT requested form be mailed to him, address verified.  Copy sent for abstraction.  Original mailed to pt    RORO Serrano.

## 2020-09-29 NOTE — TELEPHONE ENCOUNTER
Provider portion of the form completed. Patient will need to complete top of form and sign. Pam Dela Cruz MD

## 2020-10-04 ENCOUNTER — ALLIED HEALTH/NURSE VISIT (OUTPATIENT)
Dept: NURSING | Facility: CLINIC | Age: 65
End: 2020-10-04
Payer: COMMERCIAL

## 2020-10-04 DIAGNOSIS — Z23 NEED FOR PROPHYLACTIC VACCINATION AND INOCULATION AGAINST INFLUENZA: Primary | ICD-10-CM

## 2020-10-04 PROCEDURE — 90471 IMMUNIZATION ADMIN: CPT

## 2020-10-04 PROCEDURE — 90662 IIV NO PRSV INCREASED AG IM: CPT

## 2020-10-04 PROCEDURE — 99207 PR NO CHARGE NURSE ONLY: CPT

## 2020-10-04 NOTE — NURSING NOTE
Patient consents to receive outdoor care: Yes    Upon arrival, patient instructed to proceed to designated location, place vehicle in park, turn off, and remove keys  and Patient receiving an immunization or injection. Instructed patient to notify healthcare personnel if they are having an adverse reaction.    If we are unable to safely and ergonomically able to provide care- is the patient able to safely able to get out of car and transfer to a chair? Yes    Patient would like to receive their AVS in person after care is given.

## 2020-10-18 ENCOUNTER — TELEPHONE (OUTPATIENT)
Dept: FAMILY MEDICINE | Facility: CLINIC | Age: 65
End: 2020-10-18

## 2020-10-18 DIAGNOSIS — E11.42 TYPE 2 DIABETES MELLITUS WITH DIABETIC POLYNEUROPATHY, WITHOUT LONG-TERM CURRENT USE OF INSULIN (H): Chronic | ICD-10-CM

## 2020-10-19 ENCOUNTER — OFFICE VISIT (OUTPATIENT)
Dept: OTOLARYNGOLOGY | Facility: CLINIC | Age: 65
End: 2020-10-19
Payer: COMMERCIAL

## 2020-10-19 ENCOUNTER — OFFICE VISIT (OUTPATIENT)
Dept: AUDIOLOGY | Facility: CLINIC | Age: 65
End: 2020-10-19
Payer: COMMERCIAL

## 2020-10-19 VITALS
HEART RATE: 106 BPM | DIASTOLIC BLOOD PRESSURE: 91 MMHG | SYSTOLIC BLOOD PRESSURE: 155 MMHG | RESPIRATION RATE: 17 BRPM | WEIGHT: 213 LBS | OXYGEN SATURATION: 98 % | HEIGHT: 68 IN | BODY MASS INDEX: 32.28 KG/M2

## 2020-10-19 DIAGNOSIS — R42 DIZZINESS: Primary | ICD-10-CM

## 2020-10-19 DIAGNOSIS — H81.10 BENIGN PAROXYSMAL POSITIONAL VERTIGO, UNSPECIFIED LATERALITY: ICD-10-CM

## 2020-10-19 PROCEDURE — 99204 OFFICE O/P NEW MOD 45 MIN: CPT | Performed by: OTOLARYNGOLOGY

## 2020-10-19 PROCEDURE — 99207 PR NO CHARGE LOS: CPT | Performed by: AUDIOLOGIST

## 2020-10-19 PROCEDURE — 92557 COMPREHENSIVE HEARING TEST: CPT | Performed by: AUDIOLOGIST

## 2020-10-19 RX ORDER — MECLIZINE HYDROCHLORIDE 25 MG/1
25 TABLET ORAL 3 TIMES DAILY PRN
Qty: 40 TABLET | Refills: 1 | Status: SHIPPED | OUTPATIENT
Start: 2020-10-19 | End: 2020-12-10

## 2020-10-19 ASSESSMENT — MIFFLIN-ST. JEOR: SCORE: 1715.66

## 2020-10-19 ASSESSMENT — PAIN SCALES - GENERAL: PAINLEVEL: NO PAIN (0)

## 2020-10-19 NOTE — PROGRESS NOTES
AUDIOLOGY REPORT:    Patient was referred from ENT by Brady Ambrocio for audiology evaluation.  Patient reports recent severe dizziness and tinnitus. No otalgia, drainage or hearing problems are reported.    Testing:    Otoscopy:   Otoscopic exam indicates ears are clear of cerumen bilaterally     Tympanograms:    Immitance testing could not be completed due  To equipment malfunction    Reflexes (reported by stimulus ear):    Immitance testing could not be completed due  To equipment malfunction    Thresholds:   Pure Tone Thresholds assessed using conventional audiometry with good reliability from 250-8000 Hz bilaterally using insert earphones      RIGHT:  normal hearing sensitivity    LEFT:    normal hearing sensitivity    Speech Reception Threshold:    RIGHT: 25 dB HL    LEFT:   20 dB HL  Speech Reception Thresholds are in good agreement with pure tone thresholds.    Word Recognition Score:     RIGHT: 100% at 65 dB HL using NU-6 recorded word list.    LEFT:   100% at 60 dB HL using NU-6 recorded word list.    Discussed results with the patient.     Patient was returned to ENT for follow up.     Soham Brown MA, CCC-A  Licensed Audiologist #[id number]  10/19/2020

## 2020-10-19 NOTE — PATIENT INSTRUCTIONS
Sven to follow up with Primary Care provider regarding elevated blood pressure.  Scheduling Information  To schedule your CT/MRI scan, please contact [facility] at [phone] OR [facility] at [phone]    To schedule your Surgery, please contact our Specialty Schedulers at [phone]      ENT Clinic Locations Clinic Hours Telephone Number     [facility] [place]  [address] [place] [address]   Monday:           1:00pm -- 5:00pm    Friday:              8:00am - 12:00pm   To schedule/reschedule an appointment with   Dr. Ambrocio,   please contact our   Specialty Scheduling Department at:     [phone]       [facility] [place]  [address] [place] [address] Tuesday:          8:00am -- 2:00pm         Urgent Care Locations Clinic Hours Telephone Numbers     [facility] [place]  [address] [place] [address]     Monday-Friday:     11:00am - 9:00pm    Saturday-Sunday:  9:00am - 5:00pm   [phone]     [facility] [facility]  [address] [place] [address]     Monday-Friday:      5:00pm - 9:00pm     Saturday-Sunday:  9:00am - 5:00pm   [phone]

## 2020-10-19 NOTE — NURSING NOTE
Sven to follow up with Primary Care provider regarding elevated blood pressure.    Kristina Bundy MA

## 2020-10-19 NOTE — PROGRESS NOTES
History of Present Illness - Sven Givens is a 65 year old male here to see me for the first time due to dizziness.    This past Tuesday morning he woke up, and when he sat up he got severely spinning and dizzy and fell back into bed.  He could not stand for about 20 minutes.  He was also very nauseated.  His daughter suggested Epley maneuvers, but that made it much worse and he stopped.    In the week since then he has had a few days of no symptoms, and has had recurrences as well.  They have continued to try the canalith repositioning maneuvers.    No previous history of ear disease, no previous ear surgery.    Of note, he has a history of NSC carcinoma of the LEFT lung.  Per his most recent oncology follow up on 9/16/2020: Stage 1A (pT1aN0) 0.9 x 0.7 x 0.5 cm grade 1 well differentiated adenocarcinoma of the left lower lobe of the lung with invasive component being 0.3cm, s/p wedge resection and mediastinal LN sampling on 9/23/16. 3 sampled LNs were negative.    Past Medical History -   Patient Active Problem List   Diagnosis     Advanced directives, counseling/discussion     Essential hypertension with goal blood pressure less than 140/90     History of adenomatous polyp of colon     GERD (gastroesophageal reflux disease)     Anemia     Hyperlipidemia LDL goal <100     Gout     Type 2 diabetes mellitus with diabetic polyneuropathy, with long-term current use of insulin (H)     History of radiation exposure     Non-small cell carcinoma of lung, left (H)     Nonalcoholic hepatosteatosis     PRESLEY (obstructive sleep apnea)       Current Medications -   Current Outpatient Medications:      albuterol (PROAIR HFA) 108 (90 Base) MCG/ACT inhaler, Inhale 2 puffs into the lungs every 4 hours as needed for shortness of breath / dyspnea or wheezing, Disp: 8.5 Inhaler, Rfl: 3     allopurinol (ZYLOPRIM) 300 MG tablet, TAKE 1 TABLET BY MOUTH EVERY DAY, Disp: 90 tablet, Rfl: 3     ASPIRIN 81 MG PO TABS, 1 TABLET DAILY(*),  Disp: , Rfl:      atorvastatin (LIPITOR) 40 MG tablet, TAKE 1 TABLET BY MOUTH EVERY DAY, Disp: 90 tablet, Rfl: 0     Azelaic Acid (FINACEA) 15 % gel, Apply small amount twice a day to skin (Patient taking differently: Apply small amount twice a day to skin, as needed), Disp: 50 g, Rfl: 3     blood glucose (ONETOUCH ULTRA) test strip, USE TO CHECK BLOOD SUGARS TWICE A DAY., Disp: 200 strip, Rfl: 1     capsaicin (ZOSTRIX) 0.025 % CREA cream, Apply 1 g topically 3 times daily, Disp: 60 g, Rfl: 3     EQL CINNAMON PO, Take 1,000 mg by mouth 2 times daily, Disp: , Rfl:      FISH OIL 1000 MG OR CAPS, daily, Disp: , Rfl:      fluticasone (FLONASE) 50 MCG/ACT nasal spray, Spray 1-2 sprays into both nostrils daily, Disp: 1 Bottle, Rfl: 1     glimepiride (AMARYL) 4 MG tablet, TAKE 1 TABLET BY MOUTH TWICE A DAY, Disp: 180 tablet, Rfl: 3     hydrochlorothiazide (HYDRODIURIL) 25 MG tablet, TAKE 1 TABLET (25 MG) BY MOUTH DAILY FOR BLOOD PRESSURE., Disp: 90 tablet, Rfl: 1     losartan (COZAAR) 100 MG tablet, TAKE 1 TABLET (100 MG) BY MOUTH DAILY FOR BLOOD PRESSURE., Disp: 90 tablet, Rfl: 1     metFORMIN (GLUCOPHAGE) 1000 MG tablet, Take 1 tablet (1,000 mg) by mouth 2 times daily (with meals), Disp: 180 tablet, Rfl: 0     metoprolol succinate ER (TOPROL-XL) 25 MG 24 hr tablet, Take 1 tablet (25 mg) by mouth daily, Disp: 90 tablet, Rfl: 3     metroNIDAZOLE 0.75 % EX external gel, Apply topically 2 times daily, Disp: 45 g, Rfl: 3     MULTIPLE VITAMINS OR, 1 a day, Disp: , Rfl:      omeprazole (PRILOSEC) 20 MG DR capsule, TAKE 1 CAPSULE (20 MG) BY MOUTH DAILY TAKE 30-60 MINUTES BEFORE A MEAL., Disp: 90 capsule, Rfl: 2     Oxymetazoline HCl (AFRIN NASAL SPRAY NA), Spray in nostril as needed, Disp: , Rfl:      SENNA-PLUS 8.6-50 MG tablet, TAKE 1 TABLET BY MOUTH EVERYDAY AT BEDTIME, Disp: 90 tablet, Rfl: 2     terazosin (HYTRIN) 5 MG capsule, TAKE 1 CAPSULE (5 MG) BY MOUTH AT BEDTIME FOR BLOOD PRESSURE., Disp: 90 capsule, Rfl: 3  No  current facility-administered medications for this visit.     Facility-Administered Medications Ordered in Other Visits:      DOBUTamine 500 mg in dextrose 5% 250 mL (adult std), 15 mcg/kg/min, Intravenous, Continuous, Pam Dela Cruz MD, Last Rate: 44.1 mL/hr at 16 1551, 15 mcg/kg/min at 16 1551    Allergies -   Allergies   Allergen Reactions     Penicillins Rash     Ace Inhibitors Cough     Indomethacin Other (See Comments)     Severe dizziness     Invokana [Canagliflozin]      bladder infection       Social History -   Social History     Socioeconomic History     Marital status:      Spouse name: Not on file     Number of children: Not on file     Years of education: Not on file     Highest education level: Not on file   Occupational History     Employer: [employer]   Social Needs     Financial resource strain: Not on file     Food insecurity     Worry: Not on file     Inability: Not on file     Transportation needs     Medical: Not on file     Non-medical: Not on file   Tobacco Use     Smoking status: Former Smoker     Quit date: 1992     Years since quittin.7     Smokeless tobacco: Never Used     Tobacco comment: 15 + years    Substance and Sexual Activity     Alcohol use: No     Drug use: No     Sexual activity: Not Currently     Partners: Female     Birth control/protection: None   Lifestyle     Physical activity     Days per week: Not on file     Minutes per session: Not on file     Stress: Not on file   Relationships     Social connections     Talks on phone: Not on file     Gets together: Not on file     Attends Jain service: Not on file     Active member of club or organization: Not on file     Attends meetings of clubs or organizations: Not on file     Relationship status: Not on file     Intimate partner violence     Fear of current or ex partner: Not on file     Emotionally abused: Not on file     Physically abused: Not on file     Forced sexual activity:  "Not on file   Other Topics Concern     Parent/sibling w/ CABG, MI or angioplasty before 65F 55M? No   Social History Narrative     Not on file       Family History -   Family History   Problem Relation Age of Onset     Cerebrovascular Disease Father      Cancer Sister         ovarary        Review of Systems - As per HPI and PMHx, otherwise 10+ system review of the head and neck, and general constitution is negative.    Physical Exam  BP (!) 155/91   Pulse 106   Resp 17   Ht 1.727 m (5' 8\")   Wt 96.6 kg (213 lb)   SpO2 98%   BMI 32.39 kg/m      General - The patient is well nourished and well developed, and appears to have good nutritional status.  Alert and oriented to person and place, answers questions and cooperates with examination appropriately.   Head and Face - Normocephalic and atraumatic, with no gross asymmetry noted of the contour of the facial features.  The facial nerve is intact, with strong symmetric movements.  Voice and Breathing - The patient was breathing comfortably without the use of accessory muscles. There was no wheezing, stridor, or stertor.  The patients voice was clear and strong, and had appropriate pitch and quality.  Ears - The tympanic membranes are normal in appearance, bony landmarks are intact.  No retraction, perforation, or masses.  No fluid or purulence was seen in the external canal or the middle ear. No evidence of infection of the middle ear or external canal, cerumen was normal in appearance.  Eyes - Extraocular movements intact, and the pupils were reactive to light.  Sclera were not icteric or injected, conjunctiva were pink and moist.  Mouth - Examination of the oral cavity showed pink, healthy oral mucosa. No lesions or ulcerations noted.  The tongue was mobile and midline, and the dentition were in good condition.    Throat - The walls of the oropharynx were smooth, pink, moist, symmetric, and had no lesions or ulcerations.  The tonsillar pillars and soft palate " were symmetric.  The uvula was midline on elevation.    Neck - Normal midline excursion of the laryngotracheal complex during swallowing.  Full range of motion on passive movement.  Palpation of the occipital, submental, submandibular, internal jugular chain, and supraclavicular nodes did not demonstrate any abnormal lymph nodes or masses.  The carotid pulse was palpable bilaterally.  Palpation of the thyroid was soft and smooth, with no nodules or goiter appreciated.  The trachea was mobile and midline.  Nose - External contour is symmetric, no gross deflection or scars.  Nasal mucosa is pink and moist with no abnormal mucus.  The septum was midline and non-obstructive, turbinates of normal size and position.  No polyps, masses, or purulence noted on examination.      A/P - Sven Givens is a 65 year old male  (R42) Dizziness  (primary encounter diagnosis)  (H81.10) Benign paroxysmal positional vertigo, unspecified laterality    Based on today's exam and history, I think that the most likely diagnosis at this point is benign paroxysmal positional vertigo.  The etiology of benign paroxysmal positional vertigo being small crystals tumbling in the semicircular canals was discussed at length.  Also, the treatment of the problem with physical therapy for canalith repositioning maneuvers was discussed.    I will send the patient to physical therapy for this to be done.  The patient was very specifically instructed to return to me should the therapy prove unsuccessful.  In which case we will search for other possible causes, as well as discuss possible further imaging such as MRI, especially because of his past history of lung cancer.

## 2020-10-19 NOTE — LETTER
10/19/2020         RE: Sven Givens  [address]  [place] [address]        Dear Colleague,    Thank you for referring your patient, Sven Givens, to the [facility]. Please see a copy of my visit note below.    History of Present Illness - Sven Givens is a 65 year old male here to see me for the first time due to dizziness.    This past Tuesday morning he woke up, and when he sat up he got severely spinning and dizzy and fell back into bed.  He could not stand for about 20 minutes.  He was also very nauseated.  His daughter suggested Epley maneuvers, but that made it much worse and he stopped.    In the week since then he has had a few days of no symptoms, and has had recurrences as well.  They have continued to try the canalith repositioning maneuvers.    No previous history of ear disease, no previous ear surgery.    Of note, he has a history of NSC carcinoma of the LEFT lung.  Per his most recent oncology follow up on 9/16/2020: Stage 1A (pT1aN0) 0.9 x 0.7 x 0.5 cm grade 1 well differentiated adenocarcinoma of the left lower lobe of the lung with invasive component being 0.3cm, s/p wedge resection and mediastinal LN sampling on 9/23/16. 3 sampled LNs were negative.    Past Medical History -   Patient Active Problem List   Diagnosis     Advanced directives, counseling/discussion     Essential hypertension with goal blood pressure less than 140/90     History of adenomatous polyp of colon     GERD (gastroesophageal reflux disease)     Anemia     Hyperlipidemia LDL goal <100     Gout     Type 2 diabetes mellitus with diabetic polyneuropathy, with long-term current use of insulin (H)     History of radiation exposure     Non-small cell carcinoma of lung, left (H)     Nonalcoholic hepatosteatosis     PRESLEY (obstructive sleep apnea)       Current Medications -   Current Outpatient Medications:      albuterol (PROAIR HFA) 108 (90 Base) MCG/ACT inhaler, Inhale 2 puffs into  the lungs every 4 hours as needed for shortness of breath / dyspnea or wheezing, Disp: 8.5 Inhaler, Rfl: 3     allopurinol (ZYLOPRIM) 300 MG tablet, TAKE 1 TABLET BY MOUTH EVERY DAY, Disp: 90 tablet, Rfl: 3     ASPIRIN 81 MG PO TABS, 1 TABLET DAILY(*), Disp: , Rfl:      atorvastatin (LIPITOR) 40 MG tablet, TAKE 1 TABLET BY MOUTH EVERY DAY, Disp: 90 tablet, Rfl: 0     Azelaic Acid (FINACEA) 15 % gel, Apply small amount twice a day to skin (Patient taking differently: Apply small amount twice a day to skin, as needed), Disp: 50 g, Rfl: 3     blood glucose (ONETOUCH ULTRA) test strip, USE TO CHECK BLOOD SUGARS TWICE A DAY., Disp: 200 strip, Rfl: 1     capsaicin (ZOSTRIX) 0.025 % CREA cream, Apply 1 g topically 3 times daily, Disp: 60 g, Rfl: 3     EQL CINNAMON PO, Take 1,000 mg by mouth 2 times daily, Disp: , Rfl:      FISH OIL 1000 MG OR CAPS, daily, Disp: , Rfl:      fluticasone (FLONASE) 50 MCG/ACT nasal spray, Spray 1-2 sprays into both nostrils daily, Disp: 1 Bottle, Rfl: 1     glimepiride (AMARYL) 4 MG tablet, TAKE 1 TABLET BY MOUTH TWICE A DAY, Disp: 180 tablet, Rfl: 3     hydrochlorothiazide (HYDRODIURIL) 25 MG tablet, TAKE 1 TABLET (25 MG) BY MOUTH DAILY FOR BLOOD PRESSURE., Disp: 90 tablet, Rfl: 1     losartan (COZAAR) 100 MG tablet, TAKE 1 TABLET (100 MG) BY MOUTH DAILY FOR BLOOD PRESSURE., Disp: 90 tablet, Rfl: 1     metFORMIN (GLUCOPHAGE) 1000 MG tablet, Take 1 tablet (1,000 mg) by mouth 2 times daily (with meals), Disp: 180 tablet, Rfl: 0     metoprolol succinate ER (TOPROL-XL) 25 MG 24 hr tablet, Take 1 tablet (25 mg) by mouth daily, Disp: 90 tablet, Rfl: 3     metroNIDAZOLE 0.75 % EX external gel, Apply topically 2 times daily, Disp: 45 g, Rfl: 3     MULTIPLE VITAMINS OR, 1 a day, Disp: , Rfl:      omeprazole (PRILOSEC) 20 MG DR capsule, TAKE 1 CAPSULE (20 MG) BY MOUTH DAILY TAKE 30-60 MINUTES BEFORE A MEAL., Disp: 90 capsule, Rfl: 2     Oxymetazoline HCl (AFRIN NASAL SPRAY NA), [place] in nostril as  needed, Disp: , Rfl:      SENNA-PLUS 8.6-50 MG tablet, TAKE 1 TABLET BY MOUTH EVERYDAY AT BEDTIME, Disp: 90 tablet, Rfl: 2     terazosin (HYTRIN) 5 MG capsule, TAKE 1 CAPSULE (5 MG) BY MOUTH AT BEDTIME FOR BLOOD PRESSURE., Disp: 90 capsule, Rfl: 3  No current facility-administered medications for this visit.     Facility-Administered Medications Ordered in Other Visits:      DOBUTamine 500 mg in dextrose 5% 250 mL (adult std), 15 mcg/kg/min, Intravenous, Continuous, Pam Dela Cruz MD, Last Rate: 44.1 mL/hr at 16 1551, 15 mcg/kg/min at 16 1551    Allergies -   Allergies   Allergen Reactions     Penicillins Rash     Ace Inhibitors Cough     Indomethacin Other (See Comments)     Severe dizziness     Invokana [Canagliflozin]      bladder infection       Social History -   Social History     Socioeconomic History     Marital status:      Spouse name: Not on file     Number of children: Not on file     Years of education: Not on file     Highest education level: Not on file   Occupational History     Employer: [employer]   Social Needs     Financial resource strain: Not on file     Food insecurity     Worry: Not on file     Inability: Not on file     Transportation needs     Medical: Not on file     Non-medical: Not on file   Tobacco Use     Smoking status: Former Smoker     Quit date: 1992     Years since quittin.7     Smokeless tobacco: Never Used     Tobacco comment: 15 + years    Substance and Sexual Activity     Alcohol use: No     Drug use: No     Sexual activity: Not Currently     Partners: Female     Birth control/protection: None   Lifestyle     Physical activity     Days per week: Not on file     Minutes per session: Not on file     Stress: Not on file   Relationships     Social connections     Talks on phone: Not on file     Gets together: Not on file     Attends Buddhism service: Not on file     Active member of club or organization: Not on file     Attends meetings of  "clubs or organizations: Not on file     Relationship status: Not on file     Intimate partner violence     Fear of current or ex partner: Not on file     Emotionally abused: Not on file     Physically abused: Not on file     Forced sexual activity: Not on file   Other Topics Concern     Parent/sibling w/ CABG, MI or angioplasty before 65F 55M? No   Social History Narrative     Not on file       Family History -   Family History   Problem Relation Age of Onset     Cerebrovascular Disease Father      Cancer Sister         ovarary        Review of Systems - As per HPI and PMHx, otherwise 10+ system review of the head and neck, and general constitution is negative.    Physical Exam  BP (!) 155/91   Pulse 106   Resp 17   Ht 1.727 m (5' 8\")   Wt 96.6 kg (213 lb)   SpO2 98%   BMI 32.39 kg/m      General - The patient is well nourished and well developed, and appears to have good nutritional status.  Alert and oriented to person and place, answers questions and cooperates with examination appropriately.   Head and Face - Normocephalic and atraumatic, with no gross asymmetry noted of the contour of the facial features.  The facial nerve is intact, with strong symmetric movements.  Voice and Breathing - The patient was breathing comfortably without the use of accessory muscles. There was no wheezing, stridor, or stertor.  The patients voice was clear and strong, and had appropriate pitch and quality.  Ears - The tympanic membranes are normal in appearance, bony landmarks are intact.  No retraction, perforation, or masses.  No fluid or purulence was seen in the external canal or the middle ear. No evidence of infection of the middle ear or external canal, cerumen was normal in appearance.  Eyes - Extraocular movements intact, and the pupils were reactive to light.  Sclera were not icteric or injected, conjunctiva were pink and moist.  Mouth - Examination of the oral cavity showed pink, healthy oral mucosa. No lesions or " ulcerations noted.  The tongue was mobile and midline, and the dentition were in good condition.    Throat - The walls of the oropharynx were smooth, pink, moist, symmetric, and had no lesions or ulcerations.  The tonsillar pillars and soft palate were symmetric.  The uvula was midline on elevation.    Neck - Normal midline excursion of the laryngotracheal complex during swallowing.  Full range of motion on passive movement.  Palpation of the occipital, submental, submandibular, internal jugular chain, and supraclavicular nodes did not demonstrate any abnormal lymph nodes or masses.  The carotid pulse was palpable bilaterally.  Palpation of the thyroid was soft and smooth, with no nodules or goiter appreciated.  The trachea was mobile and midline.  Nose - External contour is symmetric, no gross deflection or scars.  Nasal mucosa is pink and moist with no abnormal mucus.  The septum was midline and non-obstructive, turbinates of normal size and position.  No polyps, masses, or purulence noted on examination.      A/P - Sven Givens is a 65 year old male  (R42) Dizziness  (primary encounter diagnosis)  (H81.10) Benign paroxysmal positional vertigo, unspecified laterality    Based on today's exam and history, I think that the most likely diagnosis at this point is benign paroxysmal positional vertigo.  The etiology of benign paroxysmal positional vertigo being small crystals tumbling in the semicircular canals was discussed at length.  Also, the treatment of the problem with physical therapy for canalith repositioning maneuvers was discussed.    I will send the patient to physical therapy for this to be done.  The patient was very specifically instructed to return to me should the therapy prove unsuccessful.  In which case we will search for other possible causes, as well as discuss possible further imaging such as MRI, especially because of his past history of lung cancer.        Again, thank you for allowing me to  participate in the care of your patient.        Sincerely,        Mir Ambrocio MD

## 2020-10-21 NOTE — TELEPHONE ENCOUNTER
Please call to schedule virtual or office visit follow up of diabetes. High A1C. Future lab order placed for repeat labs. I do have openings this week for virtual. Pam Dela Cruz MD

## 2020-11-01 DIAGNOSIS — E11.42 TYPE 2 DIABETES MELLITUS WITH DIABETIC POLYNEUROPATHY, WITHOUT LONG-TERM CURRENT USE OF INSULIN (H): ICD-10-CM

## 2020-11-01 DIAGNOSIS — K59.01 SLOW TRANSIT CONSTIPATION: ICD-10-CM

## 2020-11-01 DIAGNOSIS — E78.1 HYPERTRIGLYCERIDEMIA: ICD-10-CM

## 2020-11-02 ENCOUNTER — OFFICE VISIT (OUTPATIENT)
Dept: FAMILY MEDICINE | Facility: CLINIC | Age: 65
End: 2020-11-02
Payer: COMMERCIAL

## 2020-11-02 VITALS
HEIGHT: 68 IN | SYSTOLIC BLOOD PRESSURE: 119 MMHG | HEART RATE: 111 BPM | OXYGEN SATURATION: 97 % | BODY MASS INDEX: 32.58 KG/M2 | DIASTOLIC BLOOD PRESSURE: 74 MMHG | WEIGHT: 215 LBS

## 2020-11-02 DIAGNOSIS — C34.92 NON-SMALL CELL CARCINOMA OF LUNG, LEFT (H): ICD-10-CM

## 2020-11-02 DIAGNOSIS — Z12.5 SCREENING FOR PROSTATE CANCER: ICD-10-CM

## 2020-11-02 DIAGNOSIS — E11.42 TYPE 2 DIABETES MELLITUS WITH DIABETIC POLYNEUROPATHY, WITHOUT LONG-TERM CURRENT USE OF INSULIN (H): Chronic | ICD-10-CM

## 2020-11-02 DIAGNOSIS — I10 ESSENTIAL HYPERTENSION WITH GOAL BLOOD PRESSURE LESS THAN 140/90: Primary | ICD-10-CM

## 2020-11-02 DIAGNOSIS — E78.5 HYPERLIPIDEMIA LDL GOAL <100: ICD-10-CM

## 2020-11-02 DIAGNOSIS — R00.0 SINUS TACHYCARDIA: ICD-10-CM

## 2020-11-02 LAB
GLUCOSE SERPL-MCNC: 278 MG/DL (ref 70–99)
HBA1C MFR BLD: 11.4 % (ref 0–5.6)
HGB BLD-MCNC: 13.7 G/DL (ref 13.3–17.7)

## 2020-11-02 PROCEDURE — 36415 COLL VENOUS BLD VENIPUNCTURE: CPT | Performed by: FAMILY MEDICINE

## 2020-11-02 PROCEDURE — 82947 ASSAY GLUCOSE BLOOD QUANT: CPT | Performed by: FAMILY MEDICINE

## 2020-11-02 PROCEDURE — G0103 PSA SCREENING: HCPCS | Performed by: FAMILY MEDICINE

## 2020-11-02 PROCEDURE — 99214 OFFICE O/P EST MOD 30 MIN: CPT | Performed by: FAMILY MEDICINE

## 2020-11-02 PROCEDURE — 85018 HEMOGLOBIN: CPT | Performed by: FAMILY MEDICINE

## 2020-11-02 PROCEDURE — 83036 HEMOGLOBIN GLYCOSYLATED A1C: CPT | Performed by: FAMILY MEDICINE

## 2020-11-02 RX ORDER — SEMAGLUTIDE 1.34 MG/ML
0.25 INJECTION, SOLUTION SUBCUTANEOUS WEEKLY
Qty: 3 ML | Refills: 3 | Status: SHIPPED | OUTPATIENT
Start: 2020-11-02 | End: 2020-11-27

## 2020-11-02 RX ORDER — METOPROLOL SUCCINATE 50 MG/1
50 TABLET, EXTENDED RELEASE ORAL DAILY
Qty: 90 TABLET | Refills: 3 | Status: SHIPPED | OUTPATIENT
Start: 2020-11-02 | End: 2021-09-20

## 2020-11-02 ASSESSMENT — PAIN SCALES - GENERAL: PAINLEVEL: NO PAIN (0)

## 2020-11-02 ASSESSMENT — MIFFLIN-ST. JEOR: SCORE: 1724.73

## 2020-11-02 NOTE — Clinical Note
HiSven is going to start Ozempic. He is also willing to start once a day insulin if this doesn't work out. Will you be willing to schedule a follow up with him to help adjust his dosing? Therese

## 2020-11-02 NOTE — PROGRESS NOTES
Subjective     Sven NO MI Tosha is a 65 year old male who presents to clinic today for the following health issues:    HPI         Diabetes Follow-up    How often are you checking your blood sugar? Two times daily  Blood sugar testing frequency justification:  Uncontrolled diabetes  What time of day are you checking your blood sugars (select all that apply)?  Before and after meals  Have you had any blood sugars above 200?  Yes 300-400  Have you had any blood sugars below 70?  No    What symptoms do you notice when your blood sugar is low?  None    What concerns do you have today about your diabetes? None     Do you have any of these symptoms? (Select all that apply)  Numbness in feet and Burning in feet    Have you had a diabetic eye exam in the last 12 months? No    Tried Januvia, Actos, Tradjenta, Invokana- Will think about Ozempic and is willing to try this now and OK with insulin if this does not work.             Hyperlipidemia Follow-Up      Are you regularly taking any medication or supplement to lower your cholesterol?   Yes- Lipitor    Are you having muscle aches or other side effects that you think could be caused by your cholesterol lowering medication?  No    Hypertension Follow-up      Do you check your blood pressure regularly outside of the clinic? Yes     Are you following a low salt diet? Yes    Are your blood pressures ever more than 140 on the top number (systolic) OR more   than 90 on the bottom number (diastolic), for example 140/90? Yes    BP Readings from Last 2 Encounters:   11/02/20 119/74   10/19/20 (!) 155/91     Hemoglobin A1C (%)   Date Value   05/20/2020 11.4 (H)   12/12/2019 9.3 (H)     LDL Cholesterol Calculated (mg/dL)   Date Value   05/20/2020 40   03/08/2019 34         How many servings of fruits and vegetables do you eat daily?  2-3    On average, how many sweetened beverages do you drink each day (Examples: soda, juice, sweet tea, etc.  Do NOT count diet or artificially  "sweetened beverages)?   0    How many days per week do you exercise enough to make your heart beat faster? none    How many minutes a day do you exercise enough to make your heart beat faster? n/a    How many days per week do you miss taking your medication? 0        Review of Systems   Constitutional, HEENT, cardiovascular, pulmonary, gi and gu systems are negative, except as otherwise noted.      Objective    /74 (BP Location: Left arm, Patient Position: Chair, Cuff Size: Adult Large)   Pulse 111   Ht 1.727 m (5' 8\")   Wt 97.5 kg (215 lb)   SpO2 97%   BMI 32.69 kg/m    Body mass index is 32.69 kg/m .  Physical Exam   GENERAL: healthy, alert and no distress  EYES: Eyes grossly normal to inspection, PERRL and conjunctivae and sclerae normal  HENT: ear canals and TM's normal, nose and mouth without ulcers or lesions  NECK: no adenopathy, no asymmetry, masses, or scars and thyroid normal to palpation  RESP: lungs clear to auscultation - no rales, rhonchi or wheezes  CV: regular rate and rhythm, normal S1 S2, no S3 or S4, no murmur, click or rub, no peripheral edema and peripheral pulses strong  ABDOMEN: soft, nontender, no hepatosplenomegaly, no masses and bowel sounds normal  MS: no gross musculoskeletal defects noted, no edema  SKIN: no suspicious lesions or rashes  NEURO: Normal strength and tone, mentation intact and speech normal  PSYCH: mentation appears normal, affect normal/bright  LYMPH: no cervical, supraclavicular, axillary, or inguinal adenopathy    Results for orders placed or performed in visit on 11/02/20   **A1C FUTURE anytime     Status: Abnormal   Result Value Ref Range    Hemoglobin A1C 11.4 (H) 0 - 5.6 %   **Hemoglobin FUTURE anytime     Status: None   Result Value Ref Range    Hemoglobin 13.7 13.3 - 17.7 g/dL   **Glucose FUTURE anytime     Status: Abnormal   Result Value Ref Range    Glucose 278 (H) 70 - 99 mg/dL   PROSTATE SPEC ANTIGEN SCREEN     Status: None   Result Value Ref Range " "   PSA 1.55 0 - 4 ug/L           Assessment & Plan     Type 2 diabetes mellitus with diabetic polyneuropathy, without long-term current use of insulin (H)  Start Ozempic and increase dose as needed. Will recheck with Pharm D  - Semaglutide,0.25 or 0.5MG/DOS, (OZEMPIC, 0.25 OR 0.5 MG/DOSE,) 2 MG/1.5ML SOPN  Dispense: 3 mL; Refill: 3    Essential hypertension with goal blood pressure less than 140/90  Well controlled on medications   - metoprolol succinate ER (TOPROL-XL) 50 MG 24 hr tablet  Dispense: 90 tablet; Refill: 3    Hyperlipidemia LDL goal <100  Stable     Non-small cell carcinoma of lung, left (H)  CT scan normal and follow up with oncology yearly now    Sinus tachycardia  Will increase dose as tolerated to 50 mg. May need to back off of other blood pressure medication.  - metoprolol succinate ER (TOPROL-XL) 50 MG 24 hr tablet  Dispense: 90 tablet; Refill: 3    Type 2 diabetes mellitus with diabetic polyneuropathy, without long-term current use of insulin (H)  Future lab order placed  - **A1C FUTURE anytime  - **Hemoglobin FUTURE anytime  - **Glucose FUTURE anytime    Screening for prostate cancer  recheck  - PROSTATE SPEC ANTIGEN SCREEN       BMI:   Estimated body mass index is 32.69 kg/m  as calculated from the following:    Height as of this encounter: 1.727 m (5' 8\").    Weight as of this encounter: 97.5 kg (215 lb).   Weight management plan: Discussed healthy diet and exercise guidelines         CONSULTATION/REFERRAL to medication management   FUTURE LABS:       - Schedule fasting labs in 3 months  FUTURE APPOINTMENTS:       - Follow-up visit in 3 months or sooner if any questions or concerns.   Work on weight loss  Regular exercise  See Patient Instructions    No follow-ups on file.    Pma Dela Cruz MD  [facility]    "

## 2020-11-03 ENCOUNTER — TELEPHONE (OUTPATIENT)
Dept: FAMILY MEDICINE | Facility: CLINIC | Age: 65
End: 2020-11-03

## 2020-11-03 DIAGNOSIS — Z79.4 TYPE 2 DIABETES MELLITUS WITH DIABETIC POLYNEUROPATHY, WITH LONG-TERM CURRENT USE OF INSULIN (H): ICD-10-CM

## 2020-11-03 DIAGNOSIS — E11.42 TYPE 2 DIABETES MELLITUS WITH DIABETIC POLYNEUROPATHY, WITH LONG-TERM CURRENT USE OF INSULIN (H): ICD-10-CM

## 2020-11-03 LAB — PSA SERPL-ACNC: 1.55 UG/L (ref 0–4)

## 2020-11-03 NOTE — TELEPHONE ENCOUNTER
[facility] request:    Please resend rx with quantity of #100.  We do not break a box apart.    Thanks    insulin pen needle (31G X 6 MM) 31G X 6 MM miscellaneous 30 each 1 11/2/2020

## 2020-11-04 ENCOUNTER — HOSPITAL ENCOUNTER (OUTPATIENT)
Dept: PHYSICAL THERAPY | Facility: CLINIC | Age: 65
Setting detail: THERAPIES SERIES
End: 2020-11-04
Attending: FAMILY MEDICINE
Payer: COMMERCIAL

## 2020-11-04 ENCOUNTER — TELEPHONE (OUTPATIENT)
Dept: PHARMACY | Facility: CLINIC | Age: 65
End: 2020-11-04

## 2020-11-04 PROCEDURE — 97112 NEUROMUSCULAR REEDUCATION: CPT | Mod: GP | Performed by: PHYSICAL THERAPIST

## 2020-11-04 PROCEDURE — 97161 PT EVAL LOW COMPLEX 20 MIN: CPT | Mod: GP | Performed by: PHYSICAL THERAPIST

## 2020-11-04 RX ORDER — DOCUSATE SODIUM 50MG AND SENNOSIDES 8.6MG 8.6; 5 MG/1; MG/1
TABLET, FILM COATED ORAL
Qty: 90 TABLET | Refills: 1 | Status: SHIPPED | OUTPATIENT
Start: 2020-11-04 | End: 2021-06-02

## 2020-11-04 RX ORDER — ATORVASTATIN CALCIUM 40 MG/1
TABLET, FILM COATED ORAL
Qty: 90 TABLET | Refills: 0 | Status: SHIPPED | OUTPATIENT
Start: 2020-11-04 | End: 2021-01-12

## 2020-11-04 NOTE — TELEPHONE ENCOUNTER
Called to schedule MTM appt, at the request of Dr. Dela Cruz. No answer, LVM.    Quiana Mckeon, PharmD  Medication Therapy Management Pharmacist  [phone]

## 2020-11-04 NOTE — RESULT ENCOUNTER NOTE
Dear Sven    Your test results are attached.    The A1C is about the same, so starting on the new medication should be helpful.     The hemoglobin is normal and you do not have anemia. The test for prostate cancer was normal and shows low risk.      Please contact me by [employer] if you have any questions about your labs or management. You may also call my office number [phone] for any questions.     Pam Dela Cruz MD

## 2020-11-05 NOTE — PROGRESS NOTES
11/04/20 0800   General Information   Start of Care Date 11/04/20   Referring Physician DR Ambrocio   Orders Evaluate and Treat as Indicated   Order Date 10/19/20   Medical Diagnosis Dizziness, BPPV   Onset of illness/injury or Date of Surgery   (months ago started per pt but chart has 10/13)   Surgical/Medical history reviewed Yes  (DM, lung Cancer-watching per pt)   Pertinent history of current problem (include personal factors and/or comorbidities that impact the POC) Its better but I still have some symptoms when bending over. When eating I feel like I could puke. I couldnt hold my food at first. I woke up in the AM and everything started moving, so dizziness, fell on floor. Dtr is a PT and told them about exs to do, Became so dizzi so had to stop the exs. Puking constantly. After a couple days still dizzi and went to see doctor. Not completely dissapeared. A little afraid to bend or do things. Tries to sleep on his back. Uses a couple pillows to elevate my head. He had seen dentist and for a few days no problem then it started. They feel plugged/blocked both ears. More on left side at times.   Pertinent Visual History  glasses for reading only. For a few days in the beginning he had difficulty with vision.    Prior level of function comment yard work/house work, before COVID went to lifetime for ex. TM at home but stopped due to dizziness. He drives but wife drove him today   Current Community Support Family/friend caregiver   Patient role/Employment history Employed  (fulltime. working from home, [employer], [employer]??)   Living environment House/[facility]   Patient/Family Goals Statement get rid of it   Fall Risk Screen   Fall screen completed by PT   Have you fallen 2 or more times in the past year? No   Have you fallen and had an injury in the past year? No   Is patient a fall risk? No   Abuse Screen (yes response referral indicated)   Feels Unsafe at Home or Work/School no   Feels Threatened by Someone  no   Does Anyone Try to Keep You From Having Contact with Others or Doing Things Outside Your Home? no   Physical Signs of Abuse Present no   Functional Scales   Functional Scales and Outcomes DHI 16/100 (bending over, fast head motion, grocery store, high level activity)   Pain   Patient currently in pain No   Range of Motion (ROM)   ROM Comment CROM WNLS including retraction/protraction   Gait   Gait Comments 25fTW at fast speed 5.75 seconds and 11 steps.   Gait Special Tests 25 Foot Timed Walk   Seconds 7.16   Steps 13 Steps   Comments no AD, normal steps and REGAN. right arm less swing then left arm    Gait Special Tests Dynamic Gait Index   Score out of 24 12/12  (4 itme DGI)   Sensory Examination   Sensory Perception other (describe)   Sensory Perception Comments feet have tingling/numbness from DM   Infrared Goggle Exam or Frenzel Lense Exam   Vestibular Suppressant in Last 24 Hours? No   Exam completed with Infrared Goggles   Spontaneous Nystagmus Negative   Gaze Evoked Nystagmus Negative   Head Shake Horizontal Nystagmus Negative   Imani-Hallpike (right) Negative   Imani-Hallpike (Left) Negative   HSCC Supine Roll Test (Right) Negative   HSCC Supine Roll Test (Left) Negative   Supine Neck Extension Test Negative   Dynamic Visual Acuity (DVA)   Static Acuity (LogMar) 0   Horizontal Head Movement at 1 Hz (LogMar) .4  (parts of  .2)   Horizontal Head Movement at 2 Hz (LogMar) .7   DVA Comments no dizziness complaints   Clinical Impression   Criteria for Skilled Therapeutic Interventions Met yes, treatment indicated   PT Diagnosis dizziness   Influenced by the following impairments nausea, dizziness, dynamic visual acuity and decreased actitivity level   Functional limitations due to impairments affects ADLs/IADLS, recreational activity   Clinical Presentation Stable/Uncomplicated   Clinical Presentation Rationale medical history, DHI, impairments, positional testing, DVA, 4 item DGI and clinical judgement   Clinical  Decision Making (Complexity) Low complexity   Therapy Frequency other (see comments)  (see in 1-2 weeks, possible a third visit)   Predicted Duration of Therapy Intervention (days/wks) 1 month   Risk & Benefits of therapy have been explained Yes   Patient, Family & other staff in agreement with plan of care Yes   Clinical Impression Comments ITs possible he had BPPV and it is now resolved but his description and todays testing fit more with a vestibular asymmetry (DVA test). I started him on gaze stabilization exs to help with head motion symptoms and community mobility (grocery store).    Goal 1   Goal Identifier DVA   Goal Description Improve on DVA at 2 hz head motion by 2 lines for improved ability to move head quickly for shopping at stores   Target Date 20   Goal 2   Goal Identifier DHI   Goal Description Improve on DHi (self rating of symptoms) from 16/100 to < 10/100 for community and recreational activity   Target Date 20   Total Evaluation Time   PT Luhal, Low Complexity Minutes (17488) 30   Grace Selby DPT, MPT, NCS  Physical Therapist   Board Certified Neurologic Clinical Specialist     [facility]   [address] [place] [address]   [email or web address]  [email or web address]   [phone]   Clinic: [phone] //   Fax: [phone]

## 2020-11-05 NOTE — PROGRESS NOTES
[facility]      Outpatient Physical Therapy Evaluation  PLAN OF TREATMENT FOR OUTPATIENT REHABILITATION  (COMPLETE FOR INITIAL CLAIMS ONLY)  Patient's Last Name, First Name, M.I.  YOB: 1955  Sven Givens                           Provider's Name  [facility] Medical Record No.  [id number]                               Onset Date:  Order date 10/19/2020   Start of Care Date: 11/4/2020     Type: Physical Therapy Medical Diagnosis: Dizziness, BPPV                        Therapy Diagnosis:  Dizziness, impaired VOR   Visits from SOC:  1   _________________________________________________________________________________  Plan of Treatment:      Frequency/Duration: See for 1 month    Goals: improve DHI and DVA  _________________________________________________________________________________    I CERTIFY THE NEED FOR THESE SERVICES FURNISHED UNDER        THIS PLAN OF TREATMENT AND WHILE UNDER MY CARE     (Physician co-signature of this document indicates review and certification of the therapy plan).                Certification date from: 11/4/2020  Certification date to: 12/4/2020    Referring Provider: Dr Ambrocio

## 2020-11-06 NOTE — TELEPHONE ENCOUNTER
Returned patient's phone call and scheduled MTM appt for 11/13.    He is planning to start Ozempic 0.25 mg once weekly tomorrow. He has also been instructed to stop the glimepiride but he's concerned to do so because his blood sugar readings are high. Lowest is 250, has gotten as high as 300 or 400. With starting out on Ozempic 0.25 mg dose, this likely won't be very effective at reducing blood sugars, it's just to get used to the medication, so it would be better to continue glimepiride for now and reassess as blood sugar goes down.    Plan:  1. Start Ozempic 0.25 mg once weekly.  2. Continue glimepiride 4 mg twice daily.    Quiana Mckeon, PharmD  Medication Therapy Management Pharmacist  [phone]

## 2020-11-09 DIAGNOSIS — E78.1 HYPERTRIGLYCERIDEMIA: ICD-10-CM

## 2020-11-09 DIAGNOSIS — E11.42 TYPE 2 DIABETES MELLITUS WITH DIABETIC POLYNEUROPATHY, WITHOUT LONG-TERM CURRENT USE OF INSULIN (H): ICD-10-CM

## 2020-11-09 RX ORDER — ATORVASTATIN CALCIUM 40 MG/1
TABLET, FILM COATED ORAL
Qty: 0.1 TABLET | Refills: 0 | OUTPATIENT
Start: 2020-11-09

## 2020-11-10 NOTE — TELEPHONE ENCOUNTER
"Requested Prescriptions   Pending Prescriptions Disp Refills     atorvastatin (LIPITOR) 40 MG tablet [Pharmacy Med Name: ATORVASTATIN 40 MG TABLET] 90 tablet 0     Sig: TAKE 1 TABLET BY MOUTH EVERY DAY       Statins Protocol Passed - 11/9/2020 10:42 AM        Passed - LDL on file in past 12 months     Recent Labs   Lab Test 05/20/20  1229   LDL 40             Passed - No abnormal creatine kinase in past 12 months     No lab results found.             Passed - Recent (12 mo) or future (30 days) visit within the authorizing provider's specialty     Patient has had an office visit with the authorizing provider or a provider within the authorizing providers department within the previous 12 mos or has a future within next 30 days. See \"Patient Info\" tab in inbasket, or \"Choose Columns\" in Meds & Orders section of the refill encounter.              Passed - Medication is active on med list        Passed - Patient is age 18 or older           Refused Prescriptions:                       Disp   Refills    atorvastatin (LIPITOR) 40 MG tablet [Pharm*0.1 ta*0        Sig: TAKE 1 TABLET BY MOUTH EVERY DAY  Refused By: MAYDA ORLANDO  Reason for Refusal: Duplicate      "

## 2020-11-13 ENCOUNTER — ALLIED HEALTH/NURSE VISIT (OUTPATIENT)
Dept: PHARMACY | Facility: CLINIC | Age: 65
End: 2020-11-13
Payer: COMMERCIAL

## 2020-11-13 DIAGNOSIS — E11.42 TYPE 2 DIABETES MELLITUS WITH DIABETIC POLYNEUROPATHY, WITHOUT LONG-TERM CURRENT USE OF INSULIN (H): Primary | ICD-10-CM

## 2020-11-13 PROCEDURE — 99607 MTMS BY PHARM ADDL 15 MIN: CPT | Performed by: PHARMACIST

## 2020-11-13 PROCEDURE — 99606 MTMS BY PHARM EST 15 MIN: CPT | Performed by: PHARMACIST

## 2020-11-13 NOTE — Clinical Note
DENILSON HELM note, thanks!    Quiana Mckeon, PharmD  Medication Therapy Management Pharmacist  [phone]

## 2020-11-13 NOTE — PROGRESS NOTES
MTM ENCOUNTER  SUBJECTIVE/OBJECTIVE:                           Sven Givens is a 65 year old male called for a follow-up visit. He was referred to me from Pam Dela Cruz for diabetes.  Today's visit is a follow-up MTM visit from 6/3/20.    Reason for visit: diabetes follow-up.    Allergies/ADRs: Reviewed in chart  Tobacco: He reports that he quit smoking about 28 years ago. He has never used smokeless tobacco.  Alcohol: none  Caffeine: 1 cups/day of coffee, 0-1 cups/day of tea  Activity: difficult to walk, used to swim in pool often, this has been closed since March, now occasionally lifting weight at home  PMH: Reviewed in EHR    Medication Adherence/Access:   Patient uses pill box(es).  Patient takes medications 2 time(s) per day.   Per patient, misses medication 0 times per week, rarely.   The patient fills medications at [place]: NO, fills medications at [facility] [place].    Type 2 Diabetes:   Glimepiride 4 mg twice daily  Metformin 1000 mg twice daily  Cinnamon 1000 mg twice daily    Ozempic 0.25 mg weekly (Saturday x 1 week)    He's not sure if he injected Ozempic right the first time, planning to hold end of pen down for 5 seconds next time. He did notice some dizziness for a few days after injection, as well as nauseous after meals. Not throwing up, last couple days not a problem. He's also seen physical therapy for this dizziness too, has been given exercises. He's not sure if the dizziness was preexisting or due to Ozempic. Has also been given meclizine for the dizziness (see below).  He'd like to avoid insulin if possible.   Hx:Bladder infection with Invokana in 2019, saw urolgist for this.  Per PCP visit note: Tried Januvia, Actos, Tradjenta, Invokana- Will think about Ozempic and is willing to try this now and OK with insulin if this does not work.   Pt is not experiencing side effects.  SMB time(s) daily, checks when he's exhausted Ranges (patient reported): >250  AM fasting:  300s  2 hours after meal: 280-320  Symptoms of low blood sugar? Dizzy, one time, long time ago, treats with juice or sugar tablets, BG was 85  Symptoms of high blood sugar? polydipsia and fatigue, lost feeling/pain in feet  Diet/Exercise: Eats 4x/day, smaller meals. Doesn't eat fried foods, more vegetables, yogurt, bagel. Limits corn, potatoes, rice   Aspirin: Taking 81mg daily and denies side effects   Statin: Yes: atorvastatin   ACEi/ARB: Yes: losartan.   Urine Albumin:   Lab Results   Component Value Date    UMALCR 95.22 (H) 12/12/2019   ]  Lab Results   Component Value Date    A1C 11.4 11/02/2020    A1C 11.4 05/20/2020    A1C 9.3 12/12/2019    A1C 10.1 07/16/2019    A1C 9.9 03/08/2019     Dizziness:    Meclizine 25 mg three times daily as needed    Meclizine is helpful for the dizziness. Took twice daily for a few days after Ozempic injections. Denies side effects.     Today's Vitals: There were no vitals taken for this visit.    ASSESSMENT:                              Medication Adherence: No issues identified    Type 2 Diabetes: Needs Improvement. Patient is not meeting A1c goal of < 7%. Self monitoring of blood glucose is at goal of fasting  mg/dL and post prandial < 180 mg/dL. Pt would benefit from minimum SMBG: Check blood sugars fasting, and occasionally 2 hours after starting a meal.  GLP-1 agonist (Ozempic) :    Continue at dose : 0.25 mg weekly. Due to very high A1C, addition of basal insulin is warranted, however patient prefers to titrate up on Ozempic first. Aspirin therapy is safe and appropriate.  Cinnamon not effective, address at follow-up. Education on nausea side effect of Ozempic provided.    Dizziness: Because meclizine is helpful for this, it is likely a different mechanism and not related to Ozempic.       PLAN:                            1. Continue Ozempic 0.25 mg once weekly.    2. Goal blood sugars we are looking for to get your A1C less than 7% are between  mg/dL right  away in the morning or before meals, and 2-hours after a meal less than 180 mg/dL.       I spent 22 minutes with this patient today. All changes were made via collaborative practice agreement with Pam Dela Cruz. A copy of the visit note was provided to the patient's primary care provider.    Will follow up in 2 weeks.    The patient was sent via [employer] a summary of these recommendations.     Quiana Mckeon, PharmD  Medication Therapy Management Pharmacist  [phone]      Patient consented to a telehealth visit: yes  Telemedicine Visit Details  Type of service:  Telephone visit  Start Time: 8:32 AM  End Time: 8:54 AM  Originating Location (patient location): Home  Distant Location (provider location):  [facility]  Mode of Communication:  Telephone

## 2020-11-13 NOTE — PATIENT INSTRUCTIONS
Recommendations from today's MTM visit:                                                      1. Continue Ozempic 0.25 mg once weekly.    2. Goal blood sugars we are looking for to get your A1C less than 7% are between  mg/dL right away in the morning or before meals, and 2-hours after a meal less than 180 mg/dL.     It was great to speak with you today.  I value your experience and would be very thankful for your time with providing feedback on our clinic survey. You may receive a survey via email or text message in the next few days.     Next MTM visit: 2 weeks    To schedule another MTM appointment, please call the clinic directly or you may call the MTM scheduling line at [phone] or toll-free at [phone].     My Clinical Pharmacist's contact information:                                                      It was a pleasure talking with you today!  Please feel free to contact me with any questions or concerns you have.      Quiana Mckeon, PharmD  Medication Therapy Management Pharmacist  [phone]

## 2020-11-27 ENCOUNTER — VIRTUAL VISIT (OUTPATIENT)
Dept: PHARMACY | Facility: CLINIC | Age: 65
End: 2020-11-27
Payer: COMMERCIAL

## 2020-11-27 ENCOUNTER — TELEPHONE (OUTPATIENT)
Dept: FAMILY MEDICINE | Facility: CLINIC | Age: 65
End: 2020-11-27

## 2020-11-27 DIAGNOSIS — E11.42 TYPE 2 DIABETES MELLITUS WITH DIABETIC POLYNEUROPATHY, WITHOUT LONG-TERM CURRENT USE OF INSULIN (H): Chronic | ICD-10-CM

## 2020-11-27 PROCEDURE — 99606 MTMS BY PHARM EST 15 MIN: CPT | Mod: TEL | Performed by: PHARMACIST

## 2020-11-27 RX ORDER — SEMAGLUTIDE 1.34 MG/ML
0.5 INJECTION, SOLUTION SUBCUTANEOUS WEEKLY
Qty: 4.5 ML | Refills: 0 | Status: SHIPPED | OUTPATIENT
Start: 2020-11-27 | End: 2021-01-08 | Stop reason: DRUGHIGH

## 2020-11-27 RX ORDER — SEMAGLUTIDE 1.34 MG/ML
0.25 INJECTION, SOLUTION SUBCUTANEOUS WEEKLY
Qty: 9 ML | Refills: 0 | Status: SHIPPED | OUTPATIENT
Start: 2020-11-27 | End: 2020-11-27

## 2020-11-27 NOTE — TELEPHONE ENCOUNTER
Reason for Call:  Other prescription    Detailed comments: pharmacy calling back to get clarification on dosage for Semaglutide  The instructions states 0.25 or 0.5 and they need to know what the actual dose is? Please call to clarify     Phone Number Patient can be reached at: Cell number on file:    Telephone Information:    [phone]       Best Time: Any    Can we leave a detailed message on this number? YES    Call taken on 11/27/2020 at 10:37 AM by Mehreen Curiel

## 2020-11-27 NOTE — TELEPHONE ENCOUNTER
Contacted pharmacy and clarified dose should be Ozempic 0.5 mg weekly.    Thanks!  Quiana Mckeon, PharmD  Medication Therapy Management Pharmacist  [phone]

## 2020-11-27 NOTE — Clinical Note
DENILSON HELM note, thanks!    Quiana Mckeon, PharmD  Medication Therapy Management Pharmacist  [phone]

## 2020-11-27 NOTE — PROGRESS NOTES
MTM ENCOUNTER  SUBJECTIVE/OBJECTIVE:                           Sven Givens is a 65 year old male called for a follow-up visit. He was referred to me from  Pam Dela Cruz for diabetes.  Today's visit is a follow-up MTM visit from 20.     Reason for visit: diabetes follow-up.    Allergies/ADRs: Reviewed in chart  Tobacco: He reports that he quit smoking about 28 years ago. He has never used smokeless tobacco.  Alcohol: none  Alcohol: none  Caffeine: 1 cups/day of coffee, 0-1 cups/day of tea  Activity: difficult to walk, used to swim in pool often, this has been closed since March, now occasionally lifting weight at home  PMH: Reviewed in EHR    Medication Adherence/Access:   Patient uses pill box(es).  Patient takes medications 2 time(s) per day.   Per patient, misses medication 0 times per week, rarely.   The patient fills medications at [place]: NO, fills medications at [facility] [place].    Type 2 Diabetes:   Glimepiride 4 mg twice daily  Metformin 1000 mg twice daily  Cinnamon 1000 mg twice daily    Ozempic 0.25 mg weekly ( x 3 week)    No longer nauseous, dizziness still present, but he feels it is an inner ear issue, going to have a CAT scan next month. Last time, he wasn't sure if the dizziness was preexisting or due to Ozempic. Has also been given meclizine for the dizziness which is helpful (see below).  He'd like to avoid insulin if possible.   Hx:Bladder infection with Invokana in 2019, saw urolgist for this.  Per PCP visit note: Tried Januvia, Actos, Tradjenta, Invokana- Will think about Ozempic and is willing to try this now and OK with insulin if this does not work.   Pt is not experiencing side effects.  SMB time(s) daily, checks when he's exhausted Ranges (patient reported): >250  AM fastins  2 hours after meal: 250s (down from 300s)  Symptoms of low blood sugar? Dizzy, one time, long time ago, treats with juice or sugar tablets, BG was 85  Symptoms of high  blood sugar? polydipsia and fatigue, lost feeling/pain in feet  Diet/Exercise: Eats 4x/day, smaller meals. Doesn't eat fried foods, more vegetables, yogurt, bagel. Limits corn, potatoes, rice   Aspirin: Taking 81mg daily and denies side effects   Statin: Yes: atorvastatin   ACEi/ARB: Yes: losartan.   Urine Albumin:   Lab Results   Component Value Date    UMALCR 95.22 (H) 12/12/2019   ]  Lab Results   Component Value Date    A1C 11.4 11/02/2020    A1C 11.4 05/20/2020    A1C 9.3 12/12/2019    A1C 10.1 07/16/2019    A1C 9.9 03/08/2019     Dizziness:    Meclizine 25 mg three times daily as needed    Meclizine is helpful for the dizziness. Denies side effects.     Today's Vitals: There were no vitals taken for this visit.      ASSESSMENT:                              Medication Adherence: No issues identified    Type 2 Diabetes: Patient is not meeting A1c goal of < 7%. Self monitoring of blood glucose is not at goal of fasting  mg/dL and post prandial < 180 mg/dL. Pt would benefit from minimum SMBG: Check blood sugars fasting, and occasionally 2 hours after starting a meal.  GLP-1 agonist (Ozempic) :  Increase dose : 0.5 mg weekly. Due to very high A1C, addition of basal insulin is warranted, however patient prefers to titrate up on Ozempic first. Aspirin therapy is safe and appropriate.  Cinnamon not effective, address at follow-up. Education on nausea side effect of Ozempic provided.    Dizziness: Because meclizine is helpful for this, it is likely a different mechanism and not related to Ozempic.    PLAN:                              1. Increase Ozempic to 0.5 mg once weekly.    2. Check blood sugar twice daily, in the morning and two hours after a meal.    I spent 13 minutes with this patient today. All changes were made via collaborative practice agreement with Pam Dela Cruz. A copy of the visit note was provided to the patient's primary care provider.    Will follow up in 1 month, with Zita  Ailyn.    The patient was sent via [employer] a summary of these recommendations.     Quiana Mckeon, PharmD  Medication Therapy Management Pharmacist  [phone]      Patient consented to a telehealth visit: yes  Telemedicine Visit Details  Type of service:  Telephone visit  Start Time: 9:01 AM  End Time: 9:14 AM  Originating Location (patient location): Home  Distant Location (provider location):  [facility]  Mode of Communication:  Telephone

## 2020-11-27 NOTE — PATIENT INSTRUCTIONS
Recommendations from today's MTM visit:                                                      1. Increase Ozempic to 0.5 mg once weekly.    2. Check blood sugar twice daily, in the morning and two hours after a meal.    It was great to speak with you today.  I value your experience and would be very thankful for your time with providing feedback on our clinic survey. You may receive a survey via email or text message in the next few days.     Next MTM visit: 1 month    To schedule another MTM appointment, please call the clinic directly or you may call the MTM scheduling line at [phone] or toll-free at [phone].     My Clinical Pharmacist's contact information:                                                      It was a pleasure talking with you today!  Please feel free to contact me with any questions or concerns you have.      Quiana Mckeon, PharmD  Medication Therapy Management Pharmacist  [phone]

## 2020-12-08 ENCOUNTER — ANCILLARY PROCEDURE (OUTPATIENT)
Dept: MRI IMAGING | Facility: CLINIC | Age: 65
End: 2020-12-08
Attending: FAMILY MEDICINE
Payer: COMMERCIAL

## 2020-12-08 DIAGNOSIS — R26.9 GAIT DISTURBANCE: ICD-10-CM

## 2020-12-08 DIAGNOSIS — R42 DIZZINESS: ICD-10-CM

## 2020-12-08 DIAGNOSIS — C34.92 NON-SMALL CELL CARCINOMA OF LUNG, LEFT (H): ICD-10-CM

## 2020-12-08 LAB
CREAT BLD-MCNC: 0.7 MG/DL (ref 0.66–1.25)
GFR SERPL CREATININE-BSD FRML MDRD: >90 ML/MIN/{1.73_M2}

## 2020-12-08 PROCEDURE — A9585 GADOBUTROL INJECTION: HCPCS | Performed by: RADIOLOGY

## 2020-12-08 PROCEDURE — 70553 MRI BRAIN STEM W/O & W/DYE: CPT | Performed by: RADIOLOGY

## 2020-12-08 RX ORDER — GADOBUTROL 604.72 MG/ML
10 INJECTION INTRAVENOUS ONCE
Status: COMPLETED | OUTPATIENT
Start: 2020-12-08 | End: 2020-12-08

## 2020-12-08 RX ADMIN — GADOBUTROL 10 ML: 604.72 INJECTION INTRAVENOUS at 08:40

## 2020-12-09 NOTE — RESULT ENCOUNTER NOTE
Dear Sven    Your test results are attached. I am happy to let you know that they are stable.    The MRI of your brain is normal. The findings are consistent with mild small blood vessel changes that we see with age. Eating heart healthy foods and exercise help prevent heart disease and stroke. This also keeps the blood vessels in the brain healthy. This does not explain your dizziness and it is important to follow up with neurology.     Please contact me by [employer] if you have any questions about your labs or management. You may also call my office number [phone] for any questions.     Pam Dela Cruz MD

## 2020-12-10 ENCOUNTER — VIRTUAL VISIT (OUTPATIENT)
Dept: NEUROLOGY | Facility: CLINIC | Age: 65
End: 2020-12-10
Attending: FAMILY MEDICINE
Payer: COMMERCIAL

## 2020-12-10 DIAGNOSIS — R42 DIZZINESS: Primary | ICD-10-CM

## 2020-12-10 PROCEDURE — 99203 OFFICE O/P NEW LOW 30 MIN: CPT | Mod: 95 | Performed by: INTERNAL MEDICINE

## 2020-12-10 NOTE — PROGRESS NOTES
"Sven Givens is a 65 year old male who is being evaluated via a billable video visit.      The patient has been notified of following:     \"This video visit will be conducted via a call between you and your physician/provider. We have found that certain health care needs can be provided without the need for an in-person physical exam.  This service lets us provide the care you need with a video conversation.  If a prescription is necessary we can send it directly to your pharmacy.  If lab work is needed we can place an order for that and you can then stop by our lab to have the test done at a later time.    Video visits are billed at different rates depending on your insurance coverage.  Please reach out to your insurance provider with any questions.    If during the course of the call the physician/provider feels a video visit is not appropriate, you will not be charged for this service.\"    Patient has given verbal consent for Video visit? Yes    Video start: 7:53 AM  Video end: 8:20 AM    "

## 2020-12-10 NOTE — LETTER
12/10/2020         RE: Sven Givens  [address]  [place] [address]        Dear Colleague,    Thank you for referring your patient, Sven Givens, to the [facility] [place]. Please see a copy of my visit note below.    [facility] Neurology New Patient Visit    Sven Givens MRN# [id number]   Age: 65 year old YOB: 1955     Requesting physician: Pam Ramos     Reason for Consultation: Dizziness      History of Presenting Symptoms:   Sven Givens is a 65 year old male who presents today for evaluation of dizziness.    Dizziness started about 1.5 months ago. Symptoms started abruptly one morning. Dizziness is described as a room spinning sensation. For 3-4 days dizziness was constant. He was throwing up. He had significant difficulties with walking. Every time he got up his wife supported him. He mostly laid in bed for 3 days with his wife helping him go to the bathroom. Around this time patient also noticed a left ear pressure. He denied any ringing in the ear.      Since then dizziness has slowly improved. Currently dizziness is bothering him about every other days. When it comes on it usually lasts for a few seconds and then improves. He notes some dizziness with standing up too fast. He also notes brief dizziness occasionally with turning over in bed. He didn't have this problem prior to the onset of dizziness 1.5 months ago.    Patient has a feeling of left ear blocked, but his hearing is ok. This feeling was much more severe at the onset of the dizziness. He denies any ringing in the ear currently. He an audiology appointment and his hearing turned out ok. He also was seen by ENT.     Balance is now back to normal.     Patient denies any infectious symptoms around the time of the onset of the dizziness. No running nose, fever, chills.       Past Medical History:     Patient Active Problem List   Diagnosis     Advanced  directives, counseling/discussion     Essential hypertension with goal blood pressure less than 140/90     History of adenomatous polyp of colon     GERD (gastroesophageal reflux disease)     Anemia     Hyperlipidemia LDL goal <100     Gout     Type 2 diabetes mellitus with diabetic polyneuropathy, with long-term current use of insulin (H)     History of radiation exposure     Non-small cell carcinoma of lung, left (H)     Nonalcoholic hepatosteatosis     PRESLEY (obstructive sleep apnea)     Past Medical History:   Diagnosis Date     Allergies     PCN, ACE, Indomethocin     Diabetes (H) 2002     Diabetic neuropathy (H) 5/7/2012     Kidney stones 09/2004    s/p right kidney basket retrieval     Rhinitis, allergic      Rosacea      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, overuse, 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     Varicose veins         Past Surgical History:     Past Surgical History:   Procedure Laterality Date     BRONCHOSCOPY FLEXIBLE N/A 9/23/2016    Procedure: BRONCHOSCOPY FLEXIBLE;  Surgeon: Gayle Moya MD;  Location: [facility] OR     COLONOSCOPY  5-03     COLONOSCOPY WITH CO2 INSUFFLATION N/A 5/31/2017    Procedure: COLONOSCOPY WITH CO2 INSUFFLATION;  COLON SCREEN/ SEB;  Surgeon: Margarito Rasheed DO;  Location:  OR     COMBINED ESOPHAGOSCOPY, GASTROSCOPY, DUODENOSCOPY (EGD) WITH CO2 INSUFFLATION N/A 4/19/2019    Procedure: COMBINED ESOPHAGOSCOPY, GASTROSCOPY, DUODENOSCOPY (EGD) WITH CO2 INSUFFLATION;  Surgeon: Abbe Mccarty MD;  Location:  OR     ESOPHAGOSCOPY, GASTROSCOPY, DUODENOSCOPY (EGD), COMBINED N/A 4/19/2019    Procedure: Combined Esophagoscopy, Gastroscopy, Duodenoscopy (Egd), Biopsy Single Or Multiple;  Surgeon: Abbe Mccarty MD;  Location:  OR     GENITOURINARY SURGERY      kidney stones     THORACOSCOPIC WEDGE RESECTION LUNG Left 9/23/2016    Procedure: THORACOSCOPIC WEDGE RESECTION LUNG;  Surgeon: Gayle Moya MD;  Location: [facility] OR     VASCULAR SURGERY      varicose vein         Social History:     Social History     Tobacco Use     Smoking status: Former Smoker     Quit date: 1992     Years since quittin.8     Smokeless tobacco: Never Used     Tobacco comment: 15 + years    Substance Use Topics     Alcohol use: No     Drug use: No        Family History:     Family History   Problem Relation Age of Onset     Cerebrovascular Disease Father      Cancer Sister         roberto         Medications:     Current Outpatient Medications   Medication Sig     albuterol (PROAIR HFA) 108 (90 Base) MCG/ACT inhaler Inhale 2 puffs into the lungs every 4 hours as needed for shortness of breath / dyspnea or wheezing     allopurinol (ZYLOPRIM) 300 MG tablet TAKE 1 TABLET BY MOUTH EVERY DAY     ASPIRIN 81 MG PO TABS 1 TABLET DAILY(*)     atorvastatin (LIPITOR) 40 MG tablet TAKE 1 TABLET BY MOUTH EVERY DAY     Azelaic Acid (FINACEA) 15 % gel Apply small amount twice a day to skin (Patient taking differently: Apply small amount twice a day to skin, as needed)     blood glucose (ONETOUCH ULTRA) test strip USE TO CHECK BLOOD SUGARS TWICE A DAY.     capsaicin (ZOSTRIX) 0.025 % CREA cream Apply 1 g topically 3 times daily     EQL CINNAMON PO Take 1,000 mg by mouth 2 times daily     FISH OIL 1000 MG OR CAPS daily     fluticasone (FLONASE) 50 MCG/ACT nasal spray Spray 1-2 sprays into both nostrils daily     glimepiride (AMARYL) 4 MG tablet TAKE 1 TABLET BY MOUTH TWICE A DAY     hydrochlorothiazide (HYDRODIURIL) 25 MG tablet TAKE 1 TABLET (25 MG) BY MOUTH DAILY FOR BLOOD PRESSURE.     insulin pen needle (31G X 6 MM) 31G X 6 MM miscellaneous Use 1 pen needles daily or as directed.     losartan (COZAAR) 100 MG tablet TAKE 1 TABLET (100 MG) BY MOUTH DAILY FOR BLOOD PRESSURE.     meclizine (ANTIVERT) 25 MG tablet Take 1 tablet (25 mg) by mouth 3 times daily as needed for dizziness     metFORMIN (GLUCOPHAGE) 1000 MG tablet TAKE 1 TABLET BY MOUTH TWICE A DAY WITH MEALS     metoprolol succinate ER (TOPROL-XL)  50 MG 24 hr tablet Take 1 tablet (50 mg) by mouth daily     metroNIDAZOLE 0.75 % EX external gel Apply topically 2 times daily     MULTIPLE VITAMINS OR 1 a day     omeprazole (PRILOSEC) 20 MG DR capsule TAKE 1 CAPSULE (20 MG) BY MOUTH DAILY TAKE 30-60 MINUTES BEFORE A MEAL.     Oxymetazoline HCl (AFRIN NASAL SPRAY NA) Spray in nostril as needed     Semaglutide,0.25 or 0.5MG/DOS, (OZEMPIC, 0.25 OR 0.5 MG/DOSE,) 2 MG/1.5ML SOPN Inject 0.5 mg Subcutaneous once a week     SENEXON-S 8.6-50 MG tablet TAKE 1 TABLET BY MOUTH EVERYDAY AT BEDTIME     terazosin (HYTRIN) 5 MG capsule TAKE 1 CAPSULE (5 MG) BY MOUTH AT BEDTIME FOR BLOOD PRESSURE.     No current facility-administered medications for this visit.      Facility-Administered Medications Ordered in Other Visits   Medication     DOBUTamine 500 mg in dextrose 5% 250 mL (adult std)        Allergies:     Allergies   Allergen Reactions     Penicillins Rash     Ace Inhibitors Cough     Indomethacin Other (See Comments)     Severe dizziness     Invokana [Canagliflozin]      bladder infection        Review of Systems:   As above     Physical Exam:   Vitals: There were no vitals taken for this visit.   General: Seated comfortably in no acute distress.  HEENT: Neck supple with normal range of motion.   Skin: No rashes  Neurologic:     Mental Status: Fully alert, attentive and oriented. Normal memory and fund of knowledge. Language normal, speech clear and fluent, no paraphasic errors.     Cranial Nerves: EOMI with normal smooth pursuit. Facial movements symmetric. Hearing not formally tested but intact to conversation.  No dysarthria.     Motor: No tremors or other abnormal movements observed.      Sensory:Negative Romberg.      Coordination: Finger-nose-finger without dysmetria.     Gait: Normal, steady casual gait.         Data: Pertinent prior to visit   Imaging:  MRI brain 12/8/2020  Impression:  1. No acute intracranial pathology.  2. Minimal  "leukoaraiosis.    Procedures:  10/9/2020 - audiology hearing test unremarkable    Laboratory:  A1c 11.4 (11/2/2020)  TSH 2.4 (5/2020)         Assessment and Plan:   Assessment:  Sven Givens is a 65 year old male who presents today for evaluation of dizziness. About 1.5 months patient woke up with constant room spinning sensation, nausea/vomiting, and balance difficulties. Symptoms were severe for 3-4 days and since have slowly improved. He had feeling of left ear pressure during the initial stages which now feels like sensation of \"water in the ear\". Currently he feels brief sensation of room spinning for a few seconds with getting up too fast or rolling over in bed. Balance is back to normal. Limited neurological exam today is unremarkable.     Based off of history, initial symptoms were likely triggered by vestibular neuritis. Constant vertigo could also be secondary to a stroke and it was good that MRI brain did not reveal any evidence of this. Menière's disease is less likely given normal hearing test and lack of ringing in the ears. There may be a secondary component of BPPV given current report of mild symptoms triggered by head turning, however this could also be a residual effect of vestibular neuritis still in healing phase. We discussed that I would expect to see symptoms continue to improve over time. He would benefit from continuing to do home vestibular therapy exercises as he is currently doing.            Follow up in Neurology clinic as needed should new concerns arise.    Mickey Banks MD   of Neurology  [facility]        Sven Givens is a 65 year old male who is being evaluated via a billable video visit.      The patient has been notified of following:     \"This video visit will be conducted via a call between you and your physician/provider. We have found that certain health care needs can be provided without the need for an in-person physical exam.  This " "service lets us provide the care you need with a video conversation.  If a prescription is necessary we can send it directly to your pharmacy.  If lab work is needed we can place an order for that and you can then stop by our lab to have the test done at a later time.    Video visits are billed at different rates depending on your insurance coverage.  Please reach out to your insurance provider with any questions.    If during the course of the call the physician/provider feels a video visit is not appropriate, you will not be charged for this service.\"    Patient has given verbal consent for Video visit? Yes    Video start: 7:53 AM  Video end: 8:20 AM        Again, thank you for allowing me to participate in the care of your patient.        Sincerely,        Mickey Banks MD    "

## 2020-12-10 NOTE — PROGRESS NOTES
[facility] Neurology New Patient Visit    Sven Givens MRN# [id number]   Age: 65 year old YOB: 1955     Requesting physician: Pam Ramos     Reason for Consultation: Dizziness      History of Presenting Symptoms:   Sven Givens is a 65 year old male who presents today for evaluation of dizziness.    Dizziness started about 1.5 months ago. Symptoms started abruptly one morning. Dizziness is described as a room spinning sensation. For 3-4 days dizziness was constant. He was throwing up. He had significant difficulties with walking. Every time he got up his wife supported him. He mostly laid in bed for 3 days with his wife helping him go to the bathroom. Around this time patient also noticed a left ear pressure. He denied any ringing in the ear.      Since then dizziness has slowly improved. Currently dizziness is bothering him about every other days. When it comes on it usually lasts for a few seconds and then improves. He notes some dizziness with standing up too fast. He also notes brief dizziness occasionally with turning over in bed. He didn't have this problem prior to the onset of dizziness 1.5 months ago.    Patient has a feeling of left ear blocked, but his hearing is ok. This feeling was much more severe at the onset of the dizziness. He denies any ringing in the ear currently. He an audiology appointment and his hearing turned out ok. He also was seen by ENT.     Balance is now back to normal.     Patient denies any infectious symptoms around the time of the onset of the dizziness. No running nose, fever, chills.       Past Medical History:     Patient Active Problem List   Diagnosis     Advanced directives, counseling/discussion     Essential hypertension with goal blood pressure less than 140/90     History of adenomatous polyp of colon     GERD (gastroesophageal reflux disease)     Anemia     Hyperlipidemia LDL goal <100     Gout     Type 2 diabetes mellitus with  diabetic polyneuropathy, with long-term current use of insulin (H)     History of radiation exposure     Non-small cell carcinoma of lung, left (H)     Nonalcoholic hepatosteatosis     PRESLEY (obstructive sleep apnea)     Past Medical History:   Diagnosis Date     Allergies     PCN, ACE, Indomethocin     Diabetes (H)      Diabetic neuropathy (H) 2012     Kidney stones 2004    s/p right kidney basket retrieval     Rhinitis, allergic      Rosacea      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, overuse, 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     Varicose veins         Past Surgical History:     Past Surgical History:   Procedure Laterality Date     BRONCHOSCOPY FLEXIBLE N/A 2016    Procedure: BRONCHOSCOPY FLEXIBLE;  Surgeon: Gayle Moya MD;  Location: [facility] OR     COLONOSCOPY       COLONOSCOPY WITH CO2 INSUFFLATION N/A 2017    Procedure: COLONOSCOPY WITH CO2 INSUFFLATION;  COLON SCREEN/ SEB;  Surgeon: Margarito Rasheed DO;  Location: [facility] OR     COMBINED ESOPHAGOSCOPY, GASTROSCOPY, DUODENOSCOPY (EGD) WITH CO2 INSUFFLATION N/A 2019    Procedure: COMBINED ESOPHAGOSCOPY, GASTROSCOPY, DUODENOSCOPY (EGD) WITH CO2 INSUFFLATION;  Surgeon: Abbe Mccarty MD;  Location: [facility] OR     ESOPHAGOSCOPY, GASTROSCOPY, DUODENOSCOPY (EGD), COMBINED N/A 2019    Procedure: Combined Esophagoscopy, Gastroscopy, Duodenoscopy (Egd), Biopsy Single Or Multiple;  Surgeon: Abbe Mccarty MD;  Location: [facility] OR     GENITOURINARY SURGERY      kidney stones     THORACOSCOPIC WEDGE RESECTION LUNG Left 2016    Procedure: THORACOSCOPIC WEDGE RESECTION LUNG;  Surgeon: Gayle Moya MD;  Location: [facility] OR     VASCULAR SURGERY      varicose vein        Social History:     Social History     Tobacco Use     Smoking status: Former Smoker     Quit date: 1992     Years since quittin.8     Smokeless tobacco: Never Used     Tobacco comment: 15 + years    Substance Use Topics     Alcohol use: No     Drug use: No         Family History:     Family History   Problem Relation Age of Onset     Cerebrovascular Disease Father      Cancer Sister         ovaraaiden         Medications:     Current Outpatient Medications   Medication Sig     albuterol (PROAIR HFA) 108 (90 Base) MCG/ACT inhaler Inhale 2 puffs into the lungs every 4 hours as needed for shortness of breath / dyspnea or wheezing     allopurinol (ZYLOPRIM) 300 MG tablet TAKE 1 TABLET BY MOUTH EVERY DAY     ASPIRIN 81 MG PO TABS 1 TABLET DAILY(*)     atorvastatin (LIPITOR) 40 MG tablet TAKE 1 TABLET BY MOUTH EVERY DAY     Azelaic Acid (FINACEA) 15 % gel Apply small amount twice a day to skin (Patient taking differently: Apply small amount twice a day to skin, as needed)     blood glucose (ONETOUCH ULTRA) test strip USE TO CHECK BLOOD SUGARS TWICE A DAY.     capsaicin (ZOSTRIX) 0.025 % CREA cream Apply 1 g topically 3 times daily     EQL CINNAMON PO Take 1,000 mg by mouth 2 times daily     FISH OIL 1000 MG OR CAPS daily     fluticasone (FLONASE) 50 MCG/ACT nasal spray Spray 1-2 sprays into both nostrils daily     glimepiride (AMARYL) 4 MG tablet TAKE 1 TABLET BY MOUTH TWICE A DAY     hydrochlorothiazide (HYDRODIURIL) 25 MG tablet TAKE 1 TABLET (25 MG) BY MOUTH DAILY FOR BLOOD PRESSURE.     insulin pen needle (31G X 6 MM) 31G X 6 MM miscellaneous Use 1 pen needles daily or as directed.     losartan (COZAAR) 100 MG tablet TAKE 1 TABLET (100 MG) BY MOUTH DAILY FOR BLOOD PRESSURE.     meclizine (ANTIVERT) 25 MG tablet Take 1 tablet (25 mg) by mouth 3 times daily as needed for dizziness     metFORMIN (GLUCOPHAGE) 1000 MG tablet TAKE 1 TABLET BY MOUTH TWICE A DAY WITH MEALS     metoprolol succinate ER (TOPROL-XL) 50 MG 24 hr tablet Take 1 tablet (50 mg) by mouth daily     metroNIDAZOLE 0.75 % EX external gel Apply topically 2 times daily     MULTIPLE VITAMINS OR 1 a day     omeprazole (PRILOSEC) 20 MG DR capsule TAKE 1 CAPSULE (20 MG) BY MOUTH DAILY TAKE 30-60 MINUTES BEFORE A MEAL.      Oxymetazoline HCl (AFRIN NASAL SPRAY NA) Spray in nostril as needed     Semaglutide,0.25 or 0.5MG/DOS, (OZEMPIC, 0.25 OR 0.5 MG/DOSE,) 2 MG/1.5ML SOPN Inject 0.5 mg Subcutaneous once a week     SENEXON-S 8.6-50 MG tablet TAKE 1 TABLET BY MOUTH EVERYDAY AT BEDTIME     terazosin (HYTRIN) 5 MG capsule TAKE 1 CAPSULE (5 MG) BY MOUTH AT BEDTIME FOR BLOOD PRESSURE.     No current facility-administered medications for this visit.      Facility-Administered Medications Ordered in Other Visits   Medication     DOBUTamine 500 mg in dextrose 5% 250 mL (adult std)        Allergies:     Allergies   Allergen Reactions     Penicillins Rash     Ace Inhibitors Cough     Indomethacin Other (See Comments)     Severe dizziness     Invokana [Canagliflozin]      bladder infection        Review of Systems:   As above     Physical Exam:   Vitals: There were no vitals taken for this visit.   General: Seated comfortably in no acute distress.  HEENT: Neck supple with normal range of motion.   Skin: No rashes  Neurologic:     Mental Status: Fully alert, attentive and oriented. Normal memory and fund of knowledge. Language normal, speech clear and fluent, no paraphasic errors.     Cranial Nerves: EOMI with normal smooth pursuit. Facial movements symmetric. Hearing not formally tested but intact to conversation.  No dysarthria.     Motor: No tremors or other abnormal movements observed.      Sensory:Negative Romberg.      Coordination: Finger-nose-finger without dysmetria.     Gait: Normal, steady casual gait.         Data: Pertinent prior to visit   Imaging:  MRI brain 12/8/2020  Impression:  1. No acute intracranial pathology.  2. Minimal leukoaraiosis.    Procedures:  10/9/2020 - audiology hearing test unremarkable    Laboratory:  A1c 11.4 (11/2/2020)  TSH 2.4 (5/2020)         Assessment and Plan:   Assessment:  Sven Givens is a 65 year old male who presents today for evaluation of dizziness. About 1.5 months patient woke up with  "constant room spinning sensation, nausea/vomiting, and balance difficulties. Symptoms were severe for 3-4 days and since have slowly improved. He had feeling of left ear pressure during the initial stages which now feels like sensation of \"water in the ear\". Currently he feels brief sensation of room spinning for a few seconds with getting up too fast or rolling over in bed. Balance is back to normal. Limited neurological exam today is unremarkable.     Based off of history, initial symptoms were likely triggered by vestibular neuritis. Constant vertigo could also be secondary to a stroke and it was good that MRI brain did not reveal any evidence of this. Menière's disease is less likely given normal hearing test and lack of ringing in the ears. There may be a secondary component of BPPV given current report of mild symptoms triggered by head turning, however this could also be a residual effect of vestibular neuritis still in healing phase. We discussed that I would expect to see symptoms continue to improve over time. He would benefit from continuing to do home vestibular therapy exercises as he is currently doing.            Follow up in Neurology clinic as needed should new concerns arise.    Mickey Banks MD   of Neurology  [facility]      "

## 2020-12-22 NOTE — PROGRESS NOTES
MTM ENCOUNTER  SUBJECTIVE/OBJECTIVE:                           Sven Givens is a 65 year old male called for a follow-up visit. He was referred to me from Quiana Mckeon, PharmD.  Today's visit is a follow-up MTM visit from 2020.     Reason for visit: diabetes follow-up.    Allergies/ADRs: Reviewed in chart  Tobacco: He reports that he quit smoking about 28 years ago. He has never used smokeless tobacco.  Alcohol: none  Caffeine: 1 cups/day of coffee, 0-1 cups/day of tea  Activity: difficult to walk, used to swim in pool often, this has been closed since March, now occasionally lifting weight at home  Past Medical History: Reviewed in chart    Medication Adherence/Access:   Patient uses pill box(es).  Patient takes medications 2 time(s) per day.   Per patient, misses medication 0 times per week, rarely.   The patient fills medications at [place]: NO, fills medications at [facility], [place].    Type 2 Diabetes:  Testing supplies: one touch ultra  Following with certified diabetes care and  ([facility])? Last visit 2019.  Pt currently taking:  Glimepiride 4 mg twice daily - before breakfast and before evening meal.  Metformin 1000 mg twice daily  Cinnamon 1000 mg twice daily  Ozempic 0.5 mg subcut injection once weekly on Saturday - reports that he has gotten in 2 doses of the 0.5 mg strength. (dose increased  at last MTM appointment from 0.25 mg to 0.5 mg - has been on 0.5 mg).  GI distress: denies any nausea or upset stomach.  Medication History: invokana (bladder infection in , saw urologist for this). Per PCP note: Tried Januvia, Actos, Tradjenta, Invokana- Will think about Ozempic and is willing to try this now and OK with insulin if this does not work.  Per MTM note, would like to avoid insulin and chose not to start basal insulin in the past, preferring to titrate up ozempic first.  Record of home blood sugars:  SMB-2 times daily.   Ranges (patient reported): see  below  Reports that numbers were between 300-400 or higher before starting ozempic.  167-220s mg/dL in the past 2 weeks.  Symptoms of low blood sugar? None recently  Symptoms of high blood sugar? lost feeling/pain in feet - which he notes has improved, but still has some remaining symptoms.  Date Fasting AM Post-prandial glucose    12/23 176    12/22 188 227   12/21 199 203   12/20 198 217     Lab Results   Component Value Date    A1C 11.4 11/02/2020    A1C 11.4 05/20/2020    A1C 9.3 12/12/2019    A1C 10.1 07/16/2019    A1C 9.9 03/08/2019     Eye exam: due, last 10/16/2019. Patient reports that he had DM eye exam in 10/2020 at [facility].  Foot exam: due, last 4/9/2020.  Diet: eats 3-4 meals/day in small portions. Reports that he likes vegetables. Limits corn, potatoes, rice   Exercise: has been walking outside (about 1.5 miles daily) and doing exercise at home, used to go to the gym - but not recently due to COVID19.  Feels that he was more active before the pandemic.  ASCVD Prevention:  Aspirin: Taking 81mg daily and denies side effects  Statin yes - atorvastatin.  ACEi/ARB: Yes: losartan.   Urine Albumin:   Lab Results   Component Value Date    UMALCR 95.22 (H) 12/12/2019     Heartburn:  Current medications include: Prilosec (omeprazole) 20 mg once daily.  Pt c/o no current symptoms, history of heartburn symptoms.  The patient does not have a history of GI bleed.  The patient does notice symptoms if they miss a dose.  Patient has tried a trial off of therapy and is not interested in doing so. Patient feels that current regimen is effective.    Hypertension:  Current Meds:  Losartan 100 mg: once daily  Hydrochlorothiazide 25 mg: once daily in the morning  Metoprolol succinate 50 mg: once daily  Terazosin 5 mg: once daily in the evening.  Patient will check blood pressure at home if he is having a headaches, then finds it to be 140s/80s. Otherwise, does not check BP regularly.  BP Readings from Last 3 Encounters:    11/02/20 119/74   10/19/20 (!) 155/91   09/16/20 135/88     Gout:   Currently taking allopurinol 300 mg: once daily.. Patient is trying to do a low purine diet.  Still has some gout symptoms in his big toe on both feet maybe 1-2 times/year, becomes swollen and red, painful to walk. This self-resolves after 3-4 days.   One time tried to use a pain medication for gout flare, but did not find much relief in that and stopped taking it, now just lets it self-resolve after 3-4 days.  Uric Acid   Date Value Ref Range Status   10/30/2015 6.2 3.5 - 7.2 mg/dL Final     Rosacea:  Uses topical gels as needed for rosacea outbreaks. Finds these effective  Metronidazole 0.75% external gel: twice daily - Patient use: as needed.  Azalaic acid (Finacea) 15% gel: apply topically twice daily - Patient use: as needed    Today's Vitals: There were no vitals taken for this visit.   Creatinine   Date Value Ref Range Status   05/20/2020 0.59 (L) 0.66 - 1.25 mg/dL Final     ASSESSMENT:                            Medication Adherence: No issues identified    Type 2 Diabetes:  Patient's A1c of 11.4% is not at ADA goal of <7%.   SMBG fasting sugars are not at ADA goal of  mg/dL and post-prandial sugars are not at goal of less than 180 mg/dL.  Patient would benefit from:  Metformin: max dose  Ozempic: will likely increase in future, but will continue x2 more weeks to minimize risk of GI distress with titration.  Sulfonylurea: max dose glimepiride  Supplement: cinnamon - limited evidence of efficacy - recommend stopping to decrease pill burden.  SGLT2: avoiding since history of bladder infection.  HM: due for eye exam (need records), foot exam, annual UACR.  Future: increase GLP-1 dose, basal insulin    Heartburn: Stable    Hypertension: Stable  Patient's BP is at goal of <140/90 last clinic visit.    Gout: Needs further assessment - since Patient continues to have flares 1-2 times/year, recommend repeat uric acid level - last level was  2015. Will draw with next routine labs.    Rosacea: stable      PLAN:                            1. Continue current ozempic dose at 0.5 mg once weekly.  Great job getting routine exercise! Keep this up!  2. Stop cinnamon capsules.  3. Please have [facility] fax a copy of your eye exam records to our clinic Attn: Pam Thao. Fax: [phone].    Future: due for urine albumin creatinine ratio - will complete with next A1c, recommend uric acid with next set of labs.    I spent 27 minutes with this patient today. All changes were made via collaborative practice agreement with Pam Dela Cruz. A copy of the visit note was provided to the patient's primary care provider.    Will follow up in 2 weeks: Wednesday 1/6/2020 at 8am telemedicine.    The patient was sent via [employer] a summary of these recommendations.     Zita Robertson, PharmD  Medication Therapy Management (MTM) Pharmacist  [facility]  Pager: [phone]    Patient consented to a telehealth visit: yes  Telemedicine Visit Details  Type of service:  Telephone visit  Start Time: 10:00 am  End Time: 10:27am  Originating Location (patient location): Home  Distant Location (provider location):  [facility] MTM  Mode of Communication:  Telephone      Medication Therapy Recommendations  Gout    Current Medication: allopurinol (ZYLOPRIM) 300 MG tablet   Rationale: Medication requires monitoring - Needs additional monitoring - Effectiveness   Recommendation: Order Lab - uric acid lab - will complete with future labs   Status: Accepted per CPA         Type 2 diabetes mellitus with diabetic polyneuropathy, with long-term current use of insulin (H)    Rationale: No medical indication at this time - Unnecessary medication therapy - Indication   Recommendation: Discontinue Medication - stop cinnamon   Status: Patient Agreed - Adherence/Education

## 2020-12-23 ENCOUNTER — VIRTUAL VISIT (OUTPATIENT)
Dept: PHARMACY | Facility: CLINIC | Age: 65
End: 2020-12-23
Payer: COMMERCIAL

## 2020-12-23 DIAGNOSIS — M1A.0710 IDIOPATHIC CHRONIC GOUT OF RIGHT FOOT WITHOUT TOPHUS: ICD-10-CM

## 2020-12-23 DIAGNOSIS — L71.9 ROSACEA: ICD-10-CM

## 2020-12-23 DIAGNOSIS — I10 ESSENTIAL HYPERTENSION WITH GOAL BLOOD PRESSURE LESS THAN 140/90: ICD-10-CM

## 2020-12-23 DIAGNOSIS — E11.42 TYPE 2 DIABETES MELLITUS WITH DIABETIC POLYNEUROPATHY, WITHOUT LONG-TERM CURRENT USE OF INSULIN (H): Primary | ICD-10-CM

## 2020-12-23 DIAGNOSIS — K21.00 GASTROESOPHAGEAL REFLUX DISEASE WITH ESOPHAGITIS, UNSPECIFIED WHETHER HEMORRHAGE: ICD-10-CM

## 2020-12-23 PROCEDURE — 99607 MTMS BY PHARM ADDL 15 MIN: CPT | Mod: TEL | Performed by: PHARMACIST

## 2020-12-23 PROCEDURE — 99606 MTMS BY PHARM EST 15 MIN: CPT | Mod: TEL | Performed by: PHARMACIST

## 2020-12-23 NOTE — Clinical Note
BG improving with ozempic, has been on 0.5 mg x2 weeks - will continue dose x 2 more weeks with plan to increase to 1mg dose at next MTM appointment. Recommend uric acid level with next labs. See attached note for details.  KB

## 2020-12-23 NOTE — PATIENT INSTRUCTIONS
Recommendations from today's MTM visit:                                                    MTM (medication therapy management) is a service provided by a clinical pharmacist designed to help you get the most of out of your medicines.      1. Continue current ozempic dose at 0.5 mg once weekly.  Great job getting routine exercise! Keep this up!  2. Stop cinnamon capsules.  3. Please have [facility] fax a copy of your eye exam records to our clinic Attn: Pam Thao. Fax: [phone].    It was great to speak with you today.  I value your experience and would be very thankful for your time with providing feedback on our clinic survey. You may receive a survey via email or text message in the next few days.     Next MTM visit: Wednesday 1/6/2020 at 8am telemedicine.    To schedule another MTM appointment, please call the clinic directly or you may call the MTM scheduling line at [phone] or toll-free at [phone].     My Clinical Pharmacist's contact information:                                                      It was a pleasure talking with you today!  Please feel free to contact me with any questions or concerns you have.      Zita Robertson, PharmD  Medication Therapy Management (MTM) Pharmacist

## 2021-01-06 ENCOUNTER — VIRTUAL VISIT (OUTPATIENT)
Dept: PHARMACY | Facility: CLINIC | Age: 66
End: 2021-01-06
Payer: COMMERCIAL

## 2021-01-06 DIAGNOSIS — E11.42 TYPE 2 DIABETES MELLITUS WITH DIABETIC POLYNEUROPATHY, WITHOUT LONG-TERM CURRENT USE OF INSULIN (H): Primary | ICD-10-CM

## 2021-01-06 PROCEDURE — 99606 MTMS BY PHARM EST 15 MIN: CPT | Mod: TEL | Performed by: PHARMACIST

## 2021-01-06 NOTE — PROGRESS NOTES
Medication Therapy Management (MTM) Encounter    ASSESSMENT/PLAN:                            Medication Adherence/Access: No issues identified    Type 2 Diabetes:  Patient's A1c of 11.4% is not at ADA goal of <7%. SMBG fasting sugars are not at ADA goal of  mg/dL and post-prandial sugars are not specifically known  Patient would benefit from:  Lifestyle modifications - expressed some urgency today with fasting blood sugars still >200 mg/dL.  Metformin: on max dose  Ozempic: Patient initially expressed preference to continue current ozempic dose. I expressed my concern for micro and macrovascular complications with his degree of uncontrolled diabetes, especially considering A1c has not been at goal for several years.   We reviewed next options of increasing ozempic dose or starting insulin and shared decision making was used to increase ozempic dose to 1 mg once weekly.   Sulfonylurea: max dose glimepiride  Supplement: cinnamon - limited evidence of efficacy - recommend stopping to decrease pill burden.  SGLT2: avoiding since history of bladder infection.  HM: due for eye exam (need records), foot exam, annual UACR.  Future: basal insulin    PLAN:   1. Increase ozempic to 1 mg once weekly.  2. Continue getting routine exercise and limiting carbohydrates.  3. Please continue testing blood sugars once daily, sometimes in the AM before eating and sometimes 2 hours after meals.  Your blood sugars goals are:  Before eating (fasting): 80 - 130 mg/dL  2-hours after meals (post-prandial): less than 180 mg/dL    Will follow up in 1 month: 2/10/2021 at 8am telemedicine.    SUBJECTIVE/OBJECTIVE:                           Sven Givens is a 65 year old male called for a follow-up visit. He was referred to me from Quiana Mckeon, PharmD.  Today's visit is a follow-up MTM visit from 12/23/2020.     Reason for visit: diabetes follow-up.    Allergies/ADRs: Reviewed in chart  Tobacco: He reports that he quit smoking about 28  years ago. He has never used smokeless tobacco.    Alcohol: none  Caffeine:1 cups/day of coffee, 0-1 cups/day of tea  Activity: difficult to walk, used to swim in pool often, this has been closed since March, now occasionally lifting weight at home  Past Medical History: Reviewed in chart    Medication Adherence/Access:  Patient uses pill box(es).  Patient takes medications 2 time(s) per day.   Per patient, misses medication 0 times per week, rarely.   The patient fills medications at [place]: NO, fills medications at [facility] [place].    Type 2 Diabetes: reports that he was diagnosed in , then about  started on medication.  Testing supplies: one touch ultra  Following with certified diabetes care and  ([facility])? Last visit 2019.  Pt currently taking:  Glimepiride 4 mg twice daily - before breakfast and before evening meal.  Metformin 1000 mg twice daily  Ozempic 0.5 mg subcut injection once weekly on Saturday (x 4 doses at this dose)  GI distress: denies any nausea or upset stomach.  Medication History: cinnamon (stopped 2020 due to inefficacy), invokana (bladder infection in , saw urologist for this). Per PCP note: Tried Januvia, Actos, Tradjenta, Invokana- Will think about Ozempic and is willing to try this now and OK with insulin if this does not work.  Per MTM note, would like to avoid insulin and chose not to start basal insulin in the past, preferring to titrate up ozempic first.  Record of home blood sugars:  SMB-2 times daily.   Ranges (patient reported): see below  Reports that numbers were between 300-400 or higher before starting ozempic.  Gives 2 morning readings: 202 and 167 mg/dL. 170-210 mg/dL in past 2 weeks.   Symptoms of low blood sugar? None recently  Symptoms of high blood sugar? lost feeling/pain in feet - which he notes has improved over time, but still has some remaining symptoms. Reports that this is worse in the evening time.        Lab  Results   Component Value Date     A1C 11.4 11/02/2020     A1C 11.4 05/20/2020     A1C 9.3 12/12/2019     A1C 10.1 07/16/2019     A1C 9.9 03/08/2019      Eye exam: due, last 10/16/2019. Patient reports that he had DM eye exam in 10/2020 at [facility] - have requested he have records faxed to [facility].  Foot exam: due, last 4/9/2019.  Diet: eats 3-4 meals/day in small portions. Reports that he likes vegetables. Limits corn, potatoes, rice   Exercise: has been walking outside (about 1.5 miles daily) and doing exercise at home, used to go to the gym - but not recently due to COVID19.  Feels that he was more active before the pandemic.  ASCVD Prevention:  Aspirin: Taking 81mg daily and denies side effects  Statin yes - atorvastatin.  ACEi/ARB: Yes: losartan.   Urine Albumin:         Lab Results   Component Value Date     UMALCR 95.22 (H) 12/12/2019      Today's Vitals: There were no vitals taken for this visit.    I spent 15 minutes with this patient today. All changes were made via collaborative practice agreement with Pam Dela Cruz. A copy of the visit note was provided to the patient's primary care provider.    The patient was sent via [employer] a summary of these recommendations.    Zita Robertson, ElizabethD  Medication Therapy Management (MTM) Pharmacist  [facility]  Pager: [phone]    Patient consented to a telehealth visit: yes  Telemedicine Visit Details  Type of service:  Telephone visit  Start Time: 8:05 am  End Time: 8:20 am  Originating Location (patient location): Home  Distant Location (provider location):  [facility] MTM  Mode of Communication:  Telephone      Medication Therapy Recommendations  Type 2 diabetes mellitus with diabetic polyneuropathy, without long-term current use of insulin (H)    Current Medication: semaglutide (OZEMPIC, 1 MG/DOSE,) 2 MG/1.5ML pen   Rationale: Dose too low - Dosage too low - Effectiveness   Recommendation: Increase Dose    Status: Accepted per CPA

## 2021-01-08 RX ORDER — SEMAGLUTIDE 1.34 MG/ML
1 INJECTION, SOLUTION SUBCUTANEOUS
Qty: 9 ML | Refills: 1 | Status: SHIPPED | OUTPATIENT
Start: 2021-01-08 | End: 2021-06-14

## 2021-01-08 NOTE — PATIENT INSTRUCTIONS
Recommendations from today's MTM visit:                                                    MTM (medication therapy management) is a service provided by a clinical pharmacist designed to help you get the most of out of your medicines.      1. Increase ozempic to 1 mg once weekly.  2. Continue getting routine exercise and limiting carbohydrates.  3. Please continue testing blood sugars once daily, sometimes in the AM before eating and sometimes 2 hours after meals.  Your blood sugars goals are:  Before eating (fasting): 80 - 130 mg/dL  2-hours after meals (post-prandial): less than 180 mg/dL    It was great to speak with you today.  I value your experience and would be very thankful for your time with providing feedback on our clinic survey. You may receive a survey via email or text message in the next few days.     Next MTM visit: 2/10/2021 at 8am telemedicine.    To schedule another MTM appointment, please call the clinic directly or you may call the MTM scheduling line at [phone] or toll-free at [phone].     My Clinical Pharmacist's contact information:                                                      It was a pleasure talking with you today!  Please feel free to contact me with any questions or concerns you have.      Zita Robertson, PharmD  Medication Therapy Management (MTM) Pharmacist

## 2021-01-11 DIAGNOSIS — E78.1 HYPERTRIGLYCERIDEMIA: ICD-10-CM

## 2021-01-11 DIAGNOSIS — E11.42 TYPE 2 DIABETES MELLITUS WITH DIABETIC POLYNEUROPATHY, WITHOUT LONG-TERM CURRENT USE OF INSULIN (H): ICD-10-CM

## 2021-01-12 RX ORDER — ATORVASTATIN CALCIUM 40 MG/1
TABLET, FILM COATED ORAL
Qty: 90 TABLET | Refills: 0 | Status: SHIPPED | OUTPATIENT
Start: 2021-01-12 | End: 2021-05-07

## 2021-01-12 NOTE — TELEPHONE ENCOUNTER
Prescription approved per [employer] [facility] Refill Protocol.  Dora Thompson RN  [facility]

## 2021-01-18 ENCOUNTER — MYC MEDICAL ADVICE (OUTPATIENT)
Dept: FAMILY MEDICINE | Facility: CLINIC | Age: 66
End: 2021-01-18

## 2021-01-25 ENCOUNTER — OFFICE VISIT (OUTPATIENT)
Dept: FAMILY MEDICINE | Facility: CLINIC | Age: 66
End: 2021-01-25
Payer: COMMERCIAL

## 2021-01-25 ENCOUNTER — ANCILLARY PROCEDURE (OUTPATIENT)
Dept: GENERAL RADIOLOGY | Facility: CLINIC | Age: 66
End: 2021-01-25
Attending: NURSE PRACTITIONER
Payer: COMMERCIAL

## 2021-01-25 VITALS
WEIGHT: 222 LBS | OXYGEN SATURATION: 97 % | HEIGHT: 68 IN | BODY MASS INDEX: 33.65 KG/M2 | HEART RATE: 112 BPM | SYSTOLIC BLOOD PRESSURE: 130 MMHG | DIASTOLIC BLOOD PRESSURE: 79 MMHG | TEMPERATURE: 98.3 F

## 2021-01-25 DIAGNOSIS — M54.50 ACUTE LEFT-SIDED LOW BACK PAIN WITHOUT SCIATICA: ICD-10-CM

## 2021-01-25 DIAGNOSIS — M54.42 ACUTE LEFT-SIDED LOW BACK PAIN WITH LEFT-SIDED SCIATICA: Primary | ICD-10-CM

## 2021-01-25 DIAGNOSIS — B35.9 RINGWORM: ICD-10-CM

## 2021-01-25 PROCEDURE — 99214 OFFICE O/P EST MOD 30 MIN: CPT | Performed by: NURSE PRACTITIONER

## 2021-01-25 PROCEDURE — 72100 X-RAY EXAM L-S SPINE 2/3 VWS: CPT | Performed by: RADIOLOGY

## 2021-01-25 RX ORDER — TIZANIDINE 2 MG/1
2 TABLET ORAL 3 TIMES DAILY PRN
Qty: 30 TABLET | Refills: 0 | Status: SHIPPED | OUTPATIENT
Start: 2021-01-25 | End: 2022-07-06

## 2021-01-25 RX ORDER — CLOTRIMAZOLE 1 %
CREAM (GRAM) TOPICAL 2 TIMES DAILY
Qty: 30 G | Refills: 1 | Status: SHIPPED | OUTPATIENT
Start: 2021-01-25 | End: 2021-02-04

## 2021-01-25 ASSESSMENT — MIFFLIN-ST. JEOR: SCORE: 1756.49

## 2021-01-25 ASSESSMENT — PAIN SCALES - GENERAL: PAINLEVEL: MILD PAIN (3)

## 2021-01-25 NOTE — PATIENT INSTRUCTIONS
Ice or heat 15 minutes 4-6 times daily  Gentle stretching  Movement is key for early relief of back pain  iburprofen or tylenol for pain  Start tizanidine for muscle pain/spasms. It is a muscle relaxer.  If worsening or not improving in 1 week, follow up with primary care provider, sooner if needed.  If you develop loss of bowel or bladder or numbness in the groin or thighs go to emergency department.    Patient Education     General Neck and Back Pain    Both neck and back pain are usually caused by injury to the muscles or ligaments of the spine. Sometimes the disks that separate each bone of the spine may cause pain by pressing on a nearby nerve. Back and neck pain may appear after a sudden twisting or bending force (such as in a car accident), or sometimes after a simple awkward movement. In either case, muscle spasm is often present and adds to the pain.   Acute neck and back pain usually gets better in 1 to 2 weeks. Pain related to disk disease, arthritis in the spinal joints, or narrowing of the spinal canal (spinal stenosis) can become chronic and last for months or years.   Back and neck pain are common problems. Most people feel better in 1 or 2 weeks, and most of the rest in 1 to 2 months. Most people can remain active.   People have and describe pain differently.    Pain can be sharp, stabbing, shooting, aching, cramping, or burning    Movement, standing, bending, lifting, sitting, or walking may worsen the pain    Pain can be limited to one spot or area, or it can be more generalized    Pain can spread upward, downward, to the front, or go down your arms or legs    Muscle spasm may occur.  Most of the time mechanical problems with the muscles or spine cause the pain. It's usually caused by an injury, whether known or not, to the muscles or ligaments. Pain without an injury is not common. But it can sometimes be caused by a health problem such as kidney stones or an infection. Pain is usually related to  physical activity such as sports, exercise, work, or normal activity. Sometimes it can occur without an identifiable cause. This can happen simply by stretching or moving wrong, without noting pain at the time. Other causes include:     Overexertion, lifting, pushing, pulling incorrectly or too aggressively.    Sudden twisting, bending or stretching from an accident (car or fall), or accidental movement.    Poor posture    Poor conditioning, lack of regular exercise    Spinal disc disease or arthritis    Stress    Pregnancy, or illness like appendicitis, bladder or kidney infection, pelvic infections   Home care    For neck pain: Use a comfortable pillow that supports the head and keeps the spine in a neutral position. The position of the head should not be tilted forward or backward.    When in bed, try to find a position of comfort. A firm mattress is best. Try lying flat on your back with pillows under your knees. You can also try lying on your side with your knees bent up towards your chest and a pillow between your knees.    At first, don't try to stretch out the sore spots. If there is a strain, it's not like the good soreness you get after exercising without an injury. In this case, stretching may make it worse.    Don't sit for long periods, as in long car rides or other travel. This puts more stress on the lower back than standing or walking.    During the first 24 to 72 hours after an injury, apply an ice pack to the painful area for 20 minutes and then remove it for 20 minutes over a period of 60 to 90 minutes or several times a day.     You can alternate ice and heat therapies. Talk with your healthcare provider about the best treatment for your back or neck pain. As a safety precaution, don't use a heating pad at bedtime. Sleeping with a heating pad can lead to skin burns or tissue damage.    Therapeutic massage can help relax the back and neck muscles without stretching them.    Be aware of safe  lifting methods and don't lift anything over 15 pounds until all the pain is gone.    Medicines  Talk to your healthcare provider before using medicine, especially if you have other medical problems or are taking other medicines.     You may use over-the-counter medicine to control pain, unless another pain medicine was prescribed. Talk with your doctor first if you have chronic conditions like diabetes, liver or kidney disease, stomach ulcers, gastrointestinal bleeding, or are taking blood thinner medicines.    Be careful if you are given pain medicines, narcotics, or medicine for muscle spasm. They can cause drowsiness, and can affect your coordination, reflexes, and judgment. Don't drive or operate heavy machinery.  Follow-up care  Follow up with your healthcare provider, or as advised. You may need physical therapy or further tests.   If X-rays were taken, you will be told of any new findings that may affect your care.   Call 911  Call 911 if any of the following occur:     Trouble breathing    Confusion    Very drowsy or trouble awakening    Fainting or loss of consciousness    Rapid or very slow heart rate    Loss of bowel or bladder control  When to seek medical advice  Call your healthcare provider right away if any of these occur:    Pain becomes worse or spreads into your arms or legs    Weakness, numbness or pain in one or both arms or legs    Numbness in the groin area    Trouble walking    Fever of 100.4 F (38 C) or higher, or as directed by your healthcare provider  Linda last reviewed this educational content on 7/1/2016 2000-2020 [employer]. [address] [place] [address], [place] [address]. All rights reserved. This information is not intended as a substitute for professional medical care. Always follow your healthcare professional's instructions.

## 2021-01-25 NOTE — PROGRESS NOTES
Assessment & Plan     Acute left-sided low back pain with left-sided sciatica  No red flags. Tylenol/ibuprofen and the below. Supportive cares discussed. Follow up with ortho if not improving. Emergency department precautions discussed.  - XR Lumbar Spine 2/3 Views; Future  - tiZANidine (ZANAFLEX) 2 MG tablet; Take 1 tablet (2 mg) by mouth 3 times daily as needed for muscle spasms  - Orthopedic & Spine  Referral; Future    Ringworm  Wear loose fitting clothing. Start the below. Follow up if not improving or if worsening.  - clotrimazole (LOTRIMIN) 1 % external cream; Apply topically 2 times daily for 10 days       See Patient Instructions    Ice or heat 15 minutes 4-6 times daily  Gentle stretching  Movement is key for early relief of back pain  iburprofen or tylenol for pain  Start tizanidine for muscle pain/spasms. It is a muscle relaxer.  If worsening or not improving in 1 week, follow up with primary care provider, sooner if needed.  If you develop loss of bowel or bladder or numbness in the groin or thighs go to emergency department.    Return in about 2 weeks (around 2/8/2021), or if symptoms worsen or fail to improve.    JESIKA Zayas CNP  [facility]    Rishabh Machuca is a 65 year old who presents to clinic today for the following health issues     HPI       Back Pain  Onset/Duration: x 1 wk  Description:   Location of pain: low back left  Character of pain: sharp  Pain radiation: radiates into the left buttocks  New numbness or weakness in legs, not attributed to pain: no   Intensity: Currently 3/10, At its worst 7-8/10  Progression of Symptoms: worsening  History:   Specific cause: happened over night  Pain interferes with job: no  History of back problems: no prior back problems  Any previous MRI or X-rays: None  Sees a specialist for back pain: No  Alleviating factors:   Improved by: NSAIDs    Precipitating factors:  Worsened by: Sitting, Lying Flat and  "Walking  Therapies tried and outcome: NSAIDs - helped    Accompanying Signs & Symptoms:  Risk of Fracture: Age >64  Risk of Cauda Equina: None  Risk of Infection: None  Risk of Cancer: History of cancer  Risk of Ankylosing Spondylitis: Onset at age <35, male, AND morning back stiffness  no     Declined annual wellness visit today. Will schedule an appointment to come back.        Pleasant 65 year old male presents with left low back pain x1 week. It is improving but it has not resolved and he feels it should be better by now. Reports that it started overnight. Radiates to left buttock. No loss of bowel or bladder. No saddle anesthesia. No rash. No hx similar. Using ibuprofen 600 mg BID. No injury.    Also has a rash to both shins. Itches. No pain. Round and scaly. No one else with similar rash.    Review of Systems   Constitutional, HEENT, cardiovascular, pulmonary, gi and gu systems are negative, except as otherwise noted.      Objective    /79 (BP Location: Right arm, Patient Position: Sitting, Cuff Size: Adult Regular)   Pulse 112   Temp 98.3  F (36.8  C) (Oral)   Ht 1.727 m (5' 8\")   Wt 100.7 kg (222 lb)   SpO2 97%   BMI 33.75 kg/m    Body mass index is 33.75 kg/m .  Physical Exam   GENERAL: healthy, alert and no distress  RESP: lungs clear to auscultation - no rales, rhonchi or wheezes  CV: regular rate and rhythm, normal S1 S2, no S3 or S4, no murmur, click or rub, no peripheral edema and peripheral pulses strong  MS: no gross musculoskeletal defects noted, no edema  SKIN: 2 lesions to anterior shin that are annular with erythematous borders and scaling/central clearing  NEURO: Normal strength and tone, mentation intact and speech normal  Comprehensive back pain exam:  Tenderness of left lower paraspinal muscle tenderness, Range of motion not limited by pain, Lower extremity strength functional and equal on both sides, Lower extremity reflexes within normal limits bilaterally, Lower extremity " sensation normal and equal on both sides and Straight leg positive on  left, indicating possible ipsilateral radiculopathy  PSYCH: mentation appears normal, affect normal/bright    Results for orders placed or performed in visit on 01/25/21   XR Lumbar Spine 2/3 Views     Status: None    Narrative    LUMBAR SPINE TWO TO THREE VIEWS 1/25/2021 3:18 PM     COMPARISON: None    HISTORY: Focal pain left lower back paraspinal muscles. Acute  left-sided low back pain without sciatica.      Impression    IMPRESSION: Five lumbar-type vertebrae. Normal alignment of the lumbar  vertebrae; however, there is reversal of normal lumbar lordosis  centered at the L2-L3 level. Vertebral body heights are normal. No  fractures. Degenerative endplate spurring at L2-L3 and L3-L4. Facet  arthropathy at L5-S1.    SELVIN MALONE MD

## 2021-02-10 ENCOUNTER — VIRTUAL VISIT (OUTPATIENT)
Dept: PHARMACY | Facility: CLINIC | Age: 66
End: 2021-02-10
Payer: COMMERCIAL

## 2021-02-10 DIAGNOSIS — M1A.0710 IDIOPATHIC CHRONIC GOUT OF RIGHT FOOT WITHOUT TOPHUS: ICD-10-CM

## 2021-02-10 DIAGNOSIS — E11.42 TYPE 2 DIABETES MELLITUS WITH DIABETIC POLYNEUROPATHY, WITHOUT LONG-TERM CURRENT USE OF INSULIN (H): Primary | ICD-10-CM

## 2021-02-10 PROCEDURE — 99607 MTMS BY PHARM ADDL 15 MIN: CPT | Mod: TEL | Performed by: PHARMACIST

## 2021-02-10 PROCEDURE — 99606 MTMS BY PHARM EST 15 MIN: CPT | Mod: TEL | Performed by: PHARMACIST

## 2021-02-10 NOTE — PROGRESS NOTES
Medication Therapy Management (MTM) Encounter    ASSESSMENT:                            Medication Adherence/Access: No issues identified    Type 2 Diabetes:  Patient's A1c of 11.4% is not at ADA goal of <7%. SMBG fasting sugars are not at ADA goal of  mg/dL and post-prandial sugars are not at goal of less than 180 mg/dL. Recommend new testing supplies since Patient's supply is >1 year old.  Patient would benefit from:  Lifestyle modifications - cut out evening fruit: orange, apple, grapefruit before bed. Substitute for protein.  Metformin: on max dose  Ozempic: continue current max dose; long discussion with Patient as he did not see an improvement in BG From 0.5 mg dose to 1 mg dose; we reviewed how his blood sugars checks are just 1 moment in time and it is likely his daytime sugars have been lower since ozempic dose increase; additionally his avearge blood sugars was 280 mg/dL with last A1c and only 1 reading above 280 today. So although not at goal, the ozempic has helped to lower BG, Patient agrees to continue and re-assess after next A1c.  Sulfonylurea: max dose glimepiride - continue.  SGLT2: avoiding since history of bladder infection.  HM: Due for A1c, BMP, UACR.  Future: basal insulin (per PCP note, has previously discussed insulin and this might be next step; per previous MTM note Patient wanted to titrate GLP-1 before basal insulin). Agreed today to review next A1c and then may need to make medication changes, Patient has some inertia with medication changes.      PLAN:                            1. Continue current meds.  2. Switch out fruit evening snack for protein snack: nuts, yogurt, cheese, peanut butter. Continue limiting/avoiding pasta, rice, breads.  Increase physical activity to lower blood sugars.  3. New glucometer - Rx sent to [facility].  4. A1c, BMP, UACR labs - Patient call to schedule appointment.    Follow-up: 1 month with Dora Neumann, PharmD 3/11/2021 at 9am by  telephone.    SUBJECTIVE/OBJECTIVE:                          Sven Givens is a 65 year old male called for a follow-up visit. He was referred to me from Quiana Mckeon, PharmD.  Today's visit is a follow-up MTM visit from 2021.     Reason for visit: diabetes follow-up. States that he feels more energetic overall, but still frustrated that his blood sugars are not at goals; he is unsure the higher dose of ozempic helped his blood sugars much.    Allergies/ADRs: Reviewed in chart  Tobacco: He reports that he quit smoking about 29 years ago. He has never used smokeless tobacco.  Alcohol: none  Caffeine:1 cups/day of coffee, 0-1 cups/day of tea  Activity: walking and home exercise - no gym since COVID.  Past Medical History: Reviewed in chart    Medication Adherence/Access:  Patient uses pill box(es).  Patient takes medications 2 time(s) per day.   Per patient, misses medication 0 times per week, rarely. *2/10: did miss 1 ozempic dose.  The patient fills medications at [place]: NO, fills medications at [facility], [place].    Type 2 Diabetes:  Testing supplies: one touch ultra - estimates that supplies about about 10 years old.  Following with certified diabetes care and  ([facility])? Last visit 2019.  Pt currently taking:  Glimepiride 4 mg twice daily - before breakfast and before evening meal.  Metformin 1000 mg twice daily  Ozempic 1 mg subcut injection once weekly on Saturday (dose increased last MTM appointment, Patient started 21) - states that he missed the ozempic dose on one week.  GI distress: denies any nausea or upset stomach.  Medication History: cinnamon (stopped 2020 due to inefficacy), invokana (bladder infection in 2019, saw urologist for this). Per PCP note: Tried Januvia, Actos, Tradjenta, Invokana.  Record of home blood sugars:  SMB-2 times daily.   Ranges (patient reported): see below  Reports that numbers were between 300-400 or higher before starting  ozempic.  Symptoms of low blood sugar? None recently  Symptoms of high blood sugar? lost feeling/pain in feet - which he notes has improved over time, but still has some remaining symptoms. Reports that this is worse in the evening time.  One morning reading of 167 mg/dL  Date Fasting AM Before Bedtime   2/10 201    2/9 206 203   2/8 219 130   2/7 222 220   2/6 189 316    210 190               Lab Results   Component Value Date     A1C 11.4 11/02/2020     A1C 11.4 05/20/2020     A1C 9.3 12/12/2019     A1C 10.1 07/16/2019     A1C 9.9 03/08/2019    Eye exam: due, last 10/16/2019. Patient reports that he had DM eye exam in 10/2020 at [facility] - Patient instructed to have records sent to clinic.  Foot exam: due, last 4/9/2019.  Diet: eats 3-4 meals/day in small portions. Reports that he likes vegetables. Limits corn, potatoes, rice. Evening meal 5pm, Eats fruit late night before bed: orange, apple, grapefruit - no other late night snacking. Bed 9pm, fasting AM blood sugars at 6am.  Exercise: walking outside (about 1.5 miles daily- when not too cold) and doing exercise at home, used to go to the gym - but not recently due to COVID19.  Feels that he was more active before the pandemic.  ASCVD Prevention:  Aspirin: Taking 81mg daily and denies side effects  Statin yes - atorvastatin.  ACEi/ARB: Yes: losartan.   Urine Albumin:             Lab Results   Component Value Date     UMALCR 95.22 (H) 12/12/2019      Today's Vitals: There were no vitals taken for this visit.  ----------------    I spent 19 minutes with this patient today. All changes were made via collaborative practice agreement with Pam Dela Cruz. A copy of the visit note was provided to the patient's primary care provider.    The patient was sent via [employer] a summary of these recommendations.    Zita Robertson, ElizabethD  Medication Therapy Management (MTM) Pharmacist  [facility]  Pager: [phone]    Telemedicine Visit Details  Type of service:   Telephone visit  Start Time: 8:00 am  End Time: 8:19 am  Originating Location (patient location): Home  Distant Location (provider location):  [facility] MTM      Medication Therapy Recommendations  Gout    Current Medication: allopurinol (ZYLOPRIM) 300 MG tablet   Rationale: Medication requires monitoring - Needs additional monitoring - Effectiveness   Recommendation: Order Lab - uric acid lab - will complete with future labs   Status: Accepted per CPA   Note: lab future ordered 2/10/21         Type 2 diabetes mellitus with diabetic polyneuropathy, with long-term current use of insulin (H)    Current Medication: blood glucose monitoring (NO BRAND SPECIFIED) meter device kit   Rationale: More effective medication available - Ineffective medication - Effectiveness   Recommendation: Change Medication - update glucometer supplies   Status: Accepted per CPA         Type 2 diabetes mellitus with diabetic polyneuropathy, without long-term current use of insulin (H)    Current Medication: metFORMIN (GLUCOPHAGE) 1000 MG tablet   Rationale: Medication requires monitoring - Needs additional monitoring - Safety   Recommendation: Order Lab   Status: Accepted per CPA   Note: future labs ordered  pt made appt

## 2021-02-11 NOTE — PATIENT INSTRUCTIONS
Recommendations from today's MTM visit:                                                    MTM (medication therapy management) is a service provided by a clinical pharmacist designed to help you get the most of out of your medicines.      1. Continue current meds.  2. Switch out fruit evening snack for protein snack: nuts, yogurt, cheese, peanut butter. Continue limiting/avoiding pasta, rice, breads.  Increase physical activity to lower blood sugars.  3. New glucometer - Prescription sent to [facility].  4. Due for labs: A1c, BMP, UACR labs  - appointment made.    It was great to speak with you today.  I value your experience and would be very thankful for your time with providing feedback on our clinic survey. You may receive a survey via email or text message in the next few days.     Next MTM visit:  1 month with Dora Neumann PharmD 3/11/2021 at 9am by telephone.    To schedule another MTM appointment, please call the clinic directly or you may call the MTM scheduling line at [phone] or toll-free at [phone].     My Clinical Pharmacist's contact information:                                                      It was a pleasure talking with you today!  Please feel free to contact me with any questions or concerns you have.      Zita Robertson PharmD  Medication Therapy Management (MTM) Pharmacist

## 2021-02-12 ENCOUNTER — TELEPHONE (OUTPATIENT)
Dept: PHARMACY | Facility: CLINIC | Age: 66
End: 2021-02-12

## 2021-02-12 NOTE — TELEPHONE ENCOUNTER
Sven left voicemail with me, he thought I had called yesterday about an appointment, but I did not - will check with Zita Ailyn to see if she needed to get in contact with Sven? They had an appointment this week.    Quiana Mckeon, PharmD  Medication Therapy Management Pharmacist  [phone]

## 2021-02-12 NOTE — TELEPHONE ENCOUNTER
I had an appointment with patient Wednesday. I did not try calling him back so I am unsure what the VM was about.    I will send Patient a MyChart to see if he needs anything.    Zita Robertson, PharmD  Medication Therapy Management (MTM) Pharmacist

## 2021-02-16 DIAGNOSIS — M1A.0710 IDIOPATHIC CHRONIC GOUT OF RIGHT FOOT WITHOUT TOPHUS: ICD-10-CM

## 2021-02-16 DIAGNOSIS — E11.42 TYPE 2 DIABETES MELLITUS WITH DIABETIC POLYNEUROPATHY, WITHOUT LONG-TERM CURRENT USE OF INSULIN (H): ICD-10-CM

## 2021-02-16 LAB
ANION GAP SERPL CALCULATED.3IONS-SCNC: 7 MMOL/L (ref 3–14)
BUN SERPL-MCNC: 17 MG/DL (ref 7–30)
CALCIUM SERPL-MCNC: 9.3 MG/DL (ref 8.5–10.1)
CHLORIDE SERPL-SCNC: 104 MMOL/L (ref 94–109)
CO2 SERPL-SCNC: 28 MMOL/L (ref 20–32)
CREAT SERPL-MCNC: 0.71 MG/DL (ref 0.66–1.25)
CREAT UR-MCNC: 226 MG/DL
GFR SERPL CREATININE-BSD FRML MDRD: >90 ML/MIN/{1.73_M2}
GLUCOSE SERPL-MCNC: 167 MG/DL (ref 70–99)
HBA1C MFR BLD: 8.4 % (ref 0–5.6)
MICROALBUMIN UR-MCNC: 72 MG/L
MICROALBUMIN/CREAT UR: 31.9 MG/G CR (ref 0–17)
POTASSIUM SERPL-SCNC: 4 MMOL/L (ref 3.4–5.3)
SODIUM SERPL-SCNC: 139 MMOL/L (ref 133–144)
URATE SERPL-MCNC: 4.3 MG/DL (ref 3.5–7.2)

## 2021-02-16 PROCEDURE — 80048 BASIC METABOLIC PNL TOTAL CA: CPT | Performed by: FAMILY MEDICINE

## 2021-02-16 PROCEDURE — 83036 HEMOGLOBIN GLYCOSYLATED A1C: CPT | Performed by: FAMILY MEDICINE

## 2021-02-16 PROCEDURE — 36415 COLL VENOUS BLD VENIPUNCTURE: CPT | Performed by: FAMILY MEDICINE

## 2021-02-16 PROCEDURE — 82043 UR ALBUMIN QUANTITATIVE: CPT | Performed by: PHARMACIST

## 2021-02-16 PROCEDURE — 84550 ASSAY OF BLOOD/URIC ACID: CPT | Performed by: FAMILY MEDICINE

## 2021-02-17 NOTE — RESULT ENCOUNTER NOTE
Dear Sven    Your test results are attached.     The uric acid level is very low. If you would like, we can decrease the dose to 200 mg. Let me know.     Please contact me by [email or web address] if you have any questions about your labs or management. You may also call my office number [phone] for any questions.     Pam Dela Cruz MD

## 2021-03-11 ENCOUNTER — VIRTUAL VISIT (OUTPATIENT)
Dept: PHARMACY | Facility: CLINIC | Age: 66
End: 2021-03-11
Payer: COMMERCIAL

## 2021-03-11 DIAGNOSIS — E11.42 TYPE 2 DIABETES MELLITUS WITH DIABETIC POLYNEUROPATHY, WITHOUT LONG-TERM CURRENT USE OF INSULIN (H): Primary | ICD-10-CM

## 2021-03-11 DIAGNOSIS — I10 ESSENTIAL HYPERTENSION WITH GOAL BLOOD PRESSURE LESS THAN 140/90: ICD-10-CM

## 2021-03-11 PROCEDURE — 99606 MTMS BY PHARM EST 15 MIN: CPT | Mod: TEL | Performed by: PHARMACIST

## 2021-03-11 PROCEDURE — 99607 MTMS BY PHARM ADDL 15 MIN: CPT | Mod: TEL | Performed by: PHARMACIST

## 2021-03-11 NOTE — PATIENT INSTRUCTIONS
1. I spoke to the pharmacy and they informed me that your current supply of test strips is for 50 days -- so you should continue to test blood sugar twice a day, and you can refill your test strips when you run out.    2. Continue your current medications without change for now. Continue to make healthy, low carb diet choices, and work on getting at least 30 minutes of exercise every day. We will review your blood sugar levels again in 3 months.    Follow-up: 2 months for lab recheck    It was great to speak with you today.  I value your experience and would be very thankful for your time with providing feedback on our clinic survey. You may receive a survey via email or text message in the next few days. Please feel free to contact me with any questions or concerns you have.       Doar Neumann, Pharm.D., [facility]   Medication Therapy Management Pharmacist   Voicemail: [phone]   Appointments: [phone]

## 2021-03-11 NOTE — PROGRESS NOTES
Medication Therapy Management (MTM) Encounter    ASSESSMENT:                            Medication Adherence/Access: No issues identified    Type 2 Diabetes: Patient is not meeting A1c goal of < 7%, but slowly improving.  Self monitoring of blood glucose is at goal of fasting  mg/dL but is at goal post prandial < 180 mg/dL.  Will work on diet/exercise over next 2 months, and if updated labs still not at goal, will need to consider long-acting insulin.  Metformin :  stay on the same dose. On max dose.  SFU (glimepiride) :  stay on the same dose. On max dose.  GLP-1 agonist (Ozempic) :  stay on the same dose. On max dose.    Hypertension: Stable.    PLAN:                            1. I spoke to the pharmacy and they informed me that your current supply of test strips is for 50 days -- so you should continue to test blood sugar twice a day, and you can refill your test strips when you run out.  2. Continue your current medications without change for now. Continue to make healthy, low carb diet choices, and work on getting at least 30 minutes of exercise every day. We will review your blood sugar levels again in 3 months.    Follow-up: 2 months for lab recheck    SUBJECTIVE/OBJECTIVE:                          Sven Givens is a 65 year old male called for a follow-up visit. He was referred to me from Pam Dela Cruz .  Today's visit is a follow-up MTM visit from 2/10/21 visit with Zita Robertson PharmD.     Reason for visit: blood sugar check.     Tobacco: He reports that he quit smoking about 29 years ago. He has never used smokeless tobacco.  Alcohol: none    Medication Adherence/Access: no issues reported    Type 2 Diabetes: Currently taking Metformin 1000mg BID, Ozempic 1mg daily (finishing up supply of 0.5mg pens - taking 2 shots and has been on this dose since January), glimepiride 4mg twice daily.   He is hesitant to add more medication at this time.   Per chart review, he has discussed the option of  long-acting insulin with PharmD in the past.  Medication history: avoiding SGLT2 due to hx of bladder infection. Has also tried Januvia, Tradjenta, Invokana in the past which does not appear to have worked well for him.  SMBG Ranges (patient reported): new meter and supplies - he received 100 test strips and states if this is a 90 day supply, he won't be able to test twice daily. I called the pharmacy and was told they billed 100 test strips as a 50 day supply, so he will be able to test twice a day and refill when he runs out of strips.  FBG around 175-200mg/dL, 2hr PP around 140-160mg/dL  Symptoms of low blood sugar? none.   Symptoms of high blood sugar? none  Diet/Exercise: Eats 4x/day - low carb, no pasta  Breakfast: yogurt, coffee  Lunch: [place] - turkey or ham, soup or chicken salad  Dinner: Leftovers  Snack: Maybe 1 cookie or small piece of cheese  Walking 30minutes outside in good weather or 20min on treadmill between 2-4pm. Stopped going to gym during pandemic  Aspirin: Taking 81mg daily and denies side effects  Statin: Yes: atorvastatin 40mg daily and denies side effects  ACEi/ARB: Yes: losartan 100mg daily. Urine albumin is   Lab Results   Component Value Date    UMALCR 31.90 (H) 02/16/2021       Lab Results   Component Value Date    A1C 8.4 02/16/2021    A1C 11.4 11/02/2020    A1C 11.4 05/20/2020    A1C 9.3 12/12/2019    A1C 10.1 07/16/2019        Hypertension: Current medications include hydrochlorothiazide 25mg daily, losartan 100mg daily, metoprolol ER 50mg daily.  Patient does self-monitor blood pressure. Home BP monitoring in range of 120's systolic over 70-80's diastolic.  Patient reports no current medication side effects.  BP Readings from Last 3 Encounters:   01/25/21 130/79   11/02/20 119/74   10/19/20 (!) 155/91       Today's Vitals: There were no vitals taken for this visit.  ----------------      I spent 27 minutes with this patient today. All changes were made via collaborative practice  agreement with Pam Dela Cruz. A copy of the visit note was provided to the patient's primary care provider.    The patient was sent via [employer] a summary of these recommendations.     Dora Neumann, Pharm.D., [facility]  Medication Therapy Management Pharmacist  [phone]    Telemedicine Visit Details  Type of service:  Telephone visit  Start Time: 9:00 AM  End Time: 9:27 AM  Originating Location (patient location): Home  Distant Location (provider location):  [facility]

## 2021-04-10 ENCOUNTER — HEALTH MAINTENANCE LETTER (OUTPATIENT)
Age: 66
End: 2021-04-10

## 2021-05-07 ENCOUNTER — ANCILLARY PROCEDURE (OUTPATIENT)
Dept: CARDIOLOGY | Facility: CLINIC | Age: 66
End: 2021-05-07
Attending: INTERNAL MEDICINE
Payer: COMMERCIAL

## 2021-05-07 ENCOUNTER — OFFICE VISIT (OUTPATIENT)
Dept: CARDIOLOGY | Facility: CLINIC | Age: 66
End: 2021-05-07
Payer: COMMERCIAL

## 2021-05-07 VITALS
SYSTOLIC BLOOD PRESSURE: 117 MMHG | DIASTOLIC BLOOD PRESSURE: 77 MMHG | BODY MASS INDEX: 33.27 KG/M2 | OXYGEN SATURATION: 97 % | WEIGHT: 218.8 LBS | HEART RATE: 111 BPM

## 2021-05-07 DIAGNOSIS — Z79.4 TYPE 2 DIABETES MELLITUS WITH DIABETIC POLYNEUROPATHY, WITH LONG-TERM CURRENT USE OF INSULIN (H): ICD-10-CM

## 2021-05-07 DIAGNOSIS — I47.11 INAPPROPRIATE SINUS TACHYCARDIA (H): ICD-10-CM

## 2021-05-07 DIAGNOSIS — I25.10 CORONARY ARTERY DISEASE INVOLVING NATIVE CORONARY ARTERY OF NATIVE HEART WITHOUT ANGINA PECTORIS: Primary | ICD-10-CM

## 2021-05-07 DIAGNOSIS — E78.1 HYPERTRIGLYCERIDEMIA: ICD-10-CM

## 2021-05-07 DIAGNOSIS — E11.42 TYPE 2 DIABETES MELLITUS WITH DIABETIC POLYNEUROPATHY, WITH LONG-TERM CURRENT USE OF INSULIN (H): ICD-10-CM

## 2021-05-07 DIAGNOSIS — E11.42 TYPE 2 DIABETES MELLITUS WITH DIABETIC POLYNEUROPATHY, WITHOUT LONG-TERM CURRENT USE OF INSULIN (H): ICD-10-CM

## 2021-05-07 PROCEDURE — 93241 XTRNL ECG REC>48HR<7D: CPT | Performed by: INTERNAL MEDICINE

## 2021-05-07 PROCEDURE — 93000 ELECTROCARDIOGRAM COMPLETE: CPT | Performed by: INTERNAL MEDICINE

## 2021-05-07 PROCEDURE — 99215 OFFICE O/P EST HI 40 MIN: CPT | Mod: GC | Performed by: INTERNAL MEDICINE

## 2021-05-07 RX ORDER — ICOSAPENT ETHYL 1 G/1
1 CAPSULE ORAL 2 TIMES DAILY
Qty: 180 CAPSULE | Refills: 3 | Status: SHIPPED | OUTPATIENT
Start: 2021-05-07 | End: 2022-06-22

## 2021-05-07 RX ORDER — IVABRADINE 5 MG/1
5 TABLET, FILM COATED ORAL 2 TIMES DAILY WITH MEALS
Qty: 90 TABLET | Refills: 3 | Status: SHIPPED | OUTPATIENT
Start: 2021-05-07 | End: 2021-05-28

## 2021-05-07 RX ORDER — ATORVASTATIN CALCIUM 80 MG/1
80 TABLET, FILM COATED ORAL DAILY
Qty: 90 TABLET | Refills: 3 | Status: SHIPPED | OUTPATIENT
Start: 2021-05-07 | End: 2022-02-03

## 2021-05-07 ASSESSMENT — PAIN SCALES - GENERAL: PAINLEVEL: NO PAIN (0)

## 2021-05-07 NOTE — PATIENT INSTRUCTIONS
Ivabradine 5 mg twice daily. Do not  if co-pay is high, and let us know.   Vacsepa 1 capsule twice daily. Do not  if co-pay is high, and let us know.   Increase Lipitor to 80 mg every day.   Ziopatch 3 days, today.   Lab (FLP) in 2 months.

## 2021-05-07 NOTE — PROGRESS NOTES
[facility] Cardiology Consultation:    Assessment and Plan:     1. Sinus tachycardia - inappropriate sinus tach, possibly from autonomic dysfunction 2/2 diabetes - will further differentiate with ambulatory rhythm monitoring (Zio Patch) - start ivabradine 5mg BID  2. Mild CAD, normal LV fxn, coronary CTA with CAC of 104: Cont aspirin and statin.  3. Dyslipidemia, primary prevention: LDL goal <70, Non-HDL goal <100, although LDL is at goal, other atherogenic lipoprotein cholesterol may still be elevated - will increase lipitor to 80mg and add Vascepa for elevated triglycerides, repeat lipid panel in 2 months  4. HTN: Controlled  5. Type 2 diabetes mellitus: did not tolerate SGLT2 inhibitor due to bladder infx, on metformin, glimepiride and semaglutide  6. Lung cancer stage 1 s/p resection    RTC in 3 months, after repeat lipid panel    Patient was seen and discussed with attending physician, Dr. Jesus Shane MD.     Sami Shipley MD, PhD  Cardiology Fellow  Click to text page [phone]    I have seen and examined the patient, reviewed labs and tests. I have discussed my findings and treatment recommendations with the house staff and/or Cardiology fellow and agree with their assessment and plan as outlined in the note.    Jesus Shane MD    Cardiac Imaging and Prevention  [facility]  [email or web address] Pager: [phone]      HPI: referred by Dr Dela Cruz for chest pain. His risk factors include htn, dm, dyslipidemia, and prior smoking. CP is somewhat atypical, as he gets it at rest. But it is also exertional. A dobutamine stress echo was ordered, but was canceled due to atrial arrhythmia with dobutamine. Resting echo was normal. He went to the ED last week when he ruled out for a MI, and a nuclear stress test was ordered.      Interval history: Pt did have a CCTA after being seen above. CCTA found an incidental lung nodule. This was worked up and found to be stage 1  "cancer. Pt actually had this surgically removed. Separately, pt has had an Apple Watch for the last 4 years. Pt has recently been having high heart rate alarms, with rates between 120-145 bpm. His elevated HR is not associated with activity - his HR can be elevated even when sitting at a computer. Pt showed me his HR trend on his iPhone - there seems to be a morning-predominance in elevated HR.     During these episodes of high HR, pt does not experience chest pain, but does \"feel\" the high HR. He does not otherwise feel dyspneic, dizziness, syncope, or edema.     EXAM:  /77 (BP Location: Left arm, Patient Position: Sitting, Cuff Size: Adult Large)   Pulse 111   Wt 99.2 kg (218 lb 12.8 oz)   SpO2 97%   BMI 33.27 kg/m    GEN/CONSTITUIONAL: Appears comfortable, in no apparent distress   EYES: No icterus  ENT/MOUTH: Normal  JVP:  Not visible  RESPIRATORY: Clear to auscultation bilaterally   CARDIOVASCULAR: Regular S1 and S2, no murmurs, rubs, or gallops.   ABDOMEN: Soft, non-tender, positive bowel sounds   NEUROLOGIC: Grossly non-focal   PSYCHIATRIC: Normal affect  EXT: No cyanosis, clubbing, edema. Normal pedal pulses.  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     Varicose veins        CURRENT MEDICATIONS:  Current Outpatient Medications   Medication     allopurinol (ZYLOPRIM) 100 MG tablet     ASPIRIN 81 MG PO TABS     atorvastatin (LIPITOR) 40 MG tablet     Azelaic Acid (FINACEA) 15 % gel     blood glucose (NO BRAND SPECIFIED) lancets standard     blood glucose (NO BRAND SPECIFIED) test strip     blood glucose " monitoring (NO BRAND SPECIFIED) meter device kit     FISH OIL 1000 MG OR CAPS     fluticasone (FLONASE) 50 MCG/ACT nasal spray     glimepiride (AMARYL) 4 MG tablet     hydrochlorothiazide (HYDRODIURIL) 25 MG tablet     insulin pen needle (31G X 6 MM) 31G X 6 MM miscellaneous     losartan (COZAAR) 100 MG tablet     metFORMIN (GLUCOPHAGE) 1000 MG tablet     metoprolol succinate ER (TOPROL-XL) 50 MG 24 hr tablet     metroNIDAZOLE 0.75 % EX external gel     MULTIPLE VITAMINS OR     omeprazole (PRILOSEC) 20 MG DR capsule     Oxymetazoline HCl (AFRIN NASAL SPRAY NA)     semaglutide (OZEMPIC, 1 MG/DOSE,) 2 MG/1.5ML pen     SENEXON-S 8.6-50 MG tablet     terazosin (HYTRIN) 5 MG capsule     tiZANidine (ZANAFLEX) 2 MG tablet     No current facility-administered medications for this visit.      Facility-Administered Medications Ordered in Other Visits   Medication     DOBUTamine 500 mg in dextrose 5% 250 mL (adult std)       PAST SURGICAL HISTORY:  Past Surgical History:   Procedure Laterality Date     BRONCHOSCOPY FLEXIBLE N/A 9/23/2016    Procedure: BRONCHOSCOPY FLEXIBLE;  Surgeon: Gayle Moya MD;  Location: [facility] OR     COLONOSCOPY  5-03     COLONOSCOPY WITH CO2 INSUFFLATION N/A 5/31/2017    Procedure: COLONOSCOPY WITH CO2 INSUFFLATION;  COLON SCREEN/ SEB;  Surgeon: Margarito Rasheed DO;  Location: [facility] OR     COMBINED ESOPHAGOSCOPY, GASTROSCOPY, DUODENOSCOPY (EGD) WITH CO2 INSUFFLATION N/A 4/19/2019    Procedure: COMBINED ESOPHAGOSCOPY, GASTROSCOPY, DUODENOSCOPY (EGD) WITH CO2 INSUFFLATION;  Surgeon: Abbe Mccarty MD;  Location: [facility] OR     ESOPHAGOSCOPY, GASTROSCOPY, DUODENOSCOPY (EGD), COMBINED N/A 4/19/2019    Procedure: Combined Esophagoscopy, Gastroscopy, Duodenoscopy (Egd), Biopsy Single Or Multiple;  Surgeon: Abbe Mccarty MD;  Location: [facility] OR     GENITOURINARY SURGERY      kidney stones     THORACOSCOPIC WEDGE RESECTION LUNG Left 9/23/2016    Procedure: THORACOSCOPIC WEDGE RESECTION  LUNG;  Surgeon: Gayle Moya MD;  Location: [facility] OR     VASCULAR SURGERY      varicose vein       ALLERGIES     Allergies   Allergen Reactions     Penicillins Rash     Ace Inhibitors Cough     Indomethacin Other (See Comments)     Severe dizziness     Invokana [Canagliflozin]      bladder infection       FAMILY HISTORY:  Family History   Problem Relation Age of Onset     Cerebrovascular Disease Father      Cancer Sister         ovarary        SOCIAL HISTORY:  Social History     Socioeconomic History     Marital status:      Spouse name: Not on file     Number of children: Not on file     Years of education: Not on file     Highest education level: Not on file   Occupational History     Employer: [employer]   Social Needs     Financial resource strain: Not on file     Food insecurity     Worry: Not on file     Inability: Not on file     Transportation needs     Medical: Not on file     Non-medical: Not on file   Tobacco Use     Smoking status: Former Smoker     Quit date: 1992     Years since quittin.2     Smokeless tobacco: Never Used     Tobacco comment: 15 + years    Substance and Sexual Activity     Alcohol use: No     Drug use: No     Sexual activity: Not Currently     Partners: Female     Birth control/protection: None   Lifestyle     Physical activity     Days per week: Not on file     Minutes per session: Not on file     Stress: Not on file   Relationships     Social connections     Talks on phone: Not on file     Gets together: Not on file     Attends Gnosticism service: Not on file     Active member of club or organization: Not on file     Attends meetings of clubs or organizations: Not on file     Relationship status: Not on file     Intimate partner violence     Fear of current or ex partner: Not on file     Emotionally abused: Not on file     Physically abused: Not on file     Forced sexual activity: Not on file   Other Topics Concern     Parent/sibling w/ CABG, MI or angioplasty  before 65F 55M? No   Social History Narrative     Not on file       ROS:   Constitutional: No fever, chills, or sweats. No weight gain/loss   ENT: No visual disturbance, ear ache, epistaxis, sore throat  Allergies/Immunologic: Negative.   Respiratory: No cough, hemoptysia  Cardiovascular: As per HPI  GI: No nausea, vomiting, hematemesis, melena, or hematochezia  : No urinary frequency, dysuria, or hematuria  Integument: Negative  Psychiatric: Negative  Neuro: Negative  Endocrinology: Negative   Musculoskeletal: Negative    ADDITIONAL COMMENTS:     I reviewed the patient's medications:     I reviewed the patient's pertinent clinical laboratory studies:   Pt's 2020 LDL was 40 but triglycerides were 308  I reviewed the patient's pertinent imaging studies: Pt's 2020 echocardiogram was normal  I reviewed the patient's ECG:  Sinus rhythm at 97 bpm, no concerning changes.     The 10-year ASCVD risk score (Mariel DE LEON Jr., et al., 2013) is: 31.5%    Values used to calculate the score:      Age: 66 years      Sex: Male      Is Non- : No      Diabetic: Yes      Tobacco smoker: No      Systolic Blood Pressure: 130 mmHg      Is BP treated: Yes      HDL Cholesterol: 36 mg/dL      Total Cholesterol: 138 mg/dL      Echocardiogram 2020  Interpretation Summary     Global and regional left ventricular function is normal with an EF of 60-65%.  Right ventricular function, chamber size, wall motion, and thickness are normal.  Pulmonary artery systolic pressure cannot be assessed.  No pericardial effusion is present.      Coronary CTA 2016  CORONARY CALCIUM SCORE: The total Agatston calcium score is 104.3,  Left main: Zero, left anterior descendin,  circumflex: Zero,  right coronary artery: 21.3. This places the patient in the 64th  percentile when compared to age and gender matched control group.     CORONARY CT ANGIOGRAPHY   DOMINANCE: Right dominant system.      LEFT MAIN: The left main arises  normally from the left coronary cusp  and the left main has a discrete small calcified nodule which is  minimally obstructive.     LEFT ANTERIOR DESCENDING: There is mild mixed plaque minimally  obstructive in the proximal to mid left anterior descending artery.  The remainder of the left anterior descending artery and its branches  have no significant disease.     CIRCUMFLEX: The circumflex and its major branches are widely patent  without any detectable stenosis .     RIGHT CORONARY ARTERY: The right coronary is dominant. There is a  mildly obstructive mixed plaque in the proximal right coronary artery.  There is soft plaque in the mid RCA which is mildly obstructive. The  remainder of the RCA and PDA have no significant disease

## 2021-05-07 NOTE — NURSING NOTE
Sven Givens's goals for this visit include:   Chief Complaint   Patient presents with     Consult       He requests these members of his care team be copied on today's visit information: no    PCP: Pam Dela Cruz    Referring Provider:  No referring provider defined for this encounter.    /77 (BP Location: Left arm, Patient Position: Sitting, Cuff Size: Adult Large)   Pulse 111   Wt 99.2 kg (218 lb 12.8 oz)   SpO2 97%   BMI 33.27 kg/m      Do you need any medication refills at today's visit? No    Vesna Rabago CMA......May 7, 2021     8:04 AM

## 2021-05-11 DIAGNOSIS — K21.00 GASTROESOPHAGEAL REFLUX DISEASE WITH ESOPHAGITIS: ICD-10-CM

## 2021-05-13 DIAGNOSIS — E11.42 TYPE 2 DIABETES MELLITUS WITH DIABETIC POLYNEUROPATHY, WITHOUT LONG-TERM CURRENT USE OF INSULIN (H): Chronic | ICD-10-CM

## 2021-05-13 RX ORDER — GLIMEPIRIDE 4 MG/1
TABLET ORAL
Qty: 180 TABLET | Refills: 0 | Status: SHIPPED | OUTPATIENT
Start: 2021-05-13 | End: 2021-08-11

## 2021-05-17 ENCOUNTER — TELEPHONE (OUTPATIENT)
Dept: CARDIOLOGY | Facility: CLINIC | Age: 66
End: 2021-05-17

## 2021-05-17 NOTE — TELEPHONE ENCOUNTER
PA Initiation    Medication: ivabradine (CORLANOR) 5 MG tablet  Insurance Company: [employer] ([facility]) - Phone [phone] Fax [phone]  Pharmacy Filling the Rx: [id number] [place], [place] - [address]  Filling Pharmacy Phone: [phone]  Filling Pharmacy Fax: [phone]  Start Date: 5/17/2021

## 2021-05-17 NOTE — TELEPHONE ENCOUNTER
PA Initiation    Medication: Icosapent Ethyl (VASCEPA) 1 g CAPS  Insurance Company: [employer] ([facility]) - Phone [phone] Fax [phone]  Pharmacy Filling the Rx: [id number] [place] - [address]  Filling Pharmacy Phone: [phone]  Filling Pharmacy Fax: [phone]  Start Date: 5/17/2021

## 2021-05-19 NOTE — TELEPHONE ENCOUNTER
Prior Authorization Approval    Medication: ivabradine (CORLANOR) 5 MG tablet was approved on 5/18/2021  Effective:   to 12/31/2021  Reference #:    Approved Dose/Quantity:   Insurance Company: [employer] ([facility]) - Phone [phone] Fax [phone]  Expected CoPay:    Pharmacy [facility]illing the Rx[id number] [place] , [place] - [address]  Pharmacy Notified: Yes  Patient Notified: Comment:  **Instructed pharmacy to notify patient when script is ready to /ship.**

## 2021-05-19 NOTE — TELEPHONE ENCOUNTER
Prior Authorization Approval    Medication: Icosapent Ethyl (VASCEPA) 1 g CAPS was approved on 5/18/2021  Effective:   to 5/17/2022  Reference #:    Approved Dose/Quantity:   Insurance Company: [employer] ([facility]) - Phone [phone] Fax [phone]  Expected CoPay:    Pharmacy [facility]illing the [id number] [place], [place] - [address]  Pharmacy Notified: Yes  Patient Notified: Comment:  **Instructed pharmacy to notify patient when script is ready to /ship.**

## 2021-05-26 DIAGNOSIS — E11.42 TYPE 2 DIABETES MELLITUS WITH DIABETIC POLYNEUROPATHY, WITHOUT LONG-TERM CURRENT USE OF INSULIN (H): ICD-10-CM

## 2021-05-26 LAB
CHOLEST SERPL-MCNC: 103 MG/DL
HBA1C MFR BLD: 7.4 % (ref 0–5.6)
HDLC SERPL-MCNC: 33 MG/DL
LDLC SERPL CALC-MCNC: 33 MG/DL
NONHDLC SERPL-MCNC: 70 MG/DL
TRIGL SERPL-MCNC: 187 MG/DL

## 2021-05-26 PROCEDURE — 83036 HEMOGLOBIN GLYCOSYLATED A1C: CPT | Performed by: FAMILY MEDICINE

## 2021-05-26 PROCEDURE — 36415 COLL VENOUS BLD VENIPUNCTURE: CPT | Performed by: FAMILY MEDICINE

## 2021-05-26 PROCEDURE — 80061 LIPID PANEL: CPT | Performed by: FAMILY MEDICINE

## 2021-05-28 ENCOUNTER — VIRTUAL VISIT (OUTPATIENT)
Dept: PHARMACY | Facility: CLINIC | Age: 66
End: 2021-05-28
Payer: COMMERCIAL

## 2021-05-28 DIAGNOSIS — R00.0 SINUS TACHYCARDIA: ICD-10-CM

## 2021-05-28 DIAGNOSIS — E11.42 TYPE 2 DIABETES MELLITUS WITH DIABETIC POLYNEUROPATHY, WITHOUT LONG-TERM CURRENT USE OF INSULIN (H): Primary | ICD-10-CM

## 2021-05-28 DIAGNOSIS — E78.5 HYPERLIPIDEMIA LDL GOAL <100: ICD-10-CM

## 2021-05-28 DIAGNOSIS — L71.9 ROSACEA: ICD-10-CM

## 2021-05-28 DIAGNOSIS — I10 ESSENTIAL HYPERTENSION WITH GOAL BLOOD PRESSURE LESS THAN 140/90: ICD-10-CM

## 2021-05-28 PROCEDURE — 99606 MTMS BY PHARM EST 15 MIN: CPT | Performed by: PHARMACIST

## 2021-05-28 PROCEDURE — 99607 MTMS BY PHARM ADDL 15 MIN: CPT | Performed by: PHARMACIST

## 2021-05-28 RX ORDER — DOXYCYCLINE 100 MG/1
1 CAPSULE ORAL 2 TIMES DAILY
COMMUNITY
Start: 2021-05-24 | End: 2021-09-15

## 2021-05-28 NOTE — PATIENT INSTRUCTIONS
Recommendations from today's MTM visit:                                                       1. Continue your current medications without change for now. Continue to make healthy, low carb diet choices, and work on getting at least 30 minutes of exercise every day. We will review your blood sugar levels again in 3 months.    2. Ok to start doxycycline for rosacea. We discussed that this can make your skin more sensitive to the sun, so be sure to wear sunscreen outside or cover up in the sun. You may also notice some upset stomach.    Follow-up: Return in 4 months (on 9/15/2021) for Medication dose check, by phone.    It was great to speak with you today.  I value your experience and would be very thankful for your time with providing feedback on our clinic survey. You may receive a survey via email or text message in the next few days.     To schedule another MTM appointment, please call the clinic directly or you may call the MTM scheduling line at [phone] or toll-free at [phone].     My Clinical Pharmacist's contact information:                                                      Please feel free to contact me with any questions or concerns you have.      Dora Neumann, Pharm.D., BCACP  Medication Therapy Management Pharmacist  [phone]

## 2021-05-28 NOTE — PROGRESS NOTES
Medication Therapy Management (MTM) Encounter    ASSESSMENT:                            Medication Adherence/Access: No issues identified    Type 2 Diabetes: Patient is not meeting A1c goal of < 7%, but improved from previous - lowest A1c since at least 2017.  Self monitoring of blood glucose is frequently at goal of fasting  mg/dL and at goal post prandial < 180 mg/dL.  Will continue present medications without change, continue positive lifestyle changes.    Hypertension/Sinus tachycardia: Stable.    Hyperlipidemia: Improved with improved diabetes control.    Rosacea: No interactions noted, safe to start.      PLAN:                            1. Continue your current medications without change for now. Continue to make healthy, low carb diet choices, and work on getting at least 30 minutes of exercise every day. We will review your blood sugar levels again in 3 months.    2. Ok to start doxycycline for rosacea. We discussed that this can make your skin more sensitive to the sun, so be sure to wear sunscreen outside or cover up in the sun. You may also notice some upset stomach.    Follow-up: Return in 4 months (on 9/15/2021) for Medication dose check, by phone.    SUBJECTIVE/OBJECTIVE:                          Sven Givens is a 66 year old male called for a follow-up visit. He was referred to me from Pam Dela Cruz .  Today's visit is a follow-up MTM visit from 3/11/21.     Reason for visit: blood sugar check, labs review.    Tobacco: He reports that he quit smoking about 29 years ago. He has never used smokeless tobacco.  Alcohol: none    Medication Adherence/Access: no issues reported    Type 2 Diabetes:   Metformin 1000mg twice daily  Ozempic 1mg daily  Glimepiride 4mg twice daily  He prefers not to add more medication at this time.   Per chart review, he has discussed the option of long-acting insulin with PharmD in the past.  Medication history: avoiding SGLT2 due to hx of bladder infection. Has  also tried Januvia, Tradjenta, Invokana in the past which does not appear to have worked well for him.  SMBG Ranges (patient reported):  FBG around 120-160mg/dL, 2hr PP always under 155mg/dL  Symptoms of low blood sugar? Weak, tired with blood sugar around 88mg/dL; tends to feel symptoms with blood sugar under 100mg/dL. We discussed true hypoglycemia under 70mg/dL and that his body is likely adjusting to normal range.  Symptoms of high blood sugar? Ongoing feet pain - burning that happens most often at night time. He has tried lidocaine cream in the past and it didn't help at all. At this time, he is tolerating and doesn't want to take any pills for this currently.  Diet/Exercise: Eats 4x/day - low carb, no pasta  Walking 30minutes outside in good weather or 20min on treadmill between 2-4pm. Stopped going to gym during pandemic  Aspirin: Taking 81mg daily and denies side effects  Statin: Yes: atorvastatin 80mg daily and denies side effects  ACEi/ARB: Yes: losartan 100mg daily. Urine albumin is   Lab Results   Component Value Date    UMALCR 31.90 (H) 02/16/2021       Lab Results   Component Value Date    A1C 7.4 05/26/2021    A1C 8.4 02/16/2021    A1C 11.4 11/02/2020    A1C 11.4 05/20/2020    A1C 9.3 12/12/2019     Wt Readings from Last 10 Encounters:   05/07/21 218 lb 12.8 oz (99.2 kg)   01/25/21 222 lb (100.7 kg)   11/02/20 215 lb (97.5 kg)   10/19/20 213 lb (96.6 kg)   09/16/20 217 lb 9.6 oz (98.7 kg)   05/22/20 217 lb (98.4 kg)   12/12/19 217 lb (98.4 kg)   09/27/19 216 lb 3.2 oz (98.1 kg)   08/09/19 214 lb (97.1 kg)   07/23/19 219 lb 6.4 oz (99.5 kg)       Hypertension/Sinus tachycardia:  hydrochlorothiazide 25mg daily  losartan 100mg daily  metoprolol ER 50mg daily  Terazosin 5mg QHS  Corlanor 5mg twice daily - prescribed 5/7 visit with cardiology - has not started due to cost $300/month even with PA.  Patient does self-monitor blood pressure. Home BP monitoring in range of 120's systolic over 70-80's  diastolic.  Patient reports no current medication side effects.  BP Readings from Last 3 Encounters:   05/07/21 117/77   01/25/21 130/79   11/02/20 119/74       Hyperlipidemia:   Atorvastatin 80mg daily - dose increased at 5/7 visit with cardiology  Vascepa - prescribed at 5/7 visit with cardiology for elevated TG -- hasn't started yet because pharmacy was out of stock. He is continuing on over-the-counter fish oil at this time.  Patient reports no significant myalgias or other side effects.  Lab Test 05/26/21  0946 05/20/20  1229 03/02/15  0819 02/28/14  0941   CHOL 103 138 152 126   HDL 33* 36* 32* 22*   LDL 33 40 73 69   TRIG 187* 308* 235* 177*   CHOLHDLRATIO  --   --  4.8 5.8*       Rosacea: Doxycycline 100mg twice daily x3 months. Hasn't started yet - wanted to make sure there was no interactions with his current medications first.    Today's Vitals: There were no vitals taken for this visit.  ----------------      I spent 28 minutes with this patient today. All changes were made via collaborative practice agreement with Pam Dela Cruz. A copy of the visit note was provided to the patient's primary care provider.    The patient was sent via [email or web address] a summary of these recommendations.     Dora Neumann, Pharm.D., [facility]  Medication Therapy Management Pharmacist  [phone]    Telemedicine Visit Details  Type of service:  Telephone visit  Start Time: 9:00 AM  End Time: 9:28 AM  Originating Location (patient location): Home  Distant Location (provider location):  [facility]

## 2021-05-28 NOTE — Clinical Note
FYI - patient did not start Corlanor due to cost, even with PA.    Dora Neumann, Pharm.D., [facility]  Medication Therapy Management Pharmacist  [phone]

## 2021-05-30 DIAGNOSIS — K59.01 SLOW TRANSIT CONSTIPATION: ICD-10-CM

## 2021-06-02 RX ORDER — DOCUSATE SODIUM AND SENNOSIDES 50; 8.6 MG/1; MG/1
TABLET ORAL
Qty: 90 TABLET | Refills: 1 | Status: SHIPPED | OUTPATIENT
Start: 2021-06-02 | End: 2021-11-10

## 2021-06-12 DIAGNOSIS — E11.42 TYPE 2 DIABETES MELLITUS WITH DIABETIC POLYNEUROPATHY, WITHOUT LONG-TERM CURRENT USE OF INSULIN (H): ICD-10-CM

## 2021-06-14 RX ORDER — SEMAGLUTIDE 1.34 MG/ML
1 INJECTION, SOLUTION SUBCUTANEOUS
Qty: 9 ML | Refills: 0 | Status: SHIPPED | OUTPATIENT
Start: 2021-06-14 | End: 2021-09-09

## 2021-06-14 NOTE — TELEPHONE ENCOUNTER
Prescription approved per [facility] Refill Protocol.      Karoline Linares RN  [facility]

## 2021-08-04 DIAGNOSIS — I10 ESSENTIAL HYPERTENSION WITH GOAL BLOOD PRESSURE LESS THAN 140/90: ICD-10-CM

## 2021-08-04 RX ORDER — TERAZOSIN 5 MG/1
CAPSULE ORAL
Qty: 90 CAPSULE | Refills: 3 | Status: SHIPPED | OUTPATIENT
Start: 2021-08-04 | End: 2022-02-03

## 2021-08-04 NOTE — TELEPHONE ENCOUNTER
"Requested Prescriptions   Pending Prescriptions Disp Refills    terazosin (HYTRIN) 5 MG capsule [Pharmacy Med Name: TERAZOSIN 5 MG CAPSULE] 90 capsule 3     Sig: TAKE 1 CAPSULE (5 MG) BY MOUTH AT BEDTIME FOR BLOOD PRESSURE.        Antiadrenergic Antihypertensives Failed - 8/4/2021 12:29 AM        Failed - Recent (6 mo) or future (30 days) visit within the authorizing provider's specialty     Patient had office visit in the last 6 months or has a visit in the next 30 days with authorizing provider or within the authorizing provider's specialty.  See \"Patient Info\" tab in inbasket, or \"Choose Columns\" in Meds & Orders section of the refill encounter.            Passed - Blood pressure less than 140/90 in past 6 months       BP Readings from Last 3 Encounters:   05/07/21 117/77   01/25/21 130/79   11/02/20 119/74                 Passed - Medication is active on med list        Passed - Patient is age 18 or older        Passed - Normal serum creatinine on file in past 12 months     Recent Labs   Lab Test 02/16/21  0759 12/08/20  0838   CR 0.71  --    CREAT  --  0.7       Ok to refill medication if creatinine is low         Alpha Blockers Passed - 8/4/2021 12:29 AM        Passed - Blood pressure under 140/90 in past 12 months       BP Readings from Last 3 Encounters:   05/07/21 117/77   01/25/21 130/79   11/02/20 119/74                 Passed - Recent (12 mo) or future (30 days) visit within the authorizing provider's specialty     Patient has had an office visit with the authorizing provider or a provider within the authorizing providers department within the previous 12 mos or has a future within next 30 days. See \"Patient Info\" tab in inbasket, or \"Choose Columns\" in Meds & Orders section of the refill encounter.              Passed - Patient does not have Tadalafil, Vardenafil, or Sildenafil on their medication list        Passed - Medication is active on med list        Passed - Patient is 18 years of age or older "

## 2021-08-06 ENCOUNTER — OFFICE VISIT (OUTPATIENT)
Dept: CARDIOLOGY | Facility: CLINIC | Age: 66
End: 2021-08-06
Payer: COMMERCIAL

## 2021-08-06 VITALS
HEART RATE: 86 BPM | SYSTOLIC BLOOD PRESSURE: 117 MMHG | BODY MASS INDEX: 32.23 KG/M2 | OXYGEN SATURATION: 97 % | DIASTOLIC BLOOD PRESSURE: 72 MMHG | WEIGHT: 212 LBS

## 2021-08-06 DIAGNOSIS — I25.10 CORONARY ARTERY DISEASE INVOLVING NATIVE CORONARY ARTERY OF NATIVE HEART WITHOUT ANGINA PECTORIS: Primary | ICD-10-CM

## 2021-08-06 PROCEDURE — 99215 OFFICE O/P EST HI 40 MIN: CPT | Performed by: INTERNAL MEDICINE

## 2021-08-06 NOTE — PROGRESS NOTES
[facility] Cardiology Consultation:    Assessment and Plan:     1. Sinus tachycardia: Possibly inappropriate sinus tachycardia, though symptoms of orthostatic dizziness suggest an appropriate component too.  Ivabradine was not approved.  For now, recommended to stay well-hydrated.  2. Mild CAD, normal LV fxn, coronary CTA with CAC of 104: Cont aspirin and statin.  3. Dyslipidemia, primary prevention: LDL goal <70, Non-HDL goal <100, although LDL is at goal, other atherogenic lipoprotein cholesterol may still be elevated - continue lipitor 80mg and Vascepa for elevated triglycerides, repeat lipid panel in the future.  4. HTN, with orthostatic dizziness: Controlled  5. Type 2 diabetes mellitus: did not tolerate SGLT2 inhibitor due to bladder infx, on metformin, glimepiride and semaglutide  6. Lung cancer stage 1 s/p resection      Jesus Shane MD    Cardiac Imaging and Prevention  [facility]  [email or web address] Pager: [phone]      HPI: Tachycardia, CAD follow-up.  I reviewed his cardiac monitor that was done earlier.  It did not show any arrhythmic events, and one reported symptoms correlated with sinus tachycardia.  His heart rate trend shows persistent sinus tachycardia during waking hours that resolves at night.  I had prescribed ivabradine for presumed inappropriate sinus tachycardia, however it was not approved and he did not start it due to high cost.  Overall he feels that his symptoms of palpitations are somewhat improved since last visit.  He does report positional lightheadedness associated with bending down.  He does not feel lightheaded on going from sitting to standing.  The symptoms resolve in a few seconds on sitting down.  There has not been any associated syncope.  When he has checked his blood pressure during these episodes or at other times, it is usually around 120.  There have not been any recorded low blood pressure events at home.  He has  started Vascepa since his last visit.  Lipid profile checked soon after the last visit showed improved triglycerides.  He is tolerating all his other medications without any issues.      EXAM:  /72 (BP Location: Left arm, Patient Position: Sitting, Cuff Size: Adult Regular)   Pulse 86   Wt 96.2 kg (212 lb)   SpO2 97%   BMI 32.23 kg/m    GEN/CONSTITUIONAL: Appears comfortable, in no apparent distress   EYES: No icterus  ENT/MOUTH: Normal  JVP:  Not visible  RESPIRATORY: Clear to auscultation bilaterally   CARDIOVASCULAR: Regular S1 and S2, no murmurs, rubs, or gallops.   ABDOMEN: Soft, non-tender, positive bowel sounds   NEUROLOGIC: Grossly non-focal   PSYCHIATRIC: Normal affect  EXT: No cyanosis, clubbing, edema. Normal pedal pulses.  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     Varicose veins        CURRENT MEDICATIONS:  Current Outpatient Medications   Medication     allopurinol (ZYLOPRIM) 100 MG tablet     ASPIRIN 81 MG PO TABS     atorvastatin (LIPITOR) 80 MG tablet     Azelaic Acid (FINACEA) 15 % gel     doxycycline monohydrate (MONODOX) 100 MG capsule     FISH OIL 1000 MG OR CAPS     fluticasone (FLONASE) 50 MCG/ACT nasal spray     glimepiride (AMARYL) 4 MG tablet     hydrochlorothiazide (HYDRODIURIL) 25 MG tablet     Icosapent Ethyl (VASCEPA) 1 g CAPS     losartan (COZAAR) 100 MG tablet     metFORMIN (GLUCOPHAGE) 1000 MG tablet     metoprolol succinate ER (TOPROL-XL) 50 MG 24 hr tablet     metroNIDAZOLE 0.75 % EX external gel     MULTIPLE VITAMINS OR     omeprazole (PRILOSEC) 20 MG DR capsule      Oxymetazoline HCl (AFRIN NASAL SPRAY NA)     OZEMPIC, 1 MG/DOSE, 2 MG/1.5ML pen     SM STOOL SOFTENER/LAXATIVE 8.6-50 MG tablet     terazosin (HYTRIN) 5 MG capsule     tiZANidine (ZANAFLEX) 2 MG tablet     No current facility-administered medications for this visit.     Facility-Administered Medications Ordered in Other Visits   Medication     DOBUTamine 500 mg in dextrose 5% 250 mL (adult std)       PAST SURGICAL HISTORY:  Past Surgical History:   Procedure Laterality Date     BRONCHOSCOPY FLEXIBLE N/A 2016    Procedure: BRONCHOSCOPY FLEXIBLE;  Surgeon: Gayle Moya MD;  Location: [facility] OR     COLONOSCOPY       COLONOSCOPY WITH CO2 INSUFFLATION N/A 2017    Procedure: COLONOSCOPY WITH CO2 INSUFFLATION;  COLON SCREEN/ SEB;  Surgeon: Margarito Rasheed DO;  Location: [facility]     COMBINED ESOPHAGOSCOPY, GASTROSCOPY, DUODENOSCOPY (EGD) WITH CO2 INSUFFLATION N/A 2019    Procedure: COMBINED ESOPHAGOSCOPY, GASTROSCOPY, DUODENOSCOPY (EGD) WITH CO2 INSUFFLATION;  Surgeon: Abbe Mccarty MD;  Location: [facility]     ESOPHAGOSCOPY, GASTROSCOPY, DUODENOSCOPY (EGD), COMBINED N/A 2019    Procedure: Combined Esophagoscopy, Gastroscopy, Duodenoscopy (Egd), Biopsy Single Or Multiple;  Surgeon: Abbe Mccarty MD;  Location: [facility]     GENITOURINARY SURGERY      kidney stones     THORACOSCOPIC WEDGE RESECTION LUNG Left 2016    Procedure: THORACOSCOPIC WEDGE RESECTION LUNG;  Surgeon: Gayle Moya MD;  Location: [facility]     VASCULAR SURGERY      varicose vein       ALLERGIES     Allergies   Allergen Reactions     Pcn [Penicillins] Rash     Ace Inhibitors Cough     Indomethacin Other (See Comments)     Severe dizziness     Invokana [Canagliflozin]      bladder infection       FAMILY HISTORY:  Family History   Problem Relation Age of Onset     Cerebrovascular Disease Father 64     Cancer Sister         ovarary      Hernia Mother          age 97       SOCIAL HISTORY:  Social History      Socioeconomic History     Marital status:      Spouse name: Not on file     Number of children: Not on file     Years of education: Not on file     Highest education level: Not on file   Occupational History     Employer: [employer]   Tobacco Use     Smoking status: Former Smoker     Quit date: 1992     Years since quittin.5     Smokeless tobacco: Never Used     Tobacco comment: 15 + years    Substance and Sexual Activity     Alcohol use: No     Drug use: No     Sexual activity: Not Currently     Partners: Female     Birth control/protection: None   Other Topics Concern     Parent/sibling w/ CABG, MI or angioplasty before 65F 55M? No   Social History Narrative     Not on file     Social Determinants of Health     Financial Resource Strain:      Difficulty of Paying Living Expenses:    Food Insecurity:      Worried About Running Out of Food in the Last Year:      Ran Out of Food in the Last Year:    Transportation Needs:      Lack of Transportation (Medical):      Lack of Transportation (Non-Medical):    Physical Activity:      Days of Exercise per Week:      Minutes of Exercise per Session:    Stress:      Feeling of Stress :    Social Connections:      Frequency of Communication with Friends and Family:      Frequency of Social Gatherings with Friends and Family:      Attends Judaism Services:      Active Member of Clubs or Organizations:      Attends Club or Organization Meetings:      Marital Status:    Intimate Partner Violence:      Fear of Current or Ex-Partner:      Emotionally Abused:      Physically Abused:      Sexually Abused:        ROS:   Constitutional: No fever, chills, or sweats. No weight gain/loss   ENT: No visual disturbance, ear ache, epistaxis, sore throat  Allergies/Immunologic: Negative.   Respiratory: No cough, hemoptysia  Cardiovascular: As per HPI  GI: No nausea, vomiting, hematemesis, melena, or hematochezia  : No urinary frequency, dysuria, or  hematuria  Integument: Negative  Psychiatric: Negative  Neuro: Negative  Endocrinology: Negative   Musculoskeletal: Negative    ADDITIONAL COMMENTS:     I reviewed the patient's medications:     I reviewed the patient's pertinent clinical laboratory studies:   Pt's 2020 LDL was 40 but triglycerides were 308  I reviewed the patient's pertinent imaging studies: Pt's 2020 echocardiogram was normal  I reviewed the patient's ECG:  Sinus rhythm at 97 bpm, no concerning changes.     The ASCVD Risk score (Marieldante DE LEON Jr., et al., 2013) failed to calculate for the following reasons:    The valid total cholesterol range is 130 to 320 mg/dL      Echocardiogram 2020  Interpretation Summary     Global and regional left ventricular function is normal with an EF of 60-65%.  Right ventricular function, chamber size, wall motion, and thickness are normal.  Pulmonary artery systolic pressure cannot be assessed.  No pericardial effusion is present.      Coronary CTA 2016  CORONARY CALCIUM SCORE: The total Agatston calcium score is 104.3,  Left main: Zero, left anterior descendin,  circumflex: Zero,  right coronary artery: 21.3. This places the patient in the 64th  percentile when compared to age and gender matched control group.     CORONARY CT ANGIOGRAPHY   DOMINANCE: Right dominant system.      LEFT MAIN: The left main arises normally from the left coronary cusp  and the left main has a discrete small calcified nodule which is  minimally obstructive.     LEFT ANTERIOR DESCENDING: There is mild mixed plaque minimally  obstructive in the proximal to mid left anterior descending artery.  The remainder of the left anterior descending artery and its branches  have no significant disease.     CIRCUMFLEX: The circumflex and its major branches are widely patent  without any detectable stenosis .     RIGHT CORONARY ARTERY: The right coronary is dominant. There is a  mildly obstructive mixed plaque in the proximal right coronary  artery.  There is soft plaque in the mid RCA which is mildly obstructive. The  remainder of the RCA and PDA have no significant disease

## 2021-08-09 DIAGNOSIS — E11.42 TYPE 2 DIABETES MELLITUS WITH DIABETIC POLYNEUROPATHY, WITHOUT LONG-TERM CURRENT USE OF INSULIN (H): Chronic | ICD-10-CM

## 2021-08-11 RX ORDER — GLIMEPIRIDE 4 MG/1
TABLET ORAL
Qty: 180 TABLET | Refills: 0 | Status: SHIPPED | OUTPATIENT
Start: 2021-08-11 | End: 2021-09-15

## 2021-08-11 NOTE — TELEPHONE ENCOUNTER
Routing refill request to provider for review/approval because:  Patient needs to be seen because:  Diabetes.  Sandra Trejo RN

## 2021-08-23 DIAGNOSIS — I10 ESSENTIAL HYPERTENSION WITH GOAL BLOOD PRESSURE LESS THAN 140/90: ICD-10-CM

## 2021-08-24 RX ORDER — HYDROCHLOROTHIAZIDE 25 MG/1
TABLET ORAL
Qty: 90 TABLET | Refills: 0 | Status: SHIPPED | OUTPATIENT
Start: 2021-08-24 | End: 2021-11-10

## 2021-08-24 RX ORDER — LOSARTAN POTASSIUM 100 MG/1
TABLET ORAL
Qty: 90 TABLET | Refills: 0 | Status: SHIPPED | OUTPATIENT
Start: 2021-08-24 | End: 2021-11-10

## 2021-09-06 DIAGNOSIS — E11.42 TYPE 2 DIABETES MELLITUS WITH DIABETIC POLYNEUROPATHY, WITHOUT LONG-TERM CURRENT USE OF INSULIN (H): ICD-10-CM

## 2021-09-09 RX ORDER — SEMAGLUTIDE 1.34 MG/ML
INJECTION, SOLUTION SUBCUTANEOUS
Qty: 9 ML | Refills: 1 | Status: SHIPPED | OUTPATIENT
Start: 2021-09-09 | End: 2022-02-03

## 2021-09-10 ENCOUNTER — LAB (OUTPATIENT)
Dept: LAB | Facility: CLINIC | Age: 66
End: 2021-09-10
Payer: COMMERCIAL

## 2021-09-10 DIAGNOSIS — E11.42 TYPE 2 DIABETES MELLITUS WITH DIABETIC POLYNEUROPATHY, WITHOUT LONG-TERM CURRENT USE OF INSULIN (H): ICD-10-CM

## 2021-09-10 DIAGNOSIS — I25.10 CORONARY ARTERY DISEASE INVOLVING NATIVE CORONARY ARTERY OF NATIVE HEART WITHOUT ANGINA PECTORIS: ICD-10-CM

## 2021-09-10 LAB
CHOLEST SERPL-MCNC: 98 MG/DL
FASTING STATUS PATIENT QL REPORTED: YES
HBA1C MFR BLD: 6.6 % (ref 0–5.6)
HDLC SERPL-MCNC: 31 MG/DL
LDLC SERPL CALC-MCNC: 26 MG/DL
NONHDLC SERPL-MCNC: 67 MG/DL
TRIGL SERPL-MCNC: 207 MG/DL

## 2021-09-10 PROCEDURE — 36415 COLL VENOUS BLD VENIPUNCTURE: CPT

## 2021-09-10 PROCEDURE — 83036 HEMOGLOBIN GLYCOSYLATED A1C: CPT

## 2021-09-10 PROCEDURE — 80061 LIPID PANEL: CPT

## 2021-09-13 ENCOUNTER — ANCILLARY PROCEDURE (OUTPATIENT)
Dept: CT IMAGING | Facility: CLINIC | Age: 66
End: 2021-09-13
Attending: INTERNAL MEDICINE
Payer: COMMERCIAL

## 2021-09-13 DIAGNOSIS — C34.92 NON-SMALL CELL CARCINOMA OF LUNG, LEFT (H): ICD-10-CM

## 2021-09-13 PROCEDURE — 71250 CT THORAX DX C-: CPT | Mod: GC | Performed by: RADIOLOGY

## 2021-09-15 ENCOUNTER — ONCOLOGY VISIT (OUTPATIENT)
Dept: ONCOLOGY | Facility: CLINIC | Age: 66
End: 2021-09-15
Attending: INTERNAL MEDICINE
Payer: COMMERCIAL

## 2021-09-15 ENCOUNTER — VIRTUAL VISIT (OUTPATIENT)
Dept: PHARMACY | Facility: CLINIC | Age: 66
End: 2021-09-15
Payer: COMMERCIAL

## 2021-09-15 VITALS
TEMPERATURE: 98.5 F | BODY MASS INDEX: 32.3 KG/M2 | DIASTOLIC BLOOD PRESSURE: 76 MMHG | RESPIRATION RATE: 16 BRPM | HEART RATE: 88 BPM | HEIGHT: 68 IN | WEIGHT: 213.1 LBS | OXYGEN SATURATION: 97 % | SYSTOLIC BLOOD PRESSURE: 121 MMHG

## 2021-09-15 DIAGNOSIS — E11.42 TYPE 2 DIABETES MELLITUS WITH DIABETIC POLYNEUROPATHY, WITHOUT LONG-TERM CURRENT USE OF INSULIN (H): Chronic | ICD-10-CM

## 2021-09-15 DIAGNOSIS — E78.5 HYPERLIPIDEMIA LDL GOAL <100: Primary | ICD-10-CM

## 2021-09-15 DIAGNOSIS — C34.92 NON-SMALL CELL CARCINOMA OF LUNG, LEFT (H): Primary | ICD-10-CM

## 2021-09-15 PROCEDURE — 99607 MTMS BY PHARM ADDL 15 MIN: CPT | Performed by: PHARMACIST

## 2021-09-15 PROCEDURE — 99213 OFFICE O/P EST LOW 20 MIN: CPT | Performed by: INTERNAL MEDICINE

## 2021-09-15 PROCEDURE — 99606 MTMS BY PHARM EST 15 MIN: CPT | Performed by: PHARMACIST

## 2021-09-15 RX ORDER — GLIMEPIRIDE 4 MG/1
4 TABLET ORAL
Qty: 90 TABLET | Refills: 3 | Status: SHIPPED | OUTPATIENT
Start: 2021-09-15 | End: 2021-12-02 | Stop reason: DRUGHIGH

## 2021-09-15 ASSESSMENT — PAIN SCALES - GENERAL: PAINLEVEL: NO PAIN (0)

## 2021-09-15 ASSESSMENT — MIFFLIN-ST. JEOR: SCORE: 1711.12

## 2021-09-15 NOTE — PROGRESS NOTES
Received response from Dr. Shane -- no change to cholesterol medications indicated at this time:
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
## 2021-09-15 NOTE — PATIENT INSTRUCTIONS
Recommendations from today's MTM visit:                                                       1. Reduce glimepiride to 1 tablet every morning.   2. Cardiology to consider switch from vascepa to fenofibrate for improved TG levels. I will send a note to Dr. Shane.    Follow-up: Return in about 1 month (around 10/15/2021) for Medication Therapy Management, by phone.    It was great to speak with you today.  I value your experience and would be very thankful for your time with providing feedback on our clinic survey. You may receive a survey via email or text message in the next few days.     To schedule another MTM appointment, please call the clinic directly or you may call the MTM scheduling line at [phone] or toll-free at [phone].     My Clinical Pharmacist's contact information:                                                      Please feel free to contact me with any questions or concerns you have.      Dora Neumann, Pharm.D., [facility]  Medication Therapy Management Pharmacist  [phone]

## 2021-09-15 NOTE — Clinical Note
Hi Dr. Shane,    TG's are unchanged or actually slightly worsened with start of Vascepa in May 2021 and improved blood sugar control during that time. What are your thoughts on having Sven switch to Fenofibrate?     Appreciate your input!!    Thank you,  Dora Neumann, Pharm.D., [facility]  Medication Therapy Management Pharmacist  [phone]

## 2021-09-15 NOTE — NURSING NOTE
"Oncology Rooming Note    September 15, 2021 2:56 PM   Sven Givens is a 66 year old male who presents for:    Chief Complaint   Patient presents with     Oncology Clinic Visit     Follow up     Initial Vitals: /76 (BP Location: Left arm, Patient Position: Sitting, Cuff Size: Adult Regular)   Pulse 88   Temp 98.5  F (36.9  C) (Oral)   Resp 16   Ht 1.727 m (5' 8\")   Wt 96.7 kg (213 lb 1.6 oz)   SpO2 97%   BMI 32.40 kg/m   Estimated body mass index is 32.4 kg/m  as calculated from the following:    Height as of this encounter: 1.727 m (5' 8\").    Weight as of this encounter: 96.7 kg (213 lb 1.6 oz). Body surface area is 2.15 meters squared.  No Pain (0) Comment: Data Unavailable   No LMP for male patient.  Allergies reviewed: Yes  Medications reviewed: Yes    Medications: Medication refills not needed today.  Pharmacy name entered into [facility]:    [id number] [place], [place] - [address] [facility] [id number] [place] [facility] - [place], [place] - [address] [address] [address]    Clinical concerns: Should he get the COVID19 booster injection?  Dr. Valencia was notified.      Rosita Ricketts CMA            "

## 2021-09-15 NOTE — PROGRESS NOTES
Medication Therapy Management (MTM) Encounter    ASSESSMENT:                            Medication Adherence/Access: No issues identified    Type 2 Diabetes: Patient is meeting A1c goal of < 7%.  Self monitoring of blood glucose is at goal of fasting  mg/dL and now having some regular issues with hypoglycemia.  Pt would benefit from SFU (glimepiride) :  decrease dose to prevent hypoglycemia; may be able to taper off altogether in the future.    Hyperlipidemia: Patient is on high intensity statin which is indicated based on 2019 ACC/AHA guidelines for lipid management.    Vascepa does not appear to have improved TG levels, nor has improved blood sugar control; consider switching to fenofibrate.      PLAN:                            1. Reduce glimepiride to 1 tablet every morning.   2. Cardiology to consider switch from vascepa to fenofibrate for improved TG levels.    Follow-up: Return in about 1 month (around 10/15/2021) for Medication Therapy Management, by phone.    SUBJECTIVE/OBJECTIVE:                          Sven Givens is a 66 year old male called for a follow-up visit. He was referred to me from Pam Dela Cruz.  Today's visit is a follow-up MTM visit from 5/28/21.     Reason for visit: blood sugar check, labs review. Do I still need all these medications?    Tobacco: He reports that he quit smoking about 29 years ago. He has never used smokeless tobacco.  Alcohol: none    Medication Adherence/Access: no issues reported    Type 2 Diabetes:   Metformin 1000mg twice daily  Ozempic 1mg daily  Glimepiride 4mg twice daily  Medication history: avoiding SGLT2 due to hx of bladder infection. Has also tried Januvia, Tradjenta, Invokana in the past which does not appear to have worked well for him.  SMBG Ranges (patient reported): FBG around 120's, 2hr PP always under 155mg/dL  Symptoms of low blood sugar? Dizzy, feels unwell - had a blood sugar of 68mg/dL and corrected with glass of juice; happened 3-4  times if he goes for a several hours between meals  Symptoms of high blood sugar? Ongoing feet pain - burning that happens most often at night time. He has tried lidocaine cream in the past and it didn't help at all. At this time, he is tolerating and doesn't want to take any pills for this currently.  Diet/Exercise: Eats 4x/day - low carb, no pasta  Walking 30minutes outside in good weather or 20min on treadmill between 2-4pm. Stopped going to gym during pandemic  Aspirin: Taking 81mg daily and denies side effects  Statin: Yes: atorvastatin 80mg daily and denies side effects  ACEi/ARB: Yes: losartan 100mg daily. Urine albumin is   Lab Results   Component Value Date    UMALCR 31.90 (H) 02/16/2021       Lab Results   Component Value Date    A1C 6.6 09/10/2021    A1C 7.4 05/26/2021    A1C 8.4 02/16/2021    A1C 11.4 11/02/2020    A1C 11.4 05/20/2020    A1C 9.3 12/12/2019       Wt Readings from Last 10 Encounters:   08/06/21 212 lb (96.2 kg)   05/07/21 218 lb 12.8 oz (99.2 kg)   01/25/21 222 lb (100.7 kg)   11/02/20 215 lb (97.5 kg)   10/19/20 213 lb (96.6 kg)   09/16/20 217 lb 9.6 oz (98.7 kg)   05/22/20 217 lb (98.4 kg)   12/12/19 217 lb (98.4 kg)   09/27/19 216 lb 3.2 oz (98.1 kg)   08/09/19 214 lb (97.1 kg)       Hyperlipidemia:   Atorvastatin 80mg daily - dose increased at 5/7 visit with cardiology  Vascepa - prescribed at 5/7 visit with cardiology for elevated TG and has been on for the last 3 months  Patient reports no significant myalgias or other side effects.  Labs done 9/10  Recent Labs   Lab Test 09/10/21  0743 05/26/21  0946 03/18/16  0905 03/02/15  0819 02/28/14  0941 02/28/14  0941   CHOL 98 103   < > 152   < > 126   HDL 31* 33*   < > 32*   < > 22*   LDL 26 33   < > 73   < > 69   TRIG 207* 187*   < > 235*   < > 177*   CHOLHDLRATIO  --   --   --  4.8  --  5.8*    < > = values in this interval not displayed.       Today's Vitals: There were no vitals taken for this visit.  ----------------      I spent 22  minutes with this patient today. All changes were made via collaborative practice agreement with Pam Dela Cruz. A copy of the visit note was provided to the patient's primary care provider.    The patient was sent via [employer] a summary of these recommendations.     Dora Neumann, Pharm.D., [facility]  Medication Therapy Management Pharmacist  [phone]    Telemedicine Visit Details  Type of service:  Telephone visit  Start Time: 9:00 AM  End Time: 9:22 AM  Originating Location (patient location): Home  Distant Location (provider location):  [facility]
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
## 2021-09-19 ENCOUNTER — HEALTH MAINTENANCE LETTER (OUTPATIENT)
Age: 66
End: 2021-09-19

## 2021-09-19 DIAGNOSIS — R00.0 SINUS TACHYCARDIA: ICD-10-CM

## 2021-09-19 DIAGNOSIS — I10 ESSENTIAL HYPERTENSION WITH GOAL BLOOD PRESSURE LESS THAN 140/90: ICD-10-CM

## 2021-09-20 RX ORDER — METOPROLOL SUCCINATE 50 MG/1
TABLET, EXTENDED RELEASE ORAL
Qty: 90 TABLET | Refills: 0 | Status: SHIPPED | OUTPATIENT
Start: 2021-09-20 | End: 2021-12-22

## 2021-10-05 NOTE — PATIENT INSTRUCTIONS
Preventive Health Recommendations:     See your health care provider every year to    Review health changes.     Discuss preventive care.      Review your medicines if your doctor has prescribed any.      Talk with your health care provider about whether you should have a test to screen for prostate cancer (PSA).    Every 3 years, have a diabetes test (fasting glucose). If you are at risk for diabetes, you should have this test more often.    Every 5 years, have a cholesterol test. Have this test more often if you are at risk for high cholesterol or heart disease.     Every 10 years, have a colonoscopy. Or, have a yearly FIT test (stool test). These exams will check for colon cancer.    Talk to with your health care provider about screening for Abdominal Aortic Aneurysm if you have a family history of AAA or have a history of smoking.    Shots:     Get a flu shot each year.     Get a tetanus shot every 10 years.     Talk to your doctor about your pneumonia vaccines. There are now two you should receive - Pneumovax (PPSV 23) and Prevnar (PCV 13).     Talk to your pharmacist about a shingles vaccine.     Talk to your doctor about the hepatitis B vaccine.  Nutrition:     Eat at least 5 servings of fruits and vegetables each day.     Eat whole-grain bread, whole-wheat pasta and brown rice instead of white grains and rice.     Get adequate Calcium and Vitamin D.   Lifestyle    Exercise for at least 150 minutes a week (30 minutes a day, 5 days a week). This will help you control your weight and prevent disease.     Limit alcohol to one drink per day.     No smoking.     Wear sunscreen to prevent skin cancer.    See your dentist every six months for an exam and cleaning.    See your eye doctor every 1 to 2 years to screen for conditions such as glaucoma, macular degeneration, cataracts, etc.    Personalized Prevention Plan  You are due for the preventive services outlined below.  Your care team is available to assist you  in scheduling these services.  If you have already completed any of these items, please share that information with your care team to update in your medical record.  Health Maintenance Due   Topic Date Due     ANNUAL REVIEW OF  ORDERS  Never done     Zoster (Shingles) Vaccine (2 of 3) 02/14/2014     Discuss Advance Care Planning  06/16/2016     Diabetic Foot Exam  04/09/2020     Flu Vaccine (1) 09/01/2021     Eye Exam  09/02/2021     Prostate Test  11/02/2021   At [facility], we strive to deliver an exceptional experience to you, every time we see you. If you receive a survey, please complete it as we do value your feedback.  If you have MyChart, you can expect to receive results automatically within 24 hours of their completion.  Your provider will send a note interpreting your results as well.   If you do not have MyChart, you should receive your results in about a week by mail.    Your care team:                            Family Medicine Internal Medicine   MD Rodrick Arita MD Shantel Branch-Fleming, MD Srinivasa Vaka, MD Katya Belousova, PA-C  Shanelle Asencio, APRN CNP    Yeison Villegas MD Pediatrics   Jatinder Gonzales, PA-C  Zoya El, CNP MD Gabbie Wright APRN CNP   MD Keira Trejo MD Deborah Mielke, MD Kim Thein, APRN CNP      Clinic hours: Monday - Thursday 7 am-6 pm; Fridays 7 am-5 pm.   Urgent care: Monday - Friday 10 am- 8 pm; Saturday and Sunday 9 am-5 pm.    Clinic: [phone]       [facility]: Monday - Thursday 8 am - 7 pm; Friday 8 am - 6 pm  [facility]: [phone]     Use [email or web address] for 24/7 diagnosis and treatment of dozens of conditions.

## 2021-10-05 NOTE — PROGRESS NOTES
"  SUBJECTIVE:   Sven Givens is a 66 year old male who presents for Preventive Visit.      Patient has been advised of split billing requirements and indicates understanding: No   Are you in the first 12 months of your Medicare coverage?  No    Healthy Habits:     In general, how would you rate your overall health?  Good    Frequency of exercise:  2-3 days/week    Duration of exercise:  15-30 minutes    Do you usually eat at least 4 servings of fruit and vegetables a day, include whole grains    & fiber and avoid regularly eating high fat or \"junk\" foods?  Yes    Taking medications regularly:  No    Barriers to taking medications:  Cost of medication    Medication side effects:  None    Ability to successfully perform activities of daily living:  No assistance needed    Home Safety:  No safety concerns identified    Hearing Impairment:  No hearing concerns    In the past 6 months, have you been bothered by leaking of urine?  No    In general, how would you rate your overall mental or emotional health?  Fair      PHQ-2 Total Score: 2    Additional concerns today:  No    Do you feel safe in your environment? Yes    Have you ever done Advance Care Planning? (For example, a Health Directive, POLST, or a discussion with a medical provider or your loved ones about your wishes): Yes, advance care planning is on file.       Fall risk       Cognitive Screening   1) Repeat 3 items (Leader, Season, Table)    2) Clock draw: NORMAL  3) 3 item recall: Recalls 3 objects  Results: 3 items recalled: COGNITIVE IMPAIRMENT LESS LIKELY    Mini-CogTM Copyright WILBUR Mojica. Licensed by the author for use in [facility]; reprinted with permission (carley@.[email or web address]). All rights reserved.      Do you have sleep apnea, excessive snoring or daytime drowsiness?: no    Reviewed and updated as needed this visit by clinical staff  Tobacco  Allergies  Meds  Problems             Reviewed and updated as needed this visit by Provider     " Problems            Social History     Tobacco Use     Smoking status: Former Smoker     Quit date: 1992     Years since quittin.7     Smokeless tobacco: Never Used     Tobacco comment: 15 + years    Substance Use Topics     Alcohol use: No         Alcohol Use 10/7/2021   Prescreen: >3 drinks/day or >7 drinks/week? No   Prescreen: >3 drinks/day or >7 drinks/week? -           Diabetes Follow-up      How often are you checking your blood sugar? Not at all    What concerns do you have today about your diabetes? None     Do you have any of these symptoms? (Select all that apply)  Numbness in feet and Burning in feet    Have you had a diabetic eye exam in the last 12 months? Yes- Date of last eye exam: 10-6-2021,  Location: [facility]        BP Readings from Last 2 Encounters:   10/07/21 123/73   09/15/21 121/76     Hemoglobin A1C (%)   Date Value   09/10/2021 6.6 (H)   2021 7.4 (H)   2021 8.4 (H)     LDL Cholesterol Calculated (mg/dL)   Date Value   09/10/2021 26   2021 33   2020 40         Hyperlipidemia Follow-Up      Are you regularly taking any medication or supplement to lower your cholesterol?   Yes- atorvastatin 80    Are you having muscle aches or other side effects that you think could be caused by your cholesterol lowering medication?  No    Hypertension Follow-up      Do you check your blood pressure regularly outside of the clinic? Yes     Are you following a low salt diet? Yes    Are your blood pressures ever more than 140 on the top number (systolic) OR more   than 90 on the bottom number (diastolic), for example 140/90? No      Current providers sharing in care for this patient include:   Patient Care Team:  Pam Dela Cruz MD as PCP - General (Family Practice)  Zhanna Ziegler APRN CNS as Referring Physician (Clinical Nurse Specialist)  Dorcas Amezcua RD as Diabetes Educator (Dietitian, Registered)  Mir Ambrocio MD as Assigned  Surgical Provider  Lay Valencia MD as Assigned Cancer Care Provider  Mickey Banks MD as Assigned Neuroscience Provider  Dora Neumann [facility] as MTM Pharamcist (Pharmacist)  Jesus Shane MD as Assigned Heart and Vascular Provider  Pam Dela Cruz MD as Assigned PCP    The following health maintenance items are reviewed in Epic and correct as of today:  Health Maintenance Due   Topic Date Due     ZOSTER IMMUNIZATION (2 of 3) 02/14/2014     ADVANCE CARE PLANNING  06/16/2016     DIABETIC FOOT EXAM  04/09/2020     EYE EXAM  09/02/2021     PSA  11/02/2021     Lab work is in process  Labs reviewed in EPIC  BP Readings from Last 3 Encounters:   10/07/21 123/73   09/15/21 121/76   08/06/21 117/72    Wt Readings from Last 3 Encounters:   10/07/21 96.2 kg (212 lb)   09/15/21 96.7 kg (213 lb 1.6 oz)   08/06/21 96.2 kg (212 lb)                  Patient Active Problem List   Diagnosis     Advanced directives, counseling/discussion     Essential hypertension with goal blood pressure less than 140/90     History of adenomatous polyp of colon     GERD (gastroesophageal reflux disease)     Anemia     Hyperlipidemia LDL goal <100     Gout     Type 2 diabetes mellitus with diabetic polyneuropathy, with long-term current use of insulin (H)     History of radiation exposure     Non-small cell carcinoma of lung, left (H)     Nonalcoholic hepatosteatosis     PRESLEY (obstructive sleep apnea)     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)     Age-related incipient cataract of both eyes     Past Surgical History:   Procedure Laterality Date     BRONCHOSCOPY FLEXIBLE N/A 9/23/2016    Procedure: BRONCHOSCOPY FLEXIBLE;  Surgeon: Gayle Moya MD;  Location: [facility] OR     COLONOSCOPY  5-03     COLONOSCOPY WITH CO2 INSUFFLATION N/A 5/31/2017    Procedure: COLONOSCOPY WITH CO2 INSUFFLATION;  COLON SCREEN/ SEB;  Surgeon: Margarito Rasheed DO;   Location: [facility] OR     COMBINED ESOPHAGOSCOPY, GASTROSCOPY, DUODENOSCOPY (EGD) WITH CO2 INSUFFLATION N/A 2019    Procedure: COMBINED ESOPHAGOSCOPY, GASTROSCOPY, DUODENOSCOPY (EGD) WITH CO2 INSUFFLATION;  Surgeon: Abbe Mccarty MD;  Location: [facility] OR     ESOPHAGOSCOPY, GASTROSCOPY, DUODENOSCOPY (EGD), COMBINED N/A 2019    Procedure: Combined Esophagoscopy, Gastroscopy, Duodenoscopy (Egd), Biopsy Single Or Multiple;  Surgeon: Abbe Mccarty MD;  Location: [facility] OR     GENITOURINARY SURGERY      kidney stones     THORACOSCOPIC WEDGE RESECTION LUNG Left 2016    Procedure: THORACOSCOPIC WEDGE RESECTION LUNG;  Surgeon: Gayle Moya MD;  Location: [facility] OR     VASCULAR SURGERY      varicose vein       Social History     Tobacco Use     Smoking status: Former Smoker     Quit date: 1992     Years since quittin.7     Smokeless tobacco: Never Used     Tobacco comment: 15 + years    Substance Use Topics     Alcohol use: No     Family History   Problem Relation Age of Onset     Cerebrovascular Disease Father 64     Cancer Sister         ovarary      Hernia Mother          age 97         Current Outpatient Medications   Medication Sig Dispense Refill     allopurinol (ZYLOPRIM) 100 MG tablet Take 2 tablets (200 mg) by mouth daily 180 tablet 3     ASPIRIN 81 MG PO TABS 1 TABLET DAILY(*)       atorvastatin (LIPITOR) 80 MG tablet Take 1 tablet (80 mg) by mouth daily 90 tablet 3     Azelaic Acid (FINACEA) 15 % gel Apply small amount twice a day to skin (Patient taking differently: as needed Apply small amount twice a day to skin) 50 g 3     fluticasone (FLONASE) 50 MCG/ACT nasal spray Spray 1-2 sprays into both nostrils daily (Patient taking differently: Spray 1-2 sprays into both nostrils as needed ) 1 Bottle 1     glimepiride (AMARYL) 4 MG tablet Take 1 tablet (4 mg) by mouth every morning (before breakfast) 90 tablet 3     hydrochlorothiazide (HYDRODIURIL) 25 MG tablet TAKE 1 TABLET BY  MOUTH EVERY DAY FOR BLOOD PRESSURE 90 tablet 0     Icosapent Ethyl (VASCEPA) 1 g CAPS Take 1 capsule by mouth 2 times daily 180 capsule 3     ketoconazole (NIZORAL) 2 % external shampoo Apply topically every other day Lather into rash and wash off after 10 minutes. 120 mL 0     losartan (COZAAR) 100 MG tablet TAKE 1 TABLET BY MOUTH EVERY DAY FOR BLOOD PRESSURE 90 tablet 0     metFORMIN (GLUCOPHAGE) 1000 MG tablet TAKE 1 TABLET BY MOUTH TWICE A DAY WITH MEALS 180 tablet 3     metoprolol succinate ER (TOPROL-XL) 50 MG 24 hr tablet TAKE 1 TABLET BY MOUTH EVERY DAY 90 tablet 0     metroNIDAZOLE 0.75 % EX external gel Apply topically 2 times daily 45 g 3     MULTIPLE VITAMINS OR 1 a day       omeprazole (PRILOSEC) 20 MG DR capsule TAKE 1 CAPSULE (20 MG) BY MOUTH DAILY TAKE 30-60 MINUTES BEFORE A MEAL. 90 capsule 1     Oxymetazoline HCl (AFRIN NASAL SPRAY NA) Spray in nostril as needed       OZEMPIC, 1 MG/DOSE, 4 MG/3ML SOPN INJECT 1 MG SUBCUTANEOUS EVERY 7 DAYS 9 mL 1     SM STOOL SOFTENER/LAXATIVE 8.6-50 MG tablet TAKE 1 TABLET BY MOUTH EVERYDAY AT BEDTIME 90 tablet 1     terazosin (HYTRIN) 5 MG capsule TAKE 1 CAPSULE (5 MG) BY MOUTH AT BEDTIME FOR BLOOD PRESSURE. 90 capsule 3     tiZANidine (ZANAFLEX) 2 MG tablet Take 1 tablet (2 mg) by mouth 3 times daily as needed for muscle spasms 30 tablet 0     Allergies   Allergen Reactions     Pcn [Penicillins] Rash     Ace Inhibitors Cough     Indomethacin Other (See Comments)     Severe dizziness     Invokana [Canagliflozin]      bladder infection     Recent Labs   Lab Test 09/10/21  0743 05/26/21  0946 02/16/21  0759 12/08/20  0838 11/02/20  1632 05/20/20  1229 07/16/19  1139 03/08/19  0926 10/16/18  1455 05/30/18  1445 04/16/18  0834 04/16/18  0834   A1C 6.6* 7.4* 8.4*  --    < > 11.4*   < > 9.9*  --   --    < > 8.5*   LDL 26 33  --   --   --  40  --  34  --   --    < > 51   HDL 31* 33*  --   --   --  36*  --  35*  --   --    < > 31*   TRIG 207* 187*  --   --   --  308*  --   "279*  --   --    < > 216*   ALT  --   --   --   --   --  44  --  47 45  --   --  54   CR  --   --  0.71  --   --  0.59*   < > 0.71  --   --    < > 0.70   GFRESTIMATED  --   --  >90 >90  --  >90   < > >90  --    < >   < > >90   GFRESTBLACK  --   --  >90 >90  --  >90   < > >90  --    < >   < > >90   POTASSIUM  --   --  4.0  --   --  3.7   < > 4.1  --   --    < > 3.9   TSH  --   --   --   --   --  2.42  --   --   --   --   --  2.91    < > = values in this interval not displayed.              Review of Systems   Constitutional: Negative for chills and fever.   HENT: Negative for congestion, ear pain, hearing loss and sore throat.    Eyes: Negative for pain and visual disturbance.   Respiratory: Negative for cough and shortness of breath.    Cardiovascular: Negative for chest pain, palpitations and peripheral edema.   Gastrointestinal: Positive for heartburn. Negative for abdominal pain, constipation, diarrhea, hematochezia and nausea.   Genitourinary: Positive for frequency. Negative for discharge, dysuria, genital sores, hematuria, impotence and urgency.   Musculoskeletal: Negative for arthralgias, joint swelling and myalgias.   Skin: Negative for rash.   Neurological: Positive for dizziness and weakness. Negative for headaches and paresthesias.   Psychiatric/Behavioral: The patient is not nervous/anxious.          OBJECTIVE:   /73 (BP Location: Left arm, Patient Position: Chair, Cuff Size: Adult Large)   Pulse 80   Temp 97.8  F (36.6  C) (Tympanic)   Ht 1.727 m (5' 8\")   Wt 96.2 kg (212 lb)   SpO2 98%   BMI 32.23 kg/m   Estimated body mass index is 32.23 kg/m  as calculated from the following:    Height as of this encounter: 1.727 m (5' 8\").    Weight as of this encounter: 96.2 kg (212 lb).  Physical Exam  GENERAL: healthy, alert and no distress, is obese  EYES: Eyes grossly normal to inspection, PERRL and conjunctivae and sclerae normal  HENT: ear canals and TM's normal, nose and mouth without ulcers or " lesions  NECK: no adenopathy, no asymmetry, masses, or scars and thyroid normal to palpation  RESP: lungs clear to auscultation - no rales, rhonchi or wheezes  CV: regular rate and rhythm, normal S1 S2, no S3 or S4, no murmur, click or rub, no peripheral edema and peripheral pulses strong  ABDOMEN: soft, nontender, no hepatosplenomegaly, no masses and bowel sounds normal  MS: no gross musculoskeletal defects noted, no edema  SKIN: no suspicious lesions or rashes  NEURO: Normal strength and tone, mentation intact and speech normal  BACK: no CVA tenderness, no paralumbar tenderness  PSYCH: mentation appears normal, affect normal/bright  Diabetic foot exam: normal DP and PT pulses, no trophic changes or ulcerative lesions, normal calluses, decreased sensory exam and decreased monofilament exam around toes and heels.     Diagnostic Test Results:  Labs reviewed in Epic  No results found for this or any previous visit (from the past 24 hour(s)).  Lab on 09/10/2021   Component Date Value Ref Range Status     Hemoglobin A1C 09/10/2021 6.6* 0.0 - 5.6 % Final    Normal <5.7%   Prediabetes 5.7-6.4%    Diabetes 6.5% or higher     Note: Adopted from ADA consensus guidelines.     Cholesterol 09/10/2021 98  <200 mg/dL Final    Age 0-19 years  Desirable: <170 mg/dL  Borderline high:  170-199 mg/dl  High:            >199 mg/dl    Age 20 years and older  Desirable: <200 mg/dL     Triglycerides 09/10/2021 207* <150 mg/dL Final    0-9 years:  Normal:    Less than 75 mg/dL  Borderline high:  75-99 mg/dL  High:             Greater than or equal to 100 mg/dL    0-19 years:  Normal:    Less than 90 mg/dL  Borderline high:   mg/dL  High:             Greater than or equal to 130 mg/dL    20 years and older:  Normal:    Less than 150 mg/dL  Borderline high:  150-199 mg/dL  High:             200-499 mg/dL  Very high:   Greater than or equal to 500 mg/dL     Direct Measure HDL 09/10/2021 31* >=40 mg/dL Final    0-19 years:       Greater  than or equal to 45 mg/dL   Low: Less than 40 mg/dL   Borderline low: 40-44 mg/dL     20 years and older:   Female: Greater than or equal to 50 mg/dL   Male:   Greater than or equal to 40 mg/dL          LDL Cholesterol Calculated 09/10/2021 26  <=100 mg/dL Final    Age 0-19 years:  Desirable: 0-110 mg/dL   Borderline high: 110-129 mg/dL   High: >= 130 mg/dL    Age 20 years and older:  Desirable: <100mg/dL  Above desirable: 100-129 mg/dL   Borderline high: 130-159 mg/dL   High: 160-189 mg/dL   Very high: >= 190 mg/dL     Non HDL Cholesterol 09/10/2021 67  <130 mg/dL Final    0-19 years:  Desirable:          Less than 120 mg/dL  Borderline high:   120-144 mg/dL  High:                   Greater than or equal to 145 mg/dL    20 years and older:  Desirable:          130 mg/dL  Above Desirable: 130-159 mg/dL  Borderline high:   160-189 mg/dL  High:               190-219 mg/dL  Very high:     Greater than or equal to 220 mg/dL     Patient Fasting > 8hrs? 09/10/2021 Yes   Final          ASSESSMENT / PLAN:       ICD-10-CM    1. Routine general medical examination at a health care facility  Z00.00    2. Screening for prostate cancer  Z12.5 PROSTATE SPEC ANTIGEN SCREEN     PROSTATE SPEC ANTIGEN SCREEN   3. Type 2 diabetes mellitus with diabetic polyneuropathy, with long-term current use of insulin (H)  E11.42     Z79.4    4. Non-small cell carcinoma of lung, left (H)  C34.92     more than 5 years and no further fu needed at this time. 10-7-2021   5. Essential hypertension with goal blood pressure less than 140/90  I10    6. Hyperlipidemia LDL goal <100  E78.5    7. Tinea versicolor  B36.0 ketoconazole (NIZORAL) 2 % external shampoo   8. 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 Age-related incipient cataract of both eyes  H25.093        Patient has been advised of split billing requirements and indicates understanding:  "No  COUNSELING:  Reviewed preventive health counseling, as reflected in patient instructions       Consider AAA screening for ages 65-75 and smoking history       Regular exercise       Healthy diet/nutrition       Bladder control    Estimated body mass index is 32.23 kg/m  as calculated from the following:    Height as of this encounter: 1.727 m (5' 8\").    Weight as of this encounter: 96.2 kg (212 lb).    Weight management plan: Discussed healthy diet and exercise guidelines    He reports that he quit smoking about 29 years ago. He has never used smokeless tobacco.      Appropriate preventive services were discussed with this patient, including applicable screening as appropriate for cardiovascular disease, diabetes, osteopenia/osteoporosis, and glaucoma.  As appropriate for age/gender, discussed screening for colorectal cancer, prostate cancer, breast cancer, and cervical cancer. Checklist reviewing preventive services available has been given to the patient.    Reviewed patients plan of care and provided an AVS. The Basic Care Plan (routine screening as documented in Health Maintenance) for Sven meets the Care Plan requirement. This Care Plan has been established and reviewed with the Patient.    Counseling Resources:  ATP IV Guidelines  Pooled Cohorts Equation Calculator  Breast Cancer Risk Calculator  Breast Cancer: Medication to Reduce Risk  FRAX Risk Assessment  ICSI Preventive Guidelines  Dietary Guidelines for Americans, 2010  [employer] MyPlate  ASA Prophylaxis  Lung CA Screening    Pam Dela Cruz MD  [facility]    Identified Health Risks:  "

## 2021-10-06 ENCOUNTER — TRANSFERRED RECORDS (OUTPATIENT)
Dept: HEALTH INFORMATION MANAGEMENT | Facility: CLINIC | Age: 66
End: 2021-10-06

## 2021-10-06 LAB
RETINOPATHY: NEGATIVE
RETINOPATHY: NEGATIVE

## 2021-10-07 ENCOUNTER — OFFICE VISIT (OUTPATIENT)
Dept: FAMILY MEDICINE | Facility: CLINIC | Age: 66
End: 2021-10-07
Payer: COMMERCIAL

## 2021-10-07 VITALS
TEMPERATURE: 97.8 F | HEART RATE: 80 BPM | SYSTOLIC BLOOD PRESSURE: 123 MMHG | OXYGEN SATURATION: 98 % | DIASTOLIC BLOOD PRESSURE: 73 MMHG | WEIGHT: 212 LBS | HEIGHT: 68 IN | BODY MASS INDEX: 32.13 KG/M2

## 2021-10-07 DIAGNOSIS — Z00.00 ROUTINE GENERAL MEDICAL EXAMINATION AT A HEALTH CARE FACILITY: Primary | ICD-10-CM

## 2021-10-07 DIAGNOSIS — Z12.5 SCREENING FOR PROSTATE CANCER: ICD-10-CM

## 2021-10-07 DIAGNOSIS — H25.093 AGE-RELATED INCIPIENT CATARACT OF BOTH EYES: ICD-10-CM

## 2021-10-07 DIAGNOSIS — E11.42 TYPE 2 DIABETES MELLITUS WITH DIABETIC POLYNEUROPATHY, WITH LONG-TERM CURRENT USE OF INSULIN (H): ICD-10-CM

## 2021-10-07 DIAGNOSIS — Z79.4 TYPE 2 DIABETES MELLITUS WITH DIABETIC POLYNEUROPATHY, WITH LONG-TERM CURRENT USE OF INSULIN (H): ICD-10-CM

## 2021-10-07 DIAGNOSIS — I10 ESSENTIAL HYPERTENSION WITH GOAL BLOOD PRESSURE LESS THAN 140/90: ICD-10-CM

## 2021-10-07 DIAGNOSIS — B36.0 TINEA VERSICOLOR: ICD-10-CM

## 2021-10-07 DIAGNOSIS — E11.3292 MILD NONPROLIFERATIVE DIABETIC RETINOPATHY OF LEFT EYE WITHOUT MACULAR EDEMA ASSOCIATED WITH TYPE 2 DIABETES MELLITUS (H): ICD-10-CM

## 2021-10-07 DIAGNOSIS — E78.5 HYPERLIPIDEMIA LDL GOAL <100: ICD-10-CM

## 2021-10-07 DIAGNOSIS — C34.92 NON-SMALL CELL CARCINOMA OF LUNG, LEFT (H): ICD-10-CM

## 2021-10-07 LAB — PSA SERPL-MCNC: 1.21 UG/L (ref 0–4)

## 2021-10-07 PROCEDURE — 99397 PER PM REEVAL EST PAT 65+ YR: CPT | Mod: 25 | Performed by: FAMILY MEDICINE

## 2021-10-07 PROCEDURE — G0103 PSA SCREENING: HCPCS | Performed by: FAMILY MEDICINE

## 2021-10-07 PROCEDURE — 36415 COLL VENOUS BLD VENIPUNCTURE: CPT | Performed by: FAMILY MEDICINE

## 2021-10-07 PROCEDURE — 90662 IIV NO PRSV INCREASED AG IM: CPT | Performed by: FAMILY MEDICINE

## 2021-10-07 PROCEDURE — 90471 IMMUNIZATION ADMIN: CPT | Performed by: FAMILY MEDICINE

## 2021-10-07 RX ORDER — KETOCONAZOLE 20 MG/ML
SHAMPOO TOPICAL EVERY OTHER DAY
Qty: 120 ML | Refills: 0 | Status: SHIPPED | OUTPATIENT
Start: 2021-10-07 | End: 2021-11-02

## 2021-10-07 ASSESSMENT — ENCOUNTER SYMPTOMS
CHILLS: 0
NAUSEA: 0
JOINT SWELLING: 0
SHORTNESS OF BREATH: 0
FREQUENCY: 1
COUGH: 0
HEADACHES: 0
HEMATOCHEZIA: 0
MYALGIAS: 0
HEMATURIA: 0
SORE THROAT: 0
EYE PAIN: 0
DYSURIA: 0
PALPITATIONS: 0
HEARTBURN: 1
ABDOMINAL PAIN: 0
WEAKNESS: 1
FEVER: 0
DIARRHEA: 0
CONSTIPATION: 0
DIZZINESS: 1
NERVOUS/ANXIOUS: 0
ARTHRALGIAS: 0
PARESTHESIAS: 0

## 2021-10-07 ASSESSMENT — ACTIVITIES OF DAILY LIVING (ADL): CURRENT_FUNCTION: NO ASSISTANCE NEEDED

## 2021-10-07 ASSESSMENT — MIFFLIN-ST. JEOR: SCORE: 1706.13

## 2021-10-07 ASSESSMENT — PAIN SCALES - GENERAL: PAINLEVEL: NO PAIN (0)

## 2021-10-08 NOTE — RESULT ENCOUNTER NOTE
Dear Sven    Your test results are attached. I am happy to let you know that they are stable.    The test for prostate cancer was normal and shows low risk.     Please contact me by [employer] if you have any questions about your labs or management. You may also call my office number [phone] for any questions.     Pam Dela Cruz MD

## 2021-10-15 ENCOUNTER — MYC MEDICAL ADVICE (OUTPATIENT)
Dept: PHARMACY | Facility: CLINIC | Age: 66
End: 2021-10-15

## 2021-10-15 DIAGNOSIS — E11.42 TYPE 2 DIABETES MELLITUS WITH DIABETIC POLYNEUROPATHY, WITHOUT LONG-TERM CURRENT USE OF INSULIN (H): Primary | ICD-10-CM

## 2021-10-15 DIAGNOSIS — Z91.89 AT INCREASED RISK OF EXPOSURE TO COVID-19 VIRUS: ICD-10-CM

## 2021-10-19 NOTE — TELEPHONE ENCOUNTER
Lab Results   Component Value Date    A1C 6.6 09/10/2021    A1C 7.4 05/26/2021    A1C 8.4 02/16/2021    A1C 11.4 11/02/2020    A1C 11.4 05/20/2020    A1C 9.3 12/12/2019

## 2021-11-01 DIAGNOSIS — B36.0 TINEA VERSICOLOR: ICD-10-CM

## 2021-11-02 RX ORDER — KETOCONAZOLE 20 MG/ML
SHAMPOO TOPICAL EVERY OTHER DAY
Qty: 120 ML | Refills: 0 | Status: SHIPPED | OUTPATIENT
Start: 2021-11-02

## 2021-11-02 NOTE — TELEPHONE ENCOUNTER
Routing refill request to provider for review/approval because:  Drug interaction warning    Eve Selby BSN, RN

## 2021-11-05 DIAGNOSIS — K21.00 GASTROESOPHAGEAL REFLUX DISEASE WITH ESOPHAGITIS: ICD-10-CM

## 2021-11-05 NOTE — TELEPHONE ENCOUNTER
Prescription approved per [facility] Refill Protocol.  Naheed Contreras RN, BSN   [facility]

## 2021-11-07 DIAGNOSIS — K59.01 SLOW TRANSIT CONSTIPATION: ICD-10-CM

## 2021-11-07 DIAGNOSIS — I10 ESSENTIAL HYPERTENSION WITH GOAL BLOOD PRESSURE LESS THAN 140/90: ICD-10-CM

## 2021-11-10 RX ORDER — DOCUSATE SODIUM 50MG AND SENNOSIDES 8.6MG 8.6; 5 MG/1; MG/1
TABLET, FILM COATED ORAL
Qty: 90 TABLET | Refills: 1 | Status: SHIPPED | OUTPATIENT
Start: 2021-11-10 | End: 2022-02-03

## 2021-11-10 RX ORDER — HYDROCHLOROTHIAZIDE 25 MG/1
TABLET ORAL
Qty: 90 TABLET | Refills: 0 | Status: SHIPPED | OUTPATIENT
Start: 2021-11-10 | End: 2022-01-19

## 2021-11-10 RX ORDER — LOSARTAN POTASSIUM 100 MG/1
TABLET ORAL
Qty: 90 TABLET | Refills: 0 | Status: SHIPPED | OUTPATIENT
Start: 2021-11-10 | End: 2022-01-19

## 2021-12-02 ENCOUNTER — MYC MEDICAL ADVICE (OUTPATIENT)
Dept: PHARMACY | Facility: CLINIC | Age: 66
End: 2021-12-02

## 2021-12-02 ENCOUNTER — LAB (OUTPATIENT)
Dept: LAB | Facility: CLINIC | Age: 66
End: 2021-12-02
Payer: COMMERCIAL

## 2021-12-02 DIAGNOSIS — E11.42 TYPE 2 DIABETES MELLITUS WITH DIABETIC POLYNEUROPATHY, WITHOUT LONG-TERM CURRENT USE OF INSULIN (H): ICD-10-CM

## 2021-12-02 DIAGNOSIS — E11.42 TYPE 2 DIABETES MELLITUS WITH DIABETIC POLYNEUROPATHY, WITHOUT LONG-TERM CURRENT USE OF INSULIN (H): Primary | ICD-10-CM

## 2021-12-02 DIAGNOSIS — Z91.89 AT INCREASED RISK OF EXPOSURE TO COVID-19 VIRUS: ICD-10-CM

## 2021-12-02 LAB — HBA1C MFR BLD: 8 % (ref 0–5.6)

## 2021-12-02 PROCEDURE — 36415 COLL VENOUS BLD VENIPUNCTURE: CPT

## 2021-12-02 PROCEDURE — 86769 SARS-COV-2 COVID-19 ANTIBODY: CPT | Mod: 90

## 2021-12-02 PROCEDURE — 83036 HEMOGLOBIN GLYCOSYLATED A1C: CPT

## 2021-12-02 PROCEDURE — 99000 SPECIMEN HANDLING OFFICE-LAB: CPT

## 2021-12-02 RX ORDER — GLIMEPIRIDE 4 MG/1
TABLET ORAL
Qty: 135 TABLET | Refills: 1 | Status: SHIPPED | OUTPATIENT
Start: 2021-12-02 | End: 2022-02-03

## 2021-12-03 LAB
SARS-COV-2 AB SERPL IA-ACNC: 151 U/ML
SARS-COV-2 AB SERPL QL IA: POSITIVE

## 2021-12-04 NOTE — RESULT ENCOUNTER NOTE
Dear Sven    Your test results are attached. I am happy to let you know that they are stable.    The COVID antibody test shows positive antibodies from either past infection or vaccination. It does not let us know the absolute protective level but is reassuring that they are present.     Please contact me by [employer] if you have any questions about your labs or management. You may also call my office number [phone] for any questions.     Pam Dela Cruz MD

## 2021-12-17 NOTE — TELEPHONE ENCOUNTER
Lab Results   Component Value Date    A1C 8.0 12/02/2021    A1C 6.6 09/10/2021    A1C 7.4 05/26/2021    A1C 8.4 02/16/2021    A1C 11.4 11/02/2020    A1C 11.4 05/20/2020    A1C 9.3 12/12/2019

## 2022-01-18 DIAGNOSIS — M1A.0710 IDIOPATHIC CHRONIC GOUT OF RIGHT FOOT WITHOUT TOPHUS: ICD-10-CM

## 2022-01-18 DIAGNOSIS — R00.0 SINUS TACHYCARDIA: ICD-10-CM

## 2022-01-18 DIAGNOSIS — I10 ESSENTIAL HYPERTENSION WITH GOAL BLOOD PRESSURE LESS THAN 140/90: ICD-10-CM

## 2022-01-19 RX ORDER — METOPROLOL SUCCINATE 50 MG/1
TABLET, EXTENDED RELEASE ORAL
Qty: 90 TABLET | Refills: 0 | OUTPATIENT
Start: 2022-01-19

## 2022-01-19 RX ORDER — LOSARTAN POTASSIUM 100 MG/1
TABLET ORAL
Qty: 90 TABLET | Refills: 2 | Status: SHIPPED | OUTPATIENT
Start: 2022-01-19 | End: 2022-02-03

## 2022-01-19 RX ORDER — HYDROCHLOROTHIAZIDE 25 MG/1
TABLET ORAL
Qty: 90 TABLET | Refills: 2 | Status: SHIPPED | OUTPATIENT
Start: 2022-01-19 | End: 2022-02-03

## 2022-01-19 RX ORDER — ALLOPURINOL 100 MG/1
TABLET ORAL
Qty: 180 TABLET | Refills: 3 | Status: SHIPPED | OUTPATIENT
Start: 2022-01-19 | End: 2022-02-03

## 2022-01-19 NOTE — TELEPHONE ENCOUNTER
"Requested Prescriptions   Pending Prescriptions Disp Refills    allopurinol (ZYLOPRIM) 100 MG tablet [Pharmacy Med Name: ALLOPURINOL 100 MG TABLET] 180 tablet 3     Sig: TAKE 2 TABLETS BY MOUTH EVERY DAY        Gout Agents Protocol Failed - 1/19/2022  9:45 AM        Failed - CBC on file in past 12 months     Recent Labs   Lab Test 11/02/20  1632 05/20/20  1229   WBC  --  7.5   RBC  --  4.54   HGB 13.7 13.2*   HCT  --  39.7*   PLT  --  218                   Failed - ALT on file in past 12 months     Recent Labs   Lab Test 05/20/20  1229   ALT 44               Passed - Has Uric Acid on file in past 12 months and value is less than 6     Recent Labs   Lab Test 02/16/21  0759   URIC 4.3     If level is 6mg/dL or greater, ok to refill one time and refer to provider.             Passed - Recent (12 mo) or future (30 days) visit within the authorizing provider's specialty     Patient has had an office visit with the authorizing provider or a provider within the authorizing providers department within the previous 12 mos or has a future within next 30 days. See \"Patient Info\" tab in inbasket, or \"Choose Columns\" in Meds & Orders section of the refill encounter.              Passed - Medication is active on med list        Passed - Patient is age 18 or older        Passed - Normal serum creatinine on file in the past 12 months     Recent Labs   Lab Test 02/16/21  0759 12/08/20  0838   CR 0.71  --    CREAT  --  0.7       Ok to refill medication if creatinine is low            Signed Prescriptions Disp Refills    hydrochlorothiazide (HYDRODIURIL) 25 MG tablet 90 tablet 2     Sig: TAKE 1 TABLET BY MOUTH EVERY DAY FOR BLOOD PRESSURE        Diuretics (Including Combos) Protocol Passed - 1/19/2022  9:45 AM        Passed - Blood pressure under 140/90 in past 12 months       BP Readings from Last 3 Encounters:   10/07/21 123/73   09/15/21 121/76   08/06/21 117/72                 Passed - Recent (12 mo) or future (30 days) visit " "within the authorizing provider's specialty     Patient has had an office visit with the authorizing provider or a provider within the authorizing providers department within the previous 12 mos or has a future within next 30 days. See \"Patient Info\" tab in inbasket, or \"Choose Columns\" in Meds & Orders section of the refill encounter.              Passed - Medication is active on med list        Passed - Patient is age 18 or older        Passed - Normal serum creatinine on file in past 12 months       Recent Labs   Lab Test 02/16/21  0759   CR 0.71              Passed - Normal serum potassium on file in past 12 months       Recent Labs   Lab Test 02/16/21  0759   POTASSIUM 4.0                    Passed - Normal serum sodium on file in past 12 months       Recent Labs   Lab Test 02/16/21  0759                    losartan (COZAAR) 100 MG tablet 90 tablet 2     Sig: TAKE 1 TABLET BY MOUTH EVERY DAY FOR BLOOD PRESSURE        Angiotensin-II Receptors Passed - 1/19/2022  9:45 AM        Passed - Last blood pressure under 140/90 in past 12 months       BP Readings from Last 3 Encounters:   10/07/21 123/73   09/15/21 121/76   08/06/21 117/72                 Passed - Recent (12 mo) or future (30 days) visit within the authorizing provider's specialty     Patient has had an office visit with the authorizing provider or a provider within the authorizing providers department within the previous 12 mos or has a future within next 30 days. See \"Patient Info\" tab in inbasket, or \"Choose Columns\" in Meds & Orders section of the refill encounter.              Passed - Medication is active on med list        Passed - Patient is age 18 or older        Passed - Normal serum creatinine on file in past 12 months     Recent Labs   Lab Test 02/16/21  0759 12/08/20  0838   CR 0.71  --    CREAT  --  0.7       Ok to refill medication if creatinine is low          Passed - Normal serum potassium on file in past 12 months       Recent " "Labs   Lab Test 02/16/21  0759   POTASSIUM 4.0                      Refused Prescriptions Disp Refills    metoprolol succinate ER (TOPROL-XL) 50 MG 24 hr tablet [Pharmacy Med Name: METOPROLOL SUCC ER 50 MG TAB] 90 tablet 0     Sig: TAKE 1 TABLET BY MOUTH EVERY DAY        Beta-Blockers Protocol Passed - 1/19/2022  9:45 AM        Passed - Blood pressure under 140/90 in past 12 months       BP Readings from Last 3 Encounters:   10/07/21 123/73   09/15/21 121/76   08/06/21 117/72                 Passed - Patient is age 6 or older        Passed - Recent (12 mo) or future (30 days) visit within the authorizing provider's specialty     Patient has had an office visit with the authorizing provider or a provider within the authorizing providers department within the previous 12 mos or has a future within next 30 days. See \"Patient Info\" tab in inbasket, or \"Choose Columns\" in Meds & Orders section of the refill encounter.              Passed - Medication is active on med list              "

## 2022-01-20 ENCOUNTER — OFFICE VISIT (OUTPATIENT)
Dept: AUDIOLOGY | Facility: CLINIC | Age: 67
End: 2022-01-20
Payer: COMMERCIAL

## 2022-01-20 ENCOUNTER — OFFICE VISIT (OUTPATIENT)
Dept: OTOLARYNGOLOGY | Facility: CLINIC | Age: 67
End: 2022-01-20
Payer: COMMERCIAL

## 2022-01-20 VITALS — BODY MASS INDEX: 32.13 KG/M2 | RESPIRATION RATE: 16 BRPM | HEIGHT: 68 IN | WEIGHT: 212 LBS | OXYGEN SATURATION: 99 %

## 2022-01-20 DIAGNOSIS — H81.10 BENIGN PAROXYSMAL POSITIONAL VERTIGO, UNSPECIFIED LATERALITY: ICD-10-CM

## 2022-01-20 DIAGNOSIS — R42 DIZZINESS: Primary | ICD-10-CM

## 2022-01-20 PROCEDURE — 92557 COMPREHENSIVE HEARING TEST: CPT | Performed by: AUDIOLOGIST

## 2022-01-20 PROCEDURE — 99214 OFFICE O/P EST MOD 30 MIN: CPT | Performed by: OTOLARYNGOLOGY

## 2022-01-20 PROCEDURE — 92550 TYMPANOMETRY & REFLEX THRESH: CPT | Performed by: AUDIOLOGIST

## 2022-01-20 PROCEDURE — 99207 PR NO CHARGE LOS: CPT | Performed by: AUDIOLOGIST

## 2022-01-20 ASSESSMENT — MIFFLIN-ST. JEOR: SCORE: 1706.13

## 2022-01-20 ASSESSMENT — PAIN SCALES - GENERAL: PAINLEVEL: NO PAIN (0)

## 2022-01-20 NOTE — PROGRESS NOTES
AUDIOLOGY REPORT:    Patient was referred from ENT by Dr. Ambrocio for audiology evaluation. The patient reports that he first had vertigo last year, but he has been having dizziness again recently with nausea and vomiting. He reports that it is worse with head movement and when lying down. The patient also reports a plugged sensation in his left ear, but no pain, tinnitus, or hearing changes. The patient reports that he does not have a history of ear problems or ear surgery. The patient was last tested on 10/19/2020 and results showed normal hearing sensitivity bilaterally.    Testing:    Otoscopy:   Otoscopic exam indicates ears are clear of cerumen bilaterally     Tympanograms:    RIGHT: normal eardrum mobility     LEFT:   normal eardrum mobility    Reflexes (reported by stimulus ear):  RIGHT: Ipsilateral is present at normal levels  RIGHT: Contralateral is absent at frequencies tested  LEFT:   Ipsilateral is absent at frequencies tested  LEFT:   Contralateral is present at elevated levels    Thresholds:   Pure Tone Thresholds assessed using conventional audiometry with good reliability from 250-8000 Hz bilaterally using insert earphones and circumaural headphones     RIGHT:  mild sensorineural hearing loss at 4000 Hz with normal hearing sensitivity at all other tested frequencies    LEFT:    normal hearing sensitivity at all tested frequencies     Speech Reception Threshold:    RIGHT: 25 dB HL    LEFT:   25 dB HL  Results are in agreement with pure tone average.     Word Recognition Score:     RIGHT: 100% at 65 dB HL using NU-6 recorded word list.    LEFT:   100% at 65 dB HL using NU-6 recorded word list.    Discussed results with the patient. Thresholds are stable compared to 10/19/2020.    Patient was returned to ENT for follow up.     Ugo Gandhi, CCC-A  Licensed Audiologist #[id number]  1/20/2022

## 2022-01-20 NOTE — PATIENT INSTRUCTIONS
Scheduling Information  To schedule your CT/MRI scan, please contact [facility] at [phone] OR [facility] at [phone]    To schedule your Surgery, please contact our Specialty Schedulers at [phone]      ENT Clinic Locations Clinic Hours Telephone Number     [facility]  [address] [place] [address]   Monday:           1:00pm -- 5:00pm    Friday:              8:00am - 12:00pm   To schedule/reschedule an appointment with   Dr. Ambrocio,   please contact our   Specialty Scheduling Department at:     [phone]       [facility]  [address] [place] [address] Tuesday:          8:00am -- 2:00pm         Urgent Care Locations Clinic Hours Telephone Numbers     [facility]  [address] [place] [address]     Monday-Friday:     11:00am - 9:00pm    Saturday-Sunday:  9:00am - 5:00pm   [phone]     [facility]  [address] [place] [address]     Monday-Friday:      5:00pm - 9:00pm     Saturday-Sunday:  9:00am - 5:00pm   [phone]

## 2022-01-20 NOTE — PROGRESS NOTES
History of Present Illness - Sven Givens is a 65 year old male here in follow up from 10/19/2020, and was seen at that time due to dizziness.    To review his history, a few days prior to seeing me he woke up, and when he sat up he got severely spinning and dizzy and fell back into bed.  He could not stand for about 20 minutes.  He was also very nauseated.  His daughter suggested Epley maneuvers, but that made it much worse and he stopped.    In the week since then he has had a few days of no symptoms, and has had recurrences as well.  They have continued to try the canalith repositioning maneuvers.    No previous history of ear disease, no previous ear surgery.    Of note, he has a history of NSC carcinoma of the LEFT lung.  Per his most recent oncology follow up on 9/16/2020: Stage 1A (pT1aN0) 0.9 x 0.7 x 0.5 cm grade 1 well differentiated adenocarcinoma of the left lower lobe of the lung with invasive component being 0.3cm, s/p wedge resection and mediastinal LN sampling on 9/23/16. 3 sampled LNs were negative.    My impression at the end of the 10/19/2020 visit was that this was benign paroxysmal positional vertigo and referred him for formal canalith repositioning.  Because of Covid, he did not go back. He was lost to follow up until now.    On 12/10/2020 he was seen by Dr Banks with Neurology.  MRI brain was normal, and per his report his main suspicion was of Vestibular Neuritis, less likely Meniere's Disease.  Things were fine by the end of 2020, and all of 2021.    But then in the beginning of 2022 he had an acute episode of nausea and vomiting.  He could not get out of bed he was so dizzy.  This lasted several hours.  And since then he has continued to be unstable, and he cannot sleep on the LEFT side and look at the ceiling, as that triggers spinning. But otherwise, no other vertigo or instability He has not been back to PT since this started.  He has tried canalith positioning maneuvers at home.   Also, the LEFT ear has a stuffy feeling under the ear, like water in the ear.      Past Medical History -   Patient Active Problem List   Diagnosis     Advanced directives, counseling/discussion     Essential hypertension with goal blood pressure less than 140/90     History of adenomatous polyp of colon     GERD (gastroesophageal reflux disease)     Anemia     Hyperlipidemia LDL goal <100     Gout     Type 2 diabetes mellitus with diabetic polyneuropathy, with long-term current use of insulin (H)     History of radiation exposure     Non-small cell carcinoma of lung, left (H)     Nonalcoholic hepatosteatosis     PRESLEY (obstructive sleep apnea)     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)     Age-related incipient cataract of both eyes       Current Medications -   Current Outpatient Medications:      SENEXON-S 8.6-50 MG tablet, TAKE 1 TABLET BY MOUTH EVERYDAY AT BEDTIME, Disp: 90 tablet, Rfl: 1     allopurinol (ZYLOPRIM) 100 MG tablet, TAKE 2 TABLETS BY MOUTH EVERY DAY, Disp: 180 tablet, Rfl: 3     ASPIRIN 81 MG PO TABS, 1 TABLET DAILY(*), Disp: , Rfl:      atorvastatin (LIPITOR) 80 MG tablet, Take 1 tablet (80 mg) by mouth daily, Disp: 90 tablet, Rfl: 3     Azelaic Acid (FINACEA) 15 % gel, Apply small amount twice a day to skin (Patient taking differently: as needed Apply small amount twice a day to skin), Disp: 50 g, Rfl: 3     fluticasone (FLONASE) 50 MCG/ACT nasal spray, Spray 1-2 sprays into both nostrils daily (Patient taking differently: Spray 1-2 sprays into both nostrils as needed ), Disp: 1 Bottle, Rfl: 1     glimepiride (AMARYL) 4 MG tablet, Take 1 tablet (4mg) by mouth every morning and 1/2 tablet (2mg) every evening with food for diabetes, Disp: 135 tablet, Rfl: 1     hydrochlorothiazide (HYDRODIURIL) 25 MG tablet, TAKE 1 TABLET BY MOUTH EVERY DAY FOR BLOOD PRESSURE, Disp: 90 tablet, Rfl: 2     Icosapent Ethyl (VASCEPA) 1 g  CAPS, Take 1 capsule by mouth 2 times daily, Disp: 180 capsule, Rfl: 3     ketoconazole (NIZORAL) 2 % external shampoo, APPLY TOPICALLY EVERY OTHER DAY LATHER INTO RASH AND WASH OFF AFTER 10 MINUTES., Disp: 120 mL, Rfl: 0     losartan (COZAAR) 100 MG tablet, TAKE 1 TABLET BY MOUTH EVERY DAY FOR BLOOD PRESSURE, Disp: 90 tablet, Rfl: 2     metFORMIN (GLUCOPHAGE) 1000 MG tablet, TAKE 1 TABLET BY MOUTH TWICE A DAY WITH MEALS, Disp: 180 tablet, Rfl: 0     metoprolol succinate ER (TOPROL-XL) 50 MG 24 hr tablet, TAKE 1 TABLET BY MOUTH EVERY DAY, Disp: 90 tablet, Rfl: 0     metroNIDAZOLE 0.75 % EX external gel, Apply topically 2 times daily, Disp: 45 g, Rfl: 3     MULTIPLE VITAMINS OR, 1 a day, Disp: , Rfl:      omeprazole (PRILOSEC) 20 MG DR capsule, TAKE 1 CAPSULE (20 MG) BY MOUTH DAILY TAKE 30-60 MINUTES BEFORE A MEAL., Disp: 90 capsule, Rfl: 1     Oxymetazoline HCl (AFRIN NASAL SPRAY NA), Spray in nostril as needed, Disp: , Rfl:      OZEMPIC, 1 MG/DOSE, 4 MG/3ML SOPN, INJECT 1 MG SUBCUTANEOUS EVERY 7 DAYS, Disp: 9 mL, Rfl: 1     terazosin (HYTRIN) 5 MG capsule, TAKE 1 CAPSULE (5 MG) BY MOUTH AT BEDTIME FOR BLOOD PRESSURE., Disp: 90 capsule, Rfl: 3     tiZANidine (ZANAFLEX) 2 MG tablet, Take 1 tablet (2 mg) by mouth 3 times daily as needed for muscle spasms, Disp: 30 tablet, Rfl: 0    Allergies -   Allergies   Allergen Reactions     Pcn [Penicillins] Rash     Ace Inhibitors Cough     Indomethacin Other (See Comments)     Severe dizziness     Invokana [Canagliflozin]      bladder infection       Social History -   Social History     Socioeconomic History     Marital status:      Spouse name: Not on file     Number of children: Not on file     Years of education: Not on file     Highest education level: Not on file   Occupational History     Employer: [employer]   Social Needs     Financial resource strain: Not on file     Food insecurity     Worry: Not on file     Inability: Not on file     Transportation  "needs     Medical: Not on file     Non-medical: Not on file   Tobacco Use     Smoking status: Former Smoker     Quit date: 1992     Years since quittin.7     Smokeless tobacco: Never Used     Tobacco comment: 15 + years    Substance and Sexual Activity     Alcohol use: No     Drug use: No     Sexual activity: Not Currently     Partners: Female     Birth control/protection: None   Lifestyle     Physical activity     Days per week: Not on file     Minutes per session: Not on file     Stress: Not on file   Relationships     Social connections     Talks on phone: Not on file     Gets together: Not on file     Attends Holiness service: Not on file     Active member of club or organization: Not on file     Attends meetings of clubs or organizations: Not on file     Relationship status: Not on file     Intimate partner violence     Fear of current or ex partner: Not on file     Emotionally abused: Not on file     Physically abused: Not on file     Forced sexual activity: Not on file   Other Topics Concern     Parent/sibling w/ CABG, MI or angioplasty before 65F 55M? No   Social History Narrative     Not on file       Family History -   Family History   Problem Relation Age of Onset     Cerebrovascular Disease Father 64     Cancer Sister         ovarary      Hernia Mother          age 97       Review of Systems - As per HPI and PMHx, otherwise 10+ system review of the head and neck, and general constitution is negative.    Physical Exam  Resp 16   Ht 1.727 m (5' 8\")   Wt 96.2 kg (212 lb)   SpO2 99%   BMI 32.23 kg/m      General - The patient is well nourished and well developed, and appears to have good nutritional status.  Alert and oriented to person and place, answers questions and cooperates with examination appropriately.   Head and Face - Normocephalic and atraumatic, with no gross asymmetry noted of the contour of the facial features.  The facial nerve is intact, with strong symmetric " movements.  Voice and Breathing - The patient was breathing comfortably without the use of accessory muscles. There was no wheezing, stridor, or stertor.  The patients voice was clear and strong, and had appropriate pitch and quality.  Ears - The tympanic membranes are normal in appearance, bony landmarks are intact.  No retraction, perforation, or masses.  No fluid or purulence was seen in the external canal or the middle ear. No evidence of infection of the middle ear or external canal, cerumen was normal in appearance.  Eyes - Extraocular movements intact, and the pupils were reactive to light.  Sclera were not icteric or injected, conjunctiva were pink and moist.  Mouth - Examination of the oral cavity showed pink, healthy oral mucosa. No lesions or ulcerations noted.  The tongue was mobile and midline, and the dentition were in good condition.    Throat - The walls of the oropharynx were smooth, pink, moist, symmetric, and had no lesions or ulcerations.  The tonsillar pillars and soft palate were symmetric.  The uvula was midline on elevation.    Neck - Normal midline excursion of the laryngotracheal complex during swallowing.  Full range of motion on passive movement.  Palpation of the occipital, submental, submandibular, internal jugular chain, and supraclavicular nodes did not demonstrate any abnormal lymph nodes or masses.  The carotid pulse was palpable bilaterally.  Palpation of the thyroid was soft and smooth, with no nodules or goiter appreciated.  The trachea was mobile and midline.  Nose - External contour is symmetric, no gross deflection or scars.  Nasal mucosa is pink and moist with no abnormal mucus.  The septum was midline and non-obstructive, turbinates of normal size and position.  No polyps, masses, or purulence noted on examination.      A/P - Sven Givens is a 65 year old male  (R42) Dizziness  (primary encounter diagnosis)  (H81.10) Benign paroxysmal positional vertigo, unspecified  laterality    Based on today's exam and history, I think that the most likely diagnosis at this point is benign paroxysmal positional vertigo.  The etiology of benign paroxysmal positional vertigo being small crystals tumbling in the semicircular canals was discussed at length.  Also, the treatment of the problem with physical therapy for canalith repositioning maneuvers was discussed.    However, I think this might be a unique case of anterior canal canalith.  This based on the highly specific trigger of laying on the LEFT side, and looking at the ceiling.  I have referred to PT for canalith maneuvers    If this does not help, let me know and I will get imaging, including soft tissue neck.

## 2022-01-20 NOTE — LETTER
1/20/2022         RE: Sven Givens  [address]  [place] [address]        Dear Colleague,    Thank you for referring your patient, Sven Givens, to the [facility]. Please see a copy of my visit note below.    History of Present Illness - Sven Givens is a 65 year old male here in follow up from 10/19/2020, and was seen at that time due to dizziness.    To review his history, a few days prior to seeing me he woke up, and when he sat up he got severely spinning and dizzy and fell back into bed.  He could not stand for about 20 minutes.  He was also very nauseated.  His daughter suggested Epley maneuvers, but that made it much worse and he stopped.    In the week since then he has had a few days of no symptoms, and has had recurrences as well.  They have continued to try the canalith repositioning maneuvers.    No previous history of ear disease, no previous ear surgery.    Of note, he has a history of NSC carcinoma of the LEFT lung.  Per his most recent oncology follow up on 9/16/2020: Stage 1A (pT1aN0) 0.9 x 0.7 x 0.5 cm grade 1 well differentiated adenocarcinoma of the left lower lobe of the lung with invasive component being 0.3cm, s/p wedge resection and mediastinal LN sampling on 9/23/16. 3 sampled LNs were negative.    My impression at the end of the 10/19/2020 visit was that this was benign paroxysmal positional vertigo and referred him for formal canalith repositioning.  Because of Covid, he did not go back. He was lost to follow up until now.    On 12/10/2020 he was seen by Dr Banks with Neurology.  MRI brain was normal, and per his report his main suspicion was of Vestibular Neuritis, less likely Meniere's Disease.  Things were fine by the end of 2020, and all of 2021.    But then in the beginning of 2022 he had an acute episode of nausea and vomiting.  He could not get out of bed he was so dizzy.  This lasted several hours.  And since then he has continued  to be unstable, and he cannot sleep on the LEFT side and look at the ceiling, as that triggers spinning. But otherwise, no other vertigo or instability He has not been back to PT since this started.  He has tried canalith positioning maneuvers at home.  Also, the LEFT ear has a stuffy feeling under the ear, like water in the ear.      Past Medical History -   Patient Active Problem List   Diagnosis     Advanced directives, counseling/discussion     Essential hypertension with goal blood pressure less than 140/90     History of adenomatous polyp of colon     GERD (gastroesophageal reflux disease)     Anemia     Hyperlipidemia LDL goal <100     Gout     Type 2 diabetes mellitus with diabetic polyneuropathy, with long-term current use of insulin (H)     History of radiation exposure     Non-small cell carcinoma of lung, left (H)     Nonalcoholic hepatosteatosis     PRESLEY (obstructive sleep apnea)     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)     Age-related incipient cataract of both eyes       Current Medications -   Current Outpatient Medications:      SENEXON-S 8.6-50 MG tablet, TAKE 1 TABLET BY MOUTH EVERYDAY AT BEDTIME, Disp: 90 tablet, Rfl: 1     allopurinol (ZYLOPRIM) 100 MG tablet, TAKE 2 TABLETS BY MOUTH EVERY DAY, Disp: 180 tablet, Rfl: 3     ASPIRIN 81 MG PO TABS, 1 TABLET DAILY(*), Disp: , Rfl:      atorvastatin (LIPITOR) 80 MG tablet, Take 1 tablet (80 mg) by mouth daily, Disp: 90 tablet, Rfl: 3     Azelaic Acid (FINACEA) 15 % gel, Apply small amount twice a day to skin (Patient taking differently: as needed Apply small amount twice a day to skin), Disp: 50 g, Rfl: 3     fluticasone (FLONASE) 50 MCG/ACT nasal spray, Spray 1-2 sprays into both nostrils daily (Patient taking differently: Spray 1-2 sprays into both nostrils as needed ), Disp: 1 Bottle, Rfl: 1     glimepiride (AMARYL) 4 MG tablet, Take 1 tablet (4mg) by mouth every  morning and 1/2 tablet (2mg) every evening with food for diabetes, Disp: 135 tablet, Rfl: 1     hydrochlorothiazide (HYDRODIURIL) 25 MG tablet, TAKE 1 TABLET BY MOUTH EVERY DAY FOR BLOOD PRESSURE, Disp: 90 tablet, Rfl: 2     Icosapent Ethyl (VASCEPA) 1 g CAPS, Take 1 capsule by mouth 2 times daily, Disp: 180 capsule, Rfl: 3     ketoconazole (NIZORAL) 2 % external shampoo, APPLY TOPICALLY EVERY OTHER DAY LATHER INTO RASH AND WASH OFF AFTER 10 MINUTES., Disp: 120 mL, Rfl: 0     losartan (COZAAR) 100 MG tablet, TAKE 1 TABLET BY MOUTH EVERY DAY FOR BLOOD PRESSURE, Disp: 90 tablet, Rfl: 2     metFORMIN (GLUCOPHAGE) 1000 MG tablet, TAKE 1 TABLET BY MOUTH TWICE A DAY WITH MEALS, Disp: 180 tablet, Rfl: 0     metoprolol succinate ER (TOPROL-XL) 50 MG 24 hr tablet, TAKE 1 TABLET BY MOUTH EVERY DAY, Disp: 90 tablet, Rfl: 0     metroNIDAZOLE 0.75 % EX external gel, Apply topically 2 times daily, Disp: 45 g, Rfl: 3     MULTIPLE VITAMINS OR, 1 a day, Disp: , Rfl:      omeprazole (PRILOSEC) 20 MG DR capsule, TAKE 1 CAPSULE (20 MG) BY MOUTH DAILY TAKE 30-60 MINUTES BEFORE A MEAL., Disp: 90 capsule, Rfl: 1     Oxymetazoline HCl (AFRIN NASAL SPRAY NA), Spray in nostril as needed, Disp: , Rfl:      OZEMPIC, 1 MG/DOSE, 4 MG/3ML SOPN, INJECT 1 MG SUBCUTANEOUS EVERY 7 DAYS, Disp: 9 mL, Rfl: 1     terazosin (HYTRIN) 5 MG capsule, TAKE 1 CAPSULE (5 MG) BY MOUTH AT BEDTIME FOR BLOOD PRESSURE., Disp: 90 capsule, Rfl: 3     tiZANidine (ZANAFLEX) 2 MG tablet, Take 1 tablet (2 mg) by mouth 3 times daily as needed for muscle spasms, Disp: 30 tablet, Rfl: 0    Allergies -   Allergies   Allergen Reactions     Pcn [Penicillins] Rash     Ace Inhibitors Cough     Indomethacin Other (See Comments)     Severe dizziness     Invokana [Canagliflozin]      bladder infection       Social History -   Social History     Socioeconomic History     Marital status:      Spouse name: Not on file     Number of children: Not on file     Years of education:  "Not on file     Highest education level: Not on file   Occupational History     Employer: [employer]   Social Needs     Financial resource strain: Not on file     Food insecurity     Worry: Not on file     Inability: Not on file     Transportation needs     Medical: Not on file     Non-medical: Not on file   Tobacco Use     Smoking status: Former Smoker     Quit date: 1992     Years since quittin.7     Smokeless tobacco: Never Used     Tobacco comment: 15 + years    Substance and Sexual Activity     Alcohol use: No     Drug use: No     Sexual activity: Not Currently     Partners: Female     Birth control/protection: None   Lifestyle     Physical activity     Days per week: Not on file     Minutes per session: Not on file     Stress: Not on file   Relationships     Social connections     Talks on phone: Not on file     Gets together: Not on file     Attends Yazidism service: Not on file     Active member of club or organization: Not on file     Attends meetings of clubs or organizations: Not on file     Relationship status: Not on file     Intimate partner violence     Fear of current or ex partner: Not on file     Emotionally abused: Not on file     Physically abused: Not on file     Forced sexual activity: Not on file   Other Topics Concern     Parent/sibling w/ CABG, MI or angioplasty before 65F 55M? No   Social History Narrative     Not on file       Family History -   Family History   Problem Relation Age of Onset     Cerebrovascular Disease Father 64     Cancer Sister         ovarary      Hernia Mother          age 97       Review of Systems - As per HPI and PMHx, otherwise 10+ system review of the head and neck, and general constitution is negative.    Physical Exam  Resp 16   Ht 1.727 m (5' 8\")   Wt 96.2 kg (212 lb)   SpO2 99%   BMI 32.23 kg/m      General - The patient is well nourished and well developed, and appears to have good nutritional status.  Alert and oriented to person and " place, answers questions and cooperates with examination appropriately.   Head and Face - Normocephalic and atraumatic, with no gross asymmetry noted of the contour of the facial features.  The facial nerve is intact, with strong symmetric movements.  Voice and Breathing - The patient was breathing comfortably without the use of accessory muscles. There was no wheezing, stridor, or stertor.  The patients voice was clear and strong, and had appropriate pitch and quality.  Ears - The tympanic membranes are normal in appearance, bony landmarks are intact.  No retraction, perforation, or masses.  No fluid or purulence was seen in the external canal or the middle ear. No evidence of infection of the middle ear or external canal, cerumen was normal in appearance.  Eyes - Extraocular movements intact, and the pupils were reactive to light.  Sclera were not icteric or injected, conjunctiva were pink and moist.  Mouth - Examination of the oral cavity showed pink, healthy oral mucosa. No lesions or ulcerations noted.  The tongue was mobile and midline, and the dentition were in good condition.    Throat - The walls of the oropharynx were smooth, pink, moist, symmetric, and had no lesions or ulcerations.  The tonsillar pillars and soft palate were symmetric.  The uvula was midline on elevation.    Neck - Normal midline excursion of the laryngotracheal complex during swallowing.  Full range of motion on passive movement.  Palpation of the occipital, submental, submandibular, internal jugular chain, and supraclavicular nodes did not demonstrate any abnormal lymph nodes or masses.  The carotid pulse was palpable bilaterally.  Palpation of the thyroid was soft and smooth, with no nodules or goiter appreciated.  The trachea was mobile and midline.  Nose - External contour is symmetric, no gross deflection or scars.  Nasal mucosa is pink and moist with no abnormal mucus.  The septum was midline and non-obstructive, turbinates of  normal size and position.  No polyps, masses, or purulence noted on examination.      A/P - Sven Givens is a 65 year old male  (R42) Dizziness  (primary encounter diagnosis)  (H81.10) Benign paroxysmal positional vertigo, unspecified laterality    Based on today's exam and history, I think that the most likely diagnosis at this point is benign paroxysmal positional vertigo.  The etiology of benign paroxysmal positional vertigo being small crystals tumbling in the semicircular canals was discussed at length.  Also, the treatment of the problem with physical therapy for canalith repositioning maneuvers was discussed.    However, I think this might be a unique case of anterior canal canalith.  This based on the highly specific trigger of laying on the LEFT side, and looking at the ceiling.  I have referred to PT for canalith maneuvers    If this does not help, let me know and I will get imaging, including soft tissue neck.        Again, thank you for allowing me to participate in the care of your patient.        Sincerely,        Mir Ambrocio MD

## 2022-01-24 ENCOUNTER — THERAPY VISIT (OUTPATIENT)
Dept: PHYSICAL THERAPY | Facility: CLINIC | Age: 67
End: 2022-01-24
Attending: OTOLARYNGOLOGY
Payer: COMMERCIAL

## 2022-01-24 DIAGNOSIS — H81.12 BENIGN PAROXYSMAL POSITIONAL VERTIGO OF LEFT EAR: ICD-10-CM

## 2022-01-24 DIAGNOSIS — H81.10 BENIGN PAROXYSMAL POSITIONAL VERTIGO, UNSPECIFIED LATERALITY: ICD-10-CM

## 2022-01-24 DIAGNOSIS — R42 DIZZINESS: ICD-10-CM

## 2022-01-24 PROCEDURE — 95992 CANALITH REPOSITIONING PROC: CPT | Mod: GP | Performed by: PHYSICAL THERAPIST

## 2022-01-24 PROCEDURE — 97161 PT EVAL LOW COMPLEX 20 MIN: CPT | Mod: GP | Performed by: PHYSICAL THERAPIST

## 2022-01-24 ASSESSMENT — ENCOUNTER SYMPTOMS
BLURRED VISION: 0
DOUBLE VISION: 0
NECK PAIN: 1
DIZZINESS: 1

## 2022-01-24 NOTE — PROGRESS NOTES
Physical Therapy Initial Evaluation  Subjective:  Patient presents to outpatient PT with ongoing episodes of dizziness.  Patient reports episodes of dizziness date back a few years, however current symptoms worsened about a year ago (resolved without intervention), and increased a month ago.  Patient has trialed self canalith repositioning exercises, but reports these made symptoms worse (described typical posterior canal repositioning technique).  Symptoms worse when lying on back and turning head to the left.    Left ear feels full (like water is inside ear).  Patient reports less dizziness the past 2 weeks.  Patient also reports nausea, neck stiffness as concurrent symptoms.  Patient used to be active at the gym, but has been less active since COVID.  Patient denies associated headache, double vision, difficulty speaking or swallowing.  Patient reports one fall down stairs in past year, feels he missed a step (notes this did occur a few days after an episode of dizziness.    The history is provided by the patient. No  was used.   Therapist Generated HPI Evaluation  Problem details: Dizziness.           This is a recurrent condition.  Condition occurred with:  Other reason.  Where condition occurred: for unknown reasons.  Patient reports pain:  Other.  Pain is described as other and is intermittent.  Pain radiates to:  None. Pain is the same all the time.  Since onset symptoms are gradually improving.  Associated symptoms:  Dizziness. Symptoms are exacerbated by change of position, looking up or down, rotating head and lying down  and relieved by rest.  Special tests included:  MRI.    Restrictions due to condition include:  Working in normal job without restrictions.  Barriers include:  None as reported by patient.    Patient Health History  Sven Givens being seen for left blank.     Problem began: 12/24/2021.   Problem occurred: during stand up from bed   Pain is reported as 3/10 on pain  scale.  General health as reported by patient is fair.  Pertinent medical history includes: cancer, concussions/dizziness, diabetes, high blood pressure and sleep disorder/apnea.   Red flags:  None as reported by patient.  Medical allergies: none.       Current medications:  High blood pressure medication.    Current occupation .   Primary job tasks include:  Computer work.                                    Objective:        Flexibility/Screens:   Positive screens:  Cervical          Neurological: He is alert and oriented to person, place, and time.   VOMS- (-) with horizontal and vertical smooth pursuits  (+) for both horizontal and vertical saccades  (-) convergence  (-) visual motion sensitivity test  Recommend assessing VOR reflex at next visit    DHI= 30  Physical subscale= 10  Functional subscale=20  Emotional subscale=0    Performed canalith repositioning maneuver for anterior canal involvement- patient reports no dizziness, however reduction in left ear pressure by 20%.       Physical Exam    Jocy Cervical Evaluation      Movement Loss:  Protrusion (PRO): min  Flexion (Flex): nil  Retraction (RET): min  Extension (EXT): mod  Lateral Flexion Right (LF R): min  Lateral Flexion Left (LF L): min  Rotation Right (ROT R): min and mod  Rotation Left (ROT L): min  Test Movements:      RET: During: no effect  After: no effect  Mechanical Response: no effect  Repeat RET: During: no effect  After: no effect  Mechanical Response: no effect                          Conclusion: other      General Evaluation:                      Balance:  not assessed                                                       Review of Systems   HENT: Positive for ear pain. Negative for hearing loss and tinnitus.    Eyes: Negative for blurred vision and double vision.   Musculoskeletal: Positive for neck pain.   Neurological: Positive for dizziness.       Assessment/Plan:    Patient is a 66 year old male with dizziness  complaints.    Patient has the following significant findings with corresponding treatment plan.                Diagnosis 1:  BPPV  -dizziness    Therapy Evaluation Codes:   1) History comprised of:   Personal factors that impact the plan of care:      None.    Comorbidity factors that impact the plan of care are:      Cancer, Diabetes, Dizziness and High blood pressure.     Medications impacting care: High blood pressure.  2) Examination of Body Systems comprised of:   Body structures and functions that impact the plan of care:      Cervical spine.   Activity limitations that impact the plan of care are:      Reading/Computer work, Sleeping, Laying down and turning over in bed.  3) Clinical presentation characteristics are:   Stable/Uncomplicated.  4) Decision-Making    Low complexity using standardized patient assessment instrument and/or measureable assessment of functional outcome.  Cumulative Therapy Evaluation is: Low complexity.    Previous and current functional limitations:  (See Goal Flow Sheet for this information)    Short term and Long term goals: (See Goal Flow Sheet for this information)     Communication ability:  Patient appears to be able to clearly communicate and understand verbal and written communication and follow directions correctly.  Treatment Explanation - The following has been discussed with the patient:   RX ordered/plan of care  Anticipated outcomes  Possible risks and side effects  This patient would benefit from PT intervention to resume normal activities.   Rehab potential is good.    Frequency:  1 X week, once daily  Duration:  for 4 weeks  Discharge Plan:  Achieve all LTG.  Independent in home treatment program.  Reach maximal therapeutic benefit.    Please refer to the daily flowsheet for treatment today, total treatment time and time spent performing 1:1 timed codes.

## 2022-01-31 ENCOUNTER — THERAPY VISIT (OUTPATIENT)
Dept: PHYSICAL THERAPY | Facility: CLINIC | Age: 67
End: 2022-01-31
Payer: COMMERCIAL

## 2022-01-31 DIAGNOSIS — R42 DIZZINESS: ICD-10-CM

## 2022-01-31 DIAGNOSIS — H81.12 BENIGN PAROXYSMAL POSITIONAL VERTIGO OF LEFT EAR: ICD-10-CM

## 2022-01-31 PROCEDURE — 97110 THERAPEUTIC EXERCISES: CPT | Mod: GP | Performed by: PHYSICAL THERAPIST

## 2022-01-31 PROCEDURE — 97530 THERAPEUTIC ACTIVITIES: CPT | Mod: GP | Performed by: PHYSICAL THERAPIST

## 2022-01-31 NOTE — PROGRESS NOTES
Subjective:  HPI  Physical Exam                    Objective:          Neurological:   VOR lateral and vertical= (-)         Physical Exam    Jocy Cervical Evaluation      Movement Loss:  Protrusion (PRO): nil  Flexion (Flex): nil  Retraction (RET): min  Extension (EXT): min and pain  Lateral Flexion Right (LF R): mod and pain  Lateral Flexion Left (LF L): min  Rotation Right (ROT R): min  Rotation Left (ROT L): min                                                 ROS    Assessment/Plan:    SUBJECTIVE  Subjective changes as noted by pt:  Patient reports no dizziness over the past week, since PT evaluation.  Patient was compliant for 3 days with anterior canalith repositioning technique.  Patient reports continued left ear fullness, which comes and goes.     Current pain level: 1/10     Changes in function:  Yes (See Goal flowsheet attached for changes in current functional level)     Adverse reaction to treatment or activity:  None    OBJECTIVE  Changes in objective findings:  Yes, pain with palpation to left TMJ, stiffness with cervical retraction, extension, left side bending.        ASSESSMENT  Sven continues to require intervention to meet STG and LTG's: PT  Patient's symptoms are resolving.  Response to therapy has shown an improvement in  Dizziness.  Progress made towards STG/LTG?  Yes (See Goal flowsheet attached for updates on achievement of STG and LTG)    PLAN  Current treatment program is being advanced to more complex exercises.    PTA/ATC plan:  N/A    Please refer to the daily flowsheet for treatment today, total treatment time and time spent performing 1:1 timed codes.

## 2022-02-03 ENCOUNTER — OFFICE VISIT (OUTPATIENT)
Dept: FAMILY MEDICINE | Facility: CLINIC | Age: 67
End: 2022-02-03
Payer: COMMERCIAL

## 2022-02-03 VITALS
DIASTOLIC BLOOD PRESSURE: 84 MMHG | TEMPERATURE: 97.7 F | SYSTOLIC BLOOD PRESSURE: 132 MMHG | HEIGHT: 69 IN | HEART RATE: 95 BPM | OXYGEN SATURATION: 98 % | RESPIRATION RATE: 18 BRPM | WEIGHT: 219.5 LBS | BODY MASS INDEX: 32.51 KG/M2

## 2022-02-03 DIAGNOSIS — E78.1 HYPERTRIGLYCERIDEMIA: ICD-10-CM

## 2022-02-03 DIAGNOSIS — K59.01 SLOW TRANSIT CONSTIPATION: ICD-10-CM

## 2022-02-03 DIAGNOSIS — R00.0 SINUS TACHYCARDIA: ICD-10-CM

## 2022-02-03 DIAGNOSIS — M1A.0710 IDIOPATHIC CHRONIC GOUT OF RIGHT FOOT WITHOUT TOPHUS: ICD-10-CM

## 2022-02-03 DIAGNOSIS — Z23 ENCOUNTER FOR IMMUNIZATION: ICD-10-CM

## 2022-02-03 DIAGNOSIS — E11.42 TYPE 2 DIABETES MELLITUS WITH DIABETIC POLYNEUROPATHY, WITHOUT LONG-TERM CURRENT USE OF INSULIN (H): Primary | ICD-10-CM

## 2022-02-03 DIAGNOSIS — I10 ESSENTIAL HYPERTENSION WITH GOAL BLOOD PRESSURE LESS THAN 140/90: ICD-10-CM

## 2022-02-03 DIAGNOSIS — Z12.5 SCREENING FOR PROSTATE CANCER: ICD-10-CM

## 2022-02-03 PROCEDURE — 90471 IMMUNIZATION ADMIN: CPT | Performed by: FAMILY MEDICINE

## 2022-02-03 PROCEDURE — 99214 OFFICE O/P EST MOD 30 MIN: CPT | Mod: 25 | Performed by: FAMILY MEDICINE

## 2022-02-03 PROCEDURE — 90732 PPSV23 VACC 2 YRS+ SUBQ/IM: CPT | Performed by: FAMILY MEDICINE

## 2022-02-03 RX ORDER — CLOTRIMAZOLE AND BETAMETHASONE DIPROPIONATE 10; .64 MG/G; MG/G
CREAM TOPICAL
COMMUNITY
Start: 2022-01-18 | End: 2024-02-07

## 2022-02-03 RX ORDER — METOPROLOL SUCCINATE 50 MG/1
50 TABLET, EXTENDED RELEASE ORAL DAILY
Qty: 90 TABLET | Refills: 3 | Status: SHIPPED | OUTPATIENT
Start: 2022-02-03 | End: 2023-03-06

## 2022-02-03 RX ORDER — ALLOPURINOL 100 MG/1
TABLET ORAL
Qty: 180 TABLET | Refills: 3 | Status: SHIPPED | OUTPATIENT
Start: 2022-02-03 | End: 2023-03-06

## 2022-02-03 RX ORDER — LOSARTAN POTASSIUM 100 MG/1
TABLET ORAL
Qty: 90 TABLET | Refills: 3 | Status: SHIPPED | OUTPATIENT
Start: 2022-02-03 | End: 2022-02-17

## 2022-02-03 RX ORDER — HYDROCHLOROTHIAZIDE 25 MG/1
TABLET ORAL
Qty: 90 TABLET | Refills: 3 | Status: SHIPPED | OUTPATIENT
Start: 2022-02-03 | End: 2022-02-17

## 2022-02-03 RX ORDER — TERAZOSIN 5 MG/1
CAPSULE ORAL
Qty: 90 CAPSULE | Refills: 3 | Status: SHIPPED | OUTPATIENT
Start: 2022-02-03 | End: 2023-03-06

## 2022-02-03 RX ORDER — SEMAGLUTIDE 1.34 MG/ML
1 INJECTION, SOLUTION SUBCUTANEOUS
Qty: 9 ML | Refills: 1 | Status: SHIPPED | OUTPATIENT
Start: 2022-02-03 | End: 2022-06-20

## 2022-02-03 RX ORDER — ATORVASTATIN CALCIUM 80 MG/1
80 TABLET, FILM COATED ORAL DAILY
Qty: 90 TABLET | Refills: 3 | Status: SHIPPED | OUTPATIENT
Start: 2022-02-03 | End: 2023-03-06

## 2022-02-03 RX ORDER — AMOXICILLIN 250 MG
CAPSULE ORAL
Qty: 90 TABLET | Refills: 3 | Status: ON HOLD | OUTPATIENT
Start: 2022-02-03 | End: 2022-10-28

## 2022-02-03 RX ORDER — GLIMEPIRIDE 4 MG/1
4 TABLET ORAL 2 TIMES DAILY
Qty: 180 TABLET | Refills: 3 | Status: SHIPPED | OUTPATIENT
Start: 2022-02-03 | End: 2022-12-07

## 2022-02-03 ASSESSMENT — MIFFLIN-ST. JEOR: SCORE: 1756.03

## 2022-02-03 ASSESSMENT — PAIN SCALES - GENERAL: PAINLEVEL: NO PAIN (0)

## 2022-02-03 NOTE — PROGRESS NOTES
"    Rishabh Machuca is a 66 year old who presents for the following health issues:    HPI     Diabetes Follow-up    How often are you checking your blood sugar? One time daily  What time of day are you checking your blood sugars (select all that apply)?  Before and after meals  Have you had any blood sugars above 200?  Yes  Have you had any blood sugars below 70?  No    What symptoms do you notice when your blood sugar is low?  None    What concerns do you have today about your diabetes? None     Do you have any of these symptoms? (Select all that apply)  Numbness in feet      Hyperlipidemia Follow-Up      Are you regularly taking any medication or supplement to lower your cholesterol?   Yes- atorvastatin    Are you having muscle aches or other side effects that you think could be caused by your cholesterol lowering medication?  No    Hypertension Follow-up      Do you check your blood pressure regularly outside of the clinic? No     Are you following a low salt diet? Yes    Are your blood pressures ever more than 140 on the top number (systolic) OR more   than 90 on the bottom number (diastolic), for example 140/90? n/a    BP Readings from Last 2 Encounters:   02/03/22 132/84   10/07/21 123/73     Hemoglobin A1C POCT (%)   Date Value   05/26/2021 7.4 (H)   02/16/2021 8.4 (H)     Hemoglobin A1C (%)   Date Value   12/02/2021 8.0 (H)   09/10/2021 6.6 (H)     LDL Cholesterol Calculated (mg/dL)   Date Value   09/10/2021 26   05/26/2021 33   05/20/2020 40         Review of Systems   Constitutional, HEENT, cardiovascular, pulmonary, GI, , musculoskeletal, neuro, skin, endocrine and psych systems are negative, except as otherwise noted.      Objective    /84 (BP Location: Left arm, Patient Position: Sitting, Cuff Size: Adult Large)   Pulse 95   Temp 97.7  F (36.5  C) (Tympanic)   Resp 18   Ht 1.753 m (5' 9\")   Wt 99.6 kg (219 lb 8 oz)   SpO2 98%   BMI 32.41 kg/m    Body mass index is 32.41 " kg/m .  Physical Exam   GENERAL: healthy, alert and no distress  NECK: no adenopathy, no asymmetry, masses, or scars and thyroid normal to palpation  RESP: lungs clear to auscultation - no rales, rhonchi or wheezes  CV: regular rate and rhythm, normal S1 S2, no S3 or S4, no murmur, click or rub, no peripheral edema and peripheral pulses strong  ABDOMEN: soft, nontender, no hepatosplenomegaly, no masses and bowel sounds normal  MS: no gross musculoskeletal defects noted, no edema  Diabetic foot exam: normal DP and PT pulses, no trophic changes or ulcerative lesions and normal sensory exam    A/P:  (E11.42) Type 2 diabetes mellitus with diabetic polyneuropathy, without long-term current use of insulin (H)  (primary encounter diagnosis)  Comment:   Plan: atorvastatin (LIPITOR) 80 MG tablet,         glimepiride (AMARYL) 4 MG tablet, metFORMIN         (GLUCOPHAGE) 1000 MG tablet, Semaglutide, 1         MG/DOSE, (OZEMPIC, 1 MG/DOSE,) 4 MG/3ML SOPN,         Hemoglobin A1c, Basic metabolic panel  (Ca, Cl,        CO2, Creat, Gluc, K, Na, BUN), Albumin Random         Urine Quantitative with Creat Ratio        Recheck a1c. Adjust if needed. RTC in 3 months.    (I10) Essential hypertension with goal blood pressure less than 140/90  Comment:   Plan: hydrochlorothiazide (HYDRODIURIL) 25 MG tablet,        losartan (COZAAR) 100 MG tablet, metoprolol         succinate ER (TOPROL-XL) 50 MG 24 hr tablet,         terazosin (HYTRIN) 5 MG capsule, Basic         metabolic panel  (Ca, Cl, CO2, Creat, Gluc, K,         Na, BUN), Albumin Random Urine Quantitative         with Creat Ratio        Controlled. Continue with current medications.    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  Comment:   Plan: allopurinol (ZYLOPRIM) 100 MG tablet, Uric acid            (R00.0) Sinus tachycardia  Comment:   Plan: metoprolol succinate ER  (TOPROL-XL) 50 MG 24 hr        tablet            (K59.01) Slow transit constipation  Comment:   Plan: senna-docusate (SENEXON-S) 8.6-50 MG tablet            (Z23) Encounter for immunization  Comment:   Plan: Pneumococcal vaccine 23 valent PPSV23          (Pneumovax) [[id number]]            (Z12.5) Screening for prostate cancer  Comment:   Plan: PSA, screen            Yeison Villegas MD          Answers for HPI/ROS submitted by the patient on 2/3/2022  Frequency of checking blood sugars:: one time daily  What time of day are you checking your blood sugars : after meals  Have you had any blood sugars above 200?: Yes  Have you had any blood sugars below 70?: No  Hypoglycemia symptoms:: dizziness, weakness  Diabetic concerns:: none  Paraesthesia present:: numbness in feet, burning in feet

## 2022-02-17 ENCOUNTER — MYC MEDICAL ADVICE (OUTPATIENT)
Dept: FAMILY MEDICINE | Facility: CLINIC | Age: 67
End: 2022-02-17
Payer: COMMERCIAL

## 2022-02-17 DIAGNOSIS — I10 ESSENTIAL HYPERTENSION WITH GOAL BLOOD PRESSURE LESS THAN 140/90: ICD-10-CM

## 2022-02-17 RX ORDER — LOSARTAN POTASSIUM AND HYDROCHLOROTHIAZIDE 25; 100 MG/1; MG/1
1 TABLET ORAL DAILY
Qty: 90 TABLET | Refills: 3 | Status: SHIPPED | OUTPATIENT
Start: 2022-02-17 | End: 2022-12-07

## 2022-02-17 NOTE — TELEPHONE ENCOUNTER
Routing to provider to review and advise. Please see [employer] message below.     Maria T Yañez RN, BSN  [facility]

## 2022-02-21 ENCOUNTER — LAB (OUTPATIENT)
Dept: LAB | Facility: CLINIC | Age: 67
End: 2022-02-21
Payer: COMMERCIAL

## 2022-02-21 DIAGNOSIS — M1A.0710 IDIOPATHIC CHRONIC GOUT OF RIGHT FOOT WITHOUT TOPHUS: ICD-10-CM

## 2022-02-21 DIAGNOSIS — I10 ESSENTIAL HYPERTENSION WITH GOAL BLOOD PRESSURE LESS THAN 140/90: ICD-10-CM

## 2022-02-21 DIAGNOSIS — E11.42 TYPE 2 DIABETES MELLITUS WITH DIABETIC POLYNEUROPATHY, WITHOUT LONG-TERM CURRENT USE OF INSULIN (H): ICD-10-CM

## 2022-02-21 DIAGNOSIS — Z12.5 SCREENING FOR PROSTATE CANCER: ICD-10-CM

## 2022-02-21 LAB
ANION GAP SERPL CALCULATED.3IONS-SCNC: 6 MMOL/L (ref 3–14)
BUN SERPL-MCNC: 19 MG/DL (ref 7–30)
CALCIUM SERPL-MCNC: 9.5 MG/DL (ref 8.5–10.1)
CHLORIDE BLD-SCNC: 104 MMOL/L (ref 94–109)
CO2 SERPL-SCNC: 28 MMOL/L (ref 20–32)
CREAT SERPL-MCNC: 0.7 MG/DL (ref 0.66–1.25)
CREAT UR-MCNC: 269 MG/DL
GFR SERPL CREATININE-BSD FRML MDRD: >90 ML/MIN/1.73M2
GLUCOSE BLD-MCNC: 188 MG/DL (ref 70–99)
HBA1C MFR BLD: 8.6 % (ref 0–5.6)
MICROALBUMIN UR-MCNC: 101 MG/L
MICROALBUMIN/CREAT UR: 37.55 MG/G CR (ref 0–17)
POTASSIUM BLD-SCNC: 3.7 MMOL/L (ref 3.4–5.3)
PSA SERPL-MCNC: 2.25 UG/L (ref 0–4)
SODIUM SERPL-SCNC: 138 MMOL/L (ref 133–144)
URATE SERPL-MCNC: 5.5 MG/DL (ref 3.5–7.2)

## 2022-02-21 PROCEDURE — 80048 BASIC METABOLIC PNL TOTAL CA: CPT

## 2022-02-21 PROCEDURE — 36415 COLL VENOUS BLD VENIPUNCTURE: CPT

## 2022-02-21 PROCEDURE — 84550 ASSAY OF BLOOD/URIC ACID: CPT

## 2022-02-21 PROCEDURE — 82043 UR ALBUMIN QUANTITATIVE: CPT

## 2022-02-21 PROCEDURE — 83036 HEMOGLOBIN GLYCOSYLATED A1C: CPT

## 2022-02-21 PROCEDURE — G0103 PSA SCREENING: HCPCS

## 2022-03-08 PROBLEM — R42 DIZZINESS: Status: RESOLVED | Noted: 2022-01-24 | Resolved: 2022-03-08

## 2022-03-08 PROBLEM — H81.12 BENIGN PAROXYSMAL POSITIONAL VERTIGO OF LEFT EAR: Status: RESOLVED | Noted: 2022-01-24 | Resolved: 2022-03-08

## 2022-03-08 NOTE — PROGRESS NOTES
Discharge Note    Progress reporting period is from initial evaluation date (please see noted date below) to Jan 31, 2022.  Linked Episodes   Type: Episode: Status: Noted: Resolved: Last update: Updated by:   PHYSICAL THERAPY Vestibular 1/24/22 Active 1/24/2022 1/31/2022  7:54 AM Ana Laura Babcock, PT      Comments:       Sven failed to follow up and current status is unknown.  Please see information below for last relevant information on current status.  Patient seen for 2 visits.    SUBJECTIVE  Subjective changes noted by patient:     .  Current pain level is  .     Previous pain level was  3/10.   Changes in function:  Yes (See Goal flowsheet attached for changes in current functional level)  Adverse reaction to treatment or activity: None    OBJECTIVE  Changes noted in objective findings:       ASSESSMENT/PLAN  Diagnosis: dizziness, BPPV   Updated problem list and treatment plan:   vertigo  STG/LTGs have been met or progress has been made towards goals:  Yes, please see goal flowsheet for most current information  Assessment of Progress: current status is unknown.    Last current status:     Self Management Plans:  HEP  I have re-evaluated this patient and find that the nature, scope, duration and intensity of the therapy is appropriate for the medical condition of the patient.  Sven continues to require the following intervention to meet STG and LTG's:  HEP.    Recommendations:  Discharge with current home program.  Patient to follow up with MD as needed.    Please refer to the daily flowsheet for treatment today, total treatment time and time spent performing 1:1 timed codes.

## 2022-03-15 ENCOUNTER — MYC MEDICAL ADVICE (OUTPATIENT)
Dept: FAMILY MEDICINE | Facility: CLINIC | Age: 67
End: 2022-03-15
Payer: COMMERCIAL

## 2022-03-15 DIAGNOSIS — K21.00 GASTROESOPHAGEAL REFLUX DISEASE WITH ESOPHAGITIS: ICD-10-CM

## 2022-03-16 NOTE — TELEPHONE ENCOUNTER
Prescription approved per [facility] Refill Protocol.  Naheed Contreras RN, BSN   [facility] [place]

## 2022-03-22 ENCOUNTER — MYC MEDICAL ADVICE (OUTPATIENT)
Dept: FAMILY MEDICINE | Facility: CLINIC | Age: 67
End: 2022-03-22
Payer: COMMERCIAL

## 2022-03-22 DIAGNOSIS — E11.42 TYPE 2 DIABETES MELLITUS WITH DIABETIC POLYNEUROPATHY, WITHOUT LONG-TERM CURRENT USE OF INSULIN (H): Primary | ICD-10-CM

## 2022-03-23 ENCOUNTER — MYC MEDICAL ADVICE (OUTPATIENT)
Dept: PHARMACY | Facility: CLINIC | Age: 67
End: 2022-03-23
Payer: COMMERCIAL

## 2022-03-24 NOTE — TELEPHONE ENCOUNTER
Routing refill request to provider for review/approval because:  Drug/ supplies not on the G refill protocol     Eve Selby BSN, RN

## 2022-05-09 NOTE — PATIENT INSTRUCTIONS
Patient has been prescribed a ZioPatch holter for 3 days.  Patient was instructed regarding the indication, function, care and prompt return of the ZioPatch holter monitor. The monitor, with S/N [id number],  was placed on the patient with instructions regarding care of the skin, electrodes, and monitor, as well as documentation in the patient diary. Patient demonstrated understanding of this information and agreed to call [employer] with further questions or concerns.   rolling walker (5 inch wheels)

## 2022-05-27 ENCOUNTER — OFFICE VISIT (OUTPATIENT)
Dept: FAMILY MEDICINE | Facility: CLINIC | Age: 67
End: 2022-05-27
Payer: COMMERCIAL

## 2022-05-27 VITALS
BODY MASS INDEX: 32.23 KG/M2 | HEIGHT: 69 IN | HEART RATE: 98 BPM | OXYGEN SATURATION: 98 % | DIASTOLIC BLOOD PRESSURE: 80 MMHG | WEIGHT: 217.6 LBS | SYSTOLIC BLOOD PRESSURE: 131 MMHG | TEMPERATURE: 96.8 F

## 2022-05-27 DIAGNOSIS — E11.42 TYPE 2 DIABETES MELLITUS WITH DIABETIC POLYNEUROPATHY, WITHOUT LONG-TERM CURRENT USE OF INSULIN (H): ICD-10-CM

## 2022-05-27 DIAGNOSIS — U07.1 INFECTION DUE TO 2019 NOVEL CORONAVIRUS: ICD-10-CM

## 2022-05-27 DIAGNOSIS — R05.9 COUGH: Primary | ICD-10-CM

## 2022-05-27 DIAGNOSIS — R53.83 FATIGUE, UNSPECIFIED TYPE: ICD-10-CM

## 2022-05-27 LAB
ALBUMIN SERPL-MCNC: 3.8 G/DL (ref 3.4–5)
ALP SERPL-CCNC: 115 U/L (ref 40–150)
ALT SERPL W P-5'-P-CCNC: 53 U/L (ref 0–70)
ANION GAP SERPL CALCULATED.3IONS-SCNC: 9 MMOL/L (ref 3–14)
AST SERPL W P-5'-P-CCNC: 32 U/L (ref 0–45)
BASOPHILS # BLD AUTO: 0.1 10E3/UL (ref 0–0.2)
BASOPHILS NFR BLD AUTO: 1 %
BILIRUB SERPL-MCNC: 0.4 MG/DL (ref 0.2–1.3)
BUN SERPL-MCNC: 19 MG/DL (ref 7–30)
CALCIUM SERPL-MCNC: 9.4 MG/DL (ref 8.5–10.1)
CHLORIDE BLD-SCNC: 103 MMOL/L (ref 94–109)
CHOLEST SERPL-MCNC: 88 MG/DL
CO2 SERPL-SCNC: 25 MMOL/L (ref 20–32)
CREAT SERPL-MCNC: 0.79 MG/DL (ref 0.66–1.25)
EOSINOPHIL # BLD AUTO: 0.3 10E3/UL (ref 0–0.7)
EOSINOPHIL NFR BLD AUTO: 3 %
ERYTHROCYTE [DISTWIDTH] IN BLOOD BY AUTOMATED COUNT: 13.1 % (ref 10–15)
FASTING STATUS PATIENT QL REPORTED: YES
GFR SERPL CREATININE-BSD FRML MDRD: >90 ML/MIN/1.73M2
GLUCOSE BLD-MCNC: 158 MG/DL (ref 70–99)
HBA1C MFR BLD: 8 % (ref 0–5.6)
HCT VFR BLD AUTO: 37.9 % (ref 40–53)
HDLC SERPL-MCNC: 29 MG/DL
HGB BLD-MCNC: 12.3 G/DL (ref 13.3–17.7)
IMM GRANULOCYTES # BLD: 0 10E3/UL
IMM GRANULOCYTES NFR BLD: 0 %
LDLC SERPL CALC-MCNC: <5 MG/DL
LYMPHOCYTES # BLD AUTO: 3.3 10E3/UL (ref 0.8–5.3)
LYMPHOCYTES NFR BLD AUTO: 35 %
MCH RBC QN AUTO: 29 PG (ref 26.5–33)
MCHC RBC AUTO-ENTMCNC: 32.5 G/DL (ref 31.5–36.5)
MCV RBC AUTO: 89 FL (ref 78–100)
MONOCYTES # BLD AUTO: 0.6 10E3/UL (ref 0–1.3)
MONOCYTES NFR BLD AUTO: 6 %
NEUTROPHILS # BLD AUTO: 5.2 10E3/UL (ref 1.6–8.3)
NEUTROPHILS NFR BLD AUTO: 55 %
NONHDLC SERPL-MCNC: 59 MG/DL
PLATELET # BLD AUTO: 266 10E3/UL (ref 150–450)
POTASSIUM BLD-SCNC: 3.8 MMOL/L (ref 3.4–5.3)
PROT SERPL-MCNC: 7.8 G/DL (ref 6.8–8.8)
RBC # BLD AUTO: 4.24 10E6/UL (ref 4.4–5.9)
SODIUM SERPL-SCNC: 137 MMOL/L (ref 133–144)
TRIGL SERPL-MCNC: 348 MG/DL
TSH SERPL DL<=0.005 MIU/L-ACNC: 2.49 MU/L (ref 0.4–4)
WBC # BLD AUTO: 9.5 10E3/UL (ref 4–11)

## 2022-05-27 PROCEDURE — 36415 COLL VENOUS BLD VENIPUNCTURE: CPT | Performed by: FAMILY MEDICINE

## 2022-05-27 PROCEDURE — 80061 LIPID PANEL: CPT | Performed by: FAMILY MEDICINE

## 2022-05-27 PROCEDURE — 83036 HEMOGLOBIN GLYCOSYLATED A1C: CPT | Performed by: FAMILY MEDICINE

## 2022-05-27 PROCEDURE — 80050 GENERAL HEALTH PANEL: CPT | Performed by: FAMILY MEDICINE

## 2022-05-27 PROCEDURE — 99214 OFFICE O/P EST MOD 30 MIN: CPT | Performed by: FAMILY MEDICINE

## 2022-05-27 RX ORDER — BENZONATATE 200 MG/1
200 CAPSULE ORAL 3 TIMES DAILY PRN
Qty: 21 CAPSULE | Refills: 3 | Status: SHIPPED | OUTPATIENT
Start: 2022-05-27 | End: 2022-07-06

## 2022-05-27 ASSESSMENT — PAIN SCALES - GENERAL: PAINLEVEL: NO PAIN (0)

## 2022-05-27 NOTE — PROGRESS NOTES
"Rishabh Machuca is a 67 year old who presents for the following health issues:    History of Present Illness       Reason for visit:  Coughing, stuffy nose  Symptom onset:  3-4 weeks ago  Symptoms include:  Same above  Symptom intensity:  Moderate  Symptom progression:  Staying the same  Had these symptoms before:  No  What makes it worse:  None  What makes it better:  None    He eats 2-3 servings of fruits and vegetables daily.He consumes 0 sweetened beverage(s) daily.He exercises with enough effort to increase his heart rate 20 to 29 minutes per day.  He exercises with enough effort to increase his heart rate 4 days per week.   He is taking medications regularly.     Patient stated he tested positive for COVID-19 3-4 weeks ago. Has since tested negative with antigen test. No sob/cp, fever.      Review of Systems   Constitutional, HEENT, cardiovascular, pulmonary, GI, , musculoskeletal, neuro, skin, endocrine and psych systems are negative, except as otherwise noted.      Objective    /80 (BP Location: Left arm, Patient Position: Sitting, Cuff Size: Adult Regular)   Pulse 98   Temp 96.8  F (36  C) (Tympanic)   Ht 1.753 m (5' 9\")   Wt 98.7 kg (217 lb 9.6 oz)   SpO2 98%   BMI 32.13 kg/m    Body mass index is 32.13 kg/m .  Physical Exam   GENERAL: healthy, alert and no distress  NECK: no adenopathy, no asymmetry, masses, or scars and thyroid normal to palpation  RESP: lungs clear to auscultation - no rales, rhonchi or wheezes  CV: regular rate and rhythm, normal S1 S2, no S3 or S4, no murmur, click or rub, no peripheral edema and peripheral pulses strong  ABDOMEN: soft, nontender, no hepatosplenomegaly, no masses and bowel sounds normal  MS: no gross musculoskeletal defects noted, no edema    A/P:  (R05.9) Cough  (primary encounter diagnosis)  Comment:   Plan: XR Chest 2 Views, benzonatate (TESSALON) 200 MG        capsule        Likely lingering symptoms from COVID-19. Doubt developing secondary " bacterial pneumonia but did r/o with cxr today.    (R53.83) Fatigue, unspecified type  Comment:   Plan: CBC with platelets and differential, TSH with         free T4 reflex, Comprehensive metabolic panel         (BMP + Alb, Alk Phos, ALT, AST, Total. Bili,         TP)        Check labs above.    (E11.42) Type 2 diabetes mellitus with diabetic polyneuropathy, without long-term current use of insulin (H)  Comment:   Plan: Hemoglobin A1c, Lipid panel reflex to direct         LDL Non-fasting        RTC in 3 months for recheck.    (U07.1) Infection due to 2019 novel coronavirus  Comment:   Plan: recovering.    Yeison Villegas MD

## 2022-05-31 ENCOUNTER — MYC MEDICAL ADVICE (OUTPATIENT)
Dept: FAMILY MEDICINE | Facility: CLINIC | Age: 67
End: 2022-05-31
Payer: COMMERCIAL

## 2022-05-31 NOTE — TELEPHONE ENCOUNTER
Pt looking for advisement on increased triglycerides    Triglycerides <150 mg/dL 348 High   207 High       Annie Silvestre RN  [facility]

## 2022-06-14 ENCOUNTER — MYC MEDICAL ADVICE (OUTPATIENT)
Dept: PHARMACY | Facility: CLINIC | Age: 67
End: 2022-06-14
Payer: COMMERCIAL

## 2022-06-18 DIAGNOSIS — Z79.4 TYPE 2 DIABETES MELLITUS WITH DIABETIC POLYNEUROPATHY, WITH LONG-TERM CURRENT USE OF INSULIN (H): ICD-10-CM

## 2022-06-18 DIAGNOSIS — E11.42 TYPE 2 DIABETES MELLITUS WITH DIABETIC POLYNEUROPATHY, WITH LONG-TERM CURRENT USE OF INSULIN (H): ICD-10-CM

## 2022-06-18 DIAGNOSIS — I25.10 CORONARY ARTERY DISEASE INVOLVING NATIVE CORONARY ARTERY OF NATIVE HEART WITHOUT ANGINA PECTORIS: ICD-10-CM

## 2022-06-18 DIAGNOSIS — E11.42 TYPE 2 DIABETES MELLITUS WITH DIABETIC POLYNEUROPATHY, WITHOUT LONG-TERM CURRENT USE OF INSULIN (H): ICD-10-CM

## 2022-06-20 RX ORDER — SEMAGLUTIDE 1.34 MG/ML
1 INJECTION, SOLUTION SUBCUTANEOUS
Qty: 9 ML | Refills: 1 | Status: SHIPPED | OUTPATIENT
Start: 2022-06-20 | End: 2023-07-21

## 2022-06-20 NOTE — TELEPHONE ENCOUNTER
Prescription approved per [facility] Refill Protocol.    Anika Miner RN  [facility]

## 2022-06-22 RX ORDER — ICOSAPENT ETHYL 1 G/1
1 CAPSULE ORAL 2 TIMES DAILY
Qty: 180 CAPSULE | Refills: 0 | Status: SHIPPED | OUTPATIENT
Start: 2022-06-22 | End: 2022-08-05

## 2022-06-22 NOTE — TELEPHONE ENCOUNTER
Last Clinic Visit: 8/6/2021 [facility] [place]    *passes Cardiology medication protocol  - ALT and Lipid labs completed within timeframe.    Recent Labs   Lab Test 05/27/22  1412 09/10/21  0743 03/18/16  0905 03/02/15  0819   CHOL 88 98   < > 152   HDL 29* 31*   < > 32*   LDL <5 26   < > 73   TRIG 348* 207*   < > 235*   CHOLHDLRATIO  --   --   --  4.8    < > = values in this interval not displayed.     Liver Function Studies - Recent Labs   Lab Test 05/27/22  1412   PROTTOTAL 7.8   ALBUMIN 3.8   BILITOTAL 0.4   ALKPHOS 115   AST 32   ALT 53

## 2022-07-06 ENCOUNTER — OFFICE VISIT (OUTPATIENT)
Dept: PHARMACY | Facility: CLINIC | Age: 67
End: 2022-07-06
Payer: COMMERCIAL

## 2022-07-06 VITALS
DIASTOLIC BLOOD PRESSURE: 77 MMHG | SYSTOLIC BLOOD PRESSURE: 132 MMHG | BODY MASS INDEX: 31.51 KG/M2 | WEIGHT: 213.4 LBS | HEART RATE: 83 BPM | OXYGEN SATURATION: 98 %

## 2022-07-06 DIAGNOSIS — E11.42 TYPE 2 DIABETES MELLITUS WITH DIABETIC POLYNEUROPATHY, WITHOUT LONG-TERM CURRENT USE OF INSULIN (H): Primary | ICD-10-CM

## 2022-07-06 DIAGNOSIS — E78.5 HYPERLIPIDEMIA LDL GOAL <100: ICD-10-CM

## 2022-07-06 DIAGNOSIS — K21.00 GASTROESOPHAGEAL REFLUX DISEASE WITH ESOPHAGITIS, UNSPECIFIED WHETHER HEMORRHAGE: ICD-10-CM

## 2022-07-06 DIAGNOSIS — M1A.0710 IDIOPATHIC CHRONIC GOUT OF RIGHT FOOT WITHOUT TOPHUS: ICD-10-CM

## 2022-07-06 DIAGNOSIS — I10 ESSENTIAL HYPERTENSION WITH GOAL BLOOD PRESSURE LESS THAN 140/90: ICD-10-CM

## 2022-07-06 PROCEDURE — 99607 MTMS BY PHARM ADDL 15 MIN: CPT | Performed by: PHARMACIST

## 2022-07-06 PROCEDURE — 99605 MTMS BY PHARM NP 15 MIN: CPT | Performed by: PHARMACIST

## 2022-07-06 NOTE — PATIENT INSTRUCTIONS
Recommendations from today's MTM visit:                                                       1. Patient Assistance Application for Ozempic printed. You can apply if you are currently in the coverage gap. Drop off the completed application to the , and I will submit as soon as I get this back from you.    Follow-up: Return in 13 weeks (on 10/5/2022) for Blood sugar recheck.    It was great to speak with you today.  I value your experience and would be very thankful for your time with providing feedback on our clinic survey. You may receive a survey via email or text message in the next few days.     To schedule another MTM appointment, please call the clinic directly or you may call the MTM scheduling line at [phone] or toll-free at [phone].     My Clinical Pharmacist's contact information:                                                      Please feel free to contact me with any questions or concerns you have.      Dora Neumann, Pharm.D., BCACP  Medication Therapy Management Pharmacist  [phone]

## 2022-07-06 NOTE — LETTER
"Recommended To-Do List      Prepared on: 7/6/2022     You can get the best results from your medications by completing the items on this \"To-Do List.\"      Bring your To-Do List when you go to your doctor. And, share it with your family or caregivers.    My To-Do List:  What we talked about: What I should do:   The cost of your medication(s)    Patient Assistance Application for Ozempic printed. You can apply if you are currently in the coverage gap. Drop off the completed application to the , and I will submit as soon as I get this back from you.          What we talked about: What I should do:                       "

## 2022-07-06 NOTE — PROGRESS NOTES
Medication Therapy Management (MTM) Encounter    ASSESSMENT:                            Medication Adherence/Access: No issues identified    Type 2 Diabetes: Patient is not meeting A1c goal of < 7%.  Self monitoring of blood glucose is not at goal of fasting/pre-prandial  mg/dL.  He would likely benefit from increasing dose of Ozempic to max of 2mg at this time, however he prefers to work on increased exercise over the next 3 months. He may qualify for assistance for Ozempic.    Hyperlipidemia: Stable. Plan in place with cardiology.    Hypertension: Stable. Patient is meeting blood pressure goal of < 140/90mmHg.    Gout: Stable.    GERD: Stable.    PLAN:                            1. Patient Assistance Application for Ozempic printed. You can apply if you are currently in the coverage gap. Drop off the completed application to the , and I will submit as soon as I get this back from you.    Follow-up: Return in 13 weeks (on 10/5/2022) for Blood sugar recheck.    SUBJECTIVE/OBJECTIVE:                          Sven Givens is a 67 year old male coming in for a follow-up visit.  Today's visit is a follow-up MTM visit from 9/15/2021     Reason for visit: blood sugar check; medication review.  COVID end of March  Coughing for at least 2 months after - stopped about 2-3 weeks ago  Sugars not good during COVID    Allergies/ADRs: Reviewed in chart  Past Medical History: Reviewed in chart  Tobacco: He reports that he quit smoking about 30 years ago. He has never used smokeless tobacco.  Alcohol: none      Medication Adherence/Access: no issues reported  Stopped taking aspirin due to age and was taking for primary prevention      Type 2 Diabetes:   Metformin 1000mg twice daily  Ozempic 1mg daily - $445 for 3 month supply; just picked up a refill; not sure if he is in the coverage gap of his Medicare Part D plan  Glimepiride 4mg twice daily    Medication history: avoiding SGLT2 due to hx of bladder infection.  Has also tried Januvia, Tradjenta, Invokana in the past which does not appear to have worked well for him.    SMBG Ranges (patient reported):   Date FBG/ 2hours post Lunch/2hours post Dinner /2hours post   7/3   190   6/28 153     6/24 123     6/22   158   6/15   199   6/14   157   6/7 170     5/23  132    5/18 138     5/12 149     5/11   128   5/10 129       Symptoms of low blood sugar? Dizzy, feels unwell - had a blood sugar of 77mg/dL on May 4th and corrected with glass of juice; happens rarely if he has only a small breakfast and not his usual meals  Symptoms of high blood sugar? Ongoing feet pain - burning that happens most often at night time. He has tried lidocaine cream in the past and it didn't help at all. Pain levels have not changed with improved blood sugar levels. At this time, he is tolerating and doesn't want to take any pills for this currently.    Diet/Exercise: Eats 4x/day - small meals, low carb, no pasta  Started going back to the gym recently - really wants to work on increased exercise over next 3 months, prior to changing any meds    Aspirin: Stopped taking 81mg daily in light of ASPREE trial  Statin: Yes: atorvastatin 80mg daily and denies side effects  ACEi/ARB: Yes: losartan/HCTZ 100/25mg daily. Urine albumin is   Lab Results   Component Value Date    UMALCR 37.55 (H) 02/21/2022       Lab Results   Component Value Date    A1C 8.0 05/27/2022    A1C 8.6 02/21/2022    A1C 8.0 12/02/2021    A1C 6.6 09/10/2021    A1C 7.4 05/26/2021    A1C 8.4 02/16/2021    A1C 11.4 11/02/2020    A1C 11.4 05/20/2020    A1C 9.3 12/12/2019      Hyperlipidemia:   Atorvastatin 80mg daily  Icosapent Ethyl 1g twice daily - is actually taking over-the-counter Fish Oil 1000mg twice daily because the Rx product gave a terrible fishy taste, even storing it in the fridge  Has upcoming follow-up with cardiology next month.  Recent Labs   Lab Test 05/27/22  1412 09/10/21  0743 03/18/16  0905 03/02/15  0819   CHOL 88 98   < >  152   HDL 29* 31*   < > 32*   LDL <5 26   < > 73   TRIG 348* 207*   < > 235*   CHOLHDLRATIO  --   --   --  4.8    < > = values in this interval not displayed.       Hypertension:   Losartan/HCTZ 100/25mg daily  Metoprolol succinate 50mg daily  Terazosin 5mg QHS    Patient does not self-monitor blood pressure.    Patient reports the following medication side effects: orthostatic lightheadedness, made worse by dehyradation  BP Readings from Last 3 Encounters:   07/06/22 132/77   05/27/22 131/80   02/03/22 132/84     Gout:   allopurinol 100mg 2 tablets daily    Patient reports no current pain concerns.   Patient is experiencing the following medication side effects: none.   Uric Acid   Date Value Ref Range Status   02/21/2022 5.5 3.5 - 7.2 mg/dL Final   02/16/2021 4.3 3.5 - 7.2 mg/dL Final       GERD:   Prilosec (omeprazole) 20mg once daily  Patient reports no current symptoms, heartburn, bilious reflux.    Patient feels that current regimen is effective.    Today's Vitals: /77   Pulse 83   Wt 213 lb 6.4 oz (96.8 kg)   SpO2 98%   BMI 31.51 kg/m    ----------------      I spent 45 minutes with this patient today. All changes were made via collaborative practice agreement with Yeison Villegas MD, MD. A copy of the visit note was provided to the patient's provider(s).    The patient was sent via [employer] a summary of these recommendations.     Dora Neumann, Pharm.D., [facility]  Medication Therapy Management Pharmacist  [phone]     Medication Therapy Recommendations  Type 2 diabetes mellitus with diabetic polyneuropathy, without long-term current use of insulin (H)    Current Medication: OZEMPIC, 1 MG/DOSE, 4 MG/3ML SOPN   Rationale: Cannot afford medication product - Cost - Adherence   Recommendation: Provide Adherence Intervention   Status: Patient Agreed - Adherence/Education

## 2022-07-06 NOTE — LETTER
_  Medication List        Prepared on: 7/6/2022     Bring your Medication List when you go to the doctor, hospital, or   emergency room. And, share it with your family or caregivers.     Note any changes to how you take your medications.  Cross out medications when you no longer use them.    Medication How I take it Why I use it Prescriber   allopurinol (ZYLOPRIM) 100 MG tablet TAKE 2 TABLETS BY MOUTH EVERY DAY Idiopathic chronic gout of right foot without tophus Yeison Villegas MD   atorvastatin (LIPITOR) 80 MG tablet Take 1 tablet (80 mg) by mouth daily Hypertriglyceridemia; Type 2 diabetes mellitus with diabetic polyneuropathy, without long-term current use of insulin (H) Yeison Villegas MD   Azelaic Acid (FINACEA) 15 % gel Apply small amount twice a day to skin Rosacea Pam Dela Cruz MD   blood glucose (NO BRAND SPECIFIED) test strip Use to test blood sugar one time daily or as directed./ okay to dispense brand covered by insurance Type 2 diabetes mellitus with diabetic polyneuropathy, without long-term current use of insulin (H) Yeison Villegas MD   fluticasone (FLONASE) 50 MCG/ACT nasal spray Spray 1-2 sprays into both nostrils daily URI with cough and congestion Pam Dela Cruz MD   glimepiride (AMARYL) 4 MG tablet Take 1 tablet (4 mg) by mouth 2 times daily Type 2 diabetes mellitus with diabetic polyneuropathy, without long-term current use of insulin (H) Yeison Villegas MD   Icosapent Ethyl 1 g CAPS Take 1 g by mouth 2 times daily Coronary artery disease involving native coronary artery of native heart without angina pectoris; Type 2 diabetes mellitus with diabetic polyneuropathy, with long-term current use of insulin (H) Jesus Shane MD   ketoconazole (NIZORAL) 2 % external shampoo APPLY TOPICALLY EVERY OTHER DAY LATHER INTO RASH AND WASH OFF AFTER 10 MINUTES. Tinea Versicolor Pam Dela Cruz MD   losartan-hydrochlorothiazide (HYZAAR) 100-25 MG tablet Take 1 tablet by mouth daily For blood pressure.  Essential hypertension with goal blood pressure less than 140/90 Yeison Villegas MD   metFORMIN (GLUCOPHAGE) 1000 MG tablet TAKE 1 TABLET BY MOUTH TWICE A DAY WITH MEALS Type 2 diabetes mellitus with diabetic polyneuropathy, without long-term current use of insulin (H) Yeison Villegas MD   metoprolol succinate ER (TOPROL-XL) 50 MG 24 hr tablet Take 1 tablet (50 mg) by mouth daily Essential hypertension with goal blood pressure less than 140/90; Sinus Tachycardia Yeison Villegas MD   metroNIDAZOLE 0.75 % EX external gel Apply topically 2 times daily Caitlin Dela Cruz MD   MULTIPLE VITAMINS OR 1 a day General Health Patient Reported   omeprazole (PRILOSEC) 20 MG DR capsule Take 1 capsule (20 mg) by mouth daily Take 30-60 minutes before a meal. Gastroesophageal reflux disease with esophagitis Yeison Villegas MD   Oxymetazoline HCl (AFRIN NASAL SPRAY NA) Spray in nostril as needed Nasal Congestion Patient Reported   OZEMPIC, 1 MG/DOSE, 4 MG/3ML SOPN INJECT 1 MG SUBCUTANEOUS EVERY 7 DAYS Type 2 diabetes mellitus with diabetic polyneuropathy, without long-term current use of insulin (H) Yeison Villegas MD   senna-docusate (SENEXON-S) 8.6-50 MG tablet TAKE 1 TABLET BY MOUTH EVERYDAY AT BEDTIME Slow Transit Constipation Yeison Villegas MD   terazosin (HYTRIN) 5 MG capsule TAKE 1 CAPSULE (5 MG) BY MOUTH AT BEDTIME FOR BLOOD PRESSURE. Essential hypertension with goal blood pressure less than 140/90 Yeison Villegas MD         Add new medications, over-the-counter drugs, herbals, vitamins, or  minerals in the blank rows below.    Medication How I take it Why I use it Prescriber                          Allergies:      pcn [penicillins]; ace inhibitors; indomethacin; invokana [canagliflozin]        Side effects I have had:               Other Information:              My notes and questions:

## 2022-07-06 NOTE — LETTER
July 6, 2022  Sven Givens  [address]  [place] [address]    Dear Mr. Givens, [facility] [place]        Thank you for talking with me on Jul 6, 2022 about your health and medications. As a follow-up to our conversation, I have included two documents:      1. Your Recommended To-Do List has steps you should take to get the best results from your medications.  2. Your Medication List will help you keep track of your medications and how to take them.    If you want to talk about these documents, please call Dora Neumann, PharmD, BCACP at phone: [phone], Monday-Friday 8-4:30pm.    I look forward to working with you and your doctors to make sure your medications work well for you.    Sincerely,    Dora Neumann, PharmD, BCACP  [facility] Pharmacist, [facility]

## 2022-08-05 ENCOUNTER — OFFICE VISIT (OUTPATIENT)
Dept: CARDIOLOGY | Facility: CLINIC | Age: 67
End: 2022-08-05
Payer: COMMERCIAL

## 2022-08-05 VITALS
HEART RATE: 96 BPM | SYSTOLIC BLOOD PRESSURE: 116 MMHG | BODY MASS INDEX: 31.74 KG/M2 | WEIGHT: 214.9 LBS | DIASTOLIC BLOOD PRESSURE: 73 MMHG | OXYGEN SATURATION: 99 %

## 2022-08-05 DIAGNOSIS — I25.10 CORONARY ARTERY DISEASE INVOLVING NATIVE CORONARY ARTERY OF NATIVE HEART WITHOUT ANGINA PECTORIS: Primary | ICD-10-CM

## 2022-08-05 PROCEDURE — 99214 OFFICE O/P EST MOD 30 MIN: CPT | Performed by: INTERNAL MEDICINE

## 2022-08-05 RX ORDER — OMEGA-3-ACID ETHYL ESTERS 1 G/1
2 CAPSULE, LIQUID FILLED ORAL 2 TIMES DAILY
Qty: 360 CAPSULE | Refills: 3 | Status: SHIPPED | OUTPATIENT
Start: 2022-08-05

## 2022-08-05 RX ORDER — NITROGLYCERIN 0.4 MG/1
TABLET SUBLINGUAL
Qty: 25 TABLET | Refills: 3 | Status: SHIPPED | OUTPATIENT
Start: 2022-08-05

## 2022-08-05 NOTE — PROGRESS NOTES
[facility] Cardiology Consultation:    Assessment and Plan:     1. Sinus tachycardia: Possibly inappropriate sinus tachycardia, though symptoms of orthostatic dizziness suggest an orthostatic component too.  Ivabradine was not approved.  For now, recommended to stay well-hydrated.  2. Mild CAD, normal LV fxn, coronary CTA with CAC of 104: Cont aspirin and statin.  Encouraged to try sublingual nitroglycerin for occasional chest pains  3. Dyslipidemia, primary prevention: LDL goal <70, Non-HDL goal <100 - continue lipitor 80mg.  Calculated LDL is falsely low due to elevated triglycerides, can check direct LDL in the future.  Stopped Vascepa due to taste and high cost, willing to try Lovaza  4. HTN, with orthostatic dizziness: Controlled  5. Type 2 diabetes mellitus: did not tolerate SGLT2 inhibitor due to bladder infx, on metformin, glimepiride and GLP1 agonist  6. Lung cancer stage 1 s/p resection      Jesus Shane MD    Cardiac Imaging and Prevention  [facility]  [email or web address] Pager: [phone]      HPI: Tachycardia, CAD follow-up.    He stopped taking the Vascepa due to associated taste and high co-pay.  He is tolerating all his other medications without any issues.  Overall he feels that his tachycardia is improved.  He still has postural dizziness, luckily without any syncope.  Lipid profile checked earlier this year shows a total cholesterol of 88, HDL cholesterol of 29, triglycerides of 348, calculated LDL cholesterol less than 5.  Hemoglobin A1c is 8.0.  Renal function and electrolytes are normal.  Reports occasional exertional chest discomfort.  Symptoms are mild and resolve spontaneously.  He ran out of his nitroglycerin long time ago so has not tried any.  He also has known gastric reflux so was wondering if symptoms are related to that, he is on a PPI.      EXAM:  /73 (BP Location: Left arm, Patient Position: Chair, Cuff Size: Adult Regular)    Pulse 96   Wt 97.5 kg (214 lb 14.4 oz)   SpO2 99%   BMI 31.74 kg/m    GEN/CONSTITUIONAL: Appears comfortable, in no apparent distress   EYES: No icterus  ENT/MOUTH: Normal  JVP:  Not visible  RESPIRATORY: Clear to auscultation bilaterally   CARDIOVASCULAR: Regular S1 and S2, no murmurs, rubs, or gallops.   ABDOMEN: Soft, non-tender, positive bowel sounds   NEUROLOGIC: Grossly non-focal   PSYCHIATRIC: Normal affect  EXT: No cyanosis, clubbing, edema. Normal pedal pulses.  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     Varicose veins        CURRENT MEDICATIONS:  Current Outpatient Medications   Medication     allopurinol (ZYLOPRIM) 100 MG tablet     atorvastatin (LIPITOR) 80 MG tablet     Azelaic Acid (FINACEA) 15 % gel     blood glucose (NO BRAND SPECIFIED) test strip     clotrimazole-betamethasone (LOTRISONE) 1-0.05 % external cream     fluticasone (FLONASE) 50 MCG/ACT nasal spray     glimepiride (AMARYL) 4 MG tablet     Icosapent Ethyl 1 g CAPS     ketoconazole (NIZORAL) 2 % external shampoo     losartan-hydrochlorothiazide (HYZAAR) 100-25 MG tablet     metFORMIN (GLUCOPHAGE) 1000 MG tablet     metoprolol succinate ER (TOPROL-XL) 50 MG 24 hr tablet     metroNIDAZOLE (METROCREAM) 0.75 % external cream     metroNIDAZOLE 0.75 % EX external gel     MULTIPLE VITAMINS OR     omeprazole (PRILOSEC) 20 MG DR capsule     Oxymetazoline HCl (AFRIN NASAL SPRAY NA)     OZEMPIC, 1 MG/DOSE, 4 MG/3ML SOPN     senna-docusate (SENEXON-S) 8.6-50 MG tablet     terazosin (HYTRIN) 5 MG capsule     No current facility-administered medications  for this visit.       PAST SURGICAL HISTORY:  Past Surgical History:   Procedure Laterality Date     BRONCHOSCOPY FLEXIBLE N/A 2016    Procedure: BRONCHOSCOPY FLEXIBLE;  Surgeon: Gayle Moya MD;  Location: [facility] OR     COLONOSCOPY       COLONOSCOPY WITH CO2 INSUFFLATION N/A 2017    Procedure: COLONOSCOPY WITH CO2 INSUFFLATION;  COLON SCREEN/ SEB;  Surgeon: Margarito Rasheed DO;  Location: [facility] OR     COMBINED ESOPHAGOSCOPY, GASTROSCOPY, DUODENOSCOPY (EGD) WITH CO2 INSUFFLATION N/A 2019    Procedure: COMBINED ESOPHAGOSCOPY, GASTROSCOPY, DUODENOSCOPY (EGD) WITH CO2 INSUFFLATION;  Surgeon: Abbe Mccarty MD;  Location: [facility] OR     ESOPHAGOSCOPY, GASTROSCOPY, DUODENOSCOPY (EGD), COMBINED N/A 2019    Procedure: Combined Esophagoscopy, Gastroscopy, Duodenoscopy (Egd), Biopsy Single Or Multiple;  Surgeon: Abbe Mccarty MD;  Location: [facility] OR     GENITOURINARY SURGERY      kidney stones     THORACOSCOPIC WEDGE RESECTION LUNG Left 2016    Procedure: THORACOSCOPIC WEDGE RESECTION LUNG;  Surgeon: Gayle Moya MD;  Location: [facility] OR     VASCULAR SURGERY      varicose vein       ALLERGIES     Allergies   Allergen Reactions     Pcn [Penicillins] Rash     Ace Inhibitors Cough     Indomethacin Other (See Comments)     Severe dizziness     Invokana [Canagliflozin]      bladder infection       FAMILY HISTORY:  Family History   Problem Relation Age of Onset     Cerebrovascular Disease Father 64     Cancer Sister         ovarary      Hernia Mother          age 97       SOCIAL HISTORY:  Social History     Socioeconomic History     Marital status:      Spouse name: Not on file     Number of children: Not on file     Years of education: Not on file     Highest education level: Not on file   Occupational History     Employer: [employer]   Tobacco Use     Smoking status: Former Smoker     Quit date: 1992     Years since quittin.5     Smokeless tobacco: Never  Used     Tobacco comment: 15 + years    Substance and Sexual Activity     Alcohol use: No     Drug use: No     Sexual activity: Not Currently     Partners: Female     Birth control/protection: None   Other Topics Concern     Parent/sibling w/ CABG, MI or angioplasty before 65F 55M? No   Social History Narrative     Not on file     Social Determinants of Health     Financial Resource Strain: Not on file   Food Insecurity: Not on file   Transportation Needs: Not on file   Physical Activity: Not on file   Stress: Not on file   Social Connections: Not on file   Intimate Partner Violence: Not on file   Housing Stability: Not on file       ROS:   Constitutional: No fever, chills, or sweats. No weight gain/loss   ENT: No visual disturbance, ear ache, epistaxis, sore throat  Allergies/Immunologic: Negative.   Respiratory: No cough, hemoptysia  Cardiovascular: As per HPI  GI: No nausea, vomiting, hematemesis, melena, or hematochezia  : No urinary frequency, dysuria, or hematuria  Integument: Negative  Psychiatric: Negative  Neuro: Negative  Endocrinology: Negative   Musculoskeletal: Negative    ADDITIONAL COMMENTS:     I reviewed the patient's medications:     I reviewed the patient's pertinent clinical laboratory studies:   Pt's 5/2020 LDL was 40 but triglycerides were 308  I reviewed the patient's pertinent imaging studies: Pt's 6/2020 echocardiogram was normal  I reviewed the patient's ECG:  Sinus rhythm at 97 bpm, no concerning changes.     The ASCVD Risk score (Marshall DC Jr., et al., 2013) failed to calculate for the following reasons:    The valid total cholesterol range is 130 to 320 mg/dL      Echocardiogram 6/1/2020  Interpretation Summary     Global and regional left ventricular function is normal with an EF of 60-65%.  Right ventricular function, chamber size, wall motion, and thickness are normal.  Pulmonary artery systolic pressure cannot be assessed.  No pericardial effusion is present.      Coronary CTA  2016  CORONARY CALCIUM SCORE: The total Agatston calcium score is 104.3,  Left main: Zero, left anterior descendin,  circumflex: Zero,  right coronary artery: 21.3. This places the patient in the 64th  percentile when compared to age and gender matched control group.     CORONARY CT ANGIOGRAPHY   DOMINANCE: Right dominant system.      LEFT MAIN: The left main arises normally from the left coronary cusp  and the left main has a discrete small calcified nodule which is  minimally obstructive.     LEFT ANTERIOR DESCENDING: There is mild mixed plaque minimally  obstructive in the proximal to mid left anterior descending artery.  The remainder of the left anterior descending artery and its branches  have no significant disease.     CIRCUMFLEX: The circumflex and its major branches are widely patent  without any detectable stenosis .     RIGHT CORONARY ARTERY: The right coronary is dominant. There is a  mildly obstructive mixed plaque in the proximal right coronary artery.  There is soft plaque in the mid RCA which is mildly obstructive. The  remainder of the RCA and PDA have no significant disease

## 2022-08-05 NOTE — PATIENT INSTRUCTIONS
The following is a summary of your office visit today:    Stop vascepa. Start Lovaza 2 capsules twice daily.   Try nitroglycerin for chest pain     Nurse contact information:  Cardiology Care Coordinators:  Eve Belcher RN and Jose Davenport, RN   Phone  [phone]    Fax [phone]       HOW TO CHECK YOUR BLOOD PRESSURE AT HOME:     Avoid eating, smoking, and exercising for at least 30 minutes before taking a reading.    Be sure you have taken your BP medication at least 2-3 hours before you check it.     Sit quietly for 10 minutes before a reading.     Sit in a chair with your feet flat on the floor. Rest your  arm on a table so that the arm cuff is at the same level as your heart.    Remain still during the reading.    Record your blood pressure and pulse in a log and bring to your next appointment.     If you have had any blood work, imaging or other testing completed we will be in touch within 1-2 weeks regarding the results. If you have any questions, concerns or need to schedule a follow up, please contact us at [phone]. If you are needing refills please contact your pharmacy. For urgent after hour care please call the [facility] at [phone] or the [facility] at [phone] and ask to speak to the cardiologist on call.    It was a pleasure meeting with you today. Please let us know if there is anything else we can do for you so that we can be sure you are leaving completely satisfied with your care experience.     Your Cardiology Team at [facility]  RN Care Coordinators: Agueda  Support Staff: Krishan

## 2022-08-05 NOTE — NURSING NOTE
Sven Givens's goals for this visit include:   Chief Complaint   Patient presents with     Follow Up     One year follow up        He requests these members of his care team be copied on today's visit information: yes     PCP: Yeison Villegas    Referring Provider:  Jesus Shane MD  [address] [place] [address] [address] [place] [id number]    /73 (BP Location: Left arm, Patient Position: Chair, Cuff Size: Adult Regular)   Pulse 96   Wt 97.5 kg (214 lb 14.4 oz)   SpO2 99%   BMI 31.74 kg/m      Do you need any medication refills at today's visit? No       MORRO Stevens   Cardiology Team  [facility]

## 2022-09-13 ENCOUNTER — TRANSFERRED RECORDS (OUTPATIENT)
Dept: FAMILY MEDICINE | Facility: CLINIC | Age: 67
End: 2022-09-13

## 2022-09-13 ENCOUNTER — TRANSFERRED RECORDS (OUTPATIENT)
Dept: HEALTH INFORMATION MANAGEMENT | Facility: CLINIC | Age: 67
End: 2022-09-13

## 2022-09-13 LAB — RETINOPATHY: POSITIVE

## 2022-09-14 ENCOUNTER — TELEPHONE (OUTPATIENT)
Dept: FAMILY MEDICINE | Facility: CLINIC | Age: 67
End: 2022-09-14

## 2022-09-14 NOTE — TELEPHONE ENCOUNTER
Patient Quality Outreach    Patient is due for the following:   Diabetes -  A1C and Diabetic Follow-Up Visit    Next Steps:   No follow up needed at this time.  Patient has upcoming appointment, these items will be addressed at that time.    Type of outreach:    Chart review performed, no outreach needed.      Questions for provider review:    None     Tatiana Templeton

## 2022-09-16 ENCOUNTER — OFFICE VISIT (OUTPATIENT)
Dept: FAMILY MEDICINE | Facility: CLINIC | Age: 67
End: 2022-09-16
Payer: COMMERCIAL

## 2022-09-16 ENCOUNTER — TELEPHONE (OUTPATIENT)
Dept: GASTROENTEROLOGY | Facility: CLINIC | Age: 67
End: 2022-09-16

## 2022-09-16 VITALS
BODY MASS INDEX: 31.37 KG/M2 | WEIGHT: 211.8 LBS | TEMPERATURE: 98.8 F | SYSTOLIC BLOOD PRESSURE: 109 MMHG | OXYGEN SATURATION: 98 % | DIASTOLIC BLOOD PRESSURE: 64 MMHG | HEART RATE: 109 BPM | RESPIRATION RATE: 26 BRPM | HEIGHT: 69 IN

## 2022-09-16 DIAGNOSIS — Z00.00 ENCOUNTER FOR MEDICARE ANNUAL WELLNESS EXAM: Primary | ICD-10-CM

## 2022-09-16 DIAGNOSIS — E11.42 TYPE 2 DIABETES MELLITUS WITH DIABETIC POLYNEUROPATHY, WITHOUT LONG-TERM CURRENT USE OF INSULIN (H): ICD-10-CM

## 2022-09-16 DIAGNOSIS — Z12.11 SCREEN FOR COLON CANCER: ICD-10-CM

## 2022-09-16 DIAGNOSIS — Z23 ENCOUNTER FOR IMMUNIZATION: ICD-10-CM

## 2022-09-16 LAB
ANION GAP SERPL CALCULATED.3IONS-SCNC: 9 MMOL/L (ref 3–14)
BUN SERPL-MCNC: 20 MG/DL (ref 7–30)
CALCIUM SERPL-MCNC: 10.5 MG/DL (ref 8.5–10.1)
CHLORIDE BLD-SCNC: 107 MMOL/L (ref 94–109)
CO2 SERPL-SCNC: 23 MMOL/L (ref 20–32)
CREAT SERPL-MCNC: 0.8 MG/DL (ref 0.66–1.25)
CREAT UR-MCNC: 258 MG/DL
GFR SERPL CREATININE-BSD FRML MDRD: >90 ML/MIN/1.73M2
GLUCOSE BLD-MCNC: 175 MG/DL (ref 70–99)
HBA1C MFR BLD: 7.2 % (ref 0–5.6)
MICROALBUMIN UR-MCNC: 86 MG/L
MICROALBUMIN/CREAT UR: 33.33 MG/G CR (ref 0–17)
POTASSIUM BLD-SCNC: 3.8 MMOL/L (ref 3.4–5.3)
SODIUM SERPL-SCNC: 139 MMOL/L (ref 133–144)

## 2022-09-16 PROCEDURE — G0438 PPPS, INITIAL VISIT: HCPCS | Performed by: FAMILY MEDICINE

## 2022-09-16 PROCEDURE — 91313 COVID-19,PF,MODERNA BIVALENT: CPT | Performed by: FAMILY MEDICINE

## 2022-09-16 PROCEDURE — 83036 HEMOGLOBIN GLYCOSYLATED A1C: CPT | Performed by: FAMILY MEDICINE

## 2022-09-16 PROCEDURE — 0134A COVID-19,PF,MODERNA BIVALENT: CPT | Performed by: FAMILY MEDICINE

## 2022-09-16 PROCEDURE — 82043 UR ALBUMIN QUANTITATIVE: CPT | Performed by: FAMILY MEDICINE

## 2022-09-16 PROCEDURE — 80048 BASIC METABOLIC PNL TOTAL CA: CPT | Performed by: FAMILY MEDICINE

## 2022-09-16 PROCEDURE — 36415 COLL VENOUS BLD VENIPUNCTURE: CPT | Performed by: FAMILY MEDICINE

## 2022-09-16 ASSESSMENT — ENCOUNTER SYMPTOMS
NAUSEA: 0
JOINT SWELLING: 0
FREQUENCY: 1
HEMATOCHEZIA: 0
CONSTIPATION: 1
EYE PAIN: 0
HEARTBURN: 0
DIARRHEA: 0
WEAKNESS: 0
DIZZINESS: 1
NERVOUS/ANXIOUS: 0
HEADACHES: 0
PALPITATIONS: 0
MYALGIAS: 0
ABDOMINAL PAIN: 0
FEVER: 0
DYSURIA: 0
SHORTNESS OF BREATH: 0
PARESTHESIAS: 0
HEMATURIA: 0
ARTHRALGIAS: 0
COUGH: 0
CHILLS: 0
SORE THROAT: 0

## 2022-09-16 ASSESSMENT — PAIN SCALES - GENERAL: PAINLEVEL: NO PAIN (0)

## 2022-09-16 ASSESSMENT — ACTIVITIES OF DAILY LIVING (ADL): CURRENT_FUNCTION: NO ASSISTANCE NEEDED

## 2022-09-16 NOTE — PATIENT INSTRUCTIONS
Patient Education   Personalized Prevention Plan  You are due for the preventive services outlined below.  Your care team is available to assist you in scheduling these services.  If you have already completed any of these items, please share that information with your care team to update in your medical record.  Health Maintenance Due   Topic Date Due     Zoster (Shingles) Vaccine (2 of 3) 02/14/2014     COVID-19 Vaccine (4 - Booster for Moderna series) 04/16/2022     Colorectal Cancer Screening  05/31/2022     Flu Vaccine (1) 09/01/2022     Eye Exam  10/06/2022     ANNUAL REVIEW OF HM ORDERS  10/07/2022

## 2022-09-16 NOTE — TELEPHONE ENCOUNTER
Screening Questions    BlueKIND OF PREP RedLOCATION [review exclusion criteria] GreenSEDATION TYPE    Have you had a positive covid test in the last 90 days? N  If yes, what date?     Do you have a legal guardian or medical Power of ?  Are you able to give consent for your medical care? Y SELF (Sedation review/consideration needed)    1. Are you active on mychart? Y    2. What insurance is in the chart? BCBS MEDICARE     3.   Ordering/Referring Provider: HO    4. BMI 31.2 [BMI OVER 40-EXTENDED PREP]  If greater than 40 review exclusion criteria [PAC APPT IF (MAC) @ [facility]]      5.  Respiratory Screening:  [If yes to any of the following HOSPITAL setting only]     N  Do you use daily home oxygen?   Y Do you have mod to severe Obstructive Sleep Apnea?  [(OKAY @ [facility] if pt is not on OXYGEN) [facility] SH PH RI]   N   Do you have Pulmonary Hypertension?    N   Do you have UNCONTROLLED asthma?         6.   N Have you had a heart or lung transplant?    _____________________________    7.   N Are you currently on dialysis? [ If yes, G-PREP & HOSPITAL setting only]     8.   N  Do you have chronic kidney disease? [ If yes, G-PREP ]    9.   Y  Do you have a diagnosis of diabetes? [ If yes, G-PREP ]    10. N  On a regular basis do you go 3-5 days between bowel movements?  [ If yes, EXTENDED PREP.]  _____________________________    11.    N  Have you had a stroke or Transient ischemic attack (TIA - aka  mini stroke ) within 6 months? (If yes, please review exclusion criteria)          N In the past 6 months, have you had any heart related issues including cardiomyopathy or heart attack?           N If yes, did it require cardiac stenting or other implantable device?       12.   N  Do you have any implantable devices in your body (pacemaker, defib, LVAD)? (If yes, please review exclusion criteria)    13.   N STOPPED Do you take nitroglycerin?            N If yes, how often?  (if yes, HOSPITAL setting ONLY)    14.  N  Are you  currently taking any blood thinners?           [IF YES, INFORM PATIENT TO FOLLOW UP W/ ORDERING PROVIDER FOR BRIDGING INSTRUCTIONS]     15.   N Do you take Phentermine?      Yes-> Hold for 7 days before procedure.  Please consult your prescribing provider if you have questions about holding this medication.      16.   [FEMALES] Are you currently pregnant?     If yes, how many weeks?   ____________________________    17.   N  Are you taking any prescription pain medications on a routine schedule?  [ If yes, EXTENDED PREP.] [If yes, MAC]    18.   N Do you have any chemical dependencies such as alcohol, street drugs, or methadone?  [If yes, MAC]    19.   N  Do you have any history of post-traumatic stress syndrome, severe anxiety or history of psychosis?  [If yes, MAC]  ____________________________    20.   Do you transfer independently? (If NO, please HOSPITAL setting  only)  Y      21.   Preferred LOCAL Pharmacy for Pre Prescription:                             [facility] [id number] [place] [facility], [place] - [address] [facility]    Scheduling Details      Caller: Sven Givens    (Please ask for phone number if not scheduled by patient)    Type of Procedure Scheduled: Lower Endoscopy [Colonoscopy]    Which Colonoscopy Prep was Sent?: JOSEFINA TOVAR CF PATIENTS & GROEN'S PATIENTS NEEDS EXTENDED PREP    Surgeon: MARGAUX  Date of Procedure: 9/23  Location:     Sedation Type: CS  per screening   Conscious Sedation- Needs  for 6 hours after the procedure  MAC/General-Needs  for 24 hours after procedure    Pre-op Required at [facility], [place], [place] and OR for MAC sedation:  N  (advise patient they will need a pre-op WITH IN 30 DAYS prior to procedure -)      Informed patient they will need an adult  Y  Cannot take any type of public or medical transportation alone    Pre-Procedure Covid test to be completed at [facility] or Externally/Informed of at home test option: HOME    Confirmed Nurse will  call to complete assessment Y    Additional comments:

## 2022-09-16 NOTE — PROGRESS NOTES
"SUBJECTIVE:   Sven is a 67 year old who presents for Preventive Visit.      Patient has been advised of split billing requirements and indicates understanding: Yes  Are you in the first 12 months of your Medicare coverage?  No    Healthy Habits:     In general, how would you rate your overall health?  Good    Frequency of exercise:  4-5 days/week    Duration of exercise:  45-60 minutes    Do you usually eat at least 4 servings of fruit and vegetables a day, include whole grains    & fiber and avoid regularly eating high fat or \"junk\" foods?  Yes    Taking medications regularly:  Yes    Medication side effects:  None    Ability to successfully perform activities of daily living:  No assistance needed    Home Safety:  No safety concerns identified    Hearing Impairment:  No hearing concerns    In the past 6 months, have you been bothered by leaking of urine?  No    In general, how would you rate your overall mental or emotional health?  Good      PHQ-2 Total Score: 0    Additional concerns today:  No    Do you feel safe in your environment? Yes    Have you ever done Advance Care Planning? (For example, a Health Directive, POLST, or a discussion with a medical provider or your loved ones about your wishes): Yes, patient states has an Advance Care Planning document and will bring a copy to the clinic.    Fall risk  Fallen 2 or more times in the past year?: No  Any fall with injury in the past year?: No    Cognitive Screening   1) Repeat 3 items (Leader, Season, Table)    2) Clock draw: NORMAL  3) 3 item recall: Recalls 2 objects   Results: NORMAL clock, 1-2 items recalled: COGNITIVE IMPAIRMENT LESS LIKELY    Mini-CogTM Copyright WILBUR [employer]jica. Licensed by the author for use in [facility]; reprinted with permission (carley@.[facility]). All rights reserved.      Do you have sleep apnea, excessive snoring or daytime drowsiness?: yes    Reviewed and updated as needed this visit by clinical staff                "     Reviewed and updated as needed this visit by Provider                   Social History     Tobacco Use     Smoking status: Former Smoker     Quit date: 1992     Years since quittin.6     Smokeless tobacco: Never Used     Tobacco comment: 15 + years    Substance Use Topics     Alcohol use: No     If you drink alcohol do you typically have >3 drinks per day or >7 drinks per week? Not applicable    Alcohol Use 2022   Prescreen: >3 drinks/day or >7 drinks/week? No   Prescreen: >3 drinks/day or >7 drinks/week? -   No flowsheet data found.      Current providers sharing in care for this patient include:   Patient Care Team:  Yeison Villegas MD as PCP - General (Family Medicine)  Zhanna Ziegler APRN CNS as Referring Physician (Clinical Nurse Specialist)  Dorcas Amezcua RD as Diabetes Educator (Dietitian, Registered)  Mir Ambrocio MD as Assigned Surgical Provider  Lay Valencia MD as Assigned Cancer Care Provider  Dora Neumann RP as MTM Pharamcist (Pharmacist)  Jesus Shane MD as Assigned Heart and Vascular Provider  Yeison Villegas MD as Assigned PCP  Dora Neumann RP as Assigned MTM Pharmacist    The following health maintenance items are reviewed in Epic and correct as of today:  Health Maintenance   Topic Date Due     ZOSTER IMMUNIZATION (2 of 3) 2014     COVID-19 Vaccine (4 - Booster for Moderna series) 2022     COLORECTAL CANCER SCREENING  2022     INFLUENZA VACCINE (1) 2022     EYE EXAM  10/06/2022     ANNUAL REVIEW OF HM ORDERS  10/07/2022     A1C  2022     MEDICARE ANNUAL WELLNESS VISIT  2023     DIABETIC FOOT EXAM  2023     MICROALBUMIN  2023     PSA  2023     BMP  2023     LIPID  2023     FALL RISK ASSESSMENT  2023     DTAP/TDAP/TD IMMUNIZATION (3 - Td or Tdap) 2023     ADVANCE CARE PLANNING  10/07/2026     HEPATITIS C SCREENING  Completed     PHQ-2 (once per calendar year)   Completed     Pneumococcal Vaccine: 65+ Years  Completed     AORTIC ANEURYSM SCREENING (SYSTEM ASSIGNED)  Completed     IPV IMMUNIZATION  Aged Out     MENINGITIS IMMUNIZATION  Aged Out     Lab work is in process  Labs reviewed in EPIC  BP Readings from Last 3 Encounters:   09/16/22 109/64   08/05/22 116/73   07/06/22 132/77    Wt Readings from Last 3 Encounters:   09/16/22 96.1 kg (211 lb 12.8 oz)   08/05/22 97.5 kg (214 lb 14.4 oz)   07/06/22 96.8 kg (213 lb 6.4 oz)                  Patient Active Problem List   Diagnosis     Advanced directives, counseling/discussion     Essential hypertension with goal blood pressure less than 140/90     History of adenomatous polyp of colon     GERD (gastroesophageal reflux disease)     Anemia     Hyperlipidemia LDL goal <100     Gout     Type 2 diabetes mellitus with diabetic polyneuropathy, with long-term current use of insulin (H)     History of radiation exposure     Non-small cell carcinoma of lung, left (H)     Nonalcoholic hepatosteatosis     PRESLEY (obstructive sleep apnea)     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)     Age-related incipient cataract of both eyes     Past Surgical History:   Procedure Laterality Date     BRONCHOSCOPY FLEXIBLE N/A 9/23/2016    Procedure: BRONCHOSCOPY FLEXIBLE;  Surgeon: Gayle Moya MD;  Location: [facility] OR     COLONOSCOPY  5-03     COLONOSCOPY WITH CO2 INSUFFLATION N/A 5/31/2017    Procedure: COLONOSCOPY WITH CO2 INSUFFLATION;  COLON SCREEN/ SEB;  Surgeon: Margarito Rasheed DO;  Location:  OR     COMBINED ESOPHAGOSCOPY, GASTROSCOPY, DUODENOSCOPY (EGD) WITH CO2 INSUFFLATION N/A 4/19/2019    Procedure: COMBINED ESOPHAGOSCOPY, GASTROSCOPY, DUODENOSCOPY (EGD) WITH CO2 INSUFFLATION;  Surgeon: Abbe Mccarty MD;  Location:  OR     ESOPHAGOSCOPY, GASTROSCOPY, DUODENOSCOPY (EGD), COMBINED N/A 4/19/2019    Procedure: Combined Esophagoscopy,  Gastroscopy, Duodenoscopy (Egd), Biopsy Single Or Multiple;  Surgeon: Abbe Mccarty MD;  Location: [facility] OR     GENITOURINARY SURGERY      kidney stones     THORACOSCOPIC WEDGE RESECTION LUNG Left 2016    Procedure: THORACOSCOPIC WEDGE RESECTION LUNG;  Surgeon: Gayle Moya MD;  Location: [facility] OR     VASCULAR SURGERY      varicose vein       Social History     Tobacco Use     Smoking status: Former Smoker     Quit date: 1992     Years since quittin.6     Smokeless tobacco: Never Used     Tobacco comment: 15 + years    Substance Use Topics     Alcohol use: No     Family History   Problem Relation Age of Onset     Cerebrovascular Disease Father 64     Cancer Sister         ovarary      Hernia Mother          age 97         Current Outpatient Medications   Medication Sig Dispense Refill     allopurinol (ZYLOPRIM) 100 MG tablet TAKE 2 TABLETS BY MOUTH EVERY  tablet 3     atorvastatin (LIPITOR) 80 MG tablet Take 1 tablet (80 mg) by mouth daily 90 tablet 3     Azelaic Acid (FINACEA) 15 % gel Apply small amount twice a day to skin (Patient taking differently: as needed Apply small amount twice a day to skin) 50 g 3     blood glucose (NO BRAND SPECIFIED) test strip Use to test blood sugar one time daily or as directed./ okay to dispense brand covered by insurance 100 strip 3     clotrimazole-betamethasone (LOTRISONE) 1-0.05 % external cream        fluticasone (FLONASE) 50 MCG/ACT nasal spray Spray 1-2 sprays into both nostrils daily (Patient taking differently: Spray 1-2 sprays into both nostrils as needed) 1 Bottle 1     glimepiride (AMARYL) 4 MG tablet Take 1 tablet (4 mg) by mouth 2 times daily 180 tablet 3     ketoconazole (NIZORAL) 2 % external shampoo APPLY TOPICALLY EVERY OTHER DAY LATHER INTO RASH AND WASH OFF AFTER 10 MINUTES. 120 mL 0     losartan-hydrochlorothiazide (HYZAAR) 100-25 MG tablet Take 1 tablet by mouth daily For blood pressure. 90 tablet 3     metFORMIN  (GLUCOPHAGE) 1000 MG tablet TAKE 1 TABLET BY MOUTH TWICE A DAY WITH MEALS 180 tablet 3     metoprolol succinate ER (TOPROL-XL) 50 MG 24 hr tablet Take 1 tablet (50 mg) by mouth daily 90 tablet 3     metroNIDAZOLE (METROCREAM) 0.75 % external cream        metroNIDAZOLE 0.75 % EX external gel Apply topically 2 times daily 45 g 3     MULTIPLE VITAMINS OR 1 a day       nitroGLYcerin (NITROSTAT) 0.4 MG sublingual tablet For chest pain place 1 tablet under the tongue every 5 minutes for 3 doses. If symptoms persist 5 minutes after 1st dose call 911. 25 tablet 3     omega-3 acid ethyl esters (LOVAZA) 1 g capsule Take 2 capsules (2 g) by mouth 2 times daily 360 capsule 3     omeprazole (PRILOSEC) 20 MG DR capsule Take 1 capsule (20 mg) by mouth daily Take 30-60 minutes before a meal. 90 capsule 1     Oxymetazoline HCl (AFRIN NASAL SPRAY NA) Spray in nostril as needed       OZEMPIC, 1 MG/DOSE, 4 MG/3ML SOPN INJECT 1 MG SUBCUTANEOUS EVERY 7 DAYS 9 mL 1     senna-docusate (SENEXON-S) 8.6-50 MG tablet TAKE 1 TABLET BY MOUTH EVERYDAY AT BEDTIME 90 tablet 3     terazosin (HYTRIN) 5 MG capsule TAKE 1 CAPSULE (5 MG) BY MOUTH AT BEDTIME FOR BLOOD PRESSURE. 90 capsule 3     Allergies   Allergen Reactions     Pcn [Penicillins] Rash     Ace Inhibitors Cough     Indomethacin Other (See Comments)     Severe dizziness     Invokana [Canagliflozin]      bladder infection           Review of Systems   Constitutional: Negative for chills and fever.   HENT: Negative for congestion, ear pain, hearing loss and sore throat.    Eyes: Negative for pain and visual disturbance.   Respiratory: Negative for cough and shortness of breath.    Cardiovascular: Negative for chest pain, palpitations and peripheral edema.   Gastrointestinal: Positive for constipation. Negative for abdominal pain, diarrhea, heartburn, hematochezia and nausea.   Genitourinary: Positive for frequency, impotence and urgency. Negative for dysuria, genital sores, hematuria and  "penile discharge.   Musculoskeletal: Negative for arthralgias, joint swelling and myalgias.   Skin: Negative for rash.   Neurological: Positive for dizziness. Negative for weakness, headaches and paresthesias.   Psychiatric/Behavioral: Negative for mood changes. The patient is not nervous/anxious.      Constitutional, HEENT, cardiovascular, pulmonary, GI, , musculoskeletal, neuro, skin, endocrine and psych systems are negative, except as otherwise noted.    OBJECTIVE:   /64 (BP Location: Left arm, Patient Position: Sitting, Cuff Size: Adult Large)   Pulse 109   Temp 98.8  F (37.1  C) (Oral)   Resp 26   Ht 1.753 m (5' 9.02\")   Wt 96.1 kg (211 lb 12.8 oz)   SpO2 98%   BMI 31.26 kg/m   Estimated body mass index is 31.26 kg/m  as calculated from the following:    Height as of this encounter: 1.753 m (5' 9.02\").    Weight as of this encounter: 96.1 kg (211 lb 12.8 oz).  Physical Exam  GENERAL: healthy, alert and no distress  NECK: no adenopathy, no asymmetry, masses, or scars and thyroid normal to palpation  RESP: lungs clear to auscultation - no rales, rhonchi or wheezes  CV: regular rate and rhythm, normal S1 S2, no S3 or S4, no murmur, click or rub, no peripheral edema and peripheral pulses strong  ABDOMEN: soft, nontender, no hepatosplenomegaly, no masses and bowel sounds normal  MS: no gross musculoskeletal defects noted, no edema    Diagnostic Test Results:  Labs reviewed in Epic    ASSESSMENT / PLAN:       ICD-10-CM    1. Encounter for Medicare annual wellness exam  Z00.00    2. Type 2 diabetes mellitus with diabetic polyneuropathy, without long-term current use of insulin (H)  E11.42 Hemoglobin A1c   3. Screen for colon cancer  Z12.11 Colonoscopy Screening  Referral   4. Encounter for immunization  Z23 COVID-19 mRNA BIVALENT vaccine (MODERNA BOOSTER) 50 MCG/0.5ML injection 50 mcg       Patient has been advised of split billing requirements and indicates understanding: " "Yes    COUNSELING:  Reviewed preventive health counseling, as reflected in patient instructions       Regular exercise       Healthy diet/nutrition       Vision screening    Estimated body mass index is 31.74 kg/m  as calculated from the following:    Height as of 5/27/22: 1.753 m (5' 9\").    Weight as of 8/5/22: 97.5 kg (214 lb 14.4 oz).        He reports that he quit smoking about 30 years ago. He has never used smokeless tobacco.      Appropriate preventive services were discussed with this patient, including applicable screening as appropriate for cardiovascular disease, diabetes, osteopenia/osteoporosis, and glaucoma.  As appropriate for age/gender, discussed screening for colorectal cancer, prostate cancer, breast cancer, and cervical cancer. Checklist reviewing preventive services available has been given to the patient.    Reviewed patients plan of care and provided an AVS. The Basic Care Plan (routine screening as documented in Health Maintenance) for Sven meets the Care Plan requirement. This Care Plan has been established and reviewed with the Patient.    Counseling Resources:  ATP IV Guidelines  Pooled Cohorts Equation Calculator  Breast Cancer Risk Calculator  Breast Cancer: Medication to Reduce Risk  FRAX Risk Assessment  ICSI Preventive Guidelines  Dietary Guidelines for Americans, 2010  [email or web address]'s MyPlate  ASA Prophylaxis  Lung CA Screening    Yeison Villegas MD, MD  [facility]    Identified Health Risks:  "

## 2022-09-19 RX ORDER — BISACODYL 5 MG
TABLET, DELAYED RELEASE (ENTERIC COATED) ORAL
Qty: 4 TABLET | Refills: 0 | Status: ON HOLD | OUTPATIENT
Start: 2022-09-19 | End: 2022-10-28

## 2022-09-23 ENCOUNTER — HOSPITAL ENCOUNTER (OUTPATIENT)
Facility: AMBULATORY SURGERY CENTER | Age: 67
Discharge: HOME | End: 2022-09-23
Attending: INTERNAL MEDICINE | Admitting: INTERNAL MEDICINE
Payer: COMMERCIAL

## 2022-09-23 VITALS
OXYGEN SATURATION: 95 % | RESPIRATION RATE: 16 BRPM | DIASTOLIC BLOOD PRESSURE: 80 MMHG | TEMPERATURE: 98 F | HEART RATE: 93 BPM | SYSTOLIC BLOOD PRESSURE: 135 MMHG

## 2022-09-23 DIAGNOSIS — Z12.11 COLON CANCER SCREENING: Primary | ICD-10-CM

## 2022-09-23 DIAGNOSIS — D37.4 NEOPLASM OF UNCERTAIN BEHAVIOR OF COLON: ICD-10-CM

## 2022-09-23 DIAGNOSIS — K63.89 MASS OF COLON: ICD-10-CM

## 2022-09-23 DIAGNOSIS — D64.9 ANEMIA, UNSPECIFIED TYPE: ICD-10-CM

## 2022-09-23 LAB
CEA SERPL-MCNC: 2.2 NG/ML
COLONOSCOPY: NORMAL
GLUCOSE BLDC GLUCOMTR-MCNC: 137 MG/DL (ref 70–99)

## 2022-09-23 PROCEDURE — 45380 COLONOSCOPY AND BIOPSY: CPT | Mod: PT

## 2022-09-23 PROCEDURE — G8907 PT DOC NO EVENTS ON DISCHARG: HCPCS

## 2022-09-23 PROCEDURE — G8918 PT W/O PREOP ORDER IV AB PRO: HCPCS

## 2022-09-23 PROCEDURE — 45381 COLONOSCOPY SUBMUCOUS NJX: CPT | Mod: PT

## 2022-09-23 PROCEDURE — 82378 CARCINOEMBRYONIC ANTIGEN: CPT | Performed by: INTERNAL MEDICINE

## 2022-09-23 RX ORDER — LIDOCAINE 40 MG/G
CREAM TOPICAL
Status: DISCONTINUED | OUTPATIENT
Start: 2022-09-23 | End: 2022-09-24 | Stop reason: HOSPADM

## 2022-09-23 RX ORDER — FLUMAZENIL 0.1 MG/ML
0.2 INJECTION, SOLUTION INTRAVENOUS
Status: ACTIVE | OUTPATIENT
Start: 2022-09-23 | End: 2022-09-23

## 2022-09-23 RX ORDER — NALOXONE HYDROCHLORIDE 0.4 MG/ML
0.4 INJECTION, SOLUTION INTRAMUSCULAR; INTRAVENOUS; SUBCUTANEOUS
Status: DISCONTINUED | OUTPATIENT
Start: 2022-09-23 | End: 2022-09-24 | Stop reason: HOSPADM

## 2022-09-23 RX ORDER — ONDANSETRON 2 MG/ML
4 INJECTION INTRAMUSCULAR; INTRAVENOUS
Status: DISCONTINUED | OUTPATIENT
Start: 2022-09-23 | End: 2022-09-24 | Stop reason: HOSPADM

## 2022-09-23 RX ORDER — NALOXONE HYDROCHLORIDE 0.4 MG/ML
0.2 INJECTION, SOLUTION INTRAMUSCULAR; INTRAVENOUS; SUBCUTANEOUS
Status: DISCONTINUED | OUTPATIENT
Start: 2022-09-23 | End: 2022-09-24 | Stop reason: HOSPADM

## 2022-09-23 RX ORDER — ONDANSETRON 2 MG/ML
4 INJECTION INTRAMUSCULAR; INTRAVENOUS EVERY 6 HOURS PRN
Status: DISCONTINUED | OUTPATIENT
Start: 2022-09-23 | End: 2022-09-24 | Stop reason: HOSPADM

## 2022-09-23 RX ORDER — PROCHLORPERAZINE MALEATE 5 MG
5 TABLET ORAL EVERY 6 HOURS PRN
Status: DISCONTINUED | OUTPATIENT
Start: 2022-09-23 | End: 2022-09-24 | Stop reason: HOSPADM

## 2022-09-23 RX ORDER — ONDANSETRON 4 MG/1
4 TABLET, ORALLY DISINTEGRATING ORAL EVERY 6 HOURS PRN
Status: DISCONTINUED | OUTPATIENT
Start: 2022-09-23 | End: 2022-09-24 | Stop reason: HOSPADM

## 2022-09-23 RX ORDER — FENTANYL CITRATE 50 UG/ML
INJECTION, SOLUTION INTRAMUSCULAR; INTRAVENOUS PRN
Status: DISCONTINUED | OUTPATIENT
Start: 2022-09-23 | End: 2022-09-23 | Stop reason: HOSPADM

## 2022-09-27 PROCEDURE — 88305 TISSUE EXAM BY PATHOLOGIST: CPT | Performed by: PATHOLOGY

## 2022-09-28 ENCOUNTER — TRANSCRIBE ORDERS (OUTPATIENT)
Dept: OTHER | Age: 67
End: 2022-09-28

## 2022-09-28 ENCOUNTER — ONCOLOGY VISIT (OUTPATIENT)
Dept: ONCOLOGY | Facility: CLINIC | Age: 67
End: 2022-09-28

## 2022-09-28 ENCOUNTER — LAB (OUTPATIENT)
Dept: LAB | Facility: CLINIC | Age: 67
End: 2022-09-28
Payer: COMMERCIAL

## 2022-09-28 VITALS
HEIGHT: 69 IN | HEART RATE: 96 BPM | BODY MASS INDEX: 30.66 KG/M2 | WEIGHT: 207 LBS | SYSTOLIC BLOOD PRESSURE: 138 MMHG | DIASTOLIC BLOOD PRESSURE: 81 MMHG

## 2022-09-28 DIAGNOSIS — C18.2 MALIGNANT NEOPLASM OF ASCENDING COLON (H): ICD-10-CM

## 2022-09-28 DIAGNOSIS — C18.9 ADENOCARCINOMA OF COLON (H): Primary | ICD-10-CM

## 2022-09-28 DIAGNOSIS — C18.2 MALIGNANT NEOPLASM OF ASCENDING COLON (H): Primary | ICD-10-CM

## 2022-09-28 DIAGNOSIS — D64.9 ANEMIA, UNSPECIFIED TYPE: ICD-10-CM

## 2022-09-28 LAB
ALBUMIN SERPL-MCNC: 3.9 G/DL (ref 3.4–5)
ALP SERPL-CCNC: 116 U/L (ref 40–150)
ALT SERPL W P-5'-P-CCNC: 34 U/L (ref 0–70)
ANION GAP SERPL CALCULATED.3IONS-SCNC: 7 MMOL/L (ref 3–14)
AST SERPL W P-5'-P-CCNC: 22 U/L (ref 0–45)
BASOPHILS # BLD AUTO: 0 10E3/UL (ref 0–0.2)
BASOPHILS NFR BLD AUTO: 1 %
BILIRUB SERPL-MCNC: 0.4 MG/DL (ref 0.2–1.3)
BUN SERPL-MCNC: 16 MG/DL (ref 7–30)
CALCIUM SERPL-MCNC: 10 MG/DL (ref 8.5–10.1)
CHLORIDE BLD-SCNC: 108 MMOL/L (ref 94–109)
CO2 SERPL-SCNC: 27 MMOL/L (ref 20–32)
CREAT SERPL-MCNC: 0.99 MG/DL (ref 0.66–1.25)
EOSINOPHIL # BLD AUTO: 0.2 10E3/UL (ref 0–0.7)
EOSINOPHIL NFR BLD AUTO: 2 %
ERYTHROCYTE [DISTWIDTH] IN BLOOD BY AUTOMATED COUNT: 12.8 % (ref 10–15)
FERRITIN SERPL-MCNC: 61 NG/ML (ref 26–388)
GFR SERPL CREATININE-BSD FRML MDRD: 82 ML/MIN/1.73M2
GLUCOSE BLD-MCNC: 141 MG/DL (ref 70–99)
HCT VFR BLD AUTO: 37.2 % (ref 40–53)
HGB BLD-MCNC: 12.3 G/DL (ref 13.3–17.7)
IMM GRANULOCYTES # BLD: 0 10E3/UL
IMM GRANULOCYTES NFR BLD: 0 %
IRON SATN MFR SERPL: 16 % (ref 15–46)
IRON SERPL-MCNC: 57 UG/DL (ref 35–180)
LYMPHOCYTES # BLD AUTO: 2.4 10E3/UL (ref 0.8–5.3)
LYMPHOCYTES NFR BLD AUTO: 31 %
MCH RBC QN AUTO: 29.1 PG (ref 26.5–33)
MCHC RBC AUTO-ENTMCNC: 33.1 G/DL (ref 31.5–36.5)
MCV RBC AUTO: 88 FL (ref 78–100)
MONOCYTES # BLD AUTO: 0.5 10E3/UL (ref 0–1.3)
MONOCYTES NFR BLD AUTO: 6 %
NEUTROPHILS # BLD AUTO: 4.5 10E3/UL (ref 1.6–8.3)
NEUTROPHILS NFR BLD AUTO: 60 %
NRBC # BLD AUTO: 0 10E3/UL
NRBC BLD AUTO-RTO: 0 /100
PLATELET # BLD AUTO: 257 10E3/UL (ref 150–450)
POTASSIUM BLD-SCNC: 3.7 MMOL/L (ref 3.4–5.3)
PROT SERPL-MCNC: 8.1 G/DL (ref 6.8–8.8)
RBC # BLD AUTO: 4.23 10E6/UL (ref 4.4–5.9)
SODIUM SERPL-SCNC: 142 MMOL/L (ref 133–144)
TIBC SERPL-MCNC: 356 UG/DL (ref 240–430)
VIT B12 SERPL-MCNC: 360 PG/ML (ref 232–1245)
WBC # BLD AUTO: 7.6 10E3/UL (ref 4–11)

## 2022-09-28 PROCEDURE — 80053 COMPREHEN METABOLIC PANEL: CPT

## 2022-09-28 PROCEDURE — 36415 COLL VENOUS BLD VENIPUNCTURE: CPT

## 2022-09-28 PROCEDURE — 83550 IRON BINDING TEST: CPT

## 2022-09-28 PROCEDURE — 99215 OFFICE O/P EST HI 40 MIN: CPT | Performed by: INTERNAL MEDICINE

## 2022-09-28 PROCEDURE — 82607 VITAMIN B-12: CPT

## 2022-09-28 PROCEDURE — 82728 ASSAY OF FERRITIN: CPT

## 2022-09-28 PROCEDURE — 82668 ASSAY OF ERYTHROPOIETIN: CPT | Mod: 90 | Performed by: INTERNAL MEDICINE

## 2022-09-28 PROCEDURE — 85025 COMPLETE CBC W/AUTO DIFF WBC: CPT

## 2022-09-28 ASSESSMENT — PAIN SCALES - GENERAL: PAINLEVEL: NO PAIN (0)
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
## 2022-09-28 NOTE — PATIENT INSTRUCTIONS
Labs today    CT tomorrow    Refer to Colorectal surgery    We will determine the follow up with me accordingly
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
## 2022-09-28 NOTE — NURSING NOTE
"Oncology Rooming Note    September 28, 2022 12:28 PM   Sven Givens is a 67 year old male who presents for:    Chief Complaint   Patient presents with     Oncology Clinic Visit     New Colon Mass     Initial Vitals: /81 (BP Location: Left arm, Patient Position: Chair, Cuff Size: Adult Regular)   Pulse 96   Ht 1.753 m (5' 9\")   Wt 93.9 kg (207 lb)   BMI 30.57 kg/m   Estimated body mass index is 30.57 kg/m  as calculated from the following:    Height as of this encounter: 1.753 m (5' 9\").    Weight as of this encounter: 93.9 kg (207 lb). Body surface area is 2.14 meters squared.  No Pain (0) Comment: Data Unavailable   No LMP for male patient.  Allergies reviewed: Yes  Medications reviewed: Yes    Medications: Medication refills not needed today.  Pharmacy name entered into [employer]:    [id number] - [place], [place] - [address] [facility] [id number] [facility] - [place], [place] - [address] [address] [facility]    Clinical concerns: New Colon Mass     Dr. Valencia was notified.      Lili Bolaños CMA              "

## 2022-09-29 ENCOUNTER — TELEPHONE (OUTPATIENT)
Dept: SURGERY | Facility: CLINIC | Age: 67
End: 2022-09-29

## 2022-09-29 ENCOUNTER — ANCILLARY PROCEDURE (OUTPATIENT)
Dept: CT IMAGING | Facility: CLINIC | Age: 67
End: 2022-09-29
Attending: INTERNAL MEDICINE
Payer: COMMERCIAL

## 2022-09-29 DIAGNOSIS — K63.89 MASS OF COLON: ICD-10-CM

## 2022-09-29 LAB
PATH REPORT.ADDENDUM SPEC: NORMAL
PATH REPORT.COMMENTS IMP SPEC: NORMAL
PATH REPORT.COMMENTS IMP SPEC: NORMAL
PATH REPORT.FINAL DX SPEC: NORMAL
PATH REPORT.GROSS SPEC: NORMAL
PATH REPORT.MICROSCOPIC SPEC OTHER STN: NORMAL
PATH REPORT.RELEVANT HX SPEC: NORMAL
PATHOLOGY SYNOPTIC REPORT: NORMAL
PHOTO IMAGE: NORMAL

## 2022-09-29 PROCEDURE — 88341 IMHCHEM/IMCYTCHM EA ADD ANTB: CPT | Performed by: PATHOLOGY

## 2022-09-29 PROCEDURE — 71260 CT THORAX DX C+: CPT | Mod: GC | Performed by: STUDENT IN AN ORGANIZED HEALTH CARE EDUCATION/TRAINING PROGRAM

## 2022-09-29 PROCEDURE — 74177 CT ABD & PELVIS W/CONTRAST: CPT | Mod: GC | Performed by: STUDENT IN AN ORGANIZED HEALTH CARE EDUCATION/TRAINING PROGRAM

## 2022-09-29 PROCEDURE — 88342 IMHCHEM/IMCYTCHM 1ST ANTB: CPT | Performed by: PATHOLOGY

## 2022-09-29 RX ORDER — IOPAMIDOL 755 MG/ML
117 INJECTION, SOLUTION INTRAVASCULAR ONCE
Status: COMPLETED | OUTPATIENT
Start: 2022-09-29 | End: 2022-09-29

## 2022-09-29 RX ADMIN — IOPAMIDOL 117 ML: 755 INJECTION, SOLUTION INTRAVASCULAR at 08:03

## 2022-09-29 NOTE — TELEPHONE ENCOUNTER
Diagnosis, Referred by & from: Colon Cancer   Appt date: 10/4/2022   NOTES STATUS DETAILS   OFFICE NOTE from referring provider [facility] - 22 - ONC OV with Dr. Valencia   OFFICE NOTE from other specialist [facility] [place]:  22 - PCC OV with Dr. Villegas  19 - PCC OV with Dr. Dela Cruz    [facility] [place]:  3/12/19 - GI OV with Dr. Bibiana Watters [facility]:  18 - URO OV with Dr. Hillman   DISCHARGE SUMMARY from hospital N/A    DISCHARGE REPORT from the ER Care Everywhere [facility]:  19 - ED OV with Dr. Stephens   OPERATIVE REPORT Care Everywhere [facility]:  19 - OP Note for CYSTOSCOPY, ABLATION OF THE PROSTATE with Dr. Scruggs   MEDICATION LIST Internal    LABS     ANAL PAP/CEA [facility]:  22 - CEA   BIOPSIES/PATHOLOGY RELATED TO DIAGNOSIS [facility]:  22 - Colon Biopsy (Case: [id number])  17 - Colon Biopsy (Case: [id number])   DIAGNOSTIC PROCEDURES     COLONOSCOPY [facility]:  22 - Colonoscopy  17 - Colonoscopy   UPPER ENDOSCOPY (EGD) [facility]:  19 - EGD   IMAGING (DISC & REPORT)      CT [facility]:  22 - CT Chest/Abd/Pelvis  18 - CT Abd/Pelvis  17 - CT Chest/Abd/Pelvis  10/13/16 - CT Abd/Pelvis   ULTRASOUND  (ENDOANAL/ENDORECTAL) [facility]:  10/23/18 - US Abdomen

## 2022-09-30 LAB — EPO SERPL-ACNC: 14 MU/ML

## 2022-10-02 ENCOUNTER — MYC MEDICAL ADVICE (OUTPATIENT)
Dept: ONCOLOGY | Facility: CLINIC | Age: 67
End: 2022-10-02

## 2022-10-03 NOTE — PROGRESS NOTES
"Colon and Rectal Surgery Consult Video Note       Referring provider:  No referring provider defined for this encounter.     RE: Sven Givens  : 1955  SOMMER: 10/4/2022    Sven Givens is a 67 year old male who is being evaluated via a billable video visit.      The patient has been notified of following:     \"This video visit will be conducted via a call between you and your physician/provider. We have found that certain health care needs can be provided without the need for an in-person physical exam.  This service lets us provide the care you need with a video conversation.  If a prescription is necessary we can send it directly to your pharmacy.  If lab work is needed we can place an order for that and you can then stop by our lab to have the test done at a later time.    Video visits are billed at different rates depending on your insurance coverage.  Please reach out to your insurance provider with any questions.    If during the course of the call the physician/provider feels a video visit is not appropriate, you will not be charged for this service.\"    Patient has given verbal consent for Video visit? Yes    Video Start Time: 6:02 PM     Sven Givens is a very pleasant 67 year old male with a history of hypertension, DM II, and lung adenocarcinoma s/p wedge resection (2016) with a recent diagnosis of adenocarcinoma of the ascending colon. Given these findings they were subsequently sent to the [facility] Surgery Clinic for an opinion on this and a new patient consultation.       Sven had a routine screening colonoscopy on 22:  Impression:       - The examined portion of the ileum was normal.                             - Likely malignant tumor in the ascending colon.                             NICE type 3 criteria. Biopsied. Tattoo placed. Most                             likely colonic primary, less likely metastatic                             focus in the setting of prior lung " CA.                             - Diverticulosis in the sigmoid colon, in the                             descending colon and in the transverse colon.                             - The distal rectum and anal verge are normal on                             retroflexion view.   Final Diagnosis   A.  COLON, ASCENDING, MASS:  - Fragments of adenocarcinoma, moderately-differentiated      Latest Reference Range & Units 09/23/22 11:26   CEA ng/mL 2.2       His prior colonoscopy was on 5/31/2017:  Impression:       - Abnormal digital rectal exam.                             - One 3 mm polyp in the cecum, removed with a cold                             snare. Resected and retrieved.                             - One 3 mm polyp at 20 cm proximal to the anus,                             removed with a cold snare. Resected and retrieved.                             - Diverticulosis in the entire examined colon.                             - Internal hemorrhoids.   FINAL DIAGNOSIS:   A: Large intestine, cecum, polypectomy   - Tubular adenoma   - No evidence of high-grade dysplasia or invasive malignancy   B: Large intestine, 20 cm, polypectomy   - Hyperplastic polyp   - No evidence of neoplastic polyp or malignancy       He has a history of Stage 1A lung adenocarcinoma s/p wedge resection (9/23/16) and is already established with Dr. Valencia. He had a CT CAP on 9/29/22 for staging:  IMPRESSION:  1. Segmental ascending colon thickening, compatible with neoplastic  colonic lesion on recent colonoscopy; no significant abdominal  lymphadenopathy or associated bowel obstruction.  2.  Postsurgical changes of left lung wedge resection. No new or  enlarging pulmonary nodule.  3. No convincing metastatic disease in the chest, abdomen or pelvis    Overall, Sven is doing well. He has some constipation which he has taken senna with bowel movements every day. He had a previous 2017 colonoscopy with no polyps and 5 years prior had polyps.  Normal color in bowel movements. Notes that he had prostatic hypertrophy and difficult     Assessment/Plan: 67 year old male with a history of hypertension, DM II, and lung adenocarcinoma s/p wedge resection (2016) with a recent diagnosis of adenocarcinoma of the ascending colon.   1) We spoke about operative options. The patient is a good candidate for a laparoscopic right hemicolectomy with primary anastomosis. We spoke about the components of an adequate oncologic resection. It is possible that we could need to convert to an open approach and the patient understands that we would do this if there was an issue with anatomy or need to do this to complete the surgery safely. Plan also full mechanical bowel preparation with antibiotics and [facility] (PAC). He has had a wedge resection for lung cancer and this is unlikely to be of significnat consideration with ventilation. Diabetes, that we will manage carefully in the perioperative period.  2) We discussed post-operative expectations and possible post-operative complications, including pneumonia, cardiac events, urinary tract infections, as well as surgery-specific complications such as wound infection and anastomotic leak, as well as possible need for reoperation in the postoperative setting with diversion. I explained that this is unlikely but something to be aware of. He has a history of prostatic hypertrophy and difficulty with urination after surgery. Plan to leave neal until out of bed (at least 24 hours) and to try flomax.  3) We discussed discharge requirements in the hospital including return of bowel function, pain control, and ability to take adequate oral intake.   4) I answered multiple questions of the patient. Previous insurance issues with lung cancer surgery - we will work on prior authorization and the financial counselor with approvals.       Video-Visit Details    Type of service:  Video Visit    Video End Time (time video  stopped): 6:25PM    Originating Location (pt. Location): Home    Distant Location (provider location):  [facility] [place]     Mode of Communication:  Video Conference via Amwell    30 minutes spent on the date of the encounter doing chart review, history, review of imaging, documentation ,and further activities as noted above, which includes my time spent on video with the patient/family.       Medical history:  Past Medical History:   Diagnosis Date     Allergies     PCN, ACE, Indomethocin     Cancer (H)     small cell lung cancer stage I     Diabetes (H) 2002     Diabetic neuropathy (H) 5/7/2012     Hypertension      Kidney stones 09/2004    s/p right kidney basket retrieval     Rhinitis, allergic      Rosacea      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, overuse, 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     Varicose veins        Surgical history:  Past Surgical History:   Procedure Laterality Date     BRONCHOSCOPY FLEXIBLE N/A 9/23/2016    Procedure: BRONCHOSCOPY FLEXIBLE;  Surgeon: Gayle Moya MD;  Location: [facility] OR     COLONOSCOPY  5-03     COLONOSCOPY N/A 9/23/2022    Procedure: COLONOSCOPY, WITH POLYPECTOMY AND BIOPSY;  Surgeon: Abbe Mccarty MD;  Location: [facility]     COLONOSCOPY WITH CO2 INSUFFLATION N/A 5/31/2017    Procedure: COLONOSCOPY WITH CO2 INSUFFLATION;  COLON SCREEN/ SEB;  Surgeon: Margarito Rasheed DO;  Location: [facility] OR     COLONOSCOPY WITH CO2 INSUFFLATION N/A 9/23/2022    Procedure: COLONOSCOPY, WITH CO2 INSUFFLATION;  Surgeon: Abbe Mccarty MD;  Location: [facility] OR     COMBINED ESOPHAGOSCOPY, GASTROSCOPY, DUODENOSCOPY (EGD) WITH CO2 INSUFFLATION N/A 4/19/2019    Procedure: COMBINED ESOPHAGOSCOPY, GASTROSCOPY, DUODENOSCOPY (EGD) WITH CO2 INSUFFLATION;  Surgeon: Abbe Mccarty MD;  Location: [facility] OR     ESOPHAGOSCOPY, GASTROSCOPY, DUODENOSCOPY (EGD), COMBINED N/A 4/19/2019    Procedure: Combined Esophagoscopy, Gastroscopy, Duodenoscopy  (Egd), Biopsy Single Or Multiple;  Surgeon: Abbe Mccarty MD;  Location:  OR     GENITOURINARY SURGERY      kidney stones     THORACOSCOPIC WEDGE RESECTION LUNG Left 9/23/2016    Procedure: THORACOSCOPIC WEDGE RESECTION LUNG;  Surgeon: Gayle Moya MD;  Location: [facility] OR     VASCULAR SURGERY      varicose vein       Problem list:    Patient Active Problem List    Diagnosis Date Noted     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) 10/07/2021     Priority: Medium     Age-related incipient cataract of both eyes 10/07/2021     Priority: Medium     PRESLEY (obstructive sleep apnea)      Priority: Medium     11/29/2007(200#)-AHI 37.7, RDI 50, lowest oxygen saturation 80%. CPAP of 7 cm/H9O was effective.       Non-small cell carcinoma of lung, left (H) 10/17/2016     Priority: Medium     Stage 1A (pT1aN0) 0.9 x 0.7 x 0.5 cm grade 1 well differentiated adenocarcinoma of the left lower lobe of the lung with invasive component being 0.3cm, s/p wedge resection and mediastinal LN sampling on 9/23/16.       Nonalcoholic hepatosteatosis 10/17/2016     Priority: Medium     History of radiation exposure 09/30/2016     Priority: Medium     Type 2 diabetes mellitus with diabetic polyneuropathy, with long-term current use of insulin (H) 10/15/2015     Priority: Medium     Type 2 diabetes, HbA1C goal < 8%       Hyperlipidemia LDL goal <100 12/20/2013     Priority: Medium     Gout 12/20/2013     Priority: Medium     Diagnosed by joint tap August 2013 per patient report.        Anemia 07/03/2013     Priority: Medium     Persistent mild anemia. Needs evaluation. May be thalassemia carrier.       GERD (gastroesophageal reflux disease) 05/15/2012     Priority: Medium     Advanced directives, counseling/discussion 06/16/2011     Priority: Medium     Advance Directive Problem List Overview:   Name Relationship Phone    Primary Health Care Agent             Alternative Health Care Agent          Discussed advance care planning with patient; information given to patient to review.  Bindu Smith   6/16/2011          Essential hypertension with goal blood pressure less than 140/90 06/16/2011     Priority: Medium     History of adenomatous polyp of colon 12/01/2009     Priority: Medium     follow up colonoscopy 5 years requested 5-2017 due.         Medications:  Current Outpatient Medications   Medication Sig Dispense Refill     allopurinol (ZYLOPRIM) 100 MG tablet TAKE 2 TABLETS BY MOUTH EVERY  tablet 3     atorvastatin (LIPITOR) 80 MG tablet Take 1 tablet (80 mg) by mouth daily 90 tablet 3     Azelaic Acid (FINACEA) 15 % gel Apply small amount twice a day to skin (Patient taking differently: as needed Apply small amount twice a day to skin) 50 g 3     bisacodyl (DULCOLAX) 5 MG EC tablet Take 2 tablets at 3 pm the day before your procedure. If your procedure is before 11 am, take 2 additional tablets at 8 pm. If your procedure is after 11 am, take 2 additional tablets at 6 am. For additional instructions refer to your colonoscopy prep instructions. 4 tablet 0     blood glucose (NO BRAND SPECIFIED) test strip Use to test blood sugar one time daily or as directed./ okay to dispense brand covered by insurance 100 strip 3     clotrimazole-betamethasone (LOTRISONE) 1-0.05 % external cream        fluticasone (FLONASE) 50 MCG/ACT nasal spray Spray 1-2 sprays into both nostrils daily (Patient taking differently: Spray 1-2 sprays into both nostrils as needed) 1 Bottle 1     glimepiride (AMARYL) 4 MG tablet Take 1 tablet (4 mg) by mouth 2 times daily 180 tablet 3     ketoconazole (NIZORAL) 2 % external shampoo APPLY TOPICALLY EVERY OTHER DAY LATHER INTO RASH AND WASH OFF AFTER 10 MINUTES. 120 mL 0     losartan-hydrochlorothiazide (HYZAAR) 100-25 MG tablet Take 1 tablet by mouth daily For blood pressure. 90 tablet 3     metFORMIN (GLUCOPHAGE) 1000 MG tablet TAKE 1  TABLET BY MOUTH TWICE A DAY WITH MEALS 180 tablet 3     metoprolol succinate ER (TOPROL-XL) 50 MG 24 hr tablet Take 1 tablet (50 mg) by mouth daily 90 tablet 3     metroNIDAZOLE (METROCREAM) 0.75 % external cream        metroNIDAZOLE 0.75 % EX external gel Apply topically 2 times daily 45 g 3     MULTIPLE VITAMINS OR 1 a day       nitroGLYcerin (NITROSTAT) 0.4 MG sublingual tablet For chest pain place 1 tablet under the tongue every 5 minutes for 3 doses. If symptoms persist 5 minutes after 1st dose call 911. 25 tablet 3     omega-3 acid ethyl esters (LOVAZA) 1 g capsule Take 2 capsules (2 g) by mouth 2 times daily 360 capsule 3     omeprazole (PRILOSEC) 20 MG DR capsule Take 1 capsule (20 mg) by mouth daily Take 30-60 minutes before a meal. 90 capsule 1     Oxymetazoline HCl (AFRIN NASAL SPRAY NA) Spray in nostril as needed       OZEMPIC, 1 MG/DOSE, 4 MG/3ML SOPN INJECT 1 MG SUBCUTANEOUS EVERY 7 DAYS 9 mL 1     polyethylene glycol (GOLYTELY) 236 g suspension The night before the exam at 6 pm drink an 8-ounce glass every 15 minutes until the jug is half empty. If you arrive before 11 AM: Drink the other half of the Golytely jug at 11 PM night before procedure. If you arrive after 11 AM: Drink the other half of the Golytely jug at 6 AM day of procedure. For additional instructions refer to your colonoscopy prep instructions. 4000 mL 0     senna-docusate (SENEXON-S) 8.6-50 MG tablet TAKE 1 TABLET BY MOUTH EVERYDAY AT BEDTIME 90 tablet 3     terazosin (HYTRIN) 5 MG capsule TAKE 1 CAPSULE (5 MG) BY MOUTH AT BEDTIME FOR BLOOD PRESSURE. 90 capsule 3       Allergies:  Allergies   Allergen Reactions     Pcn [Penicillins] Rash     Ace Inhibitors Cough     Indomethacin Other (See Comments)     Severe dizziness     Invokana [Canagliflozin]      bladder infection       Family history:  Family History   Problem Relation Age of Onset     Cerebrovascular Disease Father 64     Cancer Sister         ovarary      Hernia Mother         "  age 97       Social history:  Social History     Tobacco Use     Smoking status: Former Smoker     Quit date: 1992     Years since quittin.6     Smokeless tobacco: Never Used     Tobacco comment: 15 + years    Substance Use Topics     Alcohol use: No    Marital status: .      Nursing Notes:   Rupert Stone EMT  10/4/2022  2:44 PM  Signed  Chief Complaint   Patient presents with     Consult     New Colon cancer       Vitals:    10/04/22 1412   Weight: 210 lb   Height: 5' 9\"       Body mass index is 31.01 kg/m .    Rupert Stone EMT-P           Caren Wagner MD  Colon and Rectal Surgery Staff  [facility]      This note was created using speech recognition software and may contain unintended word substitutions.  "

## 2022-10-04 ENCOUNTER — PRE VISIT (OUTPATIENT)
Dept: SURGERY | Facility: CLINIC | Age: 67
End: 2022-10-04

## 2022-10-04 ENCOUNTER — VIRTUAL VISIT (OUTPATIENT)
Dept: SURGERY | Facility: CLINIC | Age: 67
End: 2022-10-04
Payer: COMMERCIAL

## 2022-10-04 VITALS — WEIGHT: 210 LBS | BODY MASS INDEX: 31.1 KG/M2 | HEIGHT: 69 IN

## 2022-10-04 DIAGNOSIS — E11.42 TYPE 2 DIABETES MELLITUS WITH DIABETIC POLYNEUROPATHY, WITH LONG-TERM CURRENT USE OF INSULIN (H): ICD-10-CM

## 2022-10-04 DIAGNOSIS — C18.2 CANCER OF ASCENDING COLON (H): Primary | ICD-10-CM

## 2022-10-04 DIAGNOSIS — N40.0 BENIGN PROSTATIC HYPERPLASIA, UNSPECIFIED WHETHER LOWER URINARY TRACT SYMPTOMS PRESENT: ICD-10-CM

## 2022-10-04 DIAGNOSIS — C34.92 NON-SMALL CELL CARCINOMA OF LUNG, LEFT (H): ICD-10-CM

## 2022-10-04 DIAGNOSIS — Z79.4 TYPE 2 DIABETES MELLITUS WITH DIABETIC POLYNEUROPATHY, WITH LONG-TERM CURRENT USE OF INSULIN (H): ICD-10-CM

## 2022-10-04 PROCEDURE — 99203 OFFICE O/P NEW LOW 30 MIN: CPT | Mod: 95 | Performed by: COLON & RECTAL SURGERY

## 2022-10-04 ASSESSMENT — PAIN SCALES - GENERAL: PAINLEVEL: NO PAIN (0)

## 2022-10-04 NOTE — LETTER
10/4/2022       RE: Sven Givens  [address]  [place] [address]     Dear Colleague,    Thank you for referring your patient, Sven Givens, to the [facility] [place] [facility]. Please see a copy of my visit note below.    Colon and Rectal Surgery Consult Video Note       Referring provider:  No referring provider defined for this encounter.     RE: Sven Givens  : 1955  SOMMER: 10/4/2022      Sven Givens is a very pleasant 67 year old male with a history of hypertension, DM II, and lung adenocarcinoma s/p wedge resection (2016) with a recent diagnosis of adenocarcinoma of the ascending colon. Given these findings they were subsequently sent to the Colon and Rectal Surgery Clinic for an opinion on this and a new patient consultation.       Sven had a routine screening colonoscopy on 22:  Impression:       - The examined portion of the ileum was normal.                             - Likely malignant tumor in the ascending colon.                             NICE type 3 criteria. Biopsied. Tattoo placed. Most                             likely colonic primary, less likely metastatic                             focus in the setting of prior lung CA.                             - Diverticulosis in the sigmoid colon, in the                             descending colon and in the transverse colon.                             - The distal rectum and anal verge are normal on                             retroflexion view.   Final Diagnosis   A.  COLON, ASCENDING, MASS:  - Fragments of adenocarcinoma, moderately-differentiated      Latest Reference Range & Units 22 11:26   CEA ng/mL 2.2       His prior colonoscopy was on 2017:  Impression:       - Abnormal digital rectal exam.                             - One 3 mm polyp in the cecum, removed with a cold                             snare.  Resected and retrieved.                             - One 3 mm polyp at 20 cm proximal to the anus,                             removed with a cold snare. Resected and retrieved.                             - Diverticulosis in the entire examined colon.                             - Internal hemorrhoids.   FINAL DIAGNOSIS:   A: Large intestine, cecum, polypectomy   - Tubular adenoma   - No evidence of high-grade dysplasia or invasive malignancy   B: Large intestine, 20 cm, polypectomy   - Hyperplastic polyp   - No evidence of neoplastic polyp or malignancy       He has a history of Stage 1A lung adenocarcinoma s/p wedge resection (9/23/16) and is already established with Dr. Valencia. He had a CT CAP on 9/29/22 for staging:  IMPRESSION:  1. Segmental ascending colon thickening, compatible with neoplastic  colonic lesion on recent colonoscopy; no significant abdominal  lymphadenopathy or associated bowel obstruction.  2.  Postsurgical changes of left lung wedge resection. No new or  enlarging pulmonary nodule.  3. No convincing metastatic disease in the chest, abdomen or pelvis    Overall, Sven is doing well. He has some constipation which he has taken senna with bowel movements every day. He had a previous 2017 colonoscopy with no polyps and 5 years prior had polyps. Normal color in bowel movements. Notes that he had prostatic hypertrophy and difficult     Assessment/Plan: 67 year old male with a history of hypertension, DM II, and lung adenocarcinoma s/p wedge resection (2016) with a recent diagnosis of adenocarcinoma of the ascending colon.   1) We spoke about operative options. The patient is a good candidate for a laparoscopic right hemicolectomy with primary anastomosis. We spoke about the components of an adequate oncologic resection. It is possible that we could need to convert to an open approach and the patient understands that we would do this if there was an issue with anatomy or need to do this to complete  the surgery safely. Plan also full mechanical bowel preparation with antibiotics and PREOPERATIVE ASSESSMENT CENTER (PAC). He has had a wedge resection for lung cancer and this is unlikely to be of significnat consideration with ventilation. Diabetes, that we will manage carefully in the perioperative period.  2) We discussed post-operative expectations and possible post-operative complications, including pneumonia, cardiac events, urinary tract infections, as well as surgery-specific complications such as wound infection and anastomotic leak, as well as possible need for reoperation in the postoperative setting with diversion. I explained that this is unlikely but something to be aware of. He has a history of prostatic hypertrophy and difficulty with urination after surgery. Plan to leave neal until out of bed (at least 24 hours) and to try flomax.  3) We discussed discharge requirements in the hospital including return of bowel function, pain control, and ability to take adequate oral intake.   4) I answered multiple questions of the patient. Previous insurance issues with lung cancer surgery - we will work on prior authorization and the financial counselor with approvals.       Medical history:  Past Medical History:   Diagnosis Date     Allergies     PCN, ACE, Indomethocin     Cancer (H)     small cell lung cancer stage I     Diabetes (H) 2002     Diabetic neuropathy (H) 5/7/2012     Hypertension      Kidney stones 09/2004    s/p right kidney basket retrieval     Rhinitis, allergic      Rosacea      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, overuse, 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     Varicose veins        Surgical history:  Past Surgical History:   Procedure Laterality Date     BRONCHOSCOPY FLEXIBLE N/A 9/23/2016    Procedure: BRONCHOSCOPY FLEXIBLE;  Surgeon: Gayle Moya MD;  Location: [facility] OR     COLONOSCOPY  5-03     COLONOSCOPY N/A 9/23/2022    Procedure: COLONOSCOPY, WITH POLYPECTOMY AND BIOPSY;  Surgeon: Bibiana  Abbe Ford MD;  Location: [facility] OR     COLONOSCOPY WITH CO2 INSUFFLATION N/A 5/31/2017    Procedure: COLONOSCOPY WITH CO2 INSUFFLATION;  COLON SCREEN/ SEB;  Surgeon: Margarito Rasheed DO;  Location: [facility] OR     COLONOSCOPY WITH CO2 INSUFFLATION N/A 9/23/2022    Procedure: COLONOSCOPY, WITH CO2 INSUFFLATION;  Surgeon: Abbe Mccarty MD;  Location: [facility] OR     COMBINED ESOPHAGOSCOPY, GASTROSCOPY, DUODENOSCOPY (EGD) WITH CO2 INSUFFLATION N/A 4/19/2019    Procedure: COMBINED ESOPHAGOSCOPY, GASTROSCOPY, DUODENOSCOPY (EGD) WITH CO2 INSUFFLATION;  Surgeon: Abbe Mccarty MD;  Location: [facility] OR     ESOPHAGOSCOPY, GASTROSCOPY, DUODENOSCOPY (EGD), COMBINED N/A 4/19/2019    Procedure: Combined Esophagoscopy, Gastroscopy, Duodenoscopy (Egd), Biopsy Single Or Multiple;  Surgeon: Abbe Mccarty MD;  Location: [facility] OR     GENITOURINARY SURGERY      kidney stones     THORACOSCOPIC WEDGE RESECTION LUNG Left 9/23/2016    Procedure: THORACOSCOPIC WEDGE RESECTION LUNG;  Surgeon: Gayle Moya MD;  Location: [facility] OR     VASCULAR SURGERY      varicose vein       Problem list:    Patient Active Problem List    Diagnosis Date Noted     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) 10/07/2021     Priority: Medium     Age-related incipient cataract of both eyes 10/07/2021     Priority: Medium     PRESLEY (obstructive sleep apnea)      Priority: Medium     11/29/2007(200#)-AHI 37.7, RDI 50, lowest oxygen saturation 80%. CPAP of 7 cm/H9O was effective.       Non-small cell carcinoma of lung, left (H) 10/17/2016     Priority: Medium     Stage 1A (pT1aN0) 0.9 x 0.7 x 0.5 cm grade 1 well differentiated adenocarcinoma of the left lower lobe of the lung with invasive component being 0.3cm, s/p wedge resection and mediastinal LN sampling on 9/23/16.       Nonalcoholic hepatosteatosis 10/17/2016     Priority: Medium     History of radiation  exposure 09/30/2016     Priority: Medium     Type 2 diabetes mellitus with diabetic polyneuropathy, with long-term current use of insulin (H) 10/15/2015     Priority: Medium     Type 2 diabetes, HbA1C goal < 8%       Hyperlipidemia LDL goal <100 12/20/2013     Priority: Medium     Gout 12/20/2013     Priority: Medium     Diagnosed by joint tap August 2013 per patient report.        Anemia 07/03/2013     Priority: Medium     Persistent mild anemia. Needs evaluation. May be thalassemia carrier.       GERD (gastroesophageal reflux disease) 05/15/2012     Priority: Medium     Advanced directives, counseling/discussion 06/16/2011     Priority: Medium               Discussed advance care planning with patient; information given to patient to review.  Bindu Smith   6/16/2011

## 2022-10-04 NOTE — NURSING NOTE
"Chief Complaint   Patient presents with     Consult     New Colon cancer       Vitals:    10/04/22 1412   Weight: 210 lb   Height: 5' 9\"       Body mass index is 31.01 kg/m .    Rupert Stone EMT-P    "

## 2022-10-05 ENCOUNTER — TELEPHONE (OUTPATIENT)
Dept: SURGERY | Facility: CLINIC | Age: 67
End: 2022-10-05

## 2022-10-05 ENCOUNTER — OFFICE VISIT (OUTPATIENT)
Dept: PHARMACY | Facility: CLINIC | Age: 67
End: 2022-10-05
Payer: COMMERCIAL

## 2022-10-05 VITALS
BODY MASS INDEX: 31.01 KG/M2 | DIASTOLIC BLOOD PRESSURE: 76 MMHG | HEART RATE: 98 BPM | OXYGEN SATURATION: 98 % | SYSTOLIC BLOOD PRESSURE: 122 MMHG | WEIGHT: 210 LBS

## 2022-10-05 DIAGNOSIS — E11.42 TYPE 2 DIABETES MELLITUS WITH DIABETIC POLYNEUROPATHY, WITH LONG-TERM CURRENT USE OF INSULIN (H): Primary | ICD-10-CM

## 2022-10-05 DIAGNOSIS — C18.2 CANCER OF ASCENDING COLON (H): Primary | ICD-10-CM

## 2022-10-05 DIAGNOSIS — I10 ESSENTIAL HYPERTENSION WITH GOAL BLOOD PRESSURE LESS THAN 140/90: ICD-10-CM

## 2022-10-05 DIAGNOSIS — Z79.4 TYPE 2 DIABETES MELLITUS WITH DIABETIC POLYNEUROPATHY, WITH LONG-TERM CURRENT USE OF INSULIN (H): Primary | ICD-10-CM

## 2022-10-05 PROCEDURE — 99606 MTMS BY PHARM EST 15 MIN: CPT | Performed by: PHARMACIST

## 2022-10-05 PROCEDURE — 99607 MTMS BY PHARM ADDL 15 MIN: CPT | Performed by: PHARMACIST

## 2022-10-05 RX ORDER — NEOMYCIN SULFATE 500 MG/1
1000 TABLET ORAL EVERY 6 HOURS
Qty: 6 TABLET | Refills: 0 | Status: ON HOLD | OUTPATIENT
Start: 2022-10-05 | End: 2022-10-28

## 2022-10-05 RX ORDER — POLYETHYLENE GLYCOL 3350 17 G/17G
119 POWDER, FOR SOLUTION ORAL SEE ADMIN INSTRUCTIONS
Qty: 7 PACKET | Refills: 0 | Status: ON HOLD | OUTPATIENT
Start: 2022-10-05 | End: 2022-10-28

## 2022-10-05 RX ORDER — METRONIDAZOLE 500 MG/1
500 TABLET ORAL EVERY 6 HOURS
Qty: 3 TABLET | Refills: 0 | Status: ON HOLD | OUTPATIENT
Start: 2022-10-05 | End: 2022-10-28

## 2022-10-05 RX ORDER — ONDANSETRON 4 MG/1
4 TABLET, FILM COATED ORAL EVERY 6 HOURS
Qty: 3 TABLET | Refills: 0 | Status: ON HOLD | OUTPATIENT
Start: 2022-10-05 | End: 2022-10-28

## 2022-10-05 RX ORDER — POLYETHYLENE GLYCOL 3350 17 G/17G
238 POWDER, FOR SOLUTION ORAL SEE ADMIN INSTRUCTIONS
Qty: 14 PACKET | Refills: 0 | Status: ON HOLD | OUTPATIENT
Start: 2022-10-05 | End: 2022-10-28

## 2022-10-05 NOTE — PATIENT INSTRUCTIONS
Recommendations from today's MTM visit:                                                       Continue present medications.    Follow-up: Return in 14 weeks (on 1/11/2023) for Medication Therapy Management.    It was great to speak with you today.  I value your experience and would be very thankful for your time with providing feedback on our clinic survey. You may receive a survey via email or text message in the next few days.     To schedule another MTM appointment, please call the clinic directly or you may call the MTM scheduling line at [phone] or toll-free at [phone].     My Clinical Pharmacist's contact information:                                                      Please feel free to contact me with any questions or concerns you have.      Dora Neumann, Pharm.D., [facility]  Medication Therapy Management Pharmacist  [phone]

## 2022-10-05 NOTE — PROGRESS NOTES
Medication Therapy Management (MTM) Encounter    ASSESSMENT:                            Medication Adherence/Access: No issues identified    Type 2 Diabetes: Patient is not meeting A1c goal of < 7%, but improved from previous and now under 8%.  Ok to continue present medications. We did discuss the option of increasing Ozempic to 2mg and possibility of reducing or tapering off glimepiride. He prefers to hold off on changes at this time with upcoming colon cancer surgery.    Hypertension: Stable. Patient is meeting blood pressure goal of < 140/90mmHg.    PLAN:                            Continue present medications.    Follow-up: Return in 14 weeks (on 1/11/2023) for Medication Therapy Management.    SUBJECTIVE/OBJECTIVE:                          Sven Givens is a 67 year old male coming in for a follow-up visit.  Today's visit is a follow-up MTM visit from 9/15/2021     Reason for visit: blood sugar check; medication review.  COVID end of March  Coughing for at least 2 months after - stopped about 2-3 weeks ago  Sugars not good during COVID    Allergies/ADRs: Reviewed in chart  Past Medical History: Reviewed in chart  Tobacco: He reports that he quit smoking about 30 years ago. He has never used smokeless tobacco.  Alcohol: none      Medication Adherence/Access: no issues reported  Stopped taking aspirin due to age and was taking for primary prevention      Type 2 Diabetes:   Metformin 1000mg twice daily  Glimepiride 4mg twice daily   Ozempic 1mg injected once weekly - now receiving through [employer] [facility] however he hasn't received yet due to national drug shortage - he should hopefully receive supply within the next week. Was told he can apply for next year as soon as October 15th. Application paperwork printed and provided to him today.    Medication history: avoiding SGLT2 due to hx of bladder infection. Has also tried Januvia, Tradjenta, Invokana in the past which does not appear to have worked well for  him.    SMBG Ranges (patient reported): around 120-140's    Symptoms of low blood sugar? he had a low of 54mg/dL when he was fasting the day before his colonoscopy, felt dizzy and really weak. Corrected with glucose tablet  Symptoms of high blood sugar? Ongoing feet pain - burning that happens most often at night time. He has tried lidocaine cream in the past and it didn't help at all. Pain levels have not changed with improved blood sugar levels. At this time, he is tolerating.    Diet/Exercise: Eating less with recent diagnosis of colon cancer    Aspirin: Stopped taking 81mg daily in light of ASPREE trial  Statin: Yes: atorvastatin 80mg daily and denies side effects  ACEi/ARB: Yes: losartan/HCTZ 100/25mg daily. Urine albumin is   Lab Results   Component Value Date    UMALCR 33.33 (H) 09/16/2022     Lab Results   Component Value Date    A1C 7.2 09/16/2022    A1C 8.0 05/27/2022    A1C 8.6 02/21/2022    A1C 8.0 12/02/2021    A1C 6.6 09/10/2021    A1C 7.4 05/26/2021    A1C 8.4 02/16/2021    A1C 11.4 11/02/2020    A1C 11.4 05/20/2020    A1C 9.3 12/12/2019     Hypertension:   Losartan/HCTZ 100/25mg daily  Metoprolol succinate 50mg daily  Terazosin 5mg QHS    Patient does not self-monitor blood pressure.    Patient reports the following medication side effects: orthostatic lightheadedness, made worse by dehyradation  BP Readings from Last 3 Encounters:   10/05/22 122/76   09/28/22 138/81   09/23/22 135/80     Today's Vitals: /76   Pulse 98   Wt 210 lb (95.3 kg)   SpO2 98%   BMI 31.01 kg/m    ----------------      I spent 30 minutes with this patient today. All changes were made via collaborative practice agreement with Yeison Villegas MD, MD. A copy of the visit note was provided to the patient's provider(s).    The patient was sent via [employer] a summary of these recommendations.     Dora Neumann, Pharm.D., [facility]  Medication Therapy Management Pharmacist  [phone]     Medication Therapy  Recommendations  Hyperlipidemia LDL goal <100    Current Medication: Icosapent Ethyl (VASCEPA) 1 g CAPS (Discontinued)   Rationale: Condition refractory to medication - Ineffective medication - Effectiveness   Recommendation: Change Medication - fenofibrate 160 MG tablet   Status: Accepted per Provider

## 2022-10-07 NOTE — TELEPHONE ENCOUNTER
Case Request received to schedule:    Patient Name: Sven Givens   MRN: [id number]   Case#: [id number]   Surgeon(s) and Role:      * Caren Wagner MD - Primary   Date requested: * No dates entered *   Location:  OR   Procedure(s):   HEMICOLECTOMY, RIGHT, LAPAROSCOPY-ASSISTED (N/A)     Additional Instructions for the Case  I am calling about getting this case scheduled on 10/28 at [place] and case order requested by Dr. Wagner. Thank-you!  Multi-surgeon case:  no  Time in minutes:  180  Anesthesia: general  Prep: full bowel prep with antibiotics   Pre-Op: PAC  Labs: yes  WOC: no  Special equipment: no  Special Instructions: schedule 10/28.     Schedule with Marlee Schaffer NP or Bertha LOBATO 2-3 weeks after surgery.  Schedule with Surgeon 3-4 weeks after Marlee Schaffer NP    Spoke with patient via phone, he was concerned about his insurance Prior Auth, so I told him that I would reach out to the PA dept and request that they reach out to him regarding the insurance PA results.  Deirdre sent an email to the person working on patient's insurance PA for the surgery requesting that they reach out to patient with an estimate ahead of surgery. Completed/ confirmed the following scheduling with patient, then sent Surgery Packet to patient via [employer] and Mail including related scheduling information and prep instructions:     Required: Yes, you will need a  18 years or older to drive you home from your procedure as well as stay with you for 24 hours after your procedure, if you are discharged the same day as your procedure.    Surgery: HEMICOLECTOMY, RIGHT, LAPAROSCOPY-ASSISTED    Date: Friday October 28, 2022 Surgeon: Dr. Caren Wagner    Time: You will receive a call 1-3 days prior to your surgery with your finalized surgery and arrival time.     Location:     [facility]       "[address])      [place] [address]      [phone]   [email or web address]     Upcoming Related Appointments:     Oct 12, 2022 11:45 AM  (Arrive by 11:30 AM)  PAC EVALUATION with AMPARO [facility] [place] ([facility] ) [phone]    VIDEO VISIT     Oct 24, 2022 10:30 AM  LAB with BK LAB  [facility] ([facility] ) [phone]    [address] [address] [address] [address]     Oct 24, 2022 10:45 AM  Pre-procedure Covid with BK COVID TESTING HUB 1  [facility] ([facility] ) [phone]    [address] [address] [address]     Nov 14, 2022  8:30 AM  (Arrive by 8:15 AM)  Post Op with AMPARO [facility] Colon and Rectal Surgery Clinic [facility] ([facility] ) [phone]    [address] [place] [address] [address] [facility] [address]     Pre-surgical Preparation:    MiraLAX/Gatorade, Antibiotics, Zofran  - see \"Day Before Surgery\"   - obtain medications and ingredients in advance    Please go to your local pharmacy or store and purchase:   - TWO 8.3oz (238g) bottles of Miralax (Powder)   - 96 oz of Gatorade (no red or purple)     Halima Bang  Marta-op Coordinator  [place]  Direct Phone: [phone]      "

## 2022-10-07 NOTE — TELEPHONE ENCOUNTER
FUTURE VISIT INFORMATION      SURGERY INFORMATION:    Date: 10/28/22    [facility]    Surgeon:  Caren Wagner MD    Anesthesia Type:  general    Procedure: HEMICOLECTOMY, RIGHT, LAPAROSCOPY-ASSISTED    RECORDS REQUESTED FROM:        Primary Care Provider: Yeison Villegas MD- [facility]    Pertinent Medical History: PRESLEY, hypertension    Most recent EKG+ Tracin22    Most recent ECHO: 20    Most recent Coronary Angiogram: 16    Most recent PFT's: 16

## 2022-10-10 ENCOUNTER — MYC MEDICAL ADVICE (OUTPATIENT)
Dept: ONCOLOGY | Facility: CLINIC | Age: 67
End: 2022-10-10

## 2022-10-10 ENCOUNTER — MYC MEDICAL ADVICE (OUTPATIENT)
Dept: FAMILY MEDICINE | Facility: CLINIC | Age: 67
End: 2022-10-10

## 2022-10-10 NOTE — LETTER
2022       Sven Givens  1955  [address]  [place] [address]         To Whom it May Concern,     Sven Givens has been under my care after a cancer diagnosis on 22. He will be unable to travel, as it would be detrimental to his health. Please use this letter to refund any and all travel-related costs.     If you have any questions, please don't hesitate to call [phone].        Sincerely,      Lay Valencia MD

## 2022-10-10 NOTE — CONFIDENTIAL NOTE
To Whom it May Concern,    Sven Givens has been under my care after a cancer diagnosis on 9/28/22. He will be unable to travel, as it would be detrimental to his health. Please use this letter to refund any and all travel-related costs.    If you have any questions, please don't hesitate to call [phone].    Thank you,    Dr. Lay Valencia

## 2022-10-10 NOTE — LETTER
[address] [address]  Phone: [phone]    October 11, 2022        Sven Givens  [address]  [facility] [address]          To whom it may concern:    RE: Sven Givens    Patient is a patient of mine who was recently diagnosed with colon cancer. Patient is unable to go on his cruise trip scheduled in October to November. Patient will undergo treatment at this time.    Please contact me for questions or concerns.      Sincerely,        Yeison Villegas MD

## 2022-10-11 NOTE — TELEPHONE ENCOUNTER
Received signed letter and mailing to patient's home address.  Josselin Mcgowan MA  [facility]  2nd Floor  Primary Care

## 2022-10-12 ENCOUNTER — PRE VISIT (OUTPATIENT)
Dept: SURGERY | Facility: CLINIC | Age: 67
End: 2022-10-12

## 2022-10-12 ENCOUNTER — VIRTUAL VISIT (OUTPATIENT)
Dept: SURGERY | Facility: CLINIC | Age: 67
End: 2022-10-12
Payer: COMMERCIAL

## 2022-10-12 ENCOUNTER — ANESTHESIA EVENT (OUTPATIENT)
Dept: SURGERY | Facility: CLINIC | Age: 67
End: 2022-10-12

## 2022-10-12 DIAGNOSIS — Z01.818 PRE-OP EVALUATION: Primary | ICD-10-CM

## 2022-10-12 PROCEDURE — 99204 OFFICE O/P NEW MOD 45 MIN: CPT | Mod: 95 | Performed by: PHYSICIAN ASSISTANT

## 2022-10-12 ASSESSMENT — PAIN SCALES - GENERAL: PAINLEVEL: NO PAIN (0)

## 2022-10-12 ASSESSMENT — LIFESTYLE VARIABLES: TOBACCO_USE: 0

## 2022-10-12 NOTE — PATIENT INSTRUCTIONS
Preparing for Your Surgery      Name:  Sven Givens   MRN:  [id number]   :  1955   Today's Date:  10/12/2022       Arriving for surgery:  Surgery date:  10/28/2022  Arrival time:  11:00 am     Surgeries and procedures: Adult patients can have 2 visitors all through the surgery process.     Visiting hours: 8 a.m. to 8:30 p.m.     Hospital: Adult patients and children under age 18 can have 4 visitor at a time     No visitors under the age of 5 are allowed for hospital patients.  Double occupancy rooms: Patients can have only two visitors at a time.     Patients with disabilities: Can have a support person with them (family member, service provider     Or someone well informed about their needs) plus the allowed number of visitors     Patients confirmed or suspected to have symptoms of COVID 19 or flu:     No visitors allowed for adult patients.   Children (under age 18) can have 1 named visitor.     People who are sick or showing symptoms of COVID 19 or flu:    Are not allowed to visit patients--we can only make exceptions in special situations.       Please follow these guidelines for your visit:   Arrive wearing a mask over your mouth and nose; we will give you a medical mask to wear    If you arrive wearing a cloth mask.   Keep it on during your entire visit, even when in patient's room.   If you don't wear a mask we'll ask you to leave.     Clean your hands with alcohol hand . Do this when you arrive at and leave the building and patient room,    And again after you touch your mask or anything in the room.     You can t visit if you have a fever, cough, shortness of breath, muscle aches, headaches, sore throat    Or diarrhea      Stay 6 feet away from others during your visit and between visits     Go directly to and from the room you are visiting.     Stay in the patient s room during your visit. Limit going to other places in the hospital as much as possible     Leave bags and jackets at home  or in the car.     For everyone s health, please don t come and go during your visit. That includes for smoking   during your visit.     Please come to:     [facility] [address]  [address] [place]  [address]    - ? parking is available in front of the hospital      -    Please proceed to [address] on the 3rd floor. [phone]?     - ?If you are in need of directions, wheelchair or escort please stop at the Information Desk in the lobby.       What can I eat or drink?  - Follow diet restrictions as directed by surgeon bowel prep   -  You may have clear liquids until 1 hour before you arrive to the hospital. (Until 10 am)    -  No Alcohol for at least 24 hours before surgery.     Which medicines can I take?    Hold Aspirin for 7 days before surgery.   Hold Multivitamins for 7 days before surgery.  Hold Supplements for 7 days before surgery. (Omega 3)  Hold Ibuprofen (Advil, Motrin) for 1 day before surgery--unless otherwise directed by surgeon.  Hold Naproxen (Aleve) for 4 days before surgery.    -  DO NOT take these medications the day of surgery:  Glimepiride   Losartan hydrochlorothiazide   Metformin      -  PLEASE TAKE these medications the day of surgery:  Allopurinol  Atorvastatin  Omeprazole  Nasal spray if needed       How do I prepare myself?  - Please take 2 showers before surgery using Scrubcare or Hibiclens soap.    Use this soap only from the neck to your toes.     Leave the soap on your skin for one minute--then rinse thoroughly.      You may use your own shampoo and conditioner. No other hair products.   - Please remove all jewelry and body piercings.  - No lotions, deodorants or fragrance.  - No makeup or fingernail polish.   - Bring your ID and insurance card.    -If you have a Deep Brain Stimulator, Spinal Cord Stimulator, or any Neuro Stimulator device---you must bring the remote control to the hospital.        Questions or  Concerns:    - For any questions regarding the day of surgery or your hospital stay, please contact the Pre Admission Nursing Office at [phone].       - If you have health changes between today and your surgery, please call your surgeon.     For any medical questions or concerns regarding medications, bowel prep, or FMLA paperwork, please contact:     Shanelle RN: [phone]       - For questions after surgery, please call your surgeons office.            OPTIMAL RECOVERY AFTER SURGERY        Begin hydrating yourself by drinking at least 8-10 glasses of clear liquids for 24 hours before surgery:      Suggested clear liquids:   Water    Clear Juices   Clear Broth   Non- carbonated beverages    (Crystal Light or Raphael Aid)   Sodas    (Sprite, 7 up, ginger ale, seltzer)   Gatorade              Drink clear liquids up until 3 hours before your surgery.       We would like you to purchase a drink such as Gatorade or Ensure Clear (not the milkshake type).  Drink this before bedtime and on the way into the hospital, drink between 8-10 ounces or until you feel hydrated.        Keeping well hydrated leads to your veins being plump, you wake up faster, and you are less likely to be nauseated. Start drinking water as soon as you can after surgery and advance to clear liquids and food as tolerated.  IV fluids contain salt, drinking fluids will minimize the amount of IV fluids you need and decrease the amount of salt you get.                 The most common reason for the patient to be readmitted is dehydration. Staying hydrated after you go home from the hospital is very important.  Ensure or Ensure Clear are good options to keep you hydrated.

## 2022-10-12 NOTE — H&P
Pre-Operative H & P     CC:  Preoperative exam to assess for increased cardiopulmonary risk while undergoing surgery and anesthesia.    Date of Encounter: 10/12/2022  Primary Care Physician:  Yeison Villegas     Reason for visit:   Encounter Diagnosis   Name Primary?     Pre-op evaluation Yes       HPI  Sven Givens is a 67 year old male who presents for pre-operative H & P in preparation for  Procedure Information     Date/Time: 10/28/22     Procedure: right hemicolectomy    Anesthesia type: General    Pre-op diagnosis: colon cancer    Location: [facility]    Providers: Kristy          Patient is being evaluated for comorbid conditions of hypertension, dyslipidemia, PRESLEY, neuropathy, diabetes, obesity, GERD    Mr. Ordaz was recently diagnosed with adenocarcinoma of the ascending colon. He was referred to Dr. Wagner for further evaluation and is now scheduled for the above procedure.     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, overuse, 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     Varicose veins        Past Surgical History  Past Surgical History:   Procedure Laterality Date     BRONCHOSCOPY FLEXIBLE N/A 9/23/2016    Procedure: BRONCHOSCOPY FLEXIBLE;  Surgeon: Gayle Moya MD;  Location: [facility] OR     COLONOSCOPY  5-03     COLONOSCOPY N/A 9/23/2022    Procedure: COLONOSCOPY, WITH POLYPECTOMY AND BIOPSY;  Surgeon: Abbe Mccarty MD;  Location: [facility] OR     COLONOSCOPY WITH CO2 INSUFFLATION N/A 5/31/2017    Procedure:  COLONOSCOPY WITH CO2 INSUFFLATION;  COLON SCREEN/ SEB;  Surgeon: Margarito Rasheed DO;  Location: [facility] OR     COLONOSCOPY WITH CO2 INSUFFLATION N/A 9/23/2022    Procedure: COLONOSCOPY, WITH CO2 INSUFFLATION;  Surgeon: Abbe cMcarty MD;  Location: [facility] OR     COMBINED ESOPHAGOSCOPY, GASTROSCOPY, DUODENOSCOPY (EGD) WITH CO2 INSUFFLATION N/A 4/19/2019    Procedure: COMBINED ESOPHAGOSCOPY, GASTROSCOPY, DUODENOSCOPY (EGD) WITH CO2 INSUFFLATION;  Surgeon: Abbe Mccarty MD;  Location: [facility] OR     ESOPHAGOSCOPY, GASTROSCOPY, DUODENOSCOPY (EGD), COMBINED N/A 4/19/2019    Procedure: Combined Esophagoscopy, Gastroscopy, Duodenoscopy (Egd), Biopsy Single Or Multiple;  Surgeon: Abbe Mccarty MD;  Location: [facility]     GENITOURINARY SURGERY      kidney stones     THORACOSCOPIC WEDGE RESECTION LUNG Left 9/23/2016    Procedure: THORACOSCOPIC WEDGE RESECTION LUNG;  Surgeon: Gayle Moya MD;  Location: [facility] OR     VASCULAR SURGERY      varicose vein       Prior to Admission Medications  Current Outpatient Medications   Medication Sig Dispense Refill     allopurinol (ZYLOPRIM) 100 MG tablet TAKE 2 TABLETS BY MOUTH EVERY DAY (Patient taking differently: every morning TAKE 2 TABLETS BY MOUTH EVERY DAY) 180 tablet 3     atorvastatin (LIPITOR) 80 MG tablet Take 1 tablet (80 mg) by mouth daily (Patient taking differently: Take 80 mg by mouth every morning) 90 tablet 3     Azelaic Acid (FINACEA) 15 % gel Apply small amount twice a day to skin (Patient taking differently: as needed Apply small amount twice a day to skin) 50 g 3     fluticasone (FLONASE) 50 MCG/ACT nasal spray Spray 1-2 sprays into both nostrils daily (Patient taking differently: Spray 1-2 sprays into both nostrils as needed) 1 Bottle 1     glimepiride (AMARYL) 4 MG tablet Take 1 tablet (4 mg) by mouth 2 times daily 180 tablet 3     losartan-hydrochlorothiazide (HYZAAR) 100-25 MG tablet Take 1 tablet by mouth daily For blood pressure.  (Patient taking differently: Take 1 tablet by mouth every morning For blood pressure.) 90 tablet 3     metFORMIN (GLUCOPHAGE) 1000 MG tablet TAKE 1 TABLET BY MOUTH TWICE A DAY WITH MEALS (Patient taking differently: 2 times daily (with meals) TAKE 1 TABLET BY MOUTH TWICE A DAY WITH MEALS) 180 tablet 3     metoprolol succinate ER (TOPROL-XL) 50 MG 24 hr tablet Take 1 tablet (50 mg) by mouth daily (Patient taking differently: Take 50 mg by mouth every morning) 90 tablet 3     MULTIPLE VITAMINS OR Take by mouth every morning       nitroGLYcerin (NITROSTAT) 0.4 MG sublingual tablet For chest pain place 1 tablet under the tongue every 5 minutes for 3 doses. If symptoms persist 5 minutes after 1st dose call 911. (Patient taking differently: every 5 minutes as needed For chest pain place 1 tablet under the tongue every 5 minutes for 3 doses. If symptoms persist 5 minutes after 1st dose call 911.) 25 tablet 3     omega-3 acid ethyl esters (LOVAZA) 1 g capsule Take 2 capsules (2 g) by mouth 2 times daily 360 capsule 3     omeprazole (PRILOSEC) 20 MG DR capsule Take 1 capsule (20 mg) by mouth daily Take 30-60 minutes before a meal. (Patient taking differently: Take 20 mg by mouth every morning Take 30-60 minutes before a meal.) 90 capsule 1     OZEMPIC, 1 MG/DOSE, 4 MG/3ML SOPN INJECT 1 MG SUBCUTANEOUS EVERY 7 DAYS 9 mL 1     senna-docusate (SENEXON-S) 8.6-50 MG tablet TAKE 1 TABLET BY MOUTH EVERYDAY AT BEDTIME (Patient taking differently: At Bedtime TAKE 1 TABLET BY MOUTH EVERYDAY AT BEDTIME) 90 tablet 3     terazosin (HYTRIN) 5 MG capsule TAKE 1 CAPSULE (5 MG) BY MOUTH AT BEDTIME FOR BLOOD PRESSURE. (Patient taking differently: At Bedtime TAKE 1 CAPSULE (5 MG) BY MOUTH AT BEDTIME FOR BLOOD PRESSURE.) 90 capsule 3     bisacodyl (DULCOLAX) 5 MG EC tablet Take 2 tablets at 3 pm the day before your procedure. If your procedure is before 11 am, take 2 additional tablets at 8 pm. If your procedure is after 11 am, take 2  "additional tablets at 6 am. For additional instructions refer to your colonoscopy prep instructions. 4 tablet 0     blood glucose (NO BRAND SPECIFIED) test strip Use to test blood sugar one time daily or as directed./ okay to dispense brand covered by insurance 100 strip 3     clotrimazole-betamethasone (LOTRISONE) 1-0.05 % external cream        ketoconazole (NIZORAL) 2 % external shampoo APPLY TOPICALLY EVERY OTHER DAY LATHER INTO RASH AND WASH OFF AFTER 10 MINUTES. (Patient not taking: Reported on 10/12/2022) 120 mL 0     metroNIDAZOLE (FLAGYL) 500 MG tablet Take 1 tablet (500 mg) by mouth every 6 hours At 8:00 am, 2:00 pm, 8:00 pm the day prior to your surgery with neomycin and zofran. 3 tablet 0     metroNIDAZOLE (METROCREAM) 0.75 % external cream        metroNIDAZOLE 0.75 % EX external gel Apply topically 2 times daily 45 g 3     neomycin (MYCIFRADIN) 500 MG tablet Take 2 tablets (1,000 mg) by mouth every 6 hours At 8:00 am, 2:00 pm, 8:00 pm the day prior to your surgery with flagyl and zofran. 6 tablet 0     ondansetron (ZOFRAN) 4 MG tablet Take 1 tablet (4 mg) by mouth every 6 hours At 8:00 am, 2:00 pm, 8:00 pm the day prior to your surgery with neomycin and flagyl. 3 tablet 0     Oxymetazoline HCl (AFRIN NASAL SPRAY NA) Spray in nostril as needed       polyethylene glycol (GOLYTELY) 236 g suspension The night before the exam at 6 pm drink an 8-ounce glass every 15 minutes until the jug is half empty. If you arrive before 11 AM: Drink the other half of the Golytely jug at 11 PM night before procedure. If you arrive after 11 AM: Drink the other half of the Golytely jug at 6 AM day of procedure. For additional instructions refer to your colonoscopy prep instructions. 4000 mL 0     polyethylene glycol (MIRALAX) 17 g packet Take 238 g by mouth See Admin Instructions Starting at 4 pm night prior to surgery. Refer to \"Getting Ready for Surgery\" instructions. 14 packet 0     polyethylene glycol (MIRALAX) 17 g packet " "Take 119 g by mouth See Admin Instructions Starting at 8 pm night prior to surgery. Refer to \"Getting Ready for Surgery\" instructions. 7 packet 0       Allergies  Allergies   Allergen Reactions     Pcn [Penicillins] Rash     Ace Inhibitors Cough     Indomethacin Other (See Comments)     Severe dizziness     Invokana [Canagliflozin]      bladder infection       Social History  Social History     Socioeconomic History     Marital status:      Spouse name: Not on file     Number of children: Not on file     Years of education: Not on file     Highest education level: Not on file   Occupational History     Employer: [employer]   Tobacco Use     Smoking status: Former     Types: Cigarettes     Quit date: 1992     Years since quittin.7     Smokeless tobacco: Never     Tobacco comments:     15 + years    Vaping Use     Vaping Use: Never used   Substance and Sexual Activity     Alcohol use: No     Drug use: Never     Sexual activity: Not Currently     Partners: Female     Birth control/protection: None   Other Topics Concern     Parent/sibling w/ CABG, MI or angioplasty before 65F 55M? No   Social History Narrative     Not on file     Social Determinants of Health     Financial Resource Strain: Not on file   Food Insecurity: Not on file   Transportation Needs: Not on file   Physical Activity: Not on file   Stress: Not on file   Social Connections: Not on file   Intimate Partner Violence: Not on file   Housing Stability: Not on file       Family History  Family History   Problem Relation Age of Onset     Hernia Mother          age 97     Cerebrovascular Disease Father 64     Cancer Sister         ovarary      Deep Vein Thrombosis (DVT) No family hx of        Review of Systems  The complete review of systems is negative other than noted in the HPI or here.   Anesthesia Evaluation   Pt has had prior anesthetic.         ROS/MED HX  ENT/Pulmonary:     (+) sleep apnea, uses CPAP,  (-) tobacco use "   Neurologic:     (+) peripheral neuropathy,     Cardiovascular:     (+) Dyslipidemia hypertension-----Previous cardiac testing   Echo: Date: 5/2020 Results:  Global and regional left ventricular function is normal with an EF of 60-65%.  Right ventricular function, chamber size, wall motion, and thickness are  normal.  Pulmonary artery systolic pressure cannot be assessed.  No pericardial effusion is present.    Stress Test: Date: Results:    ECG Reviewed: Date: 5/2021 Results:  SR, non specific inferior T wave abnormality   Cath: Date: Results:   (-) taking anticoagulants/antiplatelets   METS/Exercise Tolerance: >4 METS Comment: Walking 1-2 miles daily, exercises at home   Hematologic:  - neg hematologic  ROS  (-) history of blood clots and history of blood transfusion   Musculoskeletal:  - neg musculoskeletal ROS     GI/Hepatic:     (+) GERD, Asymptomatic on medication,     Renal/Genitourinary: Comment: H/o kidney stones      Endo:     (+) type II DM, Last HgA1c: 7.2, date: 9/16/22, Not using insulin, Normal glucose range: 150-160,  (-) chronic steroid usage   Psychiatric/Substance Use:  - neg psychiatric ROS     Infectious Disease:  - neg infectious disease ROS     Malignancy:   (+) Malignancy, History of Lung and GI.  Lung CA status post Surgery.  GI CA Active status post.        Other:            Virtual visit -  No vitals were obtained    Physical Exam  Constitutional: Awake, alert, cooperative, no apparent distress, and appears stated age.  HENT: Normocephalic  Respiratory: non labored breathing   Neurologic: Awake, alert, oriented to name, place and time.   Neuropsychiatric: Calm, cooperative. Normal affect.      Prior Labs/Diagnostic Studies   All labs and imaging personally reviewed     EKG/ stress test - if available please see in ROS above   Echo result w/o MOPS: Interpretation Summary Global and regional left ventricular function is normal with an EF of 60-65%.Right ventricular function, chamber size,  wall motion, and thickness arenormal.Pulmonary artery systolic pressure cannot be assessed.No pericardial effusion is present.      PFT Latest Ref Rng & Units 9/8/2016   FVC L 4.75   FEV1 L 3.65   FVC% % 112   FEV1% % 112         The patient's records and results personally reviewed by this provider.     Outside records reviewed from: Care Everywhere      Assessment      Sven Givens is a 67 year old male seen as a PAC referral for risk assessment and optimization for anesthesia.    Plan/Recommendations  Pt will be optimized for the proposed procedure.  See below for details on the assessment, risk, and preoperative recommendations    NEUROLOGY  - No history of TIA, CVA or seizure  -Post Op delirium risk factors:  No risk identified    ENT  - No current airway concerns.  Will need to be reassessed day of surgery.  Mallampati: Unable to assess  TM: Unable to assess    CARDIAC  - No history of Afib  - CAD  mild, follows with cardiology. Last seen 8/5/22. , using statin.    -previous cardiac testing above.   -walking 1-2 miles daily and doing exercises at home without exertional symptoms  -orthostatic hypotension, staying hydrated for symptoms   - METS (Metabolic Equivalents)  Patient performs 4 or more METS exercise without symptoms            Total Score: 0      RCRI-Low risk: Class 2 0.9% complication rate            Total Score: 1    RCRI: CAD        PULMONARY  - Obstructive Sleep Apnea  PRESLEY without home CPAP use.  - Denies asthma or inhaler use   -h/o SCLC, s/p wedge resection.   - Tobacco History      History   Smoking Status     Former     Types: Cigarettes     Quit date: 2/1/1992   Smokeless Tobacco     Never       GI  - GERD  Controlled on medications: Proton Pump Inhibitor  PONV Medium Risk  Total Score: 2           1 AN PONV: Patient is not a current smoker    1 AN PONV: Intended Post Op Opioids        /RENAL  - Baseline Creatinine  WNL  -h/o kidney stones, denies recent symptoms     ENDOCRINE   "  - BMI: Estimated body mass index is 31.01 kg/m  as calculated from the following:    Height as of 10/4/22: 1.753 m (5' 9\").    Weight as of 10/5/22: 95.3 kg (210 lb).  Obesity (BMI >30)  - Diabetes  Hemoglobin A1C (%)   Date Value   09/16/2022 7.2 (H)   05/26/2021 7.4 (H)     Diabetes Mellitus, Type 2, non-insulin dependent.  Hold morning oral hypoglycemic medications. Recommend close monitoring of the patient's blood glucose levels throughout the perioperative period.    HEME  VTE High Risk 3%            Total Score: 8    VTE: Greater than 59 yrs old    VTE: Male    VTE: Current cancer      - No history of abnormal bleeding or antiplatelet use.    Patient was discussed with Dr Tamez    The patient is optimized for their procedure. AVS with information on surgery time/arrival time, meds and NPO status given by nursing staff. No further diagnostic testing indicated.    Please refer to the physical examination documented by the anesthesiologist in the anesthesia record on the day of surgery.    Video-Visit Details    Type of service:  Video Visit    Patient verbally consented to video service today: YES    Video Start Time: 1147  Video End Time (time video stopped): 1202    Originating Location (pt. Location): Home    Distant Location (provider location): home    Mode of Communication:  Video Conference via [facility]  On the day of service:     Prep time: 18 minutes  Visit time: 15 minutes  Documentation time: 15 minutes  ------------------------------------------  Total time: 48 minutes      Sangeeta Lehman PA-C  Preoperative Assessment Center  [facility]  [facility]  Phone: [phone]  Fax: [phone]  "

## 2022-10-12 NOTE — PROGRESS NOTES
Sven is a 67 year old who is being evaluated via a billable video visit.      How would you like to obtain your AVS? MyChart  If the video visit is dropped, the invitation should be resent by: Text to cell phone: [phone]      HPI         Review of Systems         Objective    Vitals - Patient Reported  Pain Score: No Pain (0)        Physical Exam

## 2022-10-14 ENCOUNTER — TEAM CONFERENCE (OUTPATIENT)
Dept: SURGERY | Facility: CLINIC | Age: 67
End: 2022-10-14

## 2022-10-14 NOTE — PROGRESS NOTES
COLON AND RECTAL SURGERY HUDDLE:    Patient was reviewed in preporation for their surgery the following was reviewed and has been completed:    Surgeon: Dr. Caren Wagner    Surgery & Date: 10/28 right hemicolectomy     Last MD Note: reviewed    Anesthesia Type: General    Other Providers: No    PAC: Yes    WOC: N/A    Labs: Yes    Bowel Prep: Yes MiraLAX / Gatorade  and Antibiotic    Packet: Yes    Imaging: N/A    Post-Op Appointments: Yes    COVID: Yes    Is patient on TPN?: N/A   If yes, I contacted the TPN pharmacist by paging the  pharmacy at [phone] or calling [phone]. I also contacted Quiana LOBATO with inpatient colon and rectal team.     Pre op call complete: Yes

## 2022-10-19 ENCOUNTER — TELEPHONE (OUTPATIENT)
Dept: FAMILY MEDICINE | Facility: CLINIC | Age: 67
End: 2022-10-19

## 2022-10-19 NOTE — TELEPHONE ENCOUNTER
Medication received from [employer]    Ozempic 4mg/3ml prefilled pen    Lot# [id number] EXP 6/30/25 NDC [id number] Quantity 4     Called informed patient medication ready for .    ROMELIA Hudson MA

## 2022-10-24 ENCOUNTER — LAB (OUTPATIENT)
Dept: URGENT CARE | Facility: URGENT CARE | Age: 67
End: 2022-10-24
Payer: COMMERCIAL

## 2022-10-24 ENCOUNTER — LAB (OUTPATIENT)
Dept: LAB | Facility: CLINIC | Age: 67
End: 2022-10-24
Payer: COMMERCIAL

## 2022-10-24 ENCOUNTER — TELEPHONE (OUTPATIENT)
Dept: FAMILY MEDICINE | Facility: CLINIC | Age: 67
End: 2022-10-24

## 2022-10-24 DIAGNOSIS — C18.2 CANCER OF ASCENDING COLON (H): ICD-10-CM

## 2022-10-24 DIAGNOSIS — Z20.822 COVID-19 RULED OUT: Primary | ICD-10-CM

## 2022-10-24 LAB
ALBUMIN SERPL-MCNC: 4 G/DL (ref 3.4–5)
ALP SERPL-CCNC: 109 U/L (ref 40–150)
ALT SERPL W P-5'-P-CCNC: 43 U/L (ref 0–70)
ANION GAP SERPL CALCULATED.3IONS-SCNC: 8 MMOL/L (ref 3–14)
AST SERPL W P-5'-P-CCNC: 28 U/L (ref 0–45)
BASOPHILS # BLD AUTO: 0 10E3/UL (ref 0–0.2)
BASOPHILS NFR BLD AUTO: 0 %
BILIRUB SERPL-MCNC: 0.8 MG/DL (ref 0.2–1.3)
BUN SERPL-MCNC: 19 MG/DL (ref 7–30)
CALCIUM SERPL-MCNC: 9.8 MG/DL (ref 8.5–10.1)
CHLORIDE BLD-SCNC: 106 MMOL/L (ref 94–109)
CO2 SERPL-SCNC: 25 MMOL/L (ref 20–32)
CREAT SERPL-MCNC: 0.87 MG/DL (ref 0.66–1.25)
EOSINOPHIL # BLD AUTO: 0.2 10E3/UL (ref 0–0.7)
EOSINOPHIL NFR BLD AUTO: 2 %
ERYTHROCYTE [DISTWIDTH] IN BLOOD BY AUTOMATED COUNT: 13.1 % (ref 10–15)
GFR SERPL CREATININE-BSD FRML MDRD: >90 ML/MIN/1.73M2
GLUCOSE BLD-MCNC: 163 MG/DL (ref 70–99)
HCT VFR BLD AUTO: 38.7 % (ref 40–53)
HGB BLD-MCNC: 12.7 G/DL (ref 13.3–17.7)
IMM GRANULOCYTES # BLD: 0 10E3/UL
IMM GRANULOCYTES NFR BLD: 0 %
LYMPHOCYTES # BLD AUTO: 3 10E3/UL (ref 0.8–5.3)
LYMPHOCYTES NFR BLD AUTO: 33 %
MCH RBC QN AUTO: 29.2 PG (ref 26.5–33)
MCHC RBC AUTO-ENTMCNC: 32.8 G/DL (ref 31.5–36.5)
MCV RBC AUTO: 89 FL (ref 78–100)
MONOCYTES # BLD AUTO: 0.5 10E3/UL (ref 0–1.3)
MONOCYTES NFR BLD AUTO: 6 %
NEUTROPHILS # BLD AUTO: 5.3 10E3/UL (ref 1.6–8.3)
NEUTROPHILS NFR BLD AUTO: 59 %
PLATELET # BLD AUTO: 196 10E3/UL (ref 150–450)
POTASSIUM BLD-SCNC: 3.7 MMOL/L (ref 3.4–5.3)
PROT SERPL-MCNC: 7.8 G/DL (ref 6.8–8.8)
RBC # BLD AUTO: 4.35 10E6/UL (ref 4.4–5.9)
SARS-COV-2 RNA RESP QL NAA+PROBE: NEGATIVE
SODIUM SERPL-SCNC: 139 MMOL/L (ref 133–144)
WBC # BLD AUTO: 9.1 10E3/UL (ref 4–11)

## 2022-10-24 PROCEDURE — 85025 COMPLETE CBC W/AUTO DIFF WBC: CPT

## 2022-10-24 PROCEDURE — 80053 COMPREHEN METABOLIC PANEL: CPT

## 2022-10-24 PROCEDURE — 36415 COLL VENOUS BLD VENIPUNCTURE: CPT

## 2022-10-24 PROCEDURE — U0005 INFEC AGEN DETEC AMPLI PROBE: HCPCS

## 2022-10-24 PROCEDURE — 84134 ASSAY OF PREALBUMIN: CPT

## 2022-10-24 PROCEDURE — U0003 INFECTIOUS AGENT DETECTION BY NUCLEIC ACID (DNA OR RNA); SEVERE ACUTE RESPIRATORY SYNDROME CORONAVIRUS 2 (SARS-COV-2) (CORONAVIRUS DISEASE [COVID-19]), AMPLIFIED PROBE TECHNIQUE, MAKING USE OF HIGH THROUGHPUT TECHNOLOGIES AS DESCRIBED BY CMS-2020-01-R: HCPCS

## 2022-10-24 NOTE — TELEPHONE ENCOUNTER
Form is with the [facility]'s on the second floor. Please retrieve form from this team. Form in TC hold bin.

## 2022-10-24 NOTE — TELEPHONE ENCOUNTER
.What type of form? [employer] Patient Assistance Program Application  What day did you drop off your forms? 10/24/2022  Is there a due date? ASAP   How would you like to receive these forms? Please fax to [phone]  Which clinic was the form dropped off at? [facility]    What is the best number to contact you? Home [phone]  What time works best to contact you with in 4 hrs? Any  Is it okay to leave a message? Yes     *Form left in [facility]    Brenda Chin

## 2022-10-25 LAB — PREALB SERPL IA-MCNC: 38 MG/DL (ref 15–45)

## 2022-10-27 ENCOUNTER — ANESTHESIA EVENT (OUTPATIENT)
Dept: SURGERY | Facility: CLINIC | Age: 67
DRG: 331 | End: 2022-10-27
Payer: COMMERCIAL

## 2022-10-28 ENCOUNTER — ANCILLARY PROCEDURE (OUTPATIENT)
Dept: ULTRASOUND IMAGING | Facility: CLINIC | Age: 67
End: 2022-10-28
Payer: COMMERCIAL

## 2022-10-28 ENCOUNTER — ANESTHESIA (OUTPATIENT)
Dept: SURGERY | Facility: CLINIC | Age: 67
DRG: 331 | End: 2022-10-28
Payer: COMMERCIAL

## 2022-10-28 ENCOUNTER — HOSPITAL ENCOUNTER (INPATIENT)
Facility: CLINIC | Age: 67
LOS: 4 days | Discharge: HOME | DRG: 331 | End: 2022-11-01
Attending: COLON & RECTAL SURGERY | Admitting: COLON & RECTAL SURGERY
Payer: COMMERCIAL

## 2022-10-28 DIAGNOSIS — J06.9 URI WITH COUGH AND CONGESTION: ICD-10-CM

## 2022-10-28 DIAGNOSIS — G89.18 ACUTE POST-OPERATIVE PAIN: Primary | ICD-10-CM

## 2022-10-28 DIAGNOSIS — C18.9 COLON ADENOCARCINOMA (H): ICD-10-CM

## 2022-10-28 LAB
CREAT SERPL-MCNC: 0.67 MG/DL (ref 0.67–1.17)
GFR SERPL CREATININE-BSD FRML MDRD: >90 ML/MIN/1.73M2
GLUCOSE BLDC GLUCOMTR-MCNC: 134 MG/DL (ref 70–99)
GLUCOSE BLDC GLUCOMTR-MCNC: 156 MG/DL (ref 70–99)
GLUCOSE BLDC GLUCOMTR-MCNC: 170 MG/DL (ref 70–99)
GLUCOSE BLDC GLUCOMTR-MCNC: 173 MG/DL (ref 70–99)

## 2022-10-28 PROCEDURE — 88309 TISSUE EXAM BY PATHOLOGIST: CPT | Mod: TC | Performed by: COLON & RECTAL SURGERY

## 2022-10-28 PROCEDURE — 370N000017 HC ANESTHESIA TECHNICAL FEE, PER MIN: Performed by: COLON & RECTAL SURGERY

## 2022-10-28 PROCEDURE — 258N000003 HC RX IP 258 OP 636: Performed by: STUDENT IN AN ORGANIZED HEALTH CARE EDUCATION/TRAINING PROGRAM

## 2022-10-28 PROCEDURE — 0DTF4ZZ RESECTION OF RIGHT LARGE INTESTINE, PERCUTANEOUS ENDOSCOPIC APPROACH: ICD-10-PCS | Performed by: COLON & RECTAL SURGERY

## 2022-10-28 PROCEDURE — 360N000077 HC SURGERY LEVEL 4, PER MIN: Performed by: COLON & RECTAL SURGERY

## 2022-10-28 PROCEDURE — 82565 ASSAY OF CREATININE: CPT | Performed by: STUDENT IN AN ORGANIZED HEALTH CARE EDUCATION/TRAINING PROGRAM

## 2022-10-28 PROCEDURE — 250N000013 HC RX MED GY IP 250 OP 250 PS 637: Performed by: COLON & RECTAL SURGERY

## 2022-10-28 PROCEDURE — 88307 TISSUE EXAM BY PATHOLOGIST: CPT | Mod: TC | Performed by: COLON & RECTAL SURGERY

## 2022-10-28 PROCEDURE — 250N000024 HC ISOFLURANE, PER MIN: Performed by: COLON & RECTAL SURGERY

## 2022-10-28 PROCEDURE — 250N000011 HC RX IP 250 OP 636

## 2022-10-28 PROCEDURE — 250N000011 HC RX IP 250 OP 636: Performed by: NURSE ANESTHETIST, CERTIFIED REGISTERED

## 2022-10-28 PROCEDURE — 36415 COLL VENOUS BLD VENIPUNCTURE: CPT | Performed by: STUDENT IN AN ORGANIZED HEALTH CARE EDUCATION/TRAINING PROGRAM

## 2022-10-28 PROCEDURE — 250N000011 HC RX IP 250 OP 636: Performed by: STUDENT IN AN ORGANIZED HEALTH CARE EDUCATION/TRAINING PROGRAM

## 2022-10-28 PROCEDURE — 999N000141 HC STATISTIC PRE-PROCEDURE NURSING ASSESSMENT: Performed by: COLON & RECTAL SURGERY

## 2022-10-28 PROCEDURE — 258N000003 HC RX IP 258 OP 636: Performed by: NURSE ANESTHETIST, CERTIFIED REGISTERED

## 2022-10-28 PROCEDURE — 120N000002 HC R&B MED SURG/OB UMMC

## 2022-10-28 PROCEDURE — 250N000013 HC RX MED GY IP 250 OP 250 PS 637: Performed by: STUDENT IN AN ORGANIZED HEALTH CARE EDUCATION/TRAINING PROGRAM

## 2022-10-28 PROCEDURE — 710N000010 HC RECOVERY PHASE 1, LEVEL 2, PER MIN: Performed by: COLON & RECTAL SURGERY

## 2022-10-28 PROCEDURE — 44205 LAP COLECTOMY PART W/ILEUM: CPT | Mod: 22 | Performed by: COLON & RECTAL SURGERY

## 2022-10-28 PROCEDURE — 250N000009 HC RX 250: Performed by: NURSE ANESTHETIST, CERTIFIED REGISTERED

## 2022-10-28 PROCEDURE — 250N000011 HC RX IP 250 OP 636: Performed by: COLON & RECTAL SURGERY

## 2022-10-28 PROCEDURE — 272N000001 HC OR GENERAL SUPPLY STERILE: Performed by: COLON & RECTAL SURGERY

## 2022-10-28 PROCEDURE — 0DBU4ZZ EXCISION OF OMENTUM, PERCUTANEOUS ENDOSCOPIC APPROACH: ICD-10-PCS | Performed by: COLON & RECTAL SURGERY

## 2022-10-28 RX ORDER — METOPROLOL TARTRATE 1 MG/ML
1-2 INJECTION, SOLUTION INTRAVENOUS EVERY 5 MIN PRN
Status: DISCONTINUED | OUTPATIENT
Start: 2022-10-28 | End: 2022-10-28 | Stop reason: HOSPADM

## 2022-10-28 RX ORDER — SODIUM CHLORIDE, SODIUM LACTATE, POTASSIUM CHLORIDE, CALCIUM CHLORIDE 600; 310; 30; 20 MG/100ML; MG/100ML; MG/100ML; MG/100ML
INJECTION, SOLUTION INTRAVENOUS CONTINUOUS
Status: DISCONTINUED | OUTPATIENT
Start: 2022-10-28 | End: 2022-10-30 | Stop reason: CLARIF

## 2022-10-28 RX ORDER — LIDOCAINE 40 MG/G
CREAM TOPICAL
Status: DISCONTINUED | OUTPATIENT
Start: 2022-10-28 | End: 2022-11-01 | Stop reason: HOSPADM

## 2022-10-28 RX ORDER — HEPARIN SODIUM 5000 [USP'U]/.5ML
5000 INJECTION, SOLUTION INTRAVENOUS; SUBCUTANEOUS
Status: COMPLETED | OUTPATIENT
Start: 2022-10-28 | End: 2022-10-28

## 2022-10-28 RX ORDER — PANTOPRAZOLE SODIUM 40 MG/1
40 TABLET, DELAYED RELEASE ORAL EVERY MORNING
Status: DISCONTINUED | OUTPATIENT
Start: 2022-10-29 | End: 2022-11-01 | Stop reason: HOSPADM

## 2022-10-28 RX ORDER — METOPROLOL SUCCINATE 50 MG/1
50 TABLET, EXTENDED RELEASE ORAL EVERY MORNING
Status: DISCONTINUED | OUTPATIENT
Start: 2022-10-29 | End: 2022-11-01 | Stop reason: HOSPADM

## 2022-10-28 RX ORDER — ACETAMINOPHEN 325 MG/1
975 TABLET ORAL ONCE
Status: COMPLETED | OUTPATIENT
Start: 2022-10-28 | End: 2022-10-28

## 2022-10-28 RX ORDER — ONDANSETRON 2 MG/ML
4 INJECTION INTRAMUSCULAR; INTRAVENOUS EVERY 6 HOURS PRN
Status: DISCONTINUED | OUTPATIENT
Start: 2022-10-28 | End: 2022-11-01 | Stop reason: HOSPADM

## 2022-10-28 RX ORDER — FLUMAZENIL 0.1 MG/ML
0.2 INJECTION, SOLUTION INTRAVENOUS
Status: DISCONTINUED | OUTPATIENT
Start: 2022-10-28 | End: 2022-10-28

## 2022-10-28 RX ORDER — ATORVASTATIN CALCIUM 40 MG/1
80 TABLET, FILM COATED ORAL EVERY MORNING
Status: DISCONTINUED | OUTPATIENT
Start: 2022-10-29 | End: 2022-11-01 | Stop reason: HOSPADM

## 2022-10-28 RX ORDER — NICOTINE POLACRILEX 4 MG
15-30 LOZENGE BUCCAL
Status: DISCONTINUED | OUTPATIENT
Start: 2022-10-28 | End: 2022-11-01 | Stop reason: HOSPADM

## 2022-10-28 RX ORDER — HYDROMORPHONE HCL IN WATER/PF 6 MG/30 ML
0.4 PATIENT CONTROLLED ANALGESIA SYRINGE INTRAVENOUS
Status: DISCONTINUED | OUTPATIENT
Start: 2022-10-28 | End: 2022-11-01 | Stop reason: HOSPADM

## 2022-10-28 RX ORDER — ONDANSETRON 4 MG/1
4 TABLET, ORALLY DISINTEGRATING ORAL EVERY 30 MIN PRN
Status: DISCONTINUED | OUTPATIENT
Start: 2022-10-28 | End: 2022-10-28 | Stop reason: HOSPADM

## 2022-10-28 RX ORDER — PROPOFOL 10 MG/ML
INJECTION, EMULSION INTRAVENOUS PRN
Status: DISCONTINUED | OUTPATIENT
Start: 2022-10-28 | End: 2022-10-28

## 2022-10-28 RX ORDER — HYDROMORPHONE HCL IN WATER/PF 6 MG/30 ML
0.4 PATIENT CONTROLLED ANALGESIA SYRINGE INTRAVENOUS EVERY 5 MIN PRN
Status: DISCONTINUED | OUTPATIENT
Start: 2022-10-28 | End: 2022-10-28 | Stop reason: HOSPADM

## 2022-10-28 RX ORDER — HYDROMORPHONE HCL IN WATER/PF 6 MG/30 ML
0.4 PATIENT CONTROLLED ANALGESIA SYRINGE INTRAVENOUS ONCE
Status: COMPLETED | OUTPATIENT
Start: 2022-10-28 | End: 2022-10-28

## 2022-10-28 RX ORDER — SODIUM CHLORIDE, SODIUM LACTATE, POTASSIUM CHLORIDE, CALCIUM CHLORIDE 600; 310; 30; 20 MG/100ML; MG/100ML; MG/100ML; MG/100ML
INJECTION, SOLUTION INTRAVENOUS CONTINUOUS PRN
Status: DISCONTINUED | OUTPATIENT
Start: 2022-10-28 | End: 2022-10-28

## 2022-10-28 RX ORDER — LIDOCAINE HYDROCHLORIDE 20 MG/ML
INJECTION, SOLUTION INFILTRATION; PERINEURAL PRN
Status: DISCONTINUED | OUTPATIENT
Start: 2022-10-28 | End: 2022-10-28

## 2022-10-28 RX ORDER — NITROGLYCERIN 0.4 MG/1
0.4 TABLET SUBLINGUAL EVERY 5 MIN PRN
Status: DISCONTINUED | OUTPATIENT
Start: 2022-10-28 | End: 2022-11-01 | Stop reason: HOSPADM

## 2022-10-28 RX ORDER — OXYCODONE HYDROCHLORIDE 5 MG/1
5 TABLET ORAL EVERY 4 HOURS PRN
Status: DISCONTINUED | OUTPATIENT
Start: 2022-10-28 | End: 2022-10-28 | Stop reason: HOSPADM

## 2022-10-28 RX ORDER — OXYCODONE HYDROCHLORIDE 10 MG/1
10 TABLET ORAL EVERY 4 HOURS PRN
Status: DISCONTINUED | OUTPATIENT
Start: 2022-10-28 | End: 2022-11-01 | Stop reason: HOSPADM

## 2022-10-28 RX ORDER — FLUTICASONE PROPIONATE 50 MCG
1-2 SPRAY, SUSPENSION (ML) NASAL DAILY PRN
Status: DISCONTINUED | OUTPATIENT
Start: 2022-10-28 | End: 2022-11-01 | Stop reason: HOSPADM

## 2022-10-28 RX ORDER — HYDROMORPHONE HCL IN WATER/PF 6 MG/30 ML
0.2 PATIENT CONTROLLED ANALGESIA SYRINGE INTRAVENOUS
Status: DISCONTINUED | OUTPATIENT
Start: 2022-10-28 | End: 2022-11-01 | Stop reason: HOSPADM

## 2022-10-28 RX ORDER — ONDANSETRON 2 MG/ML
4 INJECTION INTRAMUSCULAR; INTRAVENOUS ONCE
Status: DISCONTINUED | OUTPATIENT
Start: 2022-10-28 | End: 2022-10-28

## 2022-10-28 RX ORDER — OXYCODONE HYDROCHLORIDE 5 MG/1
5 TABLET ORAL EVERY 4 HOURS PRN
Status: DISCONTINUED | OUTPATIENT
Start: 2022-10-28 | End: 2022-11-01 | Stop reason: HOSPADM

## 2022-10-28 RX ORDER — NALOXONE HYDROCHLORIDE 0.4 MG/ML
0.2 INJECTION, SOLUTION INTRAMUSCULAR; INTRAVENOUS; SUBCUTANEOUS
Status: DISCONTINUED | OUTPATIENT
Start: 2022-10-28 | End: 2022-11-01 | Stop reason: HOSPADM

## 2022-10-28 RX ORDER — AZELAIC ACID 0.15 G/G
GEL TOPICAL 2 TIMES DAILY
Status: DISCONTINUED | OUTPATIENT
Start: 2022-10-28 | End: 2022-10-28 | Stop reason: RX

## 2022-10-28 RX ORDER — FENTANYL CITRATE 50 UG/ML
25-50 INJECTION, SOLUTION INTRAMUSCULAR; INTRAVENOUS
Status: DISCONTINUED | OUTPATIENT
Start: 2022-10-28 | End: 2022-10-28

## 2022-10-28 RX ORDER — ERTAPENEM 1 G/1
1 INJECTION, POWDER, LYOPHILIZED, FOR SOLUTION INTRAMUSCULAR; INTRAVENOUS
Status: COMPLETED | OUTPATIENT
Start: 2022-10-28 | End: 2022-10-28

## 2022-10-28 RX ORDER — FENTANYL CITRATE 50 UG/ML
50 INJECTION, SOLUTION INTRAMUSCULAR; INTRAVENOUS EVERY 5 MIN PRN
Status: DISCONTINUED | OUTPATIENT
Start: 2022-10-28 | End: 2022-10-28 | Stop reason: HOSPADM

## 2022-10-28 RX ORDER — ONDANSETRON 2 MG/ML
4 INJECTION INTRAMUSCULAR; INTRAVENOUS EVERY 30 MIN PRN
Status: DISCONTINUED | OUTPATIENT
Start: 2022-10-28 | End: 2022-10-28 | Stop reason: HOSPADM

## 2022-10-28 RX ORDER — ONDANSETRON 2 MG/ML
INJECTION INTRAMUSCULAR; INTRAVENOUS PRN
Status: DISCONTINUED | OUTPATIENT
Start: 2022-10-28 | End: 2022-10-28

## 2022-10-28 RX ORDER — TERAZOSIN 5 MG/1
5 CAPSULE ORAL AT BEDTIME
Status: DISCONTINUED | OUTPATIENT
Start: 2022-10-28 | End: 2022-11-01 | Stop reason: HOSPADM

## 2022-10-28 RX ORDER — HALOPERIDOL 5 MG/ML
1 INJECTION INTRAMUSCULAR
Status: DISCONTINUED | OUTPATIENT
Start: 2022-10-28 | End: 2022-10-28 | Stop reason: HOSPADM

## 2022-10-28 RX ORDER — ALLOPURINOL 100 MG/1
200 TABLET ORAL DAILY
Status: DISCONTINUED | OUTPATIENT
Start: 2022-10-29 | End: 2022-11-01 | Stop reason: HOSPADM

## 2022-10-28 RX ORDER — SODIUM CHLORIDE, SODIUM LACTATE, POTASSIUM CHLORIDE, CALCIUM CHLORIDE 600; 310; 30; 20 MG/100ML; MG/100ML; MG/100ML; MG/100ML
INJECTION, SOLUTION INTRAVENOUS CONTINUOUS
Status: DISCONTINUED | OUTPATIENT
Start: 2022-10-28 | End: 2022-10-28

## 2022-10-28 RX ORDER — LOSARTAN POTASSIUM AND HYDROCHLOROTHIAZIDE 25; 100 MG/1; MG/1
1 TABLET ORAL EVERY MORNING
Status: DISCONTINUED | OUTPATIENT
Start: 2022-10-29 | End: 2022-11-01 | Stop reason: HOSPADM

## 2022-10-28 RX ORDER — FENTANYL CITRATE 50 UG/ML
INJECTION, SOLUTION INTRAMUSCULAR; INTRAVENOUS PRN
Status: DISCONTINUED | OUTPATIENT
Start: 2022-10-28 | End: 2022-10-28

## 2022-10-28 RX ORDER — NALOXONE HYDROCHLORIDE 0.4 MG/ML
0.4 INJECTION, SOLUTION INTRAMUSCULAR; INTRAVENOUS; SUBCUTANEOUS
Status: DISCONTINUED | OUTPATIENT
Start: 2022-10-28 | End: 2022-11-01 | Stop reason: HOSPADM

## 2022-10-28 RX ORDER — FENTANYL CITRATE 50 UG/ML
25 INJECTION, SOLUTION INTRAMUSCULAR; INTRAVENOUS
Status: DISCONTINUED | OUTPATIENT
Start: 2022-10-28 | End: 2022-10-28 | Stop reason: HOSPADM

## 2022-10-28 RX ORDER — ONDANSETRON 4 MG/1
4 TABLET, ORALLY DISINTEGRATING ORAL EVERY 6 HOURS PRN
Status: DISCONTINUED | OUTPATIENT
Start: 2022-10-28 | End: 2022-11-01 | Stop reason: HOSPADM

## 2022-10-28 RX ORDER — ENOXAPARIN SODIUM 100 MG/ML
40 INJECTION SUBCUTANEOUS EVERY 24 HOURS
Status: DISCONTINUED | OUTPATIENT
Start: 2022-10-29 | End: 2022-11-01 | Stop reason: HOSPADM

## 2022-10-28 RX ORDER — SODIUM CHLORIDE, SODIUM LACTATE, POTASSIUM CHLORIDE, CALCIUM CHLORIDE 600; 310; 30; 20 MG/100ML; MG/100ML; MG/100ML; MG/100ML
INJECTION, SOLUTION INTRAVENOUS CONTINUOUS
Status: DISCONTINUED | OUTPATIENT
Start: 2022-10-28 | End: 2022-10-28 | Stop reason: HOSPADM

## 2022-10-28 RX ORDER — DEXTROSE MONOHYDRATE 25 G/50ML
25-50 INJECTION, SOLUTION INTRAVENOUS
Status: DISCONTINUED | OUTPATIENT
Start: 2022-10-28 | End: 2022-11-01 | Stop reason: HOSPADM

## 2022-10-28 RX ADMIN — HYDROMORPHONE HYDROCHLORIDE 0.5 MG: 1 INJECTION, SOLUTION INTRAMUSCULAR; INTRAVENOUS; SUBCUTANEOUS at 14:13

## 2022-10-28 RX ADMIN — PROPOFOL 50 MG: 10 INJECTION, EMULSION INTRAVENOUS at 12:01

## 2022-10-28 RX ADMIN — Medication 20 MG: at 13:08

## 2022-10-28 RX ADMIN — HEPARIN SODIUM 5000 UNITS: 5000 INJECTION, SOLUTION INTRAVENOUS; SUBCUTANEOUS at 11:23

## 2022-10-28 RX ADMIN — FENTANYL CITRATE 100 MCG: 50 INJECTION, SOLUTION INTRAMUSCULAR; INTRAVENOUS at 11:59

## 2022-10-28 RX ADMIN — HYDROMORPHONE HYDROCHLORIDE 0.4 MG: 0.2 INJECTION, SOLUTION INTRAMUSCULAR; INTRAVENOUS; SUBCUTANEOUS at 18:51

## 2022-10-28 RX ADMIN — HYDROMORPHONE HYDROCHLORIDE 0.4 MG: 0.2 INJECTION, SOLUTION INTRAMUSCULAR; INTRAVENOUS; SUBCUTANEOUS at 21:18

## 2022-10-28 RX ADMIN — ONDANSETRON 4 MG: 2 INJECTION INTRAMUSCULAR; INTRAVENOUS at 14:52

## 2022-10-28 RX ADMIN — FENTANYL CITRATE 25 MCG: 50 INJECTION, SOLUTION INTRAMUSCULAR; INTRAVENOUS at 16:24

## 2022-10-28 RX ADMIN — Medication 20 MG: at 12:24

## 2022-10-28 RX ADMIN — FENTANYL CITRATE 25 MCG: 50 INJECTION, SOLUTION INTRAMUSCULAR; INTRAVENOUS at 16:06

## 2022-10-28 RX ADMIN — TERAZOSIN HYDROCHLORIDE 5 MG: 5 CAPSULE ORAL at 22:59

## 2022-10-28 RX ADMIN — SODIUM CHLORIDE, POTASSIUM CHLORIDE, SODIUM LACTATE AND CALCIUM CHLORIDE: 600; 310; 30; 20 INJECTION, SOLUTION INTRAVENOUS at 11:49

## 2022-10-28 RX ADMIN — FENTANYL CITRATE 25 MCG: 50 INJECTION, SOLUTION INTRAMUSCULAR; INTRAVENOUS at 16:19

## 2022-10-28 RX ADMIN — SUGAMMADEX 200 MG: 100 INJECTION, SOLUTION INTRAVENOUS at 14:58

## 2022-10-28 RX ADMIN — HYDROMORPHONE HYDROCHLORIDE 0.4 MG: 0.2 INJECTION, SOLUTION INTRAMUSCULAR; INTRAVENOUS; SUBCUTANEOUS at 23:07

## 2022-10-28 RX ADMIN — ONDANSETRON 4 MG: 2 INJECTION INTRAMUSCULAR; INTRAVENOUS at 18:51

## 2022-10-28 RX ADMIN — Medication 10 MG: at 14:15

## 2022-10-28 RX ADMIN — PROPOFOL 100 MG: 10 INJECTION, EMULSION INTRAVENOUS at 11:59

## 2022-10-28 RX ADMIN — SODIUM CHLORIDE, POTASSIUM CHLORIDE, SODIUM LACTATE AND CALCIUM CHLORIDE: 600; 310; 30; 20 INJECTION, SOLUTION INTRAVENOUS at 16:08

## 2022-10-28 RX ADMIN — FENTANYL CITRATE 50 MCG: 50 INJECTION, SOLUTION INTRAMUSCULAR; INTRAVENOUS at 13:52

## 2022-10-28 RX ADMIN — LIDOCAINE HYDROCHLORIDE 60 MG: 20 INJECTION, SOLUTION INFILTRATION; PERINEURAL at 11:59

## 2022-10-28 RX ADMIN — ACETAMINOPHEN 975 MG: 325 TABLET, FILM COATED ORAL at 11:20

## 2022-10-28 RX ADMIN — FENTANYL CITRATE 50 MCG: 50 INJECTION, SOLUTION INTRAMUSCULAR; INTRAVENOUS at 12:45

## 2022-10-28 RX ADMIN — HYDROMORPHONE HYDROCHLORIDE 0.4 MG: 0.2 INJECTION, SOLUTION INTRAMUSCULAR; INTRAVENOUS; SUBCUTANEOUS at 16:58

## 2022-10-28 RX ADMIN — SODIUM CHLORIDE, POTASSIUM CHLORIDE, SODIUM LACTATE AND CALCIUM CHLORIDE: 600; 310; 30; 20 INJECTION, SOLUTION INTRAVENOUS at 14:01

## 2022-10-28 RX ADMIN — FENTANYL CITRATE 50 MCG: 50 INJECTION, SOLUTION INTRAMUSCULAR; INTRAVENOUS at 16:43

## 2022-10-28 RX ADMIN — ERTAPENEM SODIUM 1 G: 1 INJECTION, POWDER, LYOPHILIZED, FOR SOLUTION INTRAMUSCULAR; INTRAVENOUS at 12:10

## 2022-10-28 RX ADMIN — OXYCODONE HYDROCHLORIDE 10 MG: 10 TABLET ORAL at 20:32

## 2022-10-28 RX ADMIN — FENTANYL CITRATE 25 MCG: 50 INJECTION, SOLUTION INTRAMUSCULAR; INTRAVENOUS at 16:14

## 2022-10-28 RX ADMIN — Medication 50 MG: at 12:00

## 2022-10-28 ASSESSMENT — ACTIVITIES OF DAILY LIVING (ADL)
ADLS_ACUITY_SCORE: 18
ADLS_ACUITY_SCORE: 20
ADLS_ACUITY_SCORE: 18

## 2022-10-28 ASSESSMENT — LIFESTYLE VARIABLES: TOBACCO_USE: 0

## 2022-10-28 NOTE — ANESTHESIA POSTPROCEDURE EVALUATION
Patient: Sven Givens    Procedure: Procedure(s):  LAPAROSCOPY-ASSISTED, EXTENDED HEMICOLECTOMY, RIGHT, OMENTECTOMY       Anesthesia Type:  General    Note:  Disposition: Inpatient   Postop Pain Control: Uneventful            Sign Out: Well controlled pain   PONV: No   Neuro/Psych: Uneventful            Sign Out: Acceptable/Baseline neuro status   Airway/Respiratory: Uneventful            Sign Out: Acceptable/Baseline resp. status   CV/Hemodynamics: Uneventful            Sign Out: Acceptable CV status; No obvious hypovolemia; No obvious fluid overload   Other NRE: NONE   DID A NON-ROUTINE EVENT OCCUR? No           Last vitals:  Vitals Value Taken Time   /79 10/28/22 1715   Temp 36.6  C (97.9  F) 10/28/22 1615   Pulse 91 10/28/22 1720   Resp 14 10/28/22 1700   SpO2 94 % 10/28/22 1723   Vitals shown include unvalidated device data.    Electronically Signed By: Sami Verma MD  October 28, 2022  5:25 PM

## 2022-10-28 NOTE — OP NOTE
SURGEON: Caren Wagner MD     ASSISTANT: Marcie Asencio MD, Colorectal Surgery fellow. Savannah Mccarthy MD Surgery Resident    PREOPERATIVE DIAGNOSIS: Colon cancer of the ascending colon.     POSTOPERATIVE DIAGNOSIS:  Colon cancer of the hepatic flexure at the proximal transverse colon juncture. Urethral stricture.    PROCEDURES: Laparoscopic extended right hemicolectomy, partial omentectomy.    Please also add a Modifier 22: This case was extremely difficult due to the very large amount of involved viscera and induration and intense inflammation from dye which was going into the retroperitoneum associated with the dissection planes making retraction, exposure, and identification of appropriate tissue plains and critical structures difficult.  This case was at least 50% more difficult and took 1.5 hours longer than typical for a similar case.    INDICATIONS:  Sven Givens is a 67 year old male with a history of hypertension, diabetes type 2, and lung adenocarcinoma status post wedge resection (2016) with a recent diagnosis of adenocarcinoma of the ascending colon on routine colonoscopy. The risks and benefits of surgery were thoroughly discussed with the patient and he agreed to proceed.    DESCRIPTION OF PROCEDURE: The patient was brought to the operating room, placed supine on the operating table with a pink pad in place. General endotracheal anesthesia was induced. Neal catheter was placed with some effort. There were 3 separate urethral strictures requiring a 10 Telugu neal but it went in with only a small amount of difficulty.. The patient was placed in modified lithotomy position with Yellofins and carefully secured to the table. The abdomen was prepped and draped in the usual sterile fashion. We began the procedure with a timeout and placed a periumbilical port under direct visualization with a Donnelly technique (12 mm). Under direct visualization, 5 mm ports were place in the left lower  quadrant and left upper quadrant. The patient had a fatty omentum and retroperitoneum. There was tattoo in the hepatic flexure. We performed a medial to lateral dissection of the right side, opening the retroperitoneal plane by identifying the ileocolic vessels, defining the retroperitoneal plane, pushing the duodenum away, and extending the dissection to the right upper quadrant and right lateral abdomen but there was quite a lot of tattoo in the retroperitoneum and the scarring. It was difficult to fully appreciate the duodenum. The ileocolic vessels were isolated but left in order to continue the lateral dissection.  The lateral dissection was performed on the ascending colon and hepatic flexure in right upper quadrant through the mid transverse colon, and then the ileal attachments were divided. There was extensive inflammation and scarring from the tattoo and the dissection was difficult. Eventually the two met each other, but each layer had to be slowly divided. At this point, we divided the ileocolic pedicle with the ligature device. The right half of the omentum was divided and taken as it was indurated and involved with the dissection planes. We proceeded with exteriorization of the specimens, making a 4 cm incision and placing a wound protector. At this point, we performed our anastomosis, dividing the mesentery and then making two enterotomies. The anastomosis was formed using a single firing of the ZENON blue load 75, which was hemostatic on inspection  followed by a single firing of the TA-90 blue load. The anastomosis was reinforced with a 3-0 Vicryl crotch stitch and sutures along the transverse staple line. After this, the anastomosis appeared highly satisfactory and was under no tension and healthy in appearance. The exteriorization site fascia was closed using figure of 8 #1 PDS suture. The subcutaneous tissue was irrigated out. The skin closed with 4-0 Monocryl subcuticular suture followed by the  application of skin glue. The patient was emerged from general endotracheal anesthesia, taken postoperative anesthesia care unit in good condition. All instruments and sponge counts were correct x2 at the end the case.     SPECIMEN: omentum; right and proximal transverse colon, appendix and terminal ileum.     Caren Wagner MD  Colon and Rectal Surgery Attending  Department of Surgery  [facility]

## 2022-10-28 NOTE — BRIEF OP NOTE
[facility]    Brief Operative Note    Pre-operative diagnosis: Cancer of ascending colon (H) [C18.2]  Post-operative diagnosis Same as pre-operative diagnosis    Procedure: Procedure(s):  LAPAROSCOPY-ASSISTED, EXTENDED HEMICOLECTOMY, RIGHT, OMENTECTOMY  Surgeon: Surgeon(s) and Role:     * Caren Wagner MD - Primary     * Savannah Mccarthy MD - Resident - Assisting  Anesthesia: General   Estimated Blood Loss: 10 mL from 10/28/2022 11:48 AM to 10/28/2022  3:11 PM      Drains: None  Specimens:   ID Type Source Tests Collected by Time Destination   1 : Terminal Ileum, Appendix and Right and Transverse Colon Tissue Small Intestine, Terminal Ileum SURGICAL PATHOLOGY EXAM Caren Wagner MD 10/28/2022  2:02 PM    2 : Omentum Tissue Omentum SURGICAL PATHOLOGY EXAM Caren Wagner MD 10/28/2022  2:03 PM      Findings: Tattoo in proximal transverse colon, redundant hepatic flexure, non bleeding ileocolonic anastomosis and widely patent anastomosis  Complications: None.  Implants: * No implants in log *

## 2022-10-28 NOTE — ANESTHESIA PREPROCEDURE EVALUATION
Pre-Operative H & P     CC:  Preoperative exam to assess for increased cardiopulmonary risk while undergoing surgery and anesthesia.    Date of Encounter: 10/12/2022  Primary Care Physician:  Yeison Villegas     Reason for visit:   No diagnosis found.    YOLY Givens is a 67 year old male who presents for pre-operative H & P in preparation for  Procedure Information     Date/Time: 10/28/22     Procedure: right hemicolectomy    Anesthesia type: General    Pre-op diagnosis: colon cancer    Location: [facility]    Providers: Kristy          Patient is being evaluated for comorbid conditions of hypertension, dyslipidemia, PRESLEY, neuropathy, diabetes, obesity, GERD    Mr. Ordaz was recently diagnosed with adenocarcinoma of the ascending colon. He was referred to Dr. Wagner for further evaluation and is now scheduled for the above procedure.     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, overuse, 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     Varicose veins        Past Surgical History  Past Surgical History:   Procedure Laterality Date     BRONCHOSCOPY FLEXIBLE N/A 9/23/2016    Procedure: BRONCHOSCOPY FLEXIBLE;  Surgeon: Gayle Moya MD;  Location: [facility] OR     COLONOSCOPY  5-03     COLONOSCOPY N/A 9/23/2022    Procedure: COLONOSCOPY, WITH POLYPECTOMY AND BIOPSY;  Surgeon: Abbe Mccarty MD;  Location: [facility] OR     COLONOSCOPY WITH CO2 INSUFFLATION N/A 5/31/2017    Procedure: COLONOSCOPY WITH CO2 INSUFFLATION;  COLON SCREEN/  SEB;  Surgeon: Margarito Rasheed DO;  Location: [facility] OR     COLONOSCOPY WITH CO2 INSUFFLATION N/A 9/23/2022    Procedure: COLONOSCOPY, WITH CO2 INSUFFLATION;  Surgeon: Abbe Mccarty MD;  Location: [facility] OR     COMBINED ESOPHAGOSCOPY, GASTROSCOPY, DUODENOSCOPY (EGD) WITH CO2 INSUFFLATION N/A 4/19/2019    Procedure: COMBINED ESOPHAGOSCOPY, GASTROSCOPY, DUODENOSCOPY (EGD) WITH CO2 INSUFFLATION;  Surgeon: Abbe Mccarty MD;  Location: [facility] OR     ESOPHAGOSCOPY, GASTROSCOPY, DUODENOSCOPY (EGD), COMBINED N/A 4/19/2019    Procedure: Combined Esophagoscopy, Gastroscopy, Duodenoscopy (Egd), Biopsy Single Or Multiple;  Surgeon: Abbe Mccarty MD;  Location: [facility]     GENITOURINARY SURGERY      kidney stones     THORACOSCOPIC WEDGE RESECTION LUNG Left 9/23/2016    Procedure: THORACOSCOPIC WEDGE RESECTION LUNG;  Surgeon: Gayle Moya MD;  Location: [facility] OR     VASCULAR SURGERY      varicose vein       Prior to Admission Medications  Current Outpatient Medications   Medication Sig Dispense Refill     allopurinol (ZYLOPRIM) 100 MG tablet TAKE 2 TABLETS BY MOUTH EVERY DAY (Patient taking differently: every morning TAKE 2 TABLETS BY MOUTH EVERY DAY) 180 tablet 3     atorvastatin (LIPITOR) 80 MG tablet Take 1 tablet (80 mg) by mouth daily (Patient taking differently: Take 80 mg by mouth every morning) 90 tablet 3     Azelaic Acid (FINACEA) 15 % gel Apply small amount twice a day to skin (Patient taking differently: as needed Apply small amount twice a day to skin) 50 g 3     bisacodyl (DULCOLAX) 5 MG EC tablet Take 2 tablets at 3 pm the day before your procedure. If your procedure is before 11 am, take 2 additional tablets at 8 pm. If your procedure is after 11 am, take 2 additional tablets at 6 am. For additional instructions refer to your colonoscopy prep instructions. 4 tablet 0     blood glucose (NO BRAND SPECIFIED) test strip Use to test blood sugar one time daily or as directed./ okay to  dispense brand covered by insurance 100 strip 3     clotrimazole-betamethasone (LOTRISONE) 1-0.05 % external cream        fluticasone (FLONASE) 50 MCG/ACT nasal spray Spray 1-2 sprays into both nostrils daily (Patient taking differently: Spray 1-2 sprays into both nostrils as needed) 1 Bottle 1     glimepiride (AMARYL) 4 MG tablet Take 1 tablet (4 mg) by mouth 2 times daily 180 tablet 3     ketoconazole (NIZORAL) 2 % external shampoo APPLY TOPICALLY EVERY OTHER DAY LATHER INTO RASH AND WASH OFF AFTER 10 MINUTES. (Patient not taking: Reported on 10/12/2022) 120 mL 0     losartan-hydrochlorothiazide (HYZAAR) 100-25 MG tablet Take 1 tablet by mouth daily For blood pressure. (Patient taking differently: Take 1 tablet by mouth every morning For blood pressure.) 90 tablet 3     metFORMIN (GLUCOPHAGE) 1000 MG tablet TAKE 1 TABLET BY MOUTH TWICE A DAY WITH MEALS (Patient taking differently: 2 times daily (with meals) TAKE 1 TABLET BY MOUTH TWICE A DAY WITH MEALS) 180 tablet 3     metoprolol succinate ER (TOPROL-XL) 50 MG 24 hr tablet Take 1 tablet (50 mg) by mouth daily (Patient taking differently: Take 50 mg by mouth every morning) 90 tablet 3     metroNIDAZOLE (FLAGYL) 500 MG tablet Take 1 tablet (500 mg) by mouth every 6 hours At 8:00 am, 2:00 pm, 8:00 pm the day prior to your surgery with neomycin and zofran. 3 tablet 0     metroNIDAZOLE (METROCREAM) 0.75 % external cream        metroNIDAZOLE 0.75 % EX external gel Apply topically 2 times daily 45 g 3     MULTIPLE VITAMINS OR Take by mouth every morning       neomycin (MYCIFRADIN) 500 MG tablet Take 2 tablets (1,000 mg) by mouth every 6 hours At 8:00 am, 2:00 pm, 8:00 pm the day prior to your surgery with flagyl and zofran. 6 tablet 0     nitroGLYcerin (NITROSTAT) 0.4 MG sublingual tablet For chest pain place 1 tablet under the tongue every 5 minutes for 3 doses. If symptoms persist 5 minutes after 1st dose call 911. (Patient taking differently: every 5 minutes as  "needed For chest pain place 1 tablet under the tongue every 5 minutes for 3 doses. If symptoms persist 5 minutes after 1st dose call 911.) 25 tablet 3     omega-3 acid ethyl esters (LOVAZA) 1 g capsule Take 2 capsules (2 g) by mouth 2 times daily 360 capsule 3     omeprazole (PRILOSEC) 20 MG DR capsule Take 1 capsule (20 mg) by mouth daily Take 30-60 minutes before a meal. (Patient taking differently: Take 20 mg by mouth every morning Take 30-60 minutes before a meal.) 90 capsule 1     ondansetron (ZOFRAN) 4 MG tablet Take 1 tablet (4 mg) by mouth every 6 hours At 8:00 am, 2:00 pm, 8:00 pm the day prior to your surgery with neomycin and flagyl. 3 tablet 0     Oxymetazoline HCl (AFRIN NASAL SPRAY NA) Spray in nostril as needed       OZEMPIC, 1 MG/DOSE, 4 MG/3ML SOPN INJECT 1 MG SUBCUTANEOUS EVERY 7 DAYS (Patient taking differently: Inject 1 mg Subcutaneous every 7 days Takes on Saturdays) 9 mL 1     polyethylene glycol (GOLYTELY) 236 g suspension The night before the exam at 6 pm drink an 8-ounce glass every 15 minutes until the jug is half empty. If you arrive before 11 AM: Drink the other half of the Golytely jug at 11 PM night before procedure. If you arrive after 11 AM: Drink the other half of the Golytely jug at 6 AM day of procedure. For additional instructions refer to your colonoscopy prep instructions. 4000 mL 0     polyethylene glycol (MIRALAX) 17 g packet Take 238 g by mouth See Admin Instructions Starting at 4 pm night prior to surgery. Refer to \"Getting Ready for Surgery\" instructions. 14 packet 0     polyethylene glycol (MIRALAX) 17 g packet Take 119 g by mouth See Admin Instructions Starting at 8 pm night prior to surgery. Refer to \"Getting Ready for Surgery\" instructions. 7 packet 0     senna-docusate (SENEXON-S) 8.6-50 MG tablet TAKE 1 TABLET BY MOUTH EVERYDAY AT BEDTIME (Patient taking differently: At Bedtime TAKE 1 TABLET BY MOUTH EVERYDAY AT BEDTIME) 90 tablet 3     terazosin (HYTRIN) 5 MG capsule " TAKE 1 CAPSULE (5 MG) BY MOUTH AT BEDTIME FOR BLOOD PRESSURE. (Patient taking differently: At Bedtime TAKE 1 CAPSULE (5 MG) BY MOUTH AT BEDTIME FOR BLOOD PRESSURE.) 90 capsule 3       Allergies  Allergies   Allergen Reactions     Pcn [Penicillins] Rash     Ace Inhibitors Cough     Indomethacin Other (See Comments)     Severe dizziness     Invokana [Canagliflozin]      bladder infection       Social History  Social History     Socioeconomic History     Marital status:      Spouse name: Not on file     Number of children: Not on file     Years of education: Not on file     Highest education level: Not on file   Occupational History     Employer: [employer]   Tobacco Use     Smoking status: Former     Types: Cigarettes     Quit date: 1992     Years since quittin.7     Smokeless tobacco: Never     Tobacco comments:     15 + years    Vaping Use     Vaping Use: Never used   Substance and Sexual Activity     Alcohol use: No     Drug use: Never     Sexual activity: Not Currently     Partners: Female     Birth control/protection: None   Other Topics Concern     Parent/sibling w/ CABG, MI or angioplasty before 65F 55M? No   Social History Narrative     Not on file     Social Determinants of Health     Financial Resource Strain: Not on file   Food Insecurity: Not on file   Transportation Needs: Not on file   Physical Activity: Not on file   Stress: Not on file   Social Connections: Not on file   Intimate Partner Violence: Not on file   Housing Stability: Not on file       Family History  Family History   Problem Relation Age of Onset     Hernia Mother          age 97     Cerebrovascular Disease Father 64     Cancer Sister         ovarary      Deep Vein Thrombosis (DVT) No family hx of        Review of Systems  The complete review of systems is negative other than noted in the HPI or here.   Anesthesia Evaluation   Pt has had prior anesthetic.         ROS/MED HX  ENT/Pulmonary:     (+) sleep apnea,  uses CPAP,  (-) tobacco use   Neurologic:     (+) peripheral neuropathy,     Cardiovascular:     (+) Dyslipidemia hypertension-----Previous cardiac testing   Echo: Date: 5/2020 Results:  Global and regional left ventricular function is normal with an EF of 60-65%.  Right ventricular function, chamber size, wall motion, and thickness are  normal.  Pulmonary artery systolic pressure cannot be assessed.  No pericardial effusion is present.    Stress Test: Date: Results:    ECG Reviewed: Date: 5/2021 Results:  SR, non specific inferior T wave abnormality   Cath: Date: Results:   (-) taking anticoagulants/antiplatelets   METS/Exercise Tolerance: >4 METS Comment: Walking 1-2 miles daily, exercises at home   Hematologic:  - neg hematologic  ROS  (-) history of blood clots and history of blood transfusion   Musculoskeletal:  - neg musculoskeletal ROS     GI/Hepatic:     (+) GERD, Asymptomatic on medication,     Renal/Genitourinary: Comment: H/o kidney stones      Endo:     (+) type II DM, Last HgA1c: 7.2, date: 9/16/22, Not using insulin, Normal glucose range: 150-160,  (-) chronic steroid usage   Psychiatric/Substance Use:  - neg psychiatric ROS     Infectious Disease:  - neg infectious disease ROS     Malignancy:   (+) Malignancy, History of Lung and GI.  Lung CA status post Surgery.  GI CA Active status post.        Other:            Virtual visit -  No vitals were obtained    Physical Exam  Constitutional: Awake, alert, cooperative, no apparent distress, and appears stated age.  HENT: Normocephalic  Respiratory: non labored breathing   Neurologic: Awake, alert, oriented to name, place and time.   Neuropsychiatric: Calm, cooperative. Normal affect.      Prior Labs/Diagnostic Studies   All labs and imaging personally reviewed     EKG/ stress test - if available please see in ROS above   Echo result w/o MOPS: Interpretation Summary Global and regional left ventricular function is normal with an EF of 60-65%.Right  ventricular function, chamber size, wall motion, and thickness arenormal.Pulmonary artery systolic pressure cannot be assessed.No pericardial effusion is present.      PFT Latest Ref Rng & Units 9/8/2016   FVC L 4.75   FEV1 L 3.65   FVC% % 112   FEV1% % 112         The patient's records and results personally reviewed by this provider.     Outside records reviewed from: Care Everywhere      Assessment      Sven Givens is a 67 year old male seen as a PAC referral for risk assessment and optimization for anesthesia.    Plan/Recommendations  Pt will be optimized for the proposed procedure.  See below for details on the assessment, risk, and preoperative recommendations    NEUROLOGY  - No history of TIA, CVA or seizure  -Post Op delirium risk factors:  No risk identified    ENT  - No current airway concerns.  Will need to be reassessed day of surgery.  Mallampati: Unable to assess  TM: Unable to assess    CARDIAC  - No history of Afib  - CAD  mild, follows with cardiology. Last seen 8/5/22. , using statin.    -previous cardiac testing above.   -walking 1-2 miles daily and doing exercises at home without exertional symptoms  -orthostatic hypotension, staying hydrated for symptoms   - METS (Metabolic Equivalents)  Patient performs 4 or more METS exercise without symptoms            Total Score: 0      RCRI-Low risk: Class 2 0.9% complication rate            Total Score: 1    RCRI: CAD        PULMONARY  - Obstructive Sleep Apnea  PRESLEY without home CPAP use.  - Denies asthma or inhaler use   -h/o SCLC, s/p wedge resection.   - Tobacco History      History   Smoking Status     Former     Types: Cigarettes     Quit date: 2/1/1992   Smokeless Tobacco     Never       GI  - GERD  Controlled on medications: Proton Pump Inhibitor  PONV Medium Risk  Total Score: 2           1 AN PONV: Patient is not a current smoker    1 AN PONV: Intended Post Op Opioids        /RENAL  - Baseline Creatinine  WNL  -h/o kidney stones,  "denies recent symptoms     ENDOCRINE    - BMI: Estimated body mass index is 31.01 kg/m  as calculated from the following:    Height as of 10/4/22: 1.753 m (5' 9\").    Weight as of 10/5/22: 95.3 kg (210 lb).  Obesity (BMI >30)  - Diabetes  Hemoglobin A1C (%)   Date Value   09/16/2022 7.2 (H)   05/26/2021 7.4 (H)     Diabetes Mellitus, Type 2, non-insulin dependent.  Hold morning oral hypoglycemic medications. Recommend close monitoring of the patient's blood glucose levels throughout the perioperative period.    HEME  VTE High Risk 3%            Total Score: 8    VTE: Greater than 59 yrs old    VTE: Male    VTE: Current cancer      - No history of abnormal bleeding or antiplatelet use.    Patient was discussed with Dr Tamez    The patient is optimized for their procedure. AVS with information on surgery time/arrival time, meds and NPO status given by nursing staff. No further diagnostic testing indicated.    Please refer to the physical examination documented by the anesthesiologist in the anesthesia record on the day of surgery.    Video-Visit Details    Type of service:  Video Visit    Patient verbally consented to video service today: YES    Video Start Time: 1147  Video End Time (time video stopped): 1202    Originating Location (pt. Location): Home    Distant Location (provider location): home    Mode of Communication:  Video Conference via [employer]  On the day of service:     Prep time: 18 minutes  Visit time: 15 minutes  Documentation time: 15 minutes  ------------------------------------------  Total time: 48 minutes      Sangeeta Lehman PA-C  Preoperative Assessment Center  [facility]  Cl[facility] and [facility] Center  Phone: [phone]  Fax: [phone]    Physical Exam    Airway        Mallampati: II   TM distance: > 3 FB   Neck ROM: full     Respiratory Devices and Support         Dental  no notable dental history         Cardiovascular          Rhythm and rate: regular and normal "     Pulmonary           (+) decreased breath sounds             Anesthesia Plan    ASA Status:  3   NPO Status:  NPO Appropriate    Anesthesia Type: General.     - Airway: ETT   Induction: Intravenous.      Techniques and Equipment:     - Lines/Monitors: 2nd IV     Consents    Anesthesia Plan(s) and associated risks, benefits, and realistic alternatives discussed. Questions answered and patient/representative(s) expressed understanding.     - Discussed: Risks, Benefits and Alternatives for BOTH SEDATION and the PROCEDURE were discussed     - Discussed with:  Patient      - Extended Intubation/Ventilatory Support Discussed: No.      - Patient is DNR/DNI Status: No    Use of blood products discussed: No .     Postoperative Care    Pain management: Multi-modal analgesia.   PONV prophylaxis: Ondansetron (or other 5HT-3), Dexamethasone or Solumedrol     Comments:

## 2022-10-28 NOTE — ANESTHESIA CARE TRANSFER NOTE
Patient: Sven Givens    Procedure: Procedure(s):  LAPAROSCOPY-ASSISTED, EXTENDED HEMICOLECTOMY, RIGHT, OMENTECTOMY       Diagnosis: Cancer of ascending colon (H) [C18.2]  Diagnosis Additional Information: No value filed.    Anesthesia Type:   No value filed.     Note:    Oropharynx: oropharynx clear of all foreign objects  Level of Consciousness: awake  Oxygen Supplementation: face mask    Independent Airway: airway patency satisfactory and stable  Dentition: dentition unchanged  Vital Signs Stable: post-procedure vital signs reviewed and stable  Report to RN Given: handoff report given  Patient transferred to: PACU    Handoff Report: Identifed the Patient, Identified the Reponsible Provider, Reviewed the pertinent medical history, Discussed the surgical course, Reviewed Intra-OP anesthesia mangement and issues during anesthesia, Set expectations for post-procedure period and Allowed opportunity for questions and acknowledgement of understanding      Vitals:  Vitals Value Taken Time   BP     Temp     Pulse     Resp     SpO2         Electronically Signed By: JESIKA Molina CRNA  October 28, 2022  3:19 PM

## 2022-10-28 NOTE — ANESTHESIA PROCEDURE NOTES
Airway       Patient location during procedure: OR       Procedure Start/Stop Times: 10/28/2022 12:01 PM  Staff -        CRNA: Anika Quispe APRN CRNA       Performed By: CRNA  Consent for Airway        Urgency: elective  Indications and Patient Condition       Indications for airway management: jose-procedural       Induction type:intravenous       Mask difficulty assessment: 2 - vent by mask + OA or adjuvant +/- NMBA    Final Airway Details       Final airway type: endotracheal airway       Successful airway: ETT - single  Endotracheal Airway Details        ETT size (mm): 8.0       Cuffed: yes       Successful intubation technique: direct laryngoscopy       DL Blade Type: MAC 3       Grade View of Cords: 1       Adjucts: stylet       Position: Right       Measured from: lips       Bite block used: None    Post intubation assessment        Placement verified by: capnometry, equal breath sounds and chest rise        Number of attempts at approach: 1       Secured with: silk tape       Ease of procedure: easy       Dentition: Intact and Unchanged    Medication(s) Administered   Medication Administration Time: 10/28/2022 12:01 PM

## 2022-10-29 LAB
ANION GAP SERPL CALCULATED.3IONS-SCNC: 11 MMOL/L (ref 7–15)
BUN SERPL-MCNC: 9.4 MG/DL (ref 8–23)
CALCIUM SERPL-MCNC: 9.1 MG/DL (ref 8.8–10.2)
CHLORIDE SERPL-SCNC: 100 MMOL/L (ref 98–107)
CREAT SERPL-MCNC: 0.65 MG/DL (ref 0.67–1.17)
DEPRECATED HCO3 PLAS-SCNC: 24 MMOL/L (ref 22–29)
ERYTHROCYTE [DISTWIDTH] IN BLOOD BY AUTOMATED COUNT: 13.2 % (ref 10–15)
GFR SERPL CREATININE-BSD FRML MDRD: >90 ML/MIN/1.73M2
GLUCOSE BLDC GLUCOMTR-MCNC: 116 MG/DL (ref 70–99)
GLUCOSE BLDC GLUCOMTR-MCNC: 153 MG/DL (ref 70–99)
GLUCOSE BLDC GLUCOMTR-MCNC: 93 MG/DL (ref 70–99)
GLUCOSE BLDC GLUCOMTR-MCNC: 94 MG/DL (ref 70–99)
GLUCOSE SERPL-MCNC: 94 MG/DL (ref 70–99)
HCT VFR BLD AUTO: 31.6 % (ref 40–53)
HGB BLD-MCNC: 10.2 G/DL (ref 13.3–17.7)
MCH RBC QN AUTO: 28.9 PG (ref 26.5–33)
MCHC RBC AUTO-ENTMCNC: 32.3 G/DL (ref 31.5–36.5)
MCV RBC AUTO: 90 FL (ref 78–100)
PLATELET # BLD AUTO: 184 10E3/UL (ref 150–450)
POTASSIUM SERPL-SCNC: 3.4 MMOL/L (ref 3.4–5.3)
RBC # BLD AUTO: 3.53 10E6/UL (ref 4.4–5.9)
SODIUM SERPL-SCNC: 135 MMOL/L (ref 136–145)
WBC # BLD AUTO: 9.3 10E3/UL (ref 4–11)

## 2022-10-29 PROCEDURE — 258N000003 HC RX IP 258 OP 636: Performed by: STUDENT IN AN ORGANIZED HEALTH CARE EDUCATION/TRAINING PROGRAM

## 2022-10-29 PROCEDURE — 250N000011 HC RX IP 250 OP 636: Performed by: STUDENT IN AN ORGANIZED HEALTH CARE EDUCATION/TRAINING PROGRAM

## 2022-10-29 PROCEDURE — 250N000013 HC RX MED GY IP 250 OP 250 PS 637

## 2022-10-29 PROCEDURE — 36415 COLL VENOUS BLD VENIPUNCTURE: CPT | Performed by: STUDENT IN AN ORGANIZED HEALTH CARE EDUCATION/TRAINING PROGRAM

## 2022-10-29 PROCEDURE — 80048 BASIC METABOLIC PNL TOTAL CA: CPT | Performed by: STUDENT IN AN ORGANIZED HEALTH CARE EDUCATION/TRAINING PROGRAM

## 2022-10-29 PROCEDURE — 120N000002 HC R&B MED SURG/OB UMMC

## 2022-10-29 PROCEDURE — 250N000013 HC RX MED GY IP 250 OP 250 PS 637: Performed by: STUDENT IN AN ORGANIZED HEALTH CARE EDUCATION/TRAINING PROGRAM

## 2022-10-29 PROCEDURE — 85027 COMPLETE CBC AUTOMATED: CPT | Performed by: STUDENT IN AN ORGANIZED HEALTH CARE EDUCATION/TRAINING PROGRAM

## 2022-10-29 RX ORDER — METHOCARBAMOL 750 MG/1
750 TABLET, FILM COATED ORAL 3 TIMES DAILY PRN
Status: DISCONTINUED | OUTPATIENT
Start: 2022-10-29 | End: 2022-11-01 | Stop reason: HOSPADM

## 2022-10-29 RX ADMIN — TERAZOSIN HYDROCHLORIDE 5 MG: 5 CAPSULE ORAL at 21:34

## 2022-10-29 RX ADMIN — OXYCODONE HYDROCHLORIDE 10 MG: 10 TABLET ORAL at 16:19

## 2022-10-29 RX ADMIN — METOPROLOL SUCCINATE 50 MG: 50 TABLET, EXTENDED RELEASE ORAL at 08:27

## 2022-10-29 RX ADMIN — METHOCARBAMOL 750 MG: 750 TABLET ORAL at 21:40

## 2022-10-29 RX ADMIN — LOSARTAN POTASSIUM AND HYDROCHLOROTHIAZIDE 1 TABLET: 100; 25 TABLET, FILM COATED ORAL at 08:27

## 2022-10-29 RX ADMIN — SODIUM CHLORIDE, POTASSIUM CHLORIDE, SODIUM LACTATE AND CALCIUM CHLORIDE: 600; 310; 30; 20 INJECTION, SOLUTION INTRAVENOUS at 00:18

## 2022-10-29 RX ADMIN — HYDROMORPHONE HYDROCHLORIDE 0.4 MG: 0.2 INJECTION, SOLUTION INTRAMUSCULAR; INTRAVENOUS; SUBCUTANEOUS at 04:48

## 2022-10-29 RX ADMIN — OXYCODONE HYDROCHLORIDE 10 MG: 10 TABLET ORAL at 20:22

## 2022-10-29 RX ADMIN — ATORVASTATIN CALCIUM 80 MG: 40 TABLET, FILM COATED ORAL at 08:26

## 2022-10-29 RX ADMIN — SODIUM CHLORIDE, POTASSIUM CHLORIDE, SODIUM LACTATE AND CALCIUM CHLORIDE: 600; 310; 30; 20 INJECTION, SOLUTION INTRAVENOUS at 21:39

## 2022-10-29 RX ADMIN — ENOXAPARIN SODIUM 40 MG: 40 INJECTION SUBCUTANEOUS at 12:19

## 2022-10-29 RX ADMIN — HYDROMORPHONE HYDROCHLORIDE 0.4 MG: 0.2 INJECTION, SOLUTION INTRAMUSCULAR; INTRAVENOUS; SUBCUTANEOUS at 08:10

## 2022-10-29 RX ADMIN — OXYCODONE HYDROCHLORIDE 5 MG: 5 TABLET ORAL at 06:27

## 2022-10-29 RX ADMIN — ALLOPURINOL 200 MG: 100 TABLET ORAL at 08:23

## 2022-10-29 RX ADMIN — PANTOPRAZOLE SODIUM 40 MG: 40 TABLET, DELAYED RELEASE ORAL at 08:27

## 2022-10-29 RX ADMIN — OXYCODONE HYDROCHLORIDE 10 MG: 10 TABLET ORAL at 12:14

## 2022-10-29 RX ADMIN — SODIUM CHLORIDE, POTASSIUM CHLORIDE, SODIUM LACTATE AND CALCIUM CHLORIDE: 600; 310; 30; 20 INJECTION, SOLUTION INTRAVENOUS at 06:35

## 2022-10-29 RX ADMIN — OXYCODONE HYDROCHLORIDE 10 MG: 10 TABLET ORAL at 02:24

## 2022-10-29 RX ADMIN — SODIUM CHLORIDE, POTASSIUM CHLORIDE, SODIUM LACTATE AND CALCIUM CHLORIDE: 600; 310; 30; 20 INJECTION, SOLUTION INTRAVENOUS at 14:38

## 2022-10-29 ASSESSMENT — ACTIVITIES OF DAILY LIVING (ADL)
ADLS_ACUITY_SCORE: 22
ADLS_ACUITY_SCORE: 22
ADLS_ACUITY_SCORE: 20
ADLS_ACUITY_SCORE: 20
ADLS_ACUITY_SCORE: 22
ADLS_ACUITY_SCORE: 20
ADLS_ACUITY_SCORE: 22
ADLS_ACUITY_SCORE: 22
ADLS_ACUITY_SCORE: 20
ADLS_ACUITY_SCORE: 20
ADLS_ACUITY_SCORE: 22
ADLS_ACUITY_SCORE: 20

## 2022-10-29 NOTE — PLAN OF CARE
Assumed care for pt [id number]  Pt AOx4, OAVSS on RA, nausea managed with prn zofran , pain managed with prn dilaudid and oxycodone. Midline incision and lapsitesx2 with liquid bandage. Tate in place with adeqaute UOP. PIV infusing IVMF at 125ml/hr. Pt dangled at bedside and matched in place, tolerated it well. No gas or BM this shift.

## 2022-10-29 NOTE — PROGRESS NOTES
Admitted/transferred from: PACU  2 RN  skin assessment completed by Angie BOWLES RN and Sherry LANZA RN.  Skin assessment finding: midline incision, lap sitesx2, neal  Interventions/actions: none needed at this time     Will continue to monitor.

## 2022-10-29 NOTE — PLAN OF CARE
Goal Outcome Evaluation: POD1 of a LAPAROSCOPY-ASSISTED, EXTENDED HEMICOLECTOMY, RIGHT, OMENTECTOMY.    A&Ox4, VSS on 1 LPM NC, IS at 2000. Abd binder in place. Midline incision WAYLON, BS hypoactive. No gas or BM yet. PIV is infusing IVM at 125. On a CLD. Pain is controlled with PO oxycodone and IV dilaudid. Tate has adequate output. Continue with post op care.

## 2022-10-29 NOTE — PLAN OF CARE
"/74 (BP Location: Right arm)   Pulse 78   Temp 97.4  F (36.3  C) (Oral)   Resp 16   Ht 1.753 m (5' 9\")   Wt 91.5 kg (201 lb 11.5 oz)   SpO2 93%   BMI 29.79 kg/m      Goal Outcome Evaluation:    Plan of care reviewed with:patient and family    Overall patient progress:no change    Time: 6894-9694  Status:POD# 1 s/p laparoscopy assisted extended hemicolectomy, R omentectomy due to ascending colon cancer.   Neuro/Pain:  A&Ox4, c/o incisional pain. Oxycodone 10 mg q4h prn, Robaxin 750 mg prn three times day added.   Activity: up independent/SBA with lines. Ambulated in the hallway with his family.   Cardiac/vs: WNL  Respiratory: on room air sating > 90%, LS clear, doing IS independently.   GI/: not passing gas. Active bowel sound. Tate cath with adequate urine output.   Diet: clear liquid diet.   Skin: incision open to air, abdominal binder in place.   LDAs: Tate cath, PIV infusing  ml/hr continuous.   Labs/Imaging: reviewed.   Change this shift: none   Plan: continue on bowel regimen, pain management, Tate care and I&O.           "

## 2022-10-29 NOTE — PROGRESS NOTES
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 They are now POD#0 s/p the procedure listed above.    Subjective: Sven was seen at the bedside and reports his pain is significant. He was encouraged to utilize his PRN analgesia to alleviate is pain and this proved successful.  - Denies SOB, chest pain, or dizziness.   - Has had some sips of clears. Endorses some nausea, but no vomiting.   - Has not gotten out of bed.    Objective:  Vital Signs: Most Recent  Ranges (24 hours)   Temp: 97.7  F (36.5  C)  Pulse: 92  BP: 135/72  Resp: 12  SpO2: 90 % Temp  Min: 97.3  F (36.3  C)  Max: 97.9  F (36.6  C)  Pulse  Min: 88  Max: 99  BP  Min: 135/72  Max: 148/84  Resp  Min: 12  Max: 20  SpO2  Min: 90 %  Max: 99 %   O2 Device: None (Room air)    Physical Exam  General:  NAD, A&Ox3 (person, place, year), Cooperative  Chest:  NWOB on RA, Lungs CTAB, S1, S2  Abdomen:  Soft, Appropriately Tender (RUQ mainly), ND  Extremities:  MAEx4, PPP  Incision(s):  Incisions clean and dry w/o signs of bleeding. There was some edema and erythema at the incisional site, but within normal post-operative expectations. Incisions covered with cyanoacetate skin sealant.    Assessment/Plan:   No acute post-op issues. Please see the day team's progress notes and/or operative notes for detailed plans.  - Multimodal pain management  - Encourage deep breathing and regular pulmonary hygiene  - Antiemetics PRN for nausea  - Diet: Clear Liquid Diet  -  Activity: Encourage early ambulation when appropriate  - Code: Full Code    James Raimres MD   General Surgery Resident

## 2022-10-29 NOTE — PROGRESS NOTES
Colorectal Surgery Progress Note  [facility]  POD#1    24 Hour Events:  -To OR for Laparoscopic right colectomy    Subjective:  Feels fair, hasn't got up OOB really yet, will work on that today. Has taken minimal clear liquids so far. No bowel function.    Vitals:  Vitals:    10/28/22 2259 10/28/22 2340 10/29/22 0725 10/29/22 0827   BP: 130/69 125/71 119/69 133/71   BP Location: Right arm Right arm Right arm    Pulse:  91 90 93   Resp:  12 12    Temp:  98.1  F (36.7  C) 98.5  F (36.9  C)    TempSrc:  Oral Temporal    SpO2:  98% 98%    Weight:       Height:         I/O:  I/O last 3 completed shifts:  In: 1500 [I.V.:1500]  Out: 1010 [Urine:1000; Blood:10]    Physical Exam:  Gen: AAOx3, NAD  Pulm: Non-labored breathing  Abd: Soft, non-distended, appropriately tender, no guarding   Incision C/D/I     Ext:  Warm and well-perfused    BMP  Recent Labs   Lab 10/29/22  0806 10/29/22  0749 10/29/22  0220 10/28/22  2328 10/28/22  1923 10/28/22  1040 10/24/22  1020   NA  --  135*  --   --   --   --  139   POTASSIUM  --  3.4  --   --   --   --  3.7   CHLORIDE  --  100  --   --   --   --  106   CO2  --  24  --   --   --   --  25   BUN  --  9.4  --   --   --   --  19   CR  --  0.65*  --   --  0.67  --  0.87   GLC 94 94 116* 134*  --    < > 163*    < > = values in this interval not displayed.     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 They are now POD#0 s/p Laparoscopic  Right colectomy for colon cancer.     Plan:  - Multimodal pain management  - Encourage deep breathing and regular pulmonary hygiene  - Antiemetics PRN for nausea  -  Diet: Clear Liquid Diet  - Activity: Encourage early ambulation when appropriate  - Code: Full Code      Dispo: Ongoing need for inpatient stay, await return of bowel function and pain control.      Ryan Rosenberg MD, MPH  Fellow in Colon and Rectal Surgery  [facility]

## 2022-10-30 LAB
GLUCOSE BLDC GLUCOMTR-MCNC: 127 MG/DL (ref 70–99)
GLUCOSE BLDC GLUCOMTR-MCNC: 128 MG/DL (ref 70–99)
GLUCOSE BLDC GLUCOMTR-MCNC: 162 MG/DL (ref 70–99)
GLUCOSE BLDC GLUCOMTR-MCNC: 169 MG/DL (ref 70–99)
GLUCOSE BLDC GLUCOMTR-MCNC: 175 MG/DL (ref 70–99)

## 2022-10-30 PROCEDURE — 120N000002 HC R&B MED SURG/OB UMMC

## 2022-10-30 PROCEDURE — 250N000013 HC RX MED GY IP 250 OP 250 PS 637: Performed by: STUDENT IN AN ORGANIZED HEALTH CARE EDUCATION/TRAINING PROGRAM

## 2022-10-30 PROCEDURE — 250N000011 HC RX IP 250 OP 636: Performed by: STUDENT IN AN ORGANIZED HEALTH CARE EDUCATION/TRAINING PROGRAM

## 2022-10-30 PROCEDURE — 250N000013 HC RX MED GY IP 250 OP 250 PS 637

## 2022-10-30 PROCEDURE — 258N000003 HC RX IP 258 OP 636: Performed by: STUDENT IN AN ORGANIZED HEALTH CARE EDUCATION/TRAINING PROGRAM

## 2022-10-30 RX ADMIN — ALLOPURINOL 200 MG: 100 TABLET ORAL at 08:39

## 2022-10-30 RX ADMIN — OXYCODONE HYDROCHLORIDE 5 MG: 5 TABLET ORAL at 12:20

## 2022-10-30 RX ADMIN — OXYCODONE HYDROCHLORIDE 5 MG: 5 TABLET ORAL at 04:43

## 2022-10-30 RX ADMIN — OXYCODONE HYDROCHLORIDE 10 MG: 10 TABLET ORAL at 19:46

## 2022-10-30 RX ADMIN — METOPROLOL SUCCINATE 50 MG: 50 TABLET, EXTENDED RELEASE ORAL at 08:39

## 2022-10-30 RX ADMIN — SODIUM CHLORIDE, POTASSIUM CHLORIDE, SODIUM LACTATE AND CALCIUM CHLORIDE: 600; 310; 30; 20 INJECTION, SOLUTION INTRAVENOUS at 04:43

## 2022-10-30 RX ADMIN — METHOCARBAMOL 750 MG: 750 TABLET ORAL at 04:43

## 2022-10-30 RX ADMIN — PANTOPRAZOLE SODIUM 40 MG: 40 TABLET, DELAYED RELEASE ORAL at 08:39

## 2022-10-30 RX ADMIN — OXYCODONE HYDROCHLORIDE 10 MG: 10 TABLET ORAL at 08:38

## 2022-10-30 RX ADMIN — ATORVASTATIN CALCIUM 80 MG: 40 TABLET, FILM COATED ORAL at 08:39

## 2022-10-30 RX ADMIN — LOSARTAN POTASSIUM AND HYDROCHLOROTHIAZIDE 1 TABLET: 100; 25 TABLET, FILM COATED ORAL at 08:39

## 2022-10-30 RX ADMIN — ENOXAPARIN SODIUM 40 MG: 40 INJECTION SUBCUTANEOUS at 12:27

## 2022-10-30 RX ADMIN — OXYCODONE HYDROCHLORIDE 10 MG: 10 TABLET ORAL at 00:33

## 2022-10-30 ASSESSMENT — ACTIVITIES OF DAILY LIVING (ADL)
ADLS_ACUITY_SCORE: 24
ADLS_ACUITY_SCORE: 23
ADLS_ACUITY_SCORE: 24
ADLS_ACUITY_SCORE: 24
ADLS_ACUITY_SCORE: 23
ADLS_ACUITY_SCORE: 23
ADLS_ACUITY_SCORE: 22
ADLS_ACUITY_SCORE: 24
ADLS_ACUITY_SCORE: 23
ADLS_ACUITY_SCORE: 24

## 2022-10-30 NOTE — PROGRESS NOTES
Colorectal Surgery Progress Note  [facility]  POD#2    24 Hour Events:  -To OR for Laparoscopic right colectomy    Subjective:  Feels better than yesterday and is in the chair today, is walking around in hallway.  Patient states that even though has neal, it was not emptying last night and was very positional. He states this has happened previously during lung surgery post-op recovery as he has a known stricture from prostatic ablation.  He requests to keep neal for another day.  He has taken in clear liquids without a problem, is not nauseated, but no bowel function yet.       Vitals:  Vitals:    10/29/22 0827 10/29/22 1447 10/29/22 2218 10/30/22 0718   BP: 133/71 122/66 134/74 137/79   BP Location:  Right arm Right arm Right arm   Pulse: 93 85 78 99   Resp:  16 16 16   Temp:  98.3  F (36.8  C) 97.4  F (36.3  C) 98.4  F (36.9  C)   TempSrc:  Temporal Oral Temporal   SpO2:  91% 93% 93%   Weight:       Height:         I/O:  I/O last 3 completed shifts:  In: 4096.25 [P.O.:240; I.V.:3856.25]  Out: 4375 [Urine:4375]    Physical Exam:  Gen: AAOx3, NAD  Pulm: Non-labored breathing  Abd: Soft, non-distended, appropriately tender, no guarding   Incision C/D/I     Ext:  Warm and well-perfused    BMP  Recent Labs   Lab 10/30/22  0818 10/30/22  0204 10/29/22  2142 10/29/22  1133 10/29/22  0806 10/29/22  0749 10/28/22  2328 10/28/22  1923 10/28/22  1040 10/24/22  1020   NA  --   --   --   --   --  135*  --   --   --  139   POTASSIUM  --   --   --   --   --  3.4  --   --   --  3.7   CHLORIDE  --   --   --   --   --  100  --   --   --  106   CO2  --   --   --   --   --  24  --   --   --  25   BUN  --   --   --   --   --  9.4  --   --   --  19   CR  --   --   --   --   --  0.65*  --  0.67  --  0.87   * 127* 93 153*   < > 94   < >  --    < > 163*    < > = values in this interval not displayed.     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 They are now POD#2 s/p Laparoscopic  Right colectomy for colon cancer.     Plan:  - Multimodal pain management  - Encourage deep breathing and regular pulmonary hygiene  - Antiemetics PRN for nausea  - Diet: advance to Low fiber today, instructed patient to go slow until bowel function returns.   - Neal- will keep in until 10/31/22 since will need assistance from Urology if any issues arise given known stricture and the need for a smaller caliber neal (10Fr) on this admission.   - Activity: Encourage early ambulation when appropriate  - Code: Full Code      Dispo: Ongoing need for inpatient stay, await return of bowel function and pain control.      Ryan Rosenberg MD, MPH  Fellow in Colon and Rectal Surgery  [facility]

## 2022-10-30 NOTE — PLAN OF CARE
[id number].A&Ox4. VSS on RA. Pain managed with oxycodone and robaxin. Denies nausea. Abdominal lap sites with liquid bandage, CDI,WAYLON. Tate patent with good urine volumes. Denies passing flatus. PIV infusing LR @ 125ml/hr. Tolerating clears. Up with SBA. Awaiting ROBF.

## 2022-10-30 NOTE — PROGRESS NOTES
"Paged by nursing regarding left calf pain.     Brief HPI: Pt is 66y/o POD2 after undergoing  laparoscopic  Right colectomy for colon cancer.      Pt stated have mild intermittent positional pain in L popliteal fossa. States pain exacerbated with L knee extension, denies being in pain with knee extension. States having hx of varicose veins in RLE that were treated previously, and says that pain feels similar when it occurs.     /68 (BP Location: Right arm)   Pulse 82   Temp 99.8  F (37.7  C) (Temporal)   Resp 16   Ht 1.753 m (5' 9\")   Wt 91.5 kg (201 lb 11.5 oz)   SpO2 91%   BMI 29.79 kg/m      EXAM  Extremities: warm and well perfused. No LE swelling or erythema. Non tender calf. Palpation of L popliteal fossa with very mild pain.     Do not suspect DVT or soft tissue infection. On VTE PPx. Likely MSK. Ordered heat pad PRN.   "

## 2022-10-30 NOTE — PLAN OF CARE
Goal Outcome Evaluation:  A&Ox4.T max 99.8 temporal. Abdominal incision pain is controlled with PRN oxycodone and robaxin. BS has been the 160-170's; no sliding scale. Pt is on metformin, glimepiride and ozempic at home( see PTA); provider is notified of Bs. Md stated that to continue to monitor Bs for now and it will be addressed tomorrow.Pt is advanced to low fiber diet; pt has been having clear liquid diet; but pt stated that he will try omelet for dinner. Up walking frequently in [address] with wife. Denied passing gas. Denied BM.   PIV SLD.Continue with POC.

## 2022-10-31 LAB
GLUCOSE BLDC GLUCOMTR-MCNC: 124 MG/DL (ref 70–99)
GLUCOSE BLDC GLUCOMTR-MCNC: 161 MG/DL (ref 70–99)
GLUCOSE BLDC GLUCOMTR-MCNC: 187 MG/DL (ref 70–99)
HOLD SPECIMEN: NORMAL
PLATELET # BLD AUTO: 188 10E3/UL (ref 150–450)

## 2022-10-31 PROCEDURE — 250N000011 HC RX IP 250 OP 636: Performed by: STUDENT IN AN ORGANIZED HEALTH CARE EDUCATION/TRAINING PROGRAM

## 2022-10-31 PROCEDURE — 250N000013 HC RX MED GY IP 250 OP 250 PS 637: Performed by: STUDENT IN AN ORGANIZED HEALTH CARE EDUCATION/TRAINING PROGRAM

## 2022-10-31 PROCEDURE — 120N000002 HC R&B MED SURG/OB UMMC

## 2022-10-31 PROCEDURE — 250N000013 HC RX MED GY IP 250 OP 250 PS 637

## 2022-10-31 PROCEDURE — 36415 COLL VENOUS BLD VENIPUNCTURE: CPT | Performed by: STUDENT IN AN ORGANIZED HEALTH CARE EDUCATION/TRAINING PROGRAM

## 2022-10-31 PROCEDURE — 85049 AUTOMATED PLATELET COUNT: CPT | Performed by: STUDENT IN AN ORGANIZED HEALTH CARE EDUCATION/TRAINING PROGRAM

## 2022-10-31 RX ORDER — SIMETHICONE 80 MG
80 TABLET,CHEWABLE ORAL EVERY 6 HOURS PRN
Status: DISCONTINUED | OUTPATIENT
Start: 2022-10-31 | End: 2022-11-01 | Stop reason: HOSPADM

## 2022-10-31 RX ORDER — ACETAMINOPHEN 325 MG/1
650 TABLET ORAL EVERY 4 HOURS PRN
Status: DISCONTINUED | OUTPATIENT
Start: 2022-10-31 | End: 2022-11-01 | Stop reason: HOSPADM

## 2022-10-31 RX ADMIN — LOSARTAN POTASSIUM AND HYDROCHLOROTHIAZIDE 1 TABLET: 100; 25 TABLET, FILM COATED ORAL at 08:02

## 2022-10-31 RX ADMIN — OXYCODONE HYDROCHLORIDE 5 MG: 5 TABLET ORAL at 01:41

## 2022-10-31 RX ADMIN — ENOXAPARIN SODIUM 40 MG: 40 INJECTION SUBCUTANEOUS at 13:04

## 2022-10-31 RX ADMIN — OXYCODONE HYDROCHLORIDE 5 MG: 5 TABLET ORAL at 06:32

## 2022-10-31 RX ADMIN — ALLOPURINOL 200 MG: 100 TABLET ORAL at 08:02

## 2022-10-31 RX ADMIN — SIMETHICONE 80 MG: 80 TABLET, CHEWABLE ORAL at 13:59

## 2022-10-31 RX ADMIN — ATORVASTATIN CALCIUM 80 MG: 40 TABLET, FILM COATED ORAL at 08:02

## 2022-10-31 RX ADMIN — OXYCODONE HYDROCHLORIDE 5 MG: 5 TABLET ORAL at 18:23

## 2022-10-31 RX ADMIN — OXYCODONE HYDROCHLORIDE 5 MG: 5 TABLET ORAL at 22:26

## 2022-10-31 RX ADMIN — PANTOPRAZOLE SODIUM 40 MG: 40 TABLET, DELAYED RELEASE ORAL at 08:02

## 2022-10-31 RX ADMIN — TERAZOSIN HYDROCHLORIDE 5 MG: 5 CAPSULE ORAL at 00:08

## 2022-10-31 RX ADMIN — METOPROLOL SUCCINATE 50 MG: 50 TABLET, EXTENDED RELEASE ORAL at 08:02

## 2022-10-31 RX ADMIN — TERAZOSIN HYDROCHLORIDE 5 MG: 5 CAPSULE ORAL at 22:22

## 2022-10-31 RX ADMIN — ACETAMINOPHEN 650 MG: 325 TABLET, FILM COATED ORAL at 19:18

## 2022-10-31 RX ADMIN — METHOCARBAMOL 750 MG: 750 TABLET ORAL at 01:41

## 2022-10-31 ASSESSMENT — ACTIVITIES OF DAILY LIVING (ADL)
ADLS_ACUITY_SCORE: 22
ADLS_ACUITY_SCORE: 23
ADLS_ACUITY_SCORE: 22

## 2022-10-31 NOTE — PLAN OF CARE
[id number]. VSS. Had an episode of orthostatic hypotension. Pt stood @ bedside to relief pressure off bladder and drain neal. Reported feeling dizziness and diaphoretic. This writer helped pt back to bed.VSS.Team paged and assessed pt @ bedside. .Pain managed with oxycodone and robaxin. Denies nausea. Abdominal lap incisions with liquid bandage, open to air. Neal patent with good urine output, very positional. Denies passing gas. PIV SL.Low fiber diet but only taking clears. Up with SBA in the halls overnight. Continue to monitor for return of bowel function.

## 2022-10-31 NOTE — PLAN OF CARE
Goal Outcome Evaluation: Ongoing progressing   Alert and oriented x4. On room air. Able to make needs known.Voided 575 this shift. Pain 3/10 headache. Paged MD for tylenol, no response. Continue to monitor. No major change this shift.

## 2022-10-31 NOTE — PLAN OF CARE
Goal Outcome Evaluation: In Progress  Shift:   VS: Temp: 97  F (36.1  C) Temp src: Oral BP: 127/72 Pulse: 92   Resp: 17 SpO2: 95 % O2 Device: None (Room air)    Pain: Intermittent abdominal gas discomfort  Neuro: A&Ox4, calls appropriately  Cardiac:   Denies chest pain/palpitations  Respiratory: Ra, denies SOB  GI/Diet/Appetite: Good oral intake, no nausea/emesis  :  Tate discontinued today, voided 350 since then. No BM, passing gas. Given simethicone x1 for gas discomfort.  LDA's: PIV SL  Skin: Abdominal lap sites  CDI. No other deficit noted  Activity: Up with SBA.   Tests/Procedures:   Pertinent Labs/Lab Collection:      Plan: Continue with cares and update team with any changes.

## 2022-10-31 NOTE — PLAN OF CARE
Goal Outcome Evaluation:     A&Ox4.T max 99.8 temporal. Abdominal incision pain is controlled with PRN oxycodone and robaxin. BS has been the 160-170's; no sliding scale. Pt is on metformin, glimepiride and ozempic at home( see PTA); provider is notified of Bs. Md stated that to continue to monitor Bs for now and it will be addressed tomorrow.Pt is advanced to low fiber diet; pt has been having clear liquid diet; but pt had small amount of omelet( 25%) and yogurt for dinner. Up walking frequently in [place] with wife. Denied passing gas. Denied BM. .  PIV SLD.Continue with POC.

## 2022-10-31 NOTE — PROGRESS NOTES
Colorectal Surgery Progress Note  [facility]  POD#3      Subjective: Feeling well this morning. Occasional abdominal pain, but improved from yesterday. No gas or BM. Tolerating CLD PO intake, pacing himself considering he is still not passing gas or BM. Denies n/v. Ambulating appropriately. Voiding through neal. H/o urethral stricture. Needs to sit at bedside to relieve pressure on bladder and ensure appropriate voiding.       Vitals:  Vitals:    10/31/22 0149 10/31/22 0151 10/31/22 0155 10/31/22 0634   BP: (!) 89/57 97/57 123/71 127/72   BP Location:    Right arm   Pulse: 101 102 86 92   Resp:   16 17   Temp:   96.8  F (36  C) 97  F (36.1  C)   TempSrc:   Oral Oral   SpO2:   94% 95%   Weight:       Height:         I/O:  I/O last 3 completed shifts:  In: 840 [P.O.:840]  Out: 3600 [Urine:3600]    Physical Exam:  Gen: AAOx3, NAD  Pulm: Non-labored breathing on RA  Abd: Soft, non-distended, appropriately tender apollo in RLQ, no guarding   Incision C/D/I  Ext:  Warm and well-perfused    BMP  Recent Labs   Lab 10/31/22  0153 10/30/22  2324 10/30/22  1611 10/30/22  1214 10/29/22  0806 10/29/22  0749 10/28/22  2328 10/28/22  1923   NA  --   --   --   --   --  135*  --   --    POTASSIUM  --   --   --   --   --  3.4  --   --    CHLORIDE  --   --   --   --   --  100  --   --    CO2  --   --   --   --   --  24  --   --    BUN  --   --   --   --   --  9.4  --   --    CR  --   --   --   --   --  0.65*  --  0.67   * 128* 162* 169*   < > 94   < >  --     < > = values in this interval not displayed.     CBC  Recent Labs   Lab 10/31/22  0707 10/29/22  0749   WBC  --  9.3   HGB  --  10.2*   HCT  --  31.6*    720         ASSESSMENT: Sven Givens is a 67 year old male who has a past medical history of Allergies, Cancer (H), Diabetes (H) (2002), Diabetic neuropathy (H) (5/7/2012), Hypertension, Kidney stones (09/2004), Rhinitis, allergic, Rosacea, 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,  overuse, and pressure (10/30/2015), and Varicose veins. They are now POD#3 s/p Laparoscopic Right colectomy for colon cancer.     Plan:  - Multimodal pain management  - Encourage IS  - Antiemetics PRN for nausea  - LFD patient to go slow until bowel function returns.   - Removal of neal today. Voiding trial - may require urology assistance considering known stricture  - Activity: Encourage early ambulation when appropriate  - Code: Full Code  Dispo: Ongoing need for inpatient stay, await return of bowel function and pain control.    Patient seen with Dr Asencio, colorectal fellow, and discussed with Dr Wagner, attending.  Yoko Chambers  General Surgery PGY1

## 2022-11-01 VITALS
HEART RATE: 106 BPM | BODY MASS INDEX: 29.88 KG/M2 | SYSTOLIC BLOOD PRESSURE: 102 MMHG | TEMPERATURE: 95.8 F | HEIGHT: 69 IN | DIASTOLIC BLOOD PRESSURE: 70 MMHG | WEIGHT: 201.72 LBS | RESPIRATION RATE: 18 BRPM | OXYGEN SATURATION: 95 %

## 2022-11-01 LAB
GLUCOSE BLDC GLUCOMTR-MCNC: 131 MG/DL (ref 70–99)
GLUCOSE BLDC GLUCOMTR-MCNC: 187 MG/DL (ref 70–99)
GLUCOSE BLDC GLUCOMTR-MCNC: 215 MG/DL (ref 70–99)
LACTATE SERPL-SCNC: 1.6 MMOL/L (ref 0.7–2)

## 2022-11-01 PROCEDURE — 250N000011 HC RX IP 250 OP 636: Performed by: STUDENT IN AN ORGANIZED HEALTH CARE EDUCATION/TRAINING PROGRAM

## 2022-11-01 PROCEDURE — 250N000013 HC RX MED GY IP 250 OP 250 PS 637: Performed by: STUDENT IN AN ORGANIZED HEALTH CARE EDUCATION/TRAINING PROGRAM

## 2022-11-01 PROCEDURE — 36415 COLL VENOUS BLD VENIPUNCTURE: CPT | Performed by: COLON & RECTAL SURGERY

## 2022-11-01 PROCEDURE — 250N000013 HC RX MED GY IP 250 OP 250 PS 637

## 2022-11-01 PROCEDURE — 83605 ASSAY OF LACTIC ACID: CPT | Performed by: COLON & RECTAL SURGERY

## 2022-11-01 RX ORDER — ACETAMINOPHEN 325 MG/1
650 TABLET ORAL EVERY 4 HOURS PRN
Qty: 100 TABLET | Refills: 0 | Status: SHIPPED | OUTPATIENT
Start: 2022-11-01 | End: 2023-06-07

## 2022-11-01 RX ORDER — ENOXAPARIN SODIUM 100 MG/ML
40 INJECTION SUBCUTANEOUS EVERY 24 HOURS
Qty: 10.4 ML | Refills: 0 | Status: SHIPPED | OUTPATIENT
Start: 2022-11-01 | End: 2022-11-27

## 2022-11-01 RX ORDER — OXYCODONE HYDROCHLORIDE 5 MG/1
5 TABLET ORAL EVERY 4 HOURS PRN
Qty: 20 TABLET | Refills: 0 | Status: SHIPPED | OUTPATIENT
Start: 2022-11-01 | End: 2023-01-11

## 2022-11-01 RX ORDER — METHOCARBAMOL 750 MG/1
750 TABLET, FILM COATED ORAL 3 TIMES DAILY PRN
Qty: 30 TABLET | Refills: 0 | Status: SHIPPED | OUTPATIENT
Start: 2022-11-01 | End: 2023-01-11

## 2022-11-01 RX ORDER — SIMETHICONE 80 MG
80 TABLET,CHEWABLE ORAL EVERY 6 HOURS PRN
Qty: 30 TABLET | Refills: 0 | Status: SHIPPED | OUTPATIENT
Start: 2022-11-01 | End: 2023-01-11

## 2022-11-01 RX ADMIN — LOSARTAN POTASSIUM AND HYDROCHLOROTHIAZIDE 1 TABLET: 100; 25 TABLET, FILM COATED ORAL at 08:58

## 2022-11-01 RX ADMIN — OXYCODONE HYDROCHLORIDE 5 MG: 5 TABLET ORAL at 08:58

## 2022-11-01 RX ADMIN — PANTOPRAZOLE SODIUM 40 MG: 40 TABLET, DELAYED RELEASE ORAL at 08:58

## 2022-11-01 RX ADMIN — OXYCODONE HYDROCHLORIDE 5 MG: 5 TABLET ORAL at 03:43

## 2022-11-01 RX ADMIN — ACETAMINOPHEN 650 MG: 325 TABLET, FILM COATED ORAL at 03:43

## 2022-11-01 RX ADMIN — ALLOPURINOL 200 MG: 100 TABLET ORAL at 08:59

## 2022-11-01 RX ADMIN — ENOXAPARIN SODIUM 40 MG: 40 INJECTION SUBCUTANEOUS at 12:59

## 2022-11-01 RX ADMIN — METOPROLOL SUCCINATE 50 MG: 50 TABLET, EXTENDED RELEASE ORAL at 08:58

## 2022-11-01 RX ADMIN — ATORVASTATIN CALCIUM 80 MG: 40 TABLET, FILM COATED ORAL at 08:58

## 2022-11-01 ASSESSMENT — ACTIVITIES OF DAILY LIVING (ADL)
ADLS_ACUITY_SCORE: 22

## 2022-11-01 NOTE — PROGRESS NOTES
"  Status: POD# S/P Laparoscopic Right colectomy for colon cancer.  Neuro: A&O x 4 N/T  GI: +bS denies passing gas no BM.  : Voiding spont.   Resp: R.A  Mobility: Independent with bathroom.  Cardiac: WNL  Skin: 3 Laps liquid bandage intact.  Lines/Drains: PIV SL  Pain: 5/10 abdomen prn oxycodone 5 mg managed the pain.  Behavior: calm  VS: Blood pressure 102/70, pulse 106, temperature (!) 95.8  F (35.4  C), temperature source Oral, resp. rate 18, height 1.753 m (5' 9\"), weight 91.5 kg (201 lb 11.5 oz), SpO2 95 %.      Plan of Care: Possible discharge.  "

## 2022-11-01 NOTE — PROGRESS NOTES
"  Vital signs:  Temp: (!) 95.8  F (35.4  C) Temp src: Oral BP: 102/70 Pulse: 106   Resp: 18 SpO2: 95 % O2 Device: None (Room air) Oxygen Delivery: 1 LPM Height: 175.3 cm (5' 9\") Weight: 91.5 kg (201 lb 11.5 oz)  Estimated body mass index is 29.79 kg/m  as calculated from the following:    Height as of this encounter: 1.753 m (5' 9\").    Weight as of this encounter: 91.5 kg (201 lb 11.5 oz).    A&O x4   Written discharge,teaching has been done discharge script sent, to discharge pharmacy,copy of discharge given to the pt.Pt discharge home.  "

## 2022-11-01 NOTE — PLAN OF CARE
"[id number] Abdominal lap sites C/D/I with dermabond. Hypoactive bowel sounds. Passing flatus, no BM this shift. Pt repots having \"very small\" BM yesterday. Denies nausea. Pain managed with Tylenol and oxycodone. PIV saline locked. Voids spontaneously, marginal but adequate urine output overnight. Using urinal at bedside. On low fiber diet; pt reports poor appetite. Up with SBA. VSS on room air.   "

## 2022-11-01 NOTE — DISCHARGE SUMMARY
[facility]  Discharge Summary  Colon and Rectal Surgery     Sven Givens MRN# [id number]   YOB: 1955 Age: 67 year old     Date of Admission:  10/28/2022  Date of Discharge::  11/1/2022  Admitting Physician:  Caren Wagner MD  Discharge Physician:  Caren Wagner MD  Primary Care Physician:        Yeison Villegas          Admission Diagnoses:   Cancer of ascending colon (H) [C18.2]  Colon adenocarcinoma (H) [C18.9]  History of urethral stricture    Hypertension  PRESLEY  Diabetes mellitus type 2  Obesity  GERD  dyslipidemia          Discharge Diagnosis:   Cancer of ascending colon (H) [C18.2]  Colon adenocarcinoma (H) [C18.9]  History of urethral stricture    Hypertension  PRESLEY  Diabetes mellitus type 2  Obesity  GERD  dyslipidemia         Procedures:   10/28/2022:  PROCEDURES: Laparoscopic extended right hemicolectomy, partial omentectomy.            Consultations:   None         Imaging Studies:     Results for orders placed or performed in visit on 09/29/22   CT Chest/Abdomen/Pelvis w Contrast    Narrative    EXAMINATION: CT CHEST/ABDOMEN/PELVIS W CONTRAST, 9/29/2022 8:19 AM    TECHNIQUE:  Helical CT images from the thoracic inlet through the  symphysis pubis were obtained  with contrast. Contrast dose: 117 ml  isovue 370    COMPARISON: CT abdomen dated 5/30/2018. CT chest dated 9/13/2021. CT  chest 2/19/2018.    HISTORY: ascending colon mass.  Evaluate for metastatic disease; Mass  of colon    FINDINGS:    Chest:    Thyroid gland is within normal limits. Heart is not enlarged. No  pericardial effusion. Normal caliber thoracic aorta and main pulmonary  artery. Scattered atherosclerotic calcification of the thoracic and  mild to moderate calcifications of the coronary arteries. No  significant mediastinal, hilar or axillary lymphadenopathy. Small  hiatal hernia.    Central tracheobronchial tree is patent. No focal consolidation,  pleural  effusion, or pneumothorax. Unchanged 2 mm left lower lobe  pulmonary nodule, possibly calcified (series 6, image 161). Relatively  unchanged nodularity along bilateral posterior costal pleura (series  6, image 160). No new or enlarging pulmonary nodule. Postsurgical  changes of left lung wedge resection.    Abdomen and pelvis:     Subsegmental circumferential thickening of the ascending colon near   ileocecal junction, (series 2, image 228 and series 3, image 65);  proximal extent of the thickening is not well delineated from the  decompressed proximal colon. Diffuse colonic diverticulosis. No  abnormal dilatation of the small bowel loops. Appendix is within  normal limits.    No suspicious focal hepatic lesion. Gallbladder is normal. Spleen,  adrenal glands, and pancreas are normal. Symmetric enhancement of the  kidneys without evidence of hydronephrosis. Multiple bilateral fluid  attenuating simple cysts are seen. Several other subcentimeter  hypoattenuating foci are too small to characterize but likely  represent simple cysts. 7 mm nonobstructive renal stone in the upper  pole of the right kidney. 5 mm nonobstructing stone in the mid left  kidney. Nonspecific bilateral perinephric soft tissue streakiness.  Coarse prostatic calcifications. Prostate gland is mildly enlarged.    No free fluid or air in the abdomen or pelvis.    Abdominal aorta and its major branches including celiac, superior  mesenteric, bilateral renal, inferior mesenteric artery is patent.    No significantly enlarged lymph nodes in abdomen or pelvis.    Bones and soft tissues: Mild degenerative changes of the spine. No  acute or suspicious osseous lesions. A small fat-containing umbilical  hernia. Calcification in subcutaneous gluteal region likely  representing postinjection changes.      Impression    IMPRESSION:    1. Segmental ascending colon thickening, compatible with neoplastic  colonic lesion on recent colonoscopy; no significant  abdominal  lymphadenopathy or associated bowel obstruction.  2.  Postsurgical changes of left lung wedge resection. No new or  enlarging pulmonary nodule.  3. No convincing metastatic disease in the chest, abdomen or pelvis        I have personally reviewed the examination and initial interpretation  and I agree with the findings.    DANIAL PASTOR MD         SYSTEM ID:  [id number]              Medications Prior to Admission:     Medications Prior to Admission   Medication Sig Dispense Refill Last Dose     allopurinol (ZYLOPRIM) 100 MG tablet TAKE 2 TABLETS BY MOUTH EVERY DAY (Patient taking differently: every morning TAKE 2 TABLETS BY MOUTH EVERY DAY) 180 tablet 3 10/27/2022     atorvastatin (LIPITOR) 80 MG tablet Take 1 tablet (80 mg) by mouth daily (Patient taking differently: Take 80 mg by mouth every morning) 90 tablet 3 10/27/2022     clotrimazole-betamethasone (LOTRISONE) 1-0.05 % external cream    10/27/2022     fluticasone (FLONASE) 50 MCG/ACT nasal spray Spray 1-2 sprays into both nostrils daily (Patient taking differently: Spray 1-2 sprays into both nostrils as needed) 1 Bottle 1      glimepiride (AMARYL) 4 MG tablet Take 1 tablet (4 mg) by mouth 2 times daily 180 tablet 3 10/27/2022     ketoconazole (NIZORAL) 2 % external shampoo APPLY TOPICALLY EVERY OTHER DAY LATHER INTO RASH AND WASH OFF AFTER 10 MINUTES. 120 mL 0 Unknown     losartan-hydrochlorothiazide (HYZAAR) 100-25 MG tablet Take 1 tablet by mouth daily For blood pressure. (Patient taking differently: Take 1 tablet by mouth every morning For blood pressure.) 90 tablet 3 10/28/2022     metFORMIN (GLUCOPHAGE) 1000 MG tablet TAKE 1 TABLET BY MOUTH TWICE A DAY WITH MEALS (Patient taking differently: 2 times daily (with meals) TAKE 1 TABLET BY MOUTH TWICE A DAY WITH MEALS) 180 tablet 3 10/27/2022     metoprolol succinate ER (TOPROL-XL) 50 MG 24 hr tablet Take 1 tablet (50 mg) by mouth daily (Patient taking differently: Take 50 mg by mouth every  morning) 90 tablet 3 10/27/2022     metroNIDAZOLE (METROCREAM) 0.75 % external cream    Past Week     metroNIDAZOLE 0.75 % EX external gel Apply topically 2 times daily 45 g 3 Past Week     MULTIPLE VITAMINS OR Take by mouth every morning   Past Week     nitroGLYcerin (NITROSTAT) 0.4 MG sublingual tablet For chest pain place 1 tablet under the tongue every 5 minutes for 3 doses. If symptoms persist 5 minutes after 1st dose call 911. (Patient taking differently: every 5 minutes as needed For chest pain place 1 tablet under the tongue every 5 minutes for 3 doses. If symptoms persist 5 minutes after 1st dose call 911.) 25 tablet 3 Unknown     omega-3 acid ethyl esters (LOVAZA) 1 g capsule Take 2 capsules (2 g) by mouth 2 times daily 360 capsule 3 Unknown     omeprazole (PRILOSEC) 20 MG DR capsule Take 1 capsule (20 mg) by mouth daily Take 30-60 minutes before a meal. (Patient taking differently: Take 20 mg by mouth every morning Take 30-60 minutes before a meal.) 90 capsule 1 10/27/2022     Oxymetazoline HCl (AFRIN NASAL SPRAY NA) Spray in nostril as needed   Unknown     OZEMPIC, 1 MG/DOSE, 4 MG/3ML SOPN INJECT 1 MG SUBCUTANEOUS EVERY 7 DAYS (Patient taking differently: Inject 1 mg Subcutaneous every 7 days Takes on Saturdays) 9 mL 1 Unknown     terazosin (HYTRIN) 5 MG capsule TAKE 1 CAPSULE (5 MG) BY MOUTH AT BEDTIME FOR BLOOD PRESSURE. (Patient taking differently: At Bedtime TAKE 1 CAPSULE (5 MG) BY MOUTH AT BEDTIME FOR BLOOD PRESSURE.) 90 capsule 3 10/27/2022     Azelaic Acid (FINACEA) 15 % gel Apply small amount twice a day to skin (Patient taking differently: as needed Apply small amount twice a day to skin) 50 g 3      blood glucose (NO BRAND SPECIFIED) test strip Use to test blood sugar one time daily or as directed./ okay to dispense brand covered by insurance 100 strip 3               Discharge Medications:     Current Discharge Medication List      START taking these medications    Details   acetaminophen  (TYLENOL) 325 MG tablet Take 2 tablets (650 mg) by mouth every 4 hours as needed for mild pain or headaches  Qty: 100 tablet, Refills: 0    Associated Diagnoses: Acute post-operative pain      enoxaparin ANTICOAGULANT (LOVENOX) 40 MG/0.4ML syringe Inject 0.4 mLs (40 mg) Subcutaneous every 24 hours for 26 days  Qty: 10.4 mL, Refills: 0    Associated Diagnoses: Colon adenocarcinoma (H)      methocarbamol (ROBAXIN) 750 MG tablet Take 1 tablet (750 mg) by mouth 3 times daily as needed for muscle spasms  Qty: 30 tablet, Refills: 0    Associated Diagnoses: Acute post-operative pain      oxyCODONE (ROXICODONE) 5 MG tablet Take 1 tablet (5 mg) by mouth every 4 hours as needed for moderate to severe pain  Qty: 20 tablet, Refills: 0    Associated Diagnoses: Acute post-operative pain         CONTINUE these medications which have NOT CHANGED    Details   allopurinol (ZYLOPRIM) 100 MG tablet TAKE 2 TABLETS BY MOUTH EVERY DAY  Qty: 180 tablet, Refills: 3    Associated Diagnoses: Idiopathic chronic gout of right foot without tophus      atorvastatin (LIPITOR) 80 MG tablet Take 1 tablet (80 mg) by mouth daily  Qty: 90 tablet, Refills: 3    Associated Diagnoses: Hypertriglyceridemia; Type 2 diabetes mellitus with diabetic polyneuropathy, without long-term current use of insulin (H)      clotrimazole-betamethasone (LOTRISONE) 1-0.05 % external cream       fluticasone (FLONASE) 50 MCG/ACT nasal spray Spray 1-2 sprays into both nostrils daily  Qty: 1 Bottle, Refills: 1    Associated Diagnoses: URI with cough and congestion      glimepiride (AMARYL) 4 MG tablet Take 1 tablet (4 mg) by mouth 2 times daily  Qty: 180 tablet, Refills: 3    Associated Diagnoses: Type 2 diabetes mellitus with diabetic polyneuropathy, without long-term current use of insulin (H)      ketoconazole (NIZORAL) 2 % external shampoo APPLY TOPICALLY EVERY OTHER DAY LATHER INTO RASH AND WASH OFF AFTER 10 MINUTES.  Qty: 120 mL, Refills: 0    Associated Diagnoses: Tinea  versicolor      losartan-hydrochlorothiazide (HYZAAR) 100-25 MG tablet Take 1 tablet by mouth daily For blood pressure.  Qty: 90 tablet, Refills: 3    Associated Diagnoses: Essential hypertension with goal blood pressure less than 140/90      metFORMIN (GLUCOPHAGE) 1000 MG tablet TAKE 1 TABLET BY MOUTH TWICE A DAY WITH MEALS  Qty: 180 tablet, Refills: 3    Associated Diagnoses: Type 2 diabetes mellitus with diabetic polyneuropathy, without long-term current use of insulin (H)      metoprolol succinate ER (TOPROL-XL) 50 MG 24 hr tablet Take 1 tablet (50 mg) by mouth daily  Qty: 90 tablet, Refills: 3    Associated Diagnoses: Essential hypertension with goal blood pressure less than 140/90; Sinus tachycardia      metroNIDAZOLE (METROCREAM) 0.75 % external cream       metroNIDAZOLE 0.75 % EX external gel Apply topically 2 times daily  Qty: 45 g, Refills: 3    Associated Diagnoses: Rosacea      MULTIPLE VITAMINS OR Take by mouth every morning      nitroGLYcerin (NITROSTAT) 0.4 MG sublingual tablet For chest pain place 1 tablet under the tongue every 5 minutes for 3 doses. If symptoms persist 5 minutes after 1st dose call 911.  Qty: 25 tablet, Refills: 3    Associated Diagnoses: Coronary artery disease involving native coronary artery of native heart without angina pectoris      omega-3 acid ethyl esters (LOVAZA) 1 g capsule Take 2 capsules (2 g) by mouth 2 times daily  Qty: 360 capsule, Refills: 3    Associated Diagnoses: Coronary artery disease involving native coronary artery of native heart without angina pectoris      omeprazole (PRILOSEC) 20 MG DR capsule Take 1 capsule (20 mg) by mouth daily Take 30-60 minutes before a meal.  Qty: 90 capsule, Refills: 1    Associated Diagnoses: Gastroesophageal reflux disease with esophagitis      Oxymetazoline HCl (AFRIN NASAL SPRAY NA) Spray in nostril as needed      OZEMPIC, 1 MG/DOSE, 4 MG/3ML SOPN INJECT 1 MG SUBCUTANEOUS EVERY 7 DAYS  Qty: 9 mL, Refills: 1    Comments: EVELYN  Code Needed  .  Associated Diagnoses: Type 2 diabetes mellitus with diabetic polyneuropathy, without long-term current use of insulin (H)      terazosin (HYTRIN) 5 MG capsule TAKE 1 CAPSULE (5 MG) BY MOUTH AT BEDTIME FOR BLOOD PRESSURE.  Qty: 90 capsule, Refills: 3    Associated Diagnoses: Essential hypertension with goal blood pressure less than 140/90      Azelaic Acid (FINACEA) 15 % gel Apply small amount twice a day to skin  Qty: 50 g, Refills: 3    Associated Diagnoses: Rosacea      blood glucose (NO BRAND SPECIFIED) test strip Use to test blood sugar one time daily or as directed./ okay to dispense brand covered by insurance  Qty: 100 strip, Refills: 3    Comments: Patient requesting Contour Next brand test strips, okay to dispense brand covered by insurance  Associated Diagnoses: Type 2 diabetes mellitus with diabetic polyneuropathy, without long-term current use of insulin (H)                 Brief History of Illness:   67 year old M with PMH of DM2, diabetic neuropathy, HTN, PRESLEY not on CPAP, h/o of SCLC s/p wedge resection, kidney stones, urethral stricture, BPH, GERD, who was found to have an ascending colon cancer now s/p laparoscopic extended right hemicolectomy and partial omentectomy on 10/28/2022.         Hospital Course:   Post-operatively pt was gently fluid resuscitated.  Due to known urethral stricture and BPH, neal was kept in place and removed on POD#4 for which he tolerated and was able to urinate there after.  Pt had eventual return of bowel function and was able to tolerate small amounts of a low fiber diet.     Pt was taught how to self-inject post-operative prophylactic lovenox for which he is to do for a total of 30 days. Post-operative pain was controlled with scheduled tylenol, robaxin and prn oxycodone.     Patient is to follow up in the Colon and Rectal Surgery Clinic in 2-3 week with Marlee Schaffer NP or Bertha Nguyễn PA-C and then with Dr. Wagner in 2-3 weeks after.  "         Day of Discharge Physical Exam:   Blood pressure 102/70, pulse 106, temperature (!) 95.8  F (35.4  C), temperature source Oral, resp. rate 18, height 1.753 m (5' 9\"), weight 91.5 kg (201 lb 11.5 oz), SpO2 95 %.    Gen: AAOx3, NAD  Pulm: Non-labored breathing  Abd: Soft, mildly distended appropriately tender, no guarding/rebound   Incision C/D/I   Ext:  Warm and well-perfused         Final Pathology Result:   Pending at time of discharge           Discharge Instructions and Follow-Up:     Discharge Procedure Orders   Primary Care - Care Coordination Referral   Standing Status: Future   Referral Priority: Routine: Next available opening Referral Type: Care Coordination   Number of Visits Requested: 1     Reason for your hospital stay   Order Comments: 10/28/2022:   PROCEDURES: Laparoscopic extended right hemicolectomy, partial omentectomy.     Activity   Order Comments: Your activity upon discharge:   ACTIVITY  -No lifting, pushing, pulling greater than 10 lbs and no strenuous exercise for 6 weeks   -No driving while on narcotic analgesics (i.e. Percocet, oxycodone, Vicodin)  -No driving until you are able to fully twist to both sides or slam on brakes quickly and without any pain  -We encourage walking at least 4-5 times per day     Order Specific Question Answer Comments   Is discharge order? Yes      Adult [facility] Follow-up and recommended labs and tests   Order Comments: WOUND CARE  -Inspect your wounds daily for signs of infection (increased redness, drainage, pain)  -Keep your wound clean and dry  -You may shower, but do not soak in tub or pool    NOTIFY  Please contact Shanelle Naylor RN or Vesna Rebolledo RN at [phone] for problems after discharge such as:  -Temperature > 101F, chills, rigors, dizziness  -Redness around or purulent drainage from wound  -Inability to tolerate diet, nausea or vomiting  -You stop passing gas, develop significant bloating, abdominal pain  -Have blood in " stools/vomit  -Have severe diarrhea/constipation  -Any other questions or concerns.  - At nights (after 4:30pm), on weekends, or if urgent, call [phone] and ask the  to speak with the on-call Colorectal Surgery resident or fellow      Medication Instructions  Some of your medications may have changed. Please take only prescribed and resumed medications     FOLLOW-UP  1.  You will need to follow-up with Marlee Schaffer NP or Bertha Nguyễn PA-C in the Colon and Rectal Surgery clinic in 2-3 week(s) and then with [facility] Staff: Dr. Wagner in 4-5 weeks after.  Please contact our Nurses (phone # [phone]) if you have not heard from our clinic in 3 business days afer discharge to schedule a follow-up appointment.    2.  Avoid stool softeners/laxatives for the next few days.  But if you need to take something, we recommend miralax.  Please call the Colon and Rectal Surgery clinic at numbers above to discuss before taking any stool softeners/laxatives.     3.  Recommend checking your blood glucoses 4 times per day for the next 1-2 weeks while you are recovering from surgery as your activity level and appetites are lower than usual and this will affect your blood glucose levels.          Appointments on [facility] and/or [facility] (with [facility] or [facility] provider or service). Call [phone] if you haven't heard regarding these appointments within 7 days of discharge.     Diet   Order Comments: Follow this diet upon discharge:  DIET  -Low Residue Diet for at least 4-6 weeks unless cleared by Colorectal surgery.  No raw vegetables, fruit skins, fibrous foods that require a lot of chewing, nuts, seeds, corn, popcorn.   -We recommend eating slowly, chewing thoroughly, eating small frequent meals throughout the day  -Stay well hydrated.     Order Specific Question Answer Comments   Is discharge order? Yes             Home Health Care:     Not needed           Discharge Disposition:     Discharged to  home      Condition at discharge: Stable      Giulia Hicks PA-C ..................11/1/2022   2:31 PM  Colon and Rectal Surgery     Pt was seen and discussed with Dr. Asencio on 11/1/2022    The above plan of care was performed and communicated to me by [facility] Staff: Dr. Wagner     I spent 30 minute face-to-face or coordinating care of Sven Givens. Over 50% of our time on the unit was spent counseling the patient and/or coordinating care as documented in the assessment and plan.       Staff Addendum:  Agree with the discharge summary as documented. I was personally involved with the discharge planning and hospital decision-making for this patient.  Caren Wagner MD  Colon and Rectal Surgery Staff  [facility]

## 2022-11-01 NOTE — PLAN OF CARE
Assumed cares [id number]. A&Ox4. VSS on RA. C/o headache, received acetaminophen with relief. C/o abd pain, received PRN oxycodone once. Lap sites to abdomen C/D/I, WAYLON. Up SBA. Voided 300mL this shift. +BS, +flatus. PIV SL in left arm. LSC. Denies nausea. Talking on phone, watching TV and resting between cares.

## 2022-11-01 NOTE — PROGRESS NOTES
Colorectal Surgery Progress Note  [facility]  POD#4      Subjective: Feeling well this morning. Abdo pain controlled with PO medicine PRN. Had gas and a small BM yesterday. Had small amount of LRD and tolerated that well, no n/v. Voiding appropriately after neal removal.       Vitals:  Vitals:    10/31/22 0155 10/31/22 0634 10/31/22 1502 10/31/22 2205   BP: 123/71 127/72 116/66 115/74   BP Location:  Right arm Right arm Right arm   Pulse: 86 92 91 85   Resp: 16 17 16 16   Temp: 96.8  F (36  C) 97  F (36.1  C) 97.1  F (36.2  C) 98  F (36.7  C)   TempSrc: Oral Oral Oral Temporal   SpO2: 94% 95% 91% 95%   Weight:       Height:         I/O:  I/O last 3 completed shifts:  In: 600 [P.O.:600]  Out: 1875 [Urine:1875]       Physical Exam:  Gen: AAOx3, NAD  Pulm: Non-labored breathing on RA  Abd: Soft, non-distended, appropriately tender apollo in RLQ, no guarding   Incision C/D/I  Ext:  Warm and well-perfused    BMP  Recent Labs   Lab 11/01/22  0241 10/31/22  2221 10/31/22  1655 10/31/22  0153 10/29/22  0806 10/29/22  0749 10/28/22  2328 10/28/22  1923   NA  --   --   --   --   --  135*  --   --    POTASSIUM  --   --   --   --   --  3.4  --   --    CHLORIDE  --   --   --   --   --  100  --   --    CO2  --   --   --   --   --  24  --   --    BUN  --   --   --   --   --  9.4  --   --    CR  --   --   --   --   --  0.65*  --  0.67   * 124* 187* 161*   < > 94   < >  --     < > = values in this interval not displayed.     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 They are now POD#3 s/p  Laparoscopic Right colectomy for colon cancer. He is recovering well from a surgery stand-point.    Plan:  - See if tolerates breakfast and lunch and may discharge home later today  - Multimodal pain management  - Encourage IS  - Activity: Encourage early ambulation when appropriate  - Code: Full Code    Patient seen with Dr Asencio, colorectal fellow, and discussed with Dr Wagner, attending.  Yoko Chambers  General Surgery PGY1

## 2022-11-02 NOTE — TELEPHONE ENCOUNTER
Forms competed and they are needing Dr. Villegas's signature on the first page, provider please sign forms and look over them to make sure nothing else is needed.

## 2022-11-02 NOTE — TELEPHONE ENCOUNTER
Called and let patient know form was complete he would like originals mailed to his home address verified with patient     Faxed to [phone] per patients request right fax confirmed @ 5:01pm    Copy sent to abstracting and TC file

## 2022-11-03 ENCOUNTER — TELEPHONE (OUTPATIENT)
Dept: FAMILY MEDICINE | Facility: CLINIC | Age: 67
End: 2022-11-03

## 2022-11-03 ENCOUNTER — PATIENT OUTREACH (OUTPATIENT)
Dept: SURGERY | Facility: CLINIC | Age: 67
End: 2022-11-03

## 2022-11-03 NOTE — PROGRESS NOTES
Post Op Note     Called to check on patient postoperatively after hospital discharge.     Patient is s/p right hemicolectomy with Dr. Caren Wagner for colon cancer.   Admitted 10/28 and discharged on 11/1.      Pain is well controlled with tylenol, oxy, robaxin     Lovenox: injecting daily without concerns     Patient is not eating and drinking normally. Patient is on a low fiber diet.  Encouraged patient to drink 8-10 glasses of water a day. He states he isn't able to eat a lot. I encouraged softer bland food to start and work up from there     Patient is passing flats, is having loose bowel movements.    Patient is voiding normally and urine is light in color.    Patient is not set up with home care.     Patient Denies nausea and vomiting.    Patient Denies any fevers or chills.    Patient's states that one of his incision is red and has white drainage. Instructed him to take a picture and send it to me via [facility] Patient reminded NOT to remove any dressings over their incisions.     Patient is on a activity restriction. Lifting 10 pounds for 6 weeks.     Patient Denies needing any forms completed.     Follow up is set up with Bertha Nguyễn PA-C on 11/14  Encouraged the patient to contact the clinic in the meantime with any fevers, chills, nausea, vomiting, increased colostomy output, no colostomy output, dizziness, lightheadedness, uncontrolled pain, changes to the incisions, or with any questions or concerns.    Patient's questions were answered to their stated satisfaction and they are in agreement with this plan.    RAO Timmons [phone]  Colon & Rectal Surgery Clinic  [facility]

## 2022-11-04 NOTE — PROGRESS NOTES
"Colon and Rectal Surgery Postoperative Clinic Note    RE: Sven Givens  : 1955  SOMMER: 2022    Sven Givens is a very pleasant 67 year old male with a history of colon cancer of the hepatic flexure now status post laparoscopic extended right hemicolectomy and partial omentectomy with Dr. Wagner on 10/28/22.     Final Diagnosis   A. TERMINAL ILEUM, APPENDIX, COLON, RIGHT AND TRANSVERSE, RIGHT HEMICOLECTOMY:  - Adenocarcinoma, moderately-differentiated, arising from tubular adenoma (pT1pN0)  - Tumor Extent: Invades the submucosa  - Tumor size: 2.6 cm.  - Margins: Negative for invasive carcinoma and dysplasia   - Lymphovascular invasion: Present  - Perineural invasion: Not identified  - Twenty seven benign lymph nodes (0/27)  - Tubular adenoma  - Fibrous obliteration of the appendix     B. OMENTUM, PARTIAL RESECTION:  - Benign fibroadipose tissue  - Negative for metastatic carcinoma      Interval history: Sven is doing well. He has minimal pain now, mostly when laying on his right side. On a low fiber diet and having looser bowel movements. Has 12 lovenox injections left.     Physical Examination:  /73 (BP Location: Left arm, Patient Position: Sitting, Cuff Size: Adult Regular)   Pulse 100   Ht 5' 9\"   Wt 200 lb   SpO2 100%   BMI 29.53 kg/m    General: alert, oriented, in no acute distress, sitting comfortably  HEENT: moist mucous membranes  Abdomen: laparoscopic incision sites well-approximated, no erythema, no drainage and with glue flaking off.     Assessment/Plan:  67 year old male with a history of colon cancer of the hepatic flexure now status post laparoscopic extended right hemicolectomy and partial omentectomy with Dr. Wagner on 10/28/22. Sven is doing well after surgery. We discussed his pathology. He is following up with Dr. Valencia in about 2 weeks. We will get him scheduled to follow up with Dr. Wagner in about a month. He can do this virtually. Continue to " avoid lifting >10 lb. May start transitioning to a regular diet. Patient's questions were answered to his stated satisfaction and he is in agreement with this plan.     Medical history:  Past Medical History:   Diagnosis Date     Allergies     PCN, ACE, Indomethocin     Cancer (H)     small cell lung cancer stage I     Diabetes (H) 2002     Diabetic neuropathy (H) 5/7/2012     Hypertension      Kidney stones 09/2004    s/p right kidney basket retrieval     Rhinitis, allergic      Rosacea      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, overuse, 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     Varicose veins        Surgical history:  Past Surgical History:   Procedure Laterality Date     BRONCHOSCOPY FLEXIBLE N/A 9/23/2016    Procedure: BRONCHOSCOPY FLEXIBLE;  Surgeon: Gayle Moya MD;  Location: [facility]     COLONOSCOPY  5-03     COLONOSCOPY N/A 9/23/2022    Procedure: COLONOSCOPY, WITH POLYPECTOMY AND BIOPSY;  Surgeon: Abbe Mccarty MD;  [facility]     COLONOSCOPY WITH CO2 INSUFFLATION N/A 5/31/2017    Procedure: COLONOSCOPY WITH CO2 INSUFFLATION;  COLON SCREEN/ SEB;  Surgeon: Margarito Rasheed DO;  [facility]     COLONOSCOPY WITH CO2 INSUFFLATION N/A 9/23/2022    Procedure: COLONOSCOPY, WITH CO2 INSUFFLATION;  Surgeon: Abbe Mccarty MD;  [facility]     COMBINED ESOPHAGOSCOPY, GASTROSCOPY, DUODENOSCOPY (EGD) WITH CO2 INSUFFLATION N/A 4/19/2019    Procedure: COMBINED ESOPHAGOSCOPY, GASTROSCOPY, DUODENOSCOPY (EGD) WITH CO2 INSUFFLATION;  Surgeon: Abbe Mccarty MD;  [facility]     ESOPHAGOSCOPY, GASTROSCOPY, DUODENOSCOPY (EGD), COMBINED N/A 4/19/2019    Procedure: Combined Esophagoscopy, Gastroscopy, Duodenoscopy (Egd), Biopsy Single Or Multiple;  Surgeon: Abbe Mccarty MD;  [facility]     GENITOURINARY SURGERY      kidney stones     THORACOSCOPIC WEDGE RESECTION LUNG Left 9/23/2016    Procedure: THORACOSCOPIC WEDGE RESECTION LUNG;  Surgeon: Gayle Moya MD;   Location: [facility]     VASCULAR SURGERY      varicose vein       Problem list:    Patient Active Problem List    Diagnosis Date Noted     Colon adenocarcinoma (H) 10/28/2022     Priority: Medium     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) 10/07/2021     Priority: Medium     Age-related incipient cataract of both eyes 10/07/2021     Priority: Medium     PRESLEY (obstructive sleep apnea)      Priority: Medium     11/29/2007(200#)-AHI 37.7, RDI 50, lowest oxygen saturation 80%. CPAP of 7 cm/H9O was effective.       Non-small cell carcinoma of lung, left (H) 10/17/2016     Priority: Medium     Stage 1A (pT1aN0) 0.9 x 0.7 x 0.5 cm grade 1 well differentiated adenocarcinoma of the left lower lobe of the lung with invasive component being 0.3cm, s/p wedge resection and mediastinal LN sampling on 9/23/16.       Nonalcoholic hepatosteatosis 10/17/2016     Priority: Medium     History of radiation exposure 09/30/2016     Priority: Medium     Type 2 diabetes mellitus with diabetic polyneuropathy, with long-term current use of insulin (H) 10/15/2015     Priority: Medium     Type 2 diabetes, HbA1C goal < 8%       Hyperlipidemia LDL goal <100 12/20/2013     Priority: Medium     Gout 12/20/2013     Priority: Medium     Diagnosed by joint tap August 2013 per patient report.        Anemia 07/03/2013     Priority: Medium     Persistent mild anemia. Needs evaluation. May be thalassemia carrier.       GERD (gastroesophageal reflux disease) 05/15/2012     Priority: Medium     Advanced directives, counseling/discussion 06/16/2011     Priority: Medium     Advance Directive Problem List Overview:   Name Relationship Phone    Primary Health Care Agent            Alternative Health Care Agent          Discussed advance care planning with patient; information given to patient to review.  Bindu Smith   6/16/2011          Essential hypertension with goal blood  pressure less than 140/90 06/16/2011     Priority: Medium     History of adenomatous polyp of colon 12/01/2009     Priority: Medium     follow up colonoscopy 5 years requested 5-2017 due.         Medications:  Current Outpatient Medications   Medication Sig Dispense Refill     acetaminophen (TYLENOL) 325 MG tablet Take 2 tablets (650 mg) by mouth every 4 hours as needed for mild pain or headaches 100 tablet 0     allopurinol (ZYLOPRIM) 100 MG tablet TAKE 2 TABLETS BY MOUTH EVERY DAY (Patient taking differently: every morning TAKE 2 TABLETS BY MOUTH EVERY DAY) 180 tablet 3     atorvastatin (LIPITOR) 80 MG tablet Take 1 tablet (80 mg) by mouth daily (Patient taking differently: Take 80 mg by mouth every morning) 90 tablet 3     Azelaic Acid (FINACEA) 15 % gel Apply small amount twice a day to skin (Patient taking differently: as needed Apply small amount twice a day to skin) 50 g 3     blood glucose (NO BRAND SPECIFIED) test strip Use to test blood sugar one time daily or as directed./ okay to dispense brand covered by insurance 100 strip 3     clotrimazole-betamethasone (LOTRISONE) 1-0.05 % external cream        enoxaparin ANTICOAGULANT (LOVENOX) 40 MG/0.4ML syringe Inject 0.4 mLs (40 mg) Subcutaneous every 24 hours for 26 days 10.4 mL 0     fluticasone (FLONASE) 50 MCG/ACT nasal spray Spray 1-2 sprays into both nostrils daily (Patient taking differently: Spray 1-2 sprays into both nostrils as needed) 1 Bottle 1     glimepiride (AMARYL) 4 MG tablet Take 1 tablet (4 mg) by mouth 2 times daily 180 tablet 3     ketoconazole (NIZORAL) 2 % external shampoo APPLY TOPICALLY EVERY OTHER DAY LATHER INTO RASH AND WASH OFF AFTER 10 MINUTES. 120 mL 0     losartan-hydrochlorothiazide (HYZAAR) 100-25 MG tablet Take 1 tablet by mouth daily For blood pressure. (Patient taking differently: Take 1 tablet by mouth every morning For blood pressure.) 90 tablet 3     metFORMIN (GLUCOPHAGE) 1000 MG tablet TAKE 1 TABLET BY MOUTH TWICE A DAY  WITH MEALS (Patient taking differently: 2 times daily (with meals) TAKE 1 TABLET BY MOUTH TWICE A DAY WITH MEALS) 180 tablet 3     methocarbamol (ROBAXIN) 750 MG tablet Take 1 tablet (750 mg) by mouth 3 times daily as needed for muscle spasms 30 tablet 0     metoprolol succinate ER (TOPROL-XL) 50 MG 24 hr tablet Take 1 tablet (50 mg) by mouth daily (Patient taking differently: Take 50 mg by mouth every morning) 90 tablet 3     metroNIDAZOLE (METROCREAM) 0.75 % external cream        metroNIDAZOLE 0.75 % EX external gel Apply topically 2 times daily 45 g 3     MULTIPLE VITAMINS OR Take by mouth every morning       nitroGLYcerin (NITROSTAT) 0.4 MG sublingual tablet For chest pain place 1 tablet under the tongue every 5 minutes for 3 doses. If symptoms persist 5 minutes after 1st dose call 911. (Patient taking differently: every 5 minutes as needed For chest pain place 1 tablet under the tongue every 5 minutes for 3 doses. If symptoms persist 5 minutes after 1st dose call 911.) 25 tablet 3     omega-3 acid ethyl esters (LOVAZA) 1 g capsule Take 2 capsules (2 g) by mouth 2 times daily 360 capsule 3     omeprazole (PRILOSEC) 20 MG DR capsule TAKE 1 CAPSULE (20 MG) BY MOUTH DAILY TAKE 30-60 MINUTES BEFORE A MEAL. 90 capsule 0     oxyCODONE (ROXICODONE) 5 MG tablet Take 1 tablet (5 mg) by mouth every 4 hours as needed for moderate to severe pain 20 tablet 0     Oxymetazoline HCl (AFRIN NASAL SPRAY NA) Spray in nostril as needed       OZEMPIC, 1 MG/DOSE, 4 MG/3ML SOPN INJECT 1 MG SUBCUTANEOUS EVERY 7 DAYS (Patient taking differently: Inject 1 mg Subcutaneous every 7 days Takes on Saturdays) 9 mL 1     simethicone (MYLICON) 80 MG chewable tablet Take 1 tablet (80 mg) by mouth every 6 hours as needed for cramping 30 tablet 0     terazosin (HYTRIN) 5 MG capsule TAKE 1 CAPSULE (5 MG) BY MOUTH AT BEDTIME FOR BLOOD PRESSURE. (Patient taking differently: At Bedtime TAKE 1 CAPSULE (5 MG) BY MOUTH AT BEDTIME FOR BLOOD PRESSURE.) 90  "capsule 3       Allergies:  Allergies   Allergen Reactions     Pcn [Penicillins] Rash     Ace Inhibitors Cough     Indomethacin Other (See Comments)     Severe dizziness     Invokana [Canagliflozin]      bladder infection       Family history:  Family History   Problem Relation Age of Onset     Hernia Mother          age 97     Cerebrovascular Disease Father 64     Cancer Sister         ovarary      Deep Vein Thrombosis (DVT) No family hx of        Social history:  Social History     Tobacco Use     Smoking status: Former     Types: Cigarettes     Quit date: 1992     Years since quittin.8     Smokeless tobacco: Never     Tobacco comments:     15 + years    Substance Use Topics     Alcohol use: No     Marital status: .    Nursing Notes:   Rupert Stone, EMT  2022  8:54 AM  Signed  Chief Complaint   Patient presents with     Post-op Visit     Post op, DOS: 10/28/22       Vitals:    22 0852   BP: 116/73   BP Location: Left arm   Patient Position: Sitting   Cuff Size: Adult Regular   Pulse: 100   SpO2: 100%   Weight: 200 lb   Height: 5' 9\"       Body mass index is 29.53 kg/m .    Rupert Stone EMT-P         15 minutes spent on the date of the encounter doing chart review, history and exam, documentation and further activities as noted above.   This is a postop visit.      Bertha Nguyễn PA-C  Colon and Rectal Surgery  [facility]    "

## 2022-11-07 ENCOUNTER — TELEPHONE (OUTPATIENT)
Dept: FAMILY MEDICINE | Facility: CLINIC | Age: 67
End: 2022-11-07

## 2022-11-07 LAB
PATH REPORT.COMMENTS IMP SPEC: ABNORMAL
PATH REPORT.COMMENTS IMP SPEC: ABNORMAL
PATH REPORT.COMMENTS IMP SPEC: YES
PATH REPORT.FINAL DX SPEC: ABNORMAL
PATH REPORT.GROSS SPEC: ABNORMAL
PATH REPORT.RELEVANT HX SPEC: ABNORMAL
PATHOLOGY SYNOPTIC REPORT: ABNORMAL
PHOTO IMAGE: ABNORMAL

## 2022-11-07 PROCEDURE — 88309 TISSUE EXAM BY PATHOLOGIST: CPT | Mod: 26 | Performed by: PATHOLOGY

## 2022-11-07 PROCEDURE — 88307 TISSUE EXAM BY PATHOLOGIST: CPT | Mod: 26 | Performed by: PATHOLOGY

## 2022-11-07 NOTE — TELEPHONE ENCOUNTER
Shila with [employer] Patient Assistance program with a pharmaceutical company is calling in regards to the application for the renewal of patient's prescription Ozempic.     Shila states that   1. There is the wrong state license number and NPI number on the application  2. Patient's name and  is missing on page 5     Shila is calling requesting the following information above be fax back to their office.     Writer advised Shila to fax over medication application to address missing information. Shila verbalized understanding.     When fax is received please have provider fill out missing information and please fax back to [phone].    Routing to  to please watch out for fax and give to PCP. Shila states that they can only put initials of patient and  on cover fax.     Maria T Yañez RN, BSN  [facility]

## 2022-11-08 ENCOUNTER — MYC MEDICAL ADVICE (OUTPATIENT)
Dept: SURGERY | Facility: CLINIC | Age: 67
End: 2022-11-08

## 2022-11-08 NOTE — TELEPHONE ENCOUNTER
Faxed provider signed Patient Assistance Program Application to [employer], [phone], right fax confirmed at 1:31 pm today, 11/8/2022. Sent to abstracting.  Josselin Mcgowan [facility]  2nd Floor  Primary Care

## 2022-11-08 NOTE — TELEPHONE ENCOUNTER
Called [employer] and spoke to Bobbi and she will fax the form, they had the incorrect fax #. Waiting for form.  Josselin Mcgowan MA  [facility]  2nd Floor  Primary Care

## 2022-11-14 ENCOUNTER — OFFICE VISIT (OUTPATIENT)
Dept: SURGERY | Facility: CLINIC | Age: 67
End: 2022-11-14
Payer: COMMERCIAL

## 2022-11-14 VITALS
HEART RATE: 100 BPM | HEIGHT: 69 IN | WEIGHT: 200 LBS | OXYGEN SATURATION: 100 % | SYSTOLIC BLOOD PRESSURE: 116 MMHG | DIASTOLIC BLOOD PRESSURE: 73 MMHG | BODY MASS INDEX: 29.62 KG/M2

## 2022-11-14 DIAGNOSIS — Z09 FOLLOW-UP EXAMINATION AFTER COLORECTAL SURGERY: Primary | ICD-10-CM

## 2022-11-14 PROCEDURE — 99024 POSTOP FOLLOW-UP VISIT: CPT

## 2022-11-14 ASSESSMENT — PAIN SCALES - GENERAL: PAINLEVEL: MILD PAIN (3)

## 2022-11-14 NOTE — LETTER
"2022       RE: Sven Givens  [address]  [place] [address]     Dear Colleague,    Thank you for referring your patient, Sven Givens, to the [facility] COLON AND RECTAL SURGERY CLINIC [place] at [facility]. Please see a copy of my visit note below.    Colon and Rectal Surgery Postoperative Clinic Note    RE: Sven Givens  : 1955  SOMMER: 2022    Sven Givens is a very pleasant 67 year old male with a history of colon cancer of the hepatic flexure now status post laparoscopic extended right hemicolectomy and partial omentectomy with Dr. Wagner on 10/28/22.     Final Diagnosis   A. TERMINAL ILEUM, APPENDIX, COLON, RIGHT AND TRANSVERSE, RIGHT HEMICOLECTOMY:  - Adenocarcinoma, moderately-differentiated, arising from tubular adenoma (pT1pN0)  - Tumor Extent: Invades the submucosa  - Tumor size: 2.6 cm.  - Margins: Negative for invasive carcinoma and dysplasia   - Lymphovascular invasion: Present  - Perineural invasion: Not identified  - Twenty seven benign lymph nodes (0/27)  - Tubular adenoma  - Fibrous obliteration of the appendix     B. OMENTUM, PARTIAL RESECTION:  - Benign fibroadipose tissue  - Negative for metastatic carcinoma      Interval history: Sven is doing well. He has minimal pain now, mostly when laying on his right side. On a low fiber diet and having looser bowel movements. Has 12 lovenox injections left.     Physical Examination:  /73 (BP Location: Left arm, Patient Position: Sitting, Cuff Size: Adult Regular)   Pulse 100   Ht 5' 9\"   Wt 200 lb   SpO2 100%   BMI 29.53 kg/m    General: alert, oriented, in no acute distress, sitting comfortably  HEENT: moist mucous membranes  Abdomen: laparoscopic incision sites well-approximated, no erythema, no drainage and with glue flaking off.     Assessment/Plan:  67 year old male with a history of colon cancer of the hepatic flexure now status post " laparoscopic extended right hemicolectomy and partial omentectomy with Dr. Wagner on 10/28/22. Sven is doing well after surgery. We discussed his pathology. He is following up with Dr. Valencia in about 2 weeks. We will get him scheduled to follow up with Dr. Wagner in about a month. He can do this virtually. Continue to avoid lifting >10 lb. May start transitioning to a regular diet. Patient's questions were answered to his stated satisfaction and he is in agreement with this plan.     Medical history:  Past Medical History:   Diagnosis Date     Allergies     PCN, ACE, Indomethocin     Cancer (H)     small cell lung cancer stage I     Diabetes (H) 2002     Diabetic neuropathy (H) 5/7/2012     Hypertension      Kidney stones 09/2004    s/p right kidney basket retrieval     Rhinitis, allergic      Rosacea      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, overuse, 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     Varicose veins        Surgical history:  Past Surgical History:   Procedure Laterality Date     BRONCHOSCOPY FLEXIBLE N/A 9/23/2016    Procedure: BRONCHOSCOPY FLEXIBLE;  Surgeon: Gayle Moya MD;  [facility]: [facility]     COLONOSCOPY  5-03     COLONOSCOPY N/A 9/23/2022    Procedure: COLONOSCOPY, WITH POLYPECTOMY AND BIOPSY;  Surgeon: Abbe Mccarty MD;  [facility]     COLONOSCOPY WITH CO2 INSUFFLATION N/A 5/31/2017    Procedure: COLONOSCOPY WITH CO2 INSUFFLATION;  COLON SCREEN/ SEB;  Surgeon: Margarito Rasheed DO;  [facility]     COLONOSCOPY WITH CO2 INSUFFLATION N/A 9/23/2022    Procedure: COLONOSCOPY, WITH CO2 INSUFFLATION;  Surgeon: Abbe Mccarty MD;  [facility]     COMBINED ESOPHAGOSCOPY, GASTROSCOPY, DUODENOSCOPY (EGD) WITH CO2 INSUFFLATION N/A 4/19/2019    Procedure: COMBINED ESOPHAGOSCOPY, GASTROSCOPY, DUODENOSCOPY (EGD) WITH CO2 INSUFFLATION;  Surgeon: Abbe Mccarty MD;  [facility]: [facility]     ESOPHAGOSCOPY, GASTROSCOPY, DUODENOSCOPY (EGD), COMBINED N/A 4/19/2019     Procedure: Combined Esophagoscopy, Gastroscopy, Duodenoscopy (Egd), Biopsy Single Or Multiple;  Surgeon: Abbe Mccarty MD;  Location:  OR     GENITOURINARY SURGERY      kidney stones     THORACOSCOPIC WEDGE RESECTION LUNG Left 9/23/2016    Procedure: THORACOSCOPIC WEDGE RESECTION LUNG;  Surgeon: Gayle Moya MD;  [facility]     VASCULAR SURGERY      varicose vein       Problem list:    Patient Active Problem List    Diagnosis Date Noted     Colon adenocarcinoma (H) 10/28/2022     Priority: Medium     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) 10/07/2021     Priority: Medium     Age-related incipient cataract of both eyes 10/07/2021     Priority: Medium     PRESLEY (obstructive sleep apnea)      Priority: Medium     11/29/2007(200#)-AHI 37.7, RDI 50, lowest oxygen saturation 80%. CPAP of 7 cm/H9O was effective.       Non-small cell carcinoma of lung, left (H) 10/17/2016     Priority: Medium     Stage 1A (pT1aN0) 0.9 x 0.7 x 0.5 cm grade 1 well differentiated adenocarcinoma of the left lower lobe of the lung with invasive component being 0.3cm, s/p wedge resection and mediastinal LN sampling on 9/23/16.       Nonalcoholic hepatosteatosis 10/17/2016     Priority: Medium     History of radiation exposure 09/30/2016     Priority: Medium     Type 2 diabetes mellitus with diabetic polyneuropathy, with long-term current use of insulin (H) 10/15/2015     Priority: Medium     Type 2 diabetes, HbA1C goal < 8%       Hyperlipidemia LDL goal <100 12/20/2013     Priority: Medium     Gout 12/20/2013     Priority: Medium     Diagnosed by joint tap August 2013 per patient report.        Anemia 07/03/2013     Priority: Medium     Persistent mild anemia. Needs evaluation. May be thalassemia carrier.       GERD (gastroesophageal reflux disease) 05/15/2012     Priority: Medium     Advanced directives, counseling/discussion 06/16/2011      Priority: Medium               Discussed advance care planning with patient; information given to patient to review.  Bindu Smith   6/16/2011

## 2022-11-14 NOTE — NURSING NOTE
"Chief Complaint   Patient presents with     Post-op Visit     Post op, DOS: 10/28/22       Vitals:    11/14/22 0852   BP: 116/73   BP Location: Left arm   Patient Position: Sitting   Cuff Size: Adult Regular   Pulse: 100   SpO2: 100%   Weight: 200 lb   Height: 5' 9\"       Body mass index is 29.53 kg/m .    Rupert Stone EMT-P    "

## 2022-11-15 NOTE — TELEPHONE ENCOUNTER
MED REC REQUIRED  Post Medication Reconciliation Status: discharge medications reconciled, continue medications without change    Yeison Villegas MD

## 2022-11-15 NOTE — TELEPHONE ENCOUNTER
Current Outpatient Medications   Medication     acetaminophen (TYLENOL) 325 MG tablet     allopurinol (ZYLOPRIM) 100 MG tablet     atorvastatin (LIPITOR) 80 MG tablet     Azelaic Acid (FINACEA) 15 % gel     blood glucose (NO BRAND SPECIFIED) test strip     clotrimazole-betamethasone (LOTRISONE) 1-0.05 % external cream     enoxaparin ANTICOAGULANT (LOVENOX) 40 MG/0.4ML syringe     fluticasone (FLONASE) 50 MCG/ACT nasal spray     glimepiride (AMARYL) 4 MG tablet     ketoconazole (NIZORAL) 2 % external shampoo     losartan-hydrochlorothiazide (HYZAAR) 100-25 MG tablet     metFORMIN (GLUCOPHAGE) 1000 MG tablet     methocarbamol (ROBAXIN) 750 MG tablet     metoprolol succinate ER (TOPROL-XL) 50 MG 24 hr tablet     metroNIDAZOLE (METROCREAM) 0.75 % external cream     metroNIDAZOLE 0.75 % EX external gel     MULTIPLE VITAMINS OR     nitroGLYcerin (NITROSTAT) 0.4 MG sublingual tablet     omega-3 acid ethyl esters (LOVAZA) 1 g capsule     omeprazole (PRILOSEC) 20 MG DR capsule     oxyCODONE (ROXICODONE) 5 MG tablet     Oxymetazoline HCl (AFRIN NASAL SPRAY NA)     OZEMPIC, 1 MG/DOSE, 4 MG/3ML SOPN     simethicone (MYLICON) 80 MG chewable tablet     terazosin (HYTRIN) 5 MG capsule     No current facility-administered medications for this visit.     Patient not taking the following medications listed below. If not listed below, pt taking as listed in chart.     Tylenol  Azelaic Acid  Fluticasone  Ketoconazole  Methocarbamol  Oxycodone    Rica Jang RN    PROVIDERS:   Click create new note and enter <.>MEDRECACO.  Review medication documentation from RN.  Click blue hyperlink and select appropriate option and refresh the note.  If changes recommended, route back to RN team with note to update patient.  If no changes, sign/close encounter.

## 2022-11-19 NOTE — TELEPHONE ENCOUNTER
"Requested Prescriptions   Pending Prescriptions Disp Refills     atorvastatin (LIPITOR) 40 MG tablet [Pharmacy Med Name: ATORVASTATIN 40 MG TABLET]  Last Written Prescription Date:  05/28/19  Last Fill Quantity: 90,  # refills: 1   Last Office Visit with [facility], [facility] or [facility] prescribing provider:  08/09/19-Adelso   Future Office Visit:    90 tablet 1     Sig: TAKE 1 TABLET BY MOUTH EVERY DAY       Statins Protocol Passed - 11/20/2019  2:23 AM        Passed - LDL on file in past 12 months     Recent Labs   Lab Test 03/08/19  0926   LDL 34             Passed - No abnormal creatine kinase in past 12 months     No lab results found.             Passed - Recent (12 mo) or future (30 days) visit within the authorizing provider's specialty     Patient has had an office visit with the authorizing provider or a provider within the authorizing providers department within the previous 12 mos or has a future within next 30 days. See \"Patient Info\" tab in inbasket, or \"Choose Columns\" in Meds & Orders section of the refill encounter.              Passed - Medication is active on med list        Passed - Patient is age 18 or older          "
Prescription approved per G Refill Protocol.    Rosi Chakraborty RN  [facility]      
none

## 2022-11-21 ENCOUNTER — HEALTH MAINTENANCE LETTER (OUTPATIENT)
Age: 67
End: 2022-11-21

## 2022-11-30 ENCOUNTER — ONCOLOGY VISIT (OUTPATIENT)
Dept: ONCOLOGY | Facility: CLINIC | Age: 67
End: 2022-11-30
Payer: COMMERCIAL

## 2022-11-30 VITALS
HEIGHT: 69 IN | BODY MASS INDEX: 29.79 KG/M2 | HEART RATE: 82 BPM | WEIGHT: 201.1 LBS | RESPIRATION RATE: 16 BRPM | DIASTOLIC BLOOD PRESSURE: 78 MMHG | SYSTOLIC BLOOD PRESSURE: 128 MMHG | TEMPERATURE: 97.6 F | OXYGEN SATURATION: 99 %

## 2022-11-30 DIAGNOSIS — C18.9 COLON ADENOCARCINOMA (H): Primary | ICD-10-CM

## 2022-11-30 LAB
LAB DIRECTOR COMMENTS: ABNORMAL
LAB DIRECTOR DISCLAIMER: ABNORMAL
LAB DIRECTOR INTERPRETATION: ABNORMAL
LAB DIRECTOR METHODOLOGY: ABNORMAL
LAB DIRECTOR RESULTS: ABNORMAL
SPECIMEN DESCRIPTION: ABNORMAL

## 2022-11-30 PROCEDURE — 99213 OFFICE O/P EST LOW 20 MIN: CPT | Performed by: INTERNAL MEDICINE

## 2022-11-30 PROCEDURE — 81288 MLH1 GENE: CPT | Performed by: INTERNAL MEDICINE

## 2022-11-30 ASSESSMENT — PAIN SCALES - GENERAL: PAINLEVEL: NO PAIN (0)
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
## 2022-12-07 DIAGNOSIS — I10 ESSENTIAL HYPERTENSION WITH GOAL BLOOD PRESSURE LESS THAN 140/90: ICD-10-CM

## 2022-12-07 DIAGNOSIS — E11.42 TYPE 2 DIABETES MELLITUS WITH DIABETIC POLYNEUROPATHY, WITHOUT LONG-TERM CURRENT USE OF INSULIN (H): ICD-10-CM

## 2022-12-07 RX ORDER — GLIMEPIRIDE 4 MG/1
4 TABLET ORAL 2 TIMES DAILY
Qty: 180 TABLET | Refills: 3 | Status: SHIPPED | OUTPATIENT
Start: 2022-12-07 | End: 2023-12-05

## 2022-12-07 RX ORDER — LOSARTAN POTASSIUM AND HYDROCHLOROTHIAZIDE 25; 100 MG/1; MG/1
1 TABLET ORAL EVERY MORNING
Qty: 90 TABLET | Refills: 3 | Status: SHIPPED | OUTPATIENT
Start: 2022-12-07 | End: 2023-12-05

## 2022-12-08 NOTE — PROGRESS NOTES
"Colon and Rectal Surgery Postoperative Video Note     Referring provider:  No referring provider defined for this encounter.     RE: Sven Givens  : 1955  SOMMER: 2022    Sven Givens is a 67 year old male who is being evaluated via a billable video visit.      The patient has been notified of following:     \"This video visit will be conducted via a call between you and your physician/provider. We have found that certain health care needs can be provided without the need for an in-person physical exam.  This service lets us provide the care you need with a video conversation.  If a prescription is necessary we can send it directly to your pharmacy.  If lab work is needed we can place an order for that and you can then stop by our lab to have the test done at a later time.    Video visits are billed at different rates depending on your insurance coverage.  Please reach out to your insurance provider with any questions.    If during the course of the call the physician/provider feels a video visit is not appropriate, you will not be charged for this service.\"    Patient has given verbal consent for Video visit? Yes    Video Start Time: 6:10PM     Sven Givens is a very pleasant 67 year old male with a history of colon cancer of the hepatic flexure now 6 weeks s/p laparoscopic extended right hemicolectomy and partial omentectomy.     Final Diagnosis   A. TERMINAL ILEUM, APPENDIX, COLON, RIGHT AND TRANSVERSE, RIGHT HEMICOLECTOMY:  - Adenocarcinoma, moderately-differentiated, arising from tubular adenoma (pT1pN0)  - Tumor Extent: Invades the submucosa  - Tumor size: 2.6 cm.  - Margins: Negative for invasive carcinoma and dysplasia   - Lymphovascular invasion: Present  - Perineural invasion: Not identified  - Twenty seven benign lymph nodes (0/27)  - Tubular adenoma  - Fibrous obliteration of the appendix     B. OMENTUM, PARTIAL RESECTION:  - Benign fibroadipose tissue  - Negative for metastatic " carcinoma      Interval history: Doing overall well. Energy is good.  His bowel function is good.     Assessment/Plan: 67 year old male with a history of colon cancer of the hepatic flexure now 6 weeks s/p laparoscopic extended right hemicolectomy and partial omentectomy.   We discussed surveillance. He is due for colonoscopy in one year - September 2023. Ideally at [facility]. We will help with this referral.  May start to liberalize activity and also his diet as he would like.  Other surveillance with oncology with lung cancer otherwise. Minimal to no surveillance outside of colonoscopy otherwise for the early colon cancer. He has been seen by oncology.      Video-Visit Details    Type of service:  Video Visit    Video End Time (time video stopped): 6:26 PM      Originating Location (pt. Location): Home    Distant Location (provider location):  [facility] COLON AND RECTAL SURGERY CLIN[facility] [place]     Mode of Communication:  Video Conference via [employer]    20 minutes spent on the date of the encounter doing chart review, history, review of imaging, documentation ,and further activities as noted above, which includes my time spent on video with the patient/family.       Medical history:  Past Medical History:   Diagnosis Date     Allergies     PCN, ACE, Indomethocin     Cancer (H)     small cell lung cancer stage I     Diabetes (H) 2002     Diabetic neuropathy (H) 5/7/2012     Hypertension      Kidney stones 09/2004    s/p right kidney basket retrieval     Rhinitis, allergic      Rosacea      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, overuse, 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     Varicose veins        Surgical history:  Past Surgical History:   Procedure Laterality Date     BRONCHOSCOPY FLEXIBLE N/A 9/23/2016    Procedure: BRONCHOSCOPY FLEXIBLE;  Surgeon: Gayle Moya MD;  [facility]     COLONOSCOPY  5-03     COLONOSCOPY N/A 9/23/2022    Procedure: COLONOSCOPY, WITH POLYPECTOMY AND BIOPSY;  Surgeon: Bibiana  Abbe Ford MD;  [facility]     COLONOSCOPY WITH CO2 INSUFFLATION N/A 5/31/2017    Procedure: COLONOSCOPY WITH CO2 INSUFFLATION;  COLON SCREEN/ SEB;  Surgeon: Margarito Rasheed DO;  [facility]     COLONOSCOPY WITH CO2 INSUFFLATION N/A 9/23/2022    Procedure: COLONOSCOPY, WITH CO2 INSUFFLATION;  Surgeon: Abbe Mccarty MD;  [facility]     COMBINED ESOPHAGOSCOPY, GASTROSCOPY, DUODENOSCOPY (EGD) WITH CO2 INSUFFLATION N/A 4/19/2019    Procedure: COMBINED ESOPHAGOSCOPY, GASTROSCOPY, DUODENOSCOPY (EGD) WITH CO2 INSUFFLATION;  Surgeon: Abbe Mccarty MD;  [facility]     ESOPHAGOSCOPY, GASTROSCOPY, DUODENOSCOPY (EGD), COMBINED N/A 4/19/2019    Procedure: Combined Esophagoscopy, Gastroscopy, Duodenoscopy (Egd), Biopsy Single Or Multiple;  Surgeon: Abbe Mccarty MD;  [facility]     GENITOURINARY SURGERY      kidney stones     THORACOSCOPIC WEDGE RESECTION LUNG Left 9/23/2016    Procedure: THORACOSCOPIC WEDGE RESECTION LUNG;  Surgeon: Gayle Moya MD;  [facility]     VASCULAR SURGERY      varicose vein       Problem list:    Patient Active Problem List    Diagnosis Date Noted     Colon adenocarcinoma (H) 10/28/2022     Priority: Medium     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) 10/07/2021     Priority: Medium     Age-related incipient cataract of both eyes 10/07/2021     Priority: Medium     PRESLEY (obstructive sleep apnea)      Priority: Medium     11/29/2007(200#)-AHI 37.7, RDI 50, lowest oxygen saturation 80%. CPAP of 7 cm/H9O was effective.       Non-small cell carcinoma of lung, left (H) 10/17/2016     Priority: Medium     Stage 1A (pT1aN0) 0.9 x 0.7 x 0.5 cm grade 1 well differentiated adenocarcinoma of the left lower lobe of the lung with invasive component being 0.3cm, s/p wedge resection and mediastinal LN sampling on 9/23/16.       Nonalcoholic  hepatosteatosis 10/17/2016     Priority: Medium     History of radiation exposure 09/30/2016     Priority: Medium     Type 2 diabetes mellitus with diabetic polyneuropathy, with long-term current use of insulin (H) 10/15/2015     Priority: Medium     Type 2 diabetes, HbA1C goal < 8%       Hyperlipidemia LDL goal <100 12/20/2013     Priority: Medium     Gout 12/20/2013     Priority: Medium     Diagnosed by joint tap August 2013 per patient report.        Anemia 07/03/2013     Priority: Medium     Persistent mild anemia. Needs evaluation. May be thalassemia carrier.       GERD (gastroesophageal reflux disease) 05/15/2012     Priority: Medium     Advanced directives, counseling/discussion 06/16/2011     Priority: Medium     Advance Directive Problem List Overview:   Name Relationship Phone    Primary Health Care Agent            Alternative Health Care Agent          Discussed advance care planning with patient; information given to patient to review.  Bindu LINDA Smith   6/16/2011          Essential hypertension with goal blood pressure less than 140/90 06/16/2011     Priority: Medium     History of adenomatous polyp of colon 12/01/2009     Priority: Medium     follow up colonoscopy 5 years requested 5-2017 due.         Medications:  Current Outpatient Medications   Medication Sig Dispense Refill     acetaminophen (TYLENOL) 325 MG tablet Take 2 tablets (650 mg) by mouth every 4 hours as needed for mild pain or headaches 100 tablet 0     allopurinol (ZYLOPRIM) 100 MG tablet TAKE 2 TABLETS BY MOUTH EVERY DAY (Patient taking differently: every morning TAKE 2 TABLETS BY MOUTH EVERY DAY) 180 tablet 3     atorvastatin (LIPITOR) 80 MG tablet Take 1 tablet (80 mg) by mouth daily (Patient taking differently: Take 80 mg by mouth every morning) 90 tablet 3     Azelaic Acid (FINACEA) 15 % gel Apply small amount twice a day to skin (Patient taking differently: as needed Apply small amount twice a day to skin) 50 g 3     blood  glucose (NO BRAND SPECIFIED) test strip Use to test blood sugar one time daily or as directed./ okay to dispense brand covered by insurance 100 strip 3     clotrimazole-betamethasone (LOTRISONE) 1-0.05 % external cream        fluticasone (FLONASE) 50 MCG/ACT nasal spray Spray 1-2 sprays into both nostrils daily (Patient taking differently: Spray 1-2 sprays into both nostrils as needed) 1 Bottle 1     glimepiride (AMARYL) 4 MG tablet TAKE 1 TABLET (4 MG) BY MOUTH 2 TIMES DAILY 180 tablet 3     ketoconazole (NIZORAL) 2 % external shampoo APPLY TOPICALLY EVERY OTHER DAY LATHER INTO RASH AND WASH OFF AFTER 10 MINUTES. 120 mL 0     losartan-hydrochlorothiazide (HYZAAR) 100-25 MG tablet Take 1 tablet by mouth every morning For blood pressure. 90 tablet 3     metFORMIN (GLUCOPHAGE) 1000 MG tablet TAKE 1 TABLET BY MOUTH TWICE A DAY WITH MEALS (Patient taking differently: 2 times daily (with meals) TAKE 1 TABLET BY MOUTH TWICE A DAY WITH MEALS) 180 tablet 3     methocarbamol (ROBAXIN) 750 MG tablet Take 1 tablet (750 mg) by mouth 3 times daily as needed for muscle spasms 30 tablet 0     metoprolol succinate ER (TOPROL-XL) 50 MG 24 hr tablet Take 1 tablet (50 mg) by mouth daily (Patient taking differently: Take 50 mg by mouth every morning) 90 tablet 3     metroNIDAZOLE (METROCREAM) 0.75 % external cream        metroNIDAZOLE 0.75 % EX external gel Apply topically 2 times daily 45 g 3     MULTIPLE VITAMINS OR Take by mouth every morning       nitroGLYcerin (NITROSTAT) 0.4 MG sublingual tablet For chest pain place 1 tablet under the tongue every 5 minutes for 3 doses. If symptoms persist 5 minutes after 1st dose call 911. (Patient taking differently: every 5 minutes as needed For chest pain place 1 tablet under the tongue every 5 minutes for 3 doses. If symptoms persist 5 minutes after 1st dose call 911.) 25 tablet 3     omega-3 acid ethyl esters (LOVAZA) 1 g capsule Take 2 capsules (2 g) by mouth 2 times daily 360 capsule 3  "    omeprazole (PRILOSEC) 20 MG DR capsule TAKE 1 CAPSULE (20 MG) BY MOUTH DAILY TAKE 30-60 MINUTES BEFORE A MEAL. 90 capsule 0     oxyCODONE (ROXICODONE) 5 MG tablet Take 1 tablet (5 mg) by mouth every 4 hours as needed for moderate to severe pain 20 tablet 0     Oxymetazoline HCl (AFRIN NASAL SPRAY NA) Spray in nostril as needed       OZEMPIC, 1 MG/DOSE, 4 MG/3ML SOPN INJECT 1 MG SUBCUTANEOUS EVERY 7 DAYS (Patient taking differently: Inject 1 mg Subcutaneous every 7 days Takes on ) 9 mL 1     senna-docusate (SENEXON-S) 8.6-50 MG tablet Take 1 tablet by mouth At Bedtime 90 tablet 3     simethicone (MYLICON) 80 MG chewable tablet Take 1 tablet (80 mg) by mouth every 6 hours as needed for cramping 30 tablet 0     terazosin (HYTRIN) 5 MG capsule TAKE 1 CAPSULE (5 MG) BY MOUTH AT BEDTIME FOR BLOOD PRESSURE. (Patient taking differently: At Bedtime TAKE 1 CAPSULE (5 MG) BY MOUTH AT BEDTIME FOR BLOOD PRESSURE.) 90 capsule 3       Allergies:  Allergies   Allergen Reactions     Pcn [Penicillins] Rash     Ace Inhibitors Cough     Indomethacin Other (See Comments)     Severe dizziness     Invokana [Canagliflozin]      bladder infection       Family history:  Family History   Problem Relation Age of Onset     Hernia Mother          age 97     Cerebrovascular Disease Father 64     Cancer Sister         ovarary      Deep Vein Thrombosis (DVT) No family hx of        Social history:  Social History     Tobacco Use     Smoking status: Former     Types: Cigarettes     Quit date: 1992     Years since quittin.8     Smokeless tobacco: Never     Tobacco comments:     15 + years    Substance Use Topics     Alcohol use: No    Marital status: .    Nursing Notes:   Rupert Stone, EMT  2022  4:40 PM  Signed  Chief Complaint   Patient presents with     Follow Up       Vitals:    22 1636   Weight: 200 lb   Height: 5' 9\"       Body mass index is 29.53 kg/m .    Rupert Stone EMT-P         This is a " postoperative visit.     Caren Wagner MD  Colon and Rectal Surgery Staff  [facility]      This note was created using speech recognition software and may contain unintended word substitutions.

## 2022-12-13 ENCOUNTER — TELEPHONE (OUTPATIENT)
Dept: FAMILY MEDICINE | Facility: CLINIC | Age: 67
End: 2022-12-13

## 2022-12-13 DIAGNOSIS — K59.01 SLOW TRANSIT CONSTIPATION: ICD-10-CM

## 2022-12-13 PROCEDURE — G0452 MOLECULAR PATHOLOGY INTERPR: HCPCS | Mod: 26 | Performed by: PATHOLOGY

## 2022-12-13 RX ORDER — AMOXICILLIN 250 MG
1 CAPSULE ORAL AT BEDTIME
Qty: 90 TABLET | Refills: 3 | Status: SHIPPED | OUTPATIENT
Start: 2022-12-13 | End: 2023-12-05

## 2022-12-13 NOTE — TELEPHONE ENCOUNTER
Pharmacy looking for refill on discontinued med:    senna-docusate (SENEXON-S) 8.6-50 MG tablet (Discontinued) 90 tablet 3 2/3/2022 10/28/2022

## 2022-12-14 ENCOUNTER — VIRTUAL VISIT (OUTPATIENT)
Dept: SURGERY | Facility: CLINIC | Age: 67
End: 2022-12-14
Payer: COMMERCIAL

## 2022-12-14 VITALS — BODY MASS INDEX: 29.62 KG/M2 | HEIGHT: 69 IN | WEIGHT: 200 LBS

## 2022-12-14 DIAGNOSIS — Z09 FOLLOW-UP EXAMINATION AFTER COLORECTAL SURGERY: Primary | ICD-10-CM

## 2022-12-14 PROCEDURE — 99024 POSTOP FOLLOW-UP VISIT: CPT | Performed by: COLON & RECTAL SURGERY

## 2022-12-14 ASSESSMENT — PAIN SCALES - GENERAL: PAINLEVEL: NO PAIN (0)

## 2022-12-14 NOTE — LETTER
"2022       RE: Sven Givens  [address]  [place] [address]     Dear Colleague,    Thank you for referring your patient, Sven Givens, to the [facility] COLON AND RECTAL SURGERY CLINIC [place] at [facility]. Please see a copy of my visit note below.    Colon and Rectal Surgery Postoperative Video Note     Referring provider:  No referring provider defined for this encounter.     RE: Sven Givens  : 1955  SOMMER: 2022    Sven Givens is a 67 year old male who is being evaluated via a billable video visit.      The patient has been notified of following:     \"This video visit will be conducted via a call between you and your physician/provider. We have found that certain health care needs can be provided without the need for an in-person physical exam.  This service lets us provide the care you need with a video conversation.  If a prescription is necessary we can send it directly to your pharmacy.  If lab work is needed we can place an order for that and you can then stop by our lab to have the test done at a later time.    Video visits are billed at different rates depending on your insurance coverage.  Please reach out to your insurance provider with any questions.    If during the course of the call the physician/provider feels a video visit is not appropriate, you will not be charged for this service.\"    Patient has given verbal consent for Video visit? Yes    Video Start Time: 6:10PM     Sven Givens is a very pleasant 67 year old male with a history of colon cancer of the hepatic flexure now 6 weeks s/p laparoscopic extended right hemicolectomy and partial omentectomy.     Final Diagnosis   A. TERMINAL ILEUM, APPENDIX, COLON, RIGHT AND TRANSVERSE, RIGHT HEMICOLECTOMY:  - Adenocarcinoma, moderately-differentiated, arising from tubular adenoma (pT1pN0)  - Tumor Extent: Invades the submucosa  - Tumor size: 2.6 " cm.  - Margins: Negative for invasive carcinoma and dysplasia   - Lymphovascular invasion: Present  - Perineural invasion: Not identified  - Twenty seven benign lymph nodes (0/27)  - Tubular adenoma  - Fibrous obliteration of the appendix     B. OMENTUM, PARTIAL RESECTION:  - Benign fibroadipose tissue  - Negative for metastatic carcinoma      Interval history: Doing overall well. Energy is good.  His bowel function is good.     Assessment/Plan: 67 year old male with a history of colon cancer of the hepatic flexure now 6 weeks s/p laparoscopic extended right hemicolectomy and partial omentectomy.   We discussed surveillance. He is due for colonoscopy in one year - September 2023. Ideally at [facility]. We will help with this referral.  May start to liberalize activity and also his diet as he would like.  Other surveillance with oncology with lung cancer otherwise. Minimal to no surveillance outside of colonoscopy otherwise for the early colon cancer. He has been seen by oncology.      Video-Visit Details    Type of service:  Video Visit    Video End Time (time video stopped): 6:26 PM      Originating Location (pt. Location): Home    Distant Location (provider location):  [facility] COLON AND RECTAL SURGERY CLINIC [place]     Mode of Communication:  Video Conference via [employer] Mobile    20 minutes spent on the date of the encounter doing chart review, history, review of imaging, documentation ,and further activities as noted above, which includes my time spent on video with the patient/family.       Medical history:  Past Medical History:   Diagnosis Date     Allergies     PCN, ACE, Indomethocin     Cancer (H)     small cell lung cancer stage I     Diabetes (H) 2002     Diabetic neuropathy (H) 5/7/2012     Hypertension      Kidney stones 09/2004    s/p right kidney basket retrieval     Rhinitis, allergic      Rosacea      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, overuse, 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     Varicose veins         Surgical history:  Past Surgical History:   Procedure Laterality Date     BRONCHOSCOPY FLEXIBLE N/A 9/23/2016    Procedure: BRONCHOSCOPY FLEXIBLE;  Surgeon: Gayle Moya MD;  [facility]     COLONOSCOPY  5-03     COLONOSCOPY N/A 9/23/2022    Procedure: COLONOSCOPY, WITH POLYPECTOMY AND BIOPSY;  Surgeon: Abbe Mccarty MD;  [facility]     COLONOSCOPY WITH CO2 INSUFFLATION N/A 5/31/2017    Procedure: COLONOSCOPY WITH CO2 INSUFFLATION;  COLON SCREEN/ SEB;  Surgeon: Margarito Rasheed DO;  [facility]     COLONOSCOPY WITH CO2 INSUFFLATION N/A 9/23/2022    Procedure: COLONOSCOPY, WITH CO2 INSUFFLATION;  Surgeon: Abbe Mccarty MD;  [facility]     COMBINED ESOPHAGOSCOPY, GASTROSCOPY, DUODENOSCOPY (EGD) WITH CO2 INSUFFLATION N/A 4/19/2019    Procedure: COMBINED ESOPHAGOSCOPY, GASTROSCOPY, DUODENOSCOPY (EGD) WITH CO2 INSUFFLATION;  Surgeon: Abbe Mccarty MD;  [facility]     ESOPHAGOSCOPY, GASTROSCOPY, DUODENOSCOPY (EGD), COMBINED N/A 4/19/2019    Procedure: Combined Esophagoscopy, Gastroscopy, Duodenoscopy (Egd), Biopsy Single Or Multiple;  Surgeon: Abbe Mccarty MD;  [facility]     GENITOURINARY SURGERY      kidney stones     THORACOSCOPIC WEDGE RESECTION LUNG Left 9/23/2016    Procedure: THORACOSCOPIC WEDGE RESECTION LUNG;  Surgeon: Gayle Moya MD;  [facility]     VASCULAR SURGERY      varicose vein       Problem list:    Patient Active Problem List    Diagnosis Date Noted     Colon adenocarcinoma (H) 10/28/2022     Priority: Medium     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) 10/07/2021     Priority: Medium     Age-related incipient cataract of both eyes 10/07/2021     Priority: Medium     PRESLEY (obstructive sleep apnea)      Priority: Medium     11/29/2007(200#)-AHI 37.7, RDI 50, lowest oxygen saturation 80%. CPAP of 7 cm/H9O was effective.        Non-small cell carcinoma of lung, left (H) 10/17/2016     Priority: Medium     Stage 1A (pT1aN0) 0.9 x 0.7 x 0.5 cm grade 1 well differentiated adenocarcinoma of the left lower lobe of the lung with invasive component being 0.3cm, s/p wedge resection and mediastinal LN sampling on 9/23/16.       Nonalcoholic hepatosteatosis 10/17/2016     Priority: Medium     History of radiation exposure 09/30/2016     Priority: Medium     Type 2 diabetes mellitus with diabetic polyneuropathy, with long-term current use of insulin (H) 10/15/2015     Priority: Medium     Type 2 diabetes, HbA1C goal < 8%       Hyperlipidemia LDL goal <100 12/20/2013     Priority: Medium     Gout 12/20/2013     Priority: Medium     Diagnosed by joint tap August 2013 per patient report.        Anemia 07/03/2013     Priority: Medium     Persistent mild anemia. Needs evaluation. May be thalassemia carrier.       GERD (gastroesophageal reflux disease) 05/15/2012     Priority: Medium     Advanced directives, counseling/discussion 06/16/2011     Priority: Medium               Discussed advance care planning with patient; information given to patient to review.  Bindu Smith   6/16/2011

## 2022-12-14 NOTE — NURSING NOTE
"Chief Complaint   Patient presents with     Follow Up       Vitals:    12/14/22 1636   Weight: 200 lb   Height: 5' 9\"       Body mass index is 29.53 kg/m .    Rupert Stone EMT-P    "

## 2023-01-11 ENCOUNTER — LAB (OUTPATIENT)
Dept: LAB | Facility: CLINIC | Age: 68
End: 2023-01-11
Payer: COMMERCIAL

## 2023-01-11 ENCOUNTER — OFFICE VISIT (OUTPATIENT)
Dept: PHARMACY | Facility: CLINIC | Age: 68
End: 2023-01-11
Payer: COMMERCIAL

## 2023-01-11 VITALS
SYSTOLIC BLOOD PRESSURE: 134 MMHG | BODY MASS INDEX: 31.54 KG/M2 | OXYGEN SATURATION: 97 % | DIASTOLIC BLOOD PRESSURE: 82 MMHG | HEART RATE: 79 BPM | WEIGHT: 213.6 LBS

## 2023-01-11 DIAGNOSIS — E11.42 TYPE 2 DIABETES MELLITUS WITH DIABETIC POLYNEUROPATHY, WITH LONG-TERM CURRENT USE OF INSULIN (H): ICD-10-CM

## 2023-01-11 DIAGNOSIS — Z79.4 TYPE 2 DIABETES MELLITUS WITH DIABETIC POLYNEUROPATHY, WITH LONG-TERM CURRENT USE OF INSULIN (H): ICD-10-CM

## 2023-01-11 DIAGNOSIS — E11.42 TYPE 2 DIABETES MELLITUS WITH DIABETIC POLYNEUROPATHY, WITH LONG-TERM CURRENT USE OF INSULIN (H): Primary | ICD-10-CM

## 2023-01-11 DIAGNOSIS — I10 ESSENTIAL HYPERTENSION WITH GOAL BLOOD PRESSURE LESS THAN 140/90: ICD-10-CM

## 2023-01-11 DIAGNOSIS — Z79.4 TYPE 2 DIABETES MELLITUS WITH DIABETIC POLYNEUROPATHY, WITH LONG-TERM CURRENT USE OF INSULIN (H): Primary | ICD-10-CM

## 2023-01-11 LAB — HBA1C MFR BLD: 6.8 % (ref 0–5.6)

## 2023-01-11 PROCEDURE — 99605 MTMS BY PHARM NP 15 MIN: CPT | Performed by: PHARMACIST

## 2023-01-11 PROCEDURE — 83036 HEMOGLOBIN GLYCOSYLATED A1C: CPT

## 2023-01-11 PROCEDURE — 36415 COLL VENOUS BLD VENIPUNCTURE: CPT

## 2023-01-11 PROCEDURE — 99607 MTMS BY PHARM ADDL 15 MIN: CPT | Performed by: PHARMACIST

## 2023-01-11 NOTE — LETTER
January 11, 2023  Sven Givens  [address]  [place] [address]    Dear Mr. Givens, [facility] [place]     Thank you for talking with me on Jan 11, 2023 about your health and medications. As a follow-up to our conversation, I have included two documents:      1. Your Recommended To-Do List has steps you should take to get the best results from your medications.  2. Your Medication List will help you keep track of your medications and how to take them.    If you want to talk about these documents, please call Dora Neumann RPH, PharmD, BCACP at phone: [phone], Monday-Friday 8-4:30pm.    I look forward to working with you and your doctors to make sure your medications work well for you.    Sincerely,  Dora Neumann RPH, PharmD, BCACP   [facility] Pharmacist, [facility]

## 2023-01-11 NOTE — PROGRESS NOTES
Medication Therapy Management (MTM) Encounter    ASSESSMENT:                            Medication Adherence/Access: No issues identified    Type 2 Diabetes: Patient is meeting A1c goal of < 7% upon recheck today.  Ok to continue present medications. We did discuss the option of increasing Ozempic to 2mg and possibility of reducing or tapering off glimepiride. He prefers to hold off on changes at this time.    Hypertension: Stable. Patient is meeting blood pressure goal of < 140/90mmHg.    PLAN:                            Continue present medications. We;'ll check A1c today and based on results, see if we need to adjust the medications.    Follow-up: Return in 26 weeks (on 7/12/2023) for Medication Therapy Management.    SUBJECTIVE/OBJECTIVE:                          Sven Givens is a 67 year old male coming in for a follow-up visit.  Today's visit is a follow-up MTM visit from 10/5/2022     Reason for visit: blood sugar check; medication review.    Allergies/ADRs: Reviewed in chart  Past Medical History: Reviewed in chart  Tobacco: He reports that he quit smoking about 30 years ago. His smoking use included cigarettes. He has never used smokeless tobacco.  Alcohol: none      Medication Adherence/Access: no issues reported  Stopped taking aspirin due to age and was taking for primary prevention    Type 2 Diabetes:   Metformin 1000mg twice daily  Glimepiride 4mg twice daily   Ozempic 1mg injected once weekly - now receiving through [facility] - no side effects noted    Medication history:   avoiding SGLT2 due to hx of bladder infection. Has also tried Januvia, Tradjenta, Invokana in the past which does not appear to have worked well for him.    SMBG Ranges (patient reported): 30d avg 158mg/dL  Date FBG/ 2hours post Lunch/2hours post Dinner /2hours post   1/10 134     1/7   122   12/28   220   12/22   152   12/16   175   12/13 143     12/11  224    12/6   180   12/2   138   11/27   114   11/25   131   11/21    238     Symptoms of low blood sugar? None reported  Symptoms of high blood sugar? Ongoing feet pain - burning that happens most often at night time. He has tried lidocaine cream in the past and it didn't help at all. Pain levels have not changed with improved blood sugar levels. At this time, he is tolerating.    Diet/Exercise: Eating less with recovery from surgery for colon cancer; walking on treadmill x30 minutes for exercise, not currently going to the gym    Aspirin: Stopped taking 81mg daily in light of ASPREE trial  Statin: Yes: atorvastatin 80mg daily and denies side effects  ACEi/ARB: Yes: losartan/HCTZ 100/25mg daily. Urine albumin is   Lab Results   Component Value Date    UMALCR 33.33 (H) 09/16/2022     Lab Results   Component Value Date    A1C 6.8 01/11/2023    A1C 7.2 09/16/2022    A1C 8.0 05/27/2022    A1C 8.6 02/21/2022    A1C 8.0 12/02/2021    A1C 7.4 05/26/2021    A1C 8.4 02/16/2021    A1C 11.4 11/02/2020    A1C 11.4 05/20/2020    A1C 9.3 12/12/2019       Hypertension:   Losartan/HCTZ 100/25mg daily  Metoprolol succinate 50mg daily  Terazosin 5mg QHS    Patient does not self-monitor blood pressure.    No side effects noted today.  BP Readings from Last 3 Encounters:   01/11/23 134/82   11/30/22 128/78   11/14/22 116/73     Today's Vitals: /82   Pulse 79   Wt 213 lb 9.6 oz (96.9 kg)   SpO2 97%   BMI 31.54 kg/m    ----------------      I spent 30 minutes with this patient today. All changes were made via collaborative practice agreement with Yeison Villegas MD, MD. A copy of the visit note was provided to the patient's provider(s).    The patient was sent via [employer] a summary of these recommendations.     Dora Neumann, Pharm.D., BCACP  Medication Therapy Management Pharmacist  [phone]     Medication Therapy Recommendations  No medication therapy recommendations to display

## 2023-01-11 NOTE — LETTER
_  Medication List        Prepared on: Jan 11, 2023     Bring your Medication List when you go to the doctor, hospital, or   emergency room. And, share it with your family or caregivers.     Note any changes to how you take your medications.  Cross out medications when you no longer use them.    Medication How I take it Why I use it Prescriber   acetaminophen (TYLENOL) 325 MG tablet Take 2 tablets (650 mg) by mouth every 4 hours as needed for mild pain or headaches Acute Post-Operative Pain Giulia Hicks PA-C   allopurinol (ZYLOPRIM) 100 MG tablet TAKE 2 TABLETS BY MOUTH EVERY DAY Idiopathic chronic gout of right foot without tophus Yeison Villegas MD   atorvastatin (LIPITOR) 80 MG tablet Take 1 tablet (80 mg) by mouth daily Hypertriglyceridemia; Type 2 diabetes mellitus with diabetic polyneuropathy, without long-term current use of insulin (H) Yeison Villegas MD   Azelaic Acid (FINACEA) 15 % gel Apply small amount twice a day to skin Rosacea Pam Dela Cruz MD   blood glucose (NO BRAND SPECIFIED) test strip Use to test blood sugar one time daily or as directed./ okay to dispense brand covered by insurance Type 2 diabetes mellitus with diabetic polyneuropathy, without long-term current use of insulin (H) Yeison Villegas MD   fluticasone (FLONASE) 50 MCG/ACT nasal spray Spray 1-2 sprays into both nostrils daily URI with cough and congestion Pam Dela Cruz MD   glimepiride (AMARYL) 4 MG tablet TAKE 1 TABLET (4 MG) BY MOUTH 2 TIMES DAILY Type 2 diabetes mellitus with diabetic polyneuropathy, without long-term current use of insulin (H) Yeison Villegas MD   ketoconazole (NIZORAL) 2 % external shampoo APPLY TOPICALLY EVERY OTHER DAY LATHER INTO RASH AND WASH OFF AFTER 10 MINUTES. Tinea Versicolor Pam Dela Cruz MD   losartan-hydrochlorothiazide (HYZAAR) 100-25 MG tablet Take 1 tablet by mouth every morning For blood pressure. Essential hypertension with goal blood pressure less than 140/90 Yeison Villegas MD   metFORMIN  (GLUCOPHAGE) 1000 MG tablet TAKE 1 TABLET BY MOUTH TWICE A DAY WITH MEALS Type 2 diabetes mellitus with diabetic polyneuropathy, without long-term current use of insulin (H) Yeison Villegas MD   metoprolol succinate ER (TOPROL-XL) 50 MG 24 hr tablet Take 1 tablet (50 mg) by mouth daily Essential hypertension with goal blood pressure less than 140/90; Sinus Tachycardia Yeison Villegas MD   metroNIDAZOLE 0.75 % EX external gel Apply topically 2 times daily Caitlin Dela Cruz MD   MULTIPLE VITAMINS OR Take by mouth every morning General Health  Patient Reported   nitroGLYcerin (NITROSTAT) 0.4 MG sublingual tablet For chest pain place 1 tablet under the tongue every 5 minutes for 3 doses. If symptoms persist 5 minutes after 1st dose call 911. Coronary artery disease involving native coronary artery of native heart without angina pectoris Jesus Shane MD   omega-3 acid ethyl esters (LOVAZA) 1 g capsule Take 2 capsules (2 g) by mouth 2 times daily Coronary artery disease involving native coronary artery of native heart without angina pectoris Jesus Shane MD   omeprazole (PRILOSEC) 20 MG DR capsule TAKE 1 CAPSULE (20 MG) BY MOUTH DAILY TAKE 30-60 MINUTES BEFORE A MEAL. Gastroesophageal reflux disease with esophagitis Yeison Villegas MD   Oxymetazoline HCl (AFRIN NASAL SPRAY NA) Spray in nostril as needed Congestion Patient Reported   OZEMPIC, 1 MG/DOSE, 4 MG/3ML SOPN INJECT 1 MG SUBCUTANEOUS EVERY 7 DAYS Type 2 diabetes mellitus with diabetic polyneuropathy, without long-term current use of insulin (H) Yeison Villegas MD   senna-docusate (SENEXON-S) 8.6-50 MG tablet Take 1 tablet by mouth At Bedtime Slow Transit Constipation Yeison Villegas MD   terazosin (HYTRIN) 5 MG capsule TAKE 1 CAPSULE (5 MG) BY MOUTH AT BEDTIME FOR BLOOD PRESSURE. Essential hypertension with goal blood pressure less than 140/90 Yeison Villegas MD         Add new medications, over-the-counter drugs, herbals, vitamins, or  minerals in the blank rows  below.    Medication How I take it Why I use it Prescriber                          Allergies:      pcn [penicillins]; ace inhibitors; indomethacin; invokana [canagliflozin]        Side effects I have had:               Other Information:              My notes and questions:

## 2023-01-11 NOTE — LETTER
"Recommended To-Do List      Prepared on: Jan 11, 2023       You can get the best results from your medications by completing the items on this \"To-Do List.\"      Bring your To-Do List when you go to your doctor. And, share it with your family or caregivers.    My To-Do List:  What we talked about: What I should do:    What my medicines are for, how to know if my medicines are working, made sure my medicines are safe for me and reviewed how to take my medicines.      Take my medicines every day                  "

## 2023-01-11 NOTE — PATIENT INSTRUCTIONS
Recommendations from today's MTM visit:                                                       Continue present medications. We;'ll check A1c today and based on results, see if we need to adjust the medications.    Follow-up: Return in 26 weeks (on 7/12/2023) for Medication Therapy Management.    It was great to speak with you today.  I value your experience and would be very thankful for your time with providing feedback on our clinic survey. You may receive a survey via email or text message in the next few days.     To schedule another MTM appointment, please call the clinic directly or you may call the MTM scheduling line at [phone] or toll-free at [phone].     My Clinical Pharmacist's contact information:                                                      Please feel free to contact me with any questions or concerns you have.      Dora Neumann, Pharm.D., [facility]  Medication Therapy Management Pharmacist  [phone]

## 2023-01-18 DIAGNOSIS — Z09 FOLLOW-UP EXAMINATION AFTER COLORECTAL SURGERY: Primary | ICD-10-CM

## 2023-02-01 DIAGNOSIS — K21.00 GASTROESOPHAGEAL REFLUX DISEASE WITH ESOPHAGITIS: ICD-10-CM

## 2023-03-06 DIAGNOSIS — R00.0 SINUS TACHYCARDIA: ICD-10-CM

## 2023-03-06 DIAGNOSIS — M1A.0710 IDIOPATHIC CHRONIC GOUT OF RIGHT FOOT WITHOUT TOPHUS: ICD-10-CM

## 2023-03-06 DIAGNOSIS — I10 ESSENTIAL HYPERTENSION WITH GOAL BLOOD PRESSURE LESS THAN 140/90: ICD-10-CM

## 2023-03-06 DIAGNOSIS — E11.42 TYPE 2 DIABETES MELLITUS WITH DIABETIC POLYNEUROPATHY, WITHOUT LONG-TERM CURRENT USE OF INSULIN (H): ICD-10-CM

## 2023-03-06 DIAGNOSIS — E78.1 HYPERTRIGLYCERIDEMIA: ICD-10-CM

## 2023-03-06 RX ORDER — ATORVASTATIN CALCIUM 80 MG/1
TABLET, FILM COATED ORAL
Qty: 90 TABLET | Refills: 3 | Status: SHIPPED | OUTPATIENT
Start: 2023-03-06 | End: 2023-12-05

## 2023-03-06 RX ORDER — METOPROLOL SUCCINATE 50 MG/1
TABLET, EXTENDED RELEASE ORAL
Qty: 90 TABLET | Refills: 3 | Status: SHIPPED | OUTPATIENT
Start: 2023-03-06 | End: 2023-12-05

## 2023-03-06 RX ORDER — ALLOPURINOL 100 MG/1
TABLET ORAL
Qty: 180 TABLET | Refills: 3 | Status: SHIPPED | OUTPATIENT
Start: 2023-03-06 | End: 2023-12-05

## 2023-03-06 RX ORDER — TERAZOSIN 5 MG/1
CAPSULE ORAL
Qty: 90 CAPSULE | Refills: 3 | Status: SHIPPED | OUTPATIENT
Start: 2023-03-06 | End: 2023-12-05

## 2023-03-10 DIAGNOSIS — E11.42 TYPE 2 DIABETES MELLITUS WITH DIABETIC POLYNEUROPATHY, WITHOUT LONG-TERM CURRENT USE OF INSULIN (H): ICD-10-CM

## 2023-03-13 DIAGNOSIS — E11.42 TYPE 2 DIABETES MELLITUS WITH DIABETIC POLYNEUROPATHY, WITHOUT LONG-TERM CURRENT USE OF INSULIN (H): ICD-10-CM

## 2023-04-03 ENCOUNTER — TELEPHONE (OUTPATIENT)
Dept: FAMILY MEDICINE | Facility: CLINIC | Age: 68
End: 2023-04-03
Payer: COMMERCIAL

## 2023-04-03 NOTE — TELEPHONE ENCOUNTER
Received medication order request form from [employer] Patient Assistance Program for Ozempic. Form received via fax. Form placed in provider's bin to address.

## 2023-04-11 NOTE — TELEPHONE ENCOUNTER
Received completed form fax to [employer] Patient Assistance Program at [phone] right fax confirmed @ 1:22pm    Copy placed in TC file and abstracting

## 2023-05-08 ENCOUNTER — TELEPHONE (OUTPATIENT)
Dept: FAMILY MEDICINE | Facility: CLINIC | Age: 68
End: 2023-05-08
Payer: COMMERCIAL

## 2023-05-08 NOTE — TELEPHONE ENCOUNTER
Reason for Call:  Appointment Request    Patient requesting this type of appt:  Possible hernia and diabetic check     Requested provider: Yeison Villegas    Reason patient unable to be scheduled: Not within requested timeframe    When does patient want to be seen/preferred time: 3-7 days    Comments: patient states that he would like to get in asap to see PCP he states he needs his diabetes checked and has noticed that his stomach shape has changed since his surgery in September 2022 he thinks this is a possible hernia     Could we send this information to you in [employer] or would you prefer to receive a phone call?:   Patient would prefer a phone call   Okay to leave a detailed message?: Yes at Cell number on file:    Telephone Information:   Mobile [phone]       Call taken on 5/8/2023 at 9:36 AM by Lyn Rodriguez

## 2023-05-09 ENCOUNTER — OFFICE VISIT (OUTPATIENT)
Dept: FAMILY MEDICINE | Facility: CLINIC | Age: 68
End: 2023-05-09
Payer: COMMERCIAL

## 2023-05-09 VITALS
WEIGHT: 215 LBS | OXYGEN SATURATION: 97 % | HEART RATE: 97 BPM | HEIGHT: 69 IN | TEMPERATURE: 97.4 F | RESPIRATION RATE: 14 BRPM | SYSTOLIC BLOOD PRESSURE: 127 MMHG | DIASTOLIC BLOOD PRESSURE: 73 MMHG | BODY MASS INDEX: 31.84 KG/M2

## 2023-05-09 DIAGNOSIS — C34.92 NON-SMALL CELL CARCINOMA OF LUNG, LEFT (H): ICD-10-CM

## 2023-05-09 DIAGNOSIS — E11.42 TYPE 2 DIABETES MELLITUS WITH DIABETIC POLYNEUROPATHY, WITHOUT LONG-TERM CURRENT USE OF INSULIN (H): Primary | ICD-10-CM

## 2023-05-09 DIAGNOSIS — F41.9 ANXIETY: ICD-10-CM

## 2023-05-09 DIAGNOSIS — K43.9 VENTRAL HERNIA WITHOUT OBSTRUCTION OR GANGRENE: ICD-10-CM

## 2023-05-09 DIAGNOSIS — I10 ESSENTIAL HYPERTENSION WITH GOAL BLOOD PRESSURE LESS THAN 140/90: ICD-10-CM

## 2023-05-09 DIAGNOSIS — Z12.5 SCREENING FOR PROSTATE CANCER: ICD-10-CM

## 2023-05-09 DIAGNOSIS — E78.5 HYPERLIPIDEMIA LDL GOAL <100: ICD-10-CM

## 2023-05-09 LAB — HBA1C MFR BLD: 8 % (ref 0–5.6)

## 2023-05-09 PROCEDURE — 99214 OFFICE O/P EST MOD 30 MIN: CPT | Performed by: FAMILY MEDICINE

## 2023-05-09 PROCEDURE — G0103 PSA SCREENING: HCPCS | Performed by: FAMILY MEDICINE

## 2023-05-09 PROCEDURE — 80061 LIPID PANEL: CPT | Performed by: FAMILY MEDICINE

## 2023-05-09 PROCEDURE — 80048 BASIC METABOLIC PNL TOTAL CA: CPT | Performed by: FAMILY MEDICINE

## 2023-05-09 PROCEDURE — 83036 HEMOGLOBIN GLYCOSYLATED A1C: CPT | Performed by: FAMILY MEDICINE

## 2023-05-09 PROCEDURE — 36415 COLL VENOUS BLD VENIPUNCTURE: CPT | Performed by: FAMILY MEDICINE

## 2023-05-09 RX ORDER — CITALOPRAM HYDROBROMIDE 20 MG/1
20 TABLET ORAL DAILY
Qty: 90 TABLET | Refills: 1 | Status: SHIPPED | OUTPATIENT
Start: 2023-05-09 | End: 2023-12-05

## 2023-05-09 ASSESSMENT — PAIN SCALES - GENERAL: PAINLEVEL: MILD PAIN (2)

## 2023-05-09 NOTE — PATIENT INSTRUCTIONS
At [facility], we strive to deliver an exceptional experience to you, every time we see you. If you receive a survey, please complete it as we do value your feedback.  If you have MyChart, you can expect to receive results automatically within 24 hours of their completion.  Your provider will send a note interpreting your results as well.   If you do not have MyChart, you should receive your results in about a week by mail.    Your care team:                            Family Medicine Internal Medicine   MD Rodrick Arita MD Shantel Branch-Fleming, MD Srinivasa Vaka, MD Katya Belousova, PAJESIKA Laughlin CNP, MD ([facility]   Jatinder Gonzales, MD Carmen Lopez MD Amelia Massimini APRN GRISELDA Barrera APRN MD Ana Paula Snyder MD          Clinic hours: Monday - Thursday 7 am-6 pm; Fridays 7 am-5 pm.   Urgent care: Monday - Friday 10 am- 8 pm; Saturday and Sunday 9 am-5 pm.    Clinic: [phone]       [facility]: Monday - Thursday 8 am - 7 pm; Friday 8 am - 6 pm  [facility]: [phone]

## 2023-05-09 NOTE — PROGRESS NOTES
Rishabh Machuca is a 68 year old, presenting for the following health issues:  Diabetes        5/9/2023     3:10 PM   Additional Questions   Roomed by Monserrat   Accompanied by none     History of Present Illness       Reason for visit:  Folow up  Symptom onset:  More than a month    He eats 2-3 servings of fruits and vegetables daily.He consumes 2 sweetened beverage(s) daily.He exercises with enough effort to increase his heart rate 20 to 29 minutes per day.  He exercises with enough effort to increase his heart rate 4 days per week.   He is taking medications regularly.       Diabetes Follow-up    How often are you checking your blood sugar? Two times daily  Blood sugar testing frequency justification:  Uncontrolled diabetes  What time of day are you checking your blood sugars (select all that apply)?  Before and after meals  Have you had any blood sugars above 200?  Yes   Have you had any blood sugars below 70?  No    What symptoms do you notice when your blood sugar is low?  Dizzy    What concerns do you have today about your diabetes? None     Do you have any of these symptoms? (Select all that apply)  Numbness in feet and Burning in feet      BP Readings from Last 2 Encounters:   05/09/23 127/73   01/11/23 134/82     Hemoglobin A1C (%)   Date Value   01/11/2023 6.8 (H)   09/16/2022 7.2 (H)   05/26/2021 7.4 (H)   02/16/2021 8.4 (H)     LDL Cholesterol Calculated (mg/dL)   Date Value   05/27/2022 <5   09/10/2021 26   05/26/2021 33   05/20/2020 40     Patient is worried about a bulge above his belly button. Sometimes feel uncomfortable but no significant pain.    Patient's wife is concerned about patient worrying about things that is affecting his daily life. Patient stated since colon cancer he worries about his family often.    Review of Systems   Constitutional, HEENT, cardiovascular, pulmonary, GI, , musculoskeletal, neuro, skin, endocrine and psych systems are negative, except as otherwise noted.     "  Objective    /73 (BP Location: Left arm, Patient Position: Chair, Cuff Size: Adult Large)   Pulse 97   Temp 97.4  F (36.3  C) (Tympanic)   Resp 14   Ht 1.753 m (5' 9\")   Wt 97.5 kg (215 lb)   SpO2 97%   BMI 31.75 kg/m    Body mass index is 31.75 kg/m .  Physical Exam   GENERAL: healthy, alert and no distress  NECK: no adenopathy, no asymmetry, masses, or scars and thyroid normal to palpation  RESP: lungs clear to auscultation - no rales, rhonchi or wheezes  CV: regular rate and rhythm, normal S1 S2, no S3 or S4, no murmur, click or rub, no peripheral edema and peripheral pulses strong  ABDOMEN: soft, nontender, no hepatosplenomegaly, no masses and bowel sounds normal, defect above umbilicus, no erythema    A/P:  (E11.42) Type 2 diabetes mellitus with diabetic polyneuropathy, without long-term current use of insulin (H)  (primary encounter diagnosis)  Comment:   Plan: Hemoglobin A1c, Basic metabolic panel  (Ca, Cl,        CO2, Creat, Gluc, K, Na, BUN)        Controlled. Recheck in 6 months with Medicare Wellness visit.    (I10) Essential hypertension with goal blood pressure less than 140/90  Comment:   Plan: Basic metabolic panel  (Ca, Cl, CO2, Creat,         Gluc, K, Na, BUN)        Controlled.    (E78.5) Hyperlipidemia LDL goal <100  Comment:   Plan: Lipid panel reflex to direct LDL Non-fasting        Controlled.    (Z12.5) Screening for prostate cancer  Comment:   Plan: PROSTATE SPEC ANTIGEN SCREEN            (C34.92) Non-small cell carcinoma of lung, left (H)  Comment:   Plan: follows Oncology.    (K43.9) Ventral hernia without obstruction or gangrene  Comment:   Plan: Adult General Surg Referral            (F41.9) Anxiety  Comment:   Plan: citalopram (CELEXA) 20 MG tablet        Will let us know how he is doing in 2 months after starting citalopram.    Yeison Villegas MD                "

## 2023-05-10 LAB
ANION GAP SERPL CALCULATED.3IONS-SCNC: 8 MMOL/L (ref 3–14)
BUN SERPL-MCNC: 17 MG/DL (ref 7–30)
CALCIUM SERPL-MCNC: 9.4 MG/DL (ref 8.5–10.1)
CHLORIDE BLD-SCNC: 108 MMOL/L (ref 94–109)
CHOLEST SERPL-MCNC: 89 MG/DL
CO2 SERPL-SCNC: 26 MMOL/L (ref 20–32)
CREAT SERPL-MCNC: 0.81 MG/DL (ref 0.66–1.25)
FASTING STATUS PATIENT QL REPORTED: NO
GFR SERPL CREATININE-BSD FRML MDRD: >90 ML/MIN/1.73M2
GLUCOSE BLD-MCNC: 212 MG/DL (ref 70–99)
HDLC SERPL-MCNC: 31 MG/DL
LDLC SERPL CALC-MCNC: <5 MG/DL
NONHDLC SERPL-MCNC: 58 MG/DL
POTASSIUM BLD-SCNC: 3.7 MMOL/L (ref 3.4–5.3)
PSA SERPL-MCNC: 1.02 UG/L (ref 0–4)
SODIUM SERPL-SCNC: 142 MMOL/L (ref 133–144)
TRIGL SERPL-MCNC: 281 MG/DL

## 2023-05-10 NOTE — TELEPHONE ENCOUNTER
Received fax back from [employer] patient assistance program requesting updated information     Updated information faxed back to [employer] at [phone] right fax confirmed @ 9:53am    Copy placed in abstract and TC file

## 2023-05-18 ENCOUNTER — TELEPHONE (OUTPATIENT)
Dept: FAMILY MEDICINE | Facility: CLINIC | Age: 68
End: 2023-05-18
Payer: COMMERCIAL

## 2023-05-18 NOTE — TELEPHONE ENCOUNTER
Rx for ozempic is at clinic for pt . Unable to reach or leave a message. Will try again at later time.     Girma Allen ma

## 2023-05-24 NOTE — TELEPHONE ENCOUNTER
Ozempic 1x3 ML Prefilled pens 4mg/3ml Qty 4. Lot# [id number] Exp 10/31/25 NDC [id number].    Patient will  on or after June 5.See VoCaret rhonda.    ROMELIA Hudson MA     Speaking Coherently

## 2023-05-30 ENCOUNTER — HOSPITAL ENCOUNTER (OUTPATIENT)
Facility: AMBULATORY SURGERY CENTER | Age: 68
End: 2023-05-30
Attending: INTERNAL MEDICINE
Payer: COMMERCIAL

## 2023-05-30 ENCOUNTER — TELEPHONE (OUTPATIENT)
Dept: GASTROENTEROLOGY | Facility: CLINIC | Age: 68
End: 2023-05-30
Payer: COMMERCIAL

## 2023-05-30 DIAGNOSIS — Z09 FOLLOW-UP EXAMINATION AFTER COLORECTAL SURGERY: Primary | ICD-10-CM

## 2023-05-30 NOTE — TELEPHONE ENCOUNTER
Screening Questions  BLUE  KIND OF PREP RED  LOCATION [review exclusion criteria] GREEN  SEDATION TYPE        Y Are you active on mychart?       Caren Wagner, Ordering/Referring Provider?        BCBS/MEDICARE What type of coverage do you have?      N Have you had a positive covid test in the last 14 days?     31.75 1. BMI  [BMI 40+ - review exclusion criteria& smart-phrase document]    Y  2. Are you able to give consent for your medical care? [IF NO,RN REVIEW]          N  3. Are you taking any prescription pain medications on a routine schedule   (ex narcotics: oxycodone, roxicodone, oxycontin,  and percocet)? [RN Review]        N  3a. EXTENDED PREP What kind of prescription?     N 4. Do you have any chemical dependencies such as alcohol, street drugs, or methadone?        **If yes 3- 5 , please schedule with MAC sedation.**          IF YES TO ANY 6 - 10 - HOSPITAL SETTING ONLY.     N 6.   Do you need assistance transferring?     N 7.   Have you had a heart or lung transplant?    N 8.   Are you currently on dialysis?   N 9.   Do you use daily home oxygen?   N 10. Do you take nitroglycerin?   10a. N If yes, how often?     N 11. Are you currently pregnant?    11a. N If yes, how many weeks? [ Greater than 12 weeks, OR NEEDED]    N 12. Do you have Pulmonary Hypertension? *NEED PAC APPT AT [facility] w/ [facility]*     N 13. [review exclusion criteria]  Do you have any implantable devices in your body (pacemaker, defib, LVAD)?    N 14. In the past 6 months, have you had any heart related issues including cardiomyopathy or heart attack?     14a. N If yes, did it require cardiac stenting if so when?     N 15. Have you had a stroke or Transient ischemic attack (TIA - aka  mini stroke ) within 6 months?      N MILD 16. Do you have mod to severe Obstructive Sleep Apnea?  [Hospital only]    N 17. Do you have SEVERE AND UNCONTROLLED asthma? *NEED PAC APPT AT [facility] w/MAC*     18.Do you take blood thinners?  No    Y 19. Do  "you take any of the following medications?    Phentermine     Y Ozempic    Wegovy (Semaglutide)      19a. If yes, \"Hold for 7 days before procedure.  Please consult your prescribing provider if you have questions about holding this medication.\"      N 20. Do you have chronic kidney disease?      TYPE 2  21. Do you have a diagnosis of diabetes?     N  22. On a regular basis do you go 3-5 days between bowel movements?      23. Preferred LOCAL Pharmacy for Pre Prescription           [facility] [id number] [place] [facility], [place] - [address]        - CLOSING REMINDERS -    You will receive a call from a Nurse to review instructions and health history.  This assessment must be completed prior to your procedure.  Failure to complete the Nurse assessment may result in the procedure being cancelled.      On the day of your procedure, please designatean adult(s) who can drive you home stay with you for the next 24 hours. The medicines used in the exam will make you sleepy. You will not be able to drive.      You cannot take public transportation, ride share services, or non-medical taxi service without a responsible caregiver.  Medical transport services are allowed with the requirement that a responsible caregiver will receive you at your destination.  We require that drivers and caregivers are confirmed prior to your procedure.      - SCHEDULING DETAILS -  N & N Hospital Setting Required & If yes, what is the exclusion?   MARGAUX  Surgeon    9/27  Date of Procedure  Lower Endoscopy [Colonoscopy]  [facility]   STANDARD [facility]- If you answer yes to questions #8, #20, #21 [  pts ]Which Colonoscopy Prep was Sent?     MODERATE Sedation Type     N PAC / Pre-op Required                 "

## 2023-06-01 ENCOUNTER — TELEPHONE (OUTPATIENT)
Dept: FAMILY MEDICINE | Facility: CLINIC | Age: 68
End: 2023-06-01
Payer: COMMERCIAL

## 2023-06-01 ENCOUNTER — TRANSFERRED RECORDS (OUTPATIENT)
Dept: HEALTH INFORMATION MANAGEMENT | Facility: CLINIC | Age: 68
End: 2023-06-01
Payer: COMMERCIAL

## 2023-06-01 NOTE — TELEPHONE ENCOUNTER
Reason for Call:  Appointment Request    Patient requesting this type of appt: Pre-op    Requested provider: Yeison Villegas    Reason patient unable to be scheduled: Not within requested timeframe    When does patient want to be seen/preferred time: Before surgery on 6/12/2023    Comments: Please advise    Could we send this information to you in [email or web address] or would you prefer to receive a phone call?:   Patient would prefer a phone call   Okay to leave a detailed message?: Yes at Cell number on file:    Telephone Information:   Mobile [phone]       Call taken on 6/1/2023 at 3:18 PM by Josselin Mcgowan MA

## 2023-06-07 ENCOUNTER — OFFICE VISIT (OUTPATIENT)
Dept: FAMILY MEDICINE | Facility: CLINIC | Age: 68
End: 2023-06-07
Payer: COMMERCIAL

## 2023-06-07 VITALS
DIASTOLIC BLOOD PRESSURE: 68 MMHG | BODY MASS INDEX: 31.81 KG/M2 | WEIGHT: 214.8 LBS | TEMPERATURE: 97.4 F | SYSTOLIC BLOOD PRESSURE: 114 MMHG | HEIGHT: 69 IN | HEART RATE: 95 BPM | OXYGEN SATURATION: 98 %

## 2023-06-07 DIAGNOSIS — M1A.0710 IDIOPATHIC CHRONIC GOUT OF RIGHT FOOT WITHOUT TOPHUS: ICD-10-CM

## 2023-06-07 DIAGNOSIS — E78.5 HYPERLIPIDEMIA LDL GOAL <100: ICD-10-CM

## 2023-06-07 DIAGNOSIS — E11.42 TYPE 2 DIABETES MELLITUS WITH DIABETIC POLYNEUROPATHY, WITHOUT LONG-TERM CURRENT USE OF INSULIN (H): ICD-10-CM

## 2023-06-07 DIAGNOSIS — E78.1 HYPERTRIGLYCERIDEMIA: ICD-10-CM

## 2023-06-07 DIAGNOSIS — I10 ESSENTIAL HYPERTENSION WITH GOAL BLOOD PRESSURE LESS THAN 140/90: ICD-10-CM

## 2023-06-07 DIAGNOSIS — Z01.818 PREOP GENERAL PHYSICAL EXAM: Primary | ICD-10-CM

## 2023-06-07 DIAGNOSIS — F41.9 ANXIETY: ICD-10-CM

## 2023-06-07 DIAGNOSIS — K21.00 GASTROESOPHAGEAL REFLUX DISEASE WITH ESOPHAGITIS WITHOUT HEMORRHAGE: ICD-10-CM

## 2023-06-07 LAB
BASOPHILS # BLD AUTO: 0.1 10E3/UL (ref 0–0.2)
BASOPHILS NFR BLD AUTO: 1 %
EOSINOPHIL # BLD AUTO: 0.2 10E3/UL (ref 0–0.7)
EOSINOPHIL NFR BLD AUTO: 3 %
ERYTHROCYTE [DISTWIDTH] IN BLOOD BY AUTOMATED COUNT: 13 % (ref 10–15)
HCT VFR BLD AUTO: 36.5 % (ref 40–53)
HGB BLD-MCNC: 12 G/DL (ref 13.3–17.7)
IMM GRANULOCYTES # BLD: 0 10E3/UL
IMM GRANULOCYTES NFR BLD: 0 %
LYMPHOCYTES # BLD AUTO: 3.2 10E3/UL (ref 0.8–5.3)
LYMPHOCYTES NFR BLD AUTO: 36 %
MCH RBC QN AUTO: 29.3 PG (ref 26.5–33)
MCHC RBC AUTO-ENTMCNC: 32.9 G/DL (ref 31.5–36.5)
MCV RBC AUTO: 89 FL (ref 78–100)
MONOCYTES # BLD AUTO: 0.6 10E3/UL (ref 0–1.3)
MONOCYTES NFR BLD AUTO: 7 %
NEUTROPHILS # BLD AUTO: 4.7 10E3/UL (ref 1.6–8.3)
NEUTROPHILS NFR BLD AUTO: 53 %
PLATELET # BLD AUTO: 206 10E3/UL (ref 150–450)
RBC # BLD AUTO: 4.09 10E6/UL (ref 4.4–5.9)
WBC # BLD AUTO: 8.7 10E3/UL (ref 4–11)

## 2023-06-07 PROCEDURE — 93000 ELECTROCARDIOGRAM COMPLETE: CPT | Performed by: FAMILY MEDICINE

## 2023-06-07 PROCEDURE — 99214 OFFICE O/P EST MOD 30 MIN: CPT | Performed by: FAMILY MEDICINE

## 2023-06-07 PROCEDURE — 85025 COMPLETE CBC W/AUTO DIFF WBC: CPT | Performed by: FAMILY MEDICINE

## 2023-06-07 PROCEDURE — 36415 COLL VENOUS BLD VENIPUNCTURE: CPT | Performed by: FAMILY MEDICINE

## 2023-06-07 ASSESSMENT — PAIN SCALES - GENERAL: PAINLEVEL: NO PAIN (0)

## 2023-06-07 NOTE — PROGRESS NOTES
[address] [address]  Phone: [phone]  Primary Provider: Yeison Escobar  Pre-op Performing Provider: YEISON ESCOBAR      PREOPERATIVE EVALUATION:  Today's date: 6/7/2023    Sven Givens is a 68 year old male who presents for a preoperative evaluation.      5/9/2023     3:10 PM   Additional Questions   Roomed by Monserrat   Accompanied by none     Surgical Information:  Surgery/Procedure: Hernia  Surgery Location: [facility]  Surgeon: Kiesha Gifford MD   Surgery Date: 06/12/2023  Time of Surgery: to be determined   Where patient plans to recover: At home with family  Fax number for surgical facility: To be determined    Assessment & Plan     The proposed surgical procedure is considered LOW risk.    Preop general physical exam  Medically cleared for anesthesia for proposed procedure.    Type 2 diabetes mellitus with diabetic polyneuropathy, without long-term current use of insulin (H)  Hold oral medications morning of procedure. May restart after procedure.    Essential hypertension with goal blood pressure less than 140/90  Controlled. Continue with current medications.    Hyperlipidemia LDL goal <100  Continue with atorvastatin.    Hypertriglyceridemia  Continue with atorvastatin.    Anxiety  Continue with citalopram.    Gastroesophageal reflux disease with esophagitis  Continue with omeprazole.    Gout  Continue with Allopurinol.      RECOMMENDATION:  APPROVAL GIVEN to proceed with proposed procedure, without further diagnostic evaluation.      Subjective       HPI related to upcoming procedure: h/o ventral hernia          6/7/2023     2:47 PM   Preop Questions   1. Have you ever had a heart attack or stroke? No   2. Have you ever had surgery on your heart or blood vessels, such as a stent placement, a coronary artery bypass, or surgery on an artery in your head, neck, heart, or legs? No   3. Do you have chest pain with activity?  No   4. Do you have a history of  heart failure? No   5. Do you currently have a cold, bronchitis or symptoms of other infection? No   6. Do you have a cough, shortness of breath, or wheezing? No   7. Do you or anyone in your family have previous history of blood clots? No   8. Do you or does anyone in your family have a serious bleeding problem such as prolonged bleeding following surgeries or cuts? No   9. Have you ever had problems with anemia or been told to take iron pills? No   10. Have you had any abnormal blood loss such as black, tarry or bloody stools? No   11. Have you ever had a blood transfusion? No   12. Are you willing to have a blood transfusion if it is medically needed before, during, or after your surgery? Yes   13. Have you or any of your relatives ever had problems with anesthesia? No   14. Do you have sleep apnea, excessive snoring or daytime drowsiness? YES   14a. Do you have a CPAP machine? Yes   15. Do you have any artifical heart valves or other implanted medical devices like a pacemaker, defibrillator, or continuous glucose monitor? No   16. Do you have artificial joints? No   17. Are you allergic to latex? No       Review of Systems  CONSTITUTIONAL: NEGATIVE for fever, chills, change in weight  INTEGUMENTARY/SKIN: NEGATIVE for worrisome rashes, moles or lesions  EYES: NEGATIVE for vision changes or irritation  ENT/MOUTH: NEGATIVE for ear, mouth and throat problems  RESP: NEGATIVE for significant cough or SOB  CV: NEGATIVE for chest pain, palpitations or peripheral edema  GI: NEGATIVE for nausea, abdominal pain, heartburn, or change in bowel habits  : NEGATIVE for frequency, dysuria, or hematuria  MUSCULOSKELETAL: NEGATIVE for significant arthralgias or myalgia  NEURO: NEGATIVE for weakness, dizziness or paresthesias  ENDOCRINE: NEGATIVE for temperature intolerance, skin/hair changes  HEME: NEGATIVE for bleeding problems  PSYCHIATRIC: NEGATIVE for changes in mood or affect    Patient  Active Problem List    Diagnosis Date Noted     Colon adenocarcinoma (H) 10/28/2022     Priority: Medium     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) 10/07/2021     Priority: Medium     Age-related incipient cataract of both eyes 10/07/2021     Priority: Medium     PRESLEY (obstructive sleep apnea)      Priority: Medium     11/29/2007(200#)-AHI 37.7, RDI 50, lowest oxygen saturation 80%. CPAP of 7 cm/H9O was effective.       Non-small cell carcinoma of lung, left (H) 10/17/2016     Priority: Medium     Stage 1A (pT1aN0) 0.9 x 0.7 x 0.5 cm grade 1 well differentiated adenocarcinoma of the left lower lobe of the lung with invasive component being 0.3cm, s/p wedge resection and mediastinal LN sampling on 9/23/16.       Nonalcoholic hepatosteatosis 10/17/2016     Priority: Medium     History of radiation exposure 09/30/2016     Priority: Medium     Type 2 diabetes mellitus with diabetic polyneuropathy, with long-term current use of insulin (H) 10/15/2015     Priority: Medium     Type 2 diabetes, HbA1C goal < 8%       Hyperlipidemia LDL goal <100 12/20/2013     Priority: Medium     Gout 12/20/2013     Priority: Medium     Diagnosed by joint tap August 2013 per patient report.        Anemia 07/03/2013     Priority: Medium     Persistent mild anemia. Needs evaluation. May be thalassemia carrier.       GERD (gastroesophageal reflux disease) 05/15/2012     Priority: Medium     Advanced directives, counseling/discussion 06/16/2011     Priority: Medium     Advance Directive Problem List Overview:   Name Relationship Phone    Primary Health Care Agent            Alternative Health Care Agent          Discussed advance care planning with patient; information given to patient to review.  Bindu Smith   6/16/2011          Essential hypertension with goal blood pressure less than 140/90 06/16/2011     Priority: Medium     History of adenomatous polyp  of colon 12/01/2009     Priority: Medium     follow up colonoscopy 5 years requested 5-2017 due.        Past Medical History:   Diagnosis Date     Allergies     PCN, ACE, Indomethocin     Cancer (H)     small cell lung cancer stage I     Diabetes (H) 2002     Diabetic neuropathy (H) 5/7/2012     Hypertension      Kidney stones 09/2004    s/p right kidney basket retrieval     Rhinitis, allergic      Rosacea      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, overuse, 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     Varicose veins      Past Surgical History:   Procedure Laterality Date     BRONCHOSCOPY FLEXIBLE N/A 9/23/2016    Procedure: BRONCHOSCOPY FLEXIBLE;  Surgeon: Gayle Moya MD;  Location: [facility] OR     COLONOSCOPY  5-03     COLONOSCOPY N/A 9/23/2022    Procedure: COLONOSCOPY, WITH POLYPECTOMY AND BIOPSY;  Surgeon: Abbe Mccarty MD;  Location: [facility] OR     COLONOSCOPY WITH CO2 INSUFFLATION N/A 5/31/2017    Procedure: COLONOSCOPY WITH CO2 INSUFFLATION;  COLON SCREEN/ SEB;  Surgeon: Margarito Rasheed DO;  Location: [facility] OR     COLONOSCOPY WITH CO2 INSUFFLATION N/A 9/23/2022    Procedure: COLONOSCOPY, WITH CO2 INSUFFLATION;  Surgeon: Abbe Mccarty MD;  Location: [facility] OR     COMBINED ESOPHAGOSCOPY, GASTROSCOPY, DUODENOSCOPY (EGD) WITH CO2 INSUFFLATION N/A 4/19/2019    Procedure: COMBINED ESOPHAGOSCOPY, GASTROSCOPY, DUODENOSCOPY (EGD) WITH CO2 INSUFFLATION;  Surgeon: Abbe Mccarty MD;  Location: [facility]     ESOPHAGOSCOPY, GASTROSCOPY, DUODENOSCOPY (EGD), COMBINED N/A 4/19/2019    Procedure: Combined Esophagoscopy, Gastroscopy, Duodenoscopy (Egd), Biopsy Single Or Multiple;  Surgeon: Abbe Mccarty MD;  Location: [facility] OR     GENITOURINARY SURGERY      kidney stones     THORACOSCOPIC WEDGE RESECTION LUNG Left 9/23/2016    Procedure: THORACOSCOPIC WEDGE RESECTION LUNG;  Surgeon: Gayle Moya MD;  Location: [facility] OR     VASCULAR SURGERY      varicose vein     Current Outpatient Medications   Medication  Sig Dispense Refill     acetaminophen (TYLENOL) 325 MG tablet Take 2 tablets (650 mg) by mouth every 4 hours as needed for mild pain or headaches 100 tablet 0     allopurinol (ZYLOPRIM) 100 MG tablet TAKE 2 TABLETS BY MOUTH EVERY  tablet 3     atorvastatin (LIPITOR) 80 MG tablet TAKE 1 TABLET BY MOUTH EVERY DAY 90 tablet 3     Azelaic Acid (FINACEA) 15 % gel Apply small amount twice a day to skin (Patient taking differently: as needed Apply small amount twice a day to skin) 50 g 3     blood glucose (NO BRAND SPECIFIED) test strip Use to test blood sugar 1 times daily or as directed. Brand per insurance 100 strip 0     citalopram (CELEXA) 20 MG tablet Take 1 tablet (20 mg) by mouth daily For anxiety. 90 tablet 1     clotrimazole-betamethasone (LOTRISONE) 1-0.05 % external cream        CONTOUR NEXT TEST test strip USE TO TEST BLOOD SUGAR ONE TIME DAILY OR AS DIRECTED 100 strip 0     fluticasone (FLONASE) 50 MCG/ACT nasal spray Spray 1-2 sprays into both nostrils daily (Patient taking differently: Spray 1-2 sprays into both nostrils as needed) 1 Bottle 1     glimepiride (AMARYL) 4 MG tablet TAKE 1 TABLET (4 MG) BY MOUTH 2 TIMES DAILY 180 tablet 3     ketoconazole (NIZORAL) 2 % external shampoo APPLY TOPICALLY EVERY OTHER DAY LATHER INTO RASH AND WASH OFF AFTER 10 MINUTES. 120 mL 0     losartan-hydrochlorothiazide (HYZAAR) 100-25 MG tablet Take 1 tablet by mouth every morning For blood pressure. 90 tablet 3     metFORMIN (GLUCOPHAGE) 1000 MG tablet TAKE 1 TABLET BY MOUTH TWICE A DAY WITH MEALS 180 tablet 3     metoprolol succinate ER (TOPROL XL) 50 MG 24 hr tablet TAKE 1 TABLET BY MOUTH EVERY DAY 90 tablet 3     metroNIDAZOLE (METROCREAM) 0.75 % external cream        metroNIDAZOLE 0.75 % EX external gel Apply topically 2 times daily 45 g 3     MULTIPLE VITAMINS OR Take by mouth every morning       nitroGLYcerin (NITROSTAT) 0.4 MG sublingual tablet For chest pain place 1 tablet under the tongue every 5 minutes for  "3 doses. If symptoms persist 5 minutes after 1st dose call 911. (Patient taking differently: every 5 minutes as needed For chest pain place 1 tablet under the tongue every 5 minutes for 3 doses. If symptoms persist 5 minutes after 1st dose call 911.) 25 tablet 3     omega-3 acid ethyl esters (LOVAZA) 1 g capsule Take 2 capsules (2 g) by mouth 2 times daily 360 capsule 3     omeprazole (PRILOSEC) 20 MG DR capsule TAKE 1 CAPSULE BY MOUTH EVERY DAY TAKE 30-60 MINS BEFORE A MEAL 90 capsule 0     Oxymetazoline HCl (AFRIN NASAL SPRAY NA) Spray in nostril as needed       OZEMPIC, 1 MG/DOSE, 4 MG/3ML SOPN INJECT 1 MG SUBCUTANEOUS EVERY 7 DAYS (Patient taking differently: Inject 1 mg Subcutaneous every 7 days Takes on ) 9 mL 1     senna-docusate (SENEXON-S) 8.6-50 MG tablet Take 1 tablet by mouth At Bedtime 90 tablet 3     terazosin (HYTRIN) 5 MG capsule TAKE 1 CAPSULE (5 MG) BY MOUTH AT BEDTIME FOR BLOOD PRESSURE. 90 capsule 3       Allergies   Allergen Reactions     Pcn [Penicillins] Rash     Ace Inhibitors Cough     Indomethacin Other (See Comments)     Severe dizziness     Invokana [Canagliflozin]      bladder infection        Social History     Tobacco Use     Smoking status: Former     Types: Cigarettes     Quit date: 1992     Years since quittin.3     Smokeless tobacco: Never     Tobacco comments:     15 + years    Vaping Use     Vaping status: Never Used   Substance Use Topics     Alcohol use: No     Family History   Problem Relation Age of Onset     Hernia Mother          age 97     Cerebrovascular Disease Father 64     Cancer Sister         ovarary      Deep Vein Thrombosis (DVT) No family hx of      History   Drug Use Unknown         Objective     /68 (BP Location: Left arm, Patient Position: Sitting, Cuff Size: Adult Large)   Pulse 95   Temp 97.4  F (36.3  C) (Tympanic)   Ht 1.753 m (5' 9\")   Wt 97.4 kg (214 lb 12.8 oz)   SpO2 98%   BMI 31.72 kg/m      Physical Exam    GENERAL " APPEARANCE: healthy, alert and no distress     EYES: EOMI,  PERRL     HENT: ear canals and TM's normal and nose and mouth without ulcers or lesions     NECK: no adenopathy, no asymmetry, masses, or scars and thyroid normal to palpation     RESP: lungs clear to auscultation - no rales, rhonchi or wheezes     CV: regular rates and rhythm, normal S1 S2, no S3 or S4 and no murmur, click or rub     ABDOMEN:  soft, nontender, no HSM or masses and bowel sounds normal     MS: extremities normal- no gross deformities noted, no evidence of inflammation in joints, FROM in all extremities.     SKIN: no suspicious lesions or rashes     NEURO: Normal strength and tone, sensory exam grossly normal, mentation intact and speech normal     PSYCH: mentation appears normal. and affect normal/bright     LYMPHATICS: No cervical adenopathy    Recent Labs   Lab Test 05/09/23  1540 01/11/23  0900 10/31/22  0707 10/29/22  0749 10/28/22  1923 10/24/22  1020   HGB  --   --   --  10.2*  --  12.7*   PLT  --   --  188 184  --  196     --   --  135*  --  139   POTASSIUM 3.7  --   --  3.4  --  3.7   CR 0.81  --   --  0.65*   < > 0.87   A1C 8.0* 6.8*  --   --   --   --     < > = values in this interval not displayed.        Diagnostics:  CBC pending  EKG: appears normal, NSR, normal axis, normal intervals, no acute ST/T changes c/w ischemia, no LVH by voltage criteria    Revised Cardiac Risk Index (RCRI):  The patient has the following serious cardiovascular risks for perioperative complications:   - No serious cardiac risks = 0 points     RCRI Interpretation: 0 points: Class I (very low risk - 0.4% complication rate)           Signed Electronically by: Yeison Villegas MD  Copy of this evaluation report is provided to requesting physician.

## 2023-06-07 NOTE — PATIENT INSTRUCTIONS
For informational purposes only. Not to replace the advice of your health care provider. Copyright   2003,  [employer] [facility]. All rights reserved. Clinically reviewed by Jessica Younger MD. [employer] [id number] - REV .  Preparing for Your Surgery  Getting started  A nurse will call you to review your health history and instructions. They will give you an arrival time based on your scheduled surgery time. Please be ready to share:  Your doctor's clinic name and phone number  Your medical, surgical, and anesthesia history  A list of allergies and sensitivities  A list of medicines, including herbal treatments and over-the-counter drugs  Whether the patient has a legal guardian (ask how to send us the papers in advance)  Please tell us if you're pregnant--or if there's any chance you might be pregnant. Some surgeries may injure a fetus (unborn baby), so they require a pregnancy test. Surgeries that are safe for a fetus don't always need a test, and you can choose whether to have one.   If you have a child who's having surgery, please ask for a copy of Preparing for Your Child's Surgery.    Preparing for surgery  Within 10 to 30 days of surgery: Have a pre-op exam (sometimes called an H&P, or History and Physical). This can be done at a clinic or pre-operative center.  If you're having a , you may not need this exam. Talk to your care team.  At your pre-op exam, talk to your care team about all medicines you take. If you need to stop any medicines before surgery, ask when to start taking them again.  We do this for your safety. Many medicines can make you bleed too much during surgery. Some change how well surgery (anesthesia) drugs work.  Call your insurance company to let them know you're having surgery. (If you don't have insurance, call [phone].)  Call your clinic if there's any change in your health. This includes signs of a cold or flu (sore throat, runny nose, cough, rash, fever). It  also includes a scrape or scratch near the surgery site.  If you have questions on the day of surgery, call your hospital or surgery center.  Eating and drinking guidelines  For your safety: Unless your surgeon tells you otherwise, follow the guidelines below.  Eat and drink as usual until 8 hours before you arrive for surgery. After that, no food or milk.  Drink clear liquids until 2 hours before you arrive. These are liquids you can see through, like water, Gatorade, and Propel Water. They also include plain black coffee and tea (no cream or milk), candy, and breath mints. You can spit out gum when you arrive.  If you drink alcohol: Stop drinking it the night before surgery.  If your care team tells you to take medicine on the morning of surgery, it's okay to take it with a sip of water.  Preventing infection  Shower or bathe the night before and morning of your surgery. Follow the instructions your clinic gave you. (If no instructions, use regular soap.)  Don't shave or clip hair near your surgery site. We'll remove the hair if needed.  Don't smoke or vape the morning of surgery. You may chew nicotine gum up to 2 hours before surgery. A nicotine patch is okay.  Note: Some surgeries require you to completely quit smoking and nicotine. Check with your surgeon.  Your care team will make every effort to keep you safe from infection. We will:  Clean our hands often with soap and water (or an alcohol-based hand rub).  Clean the skin at your surgery site with a special soap that kills germs.  Give you a special gown to keep you warm. (Cold raises the risk of infection.)  Wear special hair covers, masks, gowns and gloves during surgery.  Give antibiotic medicine, if prescribed. Not all surgeries need antibiotics.  What to bring on the day of surgery  Photo ID and insurance card  Copy of your health care directive, if you have one  Glasses and hearing aids (bring cases)  You can't wear contacts during surgery  Inhaler and  eye drops, if you use them (tell us about these when you arrive)  CPAP machine or breathing device, if you use them  A few personal items, if spending the night  If you have . . .  A pacemaker, ICD (cardiac defibrillator) or other implant: Bring the ID card.  An implanted stimulator: Bring the remote control.  A legal guardian: Bring a copy of the certified (court-stamped) guardianship papers.  Please remove any jewelry, including body piercings. Leave jewelry and other valuables at home.  If you're going home the day of surgery  You must have a responsible adult drive you home. They should stay with you overnight as well.  If you don't have someone to stay with you, and you aren't safe to go home alone, we may keep you overnight. Insurance often won't pay for this.  After surgery  If it's hard to control your pain or you need more pain medicine, please call your surgeon's office.  Questions?   If you have any questions for your care team, list them here: _________________________________________________________________________________________________________________________________________________________________________ ____________________________________ ____________________________________ ____________________________________    How to Take Your Medication Before Surgery  - HOLD (do not take) oral diabetes medication morning of procedure. May restart after procedure.    For informational purposes only. Not to replace the advice of your health care provider. Copyright   2003, 2019 [employer]. All rights reserved. Clinically reviewed by Jessica Younger MD. [employer] [id number] - REV 12/22.  Preparing for Your Surgery  Getting started  A nurse will call you to review your health history and instructions. They will give you an arrival time based on your scheduled surgery time. Please be ready to share:  Your doctor's clinic name and phone number  Your medical, surgical, and anesthesia history  A list of  allergies and sensitivities  A list of medicines, including herbal treatments and over-the-counter drugs  Whether the patient has a legal guardian (ask how to send us the papers in advance)  Please tell us if you're pregnant--or if there's any chance you might be pregnant. Some surgeries may injure a fetus (unborn baby), so they require a pregnancy test. Surgeries that are safe for a fetus don't always need a test, and you can choose whether to have one.   If you have a child who's having surgery, please ask for a copy of Preparing for Your Child's Surgery.    Preparing for surgery  Within 10 to 30 days of surgery: Have a pre-op exam (sometimes called an H&P, or History and Physical). This can be done at a clinic or pre-operative center.  If you're having a , you may not need this exam. Talk to your care team.  At your pre-op exam, talk to your care team about all medicines you take. If you need to stop any medicines before surgery, ask when to start taking them again.  We do this for your safety. Many medicines can make you bleed too much during surgery. Some change how well surgery (anesthesia) drugs work.  Call your insurance company to let them know you're having surgery. (If you don't have insurance, call [phone].)  Call your clinic if there's any change in your health. This includes signs of a cold or flu (sore throat, runny nose, cough, rash, fever). It also includes a scrape or scratch near the surgery site.  If you have questions on the day of surgery, call your hospital or surgery center.  Eating and drinking guidelines  For your safety: Unless your surgeon tells you otherwise, follow the guidelines below.  Eat and drink as usual until 8 hours before you arrive for surgery. After that, no food or milk.  Drink clear liquids until 2 hours before you arrive. These are liquids you can see through, like water, Gatorade, and Propel Water. They also include plain black coffee and tea (no cream or  milk), candy, and breath mints. You can spit out gum when you arrive.  If you drink alcohol: Stop drinking it the night before surgery.  If your care team tells you to take medicine on the morning of surgery, it's okay to take it with a sip of water.  Preventing infection  Shower or bathe the night before and morning of your surgery. Follow the instructions your clinic gave you. (If no instructions, use regular soap.)  Don't shave or clip hair near your surgery site. We'll remove the hair if needed.  Don't smoke or vape the morning of surgery. You may chew nicotine gum up to 2 hours before surgery. A nicotine patch is okay.  Note: Some surgeries require you to completely quit smoking and nicotine. Check with your surgeon.  Your care team will make every effort to keep you safe from infection. We will:  Clean our hands often with soap and water (or an alcohol-based hand rub).  Clean the skin at your surgery site with a special soap that kills germs.  Give you a special gown to keep you warm. (Cold raises the risk of infection.)  Wear special hair covers, masks, gowns and gloves during surgery.  Give antibiotic medicine, if prescribed. Not all surgeries need antibiotics.  What to bring on the day of surgery  Photo ID and insurance card  Copy of your health care directive, if you have one  Glasses and hearing aids (bring cases)  You can't wear contacts during surgery  Inhaler and eye drops, if you use them (tell us about these when you arrive)  CPAP machine or breathing device, if you use them  A few personal items, if spending the night  If you have . . .  A pacemaker, ICD (cardiac defibrillator) or other implant: Bring the ID card.  An implanted stimulator: Bring the remote control.  A legal guardian: Bring a copy of the certified (court-stamped) guardianship papers.  Please remove any jewelry, including body piercings. Leave jewelry and other valuables at home.  If you're going home the day of surgery  You must have  a responsible adult drive you home. They should stay with you overnight as well.  If you don't have someone to stay with you, and you aren't safe to go home alone, we may keep you overnight. Insurance often won't pay for this.  After surgery  If it's hard to control your pain or you need more pain medicine, please call your surgeon's office.  Questions?   If you have any questions for your care team, list them here: _________________________________________________________________________________________________________________________________________________________________________ ____________________________________ ____________________________________ ____________________________________    How to Take Your Medication Before Surgery  - HOLD (do not take) hold oral diabetes medications morning of procedure. May restart after procedure.

## 2023-06-08 DIAGNOSIS — E11.42 TYPE 2 DIABETES MELLITUS WITH DIABETIC POLYNEUROPATHY, WITHOUT LONG-TERM CURRENT USE OF INSULIN (H): ICD-10-CM

## 2023-06-08 NOTE — TELEPHONE ENCOUNTER
Dr. Villegas,    See message from the pharmacy. Patient needing a new script for Accu-Check Guide Test Strips. Patient is not using the Contour Test Strips.     Thank you,    Elisa Colon RN

## 2023-06-27 ENCOUNTER — TRANSFERRED RECORDS (OUTPATIENT)
Dept: HEALTH INFORMATION MANAGEMENT | Facility: CLINIC | Age: 68
End: 2023-06-27
Payer: COMMERCIAL

## 2023-07-21 ENCOUNTER — OFFICE VISIT (OUTPATIENT)
Dept: PHARMACY | Facility: CLINIC | Age: 68
End: 2023-07-21
Payer: COMMERCIAL

## 2023-07-21 VITALS
BODY MASS INDEX: 31.28 KG/M2 | WEIGHT: 211.8 LBS | SYSTOLIC BLOOD PRESSURE: 121 MMHG | HEART RATE: 63 BPM | DIASTOLIC BLOOD PRESSURE: 74 MMHG

## 2023-07-21 DIAGNOSIS — Z79.4 TYPE 2 DIABETES MELLITUS WITH DIABETIC POLYNEUROPATHY, WITH LONG-TERM CURRENT USE OF INSULIN (H): Primary | ICD-10-CM

## 2023-07-21 DIAGNOSIS — E11.42 TYPE 2 DIABETES MELLITUS WITH DIABETIC POLYNEUROPATHY, WITH LONG-TERM CURRENT USE OF INSULIN (H): Primary | ICD-10-CM

## 2023-07-21 PROCEDURE — 99606 MTMS BY PHARM EST 15 MIN: CPT | Performed by: PHARMACIST

## 2023-07-21 PROCEDURE — 99607 MTMS BY PHARM ADDL 15 MIN: CPT | Performed by: PHARMACIST

## 2023-07-21 RX ORDER — LANOLIN ALCOHOL/MO/W.PET/CERES
1000 CREAM (GRAM) TOPICAL DAILY
COMMUNITY

## 2023-07-21 NOTE — Clinical Note
FYI -  will increase Ozempic to 2 mg pens for next shipment of Ozempic from Patient Assistance Program. This wont be until 2024 but have everything queued up for this. No further action required. Thanks! Dane

## 2023-07-21 NOTE — PROGRESS NOTES
Medication Therapy Management (MTM) Encounter    ASSESSMENT:                            Medication Adherence/Access: gets Ozempic through Patient Assistance Program.     Type 2 Diabetes: Patient is not meeting A1c goal of < 8%, likely due to reduced activity lately after hernia. Metformin and glimepiride doses are optimized, but Ozempic could be increased. Pt would benefit from increasing Ozempic to 2 mg weekly. However he gets Ozempic through a Patient Assistance Program and has a decent supply of 1 mg pens left. He would like to use these up first before increasing to 2 mg.    Immunizations: are up to date.     Microalbumin: up to date. Last microalbumin was not at goal < 30 mg/g. Pt is not on ACEI/ARB due to cough with lisinopril. Pt is on ARB  Annual Eye Exam: up to date.   Aspirin: is not indicated in this patient due to age.  Statin: Patient is on high intensity statin which is indicated per 2019 ACC/AHA guidelines for lipid management due to the presence of diabetes    PLAN:                            1. Will increase Ozempic to 2 mg weekly for 2024. Pt would like to wait until to use up the supply of 1 mg pens that he already received from the [employer] Patient Assistance Program.     2. Ordered CGM per pt request due to difficulty with glucometer.    3. Printed off Patient Assistance Program paperwork for Ozempic for 2024. Pt will fill out his portion in November and drop off for MTM at that time. Will then fill out provider section and fax.    Follow-up: Return in 7 months (on 2/7/2024) for Follow up, with me, in person.    SUBJECTIVE/OBJECTIVE:                          Sven Givens is a 68 year old male coming in for a follow-up visit from 1/13/23 with Dora Neumann, ElizabethD.      Reason for visit: A1C went up in May    Allergies/ADRs: Reviewed in chart  Past Medical History: Reviewed in chart  Tobacco: He reports that he quit smoking about 31 years ago. His smoking use included cigarettes. He has  never used smokeless tobacco.  Alcohol: not currently using    Med adherence: getting Ozempic through [employer] P[employer]    Type 2 Diabetes:   Metformin 1000mg twice daily  Glimepiride 4mg twice daily   Ozempic 1mg injected once weekly - getting through [employer] P[employer]     Medication history:   avoiding SGLT2 due to hx of bladder infection.   Januvia - not effective  Tradjenta - not effective  Invokana - not effective    Diet: monitoring carbs, doesn't drink soda  Exercise: had surgery for hernia in May 2023 due to increased activity and now is remaining sedentary due to fear of this    Today pt reports doing okay with diabetes control but is worried that his blood sugars have gone up. He had a hernia in May that has caused him a great deal of pain and he hasn't been able to be active. He is very worried about doing any activity because the hernia presented when he started getting active again. He did start taking b12 supplement due to presence of metformin. Hasn't noticed any changes in energy level yet.    Current diabetes symptoms: none  Blood sugar monitoring: glucometer testing 1 time per week. See below.  Pt having difficulty remembering to check blood sugars with glucometer and would like to have a CGM if covered. Does have history of hypoglycemia with readings less than 70 mg/dL if he skips a meal.    Date FBG Lunch/2hrs pp   7/21  197   7/16  183   7/8  175   7/3  206   7/1 184        Today's Vitals:   /74 (BP Location: Left arm, Patient Position: Sitting, Cuff Size: Adult Regular)   Pulse 63   Wt 211 lb 12.8 oz (96.1 kg)   BMI 31.28 kg/m       BP Readings from Last 3 Encounters:   06/07/23 114/68   05/09/23 127/73   01/11/23 134/82     Pulse Readings from Last 3 Encounters:   06/07/23 95   05/09/23 97   01/11/23 79       Lab Results   Component Value Date    A1C 8.0 (H) 05/09/2023     (H) 05/09/2023     05/09/2023    POTASSIUM 3.7 05/09/2023    ODELL 9.4 05/09/2023    CHLORIDE 108  05/09/2023    CO2 26 05/09/2023    BUN 17 05/09/2023    CR 0.81 05/09/2023    GFRESTIMATED >90 05/09/2023    CHOL 89 05/09/2023    LDL <5 05/09/2023    HDL 31 (L) 05/09/2023    TRIG 281 (H) 05/09/2023    B12 360 09/28/2022    UMALCR 33.33 (H) 09/16/2022    TSH 2.49 05/27/2022     Current Outpatient Medications   Medication Sig     allopurinol (ZYLOPRIM) 100 MG tablet TAKE 2 TABLETS BY MOUTH EVERY DAY     atorvastatin (LIPITOR) 80 MG tablet TAKE 1 TABLET BY MOUTH EVERY DAY     Azelaic Acid (FINACEA) 15 % gel Apply small amount twice a day to skin (Patient taking differently: as needed Apply small amount twice a day to skin)     blood glucose (NO BRAND SPECIFIED) test strip Use to test blood sugar 1 times daily or as directed. Brand per insurance     citalopram (CELEXA) 20 MG tablet Take 1 tablet (20 mg) by mouth daily For anxiety.     clotrimazole-betamethasone (LOTRISONE) 1-0.05 % external cream      CONTOUR NEXT TEST test strip USE TO TEST BLOOD SUGAR 1 TIME DAILY OR AS DIRECTED.     CONTOUR NEXT TEST test strip USE TO TEST ONCE DAILY OR AS DIRECTED     fluticasone (FLONASE) 50 MCG/ACT nasal spray Spray 1-2 sprays into both nostrils daily (Patient taking differently: Spray 1-2 sprays into both nostrils as needed)     glimepiride (AMARYL) 4 MG tablet TAKE 1 TABLET (4 MG) BY MOUTH 2 TIMES DAILY     ketoconazole (NIZORAL) 2 % external shampoo APPLY TOPICALLY EVERY OTHER DAY LATHER INTO RASH AND WASH OFF AFTER 10 MINUTES.     losartan-hydrochlorothiazide (HYZAAR) 100-25 MG tablet Take 1 tablet by mouth every morning For blood pressure.     metFORMIN (GLUCOPHAGE) 1000 MG tablet TAKE 1 TABLET BY MOUTH TWICE A DAY WITH MEALS     metoprolol succinate ER (TOPROL XL) 50 MG 24 hr tablet TAKE 1 TABLET BY MOUTH EVERY DAY     metroNIDAZOLE (METROCREAM) 0.75 % external cream      metroNIDAZOLE 0.75 % EX external gel Apply topically 2 times daily     MULTIPLE VITAMINS OR Take by mouth every morning     nitroGLYcerin (NITROSTAT)  0.4 MG sublingual tablet For chest pain place 1 tablet under the tongue every 5 minutes for 3 doses. If symptoms persist 5 minutes after 1st dose call 911. (Patient taking differently: every 5 minutes as needed For chest pain place 1 tablet under the tongue every 5 minutes for 3 doses. If symptoms persist 5 minutes after 1st dose call 911.)     omega-3 acid ethyl esters (LOVAZA) 1 g capsule Take 2 capsules (2 g) by mouth 2 times daily     omeprazole (PRILOSEC) 20 MG DR capsule TAKE 1 CAPSULE BY MOUTH EVERY DAY TAKE 30-60 MINS BEFORE A MEAL     Oxymetazoline HCl (AFRIN NASAL SPRAY NA) Spray in nostril as needed     OZEMPIC, 1 MG/DOSE, 4 MG/3ML SOPN INJECT 1 MG SUBCUTANEOUS EVERY 7 DAYS (Patient taking differently: Inject 1 mg Subcutaneous every 7 days Takes on Saturdays)     senna-docusate (SENEXON-S) 8.6-50 MG tablet Take 1 tablet by mouth At Bedtime     terazosin (HYTRIN) 5 MG capsule TAKE 1 CAPSULE (5 MG) BY MOUTH AT BEDTIME FOR BLOOD PRESSURE.     No current facility-administered medications for this visit.     ----------------  I spent 30 minutes with this patient today. All changes were made via collaborative practice agreement with Yeison Villegas MD, MD. A copy of the visit note was provided to the patient's provider(s).    A summary of these recommendations was given to the patient.    Dane Craft, PharmD, [facility]  Medication Therapy Management Provider  [phone]     Medication Therapy Recommendations  Type 2 diabetes mellitus with diabetic polyneuropathy, with long-term current use of insulin (H)    Current Medication: OZEMPIC, 1 MG/DOSE, 4 MG/3ML SOPN (Discontinued)   Rationale: Dose too low - Dosage too low - Effectiveness   Recommendation: Increase Dose - Ozempic (2 MG/DOSE) 8 MG/3ML Sopn   Status: Accepted per CPA          Current Medication: blood glucose (NO BRAND SPECIFIED) test strip   Rationale: More effective medication available - Ineffective medication - Effectiveness   Recommendation: Change  Medication - FreeStyle Audrey 2 Sensor Misc   Status: Accepted per CPA

## 2023-07-21 NOTE — PATIENT INSTRUCTIONS
Yordy Machuca, it was great talking with you today!     Recommendations from today's MTM visit:                                                      1. We will increase your Ozempic for 2024 to 2 mg weekly    2. Please make sure to check your blood sugars first thing in the morning, before food, after you start Ozempic.     3. We want your morning readings to be between 100-150 mg/dL. If your readings are consistently less than 80 mg/dL, message me on MyChart and we can discuss further.    4. Ordered CGM     I value your experience and would be very grateful for your time with providing feedback on our clinic survey. You may receive a survey via email or text message in the next few days.     Next MTM visit: 2/7    To schedule another MTM appointment, please call the clinic directly or you may call the MTM scheduling line at [phone] or toll-free at [phone].     My Clinical Pharmacist's contact information:                                                      Please feel free to contact me with any questions or concerns you have.      Dane Craft, PharmD, BCACP  [facility]  Phone: [phone]

## 2023-08-04 ENCOUNTER — TELEPHONE (OUTPATIENT)
Dept: FAMILY MEDICINE | Facility: CLINIC | Age: 68
End: 2023-08-04

## 2023-08-04 ENCOUNTER — OFFICE VISIT (OUTPATIENT)
Dept: CARDIOLOGY | Facility: CLINIC | Age: 68
End: 2023-08-04
Payer: COMMERCIAL

## 2023-08-04 VITALS — HEART RATE: 122 BPM | SYSTOLIC BLOOD PRESSURE: 141 MMHG | DIASTOLIC BLOOD PRESSURE: 68 MMHG | OXYGEN SATURATION: 99 %

## 2023-08-04 DIAGNOSIS — I25.10 CORONARY ARTERY DISEASE INVOLVING NATIVE CORONARY ARTERY OF NATIVE HEART WITHOUT ANGINA PECTORIS: Primary | ICD-10-CM

## 2023-08-04 PROCEDURE — 99214 OFFICE O/P EST MOD 30 MIN: CPT | Performed by: INTERNAL MEDICINE

## 2023-08-04 NOTE — PROGRESS NOTES
Sven Givens's goals for this visit include: Hasn't started one of his meds yet but doesn't know which one.     He requests these members of his care team be copied on today's visit information: PCP    PCP: Yeison Villegas    Referring Provider:  No referring provider defined for this encounter.    BP (!) 141/68 (BP Location: Right arm, Patient Position: Sitting, Cuff Size: Adult Regular)   Pulse (!) 122   SpO2 99%     Do you need any medication refills at today's visit? No.    Nazario Chowdhury, EMT  Clinic Support  [facility]    [phone]    Employed by [employer]

## 2023-08-04 NOTE — TELEPHONE ENCOUNTER
Received [employer] Patient Assistance Program request for Ozempic form via fax. Placed in Dr Villegas's box.  Josselin Mcgowan [facility]   Primary Care

## 2023-08-04 NOTE — PROGRESS NOTES
[facility] Cardiology Consultation:    Assessment and Plan:     Sinus tachycardia: Possibly inappropriate sinus tachycardia, though symptoms of orthostatic dizziness suggest an orthostatic component too.  Ivabradine was not approved.  For now, recommended to stay well-hydrated.  Higher heart rate today appears related to not feeling well.  Discussed ECG, decided to defer.  He will let us know if heart rate does not return to his normal range.  Mild CAD, normal LV fxn, coronary CTA with CAC of 104: Cont aspirin and statin.  Encouraged to try sublingual nitroglycerin for occasional chest pains  Dyslipidemia, LDL goal <70 - continue lipitor 80mg.  Calculated LDL is falsely low due to elevated triglycerides, can check direct LDL in the future.  Stopped icosapent ethyl due to taste and high cost, currently on omega 3   Hypertention, with orthostatic dizziness: Controlled  Type 2 diabetes mellitus: did not tolerate SGLT2 inhibitor due to bladder infx, on metformin, glimepiride and GLP1 agonist  Lung cancer stage 1 s/p resection, colon cancer s/p surgery    Judson Gamino MD  Cardiology fellow (PGY4)  4830    RTC in 1 year    [facility] Cardiovascular Division    I have seen and examined the patient, reviewed labs and tests. I have discussed my findings and treatment recommendations with the house staff and/or Cardiology fellow and agree with their assessment and plan as outlined in the note.    Jesus Shane MD    Cardiac Imaging and Prevention  [facility]  Pager: [phone]    HPI: Tachycardia, CAD follow-up.  He has been doing well without any intercurrent cardiac illness or hospitalization.  He was unfortunately diagnosed with colon cancer since his last visit.  He underwent surgery for that, and subsequently also needed surgery for hernia repair.  He reports not feeling well today, reporting headache and general uneasiness.  He is tolerating all his  medications without any issues.  Basic labs are normal.    I reviewed his ECG that was done in clinic 2 months ago.  It shows normal sinus rhythm without any concerning abnormalities.  Lipid profile checked recently shows a calculated LDL cholesterol less than 5, and triglycerides in the 200s.    Physically active.  Walks a mile a day.  Denies exertional symptoms.  Still notices episodes of occasional palpitations does not limit physical activity.  Measures BP at home, mostly in the 120-130 range.      EXAM:  BP (!) 141/68 (BP Location: Right arm, Patient Position: Sitting, Cuff Size: Adult Regular)   Pulse (!) 122   SpO2 99%     Gen: NAD  CV: RRR, Ns1,S2. No JVD or murmur  Pulm: Clear B/l. No wheezing   Abd: Soft. Non-distended. Non-tender to palpation   Ext:  No lower extremity edema   Neuro: Non-focal.     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    Varicose veins        CURRENT MEDICATIONS:  Current Outpatient Medications   Medication    allopurinol (ZYLOPRIM) 100 MG tablet    atorvastatin (LIPITOR) 80 MG tablet    Azelaic Acid (FINACEA) 15 % gel    citalopram (CELEXA) 20 MG tablet    clotrimazole-betamethasone (LOTRISONE) 1-0.05 % external cream    Continuous Blood Gluc Sensor (FREESTYLE FEDER[place] 2 SENSOR) [facility]    CONTOUR NEXT TEST test strip    cyanocobalamin (VITAMIN B-12) 1000 MCG tablet    fluticasone (FLONASE) 50 MCG/ACT nasal spray    glimepiride (AMARYL) 4 MG tablet    ketoconazole (NIZORAL) 2 % external shampoo    losartan-hydrochlorothiazide (HYZAAR) 100-25 MG tablet    metFORMIN (GLUCOPHAGE) 1000 MG tablet    metoprolol succinate ER (TOPROL XL) 50 MG 24  hr tablet    metroNIDAZOLE (METROCREAM) 0.75 % external cream    metroNIDAZOLE 0.75 % EX external gel    nitroGLYcerin (NITROSTAT) 0.4 MG sublingual tablet    omega-3 acid ethyl esters (LOVAZA) 1 g capsule    omeprazole (PRILOSEC) 20 MG DR capsule    Oxymetazoline HCl (AFRIN NASAL SPRAY NA)    Semaglutide, 2 MG/DOSE, (OZEMPIC) 8 MG/3ML pen    senna-docusate (SENEXON-S) 8.6-50 MG tablet    terazosin (HYTRIN) 5 MG capsule     No current facility-administered medications for this visit.       PAST SURGICAL HISTORY:  Past Surgical History:   Procedure Laterality Date    BRONCHOSCOPY FLEXIBLE N/A 9/23/2016    Procedure: BRONCHOSCOPY FLEXIBLE;  Surgeon: Gayle Moya MD;  Location: [facility] OR    COLONOSCOPY  5-03    COLONOSCOPY N/A 9/23/2022    Procedure: COLONOSCOPY, WITH POLYPECTOMY AND BIOPSY;  Surgeon: Abbe Mccarty MD;  Location: [facility] OR    COLONOSCOPY WITH CO2 INSUFFLATION N/A 5/31/2017    Procedure: COLONOSCOPY WITH CO2 INSUFFLATION;  COLON SCREEN/ SEB;  Surgeon: Margarito Rasheed DO;  Location: [facility] OR    COLONOSCOPY WITH CO2 INSUFFLATION N/A 9/23/2022    Procedure: COLONOSCOPY, WITH CO2 INSUFFLATION;  Surgeon: Abbe Mccarty MD;  Location: [facility] OR    COMBINED ESOPHAGOSCOPY, GASTROSCOPY, DUODENOSCOPY (EGD) WITH CO2 INSUFFLATION N/A 4/19/2019    Procedure: COMBINED ESOPHAGOSCOPY, GASTROSCOPY, DUODENOSCOPY (EGD) WITH CO2 INSUFFLATION;  Surgeon: Abbe Mccarty MD;  Location: [facility] OR    ESOPHAGOSCOPY, GASTROSCOPY, DUODENOSCOPY (EGD), COMBINED N/A 4/19/2019    Procedure: Combined Esophagoscopy, Gastroscopy, Duodenoscopy (Egd), Biopsy Single Or Multiple;  Surgeon: Abbe Mccarty MD;  Location: [facility] OR    GENITOURINARY SURGERY      kidney stones    THORACOSCOPIC WEDGE RESECTION LUNG Left 9/23/2016    Procedure: THORACOSCOPIC WEDGE RESECTION LUNG;  Surgeon: Gayle Moya MD;  Location: [facility] OR    VASCULAR SURGERY      varicose vein       ALLERGIES     Allergies   Allergen  Reactions    Pcn [Penicillins] Rash    Ace Inhibitors Cough    Indomethacin Other (See Comments)     Severe dizziness    Invokana [Canagliflozin]      bladder infection       FAMILY HISTORY:  Family History   Problem Relation Age of Onset    Hernia Mother          age 97    Cerebrovascular Disease Father 64    Cancer Sister         ovarary     Deep Vein Thrombosis (DVT) No family hx of        SOCIAL HISTORY:  Social History     Socioeconomic History    Marital status:      Spouse name: Not on file    Number of children: Not on file    Years of education: Not on file    Highest education level: Not on file   Occupational History     Employer: [email or web address]   Tobacco Use    Smoking status: Former     Types: Cigarettes     Quit date: 1992     Years since quittin.5    Smokeless tobacco: Never    Tobacco comments:     15 + years    Vaping Use    Vaping Use: Never used   Substance and Sexual Activity    Alcohol use: No    Drug use: Never    Sexual activity: Not Currently     Partners: Female     Birth control/protection: None   Other Topics Concern    Parent/sibling w/ CABG, MI or angioplasty before 65F 55M? No   Social History Narrative    Not on file     Social Determinants of Health     Financial Resource Strain: Not on file   Food Insecurity: Not on file   Transportation Needs: Not on file   Physical Activity: Not on file   Stress: Not on file   Social Connections: Not on file   Intimate Partner Violence: Not on file   Housing Stability: Not on file       ROS:   Constitutional: No fever, chills, or sweats. No weight gain/loss   ENT: No visual disturbance, ear ache, epistaxis, sore throat  Allergies/Immunologic: Negative.   Respiratory: No cough, hemoptysia  Cardiovascular: As per HPI  GI: No nausea, vomiting, hematemesis, melena, or hematochezia  : No urinary frequency, dysuria, or hematuria  Integument: Negative  Psychiatric: Negative  Neuro: Negative  Endocrinology: Negative    Musculoskeletal: Negative    ADDITIONAL COMMENTS:     I reviewed the patient's medications:     I reviewed the patient's pertinent clinical laboratory studies:     I reviewed the patient's pertinent imaging studies:   I reviewed the patient's ECG:        Recent Labs   Lab Test 05/09/23  1540 05/27/22  1412   CHOL 89 88   HDL 31* 29*   LDL <5 <5   TRIG 281* 348*

## 2023-08-08 NOTE — TELEPHONE ENCOUNTER
Faxed provider signed form to [employer] Patient Assistance Program, [phone], right fax confirmed at 9:12 am today, 8/8/2023. Copy to TC and abstracting.  Josselin Mcgowan MA  [facility]   Primary Care

## 2023-08-29 ENCOUNTER — TRANSFERRED RECORDS (OUTPATIENT)
Dept: HEALTH INFORMATION MANAGEMENT | Facility: CLINIC | Age: 68
End: 2023-08-29
Payer: COMMERCIAL

## 2023-08-29 ENCOUNTER — TELEPHONE (OUTPATIENT)
Dept: FAMILY MEDICINE | Facility: CLINIC | Age: 68
End: 2023-08-29
Payer: COMMERCIAL

## 2023-08-29 NOTE — TELEPHONE ENCOUNTER
RX for ozempic arrived for pt .  Order number [id number]  Lot: [id number]  NDC: [id number]  Exp:03/31/25  Strength: 8mg/3ml  Qty: 4    Pt was notified and plans to  today.  Girma Allen ma

## 2023-09-06 ENCOUNTER — TELEPHONE (OUTPATIENT)
Dept: SURGERY | Facility: CLINIC | Age: 68
End: 2023-09-06
Payer: COMMERCIAL

## 2023-09-06 NOTE — TELEPHONE ENCOUNTER
Patient's spouse stated she spoke with the insurance company, and they are requesting a prior authorization be completed prior to the procedure with Dr. Mccarty on 9/27/2023.    Patient/spouse want to be sure they aren't going to be placed with an unexpected out of pocket expense from the procedure.    Please call the patient/spouse at home to discuss. Thank you.    Page MULLINS/Procedure    [facility]   Neurology, NeuroSurgery, NeuroPsychology, Pain Management and Cardiology Specialties  Medical/Surgical Adult Specialties

## 2023-09-13 ENCOUNTER — TELEPHONE (OUTPATIENT)
Dept: GASTROENTEROLOGY | Facility: CLINIC | Age: 68
End: 2023-09-13
Payer: COMMERCIAL

## 2023-09-13 DIAGNOSIS — Z09 FOLLOW-UP EXAMINATION AFTER COLORECTAL SURGERY: Primary | ICD-10-CM

## 2023-09-13 RX ORDER — BISACODYL 5 MG/1
TABLET, DELAYED RELEASE ORAL
Qty: 4 TABLET | Refills: 0 | Status: SHIPPED | OUTPATIENT
Start: 2023-09-13 | End: 2023-12-05

## 2023-09-13 NOTE — TELEPHONE ENCOUNTER
Message sent to anesthesia d/t severe PRESLEY       Pre visit planning completed.      Procedure details:    Patient scheduled for Colonoscopy  on 09.27.2023.     Arrival time: 0645. Procedure time 0730    Pre op exam needed? N/A    Facility location: [facility]; [address], [place] [address]    Sedation type: Conscious sedation     Indication for procedure:  Follow-up examination after colorectal surgery       Chart review:     Electronic implanted devices? No    Diabetic? Yes. See medication holding recommendations     Diabetic medication HOLDING recommendations: (if applicable)  Oral diabetic medications: Yes:  Glimepiride (amaryl): HOLD day of procedure.  Metformin (glucophage): HOLD day of procedure.  Diabetic injectables: Yes- Ozempic (Semaglutide). Weekly dosing of medication.  Hold 7 days before procedure.  Follow up with managing provider.   Insulin: N/A      Medication review:    Anticoagulants? No    NSAIDS? No NSAID medications per patient's medication list.  RN will verify with pre-assessment call.    Other medication HOLDING recommendations:  N/A      Prep for procedure:     Bowel prep recommendation: Standard Golytely    Due to:  diabetes.     Prep instructions sent via LIANAI Bowel prep script sent to    [facility] [address] [place] [facility] [address] [facility] BETITO Finch RN  Endoscopy Procedure Pre Assessment RN  [phone] option 4

## 2023-09-15 ENCOUNTER — TELEPHONE (OUTPATIENT)
Dept: GASTROENTEROLOGY | Facility: CLINIC | Age: 68
End: 2023-09-15
Payer: COMMERCIAL

## 2023-09-15 ENCOUNTER — TRANSFERRED RECORDS (OUTPATIENT)
Dept: HEALTH INFORMATION MANAGEMENT | Facility: CLINIC | Age: 68
End: 2023-09-15
Payer: COMMERCIAL

## 2023-09-15 LAB — RETINOPATHY: POSITIVE

## 2023-09-15 NOTE — TELEPHONE ENCOUNTER
Caller: Sven  Reason for Reschedule/Cancellation (please be detailed, any staff messages or encounters to note?): pt needs hospital setting OR mac.       Prior to reschedule please review:  Ordering Provider: Katiuska  Sedation per order: moderate  Does patient have any ASC Exclusions, please identify?: y PRESLEY      Notes on Cancelled Procedure:  Procedure: Lower Endoscopy [Colonoscopy]   Date: 09/27/2023  Location: [facility]; [address], [place] [address]  Surgeon: Bibiana      Rescheduled: Yes  Procedure: Lower Endoscopy [Colonoscopy]   Date: 09/27/2023  Location: [facility]; [address], [place] [address]  Surgeon: bar Anderson  Sedation Level Scheduled  moderate,  Reason for Sedation Level per order  Prep/Instructions updated and sent: y       Send In - basket message to Panc - Zeus Pool if EUS  procedure is canceled or rescheduled: [ N/A, YES or NO] na

## 2023-09-15 NOTE — TELEPHONE ENCOUNTER
Sent message to endo scheduling to r/s with MAC or at the hospital.   RAO Garcia Benjamin, MD Soldo, Jennifer, RN  Needs MAC or to be at hospital          Previous Messages       ----- Message -----  From: Anika Finch RN  Sent: 9/13/2023   5:00 PM CDT  To: [facility] [place] Review  Subject: PRESLEY                                              Patient is scheduled for a Colonoscopy  Date of procedure: 09.27.2023  Indication:  Follow-up examination after colorectal surgery  Sedation type: Conscious sedation    Reason for review: severe PRESLEY.  Ok to proceed?      Thank you,  Anika Finch RN  Endoscopy Procedure Pre Assessment RN  [phone] option 4

## 2023-09-15 NOTE — TELEPHONE ENCOUNTER
----- Message from Anika Finch RN sent at 9/15/2023 10:20 AM CDT -----  Regarding: RE: R/S  Yordy John,     Yes Dr. Martinez reviewed chart and this was his recommendation.  It does look like his last one was [facility] and he was able to see that but still thinks the safest route would be MAC or at hospital.      Thank you,   Floresita   ----- Message -----  From: Dora Gonzales  Sent: 9/15/2023   9:23 AM CDT  To: Anika Finch RN  Subject: RE: R/S                                          Yordy Tangfer,    I just spoke with the pt and he said he hasn't had to use his PRESLEY machine in 6 years. He asked that I confirm on if he can stay at  under [facility]. He stated that he thought his last colonoscopy was done at  under [facility] as well.     Let me know and I will reach back out and reschedule him if needed.     Dora   ----- Message -----  From: Anika Finch RN  Sent: 9/15/2023   7:27 AM CDT  To: Endoscopy Scheduling Pool  Subject: R/S                                              Can someone please call patient and r/s with MAC or at the hospital (see message below).    Thank you,   RAO Garcia Benjamin, MD Soldo, Jennifer, RN  Needs [facility] or to be at hospital        Previous Messages       ----- Message -----  From: Anika Finch RN  Sent: 9/13/2023   5:00 PM CDT  To:  Anes Review  Subject: PRESLEY                                              Patient is scheduled for a Colonoscopy  Date of procedure: 09.27.2023  Indication:  Follow-up examination after colorectal surgery  Sedation type: Conscious sedation    Reason for review: severe PRESLEY.  Ok to proceed?      Thank you,  Anika Finch RN  Endoscopy Procedure Pre Assessment RN  [phone] option 4

## 2023-09-18 NOTE — TELEPHONE ENCOUNTER
Rescheduled colonoscopy to [facility]    Pre assessment completed for upcoming procedure.      Procedure details:    Patient scheduled for Colonoscopy  on 9.27.23.     Arrival time: 1145. Procedure time 1245    Pre op exam needed? N/A    Facility location: [facility]; [address], 3rd Floor, [place] [address]    Sedation type: Conscious sedation     Indication for procedure: Follow-up examination after colorectal surgery        COVID policy reviewed.    Designated  policy reviewed. Instructed to have someone stay 6 hours post procedure.       Chart review:     Electronic implanted devices? No    Diabetic? Yes. See medication holding recommendations     Diabetic medication HOLDING recommendations: (if applicable)  Oral diabetic medications: Yes:  Glimepiride (amaryl): HOLD day of procedure.  Metformin (glucophage): HOLD day of procedure.  Diabetic injectables: Yes- Ozempic (Semaglutide). Weekly dosing of medication.  Hold 7 days before procedure.  Follow up with managing provider.   Insulin: No    Medication review:    Anticoagulants? No    NSAIDS? No    Other medication HOLDING recommendations:  N/A      Prep for procedure:     Bowel prep recommendation: Standard Golytely   Due to: diabetes.     Prep instructions sent via [employer] Bowel prep script sent to      [place] [address] [place] [address]    Reviewed procedure prep instructions.     Patient verbalized understanding and had no questions or concerns at this time.        Miri Luong RN  Endoscopy Procedure Pre Assessment RN  [phone] option 4

## 2023-09-27 ENCOUNTER — HOSPITAL ENCOUNTER (OUTPATIENT)
Facility: CLINIC | Age: 68
Discharge: HOME | End: 2023-09-27
Attending: INTERNAL MEDICINE | Admitting: INTERNAL MEDICINE
Payer: COMMERCIAL

## 2023-09-27 VITALS
OXYGEN SATURATION: 97 % | SYSTOLIC BLOOD PRESSURE: 134 MMHG | RESPIRATION RATE: 16 BRPM | DIASTOLIC BLOOD PRESSURE: 83 MMHG | HEART RATE: 94 BPM

## 2023-09-27 LAB
COLONOSCOPY: NORMAL
GLUCOSE BLDC GLUCOMTR-MCNC: 156 MG/DL (ref 70–99)

## 2023-09-27 PROCEDURE — 45378 DIAGNOSTIC COLONOSCOPY: CPT | Performed by: INTERNAL MEDICINE

## 2023-09-27 PROCEDURE — 82962 GLUCOSE BLOOD TEST: CPT

## 2023-09-27 PROCEDURE — G0105 COLORECTAL SCRN; HI RISK IND: HCPCS | Performed by: INTERNAL MEDICINE

## 2023-09-27 RX ORDER — ONDANSETRON 2 MG/ML
4 INJECTION INTRAMUSCULAR; INTRAVENOUS
Status: DISCONTINUED | OUTPATIENT
Start: 2023-09-27 | End: 2023-09-27 | Stop reason: HOSPADM

## 2023-09-27 RX ORDER — LIDOCAINE 40 MG/G
CREAM TOPICAL
Status: DISCONTINUED | OUTPATIENT
Start: 2023-09-27 | End: 2023-09-27 | Stop reason: HOSPADM

## 2023-09-27 ASSESSMENT — ACTIVITIES OF DAILY LIVING (ADL)
ADLS_ACUITY_SCORE: 35
ADLS_ACUITY_SCORE: 35

## 2023-09-27 NOTE — OR NURSING
Patient had colonoscopy NO interventions Patient tolerated procedure NO need for sedation nor supplemental oxygen

## 2023-09-27 NOTE — H&P
[facility] Anesthesia Pre-op History and Physical    Sven Givens MRN# [id number]   Age: 68 year old YOB: 1955      Date of Surgery: 9/27/2023 [facility]      Date of Exam 9/27/2023 Facility (Same day)       Home clinic: Unknown   Primary care provider: Yeison Villegas         Chief Complaint and/or Reason for Procedure:   No chief complaint on file.           Active problem list:     Patient Active Problem List    Diagnosis Date Noted    Colon adenocarcinoma (H) 10/28/2022     Priority: Medium    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) 10/07/2021     Priority: Medium    Age-related incipient cataract of both eyes 10/07/2021     Priority: Medium    PRESLEY (obstructive sleep apnea)      Priority: Medium     11/29/2007(200#)-AHI 37.7, RDI 50, lowest oxygen saturation 80%. CPAP of 7 cm/H9O was effective.      Non-small cell carcinoma of lung, left (H) 10/17/2016     Priority: Medium     Stage 1A (pT1aN0) 0.9 x 0.7 x 0.5 cm grade 1 well differentiated adenocarcinoma of the left lower lobe of the lung with invasive component being 0.3cm, s/p wedge resection and mediastinal LN sampling on 9/23/16.      Nonalcoholic hepatosteatosis 10/17/2016     Priority: Medium    History of radiation exposure 09/30/2016     Priority: Medium    Type 2 diabetes mellitus with diabetic polyneuropathy, with long-term current use of insulin (H) 10/15/2015     Priority: Medium     Type 2 diabetes, HbA1C goal < 8%      Hyperlipidemia LDL goal <100 12/20/2013     Priority: Medium    Gout 12/20/2013     Priority: Medium     Diagnosed by joint tap August 2013 per patient report.       Anemia 07/03/2013     Priority: Medium     Persistent mild anemia. Needs evaluation. May be thalassemia carrier.      GERD (gastroesophageal reflux disease) 05/15/2012     Priority: Medium    Advanced directives,  counseling/discussion 06/16/2011     Priority: Medium     Advance Directive Problem List Overview:   Name Relationship Phone    Primary Health Care Agent            Alternative Health Care Agent          Discussed advance care planning with patient; information given to patient to review.  Bindu Smith   6/16/2011         Essential hypertension with goal blood pressure less than 140/90 06/16/2011     Priority: Medium    History of adenomatous polyp of colon 12/01/2009     Priority: Medium     follow up colonoscopy 5 years requested 5-2017 due.              Medications (include herbals and vitamins):   Any Plavix use in the last 7 days?  No     No current facility-administered medications for this encounter.             Allergies:      Allergies   Allergen Reactions    Pcn [Penicillins] Rash    Ace Inhibitors Cough    Indomethacin Other (See Comments)     Severe dizziness    Invokana [Canagliflozin]      bladder infection     Allergy to Latex?  No  Allergy to tape?    No  Intolerances: None            Physical Exam:   All vitals have been reviewed  Patient Vitals for the past 8 hrs:   BP Pulse Resp SpO2   09/27/23 1104 (!) 141/84 -- -- --   09/27/23 1100 (!) 141/84 95 16 95 %     No intake/output data recorded.  Airway assessment:   Patient is able to open mouth wide  Patient is able to stick out tongue              Lab / Radiology Results:     Lab Results   Component Value Date    WBC 8.7 06/07/2023    WBC 7.5 05/20/2020    RBC 4.09 06/07/2023    RBC 4.54 05/20/2020    HGB 12.0 06/07/2023    HGB 13.7 11/02/2020    HCT 36.5 06/07/2023    HCT 39.7 05/20/2020    MCV 89 06/07/2023    MCV 87 05/20/2020    RDW 13.0 06/07/2023    RDW 12.5 05/20/2020     06/07/2023     05/20/2020             Anesthetic risk and/or ASA classification:   Class 2    Rosy Schofield MD

## 2023-10-12 ENCOUNTER — PATIENT OUTREACH (OUTPATIENT)
Dept: GASTROENTEROLOGY | Facility: CLINIC | Age: 68
End: 2023-10-12
Payer: COMMERCIAL

## 2023-10-25 ENCOUNTER — TELEPHONE (OUTPATIENT)
Dept: FAMILY MEDICINE | Facility: CLINIC | Age: 68
End: 2023-10-25
Payer: COMMERCIAL

## 2023-10-25 NOTE — TELEPHONE ENCOUNTER
.What type of form? medical  What day did you drop off your forms? 10/25/23  Is there a due date? (7-10 business day to compete forms)   How would you like to receive these forms? Please mail to pt home address, however there is  fax # of [phone]  Which clinic was the form dropped off at? [facility]    What is the best number to contact you? Cell [phone]  What time works best to contact you with in 4 hrs? any  Is it okay to leave a message? Yes      Antonio Roblero

## 2023-10-25 NOTE — TELEPHONE ENCOUNTER
Thank you!     Dane Craft, PharmD, [facility]  Medication Therapy Management Provider  [phone]

## 2023-10-25 NOTE — TELEPHONE ENCOUNTER
"Received updated forms. Faxed to [employer], [phone], right fax confirmed \"transmission Error at 4:23 pm and 4:30 pm today, 10/25/2023. Will check in the morning if this went through.  Josselin Mcgowan MA  [facility]   Primary Care    "

## 2023-10-26 NOTE — TELEPHONE ENCOUNTER
Checked today, 10/26/2023. Right fax confirmed that the forms went through at 4:30 pm 10/25/2023. Copy to TC and abstracting. Placed forms in Dane's box.  Josselin Mcgowan MA  [facility]   Primary Care

## 2023-11-21 DIAGNOSIS — K21.00 GASTROESOPHAGEAL REFLUX DISEASE WITH ESOPHAGITIS: ICD-10-CM

## 2023-11-21 DIAGNOSIS — E11.42 TYPE 2 DIABETES MELLITUS WITH DIABETIC POLYNEUROPATHY, WITHOUT LONG-TERM CURRENT USE OF INSULIN (H): ICD-10-CM

## 2023-11-21 DIAGNOSIS — I10 ESSENTIAL HYPERTENSION WITH GOAL BLOOD PRESSURE LESS THAN 140/90: ICD-10-CM

## 2023-11-21 DIAGNOSIS — K59.01 SLOW TRANSIT CONSTIPATION: ICD-10-CM

## 2023-11-21 RX ORDER — DOCUSATE SODIUM 50MG AND SENNOSIDES 8.6MG 8.6; 5 MG/1; MG/1
1 TABLET, FILM COATED ORAL AT BEDTIME
Qty: 90 TABLET | Refills: 3 | OUTPATIENT
Start: 2023-11-21

## 2023-11-21 RX ORDER — GLIMEPIRIDE 4 MG/1
4 TABLET ORAL 2 TIMES DAILY
Qty: 180 TABLET | Refills: 3 | OUTPATIENT
Start: 2023-11-21

## 2023-11-21 RX ORDER — LOSARTAN POTASSIUM AND HYDROCHLOROTHIAZIDE 25; 100 MG/1; MG/1
1 TABLET ORAL EVERY MORNING
Qty: 90 TABLET | Refills: 3 | OUTPATIENT
Start: 2023-11-21

## 2023-11-26 ENCOUNTER — HEALTH MAINTENANCE LETTER (OUTPATIENT)
Age: 68
End: 2023-11-26

## 2023-11-29 ENCOUNTER — ONCOLOGY VISIT (OUTPATIENT)
Dept: ONCOLOGY | Facility: CLINIC | Age: 68
End: 2023-11-29
Payer: COMMERCIAL

## 2023-11-29 VITALS
OXYGEN SATURATION: 95 % | SYSTOLIC BLOOD PRESSURE: 136 MMHG | RESPIRATION RATE: 16 BRPM | HEART RATE: 93 BPM | WEIGHT: 209.2 LBS | DIASTOLIC BLOOD PRESSURE: 78 MMHG | BODY MASS INDEX: 30.89 KG/M2

## 2023-11-29 DIAGNOSIS — C18.9 COLON ADENOCARCINOMA (H): Primary | ICD-10-CM

## 2023-11-29 PROCEDURE — G0008 ADMIN INFLUENZA VIRUS VAC: HCPCS | Performed by: INTERNAL MEDICINE

## 2023-11-29 PROCEDURE — 90662 IIV NO PRSV INCREASED AG IM: CPT | Performed by: INTERNAL MEDICINE

## 2023-11-29 PROCEDURE — 99214 OFFICE O/P EST MOD 30 MIN: CPT | Mod: 25 | Performed by: INTERNAL MEDICINE

## 2023-11-29 ASSESSMENT — ENCOUNTER SYMPTOMS
DYSURIA: 0
HEADACHES: 0
NAUSEA: 0
DIZZINESS: 0
PALPITATIONS: 0
FEVER: 0
SHORTNESS OF BREATH: 0
EYE PAIN: 0
PARESTHESIAS: 0
CHILLS: 0
ARTHRALGIAS: 0
ABDOMINAL PAIN: 0
COUGH: 0
SORE THROAT: 0
JOINT SWELLING: 0
CONSTIPATION: 0
WEAKNESS: 0
HEMATURIA: 0
HEARTBURN: 0
FREQUENCY: 1
NERVOUS/ANXIOUS: 0
HEMATOCHEZIA: 0
MYALGIAS: 0
DIARRHEA: 0

## 2023-11-29 ASSESSMENT — ACTIVITIES OF DAILY LIVING (ADL): CURRENT_FUNCTION: NO ASSISTANCE NEEDED

## 2023-11-29 ASSESSMENT — PAIN SCALES - GENERAL: PAINLEVEL: NO PAIN (0)
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
## 2023-11-29 NOTE — NURSING NOTE
"Oncology Rooming Note    November 29, 2023 9:22 AM   Sven Givens is a 68 year old male who presents for:    Chief Complaint   Patient presents with    Oncology Clinic Visit     1 year follow up     Initial Vitals: /78 (BP Location: Left arm)   Pulse 93   Resp 16   Wt 94.9 kg (209 lb 3.2 oz)   SpO2 95%   BMI 30.89 kg/m   Estimated body mass index is 30.89 kg/m  as calculated from the following:    Height as of 6/7/23: 1.753 m (5' 9\").    Weight as of this encounter: 94.9 kg (209 lb 3.2 oz). Body surface area is 2.15 meters squared.  No Pain (0) Comment: Data Unavailable   No LMP for male patient.  Allergies reviewed: Yes  Medications reviewed: Yes    Medications: Medication refills not needed today.  Pharmacy name entered into [employer]:    [id number] - [place], [place] - [address] [address] [place] [facility] [address] [facility]    Clinical concerns: No new concerns         Clementina Spencer LPN              "
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
## 2023-11-30 NOTE — COMMUNITY RESOURCES LIST (ENGLISH)
11/30/2023   [facility]  N/A  For questions about this resource list or additional care needs, please contact your primary care clinic or care manager.  Phone: [phone]   Email: N/A   Address: [facility] [place] [address]   Hours: N/A        Hotlines and Helplines       Hotline - Housing crisis  1  [facility] Distance: 11.42 miles      Phone/Virtual   [address] [place] [address]  Language: English  Hours: Mon - Sun Open 24 Hours  Fees: Free   Phone: (803) 213-2945 Email: info@[employer]aintolaf.org Website: http://www.saintolaf.org/     2  [facility] Distance: 11.99 miles      Phone/Virtual   [address] [place] [address]  Language: English  Hours: Mon - Sun Open 24 Hours   Phone: (545) 115-4712 Email: info@[employer] Website: http://www.[employer].org          Housing       Coordinated Entry access point  3  [facility]  [facility] Distance: 6.56 miles      Phone/Virtual   [address] [place] [address]  Language: English  Hours: Mon - Fri 8:30 AM - 12:00 PM , Mon - Fri 1:00 PM - 4:00 PM  Fees: Free   Phone: (428) 881-7696 Ext.2 Email: [email or web address].[employer]org Website: https://www.[employer]usa.org/usn/     4  [facility] Distance: 10.79 miles      In-Person, Phone/Virtual   [address] [place] [address]  Language: English, Grenadian  Hours: Mon - Fri 10:00 AM - 5:30 PM , Mon - Sun 7:30 PM - 10:20 PM , Sat - Sun 1:00 PM - 5:30 PM  Fees: Free   Phone: (530) 426-1763 Email: info@[employer] Website: https://www.[employer].org/our-programs/adult-shelter-connect-hernandez-shelter/     Drop-in center or day shelter  5  [facility] Distance: 10.6 miles      In-Person   [address] [place] [address]  Language: English, Hmong, Czech, Grenadian  Hours: Mon - Thu 8:30 AM - 4:30 PM , Sat - Sun 9:00 AM - 12:00 PM   Fees: Free   Phone: (949) 544-6317 Email: info@[employer] Website: https://[employer]org/     6  [facility] Distance: 12.06 miles      In-Person   [address] [place] [address]  Language: English, Peruvian, Malawian  Hours: Mon - Sat 7:00 AM - 3:00 PM  Fees: Free, Self Pay   Phone: (539) 149-2116 Email: info@[employer] Website: https://www.[employer]/locations/opportunity-[place]/     Housing search assistance  7  [facility] Distance: 2.83 miles      In-Person   [address] [place] [address]  Language: English  Hours: Mon - Fri 8:00 AM - 4:00 PM  Fees: Free   Phone: (169) 934-5161 Email: info@[employer] Website: https://[employer]/     8  [facility] [facility] Distance: 5.01 miles      Phone/Virtual   [address] [place] [address]  Language: English, Malawian  Hours: Mon - Fri 9:00 AM - 5:00 PM  Fees: Free   Phone: (953) 876-3412 Email: services@[employer] Website: https://www.[employer]     Shelter for families  9  [facility] Distance: 18.7 miles      In-Person   [address] [place] [address]  Language: English  Hours: Mon - Fri 3:00 PM - 9:00 AM , Sat - Sun Open 24 Hours  Fees: Free   Phone: (714) 382-2396 Ext.1 Website: https://www.saintandrews.org/2020/07/03/emergency-family-shelter/     Shelter for individuals  10  [facility] Distance: 10.83 miles      In-Person   [address] [place] [address]  Language: English  Hours: Mon - Sun 5:00 PM - 10:00 AM  Fees: Free, Self Pay   Phone: (336) 845-3523 Email: info@[employer].org Website: https://www.[employer].org/locations/higher-ground-shelter/     11  [facility] Distance: 10.98 miles       In-Person   [address] [place] [address]  Language: English  Hours: Mon - Fri 4:00 PM - 9:00 AM  Fees: Free   Phone: (626) 230-2992 Email: minda@[employer] Website: https://centralusa.[employer]..org/northern/[facility]/          Important Numbers & Websites       Emergency Services   911  [facility] Services   311  Poison Control   (332) 612-9919  Suicide Prevention Lifeline   (608) 669-6006 (TALK)  Child Abuse Hotline   (696) 225-6533 (4-A-Child)  Sexual Assault Hotline   (931) 546-6909 (HOPE)  National Runaway Safeline   (713) 492-7618 (RUNAWAY)  All-Options Talkline   (772) 549-4263  Substance Abuse Referral   (223) 293-8215 (HELP)

## 2023-12-05 ENCOUNTER — OFFICE VISIT (OUTPATIENT)
Dept: FAMILY MEDICINE | Facility: CLINIC | Age: 68
End: 2023-12-05
Payer: COMMERCIAL

## 2023-12-05 ENCOUNTER — THERAPY VISIT (OUTPATIENT)
Dept: PHYSICAL THERAPY | Facility: CLINIC | Age: 68
End: 2023-12-05
Attending: FAMILY MEDICINE
Payer: COMMERCIAL

## 2023-12-05 VITALS
BODY MASS INDEX: 31.1 KG/M2 | HEART RATE: 83 BPM | HEIGHT: 69 IN | SYSTOLIC BLOOD PRESSURE: 123 MMHG | OXYGEN SATURATION: 98 % | WEIGHT: 210 LBS | TEMPERATURE: 97.4 F | DIASTOLIC BLOOD PRESSURE: 74 MMHG | RESPIRATION RATE: 20 BRPM

## 2023-12-05 DIAGNOSIS — E78.1 HYPERTRIGLYCERIDEMIA: ICD-10-CM

## 2023-12-05 DIAGNOSIS — M1A.0710 IDIOPATHIC CHRONIC GOUT OF RIGHT FOOT WITHOUT TOPHUS: ICD-10-CM

## 2023-12-05 DIAGNOSIS — E78.5 HYPERLIPIDEMIA LDL GOAL <100: ICD-10-CM

## 2023-12-05 DIAGNOSIS — H81.10 BPV (BENIGN POSITIONAL VERTIGO), UNSPECIFIED LATERALITY: ICD-10-CM

## 2023-12-05 DIAGNOSIS — E11.42 TYPE 2 DIABETES MELLITUS WITH DIABETIC POLYNEUROPATHY, WITH LONG-TERM CURRENT USE OF INSULIN (H): ICD-10-CM

## 2023-12-05 DIAGNOSIS — R00.0 SINUS TACHYCARDIA: ICD-10-CM

## 2023-12-05 DIAGNOSIS — Z00.00 ENCOUNTER FOR MEDICARE ANNUAL WELLNESS EXAM: Primary | ICD-10-CM

## 2023-12-05 DIAGNOSIS — I10 ESSENTIAL HYPERTENSION WITH GOAL BLOOD PRESSURE LESS THAN 140/90: ICD-10-CM

## 2023-12-05 DIAGNOSIS — E11.3292 MILD NONPROLIFERATIVE DIABETIC RETINOPATHY OF LEFT EYE WITHOUT MACULAR EDEMA ASSOCIATED WITH TYPE 2 DIABETES MELLITUS (H): ICD-10-CM

## 2023-12-05 DIAGNOSIS — Z79.4 TYPE 2 DIABETES MELLITUS WITH DIABETIC POLYNEUROPATHY, WITH LONG-TERM CURRENT USE OF INSULIN (H): ICD-10-CM

## 2023-12-05 DIAGNOSIS — K21.00 GASTROESOPHAGEAL REFLUX DISEASE WITH ESOPHAGITIS, UNSPECIFIED WHETHER HEMORRHAGE: ICD-10-CM

## 2023-12-05 DIAGNOSIS — F41.9 ANXIETY: ICD-10-CM

## 2023-12-05 DIAGNOSIS — E11.42 TYPE 2 DIABETES MELLITUS WITH DIABETIC POLYNEUROPATHY, WITHOUT LONG-TERM CURRENT USE OF INSULIN (H): ICD-10-CM

## 2023-12-05 DIAGNOSIS — K59.01 SLOW TRANSIT CONSTIPATION: ICD-10-CM

## 2023-12-05 LAB
ANION GAP SERPL CALCULATED.3IONS-SCNC: 14 MMOL/L (ref 7–15)
BUN SERPL-MCNC: 15.7 MG/DL (ref 8–23)
CALCIUM SERPL-MCNC: 10.1 MG/DL (ref 8.8–10.2)
CHLORIDE SERPL-SCNC: 100 MMOL/L (ref 98–107)
CREAT SERPL-MCNC: 0.75 MG/DL (ref 0.67–1.17)
CREAT UR-MCNC: 209 MG/DL
DEPRECATED HCO3 PLAS-SCNC: 25 MMOL/L (ref 22–29)
EGFRCR SERPLBLD CKD-EPI 2021: >90 ML/MIN/1.73M2
GLUCOSE SERPL-MCNC: 174 MG/DL (ref 70–99)
HBA1C MFR BLD: 7.7 % (ref 0–5.6)
MICROALBUMIN UR-MCNC: 97.5 MG/L
MICROALBUMIN/CREAT UR: 46.65 MG/G CR (ref 0–17)
POTASSIUM SERPL-SCNC: 3.9 MMOL/L (ref 3.4–5.3)
SODIUM SERPL-SCNC: 139 MMOL/L (ref 135–145)

## 2023-12-05 PROCEDURE — 80048 BASIC METABOLIC PNL TOTAL CA: CPT | Performed by: FAMILY MEDICINE

## 2023-12-05 PROCEDURE — 97161 PT EVAL LOW COMPLEX 20 MIN: CPT | Mod: GP | Performed by: PHYSICAL THERAPIST

## 2023-12-05 PROCEDURE — 95992 CANALITH REPOSITIONING PROC: CPT | Mod: GP | Performed by: PHYSICAL THERAPIST

## 2023-12-05 PROCEDURE — 36415 COLL VENOUS BLD VENIPUNCTURE: CPT | Performed by: FAMILY MEDICINE

## 2023-12-05 PROCEDURE — 99214 OFFICE O/P EST MOD 30 MIN: CPT | Mod: 25 | Performed by: FAMILY MEDICINE

## 2023-12-05 PROCEDURE — 82570 ASSAY OF URINE CREATININE: CPT | Performed by: FAMILY MEDICINE

## 2023-12-05 PROCEDURE — 83036 HEMOGLOBIN GLYCOSYLATED A1C: CPT | Performed by: FAMILY MEDICINE

## 2023-12-05 PROCEDURE — 82043 UR ALBUMIN QUANTITATIVE: CPT | Performed by: FAMILY MEDICINE

## 2023-12-05 PROCEDURE — G0439 PPPS, SUBSEQ VISIT: HCPCS | Performed by: FAMILY MEDICINE

## 2023-12-05 RX ORDER — AMOXICILLIN 250 MG
1 CAPSULE ORAL AT BEDTIME
Qty: 90 TABLET | Refills: 3 | Status: SHIPPED | OUTPATIENT
Start: 2023-12-05

## 2023-12-05 RX ORDER — METOPROLOL SUCCINATE 50 MG/1
50 TABLET, EXTENDED RELEASE ORAL DAILY
Qty: 90 TABLET | Refills: 3 | Status: SHIPPED | OUTPATIENT
Start: 2023-12-05 | End: 2024-08-06

## 2023-12-05 RX ORDER — GLIMEPIRIDE 4 MG/1
4 TABLET ORAL 2 TIMES DAILY
Qty: 180 TABLET | Refills: 3 | Status: SHIPPED | OUTPATIENT
Start: 2023-12-05

## 2023-12-05 RX ORDER — TERAZOSIN 5 MG/1
CAPSULE ORAL
Qty: 90 CAPSULE | Refills: 3 | Status: SHIPPED | OUTPATIENT
Start: 2023-12-05

## 2023-12-05 RX ORDER — RESPIRATORY SYNCYTIAL VIRUS VACCINE 120MCG/0.5
0.5 KIT INTRAMUSCULAR ONCE
Qty: 1 EACH | Refills: 0 | Status: CANCELLED | OUTPATIENT
Start: 2023-12-05 | End: 2023-12-05

## 2023-12-05 RX ORDER — MECLIZINE HYDROCHLORIDE 25 MG/1
25 TABLET ORAL 3 TIMES DAILY PRN
Qty: 40 TABLET | Refills: 3 | Status: SHIPPED | OUTPATIENT
Start: 2023-12-05 | End: 2024-02-07

## 2023-12-05 RX ORDER — CITALOPRAM HYDROBROMIDE 20 MG/1
20 TABLET ORAL DAILY
Qty: 90 TABLET | Refills: 3 | Status: SHIPPED | OUTPATIENT
Start: 2023-12-05 | End: 2024-04-24

## 2023-12-05 RX ORDER — ATORVASTATIN CALCIUM 80 MG/1
80 TABLET, FILM COATED ORAL DAILY
Qty: 90 TABLET | Refills: 3 | Status: SHIPPED | OUTPATIENT
Start: 2023-12-05 | End: 2024-08-06

## 2023-12-05 RX ORDER — ALLOPURINOL 100 MG/1
TABLET ORAL
Qty: 180 TABLET | Refills: 3 | Status: SHIPPED | OUTPATIENT
Start: 2023-12-05

## 2023-12-05 RX ORDER — LOSARTAN POTASSIUM AND HYDROCHLOROTHIAZIDE 25; 100 MG/1; MG/1
1 TABLET ORAL EVERY MORNING
Qty: 90 TABLET | Refills: 3 | Status: SHIPPED | OUTPATIENT
Start: 2023-12-05 | End: 2024-08-06

## 2023-12-05 ASSESSMENT — ENCOUNTER SYMPTOMS
PALPITATIONS: 0
JOINT SWELLING: 0
CHILLS: 0
SORE THROAT: 0
DIZZINESS: 0
COUGH: 0
NERVOUS/ANXIOUS: 0
HEMATURIA: 0
CONSTIPATION: 0
SHORTNESS OF BREATH: 0
DIARRHEA: 0
FEVER: 0
HEARTBURN: 0
HEADACHES: 0
PARESTHESIAS: 0
DYSURIA: 0
NAUSEA: 0
ABDOMINAL PAIN: 0
FREQUENCY: 1
ARTHRALGIAS: 0
WEAKNESS: 0
EYE PAIN: 0
HEMATOCHEZIA: 0
MYALGIAS: 0

## 2023-12-05 ASSESSMENT — PAIN SCALES - GENERAL: PAINLEVEL: NO PAIN (0)

## 2023-12-05 ASSESSMENT — ACTIVITIES OF DAILY LIVING (ADL): CURRENT_FUNCTION: NO ASSISTANCE NEEDED

## 2023-12-05 NOTE — PROGRESS NOTES
"The patient was provided with suggestions to help him develop a healthy physical lifestyle.  The patient was provided with suggestions to help him develop a healthy emotional lifestyle.  Answers submitted by the patient for this visit:  Annual Preventive Visit (Submitted on 11/29/2023)  Chief Complaint: Annual Exam:  In general, how would you rate your overall physical health?: fair  Frequency of exercise:: 2-3 days/week  Do you usually eat at least 4 servings of fruit and vegetables a day, include whole grains & fiber, and avoid regularly eating high fat or \"junk\" foods? : Yes  Taking medications regularly:: Yes  Medication side effects:: None  Activities of Daily Living: no assistance needed  Home safety: no safety concerns identified  Hearing Impairment:: no hearing concerns  In the past 6 months, have you been bothered by leaking of urine?: No  abdominal pain: No  Blood in stool: No  Blood in urine: No  chest pain: No  chills: No  congestion: No  constipation: No  cough: No  diarrhea: No  dizziness: No  ear pain: No  eye pain: No  nervous/anxious: No  fever: No  frequency: Yes  genital sores: No  headaches: No  hearing loss: No  heartburn: No  arthralgias: No  joint swelling: No  peripheral edema: No  mood changes: No  myalgias: No  nausea: No  dysuria: No  palpitations: No  Skin sensation changes: No  sore throat: No  urgency: No  rash: No  shortness of breath: No  visual disturbance: No  weakness: No  impotence: No  penile discharge: No  In general, how would you rate your overall mental or emotional health?: fair  Additional concerns today:: No  Exercise outside of work (Submitted on 11/29/2023)  Chief Complaint: Annual Exam:  Duration of exercise:: 15-30 minutes    "

## 2023-12-05 NOTE — PATIENT INSTRUCTIONS
Patient Education   Personalized Prevention Plan  You are due for the preventive services outlined below.  Your care team is available to assist you in scheduling these services.  If you have already completed any of these items, please share that information with your care team to update in your medical record.  Health Maintenance Due   Topic Date Due     RSV VACCINE (Pregnancy & 60+) (1 - 1-dose 60+ series) Never done     Diabetic Foot Exam  02/03/2023     COVID-19 Vaccine (5 - 2023-24 season) 09/01/2023     Annual Wellness Visit  09/16/2023     Kidney Microalbumin Urine Test  09/16/2023     ANNUAL REVIEW OF HM ORDERS  09/16/2023     A1C Lab  11/09/2023     Zoster (Shingles) Vaccine (3 of 3) 11/24/2023     Your Health Risk Assessment indicates you feel you are not in good health    A healthy lifestyle helps keep the body fit and the mind alert. It helps protect you from disease, helps you fight disease, and helps prevent chronic disease (disease that doesn't go away) from getting worse. This is important as you get older and begin to notice twinges in muscles and joints and a decline in the strength and stamina you once took for granted. A healthy lifestyle includes good healthcare, good nutrition, weight control, recreation, and regular exercise. Avoid harmful substances and do what you can to keep safe. Another part of a healthy lifestyle is stay mentally active and socially involved.    Good healthcare   Have a wellness visit every year.   If you have new symptoms, let us know right away. Don't wait until the next checkup.   Take medicines exactly as prescribed and keep your medicines in a safe place. Tell us if your medicine causes problems.   Healthy diet and weight control   Eat 3 or 4 small, nutritious, low-fat, high-fiber meals a day. Include a variety of fruits, vegetables, and whole-grain foods.   Make sure you get enough calcium in your diet. Calcium, vitamin D, and exercise help prevent osteoporosis  (bone thinning).   If you live alone, try eating with others when you can. That way you get a good meal and have company while you eat it.   Try to keep a healthy weight. If you eat more calories than your body uses for energy, it will be stored as fat and you will gain weight.     Recreation   Recreation is not limited to sports and team events. It includes any activity that provides relaxation, interest, enjoyment, and exercise. Recreation provides an outlet for physical, mental, and social energy. It can give a sense of worth and achievement. It can help you stay healthy.    Mental Exercise and Social Involvement  Mental and emotional health is as important as physical health. Keep in touch with friends and family. Stay as active as possible. Continue to learn and challenge yourself.   Things you can do to stay mentally active are:  Learn something new, like a foreign language or musical instrument.   Play SCRABBLE or do crossword puzzles. If you cannot find people to play these games with you at home, you can play them with others on your computer through the Internet.   Join a games club--anything from card games to chess or checkers or lawn bowling.   Start a new hobby.   Go back to school.   Volunteer.   Read.   Keep up with world events.  Your Health Risk Assessment indicates you feel you are not in good emotional health.    Recreation   Recreation is not limited to sports and team events. It includes any activity that provides relaxation, interest, enjoyment, and exercise. Recreation provides an outlet for physical, mental, and social energy. It can give a sense of worth and achievement. It can help you stay healthy.    Mental Exercise and Social Involvement  Mental and emotional health is as important as physical health. Keep in touch with friends and family. Stay as active as possible. Continue to learn and challenge yourself.   Things you can do to stay mentally active are:  Learn something new, like a  foreign language or musical instrument.   Play SCRABBLE or do crossword puzzles. If you cannot find people to play these games with you at home, you can play them with others on your computer through the Internet.   Join a games club--anything from card games to chess or checkers or lawn bowling.   Start a new hobby.   Go back to school.   Volunteer.   Read.   Keep up with world events.
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
## 2023-12-05 NOTE — PROGRESS NOTES
"SUBJECTIVE:   Sven is a 68 year old, presenting for the following:  Physical        12/5/2023     7:39 AM   Additional Questions   Roomed by Monserrat   Accompanied by Self       Are you in the first 12 months of your Medicare coverage?  No  Vision Exam done 9/20/23 & 11/13/23 at [facility] records in the chart    Healthy Habits:     In general, how would you rate your overall health?  Fair    Frequency of exercise:  2-3 days/week    Duration of exercise:  15-30 minutes    Do you usually eat at least 4 servings of fruit and vegetables a day, include whole grains    & fiber and avoid regularly eating high fat or \"junk\" foods?  Yes    Taking medications regularly:  Yes    Medication side effects:  None    Ability to successfully perform activities of daily living:  No assistance needed    Home Safety:  No safety concerns identified    Hearing Impairment:  No hearing concerns    In the past 6 months, have you been bothered by leaking of urine?  No    In general, how would you rate your overall mental or emotional health?  Fair    Additional concerns today:  No      Today's PHQ-2 Score:       12/4/2023     6:51 PM   PHQ-2 ( 1999 [employer])   Q1: Little interest or pleasure in doing things 0   Q2: Feeling down, depressed or hopeless 0   PHQ-2 Score 0   Q1: Little interest or pleasure in doing things Not at all   Q2: Feeling down, depressed or hopeless Not at all   PHQ-2 Score 0           Have you ever done Advance Care Planning? (For example, a Health Directive, POLST, or a discussion with a medical provider or your loved ones about your wishes): No, advance care planning information given to patient to review.  Patient plans to discuss their wishes with loved ones or provider.         Fall risk  Fallen 2 or more times in the past year?: No  Any fall with injury in the past year?: No    Cognitive Screening   1) Repeat 3 items (Leader, Season, Table)  Yes  2) Clock draw: NORMAL  3) 3 item recall: Recalls 2 objects   Results: " NORMAL clock, 1-2 items recalled: COGNITIVE IMPAIRMENT LESS LIKELY    Mini-CogTM Copyright WILBUR Mojica. Licensed by the author for use in [facility]; reprinted with permission ([email or web address]@[email or web address]). All rights reserved.      Do you have sleep apnea, excessive snoring or daytime drowsiness? : yes    Reviewed and updated as needed this visit by clinical staff   Tobacco  Allergies  Meds   Med Hx  Surg Hx           Reviewed and updated as needed this visit by Provider                 Social History     Tobacco Use    Smoking status: Former     Types: Cigarettes     Quit date: 1992     Years since quittin.8    Smokeless tobacco: Never    Tobacco comments:     15 + years    Substance Use Topics    Alcohol use: No             2023     6:50 PM   Alcohol Use   Prescreen: >3 drinks/day or >7 drinks/week? Not Applicable     Do you have a current opioid prescription? No  Do you use any other controlled substances or medications that are not prescribed by a provider? None        Current providers sharing in care for this patient include:   Patient Care Team:  Yeison Villegas MD as PCP - General (Family Medicine)  Zhanna Ziegler APRN CNS as Referring Physician (Clinical Nurse Specialist)  Dorcas Amezcua RD as Diabetes Educator (Dietitian, Registered)  Lay Valencia MD as Assigned Cancer Care Provider  Jesus Shane MD as Assigned Heart and Vascular Provider  Yeison Villegas MD as Assigned PCP  Dora Neumann [facility] as Assigned MTM Pharmacist  Caren Wagner MD as Assigned Surgical Provider  Dane Craft RP as Pharmacist (Pharmacist)  Shubham Cline MD as MD (Surgery)    The following health maintenance items are reviewed in Epic and correct as of today:  Health Maintenance   Topic Date Due    RSV VACCINE (Pregnancy & 60+) (1 - 1-dose 60+ series) Never done    DIABETIC FOOT EXAM  2023    COVID-19 Vaccine (2023-24 season) 2023    MEDICARE ANNUAL  WELLNESS VISIT  09/16/2023    MICROALBUMIN  09/16/2023    ANNUAL REVIEW OF HM ORDERS  09/16/2023    A1C  11/09/2023    ZOSTER IMMUNIZATION (3 of 3) 11/24/2023    DTAP/TDAP/TD IMMUNIZATION (2 - Td or Tdap) 12/20/2023    BMP  05/09/2024    LIPID  05/09/2024    PSA  05/09/2024    EYE EXAM  09/15/2024    FALL RISK ASSESSMENT  12/05/2024    ADVANCE CARE PLANNING  09/16/2027    COLORECTAL CANCER SCREENING  09/27/2028    HEPATITIS C SCREENING  Completed    PHQ-2 (once per calendar year)  Completed    INFLUENZA VACCINE  Completed    Pneumococcal Vaccine: 65+ Years  Completed    AORTIC ANEURYSM SCREENING (SYSTEM ASSIGNED)  Completed    IPV IMMUNIZATION  Aged Out    HPV IMMUNIZATION  Aged Out    MENINGITIS IMMUNIZATION  Aged Out    RSV MONOCLONAL ANTIBODY  Aged Out     Lab work is in process  Labs reviewed in EPIC  BP Readings from Last 3 Encounters:   12/05/23 123/74   11/29/23 136/78   09/27/23 134/83    Wt Readings from Last 3 Encounters:   12/05/23 95.3 kg (210 lb)   11/29/23 94.9 kg (209 lb 3.2 oz)   07/21/23 96.1 kg (211 lb 12.8 oz)                  Patient Active Problem List   Diagnosis    Advanced directives, counseling/discussion    Essential hypertension with goal blood pressure less than 140/90    History of adenomatous polyp of colon    GERD (gastroesophageal reflux disease)    Anemia    Hyperlipidemia LDL goal <100    Gout    Type 2 diabetes mellitus with diabetic polyneuropathy, with long-term current use of insulin (H)    History of radiation exposure    Non-small cell carcinoma of lung, left (H)    Nonalcoholic hepatosteatosis    PRESLEY (obstructive sleep apnea)    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)    Age-related incipient cataract of both eyes    Colon adenocarcinoma (H)     Past Surgical History:   Procedure Laterality Date    BRONCHOSCOPY FLEXIBLE N/A 9/23/2016    Procedure: BRONCHOSCOPY FLEXIBLE;  Surgeon: Gayle Moya  MD;  Location: [facility] OR    COLONOSCOPY  5-03    COLONOSCOPY N/A 2022    Procedure: COLONOSCOPY, WITH POLYPECTOMY AND BIOPSY;  Surgeon: Abbe Mccarty MD;  Location: [facility] OR    COLONOSCOPY N/A 2023    Procedure: Colonoscopy;  Surgeon: Rosy Schofield MD;  Location: [facility] GI    COLONOSCOPY WITH CO2 INSUFFLATION N/A 2017    Procedure: COLONOSCOPY WITH CO2 INSUFFLATION;  COLON SCREEN/ SEB;  Surgeon: Margarito Rasheed DO;  Location: [facility] OR    COLONOSCOPY WITH CO2 INSUFFLATION N/A 2022    Procedure: COLONOSCOPY, WITH CO2 INSUFFLATION;  Surgeon: Abbe Mccarty MD;  Location: [facility]    COMBINED ESOPHAGOSCOPY, GASTROSCOPY, DUODENOSCOPY (EGD) WITH CO2 INSUFFLATION N/A 2019    Procedure: COMBINED ESOPHAGOSCOPY, GASTROSCOPY, DUODENOSCOPY (EGD) WITH CO2 INSUFFLATION;  Surgeon: Abbe Mccarty MD;  Location: [facility] OR    ESOPHAGOSCOPY, GASTROSCOPY, DUODENOSCOPY (EGD), COMBINED N/A 2019    Procedure: Combined Esophagoscopy, Gastroscopy, Duodenoscopy (Egd), Biopsy Single Or Multiple;  Surgeon: Abbe Mccarty MD;  Location: [facility] OR    GENITOURINARY SURGERY      kidney stones    THORACOSCOPIC WEDGE RESECTION LUNG Left 2016    Procedure: THORACOSCOPIC WEDGE RESECTION LUNG;  Surgeon: Gayle Moya MD;  Location: [facility] OR    VASCULAR SURGERY      varicose vein       Social History     Tobacco Use    Smoking status: Former     Types: Cigarettes     Quit date: 1992     Years since quittin.8    Smokeless tobacco: Never    Tobacco comments:     15 + years    Substance Use Topics    Alcohol use: No     Family History   Problem Relation Age of Onset    Hernia Mother          age 97    Cerebrovascular Disease Father 64    Cancer Sister         ovarary     Deep Vein Thrombosis (DVT) No family hx of          Current Outpatient Medications   Medication Sig Dispense Refill    allopurinol (ZYLOPRIM) 100 MG tablet TAKE 2 TABLETS BY MOUTH EVERY  tablet 3     atorvastatin (LIPITOR) 80 MG tablet Take 1 tablet (80 mg) by mouth daily 90 tablet 3    Azelaic Acid (FINACEA) 15 % gel Apply small amount twice a day to skin (Patient taking differently: as needed Apply small amount twice a day to skin) 50 g 3    blood glucose (CONTOUR NEXT TEST) test strip USE TO TEST BLOOD SUGAR 1 TIME DAILY OR AS DIRECTED. 100 strip 11    citalopram (CELEXA) 20 MG tablet Take 1 tablet (20 mg) by mouth daily For anxiety. 90 tablet 3    clotrimazole-betamethasone (LOTRISONE) 1-0.05 % external cream       Continuous Blood Gluc Sensor (FREESTYLE FEDERICO 2 SENSOR) MISC Change every 14 days. 2 each 11    cyanocobalamin (VITAMIN B-12) 1000 MCG tablet Take 1,000 mcg by mouth daily      fluticasone (FLONASE) 50 MCG/ACT nasal spray Spray 1-2 sprays into both nostrils daily (Patient taking differently: Spray 1-2 sprays into both nostrils as needed) 1 Bottle 1    glimepiride (AMARYL) 4 MG tablet Take 1 tablet (4 mg) by mouth 2 times daily 180 tablet 3    ketoconazole (NIZORAL) 2 % external shampoo APPLY TOPICALLY EVERY OTHER DAY LATHER INTO RASH AND WASH OFF AFTER 10 MINUTES. 120 mL 0    losartan-hydrochlorothiazide (HYZAAR) 100-25 MG tablet Take 1 tablet by mouth every morning For blood pressure. 90 tablet 3    meclizine (ANTIVERT) 25 MG tablet Take 1 tablet (25 mg) by mouth 3 times daily as needed for dizziness or nausea 40 tablet 3    metFORMIN (GLUCOPHAGE) 1000 MG tablet TAKE 1 TABLET BY MOUTH TWICE A DAY WITH MEALS 180 tablet 3    metoprolol succinate ER (TOPROL XL) 50 MG 24 hr tablet Take 1 tablet (50 mg) by mouth daily 90 tablet 3    metroNIDAZOLE (METROCREAM) 0.75 % external cream       metroNIDAZOLE 0.75 % EX external gel Apply topically 2 times daily 45 g 3    nitroGLYcerin (NITROSTAT) 0.4 MG sublingual tablet For chest pain place 1 tablet under the tongue every 5 minutes for 3 doses. If symptoms persist 5 minutes after 1st dose call 911. (Patient taking differently: every 5 minutes as needed For  chest pain place 1 tablet under the tongue every 5 minutes for 3 doses. If symptoms persist 5 minutes after 1st dose call 911.) 25 tablet 3    omega-3 acid ethyl esters (LOVAZA) 1 g capsule Take 2 capsules (2 g) by mouth 2 times daily 360 capsule 3    omeprazole (PRILOSEC) 20 MG DR capsule TAKE 1 CAPSULE BY MOUTH EVERY DAY TAKE 30-60 MINS BEFORE A MEAL 90 capsule 3    Oxymetazoline HCl (AFRIN NASAL SPRAY NA) Spray in nostril as needed      Semaglutide, 2 MG/DOSE, (OZEMPIC) 8 MG/3ML pen Inject 2 mg Subcutaneous every 7 days 9 mL 3    senna-docusate (SENEXON-S) 8.6-50 MG tablet Take 1 tablet by mouth at bedtime 90 tablet 3    terazosin (HYTRIN) 5 MG capsule TAKE 1 CAPSULE (5 MG) BY MOUTH AT BEDTIME FOR BLOOD PRESSURE. 90 capsule 3     Allergies   Allergen Reactions    Pcn [Penicillins] Rash    Ace Inhibitors Cough    Indomethacin Other (See Comments)     Severe dizziness    Invokana [Canagliflozin]      bladder infection       Review of Systems   Constitutional:  Negative for chills and fever.   HENT:  Negative for congestion, ear pain, hearing loss and sore throat.    Eyes:  Negative for pain and visual disturbance.   Respiratory:  Negative for cough and shortness of breath.    Cardiovascular:  Negative for chest pain, palpitations and peripheral edema.   Gastrointestinal:  Negative for abdominal pain, constipation, diarrhea, heartburn, hematochezia and nausea.   Genitourinary:  Positive for frequency. Negative for dysuria, genital sores, hematuria, impotence, penile discharge and urgency.   Musculoskeletal:  Negative for arthralgias, joint swelling and myalgias.   Skin:  Negative for rash.   Neurological:  Negative for dizziness, weakness, headaches and paresthesias.   Psychiatric/Behavioral:  Negative for mood changes. The patient is not nervous/anxious.      Constitutional, HEENT, cardiovascular, pulmonary, GI, , musculoskeletal, neuro, skin, endocrine and psych systems are negative, except as otherwise  "noted.    OBJECTIVE:   /74 (BP Location: Left arm, Patient Position: Chair, Cuff Size: Adult Large)   Pulse 83   Temp 97.4  F (36.3  C) (Tympanic)   Resp 20   Ht 1.753 m (5' 9\")   Wt 95.3 kg (210 lb)   SpO2 98%   BMI 31.01 kg/m   Estimated body mass index is 31.01 kg/m  as calculated from the following:    Height as of this encounter: 1.753 m (5' 9\").    Weight as of this encounter: 95.3 kg (210 lb).  Physical Exam  GENERAL: healthy, alert and no distress  NECK: no adenopathy, no asymmetry, masses, or scars and thyroid normal to palpation  RESP: lungs clear to auscultation - no rales, rhonchi or wheezes  CV: regular rate and rhythm, normal S1 S2, no S3 or S4, no murmur, click or rub, no peripheral edema and peripheral pulses strong  ABDOMEN: soft, nontender, no hepatosplenomegaly, no masses and bowel sounds normal  MS: no gross musculoskeletal defects noted, no edema    Diagnostic Test Results:  Labs reviewed in Epic    ASSESSMENT / PLAN:   (Z00.00) Encounter for Medicare annual wellness exam  (primary encounter diagnosis)  Comment:   Plan: as below    (E11.42) Type 2 diabetes mellitus with diabetic polyneuropathy, without long-term current use of insulin (H)  Comment:   Plan: Albumin Random Urine Quantitative with Creat         Ratio, HEMOGLOBIN A1C, Basic metabolic panel          (Ca, Cl, CO2, Creat, Gluc, K, Na, BUN),         atorvastatin (LIPITOR) 80 MG tablet, metFORMIN         (GLUCOPHAGE) 1000 MG tablet, glimepiride         (AMARYL) 4 MG tablet, blood glucose (CONTOUR         NEXT TEST) test strip        Recheck A1c. Patient will work on diabetic diet. Recheck in 3-6 months.    (I10) Essential hypertension with goal blood pressure less than 140/90  Comment:   Plan: Albumin Random Urine Quantitative with Creat         Ratio, Basic metabolic panel  (Ca, Cl, CO2,         Creat, Gluc, K, Na, BUN), metoprolol succinate         ER (TOPROL XL) 50 MG 24 hr tablet, terazosin         (HYTRIN) 5 MG capsule, " "        losartan-hydrochlorothiazide (HYZAAR) 100-25 MG        tablet        Controlled.    (E78.5) Hyperlipidemia LDL goal <100  Comment:   Plan: controlled.    (E11.9602) 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)  Comment:   Plan: Albumin Random Urine Quantitative with Creat         Ratio, HEMOGLOBIN A1C            (H81.10) BPV (benign positional vertigo), unspecified laterality  Comment:   Plan: meclizine (ANTIVERT) 25 MG tablet, Physical         Therapy Referral            (K21.00) Gastroesophageal reflux disease with esophagitis, unspecified whether hemorrhage  Comment:   Plan: omeprazole (PRILOSEC) 20 MG DR capsule            (E11.42,  Z79.4) Type 2 diabetes mellitus with diabetic polyneuropathy, with long-term current use of insulin (H)  Comment:   Plan: Semaglutide, 2 MG/DOSE, (OZEMPIC) 8 MG/3ML pen            (F41.9) Anxiety  Comment:   Plan: citalopram (CELEXA) 20 MG tablet            (M1A.0710) Idiopathic chronic gout of right foot without tophus  Comment:   Plan: allopurinol (ZYLOPRIM) 100 MG tablet            (E78.1) Hypertriglyceridemia  Comment:   Plan: atorvastatin (LIPITOR) 80 MG tablet            (R00.0) Sinus tachycardia  Comment:   Plan: metoprolol succinate ER (TOPROL XL) 50 MG 24 hr        tablet            (K59.01) Slow transit constipation  Comment:   Plan: senna-docusate (SENEXON-S) 8.6-50 MG tablet              Patient has been advised of split billing requirements and indicates understanding: Yes      COUNSELING:  Reviewed preventive health counseling, as reflected in patient instructions       Regular exercise       Healthy diet/nutrition       Vision screening      BMI:   Estimated body mass index is 31.01 kg/m  as calculated from the following:    Height as of this encounter: 1.753 m (5' 9\").    Weight as of this encounter: 95.3 kg (210 lb).         He reports that he quit smoking about 31 years ago. His " smoking use included cigarettes. He has never used smokeless tobacco.      Appropriate preventive services were discussed with this patient, including applicable screening as appropriate for fall prevention, nutrition, physical activity, Tobacco-use cessation, weight loss and cognition.  Checklist reviewing preventive services available has been given to the patient.    Reviewed patients plan of care and provided an AVS. The Basic Care Plan (routine screening as documented in Health Maintenance) for Sven meets the Care Plan requirement. This Care Plan has been established and reviewed with the Patient.        Yeison Villegas MD, MD  [facility]    Identified Health Risks:  I have reviewed Opioid Use Disorder and Substance Use Disorder risk factors and made any needed referrals.

## 2023-12-05 NOTE — COMMUNITY RESOURCES LIST (ENGLISH)
12/05/2023   [facility]  N/A  For questions about this resource list or additional care needs, please contact your primary care clinic or care manager.  Phone: [phone]   Email: N/A   Address: [facility] [place] [address]   Hours: N/A        Hotlines and Helplines       Hotline - Housing crisis  1  [facility] Distance: 11.42 miles      Phone/Virtual   [address] [place] [address]  Language: English  Hours: Mon - Sun Open 24 Hours  Fees: Free   Phone: (656) 141-6236 Email: info@saintolaf.org Website: http://www.saintolaf.org/     2  [facility] Distance: 11.99 miles      Phone/Virtual   [address] [place] [address]  Language: English  Hours: Mon - Sun Open 24 Hours   Phone: (722) 675-7845 Email: info@Phoneth[employer] Mobile Media.Simplee Website: http://www.Phonethics Mobile Media.org          Housing       Coordinated Entry access point  3  [facility]  [facility] Distance: 6.56 miles      Phone/Virtual   [address] [place] [address]  Language: English  Hours: Mon - Fri 8:30 AM - 12:00 PM , Mon - Fri 1:00 PM - 4:00 PM  Fees: Free   Phone: (451) 486-5661 Ext.2 Email: amandeep@[facility].Sopogy.org Website: https://www.Sopogyusa.org/usn/     4  [facility] ([facility]) Distance: 10.79 miles      In-Person, Phone/Virtual   [address] [place] [address]  Language: English, Armenian  Hours: Mon - Fri 10:00 AM - 5:30 PM , Mon - Sun 7:30 PM - 10:20 PM , Sat - Sun 1:00 PM - 5:30 PM  Fees: Free   Phone: (392) 537-4769 Email: info@Novaliq.Simplee Website: https://www.Novaliq.org/our-programs/adult-shelter-connect-hernandez-shelter/     Drop-in center or day shelter  5  [facility] Distance: 10.6 miles      In-Person   [address] [place] [address]  Language: English, Hmong, Sri Lankan, Armenian  Hours: Mon - Thu 8:30 AM - 4:30 PM , Sat - Sun 9:00 AM - 12:00 PM   Fees: Free   Phone: (136) 228-4611 Email: info@Nu3.Zizerones Website: https://Nu3.org/     6  [facility] Distance: 12.06 miles      In-Person   [address] [place] [address]  Language: English, Jamaican, Cuban  Hours: Mon - Sat 7:00 AM - 3:00 PM  Fees: Free, Self Pay   Phone: (347) 106-4279 Email: info@OkBuy.com Website: https://www.Travelogy.Zizerones/locations/opportunity-[place]/     Housing search assistance  7  [facility] ([facility] Distance: 2.83 miles      In-Person   [address] [place] [address]  Language: English  Hours: Mon - Fri 8:00 AM - 4:00 PM  Fees: Free   Phone: (350) 407-7247 Email: info@Bozuko Website: https://Bozuko/     8  Neighborhood Assistance [employer]) Distance: 5.01 miles      Phone/Virtual   [address] [place] [address]  Language: English, Cuban  Hours: Mon - Fri 9:00 AM - 5:00 PM  Fees: Free   Phone: (521) 887-4806 Email: services@CoalTek Website: https://www.CoalTek     Shelter for families  9  [facility] Distance: 18.7 miles      In-Person   [address] [place] [address]  Language: English  Hours: Mon - Fri 3:00 PM - 9:00 AM , Sat - Sun Open 24 Hours  Fees: Free   Phone: (317) 237-9931 Ext.1 Website: https://www.saintandrews.org/2020/07/03/emergency-family-shelter/     Shelter for individuals  10  [facility] Distance: 10.83 miles      In-Person   [address] [place] [address]  Language: English  Hours: Mon - Sun 5:00 PM - 10:00 AM  Fees: Free, Self Pay   Phone: (286) 195-6764 Email: info@Travelogy.org Website: https://www.Travelogy.org/locations/higher-ground-shelter/     11  [facility] Distance: 10.98 miles       In-Person   [address] [place] [address]  Language: English  Hours: Mon - Fri 4:00 PM - 9:00 AM  Fees: Free   Phone: (306) 932-9375 Email: minda@[facility].SkySpecs.Shark Punch Website: https://centralusa.SkySpecs.org/northern/[facility]/          Important Numbers & Websites       Emergency Services   911  [facility] Services   311  Poison Control   (843) 801-1535  Suicide Prevention Lifeline   (163) 739-3746 (TALK)  Child Abuse Hotline   (610) 511-2861 (4-A-Child)  Sexual Assault Hotline   (314) 608-6021 (HOPE)  National Runaway Safeline   (255) 954-6356 (RUNAWAY)  All-Options Talkline   (640) 710-3788  Substance Abuse Referral   (830) 140-6161 (HELP)

## 2023-12-05 NOTE — COMMUNITY RESOURCES LIST (ENGLISH)
12/05/2023   [facility]  N/A  For additional resource needs, please contact your health insurance member services or your primary care team.  Phone: [phone]   Email: N/A   Address: [facility] [place] [address]   Hours: N/A        Hotlines and Helplines       Hotline - Housing crisis  1  [facility] Distance: 11.42 miles      Phone/Virtual   [address] [place] [address]  Language: English  Hours: Mon - Sun Open 24 Hours  Fees: Free   Phone: (582) 821-5710 Email: info@saintolaf.org Website: http://www.saintolaf.org/     2  [facility] Distance: 11.99 miles      Phone/Virtual   [address] [place] [address]  Language: English  Hours: Mon - Sun Open 24 Hours   Phone: (356) 299-6622 Email: info@V2contact.CarbonFlow Website: http://www.V2contact.org          Housing       Coordinated Entry access point  3  [facility]  [facility] - [facility] Distance: 6.56 miles      Phone/Virtual   [address] [address]  Language: English  Hours: Mon - Fri 8:30 AM - 12:00 PM , Mon - Fri 1:00 PM - 4:00 PM  Fees: Free   Phone: (648) 253-8585 Ext.2 Email: amandeep@[facility].Fabbeo.org Website: https://www.[facility] CenterKerausa.org/usn/     4  [facility] ([facility]) Distance: 10.79 miles      In-Person, Phone/Virtual   [address] [place] [address]  Language: English, Bahamian  Hours: Mon - Fri 10:00 AM - 5:30 PM , Mon - Sun 7:30 PM - 10:20 PM , Sat - Sun 1:00 PM - 5:30 PM  Fees: Free   Phone: (473) 793-2993 Email: info@MyFrontSteps.CarbonFlow Website: https://www.MyFrontSteps.org/our-programs/adult-shelter-connect-hernandez-shelter/     Drop-in center or day shelter  5  [facility] Distance: 10.6 miles      In-Person   [address] [place] [address]  Language: English, Hmong, Barbadian, Bahamian  Hours: Mon - u 8:30 AM - 4:30 PM , Sat - Sun 9:00 AM - 12:00 PM  Fees:  Free   Phone: (641) 110-9833 Email: info@Cosmotourist.Elite Form Website: https://Cosmotourist.org/     6  [facility] Distance: 12.06 miles      In-Person   [address] [place] [address]  Language: English, [place] citizen, Kyrgyz  Hours: Mon - Sat 7:00 AM - 3:00 PM  Fees: Free, Self Pay   Phone: (656) 563-3125 Email: info@IntelligenceBank Website: https://www.IntelligenceBank/locations/opportunity-[place]/     Housing search assistance  7  [employer]ousing[employer] - Online housing search assistance Distance: 10.69 miles      Phone/Virtual   [address] [address]  Language: English, Hmong, [place] citizen, Kyrgyz  Hours: Mon - Sun Open 24 Hours   Phone: (284) 399-4450 Email: info@Mobile Messenger.org Website: http://www.Mobile Messenger.Elite Form/     8  Affordable Housing Online - https://SmartCloud/ Distance: 11.35 miles      Phone/Virtual   [address] [place] [address]  Language: English  Hours: Mon - Sun Open 24 Hours   Email: info@Weston Software Website: https://SmartCloud     Shelter for families  9  [facility] Distance: 18.7 miles      In-Person   [address] [place] [address]  Language: English  Hours: Mon - Fri 3:00 PM - 9:00 AM , Sat - Sun Open 24 Hours  Fees: Free   Phone: (664) 125-2816 Ext.1 Website: https://www.saintandrews.org/2020/07/03/emergency-family-shelter/     Shelter for individuals  10  [facility] and [place] [facility] [place] Distance: 10.83 miles      In-Person   [address] [place] [address]  Language: English  Hours: Mon - Sun 5:00 PM - 10:00 AM  Fees: Free, Self Pay   Phone: (693) 524-7226 Email: info@BubbleLife Media.Elite Form Website: https://www.IntelligenceBank/locations/higher-ground-shelter/     11  [facility] Distance: 10.98 miles      In-Person   [address] [place]  [address]  Language: English  Hours: Mon - Fri 4:00 PM - 9:00 AM  Fees: Free   Phone: (508) 131-8439 Email: minda@[facility].Puuilo.[facility] Website: https://centralusa.Puuilo.org/[facility]/          Important Numbers & Websites       [address].[facility]  Poison Control   (436) 989-6657 [place]poison.org  Suicide and Crisis Lifeline   [address] [place], [place] [address]line.org  Childhelp [place] Child Abuse Hotline   272.689.2776 Childhelphotline.org  [place] Sexual Assault Hotline   (843) 443-1040 (HOPE) [facility]  National Runaway Safeline   (336) 574-8257 (RUNAWAY) [facility] Cent[facility]unaway.org  Pregnancy & Postpartum Support [place]   Call/text 958-737-1666 Ppsupportmn.org  Substance Abuse National Helpline ([facility]   682-806-HELP (0577) Findtreatment.gov  Emergency Services   918

## 2023-12-08 ENCOUNTER — THERAPY VISIT (OUTPATIENT)
Dept: PHYSICAL THERAPY | Facility: CLINIC | Age: 68
End: 2023-12-08
Attending: FAMILY MEDICINE
Payer: COMMERCIAL

## 2023-12-08 DIAGNOSIS — H81.10 BPV (BENIGN POSITIONAL VERTIGO), UNSPECIFIED LATERALITY: ICD-10-CM

## 2023-12-08 DIAGNOSIS — H81.12 BENIGN PAROXYSMAL POSITIONAL VERTIGO OF LEFT EAR: Primary | ICD-10-CM

## 2023-12-08 PROCEDURE — 95992 CANALITH REPOSITIONING PROC: CPT | Mod: GP | Performed by: PHYSICAL THERAPIST

## 2023-12-08 PROCEDURE — 97112 NEUROMUSCULAR REEDUCATION: CPT | Mod: GP,59 | Performed by: PHYSICAL THERAPIST

## 2023-12-08 PROCEDURE — 97140 MANUAL THERAPY 1/> REGIONS: CPT | Mod: GP,59 | Performed by: PHYSICAL THERAPIST

## 2023-12-18 ENCOUNTER — MYC MEDICAL ADVICE (OUTPATIENT)
Dept: FAMILY MEDICINE | Facility: CLINIC | Age: 68
End: 2023-12-18
Payer: COMMERCIAL

## 2023-12-18 ENCOUNTER — NURSE TRIAGE (OUTPATIENT)
Dept: FAMILY MEDICINE | Facility: CLINIC | Age: 68
End: 2023-12-18
Payer: COMMERCIAL

## 2023-12-18 DIAGNOSIS — U07.1 INFECTION DUE TO 2019 NOVEL CORONAVIRUS: Primary | ICD-10-CM

## 2023-12-18 NOTE — TELEPHONE ENCOUNTER
Please see telephone triage from 12/18/23.     Anika Miner, RN, BSN, PHN  [facility]  Nurse Triage, [facility]

## 2023-12-18 NOTE — TELEPHONE ENCOUNTER
RN COVID TREATMENT VISIT  12/18/23      The patient has been triaged and does not require a higher level of care.    Sven Givens  68 year old  Current weight? 205 lbs    Has the patient been seen by a primary care provider at an [facility] or [facility] within the past two years? Yes.   Have you been in close proximity to/do you have a known exposure to a person with a confirmed case of influenza? No.     General treatment eligibility:  Date of positive COVID test (PCR or at home)?  12/18/2023    Are you or have you been hospitalized for this COVID-19 infection? No.   Have you received monoclonal antibodies or antiviral treatment for COVID-19 since this positive test? No.   Do you have any of the following conditions that place you at risk of being very sick from COVID-19?   - Age 50 years or older  - Cancer/active malignancy including patients with cancer who are currently receiving chemotherapy or radiation, metastatic cancer, advance cancer and are receiving palliative care  - Diabetes mellitus, type 1 and type 2  Yes, patient has at least one high risk condition as noted above.     Current COVID symptoms:   - fever or chills  - cough  - fatigue  - muscle or body aches  - headache  - sore throat  - congestion or runny nose  - diarrhea  Yes. Patient has at least one symptom as selected.     How many days since symptoms started? 5 days or less. Established patient, 12 years or older weighing at least 88.2 lbs, who has symptoms that started in the past 5 days, has not been hospitalized nor received treatment already, and is at risk for being very sick from COVID-19.     Treatment eligibility by RN:  Are you currently pregnant or nursing? No  Do you have a clinically significant hypersensitivity to nirmatrelvir or ritonavir, or toxic epidermal necrolysis (TEN) or Mejia-Ang Syndrome? No  Do you have a history of hepatitis, any hepatic impairment on the Problem List (such as  Child-Underwood Class C, cirrhosis, fatty liver disease, alcoholic liver disease), or was the last liver lab (hepatic panel, ALT, AST, ALK Phos, bilirubin) elevated in the past 6 months? No  Do you have any history of severe renal impairment (eGFR < 30mL/min)? No    Is patient eligible to continue? Yes, patient meets all eligibility requirements for the RN COVID treatment (as denoted by all no responses above).     Current Outpatient Medications   Medication Sig Dispense Refill    allopurinol (ZYLOPRIM) 100 MG tablet TAKE 2 TABLETS BY MOUTH EVERY  tablet 3    atorvastatin (LIPITOR) 80 MG tablet Take 1 tablet (80 mg) by mouth daily 90 tablet 3    Azelaic Acid (FINACEA) 15 % gel Apply small amount twice a day to skin (Patient taking differently: as needed Apply small amount twice a day to skin) 50 g 3    blood glucose (CONTOUR NEXT TEST) test strip USE TO TEST BLOOD SUGAR 1 TIME DAILY OR AS DIRECTED. 100 strip 11    citalopram (CELEXA) 20 MG tablet Take 1 tablet (20 mg) by mouth daily For anxiety. 90 tablet 3    clotrimazole-betamethasone (LOTRISONE) 1-0.05 % external cream       Continuous Blood Gluc Sensor (FREESTYLE FEDERICO 2 SENSOR) MISC Change every 14 days. 2 each 11    cyanocobalamin (VITAMIN B-12) 1000 MCG tablet Take 1,000 mcg by mouth daily      fluticasone (FLONASE) 50 MCG/ACT nasal spray Spray 1-2 sprays into both nostrils daily (Patient taking differently: Spray 1-2 sprays into both nostrils as needed) 1 Bottle 1    glimepiride (AMARYL) 4 MG tablet Take 1 tablet (4 mg) by mouth 2 times daily 180 tablet 3    ketoconazole (NIZORAL) 2 % external shampoo APPLY TOPICALLY EVERY OTHER DAY LATHER INTO RASH AND WASH OFF AFTER 10 MINUTES. 120 mL 0    losartan-hydrochlorothiazide (HYZAAR) 100-25 MG tablet Take 1 tablet by mouth every morning For blood pressure. 90 tablet 3    meclizine (ANTIVERT) 25 MG tablet Take 1 tablet (25 mg) by mouth 3 times daily as needed for dizziness or nausea 40 tablet 3    metFORMIN  (GLUCOPHAGE) 1000 MG tablet TAKE 1 TABLET BY MOUTH TWICE A DAY WITH MEALS 180 tablet 3    metoprolol succinate ER (TOPROL XL) 50 MG 24 hr tablet Take 1 tablet (50 mg) by mouth daily 90 tablet 3    metroNIDAZOLE (METROCREAM) 0.75 % external cream       metroNIDAZOLE 0.75 % EX external gel Apply topically 2 times daily 45 g 3    nitroGLYcerin (NITROSTAT) 0.4 MG sublingual tablet For chest pain place 1 tablet under the tongue every 5 minutes for 3 doses. If symptoms persist 5 minutes after 1st dose call 911. (Patient taking differently: every 5 minutes as needed For chest pain place 1 tablet under the tongue every 5 minutes for 3 doses. If symptoms persist 5 minutes after 1st dose call 911.) 25 tablet 3    omega-3 acid ethyl esters (LOVAZA) 1 g capsule Take 2 capsules (2 g) by mouth 2 times daily 360 capsule 3    omeprazole (PRILOSEC) 20 MG DR capsule TAKE 1 CAPSULE BY MOUTH EVERY DAY TAKE 30-60 MINS BEFORE A MEAL 90 capsule 3    Oxymetazoline HCl (AFRIN NASAL SPRAY NA) Spray in nostril as needed      Semaglutide, 2 MG/DOSE, (OZEMPIC) 8 MG/3ML pen Inject 2 mg Subcutaneous every 7 days 9 mL 3    senna-docusate (SENEXON-S) 8.6-50 MG tablet Take 1 tablet by mouth at bedtime 90 tablet 3    terazosin (HYTRIN) 5 MG capsule TAKE 1 CAPSULE (5 MG) BY MOUTH AT BEDTIME FOR BLOOD PRESSURE. 90 capsule 3       Medications from List 1 of the standing order (on medications that exclude the use of Paxlovid) that patient is taking: NONE. Is patient taking Jen's Wort? No  Is patient taking Jen's Wort or any meds from List 1? No.   Medications from List 2 of the standing order (on meds that provider needs to adjust) that patient is taking: NONE. Is patient on any of the meds from List 2? No.   Medications from List 3 of standing order (on meds that a RN needs to adjust) that patient is taking: atorvastatin (Lipitor): Instructed patient to stop atorvastatin while taking Paxlovid and restart atorvastatin 1 day after the completion  of Paxlovid.  Is patient on any meds from List 3? Yes. Patient is on meds from list 3. No meds require a provider visit and at least one med required RN to adjust.     Paxlovid has an approximate 90% reduction in hospitalization. Paxlovid can possibly cause altered sense of taste, diarrhea (loose, watery stools), high blood pressure, muscle aches.     Would patient like a Paxlovid prescription?   Yes.   Lab Results   Component Value Date    GFRESTIMATED >90 12/05/2023       Was last eGFR reduced? No, eGFR 60 or greater/ No Result on record. Patient can receive the normal renal function dose. Paxlovid Rx sent to [place] pharmacy   [facility]     Temporary change to home medications: stop atorvastatin, restart 1 day after completing paxlovid    All medication adjustments (holds, etc) were discussed with the patient and patient was asked to repeat back (teachback) their med adjustment.  Did patient understand med adjustment? Yes, patient repeated back and understood correctly.    Reviewed the following instructions with the patient:    Paxlovid (nimatrelvir and ritonavir)    How it works  Two medicines (nirmatrelvir and ritonavir) are taken together. They stop the virus from growing. Less amount of virus is easier for your body to fight.    How to take  Medicine comes in a daily container with both medicine tablets. Take by mouth twice daily (once in the morning, once at night) for 5 days.  The number of tablets to take varies by patient.  Don't chew or break capsules. Swallow whole.    When to take  Take as soon as possible after positive COVID-19 test result, and within 5 days of your first symptoms.    Possible side effects  Can cause altered sense of taste, diarrhea (loose, watery stools), high blood pressure, muscle aches.    Anika Miner, RN     Reason for Disposition   HIGH RISK patient (e.g., weak immune system, age > 64 years, obesity with BMI of 30 or higher, pregnant, chronic lung disease or  other chronic medical condition) and COVID symptoms (e.g., cough, fever)  (Exceptions: Already seen by doctor or NP/PA and no new or worsening symptoms.)    Additional Information   Negative: SEVERE difficulty breathing (e.g., struggling for each breath, speaks in single words)   Negative: Difficult to awaken or acting confused (e.g., disoriented, slurred speech)   Negative: Bluish (or gray) lips or face now   Negative: Shock suspected (e.g., cold/pale/clammy skin, too weak to stand, low BP, rapid pulse)   Negative: Sounds like a life-threatening emergency to the triager   Negative: Diagnosed or suspected COVID-19 and symptoms lasting 3 or more weeks   Negative: COVID-19 exposure and no symptoms   Negative: COVID-19 vaccine reaction suspected (e.g., fever, headache, muscle aches) occurring 1 to 3 days after getting vaccine   Negative: COVID-19 vaccine, questions about   Negative: Lives with someone known to have influenza (flu test positive) and flu-like symptoms (e.g., cough, runny nose, sore throat, SOB; with or without fever)   Negative: Possible COVID-19 symptoms and triager concerned about severity of symptoms or other causes   Negative: COVID-19 and breastfeeding, questions about   Negative: SEVERE or constant chest pain or pressure  (Exception: Mild central chest pain, present only when coughing.)   Negative: MODERATE difficulty breathing (e.g., speaks in phrases, SOB even at rest, pulse 100-120)   Negative: Headache and stiff neck (can't touch chin to chest)   Negative: Oxygen level (e.g., pulse oximetry) 90% or lower   Negative: Chest pain or pressure  (Exception: MILD central chest pain, present only when coughing.)   Negative: Drinking very little and dehydration suspected (e.g., no urine > 12 hours, very dry mouth, very lightheaded)   Negative: Patient sounds very sick or weak to the triager   Negative: Fever > 100.0 F (37.8 C) and bedridden (e.g., CVA, chronic illness, recovering from surgery)    "Negative: MILD difficulty breathing (e.g., minimal/no SOB at rest, SOB with walking, pulse <100)   Negative: Fever > 103 F (39.4 C)   Negative: Fever > 101 F (38.3 C) and over 60 years of age    Answer Assessment - Initial Assessment Questions  1. COVID-19 DIAGNOSIS: \"How do you know that you have COVID?\" (e.g., positive lab test or self-test, diagnosed by doctor or NP/PA, symptoms after exposure).      At home test  2. COVID-19 EXPOSURE: \"Was there any known exposure to COVID before the symptoms began?\" [facility] Definition of close contact: within 6 feet (2 meters) for a total of 15 minutes or more over a 24-hour period.       Not sure  3. ONSET: \"When did the COVID-19 symptoms start?\"       12/17/23  4. WORST SYMPTOM: \"What is your worst symptom?\" (e.g., cough, fever, shortness of breath, muscle aches)      Cough, fatigue, muscle aches  5. COUGH: \"Do you have a cough?\" If Yes, ask: \"How bad is the cough?\"        Yes, dry   6. FEVER: \"Do you have a fever?\" If Yes, ask: \"What is your temperature, how was it measured, and when did it start?\"      Yes, 99.7  7. RESPIRATORY STATUS: \"Describe your breathing?\" (e.g., normal; shortness of breath, wheezing, unable to speak)       Normal  8. BETTER-SAME-WORSE: \"Are you getting better, staying the same or getting worse compared to yesterday?\"  If getting worse, ask, \"In what way?\"      Getting better  9. OTHER SYMPTOMS: \"Do you have any other symptoms?\"  (e.g., chills, fatigue, headache, loss of smell or taste, muscle pain, sore throat)      Fatigue, sore throat, chills, muscle aches  10. HIGH RISK DISEASE: \"Do you have any chronic medical problems?\" (e.g., asthma, heart or lung disease, weak immune system, obesity, etc.)        Yes  11. VACCINE: \"Have you had the COVID-19 vaccine?\" If Yes, ask: \"Which one, how many shots, when did you get it?\"        Yes see snap shot  12. PREGNANCY: \"Is there any chance you are pregnant?\" \"When was your last menstrual period?\"        No  13. O2 " "SATURATION MONITOR:  \"Do you use an oxygen saturation monitor (pulse oximeter) at home?\" If Yes, ask \"What is your reading (oxygen level) today?\" \"What is your usual oxygen saturation reading?\" (e.g., 95%)        96    Protocols used: Coronavirus (COVID-19) Diagnosed or [id number]    "

## 2024-01-09 ENCOUNTER — TELEPHONE (OUTPATIENT)
Dept: FAMILY MEDICINE | Facility: CLINIC | Age: 69
End: 2024-01-09
Payer: COMMERCIAL

## 2024-01-09 NOTE — TELEPHONE ENCOUNTER
OZEMPIC 2MG 1X3ML PREFILLED 8MG/3ML QTY 4    LOT# [id number]  EXP 3/31/25   NDC [id number]    Called informed patient medication ready for .    ROMELIA Hudson MA

## 2024-01-10 DIAGNOSIS — R05.3 CHRONIC COUGH: Primary | ICD-10-CM

## 2024-01-15 ENCOUNTER — OFFICE VISIT (OUTPATIENT)
Dept: PULMONOLOGY | Facility: CLINIC | Age: 69
End: 2024-01-15
Payer: COMMERCIAL

## 2024-01-15 DIAGNOSIS — R05.3 CHRONIC COUGH: ICD-10-CM

## 2024-01-15 PROCEDURE — 94726 PLETHYSMOGRAPHY LUNG VOLUMES: CPT | Performed by: INTERNAL MEDICINE

## 2024-01-15 PROCEDURE — 94729 DIFFUSING CAPACITY: CPT | Performed by: INTERNAL MEDICINE

## 2024-01-15 PROCEDURE — 94375 RESPIRATORY FLOW VOLUME LOOP: CPT | Performed by: INTERNAL MEDICINE

## 2024-01-17 LAB
DLCOUNC-%PRED-PRE: 144 %
DLCOUNC-PRE: 35.19 ML/MIN/MMHG
DLCOUNC-PRED: 24.31 ML/MIN/MMHG
ERV-%PRED-PRE: 45 %
ERV-PRE: 0.61 L
ERV-PRED: 1.34 L
EXPTIME-PRE: 5.73 SEC
FEF2575-%PRED-PRE: 148 %
FEF2575-PRE: 3.24 L/SEC
FEF2575-PRED: 2.18 L/SEC
FEFMAX-%PRED-PRE: 110 %
FEFMAX-PRE: 8.69 L/SEC
FEFMAX-PRED: 7.84 L/SEC
FEV1-%PRED-PRE: 129 %
FEV1-PRE: 3.61 L
FEV1FEV6-PRE: 80 %
FEV1FEV6-PRED: 78 %
FEV1FVC-PRE: 80 %
FEV1FVC-PRED: 77 %
FEV1SVC-PRE: 77 %
FEV1SVC-PRED: 70 %
FIFMAX-PRE: 6.96 L/SEC
FRCPLETH-%PRED-PRE: 103 %
FRCPLETH-PRE: 3.71 L
FRCPLETH-PRED: 3.59 L
FVC-%PRED-PRE: 124 %
FVC-PRE: 4.52 L
FVC-PRED: 3.63 L
IC-%PRED-PRE: 151 %
IC-PRE: 4.06 L
IC-PRED: 2.69 L
RVPLETH-%PRED-PRE: 119 %
RVPLETH-PRE: 3.1 L
RVPLETH-PRED: 2.59 L
TLCPLETH-%PRED-PRE: 115 %
TLCPLETH-PRE: 7.78 L
TLCPLETH-PRED: 6.72 L
VA-%PRED-PRE: 119 %
VA-PRE: 7.17 L
VC-%PRED-PRE: 117 %
VC-PRE: 4.68 L
VC-PRED: 3.97 L

## 2024-01-19 ENCOUNTER — OFFICE VISIT (OUTPATIENT)
Dept: PULMONOLOGY | Facility: CLINIC | Age: 69
End: 2024-01-19
Payer: COMMERCIAL

## 2024-01-19 ENCOUNTER — ANCILLARY PROCEDURE (OUTPATIENT)
Dept: GENERAL RADIOLOGY | Facility: CLINIC | Age: 69
End: 2024-01-19
Attending: PHYSICIAN ASSISTANT
Payer: COMMERCIAL

## 2024-01-19 VITALS
DIASTOLIC BLOOD PRESSURE: 76 MMHG | SYSTOLIC BLOOD PRESSURE: 131 MMHG | BODY MASS INDEX: 31.01 KG/M2 | HEART RATE: 101 BPM | OXYGEN SATURATION: 98 % | WEIGHT: 210 LBS

## 2024-01-19 DIAGNOSIS — R05.8 COUGH WITH EXPOSURE TO COVID-19 VIRUS: Primary | ICD-10-CM

## 2024-01-19 DIAGNOSIS — Z20.822 COUGH WITH EXPOSURE TO COVID-19 VIRUS: Primary | ICD-10-CM

## 2024-01-19 DIAGNOSIS — R05.3 CHRONIC COUGH: ICD-10-CM

## 2024-01-19 PROCEDURE — 71046 X-RAY EXAM CHEST 2 VIEWS: CPT | Mod: GC | Performed by: RADIOLOGY

## 2024-01-19 PROCEDURE — 99204 OFFICE O/P NEW MOD 45 MIN: CPT | Performed by: PHYSICIAN ASSISTANT

## 2024-01-19 NOTE — PROGRESS NOTES
[facility]- Pulmonary Clinic  New Pulmonary Consult    Name: Sven Givens MRN: [id number]     Age: 68 year old   YOB: 1955       Reason for Visit:   Chief Complaint   Patient presents with    Consult     Self referred  Chronic cough - has been having cough for the last couple of month - seems to have improved slightly, has been taking cough drops with no relieve of sxs  Was dx with lung cancer back in 2016              Assessment and Plan:     Sven Givens is a 68 year old male with history of stage 1 lung adenocarcinoma s/p LLL wedge resection (2016), stage 1 colon cancer s/p surgical excision 10/28/2022 on surveillance who presents for evaluation of cough.     # Acute cough, resolved  # History of stage 1 lung adenoCA s/p LLL wedge resection  # History of pulmonary nodules  He had a cough 1 month ago after testing positive for COVID. Improved with antiviral, and cough is now resolved. No dyspnea, wheeze, history of respiratory issues. He did have stage 1 lung adenocarcinoma s/p LLL wedge resection in 2016. Smoked 5-10 cigarettes per day for about 20 years, quit 1992. PFTs are normal and cxr is unremarkable. Most recent CT Chest/Abd/Pelvis 9/2022 with unchanged possibly calcified 2mm LLL nodule, stable from 2018. No need to repeat imaging per fleischner guidelines. It is my impression that his cough was related to COVID. Since it is resolved, PFTs/CXR are normal, and he does not have any other respiratory symptoms I do not think he has an underlying condition warranting further imaging or the use of inhalers. Follow up as needed        45 minutes spent reviewing chart, reviewing test results, talking with and examining patient, formulating plan, and documentation on the day of the encounter.    Dina Ricardo PA-C  General Pulmonology  Pager #[phone]             HPI:   Sven Givens is a 68 year old male with history of stage 1 lung adenocarcinoma s/p LLL wedge resection (2016), stage  1 colon cancer s/p surgical excision 10/28/2022 on surveillance who presents for evaluation of cough.     He had COVID about 1 month ago when he went to [facility], then developed frequent dry cough. No hemoptysis. He had antiviral for COVID from his PCP. Cough is resolved now but he is wondering if he has lung damage or another concerning etiology for the cough.   No dyspnea. Goes to the gym and walks on the treadmill daily at home. With the coughing he had a little bit of anterior chest discomfort. Resolved. No wheeze or chest tightness. Endorses nasal congestion, postnasal drip. Uses an OTC nasal spray which helps. Allergic to dust and in spring/fall. Will take claritin D as needed, not often. GERD is well controlled with omeprazole 20mg daily.          10 point ROS performed and negative except for as noted in HPI.    Tobacco or vapin-10 cigarettes per day for about 20 years, quit smoking   Occupation: retired, worked as an  at [employer]  Exposures: no known exposures  Family history: no family history of lung cancer  From [place] , lived in [place] for 5 years.            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, overuse, 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    Varicose veins              Past Surgical History:      Past Surgical History:   Procedure Laterality Date    BRONCHOSCOPY FLEXIBLE N/A 2016    Procedure: BRONCHOSCOPY FLEXIBLE;  Surgeon: Gayle Moya MD;  Location: [facility] OR    COLONOSCOPY      COLONOSCOPY N/A 2022    Procedure: COLONOSCOPY, WITH POLYPECTOMY AND BIOPSY;  Surgeon: Abbe Mccarty MD;  Location:  OR    COLONOSCOPY N/A 2023     Procedure: Colonoscopy;  Surgeon: Rosy Schofield MD;  Location: [facility] GI    COLONOSCOPY WITH CO2 INSUFFLATION N/A 2017    Procedure: COLONOSCOPY WITH CO2 INSUFFLATION;  COLON SCREEN/ SEB;  Surgeon: Margarito Rasheed DO;  Location: [facility] OR    COLONOSCOPY WITH CO2 INSUFFLATION N/A 2022    Procedure: COLONOSCOPY, WITH CO2 INSUFFLATION;  Surgeon: bAbe Mccarty MD;  Location: [facility] OR    COMBINED ESOPHAGOSCOPY, GASTROSCOPY, DUODENOSCOPY (EGD) WITH CO2 INSUFFLATION N/A 2019    Procedure: COMBINED ESOPHAGOSCOPY, GASTROSCOPY, DUODENOSCOPY (EGD) WITH CO2 INSUFFLATION;  Surgeon: Abbe Mccarty MD;  Location: [facility] OR    ESOPHAGOSCOPY, GASTROSCOPY, DUODENOSCOPY (EGD), COMBINED N/A 2019    Procedure: Combined Esophagoscopy, Gastroscopy, Duodenoscopy (Egd), Biopsy Single Or Multiple;  Surgeon: Abbe Mccarty MD;  Location: [facility] OR    GENITOURINARY SURGERY      kidney stones    THORACOSCOPIC WEDGE RESECTION LUNG Left 2016    Procedure: THORACOSCOPIC WEDGE RESECTION LUNG;  Surgeon: Gayle Moya MD;  Location: [facility] OR    VASCULAR SURGERY      varicose vein             Social History:     Social History     Socioeconomic History    Marital status:      Spouse name: Not on file    Number of children: Not on file    Years of education: Not on file    Highest education level: Not on file   Occupational History     Employer: [employer]   Tobacco Use    Smoking status: Former     Types: Cigarettes     Quit date: 1992     Years since quittin.9    Smokeless tobacco: Never    Tobacco comments:     15 + years    Vaping Use    Vaping Use: Never used   Substance and Sexual Activity    Alcohol use: No    Drug use: Never    Sexual activity: Not Currently     Partners: Female     Birth control/protection: None   Other Topics Concern    Parent/sibling w/ CABG, MI or angioplasty before 65F 55M? No   Social History Narrative    Not on file     Social Determinants of  Health     Financial Resource Strain: Low Risk  (2023)    Financial Resource Strain     Within the past 12 months, have you or your family members you live with been unable to get utilities (heat, electricity) when it was really needed?: No   Food Insecurity: Low Risk  (2023)    Food Insecurity     Within the past 12 months, did you worry that your food would run out before you got money to buy more?: No     Within the past 12 months, did the food you bought just not last and you didn t have money to get more?: No   Transportation Needs: Low Risk  (2023)    Transportation Needs     Within the past 12 months, has lack of transportation kept you from medical appointments, getting your medicines, non-medical meetings or appointments, work, or from getting things that you need?: No   Physical Activity: Not on file   Stress: Not on file   Social Connections: Not on file   Interpersonal Safety: Low Risk  (2023)    Interpersonal Safety     Do you feel physically and emotionally safe where you currently live?: Yes     Within the past 12 months, have you been hit, slapped, kicked or otherwise physically hurt by someone?: No     Within the past 12 months, have you been humiliated or emotionally abused in other ways by your partner or ex-partner?: No   Housing Stability: High Risk (2023)    Housing Stability     Do you have housing? : No     Are you worried about losing your housing?: No            Family History:     Family History   Problem Relation Age of Onset    Hernia Mother          age 97    Cerebrovascular Disease Father 64    Cancer Sister         ovarary     Deep Vein Thrombosis (DVT) No family hx of              Allergies:     Allergies   Allergen Reactions    Pcn [Penicillins] Rash    Ace Inhibitors Cough    Indomethacin Other (See Comments)     Severe dizziness    Invokana [Canagliflozin]      bladder infection             Medications:   allopurinol (ZYLOPRIM) 100 MG tablet, TAKE  2 TABLETS BY MOUTH EVERY DAY  atorvastatin (LIPITOR) 80 MG tablet, Take 1 tablet (80 mg) by mouth daily  Azelaic Acid (FINACEA) 15 % gel, Apply small amount twice a day to skin (Patient taking differently: as needed Apply small amount twice a day to skin)  blood glucose (CONTOUR NEXT TEST) test strip, USE TO TEST BLOOD SUGAR 1 TIME DAILY OR AS DIRECTED.  citalopram (CELEXA) 20 MG tablet, Take 1 tablet (20 mg) by mouth daily For anxiety.  clotrimazole-betamethasone (LOTRISONE) 1-0.05 % external cream,   Continuous Blood Gluc Sensor (FREESTYLE FEDERICO 2 SENSOR) [facility], Change every 14 days.  cyanocobalamin (VITAMIN B-12) 1000 MCG tablet, Take 1,000 mcg by mouth daily  fluticasone (FLONASE) 50 MCG/ACT nasal spray, Spray 1-2 sprays into both nostrils daily (Patient taking differently: Spray 1-2 sprays into both nostrils as needed)  glimepiride (AMARYL) 4 MG tablet, Take 1 tablet (4 mg) by mouth 2 times daily  ketoconazole (NIZORAL) 2 % external shampoo, APPLY TOPICALLY EVERY OTHER DAY LATHER INTO RASH AND WASH OFF AFTER 10 MINUTES.  losartan-hydrochlorothiazide (HYZAAR) 100-25 MG tablet, Take 1 tablet by mouth every morning For blood pressure.  meclizine (ANTIVERT) 25 MG tablet, Take 1 tablet (25 mg) by mouth 3 times daily as needed for dizziness or nausea  metFORMIN (GLUCOPHAGE) 1000 MG tablet, TAKE 1 TABLET BY MOUTH TWICE A DAY WITH MEALS  metoprolol succinate ER (TOPROL XL) 50 MG 24 hr tablet, Take 1 tablet (50 mg) by mouth daily  metroNIDAZOLE (METROCREAM) 0.75 % external cream,   metroNIDAZOLE 0.75 % EX external gel, Apply topically 2 times daily  nitroGLYcerin (NITROSTAT) 0.4 MG sublingual tablet, For chest pain place 1 tablet under the tongue every 5 minutes for 3 doses. If symptoms persist 5 minutes after 1st dose call 911. (Patient taking differently: every 5 minutes as needed For chest pain place 1 tablet under the tongue every 5 minutes for 3 doses. If symptoms persist 5 minutes after 1st dose call 911.)  omega-3  acid ethyl esters (LOVAZA) 1 g capsule, Take 2 capsules (2 g) by mouth 2 times daily  omeprazole (PRILOSEC) 20 MG DR capsule, TAKE 1 CAPSULE BY MOUTH EVERY DAY TAKE 30-60 MINS BEFORE A MEAL  Oxymetazoline HCl (AFRIN NASAL SPRAY NA), [place] in nostril as needed  Semaglutide, 2 MG/DOSE, (OZEMPIC) 8 MG/3ML pen, Inject 2 mg Subcutaneous every 7 days  senna-docusate (SENEXON-S) 8.6-50 MG tablet, Take 1 tablet by mouth at bedtime  terazosin (HYTRIN) 5 MG capsule, TAKE 1 CAPSULE (5 MG) BY MOUTH AT BEDTIME FOR BLOOD PRESSURE.    No current facility-administered medications on file prior to visit.             Exam:   /76 (BP Location: Left arm, Patient Position: Chair, Cuff Size: Adult Large)   Pulse 101   Wt 95.3 kg (210 lb)   SpO2 98%   BMI 31.01 kg/m      Gen: well-appearing, NAD  CV: RRR, no murmurs  Resp: Normal respiratory effort on room air. Clear to auscultation bilaterally without wheezes, rales or ronchi.   Skin: no obvious rashes on gross evaluation  Extremities: No edema  Neuro: alert and appropriate, no focal abnormalities         Data:     I have personally reviewed all pertinent labs, imaging studies and PFT results unless otherwise noted.    Imaging:  CXR 1/19/2024- no acute chest    CT CAP 9/2022  Central tracheobronchial tree is patent. No focal consolidation,  pleural effusion, or pneumothorax. Unchanged 2 mm left lower lobe  pulmonary nodule, possibly calcified (series 6, image 161). Relatively  unchanged nodularity along bilateral posterior costal pleura (series  6, image 160). No new or enlarging pulmonary nodule. Postsurgical  changes of left lung wedge resection.    PFT:   1/15/2024- normal spirometry, lung volumes and diffusion capacity

## 2024-01-19 NOTE — NURSING NOTE
Sven Givens's goals for this visit include:   Chief Complaint   Patient presents with    Consult     Self referred  Chronic cough - has been having cough for the last couple of month - seems to have improved slightly, has been taking cough drops with no relieve of sxs  Was dx with lung cancer back in 2016        He requests these members of his care team be copied on today's visit information: yes     PCP: Yeison Villegas    Referring Provider:  Referred Self, MD  No address on file    /76 (BP Location: Left arm, Patient Position: Chair, Cuff Size: Adult Large)   Pulse 101   Wt 95.3 kg (210 lb)   SpO2 98%   BMI 31.01 kg/m      Do you need any medication refills at today's visit? No     MORRO Stevens   Neph/Pulm [facility]

## 2024-02-06 NOTE — PROGRESS NOTES
Medication Therapy Management (MTM) Encounter    ASSESSMENT:                            Medication Adherence/Access: gets Ozempic through Patient Assistance Program.      Type 2 Diabetes: Patient is meeting A1c goal of < 8%. He is not checking fasting reading so it is unclear if they are at goal. Post-prandial readings are occasionally at goal and he is only checking readings sporadically. At this time, likely safe to continue regimen as directed and recheck A1C with PCP in May to help guide treatment decisions. Future considerations: add pioglitazone, stop glimepiride and add basal insulin     Immunizations: due for tetanus booster     Microalbumin: up to date. Last microalbumin was not at goal < 30 mg/g. Pt is not on ACEI/ARB due to cough with lisinopril. Pt is on ARB  Annual Eye Exam: up to date.   Aspirin: is not indicated in this patient due to age.    Hypertension: Stable. Patient is meeting blood pressure goal of < 140/90mmHg.    Hyperlipidemia: Controlled. Patient is on high intensity statin which is appropriate per 2019 ACC/AHA guidelines for lipid management due to the presence of diabetes. Low saturated fat/high soluble fiber diet encouraged. Exercise encouraged.    GERD: stable, but patient may benefit from increasing omeprazole to twice daily as recommended by pulmonologist. He had a good response with this previously for controlling his cough.    Gout: controlled. Uric acid at goal of <6 and pt denies any recent flares.    Anxiety: stable without medications. Pt aware he can start citalopram if anxiety worsens    Supplements: controlled    PLAN:                            1. Recheck A1C with PCP in May. Can adjust meds after results come back if needed    2. Encouraged patient check fasting blood sugars instead of just post-prandial    3. Increased omeprazole to twice daily as directed by pulm    4. Due for tetanus booster    5. Will help patient reapply for [employer] Patient Assistance Program in  November    Follow-up: Return in 41 weeks (on 11/20/2024) for Follow up, with me, in person.    SUBJECTIVE/OBJECTIVE:                          Sven Givens is a 68 year old male coming in for a follow-up visit from 7/21/23.       Reason for visit: follow-up on diabetes.  Allergies/ADRs: Reviewed in chart  Past Medical History: Reviewed in chart  Tobacco: He reports that he quit smoking about 32 years ago. His smoking use included cigarettes. He has never used smokeless tobacco.  Alcohol: not currently using     Med adherence: getting Ozempic through [employer] PAP  Diabetes   Type 2 Diabetes  Metformin IR 1000 mg twice daily  Glimepiride 4 mg twice daily   Ozempic 2 mg injected once weekly - increased one month ago     Medication history:    Januvia/Januvia - not effective  Invokana - not effective, also has history of UTIs and is avoiding SGLT2i    Pt increased Ozempic to 2 mg this fall  Is tolerating well  Hasn't noticed improvements    Blood sugar monitoring: checks with glucometer after dinner, not checking fasting  PP: 211, 198, 197, 186, 176, 155, 225, 200, 177    Current diabetes symptoms: neuropathy  Diet: monitoring carbs, doesn't drink soda  Exercise: had surgery for hernia in May 2023 due to increased activity and now is remaining sedentary due to fear of this     Eye exam is up to date    Hypertension:   Losartan/HCTZ 100/25 mg daily  Metoprolol succinate 50 mg daily  Terazosin 5 mg at bedtime      Patient does not self-monitor blood pressure.    Denies dizziness, lightheadedness, or fatigue    Hyperlipidemia:   atorvastatin 80 mg daily  Lovaza 2g twice daily   Denies myalgias  Has history of trigs in the high 400's   Has been on atorvastatin and Lovaza since then    GERD:   Omeprazole 20 mg once daily   Patient feels that current regimen is somewhat effective.  He does notice symptoms if he misses a dose.  He saw pulm for issue with chronic cough  They suspected pt may actually have uncontrolled  GERD  He was advised to omeprazole twice daily and he said this helped a lot  He is wondering if its appropriate to continue this way    Gout:   allopurinol 200 mg daily  Denies recent flares  Denies nausea, GI upset  Is happy he can eat shellfish again now that he is on allopurinol  Uric Acid   Date Value   02/21/2022 5.5   02/16/2021 4.3     Anxiety:  Citalopram 20 mg once daily - never started  Was worried about side effects  Thinks he takes too many medications already  He will restart this if anxiety worsens  At this time, he doesn't think he needs it    Supplements:  Vitamin B12 1000 mcg daily  Senna plus daily for constipation    BP Readings from Last 3 Encounters:   01/19/24 131/76   12/05/23 123/74   11/29/23 136/78     Pulse Readings from Last 3 Encounters:   01/19/24 101   12/05/23 83   11/29/23 93       Current Outpatient Medications   Medication Sig    omeprazole (PRILOSEC) 20 MG DR capsule Take 1 capsule (20 mg) by mouth once daily    allopurinol (ZYLOPRIM) 100 MG tablet TAKE 2 TABLETS BY MOUTH EVERY DAY    atorvastatin (LIPITOR) 80 MG tablet Take 1 tablet (80 mg) by mouth daily    blood glucose (CONTOUR NEXT TEST) test strip USE TO TEST BLOOD SUGAR 1 TIME DAILY OR AS DIRECTED.    cyanocobalamin (VITAMIN B-12) 1000 MCG tablet Take 1,000 mcg by mouth daily    glimepiride (AMARYL) 4 MG tablet Take 1 tablet (4 mg) by mouth 2 times daily    ketoconazole (NIZORAL) 2 % external shampoo APPLY TOPICALLY EVERY OTHER DAY LATHER INTO RASH AND WASH OFF AFTER 10 MINUTES.    losartan-hydrochlorothiazide (HYZAAR) 100-25 MG tablet Take 1 tablet by mouth every morning For blood pressure.    metFORMIN (GLUCOPHAGE) 1000 MG tablet TAKE 1 TABLET BY MOUTH TWICE A DAY WITH MEALS    metoprolol succinate ER (TOPROL XL) 50 MG 24 hr tablet Take 1 tablet (50 mg) by mouth daily    nitroGLYcerin (NITROSTAT) 0.4 MG sublingual tablet For chest pain place 1 tablet under the tongue every 5 minutes for 3 doses. If symptoms persist 5  minutes after 1st dose call 911.    omega-3 acid ethyl esters (LOVAZA) 1 g capsule Take 2 capsules (2 g) by mouth 2 times daily    Oxymetazoline HCl (AFRIN NASAL SPRAY NA) Spray in nostril as needed    Semaglutide, 2 MG/DOSE, (OZEMPIC) 8 MG/3ML pen Inject 2 mg Subcutaneous every 7 days    senna-docusate (SENEXON-S) 8.6-50 MG tablet Take 1 tablet by mouth at bedtime    terazosin (HYTRIN) 5 MG capsule TAKE 1 CAPSULE (5 MG) BY MOUTH AT BEDTIME FOR BLOOD PRESSURE.     No current facility-administered medications for this visit.     Lab Results   Component Value Date    A1C 7.7 (H) 12/05/2023     (H) 12/05/2023     12/05/2023    POTASSIUM 3.9 12/05/2023    ODELL 10.1 12/05/2023    CHLORIDE 100 12/05/2023    CO2 25 12/05/2023    BUN 15.7 12/05/2023    CR 0.75 12/05/2023    GFRESTIMATED >90 12/05/2023    CHOL 89 05/09/2023    LDL <5 05/09/2023    HDL 31 (L) 05/09/2023    TRIG 281 (H) 05/09/2023    B12 360 09/28/2022    UMALCR 46.65 (H) 12/05/2023    TSH 2.49 05/27/2022       ----------------  I spent 30 minutes with this patient today. All changes were made via collaborative practice agreement with Yeison Villegas MD, MD. A copy of the visit note was provided to the patient's provider(s).    A summary of these recommendations was given to the patient.    Dane Craft, PharmD, [facility]  Medication Therapy Management Provider  [phone]     Medication Therapy Recommendations  GERD (gastroesophageal reflux disease)    Current Medication: omeprazole (PRILOSEC) 20 MG DR capsule (Discontinued)   Rationale: Dose too low - Dosage too low - Effectiveness   Recommendation: Increase Frequency - omeprazole 20 MG DR capsule   Status: Accepted per CPA         Type 2 diabetes mellitus with diabetic polyneuropathy, with long-term current use of insulin (H)    Rationale: Preventive therapy - Needs additional medication therapy - Indication   Recommendation: Start Medication - Boostrix 5-2.5-18.5 LF-MCG/0.5 Susp   Status: Accepted per  CPA

## 2024-02-07 ENCOUNTER — OFFICE VISIT (OUTPATIENT)
Dept: PHARMACY | Facility: CLINIC | Age: 69
End: 2024-02-07
Payer: COMMERCIAL

## 2024-02-07 DIAGNOSIS — Z79.4 TYPE 2 DIABETES MELLITUS WITH DIABETIC POLYNEUROPATHY, WITH LONG-TERM CURRENT USE OF INSULIN (H): Primary | ICD-10-CM

## 2024-02-07 DIAGNOSIS — M1A.0710 IDIOPATHIC CHRONIC GOUT OF RIGHT FOOT WITHOUT TOPHUS: ICD-10-CM

## 2024-02-07 DIAGNOSIS — F41.1 GAD (GENERALIZED ANXIETY DISORDER): ICD-10-CM

## 2024-02-07 DIAGNOSIS — I10 ESSENTIAL HYPERTENSION WITH GOAL BLOOD PRESSURE LESS THAN 140/90: ICD-10-CM

## 2024-02-07 DIAGNOSIS — Z78.9 TAKES DIETARY SUPPLEMENTS: ICD-10-CM

## 2024-02-07 DIAGNOSIS — E78.5 HYPERLIPIDEMIA LDL GOAL <100: ICD-10-CM

## 2024-02-07 DIAGNOSIS — E11.42 TYPE 2 DIABETES MELLITUS WITH DIABETIC POLYNEUROPATHY, WITH LONG-TERM CURRENT USE OF INSULIN (H): Primary | ICD-10-CM

## 2024-02-07 DIAGNOSIS — K21.00 GASTROESOPHAGEAL REFLUX DISEASE WITH ESOPHAGITIS, UNSPECIFIED WHETHER HEMORRHAGE: ICD-10-CM

## 2024-02-07 PROCEDURE — 99607 MTMS BY PHARM ADDL 15 MIN: CPT | Performed by: PHARMACIST

## 2024-02-07 PROCEDURE — 99605 MTMS BY PHARM NP 15 MIN: CPT | Performed by: PHARMACIST

## 2024-02-07 NOTE — Clinical Note
LENARDI - pt tolerating high dose Ozempic well. He will get A1C rechecked with you in May and is open to further adjustments at that time if needed based on results. Thanks! Dane

## 2024-02-07 NOTE — LETTER
February 7, 2024  Sven Givens  [address]  [place] [address]    Dear Mr. Givens, [facility] [place]     Thank you for talking with me on Feb 7, 2024 about your health and medications. As a follow-up to our conversation, I have included two documents:      Your Recommended To-Do List has steps you should take to get the best results from your medications.  Your Medication List will help you keep track of your medications and how to take them.    If you want to talk about these documents, please call Dane Craft RPH at phone: [phone], Monday-Friday 8-4:30pm.    I look forward to working with you and your doctors to make sure your medications work well for you.    Sincerely,  Dane Craft RPH  [facility] Pharmacist, [facility]

## 2024-02-07 NOTE — LETTER
"Recommended To-Do List      Prepared on: 02/07/2024       You can get the best results from your medications by completing the items on this \"To-Do List.\"      Bring your To-Do List when you go to your doctor. And, share it with your family or caregivers.    My To-Do List:  What we talked about: What I should do:   Your medication dosage being too low    Increase omeprazole (PriLOSEC) by taking one capsule twice per day. I sent a new order so you could take it this way.          What we talked about: What I should do:   Vaccines    You are due for your tetanus booster. This needs to repeated once every 10 years. You can get this at your visit with Dr. Villegas.          What we talked about: What I should do:                     "

## 2024-02-07 NOTE — PATIENT INSTRUCTIONS
Yordy Machuca, it was great talking with you today!     Recommendations from today's MTM visit:                                                      1. Check your blood sugars first thing in the morning on a few occasions - we want your morning readings to be between  mg/dL    2. We will check your A1C in May    3. I ordered your omeprazole to be taken twice daily as instructed by the pulmonologist.     I value your experience and would be very grateful for your time with providing feedback on our clinic survey. You may receive a survey via email or text message in the next few days.     Next MTM visit: 11/20/24    To schedule another MTM appointment, please call the clinic directly or you may call the MTM scheduling line at [phone] or toll-free at [phone].     My Clinical Pharmacist's contact information:                                                      Please feel free to contact me with any questions or concerns you have.      Dane Craft, PharmD, BCACP  [facility]  Phone: [phone]

## 2024-02-07 NOTE — LETTER
_  Medication List        Prepared on: 02/07/2024     Bring your Medication List when you go to the doctor, hospital, or   emergency room. And, share it with your family or caregivers.     Note any changes to how you take your medications.  Cross out medications when you no longer use them.    Medication How I take it Why I use it Prescriber   allopurinol (ZYLOPRIM) 100 MG tablet TAKE 2 TABLETS BY MOUTH EVERY DAY Idiopathic chronic gout of right foot without tophus Yeison Villegas MD   atorvastatin (LIPITOR) 80 MG tablet Take 1 tablet (80 mg) by mouth daily Type 2 diabetes mellitus with diabetic polyneuropathy, without long-term current use of insulin (H); Hypertriglyceridemia Yeison Villegas MD   blood glucose (CONTOUR NEXT TEST) test strip USE TO TEST BLOOD SUGAR 1 TIME DAILY OR AS DIRECTED. Type 2 diabetes mellitus with diabetic polyneuropathy, without long-term current use of insulin (H) Yeison Villegas MD   citalopram (CELEXA) 20 MG tablet Take 1 tablet (20 mg) by mouth daily For anxiety. Anxiety Yeison Villegas MD   cyanocobalamin (VITAMIN B-12) 1000 MCG tablet Take 1,000 mcg by mouth daily  Low B12/neuropathy Patient Reported   glimepiride (AMARYL) 4 MG tablet Take 1 tablet (4 mg) by mouth 2 times daily Type 2 diabetes mellitus with diabetic polyneuropathy, without long-term current use of insulin (H) Yeison Villegas MD   ketoconazole (NIZORAL) 2 % external shampoo APPLY TOPICALLY EVERY OTHER DAY LATHER INTO RASH AND WASH OFF AFTER 10 MINUTES. Tinea Versicolor Pam Dela Cruz MD   losartan-hydrochlorothiazide (HYZAAR) 100-25 MG tablet Take 1 tablet by mouth every morning For blood pressure. Essential hypertension with goal blood pressure less than 140/90 Yeison Villegas MD   metFORMIN (GLUCOPHAGE) 1000 MG tablet TAKE 1 TABLET BY MOUTH TWICE A DAY WITH MEALS Type 2 diabetes mellitus with diabetic polyneuropathy, without long-term current use of insulin (H) Yeison Villegas MD   metoprolol succinate ER (TOPROL XL) 50 MG 24 hr tablet Take 1 tablet  (50 mg) by mouth daily Essential hypertension with goal blood pressure less than 140/90; Sinus Tachycardia Yeison Villegas MD   nitroGLYcerin (NITROSTAT) 0.4 MG sublingual tablet For chest pain place 1 tablet under the tongue every 5 minutes for 3 doses. If symptoms persist 5 minutes after 1st dose call 911. Coronary artery disease involving native coronary artery of native heart without angina pectoris Jesus Shane MD   omega-3 acid ethyl esters (LOVAZA) 1 g capsule Take 2 capsules (2 g) by mouth 2 times daily Coronary artery disease involving native coronary artery of native heart without angina pectoris Jesus Shane MD   omeprazole (PRILOSEC) 20 MG DR capsule Take 1 capsule (20 mg) by mouth 2 times daily Gastroesophageal reflux disease with esophagitis, unspecified whether hemorrhage Yeison Villegas MD   Oxymetazoline HCl (AFRIN NASAL SPRAY NA) Spray in nostril as needed  Congestion Patient Reported   Semaglutide, 2 MG/DOSE, (OZEMPIC) 8 MG/3ML pen Inject 2 mg Subcutaneous every 7 days Type 2 diabetes mellitus with diabetic polyneuropathy, with long-term current use of insulin (H) Yeison Villegas MD   senna-docusate (SENEXON-S) 8.6-50 MG tablet Take 1 tablet by mouth at bedtime Slow Transit Constipation Yeison Villegas MD   terazosin (HYTRIN) 5 MG capsule TAKE 1 CAPSULE (5 MG) BY MOUTH AT BEDTIME FOR BLOOD PRESSURE. Essential hypertension with goal blood pressure less than 140/90 Yeison Villegas MD         Add new medications, over-the-counter drugs, herbals, vitamins, or  minerals in the blank rows below.    Medication How I take it Why I use it Prescriber                                      Allergies:      pcn [penicillins]; ace inhibitors; indomethacin; invokana [canagliflozin]        Side effects I have had:               Other Information:              My notes and questions:

## 2024-02-13 ENCOUNTER — TRANSFERRED RECORDS (OUTPATIENT)
Dept: HEALTH INFORMATION MANAGEMENT | Facility: CLINIC | Age: 69
End: 2024-02-13
Payer: COMMERCIAL

## 2024-03-21 ENCOUNTER — TELEPHONE (OUTPATIENT)
Dept: FAMILY MEDICINE | Facility: CLINIC | Age: 69
End: 2024-03-21
Payer: COMMERCIAL

## 2024-03-21 DIAGNOSIS — Z79.4 TYPE 2 DIABETES MELLITUS WITH DIABETIC POLYNEUROPATHY, WITH LONG-TERM CURRENT USE OF INSULIN (H): ICD-10-CM

## 2024-03-21 DIAGNOSIS — E11.42 TYPE 2 DIABETES MELLITUS WITH DIABETIC POLYNEUROPATHY, WITH LONG-TERM CURRENT USE OF INSULIN (H): ICD-10-CM

## 2024-03-21 NOTE — TELEPHONE ENCOUNTER
Informed pt that we have not received any forms from [employer].  Gave pt our fax number and he will contact them and have the forms sent.  Pt states the Rx at [facility] is not covered and is trying to go through another company to have the Ozempic covered.  He needs the forms completed and returned for the medication to be covered.

## 2024-03-21 NOTE — TELEPHONE ENCOUNTER
Janina with [employer] calling to inform clinic she faxed over a form for a refill. Patient needs Ozempic 2mg filled. Need provider/ to fill out form and send back to them.  Annie Silvestre RN    [facility]- Primary Care

## 2024-03-21 NOTE — TELEPHONE ENCOUNTER
Forms/Letter Request    Type of form/letter: OTHER: [employer] Patient Assistance Program Refill Request       Do we have the form/letter: Yes: Placed in MD's box.    Who is the form from? [employer] Assistance [employer]     Where did/will the form come from? form was faxed in    When is form/letter needed by: asap    How would you like the form/letter returned: Fax : [phone]    Patient Notified form requests are processed in 5-7 business days:Yes    Could we send this information to you in [employer] or would you prefer to receive a phone call?:   Patient would prefer a phone call   Okay to leave a detailed message?: Yes at Cell number on file:    Telephone Information:   Mobile [phone]

## 2024-03-21 NOTE — TELEPHONE ENCOUNTER
1 year supply of Ozempic 2mg sent to [employer] [facility] in [employer] [place] on 12/5/23.     Please address form needs stated in message.     Shanelle Pinedo BSN, RN

## 2024-03-21 NOTE — TELEPHONE ENCOUNTER
Received provider signed forms and faxed to [employer], [phone], right fax confirmed at 3:23 pm today, 3/21/2024. Copy to TC and abstracting.  Josselin Mcgowan MA  [facility]   Primary Care

## 2024-03-26 NOTE — TELEPHONE ENCOUNTER
Received a fax from [employer] stating that the forms have missing information. Entered Dr Villegas's NPI # and re faxed to [employer] Patient [employer]ist [employer], [phone], right fax confirmed at 10:17 am today, 3/26/2024. Sent updated for to TC and abstracting.  Josselin Mcgowan MA  [facility]   Primary Care

## 2024-04-01 ENCOUNTER — MYC MEDICAL ADVICE (OUTPATIENT)
Dept: FAMILY MEDICINE | Facility: CLINIC | Age: 69
End: 2024-04-01
Payer: COMMERCIAL

## 2024-04-04 NOTE — TELEPHONE ENCOUNTER
Form updated by [facility] and faxed to [employer] [phone] on 4/24/24 at 1:46pm. Copy placed in abstract and original in Josselin's TC copy bin.

## 2024-04-04 NOTE — TELEPHONE ENCOUNTER
Per [place] Board of Medical [place], Dr. Yeison Villegas's state license [place] [id number]. Can find this on [place] Board of Medical [employer] license verification website.      Routing to team coordinators to see if we can resubmit paperwork with this info? Please let patient know if able to re-fax, thank you!      EDUARDA HuttonN, RN  [facility] Primary Care [facility]

## 2024-04-04 NOTE — TELEPHONE ENCOUNTER
We are just needing to add the state license number. Can you please provide this and TC will add to the form and refax.       Form printed from [email or web address] and placed in tc hold bin.

## 2024-04-12 ENCOUNTER — TELEPHONE (OUTPATIENT)
Dept: FAMILY MEDICINE | Facility: CLINIC | Age: 69
End: 2024-04-12
Payer: COMMERCIAL

## 2024-04-12 NOTE — TELEPHONE ENCOUNTER
Received from [employer]  OZEMPIC 2MG 1X3ML PREFILLED   8MG/3ML   #LOT [id number]   EXP: 06/30/2026 #4   NDC# [id number]    Amita Fry MA

## 2024-04-24 ENCOUNTER — OFFICE VISIT (OUTPATIENT)
Dept: FAMILY MEDICINE | Facility: CLINIC | Age: 69
End: 2024-04-24
Attending: FAMILY MEDICINE
Payer: COMMERCIAL

## 2024-04-24 VITALS
RESPIRATION RATE: 20 BRPM | HEART RATE: 70 BPM | TEMPERATURE: 98.4 F | OXYGEN SATURATION: 98 % | HEIGHT: 69 IN | SYSTOLIC BLOOD PRESSURE: 134 MMHG | DIASTOLIC BLOOD PRESSURE: 84 MMHG | WEIGHT: 210 LBS | BODY MASS INDEX: 31.1 KG/M2

## 2024-04-24 DIAGNOSIS — E11.42 TYPE 2 DIABETES MELLITUS WITH DIABETIC POLYNEUROPATHY, WITHOUT LONG-TERM CURRENT USE OF INSULIN (H): Primary | ICD-10-CM

## 2024-04-24 DIAGNOSIS — Z23 ENCOUNTER FOR IMMUNIZATION: ICD-10-CM

## 2024-04-24 DIAGNOSIS — Z12.5 SCREENING FOR PROSTATE CANCER: ICD-10-CM

## 2024-04-24 DIAGNOSIS — I10 ESSENTIAL HYPERTENSION WITH GOAL BLOOD PRESSURE LESS THAN 140/90: ICD-10-CM

## 2024-04-24 DIAGNOSIS — E78.5 HYPERLIPIDEMIA LDL GOAL <100: ICD-10-CM

## 2024-04-24 DIAGNOSIS — E11.40 TYPE 2 DIABETES MELLITUS WITH DIABETIC NEUROPATHY, WITHOUT LONG-TERM CURRENT USE OF INSULIN (H): ICD-10-CM

## 2024-04-24 LAB — HBA1C MFR BLD: 7.8 % (ref 0–5.6)

## 2024-04-24 PROCEDURE — 99214 OFFICE O/P EST MOD 30 MIN: CPT | Performed by: FAMILY MEDICINE

## 2024-04-24 PROCEDURE — 36415 COLL VENOUS BLD VENIPUNCTURE: CPT | Performed by: FAMILY MEDICINE

## 2024-04-24 PROCEDURE — G0103 PSA SCREENING: HCPCS | Performed by: FAMILY MEDICINE

## 2024-04-24 PROCEDURE — G2211 COMPLEX E/M VISIT ADD ON: HCPCS | Performed by: FAMILY MEDICINE

## 2024-04-24 PROCEDURE — 83036 HEMOGLOBIN GLYCOSYLATED A1C: CPT | Performed by: FAMILY MEDICINE

## 2024-04-24 PROCEDURE — 80061 LIPID PANEL: CPT | Performed by: FAMILY MEDICINE

## 2024-04-24 RX ORDER — RESPIRATORY SYNCYTIAL VIRUS VACCINE 120MCG/0.5
0.5 KIT INTRAMUSCULAR ONCE
Qty: 1 EACH | Refills: 0 | Status: CANCELLED | OUTPATIENT
Start: 2024-04-24 | End: 2024-04-24

## 2024-04-24 RX ORDER — PREGABALIN 50 MG/1
50 CAPSULE ORAL 2 TIMES DAILY
Qty: 60 CAPSULE | Refills: 5 | Status: SHIPPED | OUTPATIENT
Start: 2024-04-24

## 2024-04-24 ASSESSMENT — PAIN SCALES - GENERAL: PAINLEVEL: NO PAIN (0)

## 2024-04-24 NOTE — PROGRESS NOTES
"    Rishabh Macuhca is a 68 year old, presenting for the following health issues:  Diabetes        4/24/2024     7:08 AM   Additional Questions   Roomed by ligia rascon   Accompanied by none         4/24/2024     7:08 AM   Patient Reported Additional Medications   Patient reports taking the following new medications none     History of Present Illness       Diabetes:   He presents for follow up of diabetes.  He is checking home blood glucose one time daily.   He checks blood glucose after meals.  Blood glucose is sometimes over 200 and never under 70. He is aware of hypoglycemia symptoms including shakiness.    He has no concerns regarding his diabetes at this time.  He is having numbness in feet and burning in feet.            He eats 2-3 servings of fruits and vegetables daily.He consumes 1 sweetened beverage(s) daily.He exercises with enough effort to increase his heart rate 20 to 29 minutes per day.  He exercises with enough effort to increase his heart rate 4 days per week.   He is taking medications regularly.       Hyperlipidemia Follow-Up    Are you regularly taking any medication or supplement to lower your cholesterol?   Yes- atorvastatin  Are you having muscle aches or other side effects that you think could be caused by your cholesterol lowering medication?  No    Hypertension Follow-up    Do you check your blood pressure regularly outside of the clinic? Yes   Are you following a low salt diet? Yes  Are your blood pressures ever more than 140 on the top number (systolic) OR more   than 90 on the bottom number (diastolic), for example 140/90? No        Review of Systems  Constitutional, HEENT, cardiovascular, pulmonary, GI, , musculoskeletal, neuro, skin, endocrine and psych systems are negative, except as otherwise noted.      Objective    /84   Pulse 70   Temp 98.4  F (36.9  C) (Tympanic)   Resp 20   Ht 1.753 m (5' 9\")   Wt 95.3 kg (210 lb)   SpO2 98%   BMI 31.01 kg/m    Body mass index " is 31.01 kg/m .  Physical Exam   GENERAL: alert and no distress  NECK: no adenopathy, no asymmetry, masses, or scars  RESP: lungs clear to auscultation - no rales, rhonchi or wheezes  CV: regular rate and rhythm, normal S1 S2, no S3 or S4, no murmur, click or rub, no peripheral edema  ABDOMEN: soft, nontender, no hepatosplenomegaly, no masses and bowel sounds normal  MS: no gross musculoskeletal defects noted, no edema  Diabetic foot exam: normal DP and PT pulses, no trophic changes or ulcerative lesions, and reduced sensation at distal toes    A/P:    (E11.42) Type 2 diabetes mellitus with diabetic polyneuropathy, without long-term current use of insulin (H)  (primary encounter diagnosis)  Comment:   Plan: Hemoglobin A1c        Recheck and adjust if needed. Recheck in 3 months.    (E11.40) Type 2 diabetes mellitus with diabetic neuropathy, without long-term current use of insulin (H)  Comment:   Plan: pregabalin (LYRICA) 50 MG capsule        Trial Lyrica. Discussed potential side effects. Recheck in 3 months.    (I10) Essential hypertension with goal blood pressure less than 140/90  Comment:   Plan: controlled.    (E78.5) Hyperlipidemia LDL goal <100  Comment:   Plan: Lipid panel reflex to direct LDL Fasting        Controlled.    (Z12.5) Screening for prostate cancer  Comment:   Plan: PROSTATE SPEC ANTIGEN SCREEN            (Z23) Encounter for immunization  Comment:   Plan: Tdap, tetanus-diptheria-acell pertussis,         (BOOSTRIX) 5-2.5-18.5 LF-MCG/0.5 YANICK injection                    Signed Electronically by: Yeison Villegas MD, MD

## 2024-04-25 LAB
CHOLEST SERPL-MCNC: 88 MG/DL
FASTING STATUS PATIENT QL REPORTED: YES
HDLC SERPL-MCNC: 27 MG/DL
LDLC SERPL CALC-MCNC: 20 MG/DL
NONHDLC SERPL-MCNC: 61 MG/DL
PSA SERPL DL<=0.01 NG/ML-MCNC: 2.29 NG/ML (ref 0–6.5)
TRIGL SERPL-MCNC: 203 MG/DL

## 2024-05-23 ENCOUNTER — TELEPHONE (OUTPATIENT)
Dept: FAMILY MEDICINE | Facility: CLINIC | Age: 69
End: 2024-05-23
Payer: COMMERCIAL

## 2024-05-23 NOTE — TELEPHONE ENCOUNTER
Patient Quality Outreach    Patient is due for the following:       Topic Date Due    Diptheria Tetanus Pertussis (DTAP/TDAP/TD) Vaccine (2 - Td or Tdap) 12/20/2023    COVID-19 Vaccine (5 - 2023-24 season) 04/03/2024       Next Steps:   Patient has upcoming appointment, these items will be addressed at that time.    Type of outreach:    Chart review performed, no outreach needed.      Questions for provider review:    None           Leti Tavarez MA

## 2024-06-27 ENCOUNTER — TELEPHONE (OUTPATIENT)
Dept: FAMILY MEDICINE | Facility: CLINIC | Age: 69
End: 2024-06-27

## 2024-06-27 NOTE — TELEPHONE ENCOUNTER
Forms/Letter Request    Type of form/letter: OTHER: [employer] Ozempic Refill form       Do we have the form/letter: Yes: Faxed in    Who is the form from? [employer] (if other please explain)    Where did/will the form come from? form was faxed in    When is form/letter needed by: ASAP    How would you like the form/letter returned: Fax : [phone]    Patient Notified form requests are processed in 5-7 business days:Yes    Could we send this information to you in [employer] or would you prefer to receive a phone call?:   Patient would prefer a phone call   Okay to leave a detailed message?: Yes at Cell number on file:    Telephone Information:   Mobile [phone]

## 2024-07-01 ENCOUNTER — OFFICE VISIT (OUTPATIENT)
Dept: AUDIOLOGY | Facility: CLINIC | Age: 69
End: 2024-07-01
Payer: COMMERCIAL

## 2024-07-01 DIAGNOSIS — H93.12 TINNITUS OF LEFT EAR: Primary | ICD-10-CM

## 2024-07-01 PROCEDURE — 92550 TYMPANOMETRY & REFLEX THRESH: CPT

## 2024-07-01 PROCEDURE — 92557 COMPREHENSIVE HEARING TEST: CPT

## 2024-07-01 NOTE — Clinical Note
Hearing has remained stable when compared to his last hearing test in 2022. Patient reports feeling a 'bump' within his left ear canal and is concerned its a tumor- I was not able to visualize this bump. He states the aural fullness is constant and really bothersome.   Shanelle

## 2024-07-01 NOTE — PROGRESS NOTES
AUDIOLOGY REPORT:    Patient was referred to [facility] Audiology from ENT by Dr. Ambrocio for a hearing examination. Patient is here today with concerns regarding left-aural fullness and a new onset of tinnitus that began around 6 months ago. The patient does not feel this is effecting his hearing and denies any recent pain or drainage. He notes that when he puts his finger in his left ear canal, he can feel a 'bump'. Sven was last seen on 1/20/2022 where results indicated normal hearing in both ears. The patient was not accompanied to today's appointment     Testing:    Otoscopy:   Otoscopic exam indicates ears are clear of cerumen bilaterally  *'bump' could not be visualized     Tympanograms:    RIGHT: normal eardrum mobility     LEFT:   normal eardrum mobility    Reflexes (reported by stimulus ear): 1000 Hz  RIGHT: Ipsilateral is present at normal levels  RIGHT: Contralateral is elevated at normal levels  LEFT:   Ipsilateral is absent at frequencies tested  LEFT:   Contralateral is elevated at normal levels    Thresholds:   Pure Tone Thresholds assessed using conventional audiometry with good  reliability from 250-8000 Hz bilaterally using insert earphones and circumaural headphones     RIGHT:  normal to borderline normal hearing sensitivity at frequencies tested    LEFT:    normal to borderline normal hearing sensitivity at frequencies tested    Speech Reception Threshold:    RIGHT: 20 dB HL    LEFT:   20 dB HL  Speech Reception Thresholds are in good agreement with pure tone thresholds    Word Recognition Score:     RIGHT: 100% at 60 dB HL using NU-6 recorded word list.    LEFT:   100% at 60 dB HL using NU-6 recorded word list.    Compared to audiogram on 1/20/2022, hearing has remained stable in both ears. Discussed results with the patient.     Results will be sent to Dr. Ambrocio for follow-up     Ugo Morel, [facility]  Licensed Audiologist  [place] #[id number]    07/01/24     CC: MD Shanelle Barba  Main, RN

## 2024-07-02 NOTE — TELEPHONE ENCOUNTER
Received provider signed form. Faxed to [employer], [phone], right fax confirmed at 10:48 am today, 7/2/2024. Copy to TC and abstracting.  Josselin Mcgowan MA  [facility]   Primary Care

## 2024-07-03 NOTE — TELEPHONE ENCOUNTER
Received a fax from [facility] [employer] Patient [employer] that the Refill Request was missing information. They are asking to have the provider's State number filled in. Retrieved my copy of the form, filled in the # and re faxed to a different fax # indicated on the missing information letter. Faxed to [employer], [phone], right fax confirmed at 1:30 pm today, 7/3/2024. Copy to  and abstracting.  Josselin Mcgowan MA  [facility]   Primary Care

## 2024-07-05 NOTE — TELEPHONE ENCOUNTER
[employer] calling to say that the # provided, KIRT is not the same as the State License # and needs this filled out on the form. Retrieved my copy of the form and placed in Dr Villegas's box for his return on Teusday, 7/9/2024.  Josselin Mcgowan MA  [facility]   Primary Care

## 2024-07-09 NOTE — TELEPHONE ENCOUNTER
Received form with the provider's State License # filled in. Faxed the form to [employer], [phone], right fax confirmed at 1:10 pm today, 7/9/2024, copy to TC and abstracting.  Josselin Mcgowan MA  [facility]   Primary Care

## 2024-07-17 ENCOUNTER — MYC MEDICAL ADVICE (OUTPATIENT)
Dept: FAMILY MEDICINE | Facility: CLINIC | Age: 69
End: 2024-07-17
Payer: COMMERCIAL

## 2024-07-17 DIAGNOSIS — R42 VERTIGO: Primary | ICD-10-CM

## 2024-07-18 ENCOUNTER — THERAPY VISIT (OUTPATIENT)
Dept: PHYSICAL THERAPY | Facility: CLINIC | Age: 69
End: 2024-07-18
Attending: FAMILY MEDICINE
Payer: COMMERCIAL

## 2024-07-18 DIAGNOSIS — R42 VERTIGO: Primary | ICD-10-CM

## 2024-07-18 DIAGNOSIS — H81.12 BENIGN PAROXYSMAL POSITIONAL VERTIGO, LEFT: ICD-10-CM

## 2024-07-18 PROCEDURE — 97162 PT EVAL MOD COMPLEX 30 MIN: CPT | Mod: GP | Performed by: PHYSICAL THERAPIST

## 2024-07-18 PROCEDURE — 95992 CANALITH REPOSITIONING PROC: CPT | Mod: GP | Performed by: PHYSICAL THERAPIST

## 2024-07-18 PROCEDURE — 97112 NEUROMUSCULAR REEDUCATION: CPT | Mod: GP,59 | Performed by: PHYSICAL THERAPIST
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
## 2024-07-22 ENCOUNTER — THERAPY VISIT (OUTPATIENT)
Dept: PHYSICAL THERAPY | Facility: CLINIC | Age: 69
End: 2024-07-22
Attending: FAMILY MEDICINE
Payer: COMMERCIAL

## 2024-07-22 DIAGNOSIS — H81.12 BENIGN PAROXYSMAL POSITIONAL VERTIGO, LEFT: ICD-10-CM

## 2024-07-22 DIAGNOSIS — R42 VERTIGO: Primary | ICD-10-CM

## 2024-07-22 PROCEDURE — 97112 NEUROMUSCULAR REEDUCATION: CPT | Mod: GP | Performed by: PHYSICAL THERAPIST

## 2024-07-22 PROCEDURE — 97750 PHYSICAL PERFORMANCE TEST: CPT | Mod: GP | Performed by: PHYSICAL THERAPIST

## 2024-07-26 ENCOUNTER — TELEPHONE (OUTPATIENT)
Dept: FAMILY MEDICINE | Facility: CLINIC | Age: 69
End: 2024-07-26
Payer: COMMERCIAL

## 2024-07-26 NOTE — TELEPHONE ENCOUNTER
Received from [employer]  OZEMPIC 2MG 1X3ML PREFILLED   8MG/3ML   #LOT [id number]   EXP: 10-  NDC# [id number]  #4  Amita Fry MA     [facility] meds. Amita Fry MA

## 2024-08-01 NOTE — PROGRESS NOTES
[facility] Cardiology   Cardiology Clinic Note      HPI:   Mr. Sven Givens is a pleasant 69 year old male with medical history pertinent for sinus tachycardia, mild CAD, HLD, HTN, T2DM, colon cancer, and stage 1 lung cancer. He presents to cardiology clinic for annual follow up.    Patient last seen in cardiology 08/2023 by Dr. Shane. At that time he was reportedly doing well and denied any cardiac symptoms or limitations. He reported regular physical activity and was walking about a mile every day. His blood pressure at that time was running in 120-130 range. At last visit, it ws recommended that patient remain well hydrated to help with sinus tach.     Since last visit, patient reports occasional episodes of tachycardia with dizziness and sometimes palpitations. He reports that he was recently at the [place] airport just sitting still and per his phone, his heart rate reached nearly 200bpm for a couple minutes. He reports that this happens almost daily where his heart rate will randomly be >120bpm before returning to 70s-80s. He continues to have some orthostatic dizziness/pre-syncope as well that is alleviated with water intake. He denies chest pain or shortness of breath. He endorses some DAWSON when he takes the stairs, but that it is not limiting in any way. He states he sometimes gets heartburn symptoms that don't always respond to his PPI and is unclear if it is GERD or cardiac in nature. Has not taken a nitroglycerin in >1 year.     He reports that his blood pressures tend to run in 120s and states he can tell when his pressure runs higher because he gets a headache.      Today in clinic, he denies chest pain, syncope, or lower extremity edema.         PAST MEDICAL HISTORY:  Past Medical History:   Diagnosis Date    Allergies     PCN, ACE, Indomethocin    Cancer (H)     small cell lung cancer stage I    Diabetes (H) 2002    Diabetic neuropathy (H) 5/7/2012    Hypertension     Kidney stones 09/2004    s/p  right kidney basket retrieval    Rhinitis, allergic     Rosacea     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, overuse, 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    Varicose veins        FAMILY HISTORY:  Family History   Problem Relation Age of Onset    Hernia Mother          age 97    Cerebrovascular Disease Father 64    Cancer Sister         ovarary     Deep Vein Thrombosis (DVT) No family hx of        SOCIAL HISTORY:  Social History     Socioeconomic History    Marital status:    Occupational History     Employer: [employer]   Tobacco Use    Smoking status: Former     Current packs/day: 0.00     Types: Cigarettes     Quit date: 1992     Years since quittin.5    Smokeless tobacco: Never    Tobacco comments:     15 + years    Vaping Use    Vaping status: Never Used   Substance and Sexual Activity    Alcohol use: No    Drug use: Never    Sexual activity: Not Currently     Partners: Female     Birth control/protection: None   Other Topics Concern    Parent/sibling w/ CABG, MI or angioplasty before 65F 55M? No     Social Determinants of Health     Financial Resource Strain: Low Risk  (2023)    Financial Resource Strain     Within the past 12 months, have you or your family members you live with been unable to get utilities (heat, electricity) when it was really needed?: No   Food Insecurity: Low Risk  (2023)    Food Insecurity     Within the past 12 months, did you worry that your food would run out before you got money to buy more?: No     Within the past 12 months, did the food you bought just not last and you didn t have money to get more?: No   Transportation Needs: Low Risk  (2023)    Transportation Needs     Within the past 12 months, has lack of transportation kept you from medical appointments, getting your medicines, non-medical meetings or appointments, work, or from getting things that you need?: No   Interpersonal Safety: Low Risk  (2023)    Interpersonal Safety     Do you feel  physically and emotionally safe where you currently live?: Yes     Within the past 12 months, have you been hit, slapped, kicked or otherwise physically hurt by someone?: No     Within the past 12 months, have you been humiliated or emotionally abused in other ways by your partner or ex-partner?: No   Housing Stability: High Risk (11/29/2023)    Housing Stability     Do you have housing? : No     Are you worried about losing your housing?: No       CURRENT MEDICATIONS:  Current Outpatient Medications   Medication Sig Dispense Refill    allopurinol (ZYLOPRIM) 100 MG tablet TAKE 2 TABLETS BY MOUTH EVERY  tablet 3    atorvastatin (LIPITOR) 80 MG tablet Take 1 tablet (80 mg) by mouth daily 90 tablet 3    blood glucose (CONTOUR NEXT TEST) test strip USE TO TEST BLOOD SUGAR 1 TIME DAILY OR AS DIRECTED. 100 strip 11    cyanocobalamin (VITAMIN B-12) 1000 MCG tablet Take 1,000 mcg by mouth daily      glimepiride (AMARYL) 4 MG tablet Take 1 tablet (4 mg) by mouth 2 times daily 180 tablet 3    ketoconazole (NIZORAL) 2 % external shampoo APPLY TOPICALLY EVERY OTHER DAY LATHER INTO RASH AND WASH OFF AFTER 10 MINUTES. 120 mL 0    losartan-hydrochlorothiazide (HYZAAR) 100-25 MG tablet Take 1 tablet by mouth every morning For blood pressure. 90 tablet 3    metFORMIN (GLUCOPHAGE) 1000 MG tablet TAKE 1 TABLET BY MOUTH TWICE A DAY WITH MEALS 180 tablet 3    metoprolol succinate ER (TOPROL XL) 50 MG 24 hr tablet Take 1 tablet (50 mg) by mouth daily 90 tablet 3    nitroGLYcerin (NITROSTAT) 0.4 MG sublingual tablet For chest pain place 1 tablet under the tongue every 5 minutes for 3 doses. If symptoms persist 5 minutes after 1st dose call 911. 25 tablet 3    omega-3 acid ethyl esters (LOVAZA) 1 g capsule Take 2 capsules (2 g) by mouth 2 times daily 360 capsule 3    omeprazole (PRILOSEC) 20 MG DR capsule Take 1 capsule (20 mg) by mouth 2 times daily 180 capsule 3    Oxymetazoline HCl (AFRIN NASAL SPRAY NA) Spray in nostril as  "needed      pregabalin (LYRICA) 50 MG capsule Take 1 capsule (50 mg) by mouth 2 times daily 60 capsule 5    Semaglutide, 2 MG/DOSE, (OZEMPIC) 8 MG/3ML pen Inject 2 mg Subcutaneous every 7 days 9 mL 3    senna-docusate (SENEXON-S) 8.6-50 MG tablet Take 1 tablet by mouth at bedtime 90 tablet 3    terazosin (HYTRIN) 5 MG capsule TAKE 1 CAPSULE (5 MG) BY MOUTH AT BEDTIME FOR BLOOD PRESSURE. 90 capsule 3     No current facility-administered medications for this visit.       ROS:   Refer to HPI    EXAM:  /73 (BP Location: Right arm, Patient Position: Chair, Cuff Size: Adult Regular)   Pulse 91   Ht 1.753 m (5' 9\")   Wt 94.3 kg (208 lb)   SpO2 96%   BMI 30.72 kg/m      GENERAL: Appears comfortable, in no acute distress.   HEENT: Eye symmetrical, no discharge or icterus bilaterally.   CV: RRR, +S1S2, no murmur, rub, or gallop.   RESPIRATORY: Respirations regular, even, and unlabored. Lungs CTA throughout.   EXTREMITIES: no peripheral edema.   NEUROLOGIC: Alert and oriented x 3. No focal deficits.   MUSCULOSKELETAL: No joint swelling or tenderness.   SKIN: No jaundice. No rashes or lesions.     Labs, reviewed with patient in clinic today:  CBC RESULTS:  Lab Results   Component Value Date    WBC 8.7 06/07/2023    WBC 7.5 05/20/2020    RBC 4.09 (L) 06/07/2023    RBC 4.54 05/20/2020    HGB 12.0 (L) 06/07/2023    HGB 13.7 11/02/2020    HCT 36.5 (L) 06/07/2023    HCT 39.7 (L) 05/20/2020    MCV 89 06/07/2023    MCV 87 05/20/2020    MCH 29.3 06/07/2023    MCH 29.1 05/20/2020    MCHC 32.9 06/07/2023    MCHC 33.2 05/20/2020    RDW 13.0 06/07/2023    RDW 12.5 05/20/2020     06/07/2023     05/20/2020       CMP RESULTS:  Lab Results   Component Value Date     12/05/2023     02/16/2021    POTASSIUM 3.9 12/05/2023    POTASSIUM 3.7 05/09/2023    POTASSIUM 4.0 02/16/2021    CHLORIDE 100 12/05/2023    CHLORIDE 108 05/09/2023    CHLORIDE 104 02/16/2021    CO2 25 12/05/2023    CO2 26 05/09/2023    CO2 28 " "02/16/2021    ANIONGAP 14 12/05/2023    ANIONGAP 8 05/09/2023    ANIONGAP 7 02/16/2021     (H) 12/05/2023     (H) 09/27/2023     (H) 05/09/2023     (H) 02/16/2021    BUN 15.7 12/05/2023    BUN 17 05/09/2023    BUN 17 02/16/2021    CR 0.75 12/05/2023    CR 0.71 02/16/2021    GFRESTIMATED >90 12/05/2023    GFRESTIMATED >90 02/16/2021    GFRESTBLACK >90 02/16/2021    ODELL 10.1 12/05/2023    ODELL 9.3 02/16/2021    BILITOTAL 0.8 10/24/2022    BILITOTAL 0.3 10/16/2018    ALBUMIN 4.0 10/24/2022    ALBUMIN 4.1 03/08/2019    ALKPHOS 109 10/24/2022    ALKPHOS 121 10/16/2018    ALT 43 10/24/2022    ALT 44 05/20/2020    AST 28 10/24/2022    AST 24 10/16/2018        INR RESULTS:  No results found for: \"INR\"    No results found for: \"MAG\"  No results found for: \"NTBNPI\"  No results found for: \"NTBNP\"    LIPIDS:  Lab Results   Component Value Date    CHOL 88 04/24/2024    CHOL 103 05/26/2021     Lab Results   Component Value Date    HDL 27 04/24/2024    HDL 33 05/26/2021     Lab Results   Component Value Date    LDL 20 04/24/2024    LDL 33 05/26/2021     Lab Results   Component Value Date    TRIG 203 04/24/2024    TRIG 187 05/26/2021     Lab Results   Component Value Date    CHOLHDLRATIO 4.8 03/02/2015       Assessment and Plan:   Mr. Givens is a 69 year old male with a PMH of sinus tachycardia, mild CAD, HLD, HTN, T2DM, colon cancer, and stage 1 lung cancer.      Sinus tachycardia and palpitations: Possibly inappropriate sinus tachycardia, though symptoms of orthostatic dizziness suggest an orthostatic component too. He reports episodes of very fast heart rate are now happening nearly daily.  - 2 week zio monitor to assess for rhythm issues  - Continue to focus on adequate hydration    Mild CAD, normal LV fxn, coronary CTA with CAC of 104: Cont aspirin and statin.  Encouraged to try sublingual nitroglycerin for occasional chest pains    Dyslipidemia, LDL goal <70 - continue lipitor 80mg. Stopped " icosapent ethyl due to taste and high cost, currently on omega 3     Hypertension, with orthostatic dizziness: Controlled    Type 2 diabetes mellitus: did not tolerate SGLT2 inhibitor due to bladder infx, on metformin, glimepiride and GLP1 agonist      Follow up:  1 year  Chart review time today: 10 minutes  Visit time today: 20 minutes  Total time spent today: 30 minutes        Yomaira Abarca PA-C  General Cardiology   08/06/24

## 2024-08-06 ENCOUNTER — OFFICE VISIT (OUTPATIENT)
Dept: CARDIOLOGY | Facility: CLINIC | Age: 69
End: 2024-08-06
Payer: COMMERCIAL

## 2024-08-06 VITALS
HEART RATE: 91 BPM | WEIGHT: 208 LBS | SYSTOLIC BLOOD PRESSURE: 125 MMHG | BODY MASS INDEX: 30.81 KG/M2 | OXYGEN SATURATION: 96 % | HEIGHT: 69 IN | DIASTOLIC BLOOD PRESSURE: 73 MMHG

## 2024-08-06 DIAGNOSIS — I10 ESSENTIAL HYPERTENSION WITH GOAL BLOOD PRESSURE LESS THAN 140/90: ICD-10-CM

## 2024-08-06 DIAGNOSIS — R00.0 SINUS TACHYCARDIA: Primary | ICD-10-CM

## 2024-08-06 DIAGNOSIS — E78.1 HYPERTRIGLYCERIDEMIA: ICD-10-CM

## 2024-08-06 DIAGNOSIS — E11.42 TYPE 2 DIABETES MELLITUS WITH DIABETIC POLYNEUROPATHY, WITHOUT LONG-TERM CURRENT USE OF INSULIN (H): ICD-10-CM

## 2024-08-06 PROCEDURE — 99203 OFFICE O/P NEW LOW 30 MIN: CPT

## 2024-08-06 PROCEDURE — 93246 EXT ECG>7D<15D RECORDING: CPT

## 2024-08-06 RX ORDER — METOPROLOL SUCCINATE 50 MG/1
50 TABLET, EXTENDED RELEASE ORAL DAILY
Qty: 90 TABLET | Refills: 3 | Status: SHIPPED | OUTPATIENT
Start: 2024-08-06

## 2024-08-06 RX ORDER — ATORVASTATIN CALCIUM 80 MG/1
80 TABLET, FILM COATED ORAL DAILY
Qty: 90 TABLET | Refills: 3 | Status: SHIPPED | OUTPATIENT
Start: 2024-08-06

## 2024-08-06 RX ORDER — LOSARTAN POTASSIUM AND HYDROCHLOROTHIAZIDE 25; 100 MG/1; MG/1
1 TABLET ORAL EVERY MORNING
Qty: 90 TABLET | Refills: 3 | Status: SHIPPED | OUTPATIENT
Start: 2024-08-06

## 2024-08-06 NOTE — NURSING NOTE
Cardiac Monitors:   -14 day Zio patch     .  Med Reconcile: Reviewed and verified all current medications with the patient. The updated medication list was printed and given to the patient./ No medication changes.       Return Appointment  -Follow-up in 1 year

## 2024-08-06 NOTE — PATIENT INSTRUCTIONS
Take your medicines every day, as directed     Changes made today:  No medication changes   Wear Zio patch for 14 days, will be placed in clinic today        Cardiology Care Coordinators:      Margoth CURRY RN     Cardiology Rooming Staff:  Shahnaz LANZA EMT    Phone  [phone]      Fax [phone]    To Contact us     During Business Hours:  [phone]     If you are needing refills please contact your pharmacy.     For urgent after hour care please call the [facility] at [phone] or the [facility] at [phone] and ask to speak to the cardiologist on call.    If you are having a medical emergency, please call 911.            HOW TO CHECK YOUR BLOOD PRESSURE AT HOME:     Avoid eating, smoking, and exercising for at least 30 minutes before taking a reading.     Be sure you have taken your BP medication at least 2-3 hours before you check it.      Sit quietly for 10 minutes before a reading.      Sit in a chair with your feet flat on the floor. Rest your  arm on a table so that the arm cuff is at the same level as your heart.     Remain still during the reading.  Record your blood pressure and pulse in a log and bring to your next appointment.       Use [employer] [employer] allows you to communicate directly with your heart team through secure messaging.  [employer] can be accessed any time on your phone, computer, or tablet.  If you need assistance, we'd be happy to help!             Keep your Heart Appointments:     Follow-up in 1 year         [Palpitations] : palpitations [As Noted in HPI] : as noted in HPI [Arthralgias] : arthralgias [Joint Stiffness] : joint stiffness [Negative] : Endocrine [FreeTextEntry8] : cr 2.0 [de-identified] : On Xarelto

## 2024-08-06 NOTE — NURSING NOTE
"Chief Complaint   Patient presents with    Follow Up       Initial /73 (BP Location: Right arm, Patient Position: Chair, Cuff Size: Adult Regular)   Pulse 91   Ht 1.753 m (5' 9\")   Wt 94.3 kg (208 lb)   SpO2 96%   BMI 30.72 kg/m   Estimated body mass index is 30.72 kg/m  as calculated from the following:    Height as of this encounter: 1.753 m (5' 9\").    Weight as of this encounter: 94.3 kg (208 lb)..  BP completed using cuff size: regular    Shahnaz SINGH CNA  "

## 2024-08-06 NOTE — PROGRESS NOTES
Sven Givens arrived here on 8/6/2024 11:12 AM for 14 days  Zio monitor placement per ordering provider Yomaira Abarca for the diagnosis Sinus tachycardia .  Patient s skin was prepped per protocol.  Zio monitor was placed.  Instructions were reviewed with and given to the patient.  Patient verbalized understanding of wear, troubleshooting and monitor return instructions.  Shahnaz

## 2024-08-07 ENCOUNTER — TRANSFERRED RECORDS (OUTPATIENT)
Dept: HEALTH INFORMATION MANAGEMENT | Facility: CLINIC | Age: 69
End: 2024-08-07
Payer: COMMERCIAL

## 2024-08-22 PROCEDURE — 93248 EXT ECG>7D<15D REV&INTERPJ: CPT | Performed by: INTERNAL MEDICINE
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
## 2024-08-29 ENCOUNTER — OFFICE VISIT (OUTPATIENT)
Dept: OTOLARYNGOLOGY | Facility: CLINIC | Age: 69
End: 2024-08-29
Payer: COMMERCIAL

## 2024-08-29 VITALS — SYSTOLIC BLOOD PRESSURE: 149 MMHG | HEART RATE: 60 BPM | DIASTOLIC BLOOD PRESSURE: 79 MMHG

## 2024-08-29 DIAGNOSIS — H93.8X2 EAR FULLNESS, LEFT: Primary | ICD-10-CM

## 2024-08-29 PROCEDURE — 99213 OFFICE O/P EST LOW 20 MIN: CPT | Performed by: OTOLARYNGOLOGY
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
## 2024-09-09 ENCOUNTER — THERAPY VISIT (OUTPATIENT)
Dept: PHYSICAL THERAPY | Facility: CLINIC | Age: 69
End: 2024-09-09
Attending: OTOLARYNGOLOGY
Payer: COMMERCIAL

## 2024-09-09 DIAGNOSIS — H93.8X2 EAR FULLNESS, LEFT: ICD-10-CM

## 2024-09-09 DIAGNOSIS — M26.609 TEMPOROMANDIBULAR JOINT DISORDER: Primary | ICD-10-CM

## 2024-09-09 DIAGNOSIS — M54.2 NECK PAIN: ICD-10-CM

## 2024-09-09 PROCEDURE — 97110 THERAPEUTIC EXERCISES: CPT | Mod: GP | Performed by: PHYSICAL THERAPIST

## 2024-09-09 PROCEDURE — 97161 PT EVAL LOW COMPLEX 20 MIN: CPT | Mod: GP | Performed by: PHYSICAL THERAPIST

## 2024-09-09 NOTE — PROGRESS NOTES
PHYSICAL THERAPY EVALUATION  Type of Visit: Evaluation       Fall Risk Screen:  Fall screen completed by: PT  Have you fallen 2 or more times in the past year?: No  Have you fallen and had an injury in the past year?: No  Is patient a fall risk?: No    Subjective       Presenting condition or subjective complaint: Ear problems  -history of vertigo and ear pain  -fullness has been present for almost 9 months  -ENT referred to PT as ear exam looks normal  -minimal pain but symptoms are always present  -self massage below ear has been helpful for very short periods of time of relief    Date of onset: 08/29/24 (date of order)    Relevant medical history:     Dates & types of surgery: 2016 long cancer anf 2022 colon cancer    Prior diagnostic imaging/testing results:       Prior therapy history for the same diagnosis, illness or injury: No      Prior Level of Function  Transfers:   Ambulation:   ADL:   IADL:     Living Environment  Social support: With family members   Type of home: House   Stairs to enter the home: Yes 4 Is there a railing: Yes     Ramp: No   Stairs inside the home: Yes 7 Is there a railing: Yes     Help at home: None  Equipment owned:       Employment:      Hobbies/Interests: Reading    Patient goals for therapy: No    Pain assessment: See objective evaluation for additional pain details     Objective   TMJ/TMD EVALUATION  ADDITIONAL HISTORY:  Parafunctional habits:  denies clenching, bruxism, or other parafunctional habits  Stressors: none mentioned  Associate symptoms: fullness of ear, no hearing loss, no pain with jaw movement  Headache:  mild suboccipital area headaches but only when he has high blood pressure; very rare  Exercise routine: not mentioned  Symptoms reported: Clicking  Dentist: not listed  Joint lock: Joint does not lock    PAIN: Pain Level at Rest: always there  Pain Level with Use: always there  Pain Location: under ear, upper neck  Pain Quality: fullness of the ear, denies any  other pain symptoms  Pain Frequency: intermittent  Pain is Exacerbated By: nothing seems to make it worse specifically; may be a bit worse after laying on his left side  Pain is Relieved By: self massage very briefly  Pain Progression: Unchanged  OBSERVATION/FACIAL SYMMETRY: symmetrical  POSITIONING:   INTEGUMENTARY:   POSTURE: Rounded shoulders, Forward head   INTRAORAL MOUTH APPLIANCE: No  CERVICAL ROM: 40 degrees extension, flexion WNL, L rotation 44, R rotation 48, L SB unable without rotation, R SB mod loss  TMJ ROM:    mm Joint Noise Deviation Pain   Opening 45 Left sided opening click No deviation Mild and left sided   Left Lateral Deviation 7 With pain     Right Lateral Deviation 10      Protrusion 10      Retrusion         STRENGTH:     Left Right   Mid Trap     Lower Trap     Rhomboid     SPECIAL TESTS:   PALPATION: hypertonic masseter, SCM, sub  JOINT MOBILITY/ACCESSORY MOTION:   TMD FINDINGS:  Bite/Occlusion: WNL  Tongue Positioning:  Mucosal Ridging:  Tongue Scalloping:  Accelerated Tooth Wear:  MYOTOMES:   DERMATOMES:   NEURAL TENSION:   SPINAL SEGMENTAL CONCLUSIONS:  hypomobile C1-4 B    Assessment & Plan   CLINICAL IMPRESSIONS  Medical Diagnosis: Fullness in ear    Treatment Diagnosis: TMJ/D, chronic neck pain   Impression/Assessment: Patient is a 69 year old male with TMJ/upper neck/ear complaints.  The following significant findings have been identified: Pain, Decreased ROM/flexibility, Decreased joint mobility, and Impaired posture. These impairments interfere with their ability to perform  restorative sleep, daily tasks  as compared to previous level of function.     Clinical Decision Making (Complexity):  Clinical Presentation: Stable/Uncomplicated  Clinical Presentation Rationale: based on medical and personal factors listed in PT evaluation  Clinical Decision Making (Complexity): Low complexity    PLAN OF CARE  Treatment Interventions:  Modalities: Mechanical Traction  Interventions: Manual  Therapy, Neuromuscular Re-education, Therapeutic Activity, Therapeutic Exercise, Self-Care/Home Management    Long Term Goals     PT Goal 1  Goal Identifier: LTG 1  Goal Description: Patient will be able to open mouth >50 mm without pain  Rationale:  (to maximize safety and independence with eating/meals)  Goal Progress: 45 mm  Target Date: 12/02/24  PT Goal 2  Goal Identifier: LTG 2  Goal Description: Patient will have no sensation of ear fullness over 1 week period  Rationale: to maximize safety and independence with performance of ADLs and functional tasks;to maximize safety and independence within the home  Goal Progress: w+10      Frequency of Treatment: 1x/week  Duration of Treatment: 4 weeks then 2x/month for 2 months    Recommended Referrals to Other Professionals:  none  Education Assessment:   Learner/Method: No Barriers to Learning;Pictures/Video;Demonstration;Reading;Listening;Patient    Risks and benefits of evaluation/treatment have been explained.   Patient/Family/caregiver agrees with Plan of Care.     Evaluation Time:     PT Eval, Low Complexity Minutes (97321): 23     Signing Clinician: Jacky Medina PT        [facility]                                                                                   OUTPATIENT PHYSICAL THERAPY      PLAN OF TREATMENT FOR OUTPATIENT REHABILITATION   Patient's Last Name, First Name, TEMISCAR NagelMaryanne ceballosat    YOB: 1955   Provider's Name   [facility]   Medical Record No.  [id number]     Onset Date: 08/29/24 (date of order)  Start of Care Date: 09/09/24     Medical Diagnosis:  Fullness in ear      PT Treatment Diagnosis:  TMJ/D, chronic neck pain Plan of Treatment  Frequency/Duration: 1x/week/ 4 weeks then 2x/month for 2 months    Certification date from 09/09/24 to 12/02/24         See note for plan of treatment details and functional goals     Jacky Medina, PT                          I CERTIFY THE NEED FOR THESE SERVICES FURNISHED UNDER        THIS PLAN OF TREATMENT AND WHILE UNDER MY CARE     (Physician attestation of this document indicates review and certification of the therapy plan).              Referring Provider:  Mir Ambrocio    Initial Assessment  See Epic Evaluation- Start of Care Date: 09/09/24

## 2024-09-10 ENCOUNTER — OFFICE VISIT (OUTPATIENT)
Dept: FAMILY MEDICINE | Facility: CLINIC | Age: 69
End: 2024-09-10
Attending: FAMILY MEDICINE
Payer: COMMERCIAL

## 2024-09-10 VITALS
SYSTOLIC BLOOD PRESSURE: 131 MMHG | WEIGHT: 205.8 LBS | BODY MASS INDEX: 30.48 KG/M2 | TEMPERATURE: 97.1 F | RESPIRATION RATE: 18 BRPM | OXYGEN SATURATION: 97 % | HEART RATE: 78 BPM | DIASTOLIC BLOOD PRESSURE: 80 MMHG | HEIGHT: 69 IN

## 2024-09-10 DIAGNOSIS — E11.42 TYPE 2 DIABETES MELLITUS WITH DIABETIC POLYNEUROPATHY, WITHOUT LONG-TERM CURRENT USE OF INSULIN (H): Primary | ICD-10-CM

## 2024-09-10 DIAGNOSIS — E78.5 HYPERLIPIDEMIA LDL GOAL <100: ICD-10-CM

## 2024-09-10 DIAGNOSIS — Z23 ENCOUNTER FOR IMMUNIZATION: ICD-10-CM

## 2024-09-10 DIAGNOSIS — I10 ESSENTIAL HYPERTENSION WITH GOAL BLOOD PRESSURE LESS THAN 140/90: ICD-10-CM

## 2024-09-10 LAB
ANION GAP SERPL CALCULATED.3IONS-SCNC: 14 MMOL/L (ref 7–15)
BUN SERPL-MCNC: 17 MG/DL (ref 8–23)
CALCIUM SERPL-MCNC: 9.7 MG/DL (ref 8.8–10.4)
CHLORIDE SERPL-SCNC: 104 MMOL/L (ref 98–107)
CREAT SERPL-MCNC: 0.74 MG/DL (ref 0.67–1.17)
EGFRCR SERPLBLD CKD-EPI 2021: >90 ML/MIN/1.73M2
GLUCOSE SERPL-MCNC: 149 MG/DL (ref 70–99)
HBA1C MFR BLD: 7.5 % (ref 0–5.6)
HCO3 SERPL-SCNC: 23 MMOL/L (ref 22–29)
POTASSIUM SERPL-SCNC: 4 MMOL/L (ref 3.4–5.3)
SODIUM SERPL-SCNC: 141 MMOL/L (ref 135–145)

## 2024-09-10 PROCEDURE — 80048 BASIC METABOLIC PNL TOTAL CA: CPT | Performed by: FAMILY MEDICINE

## 2024-09-10 PROCEDURE — 99214 OFFICE O/P EST MOD 30 MIN: CPT | Performed by: FAMILY MEDICINE

## 2024-09-10 PROCEDURE — 83036 HEMOGLOBIN GLYCOSYLATED A1C: CPT | Performed by: FAMILY MEDICINE

## 2024-09-10 PROCEDURE — 36415 COLL VENOUS BLD VENIPUNCTURE: CPT | Performed by: FAMILY MEDICINE

## 2024-09-10 ASSESSMENT — PAIN SCALES - GENERAL: PAINLEVEL: NO PAIN (0)

## 2024-09-10 NOTE — PROGRESS NOTES
Rishabh Machuca is a 69 year old, presenting for the following health issues:  Diabetes      9/10/2024     6:57 AM   Additional Questions   Roomed by Sarabjit     History of Present Illness       Reason for visit:  Diabetes    He eats 0-1 servings of fruits and vegetables daily.He consumes 1 sweetened beverage(s) daily.He exercises with enough effort to increase his heart rate 10 to 19 minutes per day.  He exercises with enough effort to increase his heart rate 3 or less days per week.   He is taking medications regularly.       Diabetes Follow-up    How often are you checking your blood sugar? A few times a month  What time of day are you checking your blood sugars (select all that apply)?  Before and after meals  Have you had any blood sugars above 200?  Yes sometimes  Have you had any blood sugars below 70?  No  What symptoms do you notice when your blood sugar is low?  None  What concerns do you have today about your diabetes? None   Do you have any of these symptoms? (Select all that apply)  No numbness or tingling in feet.  No redness, sores or blisters on feet.  No complaints of excessive thirst.  No reports of blurry vision.  No significant changes to weight.      BP Readings from Last 2 Encounters:   08/29/24 (!) 149/79   08/06/24 125/73     Hemoglobin A1C (%)   Date Value   04/24/2024 7.8 (H)   12/05/2023 7.7 (H)   05/26/2021 7.4 (H)   02/16/2021 8.4 (H)     LDL Cholesterol Calculated (mg/dL)   Date Value   04/24/2024 20   05/09/2023 <5   05/26/2021 33   05/20/2020 40       Hyperlipidemia Follow-Up    Are you regularly taking any medication or supplement to lower your cholesterol?   Yes- atorvastatin  Are you having muscle aches or other side effects that you think could be caused by your cholesterol lowering medication?  No    Hypertension Follow-up    Do you check your blood pressure regularly outside of the clinic? Yes   Are you following a low salt diet? Yes  Are your blood pressures ever more  "than 140 on the top number (systolic) OR more   than 90 on the bottom number (diastolic), for example 140/90? No      Review of Systems  Constitutional, HEENT, cardiovascular, pulmonary, GI, , musculoskeletal, neuro, skin, endocrine and psych systems are negative, except as otherwise noted.      Objective    /80 (BP Location: Left arm, Patient Position: Sitting, Cuff Size: Adult Regular)   Pulse 78   Temp 97.1  F (36.2  C) (Temporal)   Resp 18   Ht 1.753 m (5' 9\")   Wt 93.4 kg (205 lb 12.8 oz)   SpO2 97%   BMI 30.39 kg/m    Body mass index is 30.39 kg/m .  Physical Exam   GENERAL: alert and no distress  NECK: no adenopathy, no asymmetry, masses, or scars  RESP: lungs clear to auscultation - no rales, rhonchi or wheezes  CV: regular rate and rhythm, normal S1 S2, no S3 or S4, no murmur, click or rub, no peripheral edema  ABDOMEN: soft, nontender, no hepatosplenomegaly, no masses and bowel sounds normal  MS: no gross musculoskeletal defects noted, no edema    A/P:    (E11.42) Type 2 diabetes mellitus with diabetic polyneuropathy, without long-term current use of insulin (H)  (primary encounter diagnosis)  Comment:   Plan: Basic metabolic panel  (Ca, Cl, CO2, Creat,         Gluc, K, Na, BUN), Hemoglobin A1c        Some elevation above 200's per patient. Discussed may need insulin but patient does not really want this yet. Patient will work on diabetic diet. Recheck in 3 months.    (I10) Essential hypertension with goal blood pressure less than 140/90  Comment:   Plan: Basic metabolic panel  (Ca, Cl, CO2, Creat,         Gluc, K, Na, BUN)        Controlled.    (E78.5) Hyperlipidemia LDL goal <100  Comment:   Plan: controlled.    (Z23) Encounter for immunization  Comment:   Plan: declined vaccines today.            Signed Electronically by: Yeison Villegas MD, MD    "

## 2024-09-13 ENCOUNTER — MYC MEDICAL ADVICE (OUTPATIENT)
Dept: FAMILY MEDICINE | Facility: CLINIC | Age: 69
End: 2024-09-13
Payer: COMMERCIAL

## 2024-09-13 DIAGNOSIS — E11.42 TYPE 2 DIABETES MELLITUS WITH DIABETIC POLYNEUROPATHY, WITHOUT LONG-TERM CURRENT USE OF INSULIN (H): Primary | ICD-10-CM

## 2024-09-16 ENCOUNTER — THERAPY VISIT (OUTPATIENT)
Dept: PHYSICAL THERAPY | Facility: CLINIC | Age: 69
End: 2024-09-16
Payer: COMMERCIAL

## 2024-09-16 DIAGNOSIS — M26.609 TEMPOROMANDIBULAR JOINT DISORDER: Primary | ICD-10-CM

## 2024-09-16 DIAGNOSIS — M54.2 NECK PAIN: ICD-10-CM

## 2024-09-16 PROCEDURE — 97110 THERAPEUTIC EXERCISES: CPT | Mod: GP | Performed by: PHYSICAL THERAPIST

## 2024-09-16 PROCEDURE — 97140 MANUAL THERAPY 1/> REGIONS: CPT | Mod: GP | Performed by: PHYSICAL THERAPIST

## 2024-09-16 RX ORDER — KETOROLAC TROMETHAMINE 30 MG/ML
1 INJECTION, SOLUTION INTRAMUSCULAR; INTRAVENOUS ONCE
Qty: 1 EACH | Refills: 0 | Status: SHIPPED | OUTPATIENT
Start: 2024-09-16 | End: 2024-09-16

## 2024-09-16 RX ORDER — BLOOD-GLUCOSE SENSOR
EACH MISCELLANEOUS
Qty: 6 EACH | Refills: 3 | Status: SHIPPED | OUTPATIENT
Start: 2024-09-16

## 2024-09-16 NOTE — TELEPHONE ENCOUNTER
Patient requesting a new prescription for Lifestyle Audrey 3 for continuous glucose monitoring.     Routing to provider to review advise.       Karoline Linares RN  [facility]

## 2024-10-10 ENCOUNTER — TELEPHONE (OUTPATIENT)
Dept: FAMILY MEDICINE | Facility: CLINIC | Age: 69
End: 2024-10-10
Payer: COMMERCIAL

## 2024-10-10 NOTE — TELEPHONE ENCOUNTER
Forms/Letter Request    Type of form/letter: OTHER: [employer] Patient Assistance Program Refill/Reorder/Change Request--OZEMPIC 2mg/mL       Do we have the form/letter: Yes: Faxed in    Who is the form from? [employer] (if other please explain)    Where did/will the form come from? form was faxed in    When is form/letter needed by: ASAP    How would you like the form/letter returned: Fax : [phone]    Patient Notified form requests are processed in 5-7 business days:Yes    Could we send this information to you in [employer] or would you prefer to receive a phone call?:   Patient would prefer a phone call   Okay to leave a detailed message?: Yes at Cell number on file:    Telephone Information:   Mobile [phone]      
Form completed and placed in TC bin.    Yeison Villegas MD    
Form completed and placed in TC bin.    Yeison Villegas MD    
Received provider signed forms and faxed to [employer] Patient assistance.at [phone] on 10/10/2024. Right fax confirmed at 9:47 AM.    Sailaja Licona     
[phone]

## 2024-10-16 ENCOUNTER — TELEPHONE (OUTPATIENT)
Dept: FAMILY MEDICINE | Facility: CLINIC | Age: 69
End: 2024-10-16
Payer: COMMERCIAL

## 2024-10-16 ENCOUNTER — MYC MEDICAL ADVICE (OUTPATIENT)
Dept: FAMILY MEDICINE | Facility: CLINIC | Age: 69
End: 2024-10-16
Payer: COMMERCIAL

## 2024-10-16 NOTE — TELEPHONE ENCOUNTER
Reason for Call:  Appointment Request    Patient requesting this type of appt: Sooner e visit    Requested provider: Yeison Villegas    Reason patient unable to be scheduled: Not within requested timeframe    When does patient want to be seen/preferred time: 1-2 days    Comments: Pt would like sooner apt than 11/8/24 for Anam stone follow up.  Would like e visit    Could we send this information to you in [facility] or would you prefer to receive a phone call?:   Patient would prefer a phone call   Okay to leave a detailed message?: Yes at Home number on file [phone] (home)    Call taken on 10/16/2024 at 3:44 PM by Miri Asher

## 2024-10-18 NOTE — TELEPHONE ENCOUNTER
Patient called and we cancelled his November appt and scheduled the appt for 10/23/2024 ok per Dr Villegas.  Josselin Mcgowan [facility]   Primary Care

## 2024-10-23 ENCOUNTER — OFFICE VISIT (OUTPATIENT)
Dept: FAMILY MEDICINE | Facility: CLINIC | Age: 69
End: 2024-10-23
Payer: COMMERCIAL

## 2024-10-23 VITALS
SYSTOLIC BLOOD PRESSURE: 139 MMHG | HEART RATE: 94 BPM | DIASTOLIC BLOOD PRESSURE: 79 MMHG | RESPIRATION RATE: 17 BRPM | OXYGEN SATURATION: 99 % | WEIGHT: 208 LBS | BODY MASS INDEX: 30.81 KG/M2 | HEIGHT: 69 IN | TEMPERATURE: 97.8 F

## 2024-10-23 DIAGNOSIS — M25.552 HIP PAIN, LEFT: ICD-10-CM

## 2024-10-23 DIAGNOSIS — N20.0 NEPHROLITHIASIS: Primary | ICD-10-CM

## 2024-10-23 PROCEDURE — 99214 OFFICE O/P EST MOD 30 MIN: CPT | Performed by: FAMILY MEDICINE

## 2024-10-23 ASSESSMENT — PAIN SCALES - GENERAL: PAINLEVEL_OUTOF10: NO PAIN (0)

## 2024-10-23 NOTE — PROGRESS NOTES
"    Rishabh Machuca is a 69 year old, presenting for the following health issues:  Kidney Problem        10/23/2024    10:28 AM   Additional Questions   Roomed by Sailaja     History of Present Illness       Reason for visit:  Stone in kidney  Symptom onset:  1-2 weeks ago  Symptoms include:  Pain  Symptom intensity:  Mild  Symptom progression:  Staying the same  Had these symptoms before:  Yes  Has tried/received treatment for these symptoms:  Yes  What makes it worse:  No   He is taking medications regularly.     Patient vacationed on cruise ship when he passed a stone. No symptoms at this time. Patient was treated for UTI with nitrofurantoin as well.    Patient c/o left hip pain since cruise vacation. No trauma.       Review of Systems  Constitutional, HEENT, cardiovascular, pulmonary, GI, , musculoskeletal, neuro, skin, endocrine and psych systems are negative, except as otherwise noted.      Objective    /79 (BP Location: Left arm, Patient Position: Sitting, Cuff Size: Adult Large)   Pulse 94   Temp 97.8  F (36.6  C) (Temporal)   Resp 17   Ht 1.753 m (5' 9\")   Wt 94.3 kg (208 lb)   SpO2 99%   BMI 30.72 kg/m    Body mass index is 30.72 kg/m .  Physical Exam   GENERAL: alert and no distress  NECK: no adenopathy, no asymmetry, masses, or scars  RESP: lungs clear to auscultation - no rales, rhonchi or wheezes  CV: regular rate and rhythm, normal S1 S2, no S3 or S4, no murmur, click or rub, no peripheral edema  ABDOMEN: soft, nontender, no hepatosplenomegaly, no masses and bowel sounds normal  MS: left lateral upper hip pain with palpation    A/P:  (N20.0) Nephrolithiasis  (primary encounter diagnosis)  Comment:   Plan: Adult Urology  Referral        Referred to Urology for further recommendations. Discussed staying hydrated.    (M25.552) Hip pain, left  Comment:   Plan: diclofenac (VOLTAREN) 1 % topical gel        Likely bursitis. Trial topical nsaid for treatment. If worsening, patient " will let us know.            Signed Electronically by: Yeison Villegas MD, MD

## 2024-11-15 ENCOUNTER — TELEPHONE (OUTPATIENT)
Dept: FAMILY MEDICINE | Facility: CLINIC | Age: 69
End: 2024-11-15
Payer: COMMERCIAL

## 2024-11-15 NOTE — TELEPHONE ENCOUNTER
Patient Quality Outreach    Patient is due for the following:   Flu shot     Action(s) Taken:   Patient has upcoming appointment, these items will be addressed at that time.    Type of outreach:    Chart review performed, no outreach needed.    Questions for provider review:    None           Kaya Gilliland MA

## 2024-11-17 NOTE — PROGRESS NOTES
Hematology/Medical Oncology Follow-up Note      November 27, 2024    Reason for visit:  Sven Givens is a 69 year old retired [employer]  from [place] who presents for oncologic reevaluation regarding 2022 history of stage II ascending colonic adenocarcinoma and 2016 left lower lobe lung lobectomy for stage I-A adenocarcinoma    Impression:  S/p 2022 hemicolectomy for stage II ascending colonic adenocarcinoma, MSI-H  S/p 2016 left lower lobectomy for stage I-A well-differentiated lung adenocarcinoma  Recent persistent cough without hemoptysis, phlegm or fever    Recommendation, plan, instructions:  1. CT scan chest, abdomen and pelvis  2. Blood tests today; CBC, CMP, CEA; Dr. Coburn will call with results  3. Dr. Valencia follow-up in one year    Time with patient including review, documentation, history, examination, coordination of care and counseling was 25 minutes.    History of present illness:  In 2022, at this patient underwent surveillance colonoscopy for history of benign colonic polyps and was identified with a 3 cm ascending colonic mass revealing adenocarcinoma.  After staging CT CAP revealed no apparent metastases, he underwent a 10/28/2022 laparoscopic extended right hemicolectomy and partial omentectomy revealing a 2.6 cm well-differentiated adenocarcinoma arising from a tubular adenoma with lymphovascular but no perineural invasion.  27 regional lymph nodes were benign with clear margins.  Surveillance colonoscopy is planned for 2028 after 2023 study.    Molecular genetics revealed MLH1 and PMS2 loss of nuclear expression with subsequent MLH1 promoter methylation positive consistent with sporadic MSI-H phenotype. The preoperative CEA was 2.2.  No adjuvant chemotherapy was recommended.  He has been watched since then without apparent recurrent disease.  A 9/27/2023 colonoscopy was negative with recommended return in 5 years.    Finally, in 2016, he underwent left lower lobe  lobectomy for a stage IA, 3mm, grade 1 well-differentiated adenocarcinoma with clear margins and 3 benign lymph nodes without apparent recurrence.    Family immigrated from [facility] in  after the [place].    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  Family History   Problem Relation Age of Onset    Hernia Mother          age 97    Cerebrovascular Disease Father 64    Cancer Sister         ovarary     Deep Vein Thrombosis (DVT) No family hx of          Medications:  Current Outpatient Medications   Medication Sig Dispense Refill    allopurinol (ZYLOPRIM) 100 MG tablet TAKE 2 TABLETS BY MOUTH EVERY  tablet 3    atorvastatin (LIPITOR) 80 MG tablet Take 1 tablet (80 mg) by mouth daily 90 tablet 3    blood glucose (CONTOUR NEXT TEST) test strip USE TO TEST BLOOD SUGAR 1 TIME DAILY OR AS DIRECTED. 100 strip 11    clotrimazole-betamethasone (LOTRISONE) 1-0.05 % external cream Apply topically 2 times daily.      Continuous Glucose Sensor (FREESTYLE FEDERICO 3 PLUS SENSOR) MISC Use 1 sensor every 15 days. Use to read blood sugars per 's instructions. 6 each 3    cyanocobalamin (VITAMIN B-12) 1000 MCG tablet Take 1,000 mcg by mouth daily      glimepiride (AMARYL) 4 MG tablet TAKE 1 TABLET (4 MG) BY MOUTH 2 TIMES DAILY 180 tablet 0    ketoconazole (NIZORAL) 2 % external shampoo APPLY TOPICALLY EVERY OTHER DAY LATHER INTO RASH AND WASH OFF AFTER 10 MINUTES. 120 mL 0    losartan-hydrochlorothiazide  (HYZAAR) 100-25 MG tablet Take 1 tablet by mouth every morning For blood pressure. 90 tablet 3    metFORMIN (GLUCOPHAGE) 1000 MG tablet TAKE 1 TABLET BY MOUTH TWICE A DAY WITH FOOD 180 tablet 0    metoprolol succinate ER (TOPROL XL) 50 MG 24 hr tablet Take 1 tablet (50 mg) by mouth daily 90 tablet 3    nitroGLYcerin (NITROSTAT) 0.4 MG sublingual tablet For chest pain place 1 tablet under the tongue every 5 minutes for 3 doses. If symptoms persist 5 minutes after 1st dose call 911. 25 tablet 3    omega-3 acid ethyl esters (LOVAZA) 1 g capsule Take 2 capsules (2 g) by mouth 2 times daily 360 capsule 3    omeprazole (PRILOSEC) 20 MG DR capsule TAKE 1 CAPSULE BY MOUTH TWICE A  capsule 0    Oxymetazoline HCl (AFRIN NASAL SPRAY NA) Spray in nostril as needed      pregabalin (LYRICA) 50 MG capsule Take 1 capsule (50 mg) by mouth 2 times daily 60 capsule 5    Semaglutide, 2 MG/DOSE, (OZEMPIC) 8 MG/3ML pen Inject 2 mg subcutaneously every 7 days. 12 mL 2    senna-docusate (SENEXON-S) 8.6-50 MG tablet Take 1 tablet by mouth at bedtime 90 tablet 3    terazosin (HYTRIN) 5 MG capsule TAKE 1 CAPSULE (5 MG) BY MOUTH AT BEDTIME FOR BLOOD PRESSURE. 90 capsule 3    diclofenac (VOLTAREN) 1 % topical gel Apply 4 g topically 4 times daily. 350 g 3     No current facility-administered medications for this visit.       Allergies:  Allergies   Allergen Reactions    Pcn [Penicillins] Rash    Ace Inhibitors Cough    Indomethacin Other (See Comments)     Severe dizziness    Invokana [Canagliflozin]      bladder infection       Review of systems:  He has had a persistent cough for several months now without phlegm.  He denies chest pain, pleurisy or hemoptysis.  He quit tobacco more than 30 years ago.  Except as note above, the patient denies:  Headache, double vision, change in vision, hearing loss, tinnitus;  Dyspnea, chest pain, palpitations, pleurisy or hemoptysis;  Anorexia, nausea, vomiting, diarrhea, constipation, rectal bleeding  bleeding, change in bowel habits;  Urinary frequency, burning or hematuria;  Fever, chills, sweats, change in appetite;  Bone or back pain; skin rash, joint swelling, new lumps;  Unexplained, bleeding or bruising, tingling numbness, change in gait;    Physical examination:  BP (!) 145/81   Pulse 96   Resp 16   Wt 93.4 kg (206 lb)   SpO2 96%   BMI 30.42 kg/m      The patient is alert and oriented.   Throat and oral mucosa are normal-appearing.   Thyroid gland is not enlarged.   Adenopathy is absent in the neck, axilla, groin and supraclavicular fossa;   Lungs examination reveals bilateral coarse rales, without wheezes or rubs; Heart rhythm is regular, heart sounds are without murmurs or gallops;   Abdomen is soft and flat without hepatosplenomegaly, masses, ascites or tenderness;   Extremities and skin reveal no unusual skin lesions, joint swelling or edema;    Jeremiah Coburn MD

## 2024-11-18 DIAGNOSIS — K21.00 GASTROESOPHAGEAL REFLUX DISEASE WITH ESOPHAGITIS, UNSPECIFIED WHETHER HEMORRHAGE: ICD-10-CM

## 2024-11-18 DIAGNOSIS — M1A.0710 IDIOPATHIC CHRONIC GOUT OF RIGHT FOOT WITHOUT TOPHUS: ICD-10-CM

## 2024-11-18 DIAGNOSIS — E11.42 TYPE 2 DIABETES MELLITUS WITH DIABETIC POLYNEUROPATHY, WITHOUT LONG-TERM CURRENT USE OF INSULIN (H): ICD-10-CM

## 2024-11-18 RX ORDER — GLIMEPIRIDE 4 MG/1
4 TABLET ORAL 2 TIMES DAILY
Qty: 180 TABLET | Refills: 0 | Status: SHIPPED | OUTPATIENT
Start: 2024-11-18

## 2024-11-18 RX ORDER — ALLOPURINOL 100 MG/1
TABLET ORAL
Qty: 180 TABLET | Refills: 3 | Status: SHIPPED | OUTPATIENT
Start: 2024-11-18

## 2024-11-19 NOTE — PROGRESS NOTES
Medication Therapy Management (MTM) Encounter    ASSESSMENT:                            Medication Adherence/Access: gets Ozempic through [employer] Patient Assistance Program. Due to renew application.    1. Type 2 diabetes mellitus with diabetic neuropathy, without long-term current use of insulin (H)  Patient is meeting A1c goal of < 8% (relaxed due to age) and time in target range with cCGM is at goal of > 50%. Pt has experienced nocturnal hypoglycemia and is now snacking before bed to prevent these. This may be due to evening glimepiride dose peaking overnight. Could consider taking glimepiride once daily in the mornings to see if nocturnal hypoglycemia resolves. Pt would also benefit from additional education on carbohydrates to help control post-prandial elevations.    D5 Optimal Care:   - Microalbumin up to date; not at goal of < 30 mg/g. Pt is taking ACEI/ARB as indicated. Could consider adding SGLT2i in the future if patient is eligible for financial assistance. Can re-discuss after microalbumin assay is done for this year.  - Aspirin: not necessary for primary prevention given age >75  - Immunizations are not up-to-date. Due for COVID booster and RSV vaccine  - Annual Eye Exam is up to date. Submitted for data extraction    2. Diabetic neuropathy  Stable on current regimen.     3. Essential hypertension with goal blood pressure less than 140/90  Controlled on current regimen.    4. Hyperlipidemia LDL goal <100  Pt is appropriately taking statin.     5. Gastroesophageal reflux disease with esophagitis, unspecified whether hemorrhage  Stable on current regimen    6. Idiopathic chronic gout of right foot without tophus  Controlled. Uric acid at goal of <6 and pt denies any recent flares.     7. Takes dietary supplements  Stable    PLAN:                            1. Take 2 tablets of glimepiride in the morning instead of splitting them up. Monitor hypoglycemia after this change  2. Try to find lower carb  breakfast options like english muffins instead of bagels  3. Bring your Ozempic application back to clinic after you fill it out so we can submit this to [employer]  4. Submitted diabetes eye exam data for extraction    Future consideration:  Start SGLT2i?    Follow-up: Return in 3 months (on 2/21/2025) for Follow up, with me, in person.    SUBJECTIVE/OBJECTIVE:                          Sven Givens is a 69 year old male coming in for a follow-up visit.       Reason for visit: pt needs help filling out Patient Assistance Program application.    Allergies/ADRs: Reviewed in chart  Past Medical History: Reviewed in chart  Tobacco: He reports that he quit smoking about 32 years ago. His smoking use included cigarettes. He has never used smokeless tobacco.  Alcohol: not currently using     Med adherence: getting Ozempic through [employer] Patient Assistance Program. Due to renew application today for 2025.  Diabetes   Type 2 Diabetes  Metformin IR 1000 mg twice daily  Glimepiride 4 mg twice daily   Ozempic 2 mg injected once weekly     Med history:    Januvia/Januvia - not effective  Invokana - not effective, also has history of UTIs and is avoiding SGLT2i    Pt reports diabetes control has been good lately  He is very happy with Audrey sensors  Was noticing hypoglycemia overnight after he started using a CGM  He now has a snack before bed and reports this has helped  No episodes of hypoglycemia the last month  He needs to reapply for the Ozempic Patient Assistance Program for 2025  Presents today for this reason  Current diabetes symptoms: neuropathy, retinopathy  Diet: monitoring carbs, doesn't drink soda.   Breakfast: strawberry chobani yogurt, bagel w/ cream cheese, coffee  Lunch and dinner: vary, but usually moderates carbs at these meals    Blood sugar monitoring: Freestyle Audrey 3+ (uses carline) - see below:         Eye exam in the last 12 months? Yes- Date of last eye exam: 11/15/24,  Location: [facility]  [place]  Foot exam is up to date    Neuropathy:  Pregabalin 50 mg twice daily    Pt has only filled this once  He wasn't sure he wanted to continue it    Hypertension:   Losartan/HCTZ 100/25 mg daily  Metoprolol succinate 50 mg daily  Terazosin 5 mg at bedtime      Patient does not self-monitor blood pressure.    Denies dizziness, lightheadedness, or fatigue     Hyperlipidemia:   Atorvastatin 80 mg daily    Denies myalgias  Tolerating regimen well     GERD   Omeprazole 20 mg once daily     Patient feels that current regimen is effective.  He does notice symptoms if he misses a dose.  He saw pulm for issue with chronic cough and they suspected pt may actually have uncontrolled GERD  He was advised to stay on omeprazole for this reason     Gout:   Allopurinol 200 mg daily    Denies recent flares  Denies nausea, GI upset    Uric Acid   Date Value   02/21/2022 5.5   02/16/2021 4.3      Supplements:  Vitamin B12 1000 mcg daily  Senna plus daily for constipation    ----------------  I spent 30 minutes with this patient today. All changes were made via collaborative practice agreement with Yeison Villegas MD, MD. A copy of the visit note was provided to the patient's provider(s).    A summary of these recommendations was given to the patient.    Dane Craft, PharmD, [facility]  Medication Therapy Management Provider  [phone]     Medication Therapy Recommendations  Type 2 diabetes mellitus with diabetic neuropathy, without long-term current use of insulin (H)   1 Current Medication: Semaglutide, 2 MG/DOSE, (OZEMPIC) 8 MG/3ML pen (Discontinued)   Current Medication Sig: Inject 2 mg Subcutaneous every 7 days   Rationale: Cannot afford medication product - Cost - Adherence   Recommendation: Provide Adherence Intervention - Ozempic (2 MG/DOSE) 8 MG/3ML Sopn   Status: Accepted per CPA   Identified Date: 11/20/2024 Completed Date: 11/20/2024         2 Current Medication: glimepiride (AMARYL) 4 MG tablet (Discontinued)   Current  Medication Sig: Take 1 tablet (4 mg) by mouth 2 times daily   Rationale: Undesirable effect - Adverse medication event - Safety   Recommendation: Change Administration Time - glimepiride 4 MG tablet   Status: Patient Agreed - Adherence/Education   Identified Date: 11/20/2024 Completed Date: 11/20/2024

## 2024-11-20 ENCOUNTER — OFFICE VISIT (OUTPATIENT)
Dept: PHARMACY | Facility: CLINIC | Age: 69
End: 2024-11-20
Payer: COMMERCIAL

## 2024-11-20 ENCOUNTER — TELEPHONE (OUTPATIENT)
Dept: FAMILY MEDICINE | Facility: CLINIC | Age: 69
End: 2024-11-20
Payer: COMMERCIAL

## 2024-11-20 DIAGNOSIS — I10 ESSENTIAL HYPERTENSION WITH GOAL BLOOD PRESSURE LESS THAN 140/90: ICD-10-CM

## 2024-11-20 DIAGNOSIS — K21.00 GASTROESOPHAGEAL REFLUX DISEASE WITH ESOPHAGITIS, UNSPECIFIED WHETHER HEMORRHAGE: ICD-10-CM

## 2024-11-20 DIAGNOSIS — Z79.4 TYPE 2 DIABETES MELLITUS WITH DIABETIC POLYNEUROPATHY, WITH LONG-TERM CURRENT USE OF INSULIN (H): ICD-10-CM

## 2024-11-20 DIAGNOSIS — Z78.9 TAKES DIETARY SUPPLEMENTS: ICD-10-CM

## 2024-11-20 DIAGNOSIS — E11.42 TYPE 2 DIABETES MELLITUS WITH DIABETIC POLYNEUROPATHY, WITH LONG-TERM CURRENT USE OF INSULIN (H): ICD-10-CM

## 2024-11-20 DIAGNOSIS — E11.40 TYPE 2 DIABETES MELLITUS WITH DIABETIC NEUROPATHY, WITHOUT LONG-TERM CURRENT USE OF INSULIN (H): Primary | ICD-10-CM

## 2024-11-20 DIAGNOSIS — E78.5 HYPERLIPIDEMIA LDL GOAL <100: ICD-10-CM

## 2024-11-20 DIAGNOSIS — M1A.0710 IDIOPATHIC CHRONIC GOUT OF RIGHT FOOT WITHOUT TOPHUS: ICD-10-CM

## 2024-11-20 RX ORDER — CLOTRIMAZOLE AND BETAMETHASONE DIPROPIONATE 10; .64 MG/G; MG/G
CREAM TOPICAL 2 TIMES DAILY
COMMUNITY
Start: 2024-09-09

## 2024-11-20 NOTE — TELEPHONE ENCOUNTER
Forms/Letter Request    Type of form/letter: OTHER: Pt Assistance Program Minh       Do we have the form/letter: Yes: Received from MD    Who is the form from? Patient    Where did/will the form come from? Patient or family brought in       How would you like the form/letter returned: Fax : [phone]      Form placed in TC bin and Abstracting.

## 2024-11-20 NOTE — PATIENT INSTRUCTIONS
Yordy Machuca, it was great talking with you today!     Recommendations from today's MTM visit:                                                      1. Take 2 tablets of glimepiride in the morning instead of splitting them up  2. Try to find lower carb breakfast options like english muffin instead of bagels  3. Bring your application back to clinic as soon as you can so we can submit this to [employer]    I value your experience and would be very grateful for your time with providing feedback on our clinic survey. You may receive a survey via email or text message in the next few days.     Next MTM visit: 2/21/25    To schedule another MTM appointment, please call the clinic directly or you may call the MTM scheduling line at [phone] or toll-free at [phone].     My Clinical Pharmacist's contact information:                                                      Please feel free to contact me with any questions or concerns you have.      Dane Craft, PharmD, BCACP  [facility]  Phone: [phone]

## 2024-11-20 NOTE — Clinical Note
LENARDI - helped patient fill out Patient Assistance Program application for Ozempic and put in your inbox. Had discussion today around carb counting and diet and pt coming back in 3 months to check in on diabetes

## 2024-11-21 DIAGNOSIS — E11.42 TYPE 2 DIABETES MELLITUS WITH DIABETIC POLYNEUROPATHY, WITHOUT LONG-TERM CURRENT USE OF INSULIN (H): ICD-10-CM

## 2024-11-21 RX ORDER — KETOROLAC TROMETHAMINE 30 MG/ML
1 INJECTION, SOLUTION INTRAMUSCULAR; INTRAVENOUS ONCE
Qty: 1 EACH | Refills: 0 | Status: SHIPPED | OUTPATIENT
Start: 2024-11-21 | End: 2024-11-21

## 2024-11-27 ENCOUNTER — ONCOLOGY VISIT (OUTPATIENT)
Dept: ONCOLOGY | Facility: CLINIC | Age: 69
End: 2024-11-27
Attending: INTERNAL MEDICINE
Payer: COMMERCIAL

## 2024-11-27 VITALS
DIASTOLIC BLOOD PRESSURE: 81 MMHG | SYSTOLIC BLOOD PRESSURE: 145 MMHG | WEIGHT: 206 LBS | BODY MASS INDEX: 30.42 KG/M2 | OXYGEN SATURATION: 96 % | RESPIRATION RATE: 16 BRPM | HEART RATE: 96 BPM

## 2024-11-27 DIAGNOSIS — C18.9 COLON ADENOCARCINOMA (H): Primary | ICD-10-CM

## 2024-11-27 LAB
ALBUMIN SERPL BCG-MCNC: 4.7 G/DL (ref 3.5–5.2)
ALP SERPL-CCNC: 120 U/L (ref 40–150)
ALT SERPL W P-5'-P-CCNC: 27 U/L (ref 0–70)
ANION GAP SERPL CALCULATED.3IONS-SCNC: 13 MMOL/L (ref 7–15)
AST SERPL W P-5'-P-CCNC: 24 U/L (ref 0–45)
BASOPHILS # BLD AUTO: 0.1 10E3/UL (ref 0–0.2)
BASOPHILS NFR BLD AUTO: 1 %
BILIRUB SERPL-MCNC: 0.5 MG/DL
BUN SERPL-MCNC: 15.9 MG/DL (ref 8–23)
CALCIUM SERPL-MCNC: 10.9 MG/DL (ref 8.8–10.4)
CEA SERPL-MCNC: 3.7 NG/ML
CHLORIDE SERPL-SCNC: 98 MMOL/L (ref 98–107)
CREAT SERPL-MCNC: 0.8 MG/DL (ref 0.67–1.17)
EGFRCR SERPLBLD CKD-EPI 2021: >90 ML/MIN/1.73M2
EOSINOPHIL # BLD AUTO: 0.3 10E3/UL (ref 0–0.7)
EOSINOPHIL NFR BLD AUTO: 4 %
ERYTHROCYTE [DISTWIDTH] IN BLOOD BY AUTOMATED COUNT: 13.2 % (ref 10–15)
GLUCOSE SERPL-MCNC: 172 MG/DL (ref 70–99)
HCO3 SERPL-SCNC: 25 MMOL/L (ref 22–29)
HCT VFR BLD AUTO: 39.2 % (ref 40–53)
HGB BLD-MCNC: 12.6 G/DL (ref 13.3–17.7)
HOLD SPECIMEN: NORMAL
IMM GRANULOCYTES # BLD: 0 10E3/UL
IMM GRANULOCYTES NFR BLD: 0 %
LYMPHOCYTES # BLD AUTO: 2.8 10E3/UL (ref 0.8–5.3)
LYMPHOCYTES NFR BLD AUTO: 30 %
MCH RBC QN AUTO: 28.9 PG (ref 26.5–33)
MCHC RBC AUTO-ENTMCNC: 32.1 G/DL (ref 31.5–36.5)
MCV RBC AUTO: 90 FL (ref 78–100)
MONOCYTES # BLD AUTO: 0.6 10E3/UL (ref 0–1.3)
MONOCYTES NFR BLD AUTO: 6 %
NEUTROPHILS # BLD AUTO: 5.5 10E3/UL (ref 1.6–8.3)
NEUTROPHILS NFR BLD AUTO: 59 %
NRBC # BLD AUTO: 0 10E3/UL
NRBC BLD AUTO-RTO: 0 /100
PLATELET # BLD AUTO: 221 10E3/UL (ref 150–450)
POTASSIUM SERPL-SCNC: 4.1 MMOL/L (ref 3.4–5.3)
PROT SERPL-MCNC: 8 G/DL (ref 6.4–8.3)
RBC # BLD AUTO: 4.36 10E6/UL (ref 4.4–5.9)
SODIUM SERPL-SCNC: 136 MMOL/L (ref 135–145)
WBC # BLD AUTO: 9.4 10E3/UL (ref 4–11)

## 2024-11-27 PROCEDURE — 82565 ASSAY OF CREATININE: CPT | Performed by: INTERNAL MEDICINE

## 2024-11-27 PROCEDURE — 84155 ASSAY OF PROTEIN SERUM: CPT | Performed by: INTERNAL MEDICINE

## 2024-11-27 PROCEDURE — G0463 HOSPITAL OUTPT CLINIC VISIT: HCPCS | Performed by: INTERNAL MEDICINE

## 2024-11-27 PROCEDURE — 82378 CARCINOEMBRYONIC ANTIGEN: CPT | Performed by: INTERNAL MEDICINE

## 2024-11-27 PROCEDURE — 80053 COMPREHEN METABOLIC PANEL: CPT | Performed by: INTERNAL MEDICINE

## 2024-11-27 PROCEDURE — 85004 AUTOMATED DIFF WBC COUNT: CPT | Performed by: INTERNAL MEDICINE

## 2024-11-27 ASSESSMENT — PAIN SCALES - GENERAL: PAINLEVEL_OUTOF10: NO PAIN (0)

## 2024-11-27 NOTE — PATIENT INSTRUCTIONS
1. CT scan chest, abdomen and pelvis  2. Blood tests today; CBC, CMP, CEA; Dr. Coburn will call with results  3. Dr. Valencia follow-up in one year

## 2024-11-27 NOTE — NURSING NOTE
"Oncology Rooming Note    November 27, 2024 10:23 AM   Sven Givens is a 69 year old male who presents for:    Chief Complaint   Patient presents with    Oncology Clinic Visit     1 year follow up     Initial Vitals: BP (!) 145/81   Pulse 96   Resp 16   Wt 93.4 kg (206 lb)   SpO2 96%   BMI 30.42 kg/m   Estimated body mass index is 30.42 kg/m  as calculated from the following:    Height as of 10/23/24: 1.753 m (5' 9\").    Weight as of this encounter: 93.4 kg (206 lb). Body surface area is 2.13 meters squared.  No Pain (0) Comment: Data Unavailable   No LMP for male patient.  Allergies reviewed: Yes  Medications reviewed: Yes    Medications: Medication refills not needed today.  Pharmacy name entered into [facility]:    [id number] - [place], [place] - [address] [address] [place] [facility] - [place], [place] - [address]    Frailty Screening:   Is the patient here for a new oncology consult visit in cancer care? 2. No      Clinical concerns: cough       Clementina Spencer LPN              "

## 2024-11-27 NOTE — LETTER
11/27/2024      Sven Givens  [address]  [place] [address]      Dear Colleague,    Thank you for referring your patient, Sven Givens, to the [facility]. Please see a copy of my visit note below.    Hematology/Medical Oncology Follow-up Note      November 27, 2024    Reason for visit:  Sven Givens is a 69 year old retired [employer]  from [place] who presents for oncologic reevaluation regarding 2022 history of stage II ascending colonic adenocarcinoma and 2016 left lower lobe lung lobectomy for stage I-A adenocarcinoma    Impression:  S/p 2022 hemicolectomy for stage II ascending colonic adenocarcinoma, MSI-H  S/p 2016 left lower lobectomy for stage I-A well-differentiated lung adenocarcinoma  Recent persistent cough without hemoptysis, phlegm or fever    Recommendation, plan, instructions:  1. CT scan chest, abdomen and pelvis  2. Blood tests today; CBC, CMP, CEA; Dr. Coburn will call with results  3. Dr. Valencia follow-up in one year    Time with patient including review, documentation, history, examination, coordination of care and counseling was 25 minutes.    History of present illness:  In 2022, at this patient underwent surveillance colonoscopy for history of benign colonic polyps and was identified with a 3 cm ascending colonic mass revealing adenocarcinoma.  After staging CT CAP revealed no apparent metastases, he underwent a 10/28/2022 laparoscopic extended right hemicolectomy and partial omentectomy revealing a 2.6 cm well-differentiated adenocarcinoma arising from a tubular adenoma with lymphovascular but no perineural invasion.  27 regional lymph nodes were benign with clear margins.  Surveillance colonoscopy is planned for 2028 after 2023 study.    Molecular genetics revealed MLH1 and PMS2 loss of nuclear expression with subsequent MLH1 promoter methylation positive consistent with sporadic MSI-H phenotype. The  preoperative CEA was 2.2.  No adjuvant chemotherapy was recommended.  He has been watched since then without apparent recurrent disease.  A 2023 colonoscopy was negative with recommended return in 5 years.    Finally, in 2016, he underwent left lower lobe lobectomy for a stage IA, 3mm, grade 1 well-differentiated adenocarcinoma with clear margins and 3 benign lymph nodes without apparent recurrence.    Family immigrated from [facility] in  after the [place].    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  Family History   Problem Relation Age of Onset     Hernia Mother          age 97     Cerebrovascular Disease Father 64     Cancer Sister         ovarary      Deep Vein Thrombosis (DVT) No family hx of          Medications:  Current Outpatient Medications   Medication Sig Dispense Refill     allopurinol (ZYLOPRIM) 100 MG tablet TAKE 2 TABLETS BY MOUTH EVERY  tablet 3     atorvastatin (LIPITOR) 80 MG tablet Take 1 tablet (80 mg) by mouth daily 90 tablet 3     blood glucose (CONTOUR NEXT TEST) test strip USE TO TEST BLOOD SUGAR 1 TIME DAILY OR AS DIRECTED. 100 strip 11     clotrimazole-betamethasone (LOTRISONE) 1-0.05 % external cream Apply topically 2 times daily.       Continuous Glucose Sensor (FREESTYLE FEDERICO 3 PLUS SENSOR) MISC Use 1 sensor every 15 days. Use to read blood sugars per 's instructions. 6 each 3     cyanocobalamin (VITAMIN B-12) 1000 MCG tablet Take 1,000 mcg  by mouth daily       glimepiride (AMARYL) 4 MG tablet TAKE 1 TABLET (4 MG) BY MOUTH 2 TIMES DAILY 180 tablet 0     ketoconazole (NIZORAL) 2 % external shampoo APPLY TOPICALLY EVERY OTHER DAY LATHER INTO RASH AND WASH OFF AFTER 10 MINUTES. 120 mL 0     losartan-hydrochlorothiazide (HYZAAR) 100-25 MG tablet Take 1 tablet by mouth every morning For blood pressure. 90 tablet 3     metFORMIN (GLUCOPHAGE) 1000 MG tablet TAKE 1 TABLET BY MOUTH TWICE A DAY WITH FOOD 180 tablet 0     metoprolol succinate ER (TOPROL XL) 50 MG 24 hr tablet Take 1 tablet (50 mg) by mouth daily 90 tablet 3     nitroGLYcerin (NITROSTAT) 0.4 MG sublingual tablet For chest pain place 1 tablet under the tongue every 5 minutes for 3 doses. If symptoms persist 5 minutes after 1st dose call 911. 25 tablet 3     omega-3 acid ethyl esters (LOVAZA) 1 g capsule Take 2 capsules (2 g) by mouth 2 times daily 360 capsule 3     omeprazole (PRILOSEC) 20 MG DR capsule TAKE 1 CAPSULE BY MOUTH TWICE A  capsule 0     Oxymetazoline HCl (AFRIN NASAL SPRAY NA) Spray in nostril as needed       pregabalin (LYRICA) 50 MG capsule Take 1 capsule (50 mg) by mouth 2 times daily 60 capsule 5     Semaglutide, 2 MG/DOSE, (OZEMPIC) 8 MG/3ML pen Inject 2 mg subcutaneously every 7 days. 12 mL 2     senna-docusate (SENEXON-S) 8.6-50 MG tablet Take 1 tablet by mouth at bedtime 90 tablet 3     terazosin (HYTRIN) 5 MG capsule TAKE 1 CAPSULE (5 MG) BY MOUTH AT BEDTIME FOR BLOOD PRESSURE. 90 capsule 3     diclofenac (VOLTAREN) 1 % topical gel Apply 4 g topically 4 times daily. 350 g 3     No current facility-administered medications for this visit.       Allergies:  Allergies   Allergen Reactions     Pcn [Penicillins] Rash     Ace Inhibitors Cough     Indomethacin Other (See Comments)     Severe dizziness     Invokana [Canagliflozin]      bladder infection       Review of systems:  He has had a persistent cough for several months now without phlegm.  He denies chest pain, pleurisy  or hemoptysis.  He quit tobacco more than 30 years ago.  Except as note above, the patient denies:  Headache, double vision, change in vision, hearing loss, tinnitus;  Dyspnea, chest pain, palpitations, pleurisy or hemoptysis;  Anorexia, nausea, vomiting, diarrhea, constipation, rectal bleeding bleeding, change in bowel habits;  Urinary frequency, burning or hematuria;  Fever, chills, sweats, change in appetite;  Bone or back pain; skin rash, joint swelling, new lumps;  Unexplained, bleeding or bruising, tingling numbness, change in gait;    Physical examination:  BP (!) 145/81   Pulse 96   Resp 16   Wt 93.4 kg (206 lb)   SpO2 96%   BMI 30.42 kg/m      The patient is alert and oriented.   Throat and oral mucosa are normal-appearing.   Thyroid gland is not enlarged.   Adenopathy is absent in the neck, axilla, groin and supraclavicular fossa;   Lungs examination reveals bilateral coarse rales, without wheezes or rubs; Heart rhythm is regular, heart sounds are without murmurs or gallops;   Abdomen is soft and flat without hepatosplenomegaly, masses, ascites or tenderness;   Extremities and skin reveal no unusual skin lesions, joint swelling or edema;    Jeremiah Coburn MD      Again, thank you for allowing me to participate in the care of your patient.        Sincerely,        Jeremiah Coburn MD

## 2024-12-05 DIAGNOSIS — K59.01 SLOW TRANSIT CONSTIPATION: ICD-10-CM

## 2024-12-05 RX ORDER — AMOXICILLIN 250 MG
1 CAPSULE ORAL AT BEDTIME
Qty: 90 TABLET | Refills: 2 | Status: SHIPPED | OUTPATIENT
Start: 2024-12-05

## 2024-12-10 ENCOUNTER — ANCILLARY PROCEDURE (OUTPATIENT)
Dept: CT IMAGING | Facility: CLINIC | Age: 69
End: 2024-12-10
Attending: INTERNAL MEDICINE
Payer: COMMERCIAL

## 2024-12-10 DIAGNOSIS — C18.9 COLON ADENOCARCINOMA (H): ICD-10-CM

## 2024-12-10 LAB — RADIOLOGIST FLAGS: NORMAL

## 2024-12-10 PROCEDURE — 71260 CT THORAX DX C+: CPT | Performed by: RADIOLOGY

## 2024-12-10 PROCEDURE — 74177 CT ABD & PELVIS W/CONTRAST: CPT | Performed by: RADIOLOGY

## 2024-12-10 RX ORDER — IOPAMIDOL 755 MG/ML
113 INJECTION, SOLUTION INTRAVASCULAR ONCE
Status: COMPLETED | OUTPATIENT
Start: 2024-12-10 | End: 2024-12-10

## 2024-12-10 RX ADMIN — IOPAMIDOL 113 ML: 755 INJECTION, SOLUTION INTRAVASCULAR at 08:42

## 2025-01-02 SDOH — HEALTH STABILITY: PHYSICAL HEALTH: ON AVERAGE, HOW MANY MINUTES DO YOU ENGAGE IN EXERCISE AT THIS LEVEL?: 40 MIN

## 2025-01-02 SDOH — HEALTH STABILITY: PHYSICAL HEALTH: ON AVERAGE, HOW MANY DAYS PER WEEK DO YOU ENGAGE IN MODERATE TO STRENUOUS EXERCISE (LIKE A BRISK WALK)?: 3 DAYS

## 2025-01-02 ASSESSMENT — SOCIAL DETERMINANTS OF HEALTH (SDOH): HOW OFTEN DO YOU GET TOGETHER WITH FRIENDS OR RELATIVES?: TWICE A WEEK

## 2025-01-07 ENCOUNTER — OFFICE VISIT (OUTPATIENT)
Dept: FAMILY MEDICINE | Facility: CLINIC | Age: 70
End: 2025-01-07
Attending: FAMILY MEDICINE
Payer: COMMERCIAL

## 2025-01-07 VITALS
OXYGEN SATURATION: 97 % | TEMPERATURE: 96.9 F | RESPIRATION RATE: 16 BRPM | DIASTOLIC BLOOD PRESSURE: 79 MMHG | BODY MASS INDEX: 31.22 KG/M2 | HEIGHT: 69 IN | HEART RATE: 72 BPM | SYSTOLIC BLOOD PRESSURE: 134 MMHG | WEIGHT: 210.8 LBS

## 2025-01-07 DIAGNOSIS — E11.42 TYPE 2 DIABETES MELLITUS WITH DIABETIC POLYNEUROPATHY, WITHOUT LONG-TERM CURRENT USE OF INSULIN (H): ICD-10-CM

## 2025-01-07 DIAGNOSIS — R91.8 PULMONARY NODULES: ICD-10-CM

## 2025-01-07 DIAGNOSIS — E78.5 HYPERLIPIDEMIA LDL GOAL <100: ICD-10-CM

## 2025-01-07 DIAGNOSIS — Z12.5 SCREENING FOR PROSTATE CANCER: ICD-10-CM

## 2025-01-07 DIAGNOSIS — I10 ESSENTIAL HYPERTENSION WITH GOAL BLOOD PRESSURE LESS THAN 140/90: ICD-10-CM

## 2025-01-07 DIAGNOSIS — Z00.00 ENCOUNTER FOR MEDICARE ANNUAL WELLNESS EXAM: Primary | ICD-10-CM

## 2025-01-07 DIAGNOSIS — M1A.0710 IDIOPATHIC CHRONIC GOUT OF RIGHT FOOT WITHOUT TOPHUS: ICD-10-CM

## 2025-01-07 DIAGNOSIS — E78.1 HYPERTRIGLYCERIDEMIA: ICD-10-CM

## 2025-01-07 DIAGNOSIS — C18.9 COLON ADENOCARCINOMA (H): ICD-10-CM

## 2025-01-07 DIAGNOSIS — R00.0 SINUS TACHYCARDIA: ICD-10-CM

## 2025-01-07 DIAGNOSIS — K21.00 GASTROESOPHAGEAL REFLUX DISEASE WITH ESOPHAGITIS, UNSPECIFIED WHETHER HEMORRHAGE: ICD-10-CM

## 2025-01-07 DIAGNOSIS — Z23 ENCOUNTER FOR IMMUNIZATION: ICD-10-CM

## 2025-01-07 LAB
ALBUMIN SERPL BCG-MCNC: 4.5 G/DL (ref 3.5–5.2)
ALP SERPL-CCNC: 106 U/L (ref 40–150)
ALT SERPL W P-5'-P-CCNC: 43 U/L (ref 0–70)
ANION GAP SERPL CALCULATED.3IONS-SCNC: 12 MMOL/L (ref 7–15)
AST SERPL W P-5'-P-CCNC: 37 U/L (ref 0–45)
BILIRUB SERPL-MCNC: 0.5 MG/DL
BUN SERPL-MCNC: 13.1 MG/DL (ref 8–23)
CALCIUM SERPL-MCNC: 10.1 MG/DL (ref 8.8–10.4)
CHLORIDE SERPL-SCNC: 100 MMOL/L (ref 98–107)
CHOLEST SERPL-MCNC: 98 MG/DL
CREAT SERPL-MCNC: 0.74 MG/DL (ref 0.67–1.17)
CREAT UR-MCNC: 191 MG/DL
EGFRCR SERPLBLD CKD-EPI 2021: >90 ML/MIN/1.73M2
EST. AVERAGE GLUCOSE BLD GHB EST-MCNC: 169 MG/DL
FASTING STATUS PATIENT QL REPORTED: YES
FASTING STATUS PATIENT QL REPORTED: YES
GLUCOSE SERPL-MCNC: 173 MG/DL (ref 70–99)
HBA1C MFR BLD: 7.5 % (ref 0–5.6)
HCO3 SERPL-SCNC: 27 MMOL/L (ref 22–29)
HDLC SERPL-MCNC: 28 MG/DL
LDLC SERPL CALC-MCNC: 26 MG/DL
MICROALBUMIN UR-MCNC: 65.9 MG/L
MICROALBUMIN/CREAT UR: 34.5 MG/G CR (ref 0–17)
NONHDLC SERPL-MCNC: 70 MG/DL
POTASSIUM SERPL-SCNC: 4.3 MMOL/L (ref 3.4–5.3)
PROT SERPL-MCNC: 7.6 G/DL (ref 6.4–8.3)
PSA SERPL DL<=0.01 NG/ML-MCNC: 0.71 NG/ML (ref 0–6.5)
SODIUM SERPL-SCNC: 139 MMOL/L (ref 135–145)
TRIGL SERPL-MCNC: 218 MG/DL
URATE SERPL-MCNC: 4.6 MG/DL (ref 3.4–7)

## 2025-01-07 PROCEDURE — G2211 COMPLEX E/M VISIT ADD ON: HCPCS | Performed by: FAMILY MEDICINE

## 2025-01-07 PROCEDURE — 80061 LIPID PANEL: CPT | Performed by: FAMILY MEDICINE

## 2025-01-07 PROCEDURE — 36415 COLL VENOUS BLD VENIPUNCTURE: CPT | Performed by: FAMILY MEDICINE

## 2025-01-07 PROCEDURE — 83036 HEMOGLOBIN GLYCOSYLATED A1C: CPT | Performed by: FAMILY MEDICINE

## 2025-01-07 PROCEDURE — 99214 OFFICE O/P EST MOD 30 MIN: CPT | Mod: 25 | Performed by: FAMILY MEDICINE

## 2025-01-07 PROCEDURE — 80053 COMPREHEN METABOLIC PANEL: CPT | Performed by: FAMILY MEDICINE

## 2025-01-07 PROCEDURE — 82570 ASSAY OF URINE CREATININE: CPT | Performed by: FAMILY MEDICINE

## 2025-01-07 PROCEDURE — 84550 ASSAY OF BLOOD/URIC ACID: CPT | Performed by: FAMILY MEDICINE

## 2025-01-07 PROCEDURE — G0439 PPPS, SUBSEQ VISIT: HCPCS | Performed by: FAMILY MEDICINE

## 2025-01-07 PROCEDURE — G0103 PSA SCREENING: HCPCS | Performed by: FAMILY MEDICINE

## 2025-01-07 PROCEDURE — 82043 UR ALBUMIN QUANTITATIVE: CPT | Performed by: FAMILY MEDICINE

## 2025-01-07 RX ORDER — LOSARTAN POTASSIUM AND HYDROCHLOROTHIAZIDE 25; 100 MG/1; MG/1
1 TABLET ORAL EVERY MORNING
Qty: 90 TABLET | Refills: 3 | Status: SHIPPED | OUTPATIENT
Start: 2025-01-07

## 2025-01-07 RX ORDER — GLIMEPIRIDE 4 MG/1
4 TABLET ORAL 2 TIMES DAILY
Qty: 180 TABLET | Refills: 3 | Status: SHIPPED | OUTPATIENT
Start: 2025-01-07

## 2025-01-07 RX ORDER — METOPROLOL SUCCINATE 50 MG/1
50 TABLET, EXTENDED RELEASE ORAL DAILY
Qty: 90 TABLET | Refills: 3 | Status: SHIPPED | OUTPATIENT
Start: 2025-01-07

## 2025-01-07 RX ORDER — ATORVASTATIN CALCIUM 80 MG/1
80 TABLET, FILM COATED ORAL DAILY
Qty: 90 TABLET | Refills: 3 | Status: SHIPPED | OUTPATIENT
Start: 2025-01-07

## 2025-01-07 RX ORDER — TERAZOSIN 5 MG/1
CAPSULE ORAL
Qty: 90 CAPSULE | Refills: 3 | Status: SHIPPED | OUTPATIENT
Start: 2025-01-07

## 2025-01-07 RX ORDER — PREGABALIN 50 MG/1
50 CAPSULE ORAL 2 TIMES DAILY
Qty: 180 CAPSULE | Refills: 3 | Status: CANCELLED | OUTPATIENT
Start: 2025-01-07

## 2025-01-07 RX ORDER — ALLOPURINOL 100 MG/1
TABLET ORAL
Qty: 180 TABLET | Refills: 3 | Status: SHIPPED | OUTPATIENT
Start: 2025-01-07

## 2025-01-07 ASSESSMENT — PAIN SCALES - GENERAL: PAINLEVEL_OUTOF10: NO PAIN (0)

## 2025-01-07 NOTE — PROGRESS NOTES
Preventive Care Visit  [facility]  Yeison Villegas MD, MD, Family Medicine  Jan 7, 2025      Assessment & Plan     Encounter for Medicare annual wellness exam  As below.    Type 2 diabetes mellitus with diabetic polyneuropathy, without long-term current use of insulin (H)  Recheck A1c and adjust if needed. Following MTM. Recheck in 6 months.  - Albumin Random Urine Quantitative with Creat Ratio; Future  - Hemoglobin A1c; Future  - Comprehensive metabolic panel (BMP + Alb, Alk Phos, ALT, AST, Total. Bili, TP); Future  - atorvastatin (LIPITOR) 80 MG tablet; Take 1 tablet (80 mg) by mouth daily.  - glimepiride (AMARYL) 4 MG tablet; Take 1 tablet (4 mg) by mouth 2 times daily.  - metFORMIN (GLUCOPHAGE) 1000 MG tablet; TAKE 1 TABLET BY MOUTH TWICE A DAY WITH FOOD    Essential hypertension with goal blood pressure less than 140/90  Controlled.  - Albumin Random Urine Quantitative with Creat Ratio; Future  - Comprehensive metabolic panel (BMP + Alb, Alk Phos, ALT, AST, Total. Bili, TP); Future  - losartan-hydrochlorothiazide (HYZAAR) 100-25 MG tablet; Take 1 tablet by mouth every morning. For blood pressure.  - metoprolol succinate ER (TOPROL XL) 50 MG 24 hr tablet; Take 1 tablet (50 mg) by mouth daily.  - terazosin (HYTRIN) 5 MG capsule; TAKE 1 CAPSULE (5 MG) BY MOUTH AT BEDTIME FOR BLOOD PRESSURE.    Hyperlipidemia LDL goal <100  Recheck and adjust if needed.  - Comprehensive metabolic panel (BMP + Alb, Alk Phos, ALT, AST, Total. Bili, TP); Future  - Lipid panel reflex to direct LDL Fasting; Future    Screening for prostate cancer    - PSA, screen; Future    Idiopathic chronic gout of right foot without tophus    - Uric acid; Future  - allopurinol (ZYLOPRIM) 100 MG tablet; TAKE 2 TABLETS BY MOUTH EVERY DAY    Hypertriglyceridemia    - atorvastatin (LIPITOR) 80 MG tablet; Take 1 tablet (80 mg) by mouth daily.    Sinus tachycardia    - metoprolol succinate ER (TOPROL XL) 50 MG 24 hr tablet; Take 1  "tablet (50 mg) by mouth daily.    Gastroesophageal reflux disease with esophagitis, unspecified whether hemorrhage    - omeprazole (PRILOSEC) 20 MG DR capsule; Take 1 capsule (20 mg) by mouth 2 times daily.    Colon adenocarcinoma (H)  Following Oncology.    Pulmonary nodules  Oncology ordered on CT scan in February for recheck.    Encounter for immunization  Patient declines vaccines today.      BMI  Estimated body mass index is 31.13 kg/m  as calculated from the following:    Height as of this encounter: 1.753 m (5' 9\").    Weight as of this encounter: 95.6 kg (210 lb 12.8 oz).       Counseling  Appropriate preventive services were addressed with this patient via screening, questionnaire, or discussion as appropriate for fall prevention, nutrition, physical activity, Tobacco-use cessation, social engagement, weight loss and cognition.  Checklist reviewing preventive services available has been given to the patient.  Reviewed patient's diet, addressing concerns and/or questions.   He is at risk for lack of exercise and has been provided with information to increase physical activity for the benefit of his well-being.       Rishabh Machuca is a 69 year old, presenting for the following:  Physical        1/7/2025     9:11 AM   Additional Questions   Roomed by Sarabjit           [facility]    Health Care Directive  Patient does not have a Health Care Directive: Discussed advance care planning with patient; information given to patient to review.      1/2/2025   General Health   How would you rate your overall physical health? (!) FAIR   Feel stress (tense, anxious, or unable to sleep) Patient declined         1/2/2025   Nutrition   Diet: Diabetic         1/2/2025   Exercise   Days per week of moderate/strenous exercise 3 days   Average minutes spent exercising at this level 40 min         1/2/2025   Social Factors   Frequency of gathering with friends or relatives Twice a week   Worry food won't last until get money to " buy more No   Food not last or not have enough money for food? No   Do you have housing? (Housing is defined as stable permanent housing and does not include staying ouside in a car, in a tent, in an abandoned building, in an overnight shelter, or couch-surfing.) Yes   Are you worried about losing your housing? No   Lack of transportation? No   Unable to get utilities (heat,electricity)? No         1/2/2025   Fall Risk   Fallen 2 or more times in the past year? No    No   Trouble with walking or balance? No    No       Multiple values from one day are sorted in reverse-chronological order          1/2/2025   Activities of Daily Living- Home Safety   Needs help with the following daily activites None of the above   Safety concerns in the home None of the above         1/2/2025   Dental   Dentist two times every year? Yes         1/2/2025   Hearing Screening   Hearing concerns? None of the above         1/2/2025   Driving Risk Screening   Patient/family members have concerns about driving No         1/2/2025   General Alertness/Fatigue Screening   Have you been more tired than usual lately? No         1/2/2025   Urinary Incontinence Screening   Bothered by leaking urine in past 6 months No         1/2/2025   TB Screening   Were you born outside of the [place]? Yes         Today's PHQ-2 Score:       1/7/2025     9:02 AM   PHQ-2 ( 1999 [employer])   Q1: Little interest or pleasure in doing things 0   Q2: Feeling down, depressed or hopeless 0   PHQ-2 Score 0    Q1: Little interest or pleasure in doing things Not at all   Q2: Feeling down, depressed or hopeless Not at all   PHQ-2 Score 0       Patient-reported           1/2/2025   Substance Use   Alcohol more than 3/day or more than 7/wk No   Do you have a current opioid prescription? No   How severe/bad is pain from 1 to 10? 0/10 (No Pain)   Do you use any other substances recreationally? No     Social History     Tobacco Use    Smoking status: Former     Current packs/day: 0.00      Average packs/day: 0.5 packs/day for 1 year (0.5 ttl pk-yrs)     Types: Cigarettes     Quit date: 1992     Years since quittin.0    Smokeless tobacco: Never    Tobacco comments:     15 + years    Vaping Use    Vaping status: Never Used   Substance Use Topics    Alcohol use: No    Drug use: No       ASCVD Risk   The ASCVD Risk score (Brennan DK, et al., 2019) failed to calculate for the following reasons:    The valid total cholesterol range is 130 to 320 mg/dL      Reviewed and updated as needed this visit by Provider                    Past Medical History:   Diagnosis Date    Allergies     PCN, ACE, Indomethocin    Cancer (H)     small cell lung cancer stage I    Diabetes (H)     Diabetic neuropathy (H) 2012    Hypertension     Kidney stones 2004    s/p right kidney basket retrieval    Rhinitis, allergic     Rosacea     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, overuse, 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    Varicose veins      Past Surgical History:   Procedure Laterality Date    BIOPSY  23 Sep 2016    BRONCHOSCOPY FLEXIBLE N/A 2016    Procedure: BRONCHOSCOPY FLEXIBLE;  Surgeon: Gayle Moya MD;  Location: [facility]    COLONOSCOPY  2003    COLONOSCOPY N/A 2022    Procedure: COLONOSCOPY, WITH POLYPECTOMY AND BIOPSY;  Surgeon: Abbe Mccarty MD;  Location: [facility]    COLONOSCOPY N/A 2023    Procedure: Colonoscopy;  Surgeon: Rosy Schofield MD;  Location: [facility]    COLONOSCOPY WITH CO2 INSUFFLATION N/A 2017    Procedure: COLONOSCOPY WITH CO2 INSUFFLATION;  COLON SCREEN/ SEB;  Surgeon: Margarito Rasheed DO;  Location: [facility]    COLONOSCOPY WITH CO2 INSUFFLATION N/A 2022    Procedure: COLONOSCOPY, WITH CO2 INSUFFLATION;  Surgeon: Abbe Mccarty MD;  Location: [facility]    COMBINED ESOPHAGOSCOPY, GASTROSCOPY, DUODENOSCOPY (EGD) WITH CO2 INSUFFLATION N/A 2019    Procedure: COMBINED ESOPHAGOSCOPY, GASTROSCOPY, DUODENOSCOPY (EGD) WITH CO2  INSUFFLATION;  Surgeon: Abbe Mccarty MD;  Location:  OR    ESOPHAGOSCOPY, GASTROSCOPY, DUODENOSCOPY (EGD), COMBINED N/A 04/19/2019    Procedure: Combined Esophagoscopy, Gastroscopy, Duodenoscopy (Egd), Biopsy Single Or Multiple;  Surgeon: Abbe Mccarty MD;  Location: [facility]    GENITOURINARY SURGERY      kidney stones    HERNIA REPAIR  May 2023    THORACOSCOPIC WEDGE RESECTION LUNG Left 09/23/2016    Procedure: THORACOSCOPIC WEDGE RESECTION LUNG;  Surgeon: Gayle Moya MD;  Location: [facility]    VASCULAR SURGERY      varicose vein     Lab work is in process  Labs reviewed in EPIC  BP Readings from Last 3 Encounters:   01/07/25 134/79   11/27/24 (!) 145/81   10/23/24 139/79    Wt Readings from Last 3 Encounters:   01/07/25 95.6 kg (210 lb 12.8 oz)   11/27/24 93.4 kg (206 lb)   10/23/24 94.3 kg (208 lb)                  Patient Active Problem List   Diagnosis    Essential hypertension with goal blood pressure less than 140/90    History of adenomatous polyp of colon    GERD (gastroesophageal reflux disease)    Anemia    Hyperlipidemia LDL goal <100    Gout    Type 2 diabetes mellitus with diabetic polyneuropathy, with long-term current use of insulin (H)    History of radiation exposure    Nonalcoholic hepatosteatosis    PRESLEY (obstructive sleep apnea)    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)    Age-related incipient cataract of both eyes    Vertigo    Colon adenocarcinoma (H)    Type 2 diabetes mellitus with diabetic neuropathy, without long-term current use of insulin (H)    Temporomandibular joint disorder    Neck pain     Past Surgical History:   Procedure Laterality Date    BIOPSY  23 Sep 2016    BRONCHOSCOPY FLEXIBLE N/A 09/23/2016    Procedure: BRONCHOSCOPY FLEXIBLE;  Surgeon: Gayle Moya MD;  [facility]    COLONOSCOPY  05/01/2003    COLONOSCOPY N/A 09/23/2022    Procedure: COLONOSCOPY, WITH  POLYPECTOMY AND BIOPSY;  Surgeon: Abbe Mccarty MD;  [facility]    COLONOSCOPY N/A 2023    Procedure: Colonoscopy;  Surgeon: Rosy Schofield MD;  Location: [facility]    COLONOSCOPY WITH CO2 INSUFFLATION N/A 2017    Procedure: COLONOSCOPY WITH CO2 INSUFFLATION;  COLON SCREEN/ SEB;  Surgeon: Margarito Rasheed DO;  [facility]    COLONOSCOPY WITH CO2 INSUFFLATION N/A 2022    Procedure: COLONOSCOPY, WITH CO2 INSUFFLATION;  Surgeon: Abbe Mccarty MD;  [facility]    COMBINED ESOPHAGOSCOPY, GASTROSCOPY, DUODENOSCOPY (EGD) WITH CO2 INSUFFLATION N/A 2019    Procedure: COMBINED ESOPHAGOSCOPY, GASTROSCOPY, DUODENOSCOPY (EGD) WITH CO2 INSUFFLATION;  Surgeon: Abbe Mccarty MD;  [facility]    ESOPHAGOSCOPY, GASTROSCOPY, DUODENOSCOPY (EGD), COMBINED N/A 2019    Procedure: Combined Esophagoscopy, Gastroscopy, Duodenoscopy (Egd), Biopsy Single Or Multiple;  Surgeon: Abbe Mccarty MD;  [facility]    GENITOURINARY SURGERY      kidney stones    HERNIA REPAIR  May 2023    THORACOSCOPIC WEDGE RESECTION LUNG Left 2016    Procedure: THORACOSCOPIC WEDGE RESECTION LUNG;  Surgeon: Gayle Moya MD;  Location: [facility]    VASCULAR SURGERY      varicose vein       Social History     Tobacco Use    Smoking status: Former     Current packs/day: 0.00     Average packs/day: 0.5 packs/day for 1 year (0.5 ttl pk-yrs)     Types: Cigarettes     Quit date: 1992     Years since quittin.0    Smokeless tobacco: Never    Tobacco comments:     15 + years    Substance Use Topics    Alcohol use: No     Family History   Problem Relation Age of Onset    Hernia Mother          age 97    Cerebrovascular Disease Father 64    Cancer Sister         ovarary     Other Cancer Sister     Obesity Sister     Deep Vein Thrombosis (DVT) No family hx of          Current Outpatient Medications   Medication Sig Dispense Refill    allopurinol  (ZYLOPRIM) 100 MG tablet TAKE 2 TABLETS BY MOUTH EVERY  tablet 3    atorvastatin (LIPITOR) 80 MG tablet Take 1 tablet (80 mg) by mouth daily. 90 tablet 3    blood glucose (CONTOUR NEXT TEST) test strip USE TO TEST BLOOD SUGAR 1 TIME DAILY OR AS DIRECTED. 100 strip 11    clotrimazole-betamethasone (LOTRISONE) 1-0.05 % external cream Apply topically 2 times daily.      Continuous Glucose Sensor (FREESTYLE FEDERICO 3 PLUS SENSOR) MISC Use 1 sensor every 15 days. Use to read blood sugars per 's instructions. 6 each 3    cyanocobalamin (VITAMIN B-12) 1000 MCG tablet Take 1,000 mcg by mouth daily      glimepiride (AMARYL) 4 MG tablet Take 1 tablet (4 mg) by mouth 2 times daily. 180 tablet 3    ketoconazole (NIZORAL) 2 % external shampoo APPLY TOPICALLY EVERY OTHER DAY LATHER INTO RASH AND WASH OFF AFTER 10 MINUTES. 120 mL 0    losartan-hydrochlorothiazide (HYZAAR) 100-25 MG tablet Take 1 tablet by mouth every morning. For blood pressure. 90 tablet 3    metFORMIN (GLUCOPHAGE) 1000 MG tablet TAKE 1 TABLET BY MOUTH TWICE A DAY WITH FOOD 180 tablet 3    metoprolol succinate ER (TOPROL XL) 50 MG 24 hr tablet Take 1 tablet (50 mg) by mouth daily. 90 tablet 3    nitroGLYcerin (NITROSTAT) 0.4 MG sublingual tablet For chest pain place 1 tablet under the tongue every 5 minutes for 3 doses. If symptoms persist 5 minutes after 1st dose call 911. 25 tablet 3    omega-3 acid ethyl esters (LOVAZA) 1 g capsule Take 2 capsules (2 g) by mouth 2 times daily 360 capsule 3    omeprazole (PRILOSEC) 20 MG DR capsule Take 1 capsule (20 mg) by mouth 2 times daily. 180 capsule 3    Oxymetazoline HCl (AFRIN NASAL SPRAY NA) Spray in nostril as needed      Semaglutide, 2 MG/DOSE, (OZEMPIC) 8 MG/3ML pen Inject 2 mg subcutaneously every 7 days. 12 mL 2    senna-docusate (SENEXON-S) 8.6-50 MG tablet Take 1 tablet by mouth at bedtime. 90 tablet 2    terazosin (HYTRIN) 5 MG capsule TAKE 1 CAPSULE (5 MG) BY MOUTH AT BEDTIME FOR BLOOD  PRESSURE. 90 capsule 3     Allergies   Allergen Reactions    Pcn [Penicillins] Rash    Ace Inhibitors Cough    Indomethacin Other (See Comments)     Severe dizziness    Invokana [Canagliflozin]      bladder infection     Recent Labs   Lab Test 11/27/24  1053 09/10/24  0755 04/24/24  0811 12/05/23  0820 05/09/23  1540 10/28/22  1923 10/24/22  1020 09/28/22  1315 09/16/22  1519 05/27/22  1412 05/26/21  0946 02/16/21  0759 12/08/20  0838 11/02/20  1632 05/20/20  1229   A1C  --  7.5* 7.8* 7.7* 8.0*   < >  --   --    < > 8.0*   < > 8.4*  --    < > 11.4*   LDL  --   --  20  --  <5  --   --   --   --  <5   < >  --   --   --  40   HDL  --   --  27*  --  31*  --   --   --   --  29*   < >  --   --   --  36*   TRIG  --   --  203*  --  281*  --   --   --   --  348*   < >  --   --   --  308*   ALT 27  --   --   --   --   --  43 34  --  53   < >  --   --   --  44   CR 0.80 0.74  --  0.75 0.81   < > 0.87 0.99   < > 0.79   < > 0.71  --   --  0.59*   GFRESTIMATED >90 >90  --  >90 >90   < > >90 82   < > >90   < > >90 >90  --  >90   GFRESTBLACK  --   --   --   --   --   --   --   --   --   --   --  >90 >90  --  >90   POTASSIUM 4.1 4.0  --  3.9 3.7   < > 3.7 3.7   < > 3.8   < > 4.0  --   --  3.7   TSH  --   --   --   --   --   --   --   --   --  2.49  --   --   --   --  2.42    < > = values in this interval not displayed.      Current providers sharing in care for this patient include:  Patient Care Team:  Yeison Villegas MD as PCP - General (Family Medicine)  Zhanna Ziegler APRN CNS as Referring Physician (Clinical Nurse Specialist)  Dorcas Amezcua RD as Diabetes Educator (Dietitian, Registered)  Yeison Villegas MD as Assigned PCP  Dane Craft RPH as Pharmacist (Pharmacist)  Shubham Cline MD as MD (Surgery)  Dina Ricardo PA-C as Physician Assistant (Pulmonary Disease)  Dane Craft RPH as Assigned [place] Pharmacist  Dina Ricardo PA-C as Assigned Cancer Care Provider  Mir Ambrocio MD as MD  "(Otolaryngology)  Yomaira Abarca PA-C as Assigned Heart and Vascular Provider  Mir Ambrocio MD as Assigned Surgical Provider  Zhao Joyce MD as MD (Urology)    The following health maintenance items are reviewed in Epic and correct as of today:  Health Maintenance   Topic Date Due    RSV VACCINE (1 - Risk 60-74 years 1-dose series) Never done    URIC ACID  02/21/2023    COVID-19 Vaccine (5 - 2024-25 season) 09/01/2024    MEDICARE ANNUAL WELLNESS VISIT  12/05/2024    MICROALBUMIN  12/05/2024    ANNUAL REVIEW OF HM ORDERS  12/05/2024    A1C  03/10/2025    LIPID  04/24/2025    PSA  04/24/2025    DIABETIC FOOT EXAM  04/24/2025    EYE EXAM  11/15/2025    BMP  11/27/2025    FALL RISK ASSESSMENT  01/07/2026    COLORECTAL CANCER SCREENING  09/27/2028    ADVANCE CARE PLANNING  12/05/2028    DTAP/TDAP/TD IMMUNIZATION (3 - Td or Tdap) 04/24/2034    HEPATITIS C SCREENING  Completed    PHQ-2 (once per calendar year)  Completed    INFLUENZA VACCINE  Completed    Pneumococcal Vaccine: 50+ Years  Completed    ZOSTER IMMUNIZATION  Completed    AORTIC ANEURYSM SCREENING (SYSTEM ASSIGNED)  Completed    HPV IMMUNIZATION  Aged Out    MENINGITIS IMMUNIZATION  Aged Out    RSV MONOCLONAL ANTIBODY  Aged Out         Review of Systems  Constitutional, HEENT, cardiovascular, pulmonary, GI, , musculoskeletal, neuro, skin, endocrine and psych systems are negative, except as otherwise noted.     Objective    Exam  /79 (BP Location: Left arm, Patient Position: Sitting, Cuff Size: Adult Large)   Pulse 72   Temp 96.9  F (36.1  C) (Temporal)   Resp 16   Ht 1.753 m (5' 9\")   Wt 95.6 kg (210 lb 12.8 oz)   SpO2 97%   BMI 31.13 kg/m     Estimated body mass index is 31.13 kg/m  as calculated from the following:    Height as of 10/23/24: 1.753 m (5' 9\").    Weight as of this encounter: 95.6 kg (210 lb 12.8 oz).    Physical Exam  GENERAL: alert and no distress  NECK: no adenopathy, no asymmetry, masses, or scars  RESP: lungs " clear to auscultation - no rales, rhonchi or wheezes  CV: regular rate and rhythm, normal S1 S2, no S3 or S4, no murmur, click or rub, no peripheral edema  ABDOMEN: soft, nontender, no hepatosplenomegaly, no masses and bowel sounds normal  MS: no gross musculoskeletal defects noted, no edema        1/7/2025   Mini Cog   Clock Draw Score 2 Normal   3 Item Recall 2 objects recalled   Mini Cog Total Score 4            Signed Electronically by: Yeison Villegas MD, MD

## 2025-01-16 ENCOUNTER — OFFICE VISIT (OUTPATIENT)
Dept: UROLOGY | Facility: CLINIC | Age: 70
End: 2025-01-16
Attending: FAMILY MEDICINE
Payer: COMMERCIAL

## 2025-01-16 DIAGNOSIS — N20.0 NEPHROLITHIASIS: ICD-10-CM

## 2025-01-16 NOTE — PROGRESS NOTES
Urology Consult History and Physical  [facility]  Name: Sven Givens    MRN: [id number]   YOB: 1955       We were asked to see Sven Givens at the request of Dr. Villegas for evaluation and treatment of left kidney stone.        Chief Complaint:   LEFT kidney stone    History is obtained from the patient            History of Present Illness:   Sven Givens is a 69 year old male who is being seen for evaluation of LEFT kidney stone    History of kidney stones  Had stone removed about 20 years ago  Was recently on a cruise and developed sudden decreased I urine stream and found to have a stone in the urethra and this was removed with a tweezers  He has been doing well now with no flank pain or issues    H/O lung and colon cancer and had recent surveillance CT scan           Past Medical History:     Past Medical History:   Diagnosis Date    Allergies     PCN, ACE, Indomethocin    Cancer (H)     small cell lung cancer stage I    Diabetes (H) 2002    Diabetic neuropathy (H) 5/7/2012    Hypertension     Kidney stones 09/2004    s/p right kidney basket retrieval    Rhinitis, allergic     Rosacea     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, overuse, 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    Varicose veins             Past Surgical History:     Past Surgical History:   Procedure Laterality Date    BIOPSY  23 Sep 2016    BRONCHOSCOPY FLEXIBLE N/A 09/23/2016    Procedure: BRONCHOSCOPY FLEXIBLE;  Surgeon: Gayle Moya MD;  [facility]: [facility]    COLONOSCOPY  05/01/2003    COLONOSCOPY N/A 09/23/2022    Procedure: COLONOSCOPY, WITH POLYPECTOMY AND BIOPSY;  Surgeon: Abbe Mccarty MD;  [facility]    COLONOSCOPY N/A 09/27/2023    Procedure: Colonoscopy;  Surgeon: Rosy Schofield MD;  Location: [facility]    COLONOSCOPY WITH CO2 INSUFFLATION N/A 05/31/2017    Procedure: COLONOSCOPY WITH CO2 INSUFFLATION;  COLON SCREEN/ SEB;  Surgeon: Margarito Rasheed DO;  [facility]    COLONOSCOPY WITH CO2 INSUFFLATION N/A  2022    Procedure: COLONOSCOPY, WITH CO2 INSUFFLATION;  Surgeon: Abbe Mccarty MD;  [facility]    COMBINED ESOPHAGOSCOPY, GASTROSCOPY, DUODENOSCOPY (EGD) WITH CO2 INSUFFLATION N/A 2019    Procedure: COMBINED ESOPHAGOSCOPY, GASTROSCOPY, DUODENOSCOPY (EGD) WITH CO2 INSUFFLATION;  Surgeon: Abbe Mccarty MD;  [facility]    ESOPHAGOSCOPY, GASTROSCOPY, DUODENOSCOPY (EGD), COMBINED N/A 2019    Procedure: Combined Esophagoscopy, Gastroscopy, Duodenoscopy (Egd), Biopsy Single Or Multiple;  Surgeon: Abbe Mccarty MD;  [facility]    GENITOURINARY SURGERY      kidney stones    HERNIA REPAIR  May 2023    THORACOSCOPIC WEDGE RESECTION LUNG Left 2016    Procedure: THORACOSCOPIC WEDGE RESECTION LUNG;  Surgeon: Gayle Moya MD;  Location: [facility]    VASCULAR SURGERY      varicose vein            Social History:     Social History     Tobacco Use    Smoking status: Former     Current packs/day: 0.00     Average packs/day: 0.5 packs/day for 1 year (0.5 ttl pk-yrs)     Types: Cigarettes     Quit date: 1992     Years since quittin.0    Smokeless tobacco: Never    Tobacco comments:     15 + years    Substance Use Topics    Alcohol use: No       History   Smoking Status    Former    Types: Cigarettes   Smokeless Tobacco    Never            Family History:     Family History   Problem Relation Age of Onset    Hernia Mother          age 97    Cerebrovascular Disease Father 64    Cancer Sister         ovarary     Other Cancer Sister     Obesity Sister     Deep Vein Thrombosis (DVT) No family hx of               Allergies:     Allergies   Allergen Reactions    Pcn [Penicillins] Rash    Ace Inhibitors Cough    Indomethacin Other (See Comments)     Severe dizziness    Invokana [Canagliflozin]      bladder infection            Medications:     Current Outpatient Medications   Medication Sig Dispense Refill    allopurinol (ZYLOPRIM) 100 MG tablet TAKE  2 TABLETS BY MOUTH EVERY  tablet 3    atorvastatin (LIPITOR) 80 MG tablet Take 1 tablet (80 mg) by mouth daily. 90 tablet 3    blood glucose (CONTOUR NEXT TEST) test strip USE TO TEST BLOOD SUGAR 1 TIME DAILY OR AS DIRECTED. 100 strip 11    clotrimazole-betamethasone (LOTRISONE) 1-0.05 % external cream Apply topically 2 times daily.      Continuous Glucose Sensor (FREESTYLE FEDERICO 3 PLUS SENSOR) MISC Use 1 sensor every 15 days. Use to read blood sugars per 's instructions. 6 each 3    cyanocobalamin (VITAMIN B-12) 1000 MCG tablet Take 1,000 mcg by mouth daily      glimepiride (AMARYL) 4 MG tablet Take 1 tablet (4 mg) by mouth 2 times daily. 180 tablet 3    ketoconazole (NIZORAL) 2 % external shampoo APPLY TOPICALLY EVERY OTHER DAY LATHER INTO RASH AND WASH OFF AFTER 10 MINUTES. 120 mL 0    losartan-hydrochlorothiazide (HYZAAR) 100-25 MG tablet Take 1 tablet by mouth every morning. For blood pressure. 90 tablet 3    metFORMIN (GLUCOPHAGE) 1000 MG tablet TAKE 1 TABLET BY MOUTH TWICE A DAY WITH FOOD 180 tablet 3    metoprolol succinate ER (TOPROL XL) 50 MG 24 hr tablet Take 1 tablet (50 mg) by mouth daily. 90 tablet 3    nitroGLYcerin (NITROSTAT) 0.4 MG sublingual tablet For chest pain place 1 tablet under the tongue every 5 minutes for 3 doses. If symptoms persist 5 minutes after 1st dose call 911. 25 tablet 3    omega-3 acid ethyl esters (LOVAZA) 1 g capsule Take 2 capsules (2 g) by mouth 2 times daily 360 capsule 3    omeprazole (PRILOSEC) 20 MG DR capsule Take 1 capsule (20 mg) by mouth 2 times daily. 180 capsule 3    Oxymetazoline HCl (AFRIN NASAL SPRAY NA) Spray in nostril as needed      Semaglutide, 2 MG/DOSE, (OZEMPIC) 8 MG/3ML pen Inject 2 mg subcutaneously every 7 days. 12 mL 2    senna-docusate (SENEXON-S) 8.6-50 MG tablet Take 1 tablet by mouth at bedtime. 90 tablet 2    terazosin (HYTRIN) 5 MG capsule TAKE 1 CAPSULE (5 MG) BY MOUTH AT BEDTIME FOR BLOOD PRESSURE. 90 capsule 3     No  current facility-administered medications for this visit.             Review of Systems:     Reviewed and negative except as noted above          Physical Exam:   No data found.  There is no height or weight on file to calculate BMI.     General: age-appropriate appearing male in NAD  HEENT: Head AT/NC, EOMI, CN Grossly intact  Lungs: no respiratory distress, or pursed lip breathing  Heart: No obvious jugular venous distension present  Abdomen: non-distended  Musculoskeltal: extremities normal, no peripheral edema  Skin: no suspicious lesions or rashes  Neuro: Alert, oriented, speech and mentation normal;  moving all 4 extremities equally.  Psych: affect and mood normal          Data:   All laboratory data reviewed:    UA RESULTS:  Recent Labs   Lab Test 08/09/19  0903   COLOR Yellow   APPEARANCE Clear   URINEGLC Negative   URINEBILI Negative   URINEKETONE 15*   SG 1.020   UBLD Large*   URINEPH 7.0   PROTEIN >=300*   UROBILINOGEN 0.2   NITRITE Positive*   LEUKEST Moderate*   RBCU 25-50*   WBCU 25-50*      PSA   Date Value Ref Range Status   11/02/2020 1.55 0 - 4 ug/L Final     Comment:     Assay Method:  Chemiluminescence using Siemens Vista analyzer     Prostate Specific Antigen Screen   Date Value Ref Range Status   01/07/2025 0.71 0.00 - 6.50 ng/mL Final   05/09/2023 1.02 0.00 - 4.00 ug/L Final      Lab Results   Component Value Date    CR 0.74 01/07/2025    CR 0.71 02/16/2021      IMAGING:  All pertinent imaging reviewed:    All imaging studies reviewed by me.  I personally reviewed these imaging films.  A formal report from radiology will follow.    CT ABD/PEL 12/10/2024:  IMPRESSION:   1. Postsurgical changes of left lower lobe wedge resection with a new  9 mm nodule along the resection margin. Recurrent disease is possible  although the timeframe would be atypical. Recommend short term follow  up chest CT.  2. Postsurgical changes of right hemicolectomy without evidence of  local recurrence.  3. No evidence  of metastatic disease in the abdomen or pelvis.         Impression and Plan:   Impression:   69-year-old man with left-sided kidney stone      Plan:   Left kidney stone  - I reviewed his labs which are notable for an unremarkable PSA, and normal serum creatinine  - I reviewed his CT scan and reviewed these images personally.  I agree with radiologist interpretation.  I reviewed his CTs back for the last several years with stability of his left midpole kidney stone which appears as this may be densely adherent to a Jeff's plaque or still within the renal parenchyma  - We discussed that given the stability of the stone, and that he is asymptomatic, I would recommend no treatment at this time  - He may follow-up with me on a as needed basis  - We discussed general stone prevention recommendations with increase p.o. fluid hydration     Thank you for the kind consultation.    Time spent: 20 minutes spent on the date of the encounter doing chart review, history and exam, documentation and further activities as noted above.    Zhao Joyce MD   Urology  [facility]  [facility] Phone: [phone]  [facility] Phone: [phone]

## 2025-01-16 NOTE — NURSING NOTE
Sven Givens's goals for this visit include:   Chief Complaint   Patient presents with    New Patient     Nephrolithiasis       He requests these members of his care team be copied on today's visit information:     PCP: Yeison Villegas    Referring Provider:  Yeison Villegas MD  [address] [place] [address]    There were no vitals taken for this visit.    Do you need any medication refills at today's visit?     Soraya Mcmillan on 1/16/2025 at 2:25 PM

## 2025-02-03 ENCOUNTER — ANCILLARY PROCEDURE (OUTPATIENT)
Dept: GENERAL RADIOLOGY | Facility: CLINIC | Age: 70
End: 2025-02-03
Attending: PODIATRIST
Payer: COMMERCIAL

## 2025-02-03 ENCOUNTER — OFFICE VISIT (OUTPATIENT)
Dept: PODIATRY | Facility: CLINIC | Age: 70
End: 2025-02-03
Payer: COMMERCIAL

## 2025-02-03 DIAGNOSIS — E11.628 TYPE 2 DIABETES MELLITUS WITH PRESSURE CALLUS (H): ICD-10-CM

## 2025-02-03 DIAGNOSIS — L84 TYPE 2 DIABETES MELLITUS WITH PRESSURE CALLUS (H): ICD-10-CM

## 2025-02-03 DIAGNOSIS — E11.49 TYPE II OR UNSPECIFIED TYPE DIABETES MELLITUS WITH NEUROLOGICAL MANIFESTATIONS, NOT STATED AS UNCONTROLLED(250.60) (H): Primary | ICD-10-CM

## 2025-02-03 DIAGNOSIS — E11.69 TYPE 2 DIABETES MELLITUS WITH DIABETIC FOOT DEFORMITY (H): ICD-10-CM

## 2025-02-03 DIAGNOSIS — M21.969 TYPE 2 DIABETES MELLITUS WITH DIABETIC FOOT DEFORMITY (H): ICD-10-CM

## 2025-02-03 DIAGNOSIS — M72.2 PLANTAR FASCIITIS OF LEFT FOOT: ICD-10-CM

## 2025-02-03 DIAGNOSIS — E11.51 DIABETES MELLITUS WITH PERIPHERAL VASCULAR DISEASE (H): ICD-10-CM

## 2025-02-03 PROCEDURE — 99204 OFFICE O/P NEW MOD 45 MIN: CPT | Performed by: PODIATRIST

## 2025-02-03 PROCEDURE — 73630 X-RAY EXAM OF FOOT: CPT | Mod: LT | Performed by: RADIOLOGY

## 2025-02-03 NOTE — PROGRESS NOTES
Past Medical History:   Diagnosis Date    Allergies     PCN, ACE, Indomethocin    Cancer (H)     small cell lung cancer stage I    Diabetes (H) 2002    Diabetic neuropathy (H) 5/7/2012    Hypertension     Kidney stones 09/2004    s/p right kidney basket retrieval    Rhinitis, allergic     Rosacea     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, overuse, 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    Varicose veins      Patient Active Problem List   Diagnosis    Essential hypertension with goal blood pressure less than 140/90    History of adenomatous polyp of colon    GERD (gastroesophageal reflux disease)    Anemia    Hyperlipidemia LDL goal <100    Gout    Type 2 diabetes mellitus with diabetic polyneuropathy, with long-term current use of insulin (H)    History of radiation exposure    Nonalcoholic hepatosteatosis    PRESLEY (obstructive sleep apnea)    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)    Age-related incipient cataract of both eyes    Vertigo    Colon adenocarcinoma (H)    Type 2 diabetes mellitus with diabetic neuropathy, without long-term current use of insulin (H)    Temporomandibular joint disorder    Neck pain     Past Surgical History:   Procedure Laterality Date    BIOPSY  23 Sep 2016    BRONCHOSCOPY FLEXIBLE N/A 09/23/2016    Procedure: BRONCHOSCOPY FLEXIBLE;  Surgeon: Gayle Moya MD;  [facility]    COLONOSCOPY  05/01/2003    COLONOSCOPY N/A 09/23/2022    Procedure: COLONOSCOPY, WITH POLYPECTOMY AND BIOPSY;  Surgeon: Abbe Mccarty MD;  [facility]    COLONOSCOPY N/A 09/27/2023    Procedure: Colonoscopy;  Surgeon: Rosy Schofield MD;  [facility]    COLONOSCOPY WITH CO2 INSUFFLATION N/A 05/31/2017    Procedure: COLONOSCOPY WITH CO2 INSUFFLATION;  COLON SCREEN/ SEB;  Surgeon: Margarito Rasheed DO;  [facility]    COLONOSCOPY WITH CO2 INSUFFLATION N/A 09/23/2022    Procedure: COLONOSCOPY, WITH CO2 INSUFFLATION;   Surgeon: Abbe Mccarty MD;  [facility]    COMBINED ESOPHAGOSCOPY, GASTROSCOPY, DUODENOSCOPY (EGD) WITH CO2 INSUFFLATION N/A 2019    Procedure: COMBINED ESOPHAGOSCOPY, GASTROSCOPY, DUODENOSCOPY (EGD) WITH CO2 INSUFFLATION;  Surgeon: Abbe Mccarty MD;  [facility]    ESOPHAGOSCOPY, GASTROSCOPY, DUODENOSCOPY (EGD), COMBINED N/A 2019    Procedure: Combined Esophagoscopy, Gastroscopy, Duodenoscopy (Egd), Biopsy Single Or Multiple;  Surgeon: Abbe Mccarty MD;  [facility]    GENITOURINARY SURGERY      kidney stones    HERNIA REPAIR  May 2023    THORACOSCOPIC WEDGE RESECTION LUNG Left 2016    Procedure: THORACOSCOPIC WEDGE RESECTION LUNG;  Surgeon: Gayle Moya MD;  [facility]    VASCULAR SURGERY      varicose vein     Social History     Socioeconomic History    Marital status:      Spouse name: Not on file    Number of children: Not on file    Years of education: Not on file    Highest education level: Not on file   Occupational History     Employer: [employer]   Tobacco Use    Smoking status: Former     Current packs/day: 0.00     Average packs/day: 0.5 packs/day for 1 year (0.5 ttl pk-yrs)     Types: Cigarettes     Quit date: 1992     Years since quittin.1    Smokeless tobacco: Never    Tobacco comments:     15 + years    Vaping Use    Vaping status: Never Used   Substance and Sexual Activity    Alcohol use: No    Drug use: No    Sexual activity: Not Currently     Partners: Female     Birth control/protection: None   Other Topics Concern    Parent/sibling w/ CABG, MI or angioplasty before 65F 55M? No   Social History Narrative    Not on file     Social Drivers of Health     Financial Resource Strain: Low Risk  (2025)    Financial Resource Strain     Within the past 12 months, have you or your family members you live with been unable to get utilities (heat, electricity) when it was really needed?: No   Food  Insecurity: Low Risk  (2025)    Food Insecurity     Within the past 12 months, did you worry that your food would run out before you got money to buy more?: No     Within the past 12 months, did the food you bought just not last and you didn t have money to get more?: No   Transportation Needs: Low Risk  (2025)    Transportation Needs     Within the past 12 months, has lack of transportation kept you from medical appointments, getting your medicines, non-medical meetings or appointments, work, or from getting things that you need?: No   Physical Activity: Insufficiently Active (2025)    Exercise Vital Sign     Days of Exercise per Week: 3 days     Minutes of Exercise per Session: 40 min   Stress: Patient Declined (2025)    [employer] - Occupational Stress Questionnaire     Feeling of Stress : Patient declined   Social Connections: Unknown (2025)    Social Connection and Isolation Panel [NHANES]     Frequency of Communication with Friends and Family: Not on file     Frequency of Social Gatherings with Friends and Family: Twice a week     Attends Scientologist Services: Not on file     Active Member of Clubs or Organizations: Not on file     Attends Club or Organization Meetings: Not on file     Marital Status: Not on file   Interpersonal Safety: Low Risk  (2025)    Interpersonal Safety     Do you feel physically and emotionally safe where you currently live?: Yes     Within the past 12 months, have you been hit, slapped, kicked or otherwise physically hurt by someone?: No     Within the past 12 months, have you been humiliated or emotionally abused in other ways by your partner or ex-partner?: No   Housing Stability: Low Risk  (2025)    Housing Stability     Do you have housing? : Yes     Are you worried about losing your housing?: No     Family History   Problem Relation Age of Onset    Hernia Mother          age 97    Cerebrovascular Disease Father 64     "Cancer Sister         ovarary     Other Cancer Sister     Obesity Sister     Deep Vein Thrombosis (DVT) No family hx of        Lab Results   Component Value Date    A1C 7.5 01/07/2025    A1C 7.5 09/10/2024    A1C 7.8 04/24/2024    A1C 7.7 12/05/2023    A1C 8.0 05/09/2023    A1C 7.4 05/26/2021    A1C 8.4 02/16/2021    A1C 11.4 11/02/2020    A1C 11.4 05/20/2020    A1C 9.3 12/12/2019       Uric Acid   Date Value Ref Range Status   01/07/2025 4.6 3.4 - 7.0 mg/dL Final   02/16/2021 4.3 3.5 - 7.2 mg/dL Final     Last Comprehensive Metabolic Panel:  Lab Results   Component Value Date     01/07/2025    POTASSIUM 4.3 01/07/2025    CHLORIDE 100 01/07/2025    CO2 27 01/07/2025    ANIONGAP 12 01/07/2025     (H) 01/07/2025    BUN 13.1 01/07/2025    CR 0.74 01/07/2025    GFRESTIMATED >90 01/07/2025    ODELL 10.1 01/07/2025       Lab Results   Component Value Date    AST 37 01/07/2025    AST 24 10/16/2018     Lab Results   Component Value Date    ALT 43 01/07/2025    ALT 44 05/20/2020     No results found for: \"BILICONJ\"   Lab Results   Component Value Date    BILITOTAL 0.5 01/07/2025    BILITOTAL 0.3 10/16/2018     Lab Results   Component Value Date    ALBUMIN 4.5 01/07/2025    ALBUMIN 4.0 10/24/2022    ALBUMIN 4.1 03/08/2019     Lab Results   Component Value Date    PROTTOTAL 7.6 01/07/2025    PROTTOTAL 7.7 10/16/2018      Lab Results   Component Value Date    ALKPHOS 106 01/07/2025    ALKPHOS 121 10/16/2018             Subjective findings- 69-year-old presents for left heel pain.  Relates has been getting left heel pain for 1+ months duration, relates had left hip pain for about 3 months but that is gone away, relates he intermittently gets pain in his legs, relates to no injuries, relates is worse in the morning when he gets up on the first few steps, relates as he walks it goes away, relates took Ibuprofen a few times and that helped, relates the pain is better and worse at different times.  Relates he is Diabetic, " relates he has numbness tingling and Neuropathy in his feet, relates to no history of ulcers or sores.  Relates he does not wear Diabetic shoes.  Relates he has history Gout.    Objective findings- DP and PT are 2 out of 4 bilaterally.  Has dorsally contracted digits 2 through 5 bilaterally.  Has flexible flatfoot type bilaterally.  Has hyperkeratotic tissue buildup plantar left fifth MPJ.  Has mild hyperkeratotic tissue buildup plantar first and fifth MPJ on the right.  Has mild pain on palpation plantar left heel, he relates this is where it hurts when it hurts.  Sharp/dull is decreased with 5.07 Sinton Carlos Alberto monofilament bilaterally, proprioception is intact bilaterally, deep tendon reflexes are intact bilaterally, no tendon voids bilaterally.  There is no erythema, no drainage, no odor, no calor bilaterally.  X-rays 3 views left foot reviewed with patient in clinic today with joint and cortical margins intact, decreased calcaneal inclination, plantar and posterior calcaneal spurring, diffuse arterial sclerosis, joint space narrowing and irregularity of the talonavicular navicular cuneiform joint, elevated first metatarsal, accessory ossicle at the lateral cuboid and medial navicular noted.    Assessment and plan- Plantar Fasciitis left.  Diabetes with peripheral Neuropathy.  Diabetes with Hammertoes.  Diabetes with callus.  Diagnosis and treatment options discussed with the patient.  X-rays left foot ordered, reviewed and use discussed with the patient.  Patient is advised on runner stretch.  Prescription for Diabetic shoes and inserts given and patient is given the phone number address orthotics and prosthetics lab for these.  Prescription for Voltaren gel given and use discussed with the patient, he relates he has used Ibuprofen with no problems.  Referral to vascular given and use discussed with the patient.  Patient opted for no physical therapy today.  Previous notes reviewed.  Return to clinic and see  me in 2 months                                        Moderate level of medical decision making.

## 2025-02-03 NOTE — LETTER
2/3/2025      Sven Givens  [address]  [place] [address]      Dear Colleague,    Thank you for referring your patient, Sven Givens, to the [facility]. Please see a copy of my visit note below.    Past Medical History:   Diagnosis Date    Allergies     PCN, ACE, Indomethocin    Cancer (H)     small cell lung cancer stage I    Diabetes (H) 2002    Diabetic neuropathy (H) 5/7/2012    Hypertension     Kidney stones 09/2004    s/p right kidney basket retrieval    Rhinitis, allergic     Rosacea     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, overuse, 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    Varicose veins      Patient Active Problem List   Diagnosis    Essential hypertension with goal blood pressure less than 140/90    History of adenomatous polyp of colon    GERD (gastroesophageal reflux disease)    Anemia    Hyperlipidemia LDL goal <100    Gout    Type 2 diabetes mellitus with diabetic polyneuropathy, with long-term current use of insulin (H)    History of radiation exposure    Nonalcoholic hepatosteatosis    PRESLEY (obstructive sleep apnea)    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)    Age-related incipient cataract of both eyes    Vertigo    Colon adenocarcinoma (H)    Type 2 diabetes mellitus with diabetic neuropathy, without long-term current use of insulin (H)    Temporomandibular joint disorder    Neck pain     Past Surgical History:   Procedure Laterality Date    BIOPSY  23 Sep 2016    BRONCHOSCOPY FLEXIBLE N/A 09/23/2016    Procedure: BRONCHOSCOPY FLEXIBLE;  Surgeon: Gayle Moya MD;  [facility]    COLONOSCOPY  05/01/2003    COLONOSCOPY N/A 09/23/2022    Procedure: COLONOSCOPY, WITH POLYPECTOMY AND BIOPSY;  Surgeon: Abbe Mccarty MD;  Location:  OR    COLONOSCOPY N/A 09/27/2023    Procedure: Colonoscopy;  Surgeon: Rosy Schofield MD;  [facility]    COLONOSCOPY WITH CO2  INSUFFLATION N/A 2017    Procedure: COLONOSCOPY WITH CO2 INSUFFLATION;  COLON SCREEN/ SEB;  Surgeon: Margarito Rasheed DO;  [facility]    COLONOSCOPY WITH CO2 INSUFFLATION N/A 2022    Procedure: COLONOSCOPY, WITH CO2 INSUFFLATION;  Surgeon: Abbe Mccarty MD;  [facility]    COMBINED ESOPHAGOSCOPY, GASTROSCOPY, DUODENOSCOPY (EGD) WITH CO2 INSUFFLATION N/A 2019    Procedure: COMBINED ESOPHAGOSCOPY, GASTROSCOPY, DUODENOSCOPY (EGD) WITH CO2 INSUFFLATION;  Surgeon: Abbe Mccarty MD;  [facility]    ESOPHAGOSCOPY, GASTROSCOPY, DUODENOSCOPY (EGD), COMBINED N/A 2019    Procedure: Combined Esophagoscopy, Gastroscopy, Duodenoscopy (Egd), Biopsy Single Or Multiple;  Surgeon: Abbe Mccarty MD;  Location: [facility]    GENITOURINARY SURGERY      kidney stones    HERNIA REPAIR  May 2023    THORACOSCOPIC WEDGE RESECTION LUNG Left 2016    Procedure: THORACOSCOPIC WEDGE RESECTION LUNG;  Surgeon: Gayle Moya MD;  [facility]    VASCULAR SURGERY      varicose vein     Social History     Socioeconomic History    Marital status:      Spouse name: Not on file    Number of children: Not on file    Years of education: Not on file    Highest education level: Not on file   Occupational History     Employer: [employer]   Tobacco Use    Smoking status: Former     Current packs/day: 0.00     Average packs/day: 0.5 packs/day for 1 year (0.5 ttl pk-yrs)     Types: Cigarettes     Quit date: 1992     Years since quittin.1    Smokeless tobacco: Never    Tobacco comments:     15 + years    Vaping Use    Vaping status: Never Used   Substance and Sexual Activity    Alcohol use: No    Drug use: No    Sexual activity: Not Currently     Partners: Female     Birth control/protection: None   Other Topics Concern    Parent/sibling w/ CABG, MI or angioplasty before 65F 55M? No   Social History Narrative    Not on file     Social Drivers of  Health     Financial Resource Strain: Low Risk  (1/2/2025)    Financial Resource Strain     Within the past 12 months, have you or your family members you live with been unable to get utilities (heat, electricity) when it was really needed?: No   Food Insecurity: Low Risk  (1/2/2025)    Food Insecurity     Within the past 12 months, did you worry that your food would run out before you got money to buy more?: No     Within the past 12 months, did the food you bought just not last and you didn t have money to get more?: No   Transportation Needs: Low Risk  (1/2/2025)    Transportation Needs     Within the past 12 months, has lack of transportation kept you from medical appointments, getting your medicines, non-medical meetings or appointments, work, or from getting things that you need?: No   Physical Activity: Insufficiently Active (1/2/2025)    Exercise Vital Sign     Days of Exercise per Week: 3 days     Minutes of Exercise per Session: 40 min   Stress: Patient Declined (1/2/2025)    [employer] - Occupational Stress Questionnaire     Feeling of Stress : Patient declined   Social Connections: Unknown (1/2/2025)    Social Connection and Isolation Panel [NHANES]     Frequency of Communication with Friends and Family: Not on file     Frequency of Social Gatherings with Friends and Family: Twice a week     Attends Congregational Services: Not on file     Active Member of Clubs or Organizations: Not on file     Attends Club or Organization Meetings: Not on file     Marital Status: Not on file   Interpersonal Safety: Low Risk  (1/7/2025)    Interpersonal Safety     Do you feel physically and emotionally safe where you currently live?: Yes     Within the past 12 months, have you been hit, slapped, kicked or otherwise physically hurt by someone?: No     Within the past 12 months, have you been humiliated or emotionally abused in other ways by your partner or ex-partner?: No   Housing Stability: Low  "Risk  (2025)    Housing Stability     Do you have housing? : Yes     Are you worried about losing your housing?: No     Family History   Problem Relation Age of Onset    Hernia Mother          age 97    Cerebrovascular Disease Father 64    Cancer Sister         ovarary     Other Cancer Sister     Obesity Sister     Deep Vein Thrombosis (DVT) No family hx of        Lab Results   Component Value Date    A1C 7.5 2025    A1C 7.5 09/10/2024    A1C 7.8 2024    A1C 7.7 2023    A1C 8.0 2023    A1C 7.4 2021    A1C 8.4 2021    A1C 11.4 2020    A1C 11.4 2020    A1C 9.3 2019       Uric Acid   Date Value Ref Range Status   2025 4.6 3.4 - 7.0 mg/dL Final   2021 4.3 3.5 - 7.2 mg/dL Final     Last Comprehensive Metabolic Panel:  Lab Results   Component Value Date     2025    POTASSIUM 4.3 2025    CHLORIDE 100 2025    CO2 27 2025    ANIONGAP 12 2025     (H) 2025    BUN 13.1 2025    CR 0.74 2025    GFRESTIMATED >90 2025    ODELL 10.1 2025       Lab Results   Component Value Date    AST 37 2025    AST 24 10/16/2018     Lab Results   Component Value Date    ALT 43 2025    ALT 44 2020     No results found for: \"BILICONJ\"   Lab Results   Component Value Date    BILITOTAL 0.5 2025    BILITOTAL 0.3 10/16/2018     Lab Results   Component Value Date    ALBUMIN 4.5 2025    ALBUMIN 4.0 10/24/2022    ALBUMIN 4.1 2019     Lab Results   Component Value Date    PROTTOTAL 7.6 2025    PROTTOTAL 7.7 10/16/2018      Lab Results   Component Value Date    ALKPHOS 106 2025    ALKPHOS 121 10/16/2018             Subjective findings- 69-year-old presents for left heel pain.  Relates has been getting left heel pain for 1+ months duration, relates had left hip pain for about 3 months but that is gone away, relates he intermittently gets pain in his legs, relates to no " injuries, relates is worse in the morning when he gets up on the first few steps, relates as he walks it goes away, relates took Ibuprofen a few times and that helped, relates the pain is better and worse at different times.  Relates he is Diabetic, relates he has numbness tingling and Neuropathy in his feet, relates to no history of ulcers or sores.  Relates he does not wear Diabetic shoes.  Relates he has history Gout.    Objective findings- DP and PT are 2 out of 4 bilaterally.  Has dorsally contracted digits 2 through 5 bilaterally.  Has flexible flatfoot type bilaterally.  Has hyperkeratotic tissue buildup plantar left fifth MPJ.  Has mild hyperkeratotic tissue buildup plantar first and fifth MPJ on the right.  Has mild pain on palpation plantar left heel, he relates this is where it hurts when it hurts.  Sharp/dull is decreased with 5.[address] [place] [address]to monofilament bilaterally, proprioception is intact bilaterally, deep tendon reflexes are intact bilaterally, no tendon voids bilaterally.  There is no erythema, no drainage, no odor, no calor bilaterally.  X-rays 3 views left foot reviewed with patient in clinic today with joint and cortical margins intact, decreased calcaneal inclination, plantar and posterior calcaneal spurring, diffuse arterial sclerosis, joint space narrowing and irregularity of the talonavicular navicular cuneiform joint, elevated first metatarsal, accessory ossicle at the lateral cuboid and medial navicular noted.    Assessment and plan- Plantar Fasciitis left.  Diabetes with peripheral Neuropathy.  Diabetes with Hammertoes.  Diabetes with callus.  Diagnosis and treatment options discussed with the patient.  X-rays left foot ordered, reviewed and use discussed with the patient.  Patient is advised on runner stretch.  Prescription for Diabetic shoes and inserts given and patient is given the phone number address orthotics and prosthetics lab for these.  Prescription for Voltaren gel given  and use discussed with the patient, he relates he has used Ibuprofen with no problems.  Referral to vascular given and use discussed with the patient.  Patient opted for no physical therapy today.  Previous notes reviewed.  Return to clinic and see me in 2 months              Moderate level of medical decision making.        Again, thank you for allowing me to participate in the care of your patient.        Sincerely,    Amadeo Mercado DPM    Electronically signed

## 2025-02-03 NOTE — NURSING NOTE
Sven Givens's chief complaint for this visit includes:  Chief Complaint   Patient presents with    Consult     Painful to walk on his left foot     PCP: Yeison Villegas    Referring Provider:  Referred Self, MD  No address on file    There were no vitals taken for this visit.  Data Unavailable     Do you need any medication refills at today's visit? NO    Allergies   Allergen Reactions    Pcn [Penicillins] Rash    Ace Inhibitors Cough    Indomethacin Other (See Comments)     Severe dizziness    Invokana [Canagliflozin]      bladder infection       Erica Coles LPN

## 2025-02-05 ENCOUNTER — DOCUMENTATION ONLY (OUTPATIENT)
Dept: FAMILY MEDICINE | Facility: CLINIC | Age: 70
End: 2025-02-05
Payer: COMMERCIAL

## 2025-02-05 DIAGNOSIS — I83.893 SYMPTOMATIC VARICOSE VEINS OF BOTH LOWER EXTREMITIES: Primary | ICD-10-CM

## 2025-02-05 DIAGNOSIS — I70.213 ATHEROSCLEROSIS OF NATIVE ARTERIES OF EXTREMITIES WITH INTERMITTENT CLAUDICATION, BILATERAL LEGS: ICD-10-CM

## 2025-02-05 NOTE — PROGRESS NOTES
Medicare requires that the MD/DO treating the patient for diabetes answers all of the questions and signs the pended order for the patient to qualify for diabetic shoes. Once the order is completed, please route the encounter back to sender.    Nicole Khan

## 2025-02-20 ENCOUNTER — OFFICE VISIT (OUTPATIENT)
Dept: VASCULAR SURGERY | Facility: CLINIC | Age: 70
End: 2025-02-20
Payer: COMMERCIAL

## 2025-02-20 VITALS
BODY MASS INDEX: 31.32 KG/M2 | OXYGEN SATURATION: 97 % | HEART RATE: 102 BPM | DIASTOLIC BLOOD PRESSURE: 86 MMHG | SYSTOLIC BLOOD PRESSURE: 122 MMHG | WEIGHT: 212.1 LBS

## 2025-02-20 DIAGNOSIS — I83.893 SYMPTOMATIC VARICOSE VEINS OF BOTH LOWER EXTREMITIES: Primary | ICD-10-CM

## 2025-02-20 PROCEDURE — 99203 OFFICE O/P NEW LOW 30 MIN: CPT | Performed by: STUDENT IN AN ORGANIZED HEALTH CARE EDUCATION/TRAINING PROGRAM

## 2025-02-20 ASSESSMENT — PAIN SCALES - GENERAL: PAINLEVEL_OUTOF10: MODERATE PAIN (5)

## 2025-02-20 NOTE — NURSING NOTE
Chief Complaint   Patient presents with    New Patient     2/20/2025 visit with Sami Barnett MD for NEW VASCULAR PATIENT - Diabetes mellitus with peripheral vascular disease       Vitals were taken and medications reconciled.    Baldev Shelton, Visit Facilitator  1:32 PM

## 2025-02-20 NOTE — PROGRESS NOTES
"    VASCULAR AND INTERVENTIONAL RADIOLOGY   CLINIC NOTE  Feb 20, 2025    Referring Provider:   Referred Self, MD  No address on file     Patient Name:  Sven Givens  Medical Record Number:  [id number]  YOB: 1955  Reason for Visit:  Varicose Vein Evaluation    ASSESSMENT:  Varicose vein on physical exam, left greater than right.   CEAP Classification:  c4a  Venous Clinical Severity Score Calculator:  11    DDX:  May thurner (when there is unilateral leg swelling.  Consider MRV), PAD, lumbar DDD, Restless Leg syndrome    Plan:  -Venous Competency Ultrasound now  - Possible Venaseal given US findings  -3 month trial of medical grade compression (15-20mm Hg).  Wear daily and take off while sleeping.    Due to falling outside the measurements for standard over the counter compression, Patient is in need of custom fitted garment.  Rx is compression stockings under database (outpatient order) Miscellaneous CODE [id number]  \"Please measure and distribute two pairs of 215-20mmHG to the knee with extra refills as indicated.\"    -Doff/Ilia, zip up compression stockings, for lower extremities    -Skin care  -Leg elevation at least 1 hour/day  -Encouraged walking at least 20 minutes per day  -Counseled on weight loss and keeping weight stable.    -Discussed with patient that IR intervention would help with the leg symptoms and not help the appearance of varicose veins.   -Patients with refractory symptom management should undergo comprehensive lower extremity duplex exam (with showing demonstration of venous reflux)  -Ongoing symptoms warrant return visit with Interventional Radiologist/[facility] clinic to discuss treatment options.                                                                  -All of the patient's questions were answered to their satisfaction.          ===========================================================================================================    HISTORY OF PRESENT ILLNESS:  Sven" Tosha is a 69 year old male with chronic varicose veins in the lower extremities, left greater than right, with superficial venous treatment in [facility] 25 years prior x2 with improved symptoms. Pt reports heaviness, cramping, and discoloration of the LLE, worse at the end of the day, relived by compression and elevation.   Pt also reports focal pain at the base of the left calcaneus, tender to touch. This is likely separate and related to an ongoing plantar fascitis.   No active wounds.   Telengectasia noted throughout the LLE.     Hx of lung and colon cancer s/p resections, not on chemo. Recent CT chest demonstrates new small nodule, needing followup with possible biopsy impending.   No FH of DVT/PE or MAY thurner.   No bleeding and clotting disorders.    DM/HTN/HLD on current treatment with A1c at 7.5 with notable periph neuropathy        Onset: When did the pain start? 2 months prior  Location: Where is the pain? LLE  Duration: How long has the pain been going on? 2 months  Quality: What does the pain feel like? Cramping and squeezing, heaviness  Aggravating factors: What brings on the pain? What makes it better?   Radiating: Does the pain travel to another area of the body? no  Severity: How intense is the pain on a scale of 1-10? 6  Occupation?  Is there Prolonged standing or sitting? yes  Has it limited your ability to exercise or work? yes  Family history? N/a      Patient denies pain, leg heaviness, aching, swelling, skin dryness, tightness, bleeding itching, skin irritation,  history of DVT/PE, trauma to legs, smoking, rest pain, numbness/tingling fever, chest pain or SOB, wounds in the legs or feet, (pelvic congestion; pelvic pain, dyspareunia, urinary or GI symptoms), or muscle cramps.     Risk Factors: Venous claudication, which is a severe deep pain and tightness typically in the thigh muscles with vigorous exercise, can occur in the setting of acute, severe venous obstruction or longstanding venous  obstruction.  *Visible clinical signs of chronic venous disorders in increasing severity include dilated veins (eg, telangiectasia, varicose veins), leg edema, skin changes, and skin ulceration. The prevalence of symptoms and clinical signs of venous disease correlates with the presence of venous reflux (superficial, deep venous obstruction) identified on duplex ultrasound The most common risk factors include advancing age, family history of venous disease, increased body mass index, smoking, a history of lower extremity trauma, prior venous thrombosis, and, in women, pregnancy.     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    Varicose veins        MEDICATIONS:  Current Outpatient Medications   Medication Sig Dispense Refill    allopurinol (ZYLOPRIM) 100 MG tablet TAKE 2 TABLETS BY MOUTH EVERY  tablet 3    atorvastatin (LIPITOR) 80 MG tablet Take 1 tablet (80 mg) by mouth daily. 90 tablet 3    blood glucose (CONTOUR NEXT TEST) test strip USE TO TEST BLOOD SUGAR 1 TIME DAILY OR AS DIRECTED. 100 strip 11    clotrimazole-betamethasone (LOTRISONE) 1-0.05 % external cream Apply topically 2 times daily.      Continuous Glucose Sensor (FREESTYLE FEDERICO 3 PLUS SENSOR) MISC Use 1 sensor every 15 days. Use to read blood sugars per 's instructions. 6 each 3    cyanocobalamin (VITAMIN B-12) 1000 MCG tablet Take 1,000 mcg by mouth daily      diclofenac (VOLTAREN) 1 % topical gel 1 g to affected area on the left foot 2-3 times a day as needed for pain. 100 g 2    glimepiride (AMARYL) 4 MG tablet Take 1 tablet (4 mg) by mouth 2 times daily. 180 tablet 3     ketoconazole (NIZORAL) 2 % external shampoo APPLY TOPICALLY EVERY OTHER DAY LATHER INTO RASH AND WASH OFF AFTER 10 MINUTES. 120 mL 0    losartan-hydrochlorothiazide (HYZAAR) 100-25 MG tablet Take 1 tablet by mouth every morning. For blood pressure. 90 tablet 3    metFORMIN (GLUCOPHAGE) 1000 MG tablet TAKE 1 TABLET BY MOUTH TWICE A DAY WITH FOOD 180 tablet 3    metoprolol succinate ER (TOPROL XL) 50 MG 24 hr tablet Take 1 tablet (50 mg) by mouth daily. 90 tablet 3    nitroGLYcerin (NITROSTAT) 0.4 MG sublingual tablet For chest pain place 1 tablet under the tongue every 5 minutes for 3 doses. If symptoms persist 5 minutes after 1st dose call 911. 25 tablet 3    omega-3 acid ethyl esters (LOVAZA) 1 g capsule Take 2 capsules (2 g) by mouth 2 times daily 360 capsule 3    omeprazole (PRILOSEC) 20 MG DR capsule Take 1 capsule (20 mg) by mouth 2 times daily. 180 capsule 3    Oxymetazoline HCl (AFRIN NASAL SPRAY NA) Spray in nostril as needed      Semaglutide, 2 MG/DOSE, (OZEMPIC) 8 MG/3ML pen Inject 2 mg subcutaneously every 7 days. 12 mL 2    senna-docusate (SENEXON-S) 8.6-50 MG tablet Take 1 tablet by mouth at bedtime. 90 tablet 2    terazosin (HYTRIN) 5 MG capsule TAKE 1 CAPSULE (5 MG) BY MOUTH AT BEDTIME FOR BLOOD PRESSURE. 90 capsule 3     No current facility-administered medications for this visit.       ALLERGIES:  Allergies   Allergen Reactions    Pcn [Penicillins] Rash    Ace Inhibitors Cough    Indomethacin Other (See Comments)     Severe dizziness    Invokana [Canagliflozin]      bladder infection       FAMILY HISTORY:  Family History   Problem Relation Age of Onset    Hernia Mother          age 97    Cerebrovascular Disease Father 64    Cancer Sister         ovarary     Other Cancer Sister     Obesity Sister     Deep Vein Thrombosis (DVT) No family hx of        PAST SURGICAL HISTORY:  Past Surgical History:   Procedure Laterality Date    BIOPSY  23 Sep 2016    BRONCHOSCOPY FLEXIBLE N/A 2016     Procedure: BRONCHOSCOPY FLEXIBLE;  Surgeon: Gayle Moya MD;  [facility]    COLONOSCOPY  2003    COLONOSCOPY N/A 2022    Procedure: COLONOSCOPY, WITH POLYPECTOMY AND BIOPSY;  Surgeon: Abbe Mccarty MD;  [facility]    COLONOSCOPY N/A 2023    Procedure: Colonoscopy;  Surgeon: Rosy Schofield MD;  [facility]    COLONOSCOPY WITH CO2 INSUFFLATION N/A 2017    Procedure: COLONOSCOPY WITH CO2 INSUFFLATION;  COLON SCREEN/ SEB;  Surgeon: Margarito Rasheed DO;  [facility]    COLONOSCOPY WITH CO2 INSUFFLATION N/A 2022    Procedure: COLONOSCOPY, WITH CO2 INSUFFLATION;  Surgeon: Abbe Mccarty MD;  [facility]    COMBINED ESOPHAGOSCOPY, GASTROSCOPY, DUODENOSCOPY (EGD) WITH CO2 INSUFFLATION N/A 2019    Procedure: COMBINED ESOPHAGOSCOPY, GASTROSCOPY, DUODENOSCOPY (EGD) WITH CO2 INSUFFLATION;  Surgeon: Abbe Mccarty MD;  [facility]    ESOPHAGOSCOPY, GASTROSCOPY, DUODENOSCOPY (EGD), COMBINED N/A 2019    Procedure: Combined Esophagoscopy, Gastroscopy, Duodenoscopy (Egd), Biopsy Single Or Multiple;  Surgeon: Abbe Mccarty MD;  Location: [facility]    GENITOURINARY SURGERY      kidney stones    HERNIA REPAIR  May 2023    THORACOSCOPIC WEDGE RESECTION LUNG Left 2016    Procedure: THORACOSCOPIC WEDGE RESECTION LUNG;  Surgeon: Gayle Moya MD;  [facility]    VASCULAR SURGERY      varicose vein       SOCIAL HISTORY:  Social History     Tobacco Use    Smoking status: Former     Current packs/day: 0.00     Average packs/day: 0.5 packs/day for 1 year (0.5 ttl pk-yrs)     Types: Cigarettes     Quit date: 1992     Years since quittin.1    Smokeless tobacco: Never    Tobacco comments:     15 + years    Vaping Use    Vaping status: Never Used   Substance Use Topics    Alcohol use: No    Drug use: No       PHYSICAL EXAMINATION:  /86 (BP Location: Left arm, Patient Position: Sitting, Cuff  "Size: Adult Large)   Pulse 102   Wt 212 lb 1.6 oz   SpO2 97%   BMI 31.32 kg/m    General:  alert and oriented in NAD  Lower Extremity Exam: warm, non tender to palpation bilaterally. Minimal edema.  Distal pulses 2+ palpable.  No wounds appreciated.  Yes, Scattered telangectasia (spider veins), Reticular veins  chronic venostasis changes and skin changes noted. Periph neuropathy noted 2/2 DM2.       PERTINENT IMAGING:  US VENOUS COMPETENCY BILATERAL pending    Varicose veins: >3 mm  Reticular veins:  1-3 mm Reticular veins are dilated bluish subdermal veins   Telangiectasias:  <1 mm are a confluence of dilated intradermal venules <1 mm in diameter (picture 2A-B).    Venous Competency Diagnostic Criteria Adopted 11/29/11.     Venous competency criteria defining abnormal reflux duration:  Femoral - popliteal reflux > 1.0 sec.  Deep femoral, deep calf veins, and superficial vein reflux > 0.5 sec.   vein reflux > 0.35 sec.     Supporting document: J Vasc Surg 2003; 38:793-8. Definition of reflux  in lower extremity veins.    Reference: \"Duplex Ultrasound in the Diagnosis of Lower-Extremity Deep  Venous Thrombosis\"- Dora Euceda MD, S; Anson Guajardo MD  (Circulation. 2014;129:917-921. http://circ.ahajournals.org)      MEDIA pictures with DPM notes. No wounds.     Sami Barnett MD  Interventional Radiology  [phone] (IR RN triage)    CC:  Patient Care Team  Patient Care Team:  Yeison Villegas MD as PCP - General (Family Medicine)  Zhanna Ziegler APRN CNS as Referring Physician (Clinical Nurse Specialist)  Dorcas Amezcua RD as Diabetes Educator (Dietitian, Registered)  Yeison Villegas MD as Assigned PCP  Dane Craft RPH as Pharmacist (Pharmacist)  Shubham Cline MD as MD (Surgery)  Dina Ricardo PA-C as Physician Assistant (Pulmonary Disease)  Dane Craft RPH as Assigned MTM Pharmacist  Dina Ricardo PA-C as Assigned Cancer Care Provider  Mir Ambrocio " MD Olaf as MD (Otolaryngology)  Yomaira Abarca PA-C as Assigned Heart and Vascular Provider  Zhao Joyce MD as MD (Urology)  Zhao Joyce MD as Assigned Surgical Provider        apply 45 minutes level of service.  Varicose veins, associate diagnosis

## 2025-02-20 NOTE — LETTER
"2/20/2025       RE: Sven Givens  [address]  [place] [address]     Dear Colleague,    Thank you for referring your patient, Sven Givens, to the [facility] at [facility]. Please see a copy of my visit note below.        VASCULAR AND INTERVENTIONAL RADIOLOGY   CLINIC NOTE  Feb 20, 2025    Referring Provider:   Referred Self, MD  No address on file     Patient Name:  Sven Givens  Medical Record Number:  [id number]  YOB: 1955  Reason for Visit:  Varicose Vein Evaluation    ASSESSMENT:  Varicose vein on physical exam, left greater than right.   CEAP Classification:  c4a  Venous Clinical Severity Score Calculator:  11    DDX:  Vanesa diazurner (when there is unilateral leg swelling.  Consider MRV), PAD, lumbar DDD, Restless Leg syndrome    Plan:  -Venous Competency Ultrasound now  - Possible Venaseal given US findings  -3 month trial of medical grade compression (15-20mm Hg).  Wear daily and take off while sleeping.    Due to falling outside the measurements for standard over the counter compression, Patient is in need of custom fitted garment.  Rx is compression stockings under database (outpatient order) Miscellaneous CODE 440472  \"Please measure and distribute two pairs of 215-20mmHG to the knee with extra refills as indicated.\"    -Doff/Ilia, zip up compression stockings, for lower extremities    -Skin care  -Leg elevation at least 1 hour/day  -Encouraged walking at least 20 minutes per day  -Counseled on weight loss and keeping weight stable.    -Discussed with patient that IR intervention would help with the leg symptoms and not help the appearance of varicose veins.   -Patients with refractory symptom management should undergo comprehensive lower extremity duplex exam (with showing demonstration of venous reflux)  -Ongoing symptoms warrant return visit with Interventional Radiologist/PA clinic to " discuss treatment options.                                                                  -All of the patient's questions were answered to their satisfaction.          ===========================================================================================================    HISTORY OF PRESENT ILLNESS:  Sven Givens is a 69 year old male with chronic varicose veins in the lower extremities, left greater than right, with superficial venous treatment in [facility] 25 years prior x2 with improved symptoms. Pt reports heaviness, cramping, and discoloration of the LLE, worse at the end of the day, relived by compression and elevation.   Pt also reports focal pain at the base of the left calcaneus, tender to touch. This is likely separate and related to an ongoing plantar fascitis.   No active wounds.   Telengectasia noted throughout the LLE.     Hx of lung and colon cancer s/p resections, not on chemo. Recent CT chest demonstrates new small nodule, needing followup with possible biopsy impending.   No FH of DVT/PE or MAY thurner.   No bleeding and clotting disorders.    DM/HTN/HLD on current treatment with A1c at 7.5 with notable periph neuropathy        Onset: When did the pain start? 2 months prior  Location: Where is the pain? LLE  Duration: How long has the pain been going on? 2 months  Quality: What does the pain feel like? Cramping and squeezing, heaviness  Aggravating factors: What brings on the pain? What makes it better?   Radiating: Does the pain travel to another area of the body? no  Severity: How intense is the pain on a scale of 1-10? 6  Occupation?  Is there Prolonged standing or sitting? yes  Has it limited your ability to exercise or work? yes  Family history? N/a      Patient denies pain, leg heaviness, aching, swelling, skin dryness, tightness, bleeding itching, skin irritation,  history of DVT/PE, trauma to legs, smoking, rest pain, numbness/tingling fever, chest pain or SOB, wounds in the legs or  feet, (pelvic congestion; pelvic pain, dyspareunia, urinary or GI symptoms), or muscle cramps.     Risk Factors: Venous claudication, which is a severe deep pain and tightness typically in the thigh muscles with vigorous exercise, can occur in the setting of acute, severe venous obstruction or longstanding venous obstruction.  *Visible clinical signs of chronic venous disorders in increasing severity include dilated veins (eg, telangiectasia, varicose veins), leg edema, skin changes, and skin ulceration. The prevalence of symptoms and clinical signs of venous disease correlates with the presence of venous reflux (superficial, deep venous obstruction) identified on duplex ultrasound The most common risk factors include advancing age, family history of venous disease, increased body mass index, smoking, a history of lower extremity trauma, prior venous thrombosis, and, in women, pregnancy.     PAST MEDICAL HISTORY:  Past Medical History:   Diagnosis Date     Allergies     PCN, ACE, Indomethocin     Cancer (H)     small cell lung cancer stage I     Diabetes (H) 2002     Diabetic neuropathy (H) 5/7/2012     Hypertension      Kidney stones 09/2004    s/p right kidney basket retrieval     Rhinitis, allergic      Rosacea      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, overuse, 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     Varicose veins        MEDICATIONS:  Current Outpatient Medications   Medication Sig Dispense Refill     allopurinol (ZYLOPRIM) 100 MG tablet TAKE 2 TABLETS BY MOUTH EVERY  tablet 3     atorvastatin (LIPITOR) 80 MG tablet Take 1 tablet (80 mg) by mouth daily. 90 tablet 3     blood glucose (CONTOUR NEXT TEST) test strip USE TO TEST BLOOD SUGAR 1 TIME DAILY OR AS DIRECTED. 100 strip 11     clotrimazole-betamethasone (LOTRISONE) 1-0.05 % external cream Apply topically 2 times daily.       Continuous Glucose Sensor (FREESTYLE FEDERICO 3 PLUS SENSOR) MISC Use 1 sensor every 15 days. Use to read blood sugars per 's  instructions. 6 each 3     cyanocobalamin (VITAMIN B-12) 1000 MCG tablet Take 1,000 mcg by mouth daily       diclofenac (VOLTAREN) 1 % topical gel 1 g to affected area on the left foot 2-3 times a day as needed for pain. 100 g 2     glimepiride (AMARYL) 4 MG tablet Take 1 tablet (4 mg) by mouth 2 times daily. 180 tablet 3     ketoconazole (NIZORAL) 2 % external shampoo APPLY TOPICALLY EVERY OTHER DAY LATHER INTO RASH AND WASH OFF AFTER 10 MINUTES. 120 mL 0     losartan-hydrochlorothiazide (HYZAAR) 100-25 MG tablet Take 1 tablet by mouth every morning. For blood pressure. 90 tablet 3     metFORMIN (GLUCOPHAGE) 1000 MG tablet TAKE 1 TABLET BY MOUTH TWICE A DAY WITH FOOD 180 tablet 3     metoprolol succinate ER (TOPROL XL) 50 MG 24 hr tablet Take 1 tablet (50 mg) by mouth daily. 90 tablet 3     nitroGLYcerin (NITROSTAT) 0.4 MG sublingual tablet For chest pain place 1 tablet under the tongue every 5 minutes for 3 doses. If symptoms persist 5 minutes after 1st dose call 911. 25 tablet 3     omega-3 acid ethyl esters (LOVAZA) 1 g capsule Take 2 capsules (2 g) by mouth 2 times daily 360 capsule 3     omeprazole (PRILOSEC) 20 MG DR capsule Take 1 capsule (20 mg) by mouth 2 times daily. 180 capsule 3     Oxymetazoline HCl (AFRIN NASAL SPRAY NA) Spray in nostril as needed       Semaglutide, 2 MG/DOSE, (OZEMPIC) 8 MG/3ML pen Inject 2 mg subcutaneously every 7 days. 12 mL 2     senna-docusate (SENEXON-S) 8.6-50 MG tablet Take 1 tablet by mouth at bedtime. 90 tablet 2     terazosin (HYTRIN) 5 MG capsule TAKE 1 CAPSULE (5 MG) BY MOUTH AT BEDTIME FOR BLOOD PRESSURE. 90 capsule 3     No current facility-administered medications for this visit.       ALLERGIES:  Allergies   Allergen Reactions     Pcn [Penicillins] Rash     Ace Inhibitors Cough     Indomethacin Other (See Comments)     Severe dizziness     Invokana [Canagliflozin]      bladder infection       FAMILY HISTORY:  Family History   Problem Relation Age of Onset     Hernia  Mother          age 97     Cerebrovascular Disease Father 64     Cancer Sister         ovarary      Other Cancer Sister      Obesity Sister      Deep Vein Thrombosis (DVT) No family hx of        PAST SURGICAL HISTORY:  Past Surgical History:   Procedure Laterality Date     BIOPSY  23 Sep 2016     BRONCHOSCOPY FLEXIBLE N/A 2016    Procedure: BRONCHOSCOPY FLEXIBLE;  Surgeon: Gayle Moya MD;  Location: [facility] OR     COLONOSCOPY  2003     COLONOSCOPY N/A 2022    Procedure: COLONOSCOPY, WITH POLYPECTOMY AND BIOPSY;  Surgeon: Abbe Mccarty MD;  Location: [facility]     COLONOSCOPY N/A 2023    Procedure: Colonoscopy;  Surgeon: Rosy Schofield MD;  Location: [facility] GI     COLONOSCOPY WITH CO2 INSUFFLATION N/A 2017    Procedure: COLONOSCOPY WITH CO2 INSUFFLATION;  COLON SCREEN/ SEB;  Surgeon: Margarito Rasheed DO;  Location: [facility]     COLONOSCOPY WITH CO2 INSUFFLATION N/A 2022    Procedure: COLONOSCOPY, WITH CO2 INSUFFLATION;  Surgeon: Abbe Mccarty MD;  Location: [facility]     COMBINED ESOPHAGOSCOPY, GASTROSCOPY, DUODENOSCOPY (EGD) WITH CO2 INSUFFLATION N/A 2019    Procedure: COMBINED ESOPHAGOSCOPY, GASTROSCOPY, DUODENOSCOPY (EGD) WITH CO2 INSUFFLATION;  Surgeon: Abbe Mccarty MD;  Location: [facility] OR     ESOPHAGOSCOPY, GASTROSCOPY, DUODENOSCOPY (EGD), COMBINED N/A 2019    Procedure: Combined Esophagoscopy, Gastroscopy, Duodenoscopy (Egd), Biopsy Single Or Multiple;  Surgeon: Abbe Mccarty MD;  Location: [facility] OR     GENITOURINARY SURGERY      kidney stones     HERNIA REPAIR  May 2023     THORACOSCOPIC WEDGE RESECTION LUNG Left 2016    Procedure: THORACOSCOPIC WEDGE RESECTION LUNG;  Surgeon: Gayle Moya MD;  Location: [facility] OR     VASCULAR SURGERY      varicose vein       SOCIAL HISTORY:  Social History     Tobacco Use     Smoking status: Former     Current packs/day: 0.00     Average packs/day: 0.5 packs/day for 1 year (0.5  "ttl pk-yrs)     Types: Cigarettes     Quit date: 1992     Years since quittin.1     Smokeless tobacco: Never     Tobacco comments:     15 + years    Vaping Use     Vaping status: Never Used   Substance Use Topics     Alcohol use: No     Drug use: No       PHYSICAL EXAMINATION:  /86 (BP Location: Left arm, Patient Position: Sitting, Cuff Size: Adult Large)   Pulse 102   Wt 212 lb 1.6 oz   SpO2 97%   BMI 31.32 kg/m    General:  alert and oriented in NAD  Lower Extremity Exam: warm, non tender to palpation bilaterally. Minimal edema.  Distal pulses 2+ palpable.  No wounds appreciated.  Yes, Scattered telangectasia (spider veins), Reticular veins  chronic venostasis changes and skin changes noted. Periph neuropathy noted 2/2 DM2.       PERTINENT IMAGING:  US VENOUS COMPETENCY BILATERAL pending    Varicose veins: >3 mm  Reticular veins:  1-3 mm Reticular veins are dilated bluish subdermal veins   Telangiectasias:  <1 mm are a confluence of dilated intradermal venules <1 mm in diameter (picture 2A-B).    Venous Competency Diagnostic Criteria Adopted 11.     Venous competency criteria defining abnormal reflux duration:  Femoral - popliteal reflux > 1.0 sec.  Deep femoral, deep calf veins, and superficial vein reflux > 0.5 sec.   vein reflux > 0.35 sec.     Supporting document: J Vasc Surg 2003; 38:793-8. Definition of reflux  in lower extremity veins.    Reference: \"Duplex Ultrasound in the Diagnosis of Lower-Extremity Deep  Venous Thrombosis\"- Dora Euceda MD, S; Anson Guajardo MD  (Circulation. 2014;129:917-921. http://circ.ahajournals.org)      MEDIA pictures with DPM notes. No wounds.     Sami Barnett MD  Interventional Radiology  [phone] (IR RN triage)    CC:  Patient Care Team  Patient Care Team:  Yeison Villegas MD as PCP - General (Family Medicine)  Zhanna Ziegler APRN CNS as Referring Physician (Clinical Nurse Specialist)  Dorcas Amezcua RD as Diabetes " Educator (Dietitian, Registered)  Yeison Villegas MD as Assigned PCP  Dane Craft RPH as Pharmacist (Pharmacist)  Shubham Cline MD as MD (Surgery)  Dina Ricardo PA-C as Physician Assistant (Pulmonary Disease)  Dane Craft RPH as Assigned MTM Pharmacist  Dina Ricardo PA-C as Assigned Cancer Care Provider  Mir Ambrocio MD as MD (Otolaryngology)  Yomaira Abarca PA-C as Assigned Heart and Vascular Provider  Zhao Joyce MD as MD (Urology)  Zhao Joyce MD as Assigned Surgical Provider        apply 45 minutes level of service.  Varicose veins, associate diagnosis      Again, thank you for allowing me to participate in the care of your patient.      Sincerely,    Sami Barnett MD

## 2025-02-20 NOTE — PATIENT INSTRUCTIONS
It was a pleasure to see you today.    We have ordered a venous comp study to further assess your veins. You may call [phone] to make this appointment         Kiersten BANSAL RN, BSN  Interventional Radiology/Vascular  Nurse Coordinator  Phone: [phone], option 2            You have been prescribed  compression stocking  . Please bring your prescription to a Home Medical Suppy location to  your item.       [facility] Locations:   Visit [email or web address]      [facility]  [address], [address]  [place] [address]  [phone]  Monday - Friday, 8 a.m. to 4:30 p.m.      [facility]  [address] [place] [address], [address]  [place] [address]  [phone]  Monday - Friday, 8 a.m. to 4:30 p.m.   [facility]  [address]  [place] [address]  [phone]  Monday - Friday, 8 a.m. to 4:30 p.m.     [facility]  [address], [address]  [place] [address]  [phone]  Monday - Friday, 8 a.m. to 4:30 p.m.  *Closed daily Noon to 1 p.m. for patient deliveries     [place]  [facility]  [address], [address]     [place] [address]            [phone]  Monday - Friday, 8 a.m. to 5:00 p.m.      [facility]  [address], [address]  [place], [place] [address] [phone]  Monday - Friday, 8 a.m. to 4:30 p.m.        [facility]  [address]  [place], [place] [address] [phone]  Monday - Friday, 8 a.m. to 4:30 p.m.          Other Home Medical Equipment Locations:     [facility]  [address]  [place] [address]  [phone]    [facility] [email or web address]  [address] [place] [address]  [phone]    [facility]  [email or web address]  [address]  [place] [address]  Phone: [phone]     [address] [place] [address]  Phone: [phone]    [address] [place] [address]  Phone: [phone]    [address], [place] [address]  Phone: [phone] [address], [place] [address]  Phone: [phone]    [facility] [email or web address]  [address], [place] [address]  Phone: [phone]    [address], [place] [address]  Phone: [phone] [address], [place] [address]  Phone: [phone]

## 2025-02-25 ENCOUNTER — ANCILLARY PROCEDURE (OUTPATIENT)
Dept: CT IMAGING | Facility: CLINIC | Age: 70
End: 2025-02-25
Attending: INTERNAL MEDICINE
Payer: COMMERCIAL

## 2025-02-25 ENCOUNTER — LAB (OUTPATIENT)
Dept: LAB | Facility: CLINIC | Age: 70
End: 2025-02-25
Payer: COMMERCIAL

## 2025-02-25 DIAGNOSIS — E83.52 HYPERCALCEMIA: ICD-10-CM

## 2025-02-25 DIAGNOSIS — C34.92 PRIMARY ADENOCARCINOMA OF LEFT LUNG (H): ICD-10-CM

## 2025-02-25 DIAGNOSIS — C18.9 COLON ADENOCARCINOMA (H): ICD-10-CM

## 2025-02-25 LAB
ALBUMIN SERPL BCG-MCNC: 4.6 G/DL (ref 3.5–5.2)
ALP SERPL-CCNC: 99 U/L (ref 40–150)
ALT SERPL W P-5'-P-CCNC: 31 U/L (ref 0–70)
ANION GAP SERPL CALCULATED.3IONS-SCNC: 12 MMOL/L (ref 7–15)
AST SERPL W P-5'-P-CCNC: 27 U/L (ref 0–45)
BASOPHILS # BLD AUTO: 0.1 10E3/UL (ref 0–0.2)
BASOPHILS NFR BLD AUTO: 1 %
BILIRUB SERPL-MCNC: 0.4 MG/DL
BUN SERPL-MCNC: 16.3 MG/DL (ref 8–23)
CA-I BLD-MCNC: 4.8 MG/DL (ref 4.4–5.2)
CALCIUM SERPL-MCNC: 10.1 MG/DL (ref 8.8–10.4)
CEA SERPL-MCNC: 4.1 NG/ML
CHLORIDE SERPL-SCNC: 99 MMOL/L (ref 98–107)
CREAT BLD-MCNC: 0.9 MG/DL (ref 0.7–1.3)
CREAT SERPL-MCNC: 0.74 MG/DL (ref 0.67–1.17)
EGFRCR SERPLBLD CKD-EPI 2021: >60 ML/MIN/1.73M2
EGFRCR SERPLBLD CKD-EPI 2021: >90 ML/MIN/1.73M2
EOSINOPHIL # BLD AUTO: 0.4 10E3/UL (ref 0–0.7)
EOSINOPHIL NFR BLD AUTO: 4 %
ERYTHROCYTE [DISTWIDTH] IN BLOOD BY AUTOMATED COUNT: 13.1 % (ref 10–15)
GLUCOSE SERPL-MCNC: 200 MG/DL (ref 70–99)
HCO3 SERPL-SCNC: 26 MMOL/L (ref 22–29)
HCT VFR BLD AUTO: 39.9 % (ref 40–53)
HGB BLD-MCNC: 13 G/DL (ref 13.3–17.7)
IMM GRANULOCYTES # BLD: 0 10E3/UL
IMM GRANULOCYTES NFR BLD: 0 %
LYMPHOCYTES # BLD AUTO: 3.1 10E3/UL (ref 0.8–5.3)
LYMPHOCYTES NFR BLD AUTO: 35 %
MCH RBC QN AUTO: 28.7 PG (ref 26.5–33)
MCHC RBC AUTO-ENTMCNC: 32.6 G/DL (ref 31.5–36.5)
MCV RBC AUTO: 88 FL (ref 78–100)
MONOCYTES # BLD AUTO: 0.6 10E3/UL (ref 0–1.3)
MONOCYTES NFR BLD AUTO: 6 %
NEUTROPHILS # BLD AUTO: 4.8 10E3/UL (ref 1.6–8.3)
NEUTROPHILS NFR BLD AUTO: 54 %
NRBC # BLD AUTO: 0 10E3/UL
NRBC BLD AUTO-RTO: 0 /100
PLATELET # BLD AUTO: 225 10E3/UL (ref 150–450)
POTASSIUM SERPL-SCNC: 3.9 MMOL/L (ref 3.4–5.3)
PROT SERPL-MCNC: 7.8 G/DL (ref 6.4–8.3)
PTH-INTACT SERPL-MCNC: 39 PG/ML (ref 15–65)
RBC # BLD AUTO: 4.53 10E6/UL (ref 4.4–5.9)
SODIUM SERPL-SCNC: 137 MMOL/L (ref 135–145)
WBC # BLD AUTO: 8.9 10E3/UL (ref 4–11)

## 2025-02-25 PROCEDURE — 82378 CARCINOEMBRYONIC ANTIGEN: CPT

## 2025-02-25 PROCEDURE — 85025 COMPLETE CBC W/AUTO DIFF WBC: CPT

## 2025-02-25 PROCEDURE — 80053 COMPREHEN METABOLIC PANEL: CPT

## 2025-02-25 PROCEDURE — 82565 ASSAY OF CREATININE: CPT | Mod: 91

## 2025-02-25 PROCEDURE — 71260 CT THORAX DX C+: CPT | Mod: GC | Performed by: RADIOLOGY

## 2025-02-25 PROCEDURE — 83970 ASSAY OF PARATHORMONE: CPT

## 2025-02-25 PROCEDURE — 36415 COLL VENOUS BLD VENIPUNCTURE: CPT

## 2025-02-25 PROCEDURE — 82330 ASSAY OF CALCIUM: CPT

## 2025-02-25 RX ORDER — IOPAMIDOL 755 MG/ML
103 INJECTION, SOLUTION INTRAVASCULAR ONCE
Status: COMPLETED | OUTPATIENT
Start: 2025-02-25 | End: 2025-02-25

## 2025-02-25 RX ADMIN — IOPAMIDOL 103 ML: 755 INJECTION, SOLUTION INTRAVASCULAR at 09:15

## 2025-03-02 DIAGNOSIS — E11.42 TYPE 2 DIABETES MELLITUS WITH DIABETIC POLYNEUROPATHY, WITHOUT LONG-TERM CURRENT USE OF INSULIN (H): ICD-10-CM

## 2025-03-06 ENCOUNTER — HOSPITAL ENCOUNTER (OUTPATIENT)
Dept: PET IMAGING | Facility: HOSPITAL | Age: 70
End: 2025-03-06
Attending: INTERNAL MEDICINE
Payer: COMMERCIAL

## 2025-03-06 DIAGNOSIS — R97.0 CARCINOEMBRYONIC ANTIGEN (CEA) ELEVATION: ICD-10-CM

## 2025-03-06 DIAGNOSIS — C18.9 COLON ADENOCARCINOMA (H): ICD-10-CM

## 2025-03-06 DIAGNOSIS — R91.1 SOLITARY PULMONARY NODULE: ICD-10-CM

## 2025-03-06 LAB — GLUCOSE BLDC GLUCOMTR-MCNC: 175 MG/DL (ref 70–99)

## 2025-03-06 PROCEDURE — 343N000001 HC RX 343 MED OP 636: Performed by: INTERNAL MEDICINE

## 2025-03-06 PROCEDURE — 82962 GLUCOSE BLOOD TEST: CPT

## 2025-03-06 PROCEDURE — 78815 PET IMAGE W/CT SKULL-THIGH: CPT | Mod: PI

## 2025-03-06 PROCEDURE — A9552 F18 FDG: HCPCS | Performed by: INTERNAL MEDICINE

## 2025-03-06 RX ORDER — FLUDEOXYGLUCOSE F 18 200 MCI/ML
8-18 INJECTION, SOLUTION INTRAVENOUS ONCE
Status: COMPLETED | OUTPATIENT
Start: 2025-03-06 | End: 2025-03-06

## 2025-03-06 RX ADMIN — FLUDEOXYGLUCOSE F 18 11.52 MILLICURIE: 200 INJECTION, SOLUTION INTRAVENOUS at 06:27

## 2025-03-20 ENCOUNTER — ANCILLARY PROCEDURE (OUTPATIENT)
Dept: ULTRASOUND IMAGING | Facility: CLINIC | Age: 70
End: 2025-03-20
Attending: STUDENT IN AN ORGANIZED HEALTH CARE EDUCATION/TRAINING PROGRAM
Payer: COMMERCIAL

## 2025-03-20 DIAGNOSIS — I83.893 SYMPTOMATIC VARICOSE VEINS OF BOTH LOWER EXTREMITIES: ICD-10-CM

## 2025-03-20 PROCEDURE — 93970 EXTREMITY STUDY: CPT | Mod: GC | Performed by: RADIOLOGY

## 2025-04-02 DIAGNOSIS — I83.893 SYMPTOMATIC VARICOSE VEINS OF BOTH LOWER EXTREMITIES: Primary | ICD-10-CM

## 2025-04-02 DIAGNOSIS — I70.213 ATHEROSCLEROSIS OF NATIVE ARTERIES OF EXTREMITIES WITH INTERMITTENT CLAUDICATION, BILATERAL LEGS: ICD-10-CM

## 2025-04-07 ENCOUNTER — TELEPHONE (OUTPATIENT)
Dept: VASCULAR SURGERY | Facility: CLINIC | Age: 70
End: 2025-04-07
Payer: COMMERCIAL

## 2025-04-07 NOTE — TELEPHONE ENCOUNTER
Patient Contacted for the patient to call back and schedule the following:Diabetes mellitus with peripheral vascular disease     The pt states that he is doing better and feels as if he does not need to follow up.    Location: [facility]  Provider: Dr. Sami Barnett  Appointment type: Return Vascular Patient  Appointment mode: In Person or Virtual Visit  Return date: August 2025    Specialty phone number: [phone] or [phone]    Referred to Vascular [facility]: No    Is Imaging Needed: Yes, US    Additional Notes: Please schedule imaging prior to Provider visit.    -Joseph Dominique on 4/7/2025 at 4:14 PM

## 2025-05-20 ENCOUNTER — TELEPHONE (OUTPATIENT)
Dept: CARDIOLOGY | Facility: CLINIC | Age: 70
End: 2025-05-20
Payer: COMMERCIAL

## 2025-05-20 NOTE — TELEPHONE ENCOUNTER
Left Voicemail (1st Attempt) and Sent [employer].hart (1st Attempt) for the patient to call back and schedule the following:    Appointment type: Return General Cardiology  Provider: Krish San or any General Cardiologist  Return date: August 2025 or next available  Specialty phone number: [phone]  Additional appointment(s) needed:   Additonal Notes:     Attempted to schedule a return General Cardiology appointment with Krish San or any General Cardiologist in August 2025 or next available.    Also sent a Guzz[employer]t message.    Page MULLINS/Pernell Procedure    [facility]   Neurology, NeuroSurgery, NeuroPsychology, Pain Management and Cardiology Specialties  Medical/Surgical Adult Specialties

## 2025-06-12 ENCOUNTER — TELEPHONE (OUTPATIENT)
Dept: FAMILY MEDICINE | Facility: CLINIC | Age: 70
End: 2025-06-12
Payer: COMMERCIAL

## 2025-06-12 NOTE — TELEPHONE ENCOUNTER
Form faxed to [facility] [phone] on 6/12/2025 at 9:02am. Copy placed in abstract and original in TC copy bin.   Henna Zheng

## 2025-06-12 NOTE — TELEPHONE ENCOUNTER
Forms/Letter Request    Type of form/letter: OTHER: Patient Assistance Program Refill/Reorder/Change Request--Ozempic 2mg/mL (1 Pen X 3mL/Pen)       Do we have the form/letter: Yes:     Who is the form from? [employer] (if other please explain)    Where did/will the form come from? form was faxed in    When is form/letter needed by: ASAP    How would you like the form/letter returned: Fax : [phone]    Patient Notified form requests are processed in 5-7 business days:Yes    Could we send this information to you in [place] or would you prefer to receive a phone call?:   Patient would prefer a phone call   Okay to leave a detailed message?: Yes at Cell number on file:    Telephone Information:   Mobile [phone]

## 2025-06-13 ENCOUNTER — ANCILLARY PROCEDURE (OUTPATIENT)
Dept: CT IMAGING | Facility: CLINIC | Age: 70
End: 2025-06-13
Attending: INTERNAL MEDICINE
Payer: COMMERCIAL

## 2025-06-13 DIAGNOSIS — R91.1 SOLITARY PULMONARY NODULE: ICD-10-CM

## 2025-06-13 LAB
CREAT BLD-MCNC: 0.7 MG/DL (ref 0.7–1.2)
EGFRCR SERPLBLD CKD-EPI 2021: >60 ML/MIN/1.73M2

## 2025-06-13 PROCEDURE — 71260 CT THORAX DX C+: CPT | Mod: GC | Performed by: RADIOLOGY

## 2025-06-13 RX ORDER — IOPAMIDOL 755 MG/ML
103 INJECTION, SOLUTION INTRAVASCULAR ONCE
Status: COMPLETED | OUTPATIENT
Start: 2025-06-13 | End: 2025-06-13

## 2025-06-13 RX ADMIN — IOPAMIDOL 103 ML: 755 INJECTION, SOLUTION INTRAVASCULAR at 09:21

## 2025-06-18 ENCOUNTER — ONCOLOGY VISIT (OUTPATIENT)
Dept: ONCOLOGY | Facility: CLINIC | Age: 70
End: 2025-06-18
Attending: INTERNAL MEDICINE
Payer: COMMERCIAL

## 2025-06-18 VITALS
WEIGHT: 204 LBS | DIASTOLIC BLOOD PRESSURE: 73 MMHG | OXYGEN SATURATION: 98 % | HEART RATE: 77 BPM | BODY MASS INDEX: 30.11 KG/M2 | SYSTOLIC BLOOD PRESSURE: 128 MMHG

## 2025-06-18 DIAGNOSIS — R91.1 SOLITARY PULMONARY NODULE: Primary | ICD-10-CM

## 2025-06-18 DIAGNOSIS — C18.9 COLON ADENOCARCINOMA (H): ICD-10-CM

## 2025-06-18 DIAGNOSIS — C34.92 PRIMARY ADENOCARCINOMA OF LEFT LUNG (H): ICD-10-CM

## 2025-06-18 DIAGNOSIS — R97.0 CARCINOEMBRYONIC ANTIGEN (CEA) ELEVATION: ICD-10-CM

## 2025-06-18 PROCEDURE — G2211 COMPLEX E/M VISIT ADD ON: HCPCS | Performed by: INTERNAL MEDICINE

## 2025-06-18 PROCEDURE — G0463 HOSPITAL OUTPT CLINIC VISIT: HCPCS | Performed by: INTERNAL MEDICINE

## 2025-06-18 PROCEDURE — 99213 OFFICE O/P EST LOW 20 MIN: CPT | Performed by: INTERNAL MEDICINE

## 2025-06-18 ASSESSMENT — PAIN SCALES - GENERAL: PAINLEVEL_OUTOF10: NO PAIN (0)

## 2025-06-18 NOTE — NURSING NOTE
"Oncology Rooming Note    June 18, 2025 8:15 AM   Sven Givens is a 70 year old male who presents for:    Chief Complaint   Patient presents with    Oncology Clinic Visit     Initial Vitals: /73 (BP Location: Right arm)   Pulse 77   Wt 92.5 kg (204 lb)   SpO2 98%   BMI 30.11 kg/m   Estimated body mass index is 30.11 kg/m  as calculated from the following:    Height as of 2/28/25: 1.753 m (5' 9.02\").    Weight as of this encounter: 92.5 kg (204 lb). Body surface area is 2.12 meters squared.  No Pain (0) Comment: Data Unavailable   No LMP for male patient.  Allergies reviewed: Yes  Medications reviewed: Yes    Medications: Medication refills not needed today.  Pharmacy name entered into [facility] [employer] [employer]: [facility] [address] [place] [address]    Frailty Screening:   Is the patient here for a new oncology consult visit in cancer care? 2. No    PHQ9:  Did this patient require a PHQ9?: No      Clinical concerns: No Concerns        Kinjal Cramer MA            "
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
## 2025-06-23 DIAGNOSIS — K59.01 SLOW TRANSIT CONSTIPATION: ICD-10-CM

## 2025-06-23 RX ORDER — DOCUSATE SODIUM 50MG AND SENNOSIDES 8.6MG 8.6; 5 MG/1; MG/1
1 TABLET, FILM COATED ORAL AT BEDTIME
Qty: 90 TABLET | Refills: 2 | OUTPATIENT
Start: 2025-06-23

## 2025-07-08 ENCOUNTER — OFFICE VISIT (OUTPATIENT)
Dept: FAMILY MEDICINE | Facility: CLINIC | Age: 70
End: 2025-07-08
Attending: FAMILY MEDICINE
Payer: COMMERCIAL

## 2025-07-08 VITALS
OXYGEN SATURATION: 98 % | BODY MASS INDEX: 30.26 KG/M2 | HEART RATE: 73 BPM | DIASTOLIC BLOOD PRESSURE: 71 MMHG | RESPIRATION RATE: 16 BRPM | TEMPERATURE: 97.5 F | WEIGHT: 205 LBS | SYSTOLIC BLOOD PRESSURE: 112 MMHG

## 2025-07-08 DIAGNOSIS — I25.10 CORONARY ARTERY DISEASE INVOLVING NATIVE CORONARY ARTERY OF NATIVE HEART WITHOUT ANGINA PECTORIS: ICD-10-CM

## 2025-07-08 DIAGNOSIS — E78.5 HYPERLIPIDEMIA LDL GOAL <100: ICD-10-CM

## 2025-07-08 DIAGNOSIS — I10 ESSENTIAL HYPERTENSION WITH GOAL BLOOD PRESSURE LESS THAN 140/90: ICD-10-CM

## 2025-07-08 DIAGNOSIS — E11.42 TYPE 2 DIABETES MELLITUS WITH DIABETIC POLYNEUROPATHY, WITHOUT LONG-TERM CURRENT USE OF INSULIN (H): Primary | ICD-10-CM

## 2025-07-08 DIAGNOSIS — Z23 ENCOUNTER FOR IMMUNIZATION: ICD-10-CM

## 2025-07-08 LAB
EST. AVERAGE GLUCOSE BLD GHB EST-MCNC: 163 MG/DL
HBA1C MFR BLD: 7.3 % (ref 0–5.6)

## 2025-07-08 PROCEDURE — 83036 HEMOGLOBIN GLYCOSYLATED A1C: CPT | Performed by: FAMILY MEDICINE

## 2025-07-08 PROCEDURE — 36415 COLL VENOUS BLD VENIPUNCTURE: CPT | Performed by: FAMILY MEDICINE

## 2025-07-08 RX ORDER — LOSARTAN POTASSIUM 100 MG/1
100 TABLET ORAL DAILY
Qty: 90 TABLET | Refills: 3 | Status: SHIPPED | OUTPATIENT
Start: 2025-07-08

## 2025-07-08 RX ORDER — NITROGLYCERIN 0.4 MG/1
TABLET SUBLINGUAL
Qty: 25 TABLET | Refills: 3 | Status: SHIPPED | OUTPATIENT
Start: 2025-07-08

## 2025-07-08 RX ORDER — GLIMEPIRIDE 4 MG/1
4 TABLET ORAL
Qty: 90 TABLET | Refills: 3 | Status: SHIPPED | OUTPATIENT
Start: 2025-07-08

## 2025-07-08 ASSESSMENT — PAIN SCALES - GENERAL: PAINLEVEL_OUTOF10: NO PAIN (0)

## 2025-07-08 NOTE — PROGRESS NOTES
"  Assessment & Plan     (E11.42) Type 2 diabetes mellitus with diabetic polyneuropathy, without long-term current use of insulin (H)  (primary encounter diagnosis)  Comment:   Plan: HEMOGLOBIN A1C, glimepiride (AMARYL) 4 MG         tablet        Recheck A1c and adjust if needed. Patient stated having lows 60's at night. Discontinue evening glimepiride dose to see if this will help. Recheck in 6 months with Medicare Wellness visit.    (I10) Essential hypertension with goal blood pressure less than 140/90  Comment:   Plan: losartan (COZAAR) 100 MG tablet        Controlled. Patient having lightheadedness especially when getting up. Discontinue hydrochlorothiazide. Monitor bp's.    (E78.5) Hyperlipidemia LDL goal <100  Comment:   Plan: controlled.    (I25.10) Coronary artery disease involving native coronary artery of native heart without angina pectoris  Comment:   Plan: nitroGLYcerin (NITROSTAT) 0.4 MG sublingual         tablet        Control risk factors above. Patient not on aspirin by choice. Recommended this. Patient will discuss further with Cardiology.    (Z23) Encounter for immunization  Comment:   Plan: RSV vaccine, bivalent, ABRYSVO, injection                  BMI  Estimated body mass index is 30.26 kg/m  as calculated from the following:    Height as of 2/28/25: 1.753 m (5' 9.02\").    Weight as of this encounter: 93 kg (205 lb).         Follow-up   No follow-ups on file.     Follow-up Visit   Expected date:  Jan 08, 2026 (Approximate)      Follow Up Appointment Details:     Follow-up with whom?: Me    Follow-Up for what?: Medicare Wellness    Any Additional Chronic Condition Management?:  Diabetes  Hypertension  Hyperlipidemia       Welcome or Annual?: Annual Wellness    How?: In Person                 Rishabh Machuca is a 70 year old, presenting for the following health issues:  RECHECK      7/8/2025     7:34 AM   Additional Questions   Roomed by teddy     History of Present Illness       Reason for " visit:  Follow up with diabetes   He is taking medications regularly.        Diabetes Follow-up    How often are you checking your blood sugar? Continuous glucose monitor  What time of day are you checking your blood sugars (select all that apply)?  Not applicable  Have you had any blood sugars above 200?  Yes   Have you had any blood sugars below 70?  Yes   What symptoms do you notice when your blood sugar is low?  Dizzy and Weak  What concerns do you have today about your diabetes? None   Do you have any of these symptoms? (Select all that apply)  Numbness in feet, Burning in feet, and Excessive thirst      BP Readings from Last 2 Encounters:   06/18/25 128/73   02/28/25 (!) 144/75     Hemoglobin A1C (%)   Date Value   01/07/2025 7.5 (H)   09/10/2024 7.5 (H)   05/26/2021 7.4 (H)   02/16/2021 8.4 (H)     LDL Cholesterol Calculated (mg/dL)   Date Value   01/07/2025 26   04/24/2024 20   05/26/2021 33   05/20/2020 40         How many servings of fruits and vegetables do you eat daily?  0-1  On average, how many sweetened beverages do you drink each day (Examples: soda, juice, sweet tea, etc.  Do NOT count diet or artificially sweetened beverages)?   0  How many days per week do you exercise enough to make your heart beat faster? 5  How many minutes a day do you exercise enough to make your heart beat faster? 20 - 29  How many days per week do you miss taking your medication? 0  Hyperlipidemia Follow-Up    Are you regularly taking any medication or supplement to lower your cholesterol?   Yes- atorvastatin  Are you having muscle aches or other side effects that you think could be caused by your cholesterol lowering medication?  No    Hypertension Follow-up    Do you check your blood pressure regularly outside of the clinic? Yes   Are you following a low salt diet? Yes  Are your blood pressures ever more than 140 on the top number (systolic) OR more   than 90 on the bottom number (diastolic), for example 140/90? No    BP  Readings from Last 2 Encounters:   07/08/25 112/71   06/18/25 128/73           Review of Systems  Constitutional, HEENT, cardiovascular, pulmonary, GI, , musculoskeletal, neuro, skin, endocrine and psych systems are negative, except as otherwise noted.      Objective    /71   Pulse 73   Temp 97.5  F (36.4  C) (Temporal)   Resp 16   Wt 93 kg (205 lb)   SpO2 98%   BMI 30.26 kg/m    Body mass index is 30.26 kg/m .  Physical Exam   GENERAL: alert and no distress  NECK: no adenopathy, no asymmetry, masses, or scars  RESP: lungs clear to auscultation - no rales, rhonchi or wheezes  CV: regular rate and rhythm, normal S1 S2, no S3 or S4, no murmur, click or rub, no peripheral edema  ABDOMEN: soft, nontender, no hepatosplenomegaly, no masses and bowel sounds normal  MS: no gross musculoskeletal defects noted, no edema          Signed Electronically by: Yeison Villegas MD, MD

## 2025-07-08 NOTE — PATIENT INSTRUCTIONS
At [facility], we strive to deliver an exceptional experience to you, every time we see you. If you receive a survey, please let us know what we are doing well and/or what we could improve upon, as we do value your feedback.  If you have MyChart, you can expect to receive results automatically within 24 hours of their completion.  Your provider will send a note interpreting your results as well.   If you do not have MyChart, you should receive your results in about a week by mail.    Your care team:                            Family Medicine Internal Medicine   MD Rodrick Arita, MD Heidy Segura, MD Josse Winchester, MD Montse Griffin, PA-C JESIKA Quintero, JOSE MARIA Villegas, MD Pediatrics   MD Carmen Trejo, MD Lisa Gonsales, PAJOSE Law APRN CNP   MD Keira Garcia, MD Pat Wen, CNP      Same-Day Provider (No follow-up visits)    AMPARO Cortez PA-C     Clinic hours: Monday - Thursday 7 am-6 pm; Fridays 7 am-5 pm.   Urgent care: Monday - Friday 10 am- 8 pm; Saturday and Sunday 9 am-5 pm.    Clinic: [phone]       [facility]: Monday - Thursday 8 am - 7 pm; Friday 8 am - 6 pm  [facility]: [phone]     [facility] Scheduling: Monday - Friday 7 am - 7 pm; Saturday 7 am - 3:30 pm  ([phone]) [place] : [phone]

## 2025-07-22 NOTE — H&P
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, overuse, 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     Varicose veins        Past surgical history:   Past Surgical History:   Procedure Laterality Date     BRONCHOSCOPY FLEXIBLE N/A 9/23/2016    Procedure: BRONCHOSCOPY FLEXIBLE;  Surgeon: Gayle Moya MD;  Location: [facility] OR     COLONOSCOPY  5-03     COLONOSCOPY WITH CO2 INSUFFLATION N/A 5/31/2017    Procedure: COLONOSCOPY WITH CO2 INSUFFLATION;  COLON SCREEN/ SEB;  Surgeon: Margarito Rasheed DO;  Location: [facility] OR     COMBINED ESOPHAGOSCOPY, GASTROSCOPY, DUODENOSCOPY (EGD) WITH CO2 INSUFFLATION N/A 4/19/2019    Procedure: COMBINED ESOPHAGOSCOPY, GASTROSCOPY, DUODENOSCOPY (EGD) WITH CO2 INSUFFLATION;  Surgeon: Abbe Mccarty MD;  Location: [facility] OR     ESOPHAGOSCOPY, GASTROSCOPY, DUODENOSCOPY (EGD), COMBINED N/A 4/19/2019    Procedure: Combined Esophagoscopy, Gastroscopy, Duodenoscopy (Egd), Biopsy Single Or Multiple;  Surgeon: Abbe Mccarty MD;  Location: [facility]     GENITOURINARY SURGERY      kidney stones     THORACOSCOPIC WEDGE RESECTION LUNG Left 9/23/2016    Procedure: THORACOSCOPIC WEDGE RESECTION LUNG;  Surgeon: Gayle Moya MD;  Location: [facility] OR     VASCULAR SURGERY      varicose vein       Current Outpatient Medications   Medication     allopurinol (ZYLOPRIM) 100 MG tablet     atorvastatin (LIPITOR) 80 MG tablet     bisacodyl (DULCOLAX) 5 MG EC tablet     glimepiride (AMARYL) 4 MG tablet     losartan-hydrochlorothiazide  (HYZAAR) 100-25 MG tablet     metFORMIN (GLUCOPHAGE) 1000 MG tablet     metoprolol succinate ER (TOPROL-XL) 50 MG 24 hr tablet     MULTIPLE VITAMINS OR     omega-3 acid ethyl esters (LOVAZA) 1 g capsule     omeprazole (PRILOSEC) 20 MG DR capsule     OZEMPIC, 1 MG/DOSE, 4 MG/3ML SOPN     polyethylene glycol (GOLYTELY) 236 g suspension     senna-docusate (SENEXON-S) 8.6-50 MG tablet     terazosin (HYTRIN) 5 MG capsule     Azelaic Acid (FINACEA) 15 % gel     blood glucose (NO BRAND SPECIFIED) test strip     clotrimazole-betamethasone (LOTRISONE) 1-0.05 % external cream     fluticasone (FLONASE) 50 MCG/ACT nasal spray     ketoconazole (NIZORAL) 2 % external shampoo     metroNIDAZOLE (METROCREAM) 0.75 % external cream     metroNIDAZOLE 0.75 % EX external gel     nitroGLYcerin (NITROSTAT) 0.4 MG sublingual tablet     Oxymetazoline HCl (AFRIN NASAL SPRAY NA)     Current Facility-Administered Medications   Medication     fentaNYL (PF) (SUBLIMAZE) injection     flumazenil (ROMAZICON) injection 0.2 mg     lidocaine (LMX4) kit     lidocaine 1 % 0.1-1 mL     midazolam (VERSED) injection     naloxone (NARCAN) injection 0.2 mg     naloxone (NARCAN) injection 0.2 mg     naloxone (NARCAN) injection 0.4 mg     naloxone (NARCAN) injection 0.4 mg     ondansetron (ZOFRAN ODT) ODT tab 4 mg    Or     ondansetron (ZOFRAN) injection 4 mg     ondansetron (ZOFRAN) injection 4 mg     prochlorperazine (COMPAZINE) injection 5 mg    Or     prochlorperazine (COMPAZINE) tablet 5 mg     sodium chloride (PF) 0.9% PF flush 3 mL     sodium chloride (PF) 0.9% PF flush 3 mL     sodium chloride (PF) 0.9% PF flush       Allergies   Allergen Reactions     Pcn [Penicillins] Rash     Ace Inhibitors Cough     Indomethacin Other (See Comments)     Severe dizziness     Invokana [Canagliflozin]      bladder infection       History of Anesthesia/Sedation Problems: no    Physical Exam:    Mental status: alert  Heart: Normal  Lung: Normal  Assessment of patient's  airway: Normal  Other as pertinent for procedure: None     ASA Score: See Provation note    Mallampati score:  II - Faucial pillars and soft palate may be seen, but uvula is masked by the base of the tongue    Assessment/Plan:     The patient is an appropriate candidate to receive sedation.    Informed consent was discussed with the patient/family, including the risks, benefits, potential complications and any alternative options associated with sedation.    Patient assessment completed just prior to sedation and while under constant observation by the provider. Condition determined to be adequate for proceeding with sedation.    The specific risks for the procedure were discussed with the patient at the time of informed consent and include but are not limited to perforation which could require surgery, missing significant neoplasm or lesion, hemorrhage and adverse sedative complication.      Abbe Mccarty MD

## 2025-09-02 DIAGNOSIS — K59.01 SLOW TRANSIT CONSTIPATION: ICD-10-CM

## 2025-09-02 DIAGNOSIS — E11.42 TYPE 2 DIABETES MELLITUS WITH DIABETIC POLYNEUROPATHY, WITHOUT LONG-TERM CURRENT USE OF INSULIN (H): ICD-10-CM

## 2025-09-02 RX ORDER — HYDROCHLOROTHIAZIDE 12.5 MG/1
CAPSULE ORAL
Qty: 6 EACH | Refills: 2 | Status: SHIPPED | OUTPATIENT
Start: 2025-09-02

## 2025-09-02 RX ORDER — DOCUSATE SODIUM 50MG AND SENNOSIDES 8.6MG 8.6; 5 MG/1; MG/1
1 TABLET, FILM COATED ORAL AT BEDTIME
Qty: 90 TABLET | Refills: 2 | Status: SHIPPED | OUTPATIENT
Start: 2025-09-02

## (undated) DEVICE — KIT ENDO FIRST STEP DISINFECTANT 200ML W/POUCH EP-4

## (undated) DEVICE — ENDO TROCAR FIRST ENTRY KII FIOS ADV FIX 05X100MM CFF03

## (undated) DEVICE — DRAPE LEGGINGS CLEAR 8430

## (undated) DEVICE — ENDO SCOPE WARMER SEAL  C3101

## (undated) DEVICE — KIT PATIENT POSITIONING PIGAZZI LATEX FREE 40580

## (undated) DEVICE — DRAPE SHEET REV FOLD 3/4 9349

## (undated) DEVICE — SOL WATER IRRIG 1000ML BOTTLE 07139-09

## (undated) DEVICE — CATH TRAY FOLEY SURESTEP 16FR W/URNE MTR STLK LATEX A303316A

## (undated) DEVICE — ENDO TROCAR FIRST ENTRY KII FIOS Z-THRD 05X100MM CTF03

## (undated) DEVICE — SU VICRYL 3-0 SH 8X18" UND J864D

## (undated) DEVICE — SU PDS II 1 CTX 36" Z371T

## (undated) DEVICE — SOL WATER IRRIG 1000ML BOTTLE 2F7114

## (undated) DEVICE — SUCTION IRR STRYKERFLOW II W/TIP 250-070-520

## (undated) DEVICE — STPL LINEAR 90 X 3.5MM TA9035S

## (undated) DEVICE — TUBING SMOKE EVAC PNEUMOCLEAR HIGH FLOW 0620050250

## (undated) DEVICE — LINEN TOWEL PACK X30 5481

## (undated) DEVICE — ESU PENCIL SMOKE EVAC W/ROCKER SWITCH 0703-047-000

## (undated) DEVICE — STPL RELOAD LINEAR CUT 75MM TCR75

## (undated) DEVICE — SOL WATER IRRIG 3000ML BAG 2B7117

## (undated) DEVICE — ANTIFOG SOLUTION W/FOAM PAD 31142527

## (undated) DEVICE — SU MONOCRYL 4-0 PS-2 27" UND Y426H

## (undated) DEVICE — ENDO TROCAR BLUNT TIP KII BALLOON 12X100MM C0R47

## (undated) DEVICE — BLADE CLIPPER SGL USE 9680

## (undated) DEVICE — Device

## (undated) DEVICE — ESU GROUND PAD ADULT W/CORD E7507

## (undated) DEVICE — PREP CHLORAPREP 26ML TINTED HI-LITE ORANGE 930815

## (undated) DEVICE — ADH SKIN CLOSURE PREMIERPRO EXOFIN 1.0ML 3470

## (undated) DEVICE — LINEN TOWEL PACK X6 WHITE 5487

## (undated) DEVICE — SUCTION MANIFOLD NEPTUNE 2 SYS 4 PORT 0702-020-000

## (undated) DEVICE — ENDO TROCAR SLEEVE KII ADV FIXATION 05X100MM CFS02

## (undated) DEVICE — CATH FOLYSIL 12FR  10ML AA6112

## (undated) DEVICE — ESU LIGASURE SEALER/DIVIDER RETRACT L-HOOK 37CM LF5637

## (undated) DEVICE — PAD CHUX UNDERPAD 23X24" 7136

## (undated) DEVICE — GLOVE PROTEXIS POWDER FREE SMT 6.5  2D72PT65X

## (undated) DEVICE — ESU LIGASURE LAPAROSCOPIC BLUNT TIP SEALER 5MMX37CM LF1837

## (undated) DEVICE — STPL LINEAR CUT 75MM TLC75

## (undated) RX ORDER — HYDROMORPHONE HYDROCHLORIDE 1 MG/ML
INJECTION, SOLUTION INTRAMUSCULAR; INTRAVENOUS; SUBCUTANEOUS
Status: DISPENSED
Start: 2022-10-28

## (undated) RX ORDER — PROPOFOL 10 MG/ML
INJECTION, EMULSION INTRAVENOUS
Status: DISPENSED
Start: 2022-10-28

## (undated) RX ORDER — FENTANYL CITRATE 50 UG/ML
INJECTION, SOLUTION INTRAMUSCULAR; INTRAVENOUS
Status: DISPENSED
Start: 2022-10-28

## (undated) RX ORDER — FENTANYL CITRATE 50 UG/ML
INJECTION, SOLUTION INTRAMUSCULAR; INTRAVENOUS
Status: DISPENSED
Start: 2023-09-27

## (undated) RX ORDER — FENTANYL CITRATE-0.9 % NACL/PF 10 MCG/ML
PLASTIC BAG, INJECTION (ML) INTRAVENOUS
Status: DISPENSED
Start: 2022-10-28

## (undated) RX ORDER — FENTANYL CITRATE 50 UG/ML
INJECTION, SOLUTION INTRAMUSCULAR; INTRAVENOUS
Status: DISPENSED
Start: 2022-09-23

## (undated) RX ORDER — SIMETHICONE 40MG/0.6ML
SUSPENSION, DROPS(FINAL DOSAGE FORM)(ML) ORAL
Status: DISPENSED
Start: 2023-09-27

## (undated) RX ORDER — ERTAPENEM 1 G/1
INJECTION, POWDER, LYOPHILIZED, FOR SOLUTION INTRAMUSCULAR; INTRAVENOUS
Status: DISPENSED
Start: 2022-10-28

## (undated) RX ORDER — HEPARIN SODIUM 5000 [USP'U]/.5ML
INJECTION, SOLUTION INTRAVENOUS; SUBCUTANEOUS
Status: DISPENSED
Start: 2022-10-28

## (undated) RX ORDER — ACETAMINOPHEN 325 MG/1
TABLET ORAL
Status: DISPENSED
Start: 2022-10-28

## (undated) RX ORDER — SODIUM CHLORIDE, SODIUM LACTATE, POTASSIUM CHLORIDE, CALCIUM CHLORIDE 600; 310; 30; 20 MG/100ML; MG/100ML; MG/100ML; MG/100ML
INJECTION, SOLUTION INTRAVENOUS
Status: DISPENSED
Start: 2022-10-28

## (undated) RX ORDER — LIDOCAINE HYDROCHLORIDE 20 MG/ML
INJECTION, SOLUTION EPIDURAL; INFILTRATION; INTRACAUDAL; PERINEURAL
Status: DISPENSED
Start: 2022-10-28

## (undated) RX ORDER — FENTANYL CITRATE 50 UG/ML
INJECTION, SOLUTION INTRAMUSCULAR; INTRAVENOUS
Status: DISPENSED
Start: 2019-04-19